# Patient Record
Sex: MALE | Race: WHITE | NOT HISPANIC OR LATINO | Employment: OTHER | URBAN - METROPOLITAN AREA
[De-identification: names, ages, dates, MRNs, and addresses within clinical notes are randomized per-mention and may not be internally consistent; named-entity substitution may affect disease eponyms.]

---

## 2017-02-01 ENCOUNTER — ALLIED HEALTH/NURSE VISIT (OUTPATIENT)
Dept: NURSING | Facility: CLINIC | Age: 72
End: 2017-02-01
Payer: COMMERCIAL

## 2017-02-01 VITALS
SYSTOLIC BLOOD PRESSURE: 130 MMHG | HEART RATE: 45 BPM | RESPIRATION RATE: 14 BRPM | OXYGEN SATURATION: 98 % | TEMPERATURE: 98.4 F | DIASTOLIC BLOOD PRESSURE: 62 MMHG

## 2017-02-01 DIAGNOSIS — E03.9 HYPOTHYROIDISM, UNSPECIFIED TYPE: Chronic | ICD-10-CM

## 2017-02-01 DIAGNOSIS — Z01.30 BP CHECK: ICD-10-CM

## 2017-02-01 DIAGNOSIS — I10 ESSENTIAL HYPERTENSION: Primary | Chronic | ICD-10-CM

## 2017-02-01 DIAGNOSIS — E78.5 HYPERLIPIDEMIA, UNSPECIFIED HYPERLIPIDEMIA TYPE: Chronic | ICD-10-CM

## 2017-02-01 DIAGNOSIS — D64.9 ANEMIA, UNSPECIFIED: ICD-10-CM

## 2017-02-01 LAB
ERYTHROCYTE [DISTWIDTH] IN BLOOD BY AUTOMATED COUNT: 13.6 % (ref 10–15)
HCT VFR BLD AUTO: 39 % (ref 40–53)
HGB BLD-MCNC: 13.4 G/DL (ref 13.3–17.7)
MCH RBC QN AUTO: 34.5 PG (ref 26.5–33)
MCHC RBC AUTO-ENTMCNC: 34.4 G/DL (ref 31.5–36.5)
MCV RBC AUTO: 101 FL (ref 78–100)
PLATELET # BLD AUTO: 205 10E9/L (ref 150–450)
RBC # BLD AUTO: 3.88 10E12/L (ref 4.4–5.9)
WBC # BLD AUTO: 5.9 10E9/L (ref 4–11)

## 2017-02-01 PROCEDURE — 85027 COMPLETE CBC AUTOMATED: CPT | Performed by: INTERNAL MEDICINE

## 2017-02-01 PROCEDURE — 99207 ZZC NO CHARGE NURSE ONLY: CPT

## 2017-02-01 PROCEDURE — 83540 ASSAY OF IRON: CPT | Performed by: INTERNAL MEDICINE

## 2017-02-01 PROCEDURE — 84443 ASSAY THYROID STIM HORMONE: CPT | Performed by: INTERNAL MEDICINE

## 2017-02-01 PROCEDURE — 84439 ASSAY OF FREE THYROXINE: CPT | Performed by: INTERNAL MEDICINE

## 2017-02-01 PROCEDURE — 80061 LIPID PANEL: CPT | Performed by: INTERNAL MEDICINE

## 2017-02-01 PROCEDURE — 36415 COLL VENOUS BLD VENIPUNCTURE: CPT | Performed by: INTERNAL MEDICINE

## 2017-02-01 PROCEDURE — 82728 ASSAY OF FERRITIN: CPT | Performed by: INTERNAL MEDICINE

## 2017-02-01 PROCEDURE — 83550 IRON BINDING TEST: CPT | Performed by: INTERNAL MEDICINE

## 2017-02-01 NOTE — NURSING NOTE
Mendez Greene is a 71 year old male who comes in today for a Blood Pressure check because of ongoing blood pressure monitoring.    *Document pulse and BP  *Use new set of vitals button for multiple readings.  *Use extended vitals for orthostatic    Vitals as recorded, a regular cuff was used.    Patient is taking medication as prescribed  Patient is tolerating medications well.  Patient is monitoring Blood Pressure at home.  Average readings if yes are 150s/70-80s    Current complaints: none    Disposition: follow-up as indicated by MD/AP, results routed to MD/AP, patient reminded to call as needed and patient to continue with the same medication unless notified by Dr. Day.    DONYA Frazier CMA February 1, 2017 10:06 AM

## 2017-02-02 LAB
CHOLEST SERPL-MCNC: 159 MG/DL
FERRITIN SERPL-MCNC: 80 NG/ML (ref 26–388)
HDLC SERPL-MCNC: 78 MG/DL
IRON SATN MFR SERPL: 33 % (ref 15–46)
IRON SERPL-MCNC: 103 UG/DL (ref 35–180)
LDLC SERPL CALC-MCNC: 69 MG/DL
NONHDLC SERPL-MCNC: 81 MG/DL
T4 FREE SERPL-MCNC: 0.88 NG/DL (ref 0.76–1.46)
TIBC SERPL-MCNC: 313 UG/DL (ref 240–430)
TRIGL SERPL-MCNC: 62 MG/DL
TSH SERPL DL<=0.005 MIU/L-ACNC: 8.09 MU/L (ref 0.4–4)

## 2017-02-03 ENCOUNTER — MYC MEDICAL ADVICE (OUTPATIENT)
Dept: FAMILY MEDICINE | Facility: CLINIC | Age: 72
End: 2017-02-03

## 2017-02-03 DIAGNOSIS — E03.9 HYPOTHYROIDISM, UNSPECIFIED TYPE: Primary | Chronic | ICD-10-CM

## 2017-02-06 RX ORDER — LEVOTHYROXINE SODIUM 150 UG/1
150 TABLET ORAL DAILY
Qty: 90 TABLET | Refills: 0 | Status: SHIPPED | OUTPATIENT
Start: 2017-02-06 | End: 2017-04-05

## 2017-02-06 NOTE — TELEPHONE ENCOUNTER
Please see My Chart message below and advise as appropriate.    Savanah Cornelius RN  [employer]

## 2017-02-07 NOTE — TELEPHONE ENCOUNTER
Trudy Goncalves, please see Steven's note below re: Tess not showing [facility]. Can you please help him? Per his note, this has been quite frustrating and he is a very on-top-of-it ladarius. ?Is it [place] or something else?  Thanks!

## 2017-02-07 NOTE — TELEPHONE ENCOUNTER
After some research into scheduling with Tess, [facility] is not set up for scheduling online d/t complexity of patients.  Patients need to send a message to the  or call the clinic directly to schedule so the appropriate appointment can be made.  Call to patient to inform, he verbalizes understanding.  He does not need to make an appointment at this time, he will call when he does.  Becky Rodriguez RN

## 2017-02-19 DIAGNOSIS — I10 ESSENTIAL HYPERTENSION: Chronic | ICD-10-CM

## 2017-02-20 RX ORDER — LISINOPRIL 5 MG/1
TABLET ORAL
Qty: 90 TABLET | Refills: 2 | Status: SHIPPED | OUTPATIENT
Start: 2017-02-20 | End: 2017-04-04

## 2017-02-20 NOTE — TELEPHONE ENCOUNTER
lisinopril (PRINIVIL/ZESTRIL) 5 MG tablet        Last Written Prescription Date: 5/19/2016  Last Fill Quantity: 90, # refills: 2  Last Office Visit with G, P or [facility] prescribing provider: 12/26/2016       Potassium   Date Value Ref Range Status   12/26/2016 4.1 3.4 - 5.3 mmol/L Final     Creatinine   Date Value Ref Range Status   12/26/2016 0.93 0.66 - 1.25 mg/dL Final     BP Readings from Last 3 Encounters:   02/01/17 130/62   12/26/16 106/72   08/01/16 136/68

## 2017-04-04 ENCOUNTER — OFFICE VISIT (OUTPATIENT)
Dept: FAMILY MEDICINE | Facility: CLINIC | Age: 72
End: 2017-04-04
Payer: COMMERCIAL

## 2017-04-04 VITALS
TEMPERATURE: 96.5 F | HEART RATE: 68 BPM | HEIGHT: 71 IN | OXYGEN SATURATION: 98 % | BODY MASS INDEX: 28 KG/M2 | DIASTOLIC BLOOD PRESSURE: 80 MMHG | SYSTOLIC BLOOD PRESSURE: 128 MMHG | WEIGHT: 200 LBS | RESPIRATION RATE: 18 BRPM

## 2017-04-04 DIAGNOSIS — E78.5 HYPERLIPIDEMIA, UNSPECIFIED HYPERLIPIDEMIA TYPE: Chronic | ICD-10-CM

## 2017-04-04 DIAGNOSIS — R06.7 SNEEZING: ICD-10-CM

## 2017-04-04 DIAGNOSIS — I10 ESSENTIAL HYPERTENSION: Chronic | ICD-10-CM

## 2017-04-04 DIAGNOSIS — M62.830 BACK MUSCLE SPASM: ICD-10-CM

## 2017-04-04 DIAGNOSIS — E03.9 HYPOTHYROIDISM, UNSPECIFIED TYPE: Primary | Chronic | ICD-10-CM

## 2017-04-04 PROCEDURE — 80061 LIPID PANEL: CPT | Performed by: INTERNAL MEDICINE

## 2017-04-04 PROCEDURE — 84443 ASSAY THYROID STIM HORMONE: CPT | Performed by: INTERNAL MEDICINE

## 2017-04-04 PROCEDURE — 99214 OFFICE O/P EST MOD 30 MIN: CPT | Performed by: INTERNAL MEDICINE

## 2017-04-04 PROCEDURE — 36415 COLL VENOUS BLD VENIPUNCTURE: CPT | Performed by: INTERNAL MEDICINE

## 2017-04-04 RX ORDER — SIMVASTATIN 10 MG
10 TABLET ORAL AT BEDTIME
Qty: 90 TABLET | Refills: 3 | Status: SHIPPED | OUTPATIENT
Start: 2017-04-04 | End: 2018-03-20

## 2017-04-04 RX ORDER — LISINOPRIL 2.5 MG/1
2.5 TABLET ORAL DAILY
Qty: 90 TABLET | Refills: 3 | Status: SHIPPED | OUTPATIENT
Start: 2017-04-04 | End: 2018-03-20

## 2017-04-04 NOTE — PATIENT INSTRUCTIONS
Labs today  Prescriptions refilled- they will be on file when you need them   Try Flonase or Nasacort daily in each nostril for sneezing/allergies  Try a heating pad?  Keep me updated on your symptoms

## 2017-04-04 NOTE — NURSING NOTE
"Chief Complaint   Patient presents with     Hypertension     Hyperlipidemia       Initial /80 (BP Location: Right arm, Patient Position: Right side, Cuff Size: Adult Regular)  Pulse 68  Temp 96.5  F (35.8  C) (Tympanic)  Resp 18  Ht 5' 10.5\" (1.791 m)  Wt 200 lb (90.7 kg)  SpO2 98%  BMI 28.29 kg/m2 Estimated body mass index is 28.29 kg/(m^2) as calculated from the following:    Height as of this encounter: 5' 10.5\" (1.791 m).    Weight as of this encounter: 200 lb (90.7 kg).  Medication Reconciliation: complete   Viola Garcia CMA (AAMA)      "

## 2017-04-04 NOTE — MR AVS SNAPSHOT
After Visit Summary   4/4/2017    Mendez Greene    MRN: [id number]           Patient Information     Date Of Birth          1945        Visit Information        Provider Department      4/4/2017 9:30 AM Michell Day, DO [facility]        Today's Diagnoses     Hypothyroidism, unspecified type    -  1    Essential hypertension        Hyperlipidemia, unspecified hyperlipidemia type        Back muscle spasm        Sneezing          Care Instructions    Labs today  Prescriptions refilled- they will be on file when you need them   Try Flonase or Nasacort daily in each nostril for sneezing/allergies  Try a heating pad?  Keep me updated on your symptoms          Follow-ups after your visit        Who to contact     If you have questions or need follow up information about today's clinic visit or your schedule please contact [facility] directly at [phone].  Normal or non-critical lab and imaging results will be communicated to you by combionichart, letter or phone within 4 business days after the clinic has received the results. If you do not hear from us within 7 days, please contact the clinic through combionichart or phone. If you have a critical or abnormal lab result, we will notify you by phone as soon as possible.  Submit refill requests through [employer] or call your pharmacy and they will forward the refill request to us. Please allow 3 business days for your refill to be completed.          Additional Information About Your Visit        MyChart Information     [employer] gives you secure access to your electronic health record. If you see a primary care provider, you can also send messages to your care team and make appointments. If you have questions, please call your primary care clinic.  If you do not have a primary care provider, please call [phone] and they will assist you.        Care EveryWhere ID     This is your Care EveryWhere ID. This could be used by other  "organizations to access your [place] medical records  [id number]        Your Vitals Were     Pulse Temperature Respirations Height Pulse Oximetry BMI (Body Mass Index)    68 96.5  F (35.8  C) (Tympanic) 18 5' 10.5\" (1.791 m) 98% 28.29 kg/m2       Blood Pressure from Last 3 Encounters:   04/04/17 128/80   02/01/17 130/62   12/26/16 106/72    Weight from Last 3 Encounters:   04/04/17 200 lb (90.7 kg)   12/26/16 205 lb (93 kg)   08/01/16 200 lb 4.8 oz (90.9 kg)              We Performed the Following     Lipid panel reflex to direct LDL     TSH with free T4 reflex          Today's Medication Changes          These changes are accurate as of: 4/4/17 10:14 AM.  If you have any questions, ask your nurse or doctor.               These medicines have changed or have updated prescriptions.        Dose/Directions    lisinopril 2.5 MG tablet   Commonly known as:  PRINIVIL/Zestril   This may have changed:  See the new instructions.   Used for:  Essential hypertension   Changed by:  Michell Day DO        Dose:  2.5 mg   Take 1 tablet (2.5 mg) by mouth daily   Quantity:  90 tablet   Refills:  3       simvastatin 10 MG tablet   Commonly known as:  ZOCOR   This may have changed:  medication strength   Used for:  Hyperlipidemia, unspecified hyperlipidemia type   Changed by:  Michell Day DO        Dose:  10 mg   Take 1 tablet (10 mg) by mouth At Bedtime   Quantity:  90 tablet   Refills:  3            Where to get your medicines      These medications were sent to [id number] - [facility] [address] AT [facility] [address]  [address], [facility] [address]    Hours:  24-hours Phone:  [phone]     lisinopril 2.5 MG tablet    simvastatin 10 MG tablet                Primary Care Provider Office Phone # Fax #    Michell Day [phone]       [facility] [place] [address]  [place] [address]        Thank you!     Thank you for choosing " [facility]  for your care. Our goal is always to provide you with excellent care. Hearing back from our patients is one way we can continue to improve our services. Please take a few minutes to complete the written survey that you may receive in the mail after your visit with us. Thank you!             Your Updated Medication List - Protect others around you: Learn how to safely use, store and throw away your medicines at www.disposemymeds.org.          This list is accurate as of: 4/4/17 10:14 AM.  Always use your most recent med list.                   Brand Name Dispense Instructions for use    aspirin 81 MG tablet      Take  by mouth daily.       B-12 1000 MCG Caps      Take 1,000 mcg by mouth daily       BENADRYL 25 MG tablet   Generic drug:  diphenhydrAMINE     30 tablet    Take 1 tablet by mouth every 6 hours as needed for itching.       COMPOUND - PHARMACY TO MIX COMPOUNDED MEDICATION    CMPD RX     Hydro 2.5% Cr / Ketocon Crm.  Apply to the effected area on face twice daily as needed.       levothyroxine 150 MCG tablet    SYNTHROID/LEVOTHROID    90 tablet    Take 1 tablet (150 mcg) by mouth daily       lisinopril 2.5 MG tablet    PRINIVIL/Zestril    90 tablet    Take 1 tablet (2.5 mg) by mouth daily       MULTIVITAMIN ADULT PO          PATADAY 0.2 % Soln   Generic drug:  olopatadine HCl      Apply  to eye. 1 drop each eye once daily as needed       simvastatin 10 MG tablet    ZOCOR    90 tablet    Take 1 tablet (10 mg) by mouth At Bedtime       tadalafil 20 MG tablet    CIALIS    6 tablet    Take 1 tablet (20 mg) by mouth daily as needed for erectile dysfunction TAKEN AT LEAST 30 MINUTES BEFORE INTERCOURSE

## 2017-04-04 NOTE — PROGRESS NOTES
"  SUBJECTIVE:                                                    Mendez Greene is a 71 year old male who presents to clinic today for the following health issues:      Hyperlipidemia Follow-Up      Rate your low fat/cholesterol diet?: not monitoring fat    Taking statin?  Yes, no muscle aches from statin    Other lipid medications/supplements?:  none     Hypertension Follow-up      Outpatient blood pressures are being checked at home.  Results are 130's/80's.    Low Salt Diet: not monitoring salt       Amount of exercise or physical activity: walking    Problems taking medications regularly: No    Medication side effects: none    Diet: regular (no restrictions)      Hypothyroidism Follow-up      Since last visit, patient describes the following symptoms: Weight stable, no hair loss, no skin changes, no constipation, no loose stools       Steven has been doing well. He is fasting today.  States he has been getting chilled and will start sneezing \"uncontrollably\" for a few minutes with congestion. He will blow his nose. Says it isn't related to cold weather, its just if he gets a chill which is very short-lived and random. He does have seasonal allergies. Pt states his eyes are itching too. He isn't that concerned about it - just wanted to bring it up.    Complaining of muscular pain across his left flank. He had questions about when/if he should be worried about kidney problems. Says the pain is the most prominent in the morning. He thinks it was d/t when he slept on a very hard bed and is musculoskeletal.   He had two kidney stones a few years ago and said he didn't have associated pain, except for when he passed it.  S/p cystoprostatectomy d/t history of bladder cancer. Denies hematuria. [place] did imaging last year, and he is not scheduled for any future f/u due to stability.  He has been doing well with his meds and increased Levothyroxine dose since our last OV and labs.    Problem list and histories reviewed & " "adjusted, as indicated.    ROS:  Detailed as above    This document serves as a record of the services and decisions personally performed and made by Michell Day DO. It was created on his/her behalf by Janene Mosley, a trained medical scribe. The creation of this document is based the provider's statements to the medical scribe.  Heaven Mosley 9:41 AM, April 4, 2017   OBJECTIVE:                                                    /80 (BP Location: Right arm, Patient Position: Right side, Cuff Size: Adult Regular)  Pulse 68  Temp 96.5  F (35.8  C) (Tympanic)  Resp 18  Ht 5' 10.5\" (1.791 m)  Wt 200 lb (90.7 kg)  SpO2 98%  BMI 28.29 kg/m2  Body mass index is 28.29 kg/(m^2).  Pale nasal passageway, clear drainage   No pain with palpation of the left paraspinal muscles nor rash  Abdomen ND, NT, no HSM   Alert, pleasant, cooperative, NAD  Normal affect     ASSESSMENT/PLAN:                                                    1. Hypothyroidism, unspecified type  Will dose adjust according to labs - had recent dose adjustment  - TSH with free T4 reflex    2. Essential hypertension  Tolerating decreased dose   - lisinopril (PRINIVIL/ZESTRIL) 2.5 MG tablet; Take 1 tablet (2.5 mg) by mouth daily  Dispense: 90 tablet; Refill: 3    3. Hyperlipidemia, unspecified hyperlipidemia type  Had cut down on dose d/t his improved physical activity and weight loss  - simvastatin (ZOCOR) 10 MG tablet; Take 1 tablet (10 mg) by mouth At Bedtime  Dispense: 90 tablet; Refill: 3  - Lipid panel reflex to direct LDL    4. Back muscle spasm  See pt instructions    5. Sneezing  See pt instructions - has h/o allergic rhinitis on problem list too      Patient Instructions   Labs today  Prescriptions refilled- they will be on file when you need them   Try Flonase or Nasacort daily in each nostril for sneezing/allergies  Try a heating pad?  Keep me updated on your symptoms    Follow up this winter for annual exam or " sooner PRN    The information in this document, created by the medical scribe for me, accurately reflects the services I personally performed and the decisions made by me. I have reviewed and approved this document for accuracy prior to leaving the patient care area.  Michell Day DO  9:42 AM, 04/04/17    Michell Day DO  [facility]

## 2017-04-05 LAB
CHOLEST SERPL-MCNC: 154 MG/DL
HDLC SERPL-MCNC: 76 MG/DL
LDLC SERPL CALC-MCNC: 66 MG/DL
NONHDLC SERPL-MCNC: 78 MG/DL
TRIGL SERPL-MCNC: 59 MG/DL
TSH SERPL DL<=0.005 MIU/L-ACNC: 3.88 MU/L (ref 0.4–4)

## 2017-04-05 RX ORDER — LEVOTHYROXINE SODIUM 150 UG/1
150 TABLET ORAL DAILY
Qty: 90 TABLET | Refills: 3 | Status: SHIPPED | OUTPATIENT
Start: 2017-04-05 | End: 2018-05-13

## 2017-06-08 ENCOUNTER — TRANSFERRED RECORDS (OUTPATIENT)
Dept: HEALTH INFORMATION MANAGEMENT | Facility: CLINIC | Age: 72
End: 2017-06-08

## 2017-06-20 ENCOUNTER — TRANSFERRED RECORDS (OUTPATIENT)
Dept: HEALTH INFORMATION MANAGEMENT | Facility: CLINIC | Age: 72
End: 2017-06-20

## 2017-08-23 ENCOUNTER — MYC MEDICAL ADVICE (OUTPATIENT)
Dept: FAMILY MEDICINE | Facility: CLINIC | Age: 72
End: 2017-08-23

## 2017-08-25 ENCOUNTER — OFFICE VISIT (OUTPATIENT)
Dept: FAMILY MEDICINE | Facility: CLINIC | Age: 72
End: 2017-08-25
Payer: COMMERCIAL

## 2017-08-25 VITALS
WEIGHT: 201 LBS | BODY MASS INDEX: 28.14 KG/M2 | HEART RATE: 51 BPM | HEIGHT: 71 IN | SYSTOLIC BLOOD PRESSURE: 110 MMHG | OXYGEN SATURATION: 96 % | TEMPERATURE: 98 F | DIASTOLIC BLOOD PRESSURE: 72 MMHG

## 2017-08-25 DIAGNOSIS — H81.10 BPPV (BENIGN PAROXYSMAL POSITIONAL VERTIGO), UNSPECIFIED LATERALITY: Primary | ICD-10-CM

## 2017-08-25 PROCEDURE — 99213 OFFICE O/P EST LOW 20 MIN: CPT | Performed by: INTERNAL MEDICINE

## 2017-08-25 NOTE — PATIENT INSTRUCTIONS
Meclizine (Antivert) as needed for dizziness  Try to back down a bit on warbler watching for now  Schedule physical therapy for vertigo if not improving  Lab work in spring

## 2017-08-25 NOTE — MR AVS SNAPSHOT
After Visit Summary   8/25/2017    Mendez Gerene    MRN: [id number]           Patient Information     Date Of Birth          1945        Visit Information        Provider Department      8/25/2017 10:00 AM Michell Day,  [facility]        Today's Diagnoses     BPPV (benign paroxysmal positional vertigo), unspecified laterality    -  1      Care Instructions    Meclizine (Antivert) as needed for dizziness  Try to back down a bit on warbler watching for now  Schedule physical therapy for vertigo if not improving  Lab work in spring          Follow-ups after your visit        Additional Services     [facility] PT, HAND, AND CHIROPRACTIC REFERRAL       **This order will print in the [facility] Scheduling Office**    Physical Therapy, Hand Therapy and Chiropractic Care are available through:    *[facility]  *[facility]  *[facility]    Call one number to schedule at any of the above locations: [phone].    Your provider has referred you to: Physical Therapy at [facility] or [facility]    Indication/Reason for Referral: BPPV  Onset of Illness: 2 weeks  Therapy Orders: Evaluate and Treat  Special Programs: None  Special Request: None    Kathy Chavez      Additional Comments for the Therapist or Chiropractor: No    Please be aware that coverage of these services is subject to the terms and limitations of your health insurance plan.  Call member services at your health plan with any benefit or coverage questions.      Please bring the following to your appointment:    *Your personal calendar for scheduling future appointments  *Comfortable clothing                  Who to contact     If you have questions or need follow up information about today's clinic visit or your schedule please contact [facility] [place] directly at [phone].  Normal or non-critical lab and imaging results will be communicated to you by MyChart, letter or phone within  "4 business days after the clinic has received the results. If you do not hear from us within 7 days, please contact the clinic through [employer] or phone. If you have a critical or abnormal lab result, we will notify you by phone as soon as possible.  Submit refill requests through [employer] or call your pharmacy and they will forward the refill request to us. Please allow 3 business days for your refill to be completed.          Additional Information About Your Visit        [employer] MessagingharYoungCurrent Information     [employer] gives you secure access to your electronic health record. If you see a primary care provider, you can also send messages to your care team and make appointments. If you have questions, please call your primary care clinic.  If you do not have a primary care provider, please call [phone] and they will assist you.        Care EveryWhere ID     This is your Care EveryWhere ID. This could be used by other organizations to access your [place] medical records  [id number]        Your Vitals Were     Pulse Temperature Height Pulse Oximetry BMI (Body Mass Index)       51 98  F (36.7  C) (Oral) 5' 10.5\" (1.791 m) 96% 28.43 kg/m2        Blood Pressure from Last 3 Encounters:   08/25/17 110/72   04/04/17 128/80   02/01/17 130/62    Weight from Last 3 Encounters:   08/25/17 201 lb (91.2 kg)   04/04/17 200 lb (90.7 kg)   12/26/16 205 lb (93 kg)              We Performed the Following     [facility] PT, HAND, AND CHIROPRACTIC REFERRAL        Primary Care Provider Office Phone # Fax #    Michell Meagan Day [phone] [address] [facility] [address]  [place] [address]        Equal Access to Services     [facility] HospitalJEANIE : Hadii laura Vivar, waaxda luqadaha, qaybta kaalmargaret hogan . So [facility] [phone].    ATENCIÓN: Si habla español, tiene a anne disposición servicios gratuitos de asistencia lingüística. Llame al [phone].    We comply with applicable federal civil " rights laws and [place] laws. We do not discriminate on the basis of race, color, national origin, age, disability sex, sexual orientation or gender identity.            Thank you!     Thank you for choosing [facility]  for your care. Our goal is always to provide you with excellent care. Hearing back from our patients is one way we can continue to improve our services. Please take a few minutes to complete the written survey that you may receive in the mail after your visit with us. Thank you!             Your Updated Medication List - Protect others around you: Learn how to safely use, store and throw away your medicines at www.disposemymeds.org.          This list is accurate as of: 8/25/17 10:52 AM.  Always use your most recent med list.                   Brand Name Dispense Instructions for use Diagnosis    aspirin 81 MG tablet      Take  by mouth daily.        B-12 1000 MCG Caps      Take 1,000 mcg by mouth daily        BENADRYL 25 MG tablet   Generic drug:  diphenhydrAMINE     30 tablet    Take 1 tablet by mouth every 6 hours as needed for itching.    Allergic rhinitis, cause unspecified       COMPOUNDED NON-CONTROLLED SUBSTANCE - PHARMACY TO MIX COMPOUNDED MEDICATION    CMPD RX     Hydro 2.5% Cr / Ketocon Crm.  Apply to the effected area on face twice daily as needed.        levothyroxine 150 MCG tablet    SYNTHROID/LEVOTHROID    90 tablet    Take 1 tablet (150 mcg) by mouth daily    Hypothyroidism, unspecified type       lisinopril 2.5 MG tablet    PRINIVIL/Zestril    90 tablet    Take 1 tablet (2.5 mg) by mouth daily    Essential hypertension       MULTIVITAMIN ADULT PO           PATADAY 0.2 % Soln   Generic drug:  olopatadine HCl      Apply  to eye. 1 drop each eye once daily as needed        simvastatin 10 MG tablet    ZOCOR    90 tablet    Take 1 tablet (10 mg) by mouth At Bedtime    Hyperlipidemia, unspecified hyperlipidemia type       tadalafil 20 MG tablet    CIALIS    6 tablet    Take  1 tablet (20 mg) by mouth daily as needed for erectile dysfunction TAKEN AT LEAST 30 MINUTES BEFORE INTERCOURSE    Impotence of organic origin

## 2017-08-25 NOTE — PROGRESS NOTES
"  SUBJECTIVE:   Mendez Greene is a 71 year old male who presents to clinic today for the following health issues:      Chief Complaint   Patient presents with     Dizziness     dizziness in the morning when getting out of bed and seems to last the whole day until he lays down back at night for bed; fatigue also present       Dizziness the second he lies down and then just karen \"there\". Sleeps well. Going on 10 days without change. Describes it as a \"tipping\" feeling but otherwise hard to describe.   Has been looking at warblers (looking up) more than usual past week but this isn't something new.  Also mild forehead pressure. Denies vision changes and just had eyes checked. No nausea.   Doesn't feel ill. No falls recently.   No head trauma.  Does have some intermittent right ear tinnitus.  No focal abnormalities noted.   His  recommended Meclizine.    Problem list and histories reviewed & adjusted, as indicated.    ROS:  Detailed as above     OBJECTIVE:     /72 (BP Location: Right arm, Patient Position: Sitting, Cuff Size: Adult Regular)  Pulse 51  Temp 98  F (36.7  C) (Oral)  Ht 5' 10.5\" (1.791 m)  Wt 201 lb (91.2 kg)  SpO2 96%  BMI 28.43 kg/m2  Body mass index is 28.43 kg/(m^2).  Alert, pleasant, robust, NAD, healthy appearing  CN II-XII within normal limits  Normal bilateral upper and lower ext strength and sensation  Normal speech and gait  No dysmetria  TMs normal and canals without cerumen  Heart regular fam and no carotid bruits    ASSESSMENT/PLAN:     BPPV (benign paroxysmal positional vertigo), unspecified laterality  Does sound like BPPV and is mild  Does have some intermittent though chronic right ear tinnitus which may relate to underlying disorder  - [facility] PT, HAND, AND CHIROPRACTIC REFERRAL    Patient Instructions   Meclizine (Antivert) as needed for dizziness  Try to back down a bit on warbler watching for now  Schedule physical therapy for vertigo if not improving  Lab work in " spring    MDM: >15 minutes spent with patient, over 50% time counseling, coordinating care and explaining about nature of the patient's conditions.      Michell Day, [facility]

## 2017-10-09 ENCOUNTER — MYC MEDICAL ADVICE (OUTPATIENT)
Dept: FAMILY MEDICINE | Facility: CLINIC | Age: 72
End: 2017-10-09

## 2017-10-09 DIAGNOSIS — G47.30 ORGANIC SLEEP APNEA: Primary | Chronic | ICD-10-CM

## 2017-10-11 NOTE — TELEPHONE ENCOUNTER
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 I don't have the forms anymore so we'd need a new blank form.  Thanks!

## 2017-10-12 NOTE — TELEPHONE ENCOUNTER
Arleth spoke with Steven and Lex Jernigan yesterday and I'll fill out the form when it comes back to me  When he officially goes on Medicare later this year it is suggested he see a sleep specialist.

## 2017-10-19 NOTE — TELEPHONE ENCOUNTER
I am working with [facility] to find out more specifics on what chart notes are sufficient in order to be compliant with his insurance. I will call Rachel back in the morning when she is available, she is out of the office for the rest of today.     FYI - we received a fax asking for chart notes for insurance compliance. Steven is not currently on Medicare, so we need to find out what is sufficient for his CPAP supply coverage, as Dr. Day did not specifically address his sleep apnea at any recent visits, but only approved his supplies for refill as she did last year.

## 2017-10-20 NOTE — TELEPHONE ENCOUNTER
I was able to reach [facility] regarding the insurance requirements for Steven's CPAP supplies - they need him to make an appointment with Dr. Day so that he can specifically address his sleep apnea with her in order for his supplies to be approved/covered by his insurance. Once this is done, we need to fax the visit notes along with the signed rx to [facility] at Fax # [phone].    I left Steven a message with this information through his wife, and she will have him make an appointment as soon as he is able. According to [facility], he just got new CPAP supplies in August, 2017 so he should be good for a while.

## 2017-12-05 ENCOUNTER — OFFICE VISIT (OUTPATIENT)
Dept: FAMILY MEDICINE | Facility: CLINIC | Age: 72
End: 2017-12-05
Payer: COMMERCIAL

## 2017-12-05 VITALS
WEIGHT: 201 LBS | DIASTOLIC BLOOD PRESSURE: 66 MMHG | HEART RATE: 54 BPM | SYSTOLIC BLOOD PRESSURE: 138 MMHG | TEMPERATURE: 97.7 F | BODY MASS INDEX: 28.14 KG/M2 | HEIGHT: 71 IN | OXYGEN SATURATION: 98 %

## 2017-12-05 DIAGNOSIS — M25.522 LEFT ELBOW PAIN: ICD-10-CM

## 2017-12-05 DIAGNOSIS — Z87.891 FORMER SMOKER: ICD-10-CM

## 2017-12-05 DIAGNOSIS — D49.4 NEOPLASM OF BLADDER: Chronic | ICD-10-CM

## 2017-12-05 DIAGNOSIS — G47.33 OSA (OBSTRUCTIVE SLEEP APNEA): Primary | ICD-10-CM

## 2017-12-05 PROCEDURE — 99213 OFFICE O/P EST LOW 20 MIN: CPT | Performed by: INTERNAL MEDICINE

## 2017-12-05 NOTE — PROGRESS NOTES
"  SUBJECTIVE:   Mendez Greene is a 72 year old male who presents to clinic today for the following health issues:      Follow-up for Obstructive sleep apnea  Per Tess note from Steven: \"I have used my CPAP for over 20 years, and doubt that my sleep apnea has been cured. I do know I can't sleep very long without it.\" Uses CPAP every night and even relies on portable CPAP when travels. His wife had noticed he had apnea and had snoring leading to the initial sleep study years ago. No longer snoring regularly and no witnessed apnea while using CPAP. He has lost some weight over the years by making good lifestyle habits. Uses heated humidity and CPAP set at 7 cmH20. Would be willing to establish with sleep clinic if necessary. Just did refill his supplies.    A few weeks of left elbow soreness and can't stretch out completely; no injury. Is R handed. Not that much of a nuisance; just noticed it. Overall very functional.    Also feels good overall. Weight stable. [facility].    Also recently received a good report with excellent basic labs per [place] in f/u of bladder cancer.    His vertigo we discussed per last OV resolved. As did recent 3 week course of what was likely a bronchitis.      Problem list and histories reviewed & adjusted, as indicated.    ROS:  Detailed as above     OBJECTIVE:     /66 (BP Location: Right arm, Cuff Size: Adult Large)  Pulse 54  Temp 97.7  F (36.5  C) (Oral)  Ht 5' 10.5\" (1.791 m)  Wt 201 lb (91.2 kg)  SpO2 98%  BMI 28.43 kg/m2  Body mass index is 28.43 kg/(m^2).  Wt Readings from Last 2 Encounters:   12/05/17 201 lb (91.2 kg)   08/25/17 201 lb (91.2 kg)   Alert, pleasant, healthy appearing, NAD  Mild fullness left medial elbow / proximal forearm but normal strength with elbow flexion, internal/external rotation, supination/pronation; slight difficulty with full extension      ASSESSMENT/PLAN:     1. CHA (obstructive sleep " "apnea)  Forms completed along with CPAP supplies to be faxed to [facility]  He can establish with the [facility] next year on medicare for refills as needed  - order for DME; Equipment being ordered: CPAP and supplies. LIFETIME need.  Dispense: 1 each; Refill: 0    2. Left elbow pain  Monitor - likely very low grade musculoskeletal tear  Physical therapy if worsens  Ok to hold off on further imaging at this time    3. 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  Labs in the summer due and he will hold off on further [facility] f/u unless symptoms develop or change in urine cytology  We'll scan in a copy of the labs [facility] requests and he'd be willing to go back to [facility] if a change in symptoms or cytology      Patient Instructions   Sleep clinic referral in about a year - [place] ([place])  Ph [phone]     [facility]    If left elbow is getting worse let me know and can start physical therapy     Labs and visit in spring/early summertime      Michell Day, DO  [facility] [place]    "

## 2017-12-05 NOTE — PATIENT INSTRUCTIONS
"Sleep clinic referral in about a year - [place] ([place])  Ph [phone]     [facility]\"    If left elbow is getting worse let me know and can start physical therapy     Labs and visit in spring/early summertime  "

## 2017-12-05 NOTE — NURSING NOTE
"Chief Complaint   Patient presents with     Follow Up For     Organic sleep apnea       Initial /66 (BP Location: Right arm, Cuff Size: Adult Large)  Pulse 54  Temp 97.7  F (36.5  C) (Oral)  Ht 5' 10.5\" (1.791 m)  Wt 201 lb (91.2 kg)  SpO2 98%  BMI 28.43 kg/m2 Estimated body mass index is 28.43 kg/(m^2) as calculated from the following:    Height as of this encounter: 5' 10.5\" (1.791 m).    Weight as of this encounter: 201 lb (91.2 kg).  Medication Reconciliation: complete       Mary Porter CMA      "

## 2017-12-05 NOTE — MR AVS SNAPSHOT
"              After Visit Summary   12/5/2017    Mendez Greene    MRN: [id number]           Patient Information     Date Of Birth          1945        Visit Information        Provider Department      12/5/2017 9:30 AM Michell Day,  [facility]        Today's Diagnoses     CHA (obstructive sleep apnea)    -  1    Essential hypertension        Hypothyroidism, unspecified type        Hyperlipidemia, unspecified hyperlipidemia type        Low vitamin B12 level          Care Instructions    Sleep clinic referral in about a year - [place] ([place])   [phone]     [facility]    If left elbow is getting worse let me know and can start physical therapy     Labs and visit in spring/early summertime          Follow-ups after your visit        Who to contact     If you have questions or need follow up information about today's clinic visit or your schedule please contact [facility] directly at [phone].  Normal or non-critical lab and imaging results will be communicated to you by [employer], letter or phone within 4 business days after the clinic has received the results. If you do not hear from us within 7 days, please contact the clinic through [employer] or phone. If you have a critical or abnormal lab result, we will notify you by phone as soon as possible.  Submit refill requests through [employer] or call your pharmacy and they will forward the refill request to us. Please allow 3 business days for your refill to be completed.          Additional Information About Your Visit        MyChart Information     [employer] gives you secure access to your electronic health record. If you see a primary care provider, you can also send messages to your care team and make appointments. If you have questions, please call your primary care clinic.  If you do not have a primary care provider, please call [phone] and they will assist you.        Care " "EveryWhere ID     This is your Care EveryWhere ID. This could be used by other organizations to access your [place] medical records  [id number]        Your Vitals Were     Pulse Temperature Height Pulse Oximetry BMI (Body Mass Index)       54 97.7  F (36.5  C) (Oral) 5' 10.5\" (1.791 m) 98% 28.43 kg/m2        Blood Pressure from Last 3 Encounters:   12/05/17 138/66   08/25/17 110/72   04/04/17 128/80    Weight from Last 3 Encounters:   12/05/17 201 lb (91.2 kg)   08/25/17 201 lb (91.2 kg)   04/04/17 200 lb (90.7 kg)              Today, you had the following     No orders found for display         Today's Medication Changes          These changes are accurate as of: 12/5/17 10:20 AM.  If you have any questions, ask your nurse or doctor.               Start taking these medicines.        Dose/Directions    order for DME   Used for:  CHA (obstructive sleep apnea)   Started by:  Michell Day DO        Equipment being ordered: CPAP and supplies. LIFETIME need.   Quantity:  1 each   Refills:  0            Where to get your medicines      Some of these will need a paper prescription and others can be bought over the counter.  Ask your nurse if you have questions.     Bring a paper prescription for each of these medications     order for DME                Primary Care Provider Office Phone # Fax #    Michell Day [phone] [address]  [facility] [address]        Equal Access to Services     [facility] JIGAR AH: Hadii alura paris hadasho Sobudali, waaxda luqadaha, qaybta kaalmada deborayada, margaret james . So [facility] [phone].    ATENCIÓN: Si habla español, tiene a anne disposición servicios gratuitos de asistencia lingüística. Llame al [phone].    We comply with applicable federal civil rights laws and [place] laws. We do not discriminate on the basis of race, color, national origin, age, disability, sex, sexual orientation, or gender identity.          "   Thank you!     Thank you for choosing [facility]  for your care. Our goal is always to provide you with excellent care. Hearing back from our patients is one way we can continue to improve our services. Please take a few minutes to complete the written survey that you may receive in the mail after your visit with us. Thank you!             Your Updated Medication List - Protect others around you: Learn how to safely use, store and throw away your medicines at www.disposemymeds.org.          This list is accurate as of: 12/5/17 10:20 AM.  Always use your most recent med list.                   Brand Name Dispense Instructions for use Diagnosis    aspirin 81 MG tablet      Take  by mouth daily.        B-12 1000 MCG Caps      Take 1,000 mcg by mouth daily        BENADRYL 25 MG tablet   Generic drug:  diphenhydrAMINE     30 tablet    Take 1 tablet by mouth every 6 hours as needed for itching.    Allergic rhinitis, cause unspecified       COMPOUNDED NON-CONTROLLED SUBSTANCE - PHARMACY TO MIX COMPOUNDED MEDICATION    CMPD RX     Hydro 2.5% Cr / Ketocon Crm.  Apply to the effected area on face twice daily as needed.        levothyroxine 150 MCG tablet    SYNTHROID/LEVOTHROID    90 tablet    Take 1 tablet (150 mcg) by mouth daily    Hypothyroidism, unspecified type       lisinopril 2.5 MG tablet    PRINIVIL/Zestril    90 tablet    Take 1 tablet (2.5 mg) by mouth daily    Essential hypertension       MULTIVITAMIN ADULT PO           order for DME     1 each    Equipment being ordered: CPAP and supplies. LIFETIME need.    CHA (obstructive sleep apnea)       PATADAY 0.2 % Soln   Generic drug:  olopatadine HCl      Apply  to eye. 1 drop each eye once daily as needed        simvastatin 10 MG tablet    ZOCOR    90 tablet    Take 1 tablet (10 mg) by mouth At Bedtime    Hyperlipidemia, unspecified hyperlipidemia type       tadalafil 20 MG tablet    CIALIS    6 tablet    Take 1 tablet (20 mg) by mouth daily as needed  for erectile dysfunction TAKEN AT LEAST 30 MINUTES BEFORE INTERCOURSE    Impotence of organic origin

## 2017-12-06 ENCOUNTER — DOCUMENTATION ONLY (OUTPATIENT)
Dept: VASCULAR SURGERY | Facility: CLINIC | Age: 72
End: 2017-12-06

## 2018-03-14 ENCOUNTER — DOCUMENTATION ONLY (OUTPATIENT)
Dept: FAMILY MEDICINE | Facility: CLINIC | Age: 73
End: 2018-03-14

## 2018-03-14 DIAGNOSIS — R79.89 LOW VITAMIN B12 LEVEL: ICD-10-CM

## 2018-03-14 DIAGNOSIS — E03.9 HYPOTHYROIDISM, UNSPECIFIED TYPE: Chronic | ICD-10-CM

## 2018-03-14 DIAGNOSIS — Z13.0 SCREENING FOR IRON DEFICIENCY ANEMIA: ICD-10-CM

## 2018-03-14 DIAGNOSIS — E78.5 HYPERLIPIDEMIA, UNSPECIFIED HYPERLIPIDEMIA TYPE: Chronic | ICD-10-CM

## 2018-03-14 DIAGNOSIS — D49.4 NEOPLASM OF BLADDER: Primary | Chronic | ICD-10-CM

## 2018-03-14 NOTE — PROGRESS NOTES
Labs requested per [facility] for follow up YEARLY (last done June 2017):  CBC, CMP, Urine Cytology, B12, Cystatin C

## 2018-03-20 DIAGNOSIS — E78.5 HYPERLIPIDEMIA, UNSPECIFIED HYPERLIPIDEMIA TYPE: Chronic | ICD-10-CM

## 2018-03-20 DIAGNOSIS — I10 ESSENTIAL HYPERTENSION: Chronic | ICD-10-CM

## 2018-03-21 NOTE — TELEPHONE ENCOUNTER
"Requested Prescriptions   Pending Prescriptions Disp Refills     lisinopril (PRINIVIL/ZESTRIL) 2.5 MG tablet [Pharmacy Med Name: LISINOPRIL 2.5MG TABLETS]  Last Written Prescription Date:  4/4/2017  Last Fill Quantity: 90 tablet,  # refills: 3   Last Office Visit: 12/5/2017   Future Office Visit:    90 tablet 0     Sig: TAKE 1 TABLET BY MOUTH DAILY    ACE Inhibitors (Including Combos) Protocol Failed    3/20/2018  4:30 PM       Failed - Normal serum creatinine on file in past 12 months    Recent Labs   Lab Test  12/26/16   1035   CR  0.93            Failed - Normal serum potassium on file in past 12 months    Recent Labs   Lab Test  12/26/16   1035   POTASSIUM  4.1            Passed - Blood pressure under 140/90 in past 12 months    BP Readings from Last 3 Encounters:   12/05/17 138/66   08/25/17 110/72   04/04/17 128/80                Passed - Recent (12 mo) or future (30 days) visit within the authorizing provider's specialty    Patient had office visit in the last 12 months or has a visit in the next 30 days with authorizing provider or within the authorizing provider's specialty.  See \"Patient Info\" tab in inbasket, or \"Choose Columns\" in Meds & Orders section of the refill encounter.           Passed - Patient is age 18 or older        simvastatin (ZOCOR) 10 MG tablet [Pharmacy Med Name: SIMVASTATIN 10MG TABLETS]  Last Written Prescription Date:  4/4/2017  Last Fill Quantity: 90 tablet,  # refills: 3   Last Office Visit: 12/5/2017   Future Office Visit:    90 tablet 0     Sig: TAKE 1 TABLET(10 MG) BY MOUTH AT BEDTIME    Statins Protocol Passed    3/20/2018  4:30 PM       Passed - LDL on file in past 12 months    Recent Labs   Lab Test  04/04/17   1020   LDL  66            Passed - No abnormal creatine kinase in past 12 months    No lab results found.            Passed - Recent (12 mo) or future (30 days) visit within the authorizing provider's specialty    Patient had office visit in the last 12 months or has a " "visit in the next 30 days with authorizing provider or within the authorizing provider's specialty.  See \"Patient Info\" tab in inbasket, or \"Choose Columns\" in Meds & Orders section of the refill encounter.           Passed - Patient is age 18 or older          "

## 2018-03-22 ENCOUNTER — MYC MEDICAL ADVICE (OUTPATIENT)
Dept: FAMILY MEDICINE | Facility: CLINIC | Age: 73
End: 2018-03-22

## 2018-03-22 RX ORDER — SIMVASTATIN 10 MG
TABLET ORAL
Qty: 90 TABLET | Refills: 0 | Status: SHIPPED | OUTPATIENT
Start: 2018-03-22 | End: 2018-05-18

## 2018-03-22 RX ORDER — LISINOPRIL 2.5 MG/1
TABLET ORAL
Qty: 90 TABLET | Refills: 0 | Status: SHIPPED | OUTPATIENT
Start: 2018-03-22 | End: 2018-05-16

## 2018-03-22 NOTE — TELEPHONE ENCOUNTER
Medication is being filled for 1 time refill only due to:  Patient needs to be seen because due for physical and fasting labs in April.

## 2018-04-06 ENCOUNTER — MYC MEDICAL ADVICE (OUTPATIENT)
Dept: FAMILY MEDICINE | Facility: CLINIC | Age: 73
End: 2018-04-06

## 2018-05-13 DIAGNOSIS — E03.9 HYPOTHYROIDISM, UNSPECIFIED TYPE: Chronic | ICD-10-CM

## 2018-05-14 NOTE — TELEPHONE ENCOUNTER
"levothyroxine (SYNTHROID/LEVOTHROID) 150 MCG tablet 90 tablet 3 4/5/2017         Last Written Prescription Date:  04/05/2017  Last Fill Quantity: 90,  # refills: 3   Last office visit: 12/5/2017 with prescribing provider:     Future Office Visit: 05/16/2018  Next 5 appointments (look out 90 days)     May 16, 2018  9:30 AM CDT   Tess Sahu with Michell Day, DO   [facility]    [address] [facility] [address]   [phone]                 Requested Prescriptions   Pending Prescriptions Disp Refills     levothyroxine (SYNTHROID/LEVOTHROID) 150 MCG tablet [Pharmacy Med Name: LEVOTHYROXINE 0.150MG (150MCG) TAB] 90 tablet 0     Sig: TAKE 1 TABLET BY MOUTH DAILY    Thyroid Protocol Failed    5/13/2018  3:12 AM       Failed - Normal TSH on file in past 12 months    Recent Labs   Lab Test  04/04/17   1020   TSH  3.88             Passed - Patient is 12 years or older       Passed - Recent (12 mo) or future (30 days) visit within the authorizing provider's specialty    Patient had office visit in the last 12 months or has a visit in the next 30 days with authorizing provider or within the authorizing provider's specialty.  See \"Patient Info\" tab in inbasket, or \"Choose Columns\" in Meds & Orders section of the refill encounter.              "

## 2018-05-15 RX ORDER — LEVOTHYROXINE SODIUM 150 UG/1
TABLET ORAL
Qty: 90 TABLET | Refills: 0 | Status: SHIPPED | OUTPATIENT
Start: 2018-05-15 | End: 2018-05-18

## 2018-05-15 NOTE — TELEPHONE ENCOUNTER
Prescription approved per [facility] Refill Protocol.  Karen Fink RN

## 2018-05-16 ENCOUNTER — OFFICE VISIT (OUTPATIENT)
Dept: FAMILY MEDICINE | Facility: CLINIC | Age: 73
End: 2018-05-16
Payer: COMMERCIAL

## 2018-05-16 VITALS
TEMPERATURE: 97.8 F | HEIGHT: 70 IN | HEART RATE: 51 BPM | SYSTOLIC BLOOD PRESSURE: 136 MMHG | BODY MASS INDEX: 28.63 KG/M2 | OXYGEN SATURATION: 99 % | WEIGHT: 200 LBS | DIASTOLIC BLOOD PRESSURE: 60 MMHG

## 2018-05-16 DIAGNOSIS — Z13.0 SCREENING FOR IRON DEFICIENCY ANEMIA: ICD-10-CM

## 2018-05-16 DIAGNOSIS — R79.89 LOW VITAMIN B12 LEVEL: ICD-10-CM

## 2018-05-16 DIAGNOSIS — I10 ESSENTIAL HYPERTENSION: Chronic | ICD-10-CM

## 2018-05-16 DIAGNOSIS — E03.9 HYPOTHYROIDISM, UNSPECIFIED TYPE: Chronic | ICD-10-CM

## 2018-05-16 DIAGNOSIS — D49.4 NEOPLASM OF BLADDER: Chronic | ICD-10-CM

## 2018-05-16 DIAGNOSIS — Z12.11 SPECIAL SCREENING FOR MALIGNANT NEOPLASMS, COLON: ICD-10-CM

## 2018-05-16 DIAGNOSIS — Z00.00 MEDICARE ANNUAL WELLNESS VISIT, SUBSEQUENT: Primary | ICD-10-CM

## 2018-05-16 DIAGNOSIS — E78.5 HYPERLIPIDEMIA, UNSPECIFIED HYPERLIPIDEMIA TYPE: Chronic | ICD-10-CM

## 2018-05-16 DIAGNOSIS — Z23 NEED FOR VACCINATION: ICD-10-CM

## 2018-05-16 LAB
ERYTHROCYTE [DISTWIDTH] IN BLOOD BY AUTOMATED COUNT: 14.2 % (ref 10–15)
HCT VFR BLD AUTO: 37.7 % (ref 40–53)
HGB BLD-MCNC: 13.1 G/DL (ref 13.3–17.7)
MCH RBC QN AUTO: 34.7 PG (ref 26.5–33)
MCHC RBC AUTO-ENTMCNC: 34.7 G/DL (ref 31.5–36.5)
MCV RBC AUTO: 100 FL (ref 78–100)
PLATELET # BLD AUTO: 217 10E9/L (ref 150–450)
RBC # BLD AUTO: 3.78 10E12/L (ref 4.4–5.9)
VIT B12 SERPL-MCNC: 407 PG/ML (ref 193–986)
WBC # BLD AUTO: 3.9 10E9/L (ref 4–11)

## 2018-05-16 PROCEDURE — 90750 HZV VACC RECOMBINANT IM: CPT | Performed by: INTERNAL MEDICINE

## 2018-05-16 PROCEDURE — 82728 ASSAY OF FERRITIN: CPT | Performed by: INTERNAL MEDICINE

## 2018-05-16 PROCEDURE — 82610 CYSTATIN C: CPT | Mod: 90 | Performed by: INTERNAL MEDICINE

## 2018-05-16 PROCEDURE — 99000 SPECIMEN HANDLING OFFICE-LAB: CPT | Performed by: INTERNAL MEDICINE

## 2018-05-16 PROCEDURE — 84443 ASSAY THYROID STIM HORMONE: CPT | Performed by: INTERNAL MEDICINE

## 2018-05-16 PROCEDURE — 85027 COMPLETE CBC AUTOMATED: CPT | Performed by: INTERNAL MEDICINE

## 2018-05-16 PROCEDURE — 80053 COMPREHEN METABOLIC PANEL: CPT | Performed by: INTERNAL MEDICINE

## 2018-05-16 PROCEDURE — 88112 CYTOPATH CELL ENHANCE TECH: CPT | Performed by: INTERNAL MEDICINE

## 2018-05-16 PROCEDURE — 36415 COLL VENOUS BLD VENIPUNCTURE: CPT | Performed by: INTERNAL MEDICINE

## 2018-05-16 PROCEDURE — 82306 VITAMIN D 25 HYDROXY: CPT | Performed by: INTERNAL MEDICINE

## 2018-05-16 PROCEDURE — 80061 LIPID PANEL: CPT | Performed by: INTERNAL MEDICINE

## 2018-05-16 PROCEDURE — 90471 IMMUNIZATION ADMIN: CPT | Performed by: INTERNAL MEDICINE

## 2018-05-16 PROCEDURE — 82607 VITAMIN B-12: CPT | Performed by: INTERNAL MEDICINE

## 2018-05-16 PROCEDURE — 99397 PER PM REEVAL EST PAT 65+ YR: CPT | Mod: 25 | Performed by: INTERNAL MEDICINE

## 2018-05-16 PROCEDURE — 00000103 ZZHCL STATISTIC CYTO-FISH QC 88120: Performed by: INTERNAL MEDICINE

## 2018-05-16 RX ORDER — LISINOPRIL 2.5 MG/1
2.5 TABLET ORAL DAILY
Qty: 90 TABLET | Refills: 3 | Status: SHIPPED | OUTPATIENT
Start: 2018-05-16 | End: 2019-05-06

## 2018-05-16 NOTE — MR AVS SNAPSHOT
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        Hyperlipidemia, unspecified hyperlipidemia type        Low vitamin B12 level        Screening for iron deficiency anemia        Hypothyroidism, unspecified type        Essential hypertension        Special screening for malignant neoplasms, colon          Care Instructions    Labs and Shingrix today    Shingrix  is:  1) Recommended for healthy adults aged 50 years and older to help prevent shingles and its related complications such as pain related to the shingles virus  2) Recommended for adults who previously received the previous shingles vaccine (Zostavax )  3) The preferred vaccine for helping to prevent shingles and its related complications  4) It is a series of TWO vaccines with the second dose administered between 2-6 months after the first dose in this series  5) PLEASE CHECK COST WITH YOUR INSURANCE COMPANY OR YOUR LOCAL PHARMACY AS EACH DOSE MAY BE AS MUCH AS APPROXIMATELY $180 IF NOT COVERED BY INSURANCE    Consider [facility]    You'll get a call to schedule colonoscopy    Follow up at least annually or sooner as needed    Preventive Health Recommendations:   Male Ages 65 and over    Yearly exam:             See your health care provider every year in order to  o   Review health changes.   o   Discuss preventive care.    o   Review your medicines if your doctor has prescribed any.    Talk with your health care provider about whether you should have a test to screen for prostate cancer  (PSA).    Every 3 years, have a diabetes test (fasting glucose). If you are at risk for diabetes, you should have this test more often.    Every 5 years, have a cholesterol test. Have this test more often if you are at risk for high cholesterol or heart disease.     Every 10 years, have a colonoscopy. Or, have a yearly FIT test (stool test). These exams will check for colon cancer.    Talk to with your health care provider about screening for Abdominal Aortic Aneurysm if you have a family history of AAA or have a history of smoking.  Shots:     Get a flu shot each year.     Get a tetanus shot every 10 years.     Talk to your doctor about your pneumonia vaccines. There are now two you should receive - Pneumovax (PPSV 23) and Prevnar (PCV 13).    Talk to your doctor about a shingles vaccine.     Talk to your doctor about the hepatitis B vaccine.  Nutrition:     Eat at least 5 servings of fruits and vegetables each day.     Eat whole-grain bread, whole-wheat pasta and brown rice instead of white grains and rice.     Talk to your doctor about Calcium and Vitamin D.   Lifestyle    Exercise for at least 150 minutes a week (30 minutes a day, 5 days a week). This will help you control your weight and prevent disease.     Limit alcohol to one drink per day.     No smoking.     Wear sunscreen to prevent skin cancer.     See your dentist every six months for an exam and cleaning.     See your eye doctor every 1 to 2 years to screen for conditions such as glaucoma, macular degeneration and cataracts.          Follow-ups after your visit        Additional Services     GASTROENTEROLOGY ADULT REF PROCEDURE ONLY Jose Diaz [phone]; No Provider Preference                 Who to contact     If you have questions or need follow up information about today's clinic visit or your schedule please contact [facility] directly at [phone].  Normal or non-critical lab and imaging results will be communicated to  "you by MyChart, letter or phone within 4 business days after the clinic has received the results. If you do not hear from us within 7 days, please contact the clinic through [employer] or phone. If you have a critical or abnormal lab result, we will notify you by phone as soon as possible.  Submit refill requests through [employer] or call your pharmacy and they will forward the refill request to us. Please allow 3 business days for your refill to be completed.          Additional Information About Your Visit        [employer] Information     [employer] gives you secure access to your electronic health record. If you see a primary care provider, you can also send messages to your care team and make appointments. If you have questions, please call your primary care clinic.  If you do not have a primary care provider, please call [phone] and they will assist you.        Care EveryWhere ID     This is your Care EveryWhere ID. This could be used by other organizations to access your [place] medical records  [id number]        Your Vitals Were     Pulse Temperature Height Pulse Oximetry BMI (Body Mass Index)       51 97.8  F (36.6  C) (Tympanic) 5' 9.75\" (1.772 m) 99% 28.9 kg/m2        Blood Pressure from Last 3 Encounters:   05/16/18 136/60   12/05/17 138/66   08/25/17 110/72    Weight from Last 3 Encounters:   05/16/18 200 lb (90.7 kg)   12/05/17 201 lb (91.2 kg)   08/25/17 201 lb (91.2 kg)              We Performed the Following     CBC with platelets     Comprehensive metabolic panel     Cystatin C with GFR     Cytology non gyn     Ferritin     GASTROENTEROLOGY ADULT REF PROCEDURE ONLY Jose Diaz [phone]; No Provider Preference     Lipid panel reflex to direct LDL Fasting     TSH with free T4 reflex     Vitamin B12     Vitamin D Deficiency          Today's Medication Changes          These changes are accurate as of 5/16/18 10:20 AM.  If you have any questions, ask your nurse or doctor.               These " medicines have changed or have updated prescriptions.        Dose/Directions    lisinopril 2.5 MG tablet   Commonly known as:  PRINIVIL/Zestril   This may have changed:  See the new instructions.   Used for:  Essential hypertension   Changed by:  Michell Day DO        Dose:  2.5 mg   Take 1 tablet (2.5 mg) by mouth daily   Quantity:  90 tablet   Refills:  3            Where to get your medicines      These medications were sent to [id number] - [facility] [address] AT [facility] [address]  [address], [facility] [address]     Phone:  [phone]     lisinopril 2.5 MG tablet                Primary Care Provider Office Phone # Fax #    Michell Day [phone] [address] [facility] [address]  [facility] [address]        Equal Access to Services     [facility] AH: Hadii aad ku hadasho Soomaali, waaxda luqadaha, qaybta kaalmada adeegyada, waxay idiin hayaan adeeg khmarek james ah. So [facility] [phone].    ATENCIÓN: Si habla español, tiene a anne disposición servicios gratuitos de asistencia lingüística. Toby al [phone].    We comply with applicable federal civil rights laws and [place] laws. We do not discriminate on the basis of race, color, national origin, age, disability, sex, sexual orientation, or gender identity.            Thank you!     Thank you for choosing [facility]  for your care. Our goal is always to provide you with excellent care. Hearing back from our patients is one way we can continue to improve our services. Please take a few minutes to complete the written survey that you may receive in the mail after your visit with us. Thank you!             Your Updated Medication List - Protect others around you: Learn how to safely use, store and throw away your medicines at www.disposemymeds.org.          This list is accurate as of 5/16/18 10:20 AM.  Always use your most recent med list.                   Brand Name Dispense  Instructions for use Diagnosis    aspirin 81 MG tablet      Take  by mouth daily.        B-12 1000 MCG Caps      Take 1,000 mcg by mouth daily        BENADRYL 25 MG tablet   Generic drug:  diphenhydrAMINE     30 tablet    Take 1 tablet by mouth every 6 hours as needed for itching.    Allergic rhinitis, cause unspecified       COMPOUNDED NON-CONTROLLED SUBSTANCE - PHARMACY TO MIX COMPOUNDED MEDICATION    CMPD RX     Hydro 2.5% Cr / Ketocon Crm.  Apply to the effected area on face twice daily as needed.        levothyroxine 150 MCG tablet    SYNTHROID/LEVOTHROID    90 tablet    TAKE 1 TABLET BY MOUTH DAILY    Hypothyroidism, unspecified type       lisinopril 2.5 MG tablet    PRINIVIL/Zestril    90 tablet    Take 1 tablet (2.5 mg) by mouth daily    Essential hypertension       MULTIVITAMIN ADULT PO           order for DME     1 each    Equipment being ordered: CPAP and supplies. LIFETIME need.    CHA (obstructive sleep apnea)       PATADAY 0.2 % Soln   Generic drug:  olopatadine HCl      Apply  to eye. 1 drop each eye once daily as needed        simvastatin 10 MG tablet    ZOCOR    90 tablet    TAKE 1 TABLET(10 MG) BY MOUTH AT BEDTIME    Hyperlipidemia, unspecified hyperlipidemia type       tadalafil 20 MG tablet    CIALIS    6 tablet    Take 1 tablet (20 mg) by mouth daily as needed for erectile dysfunction TAKEN AT LEAST 30 MINUTES BEFORE INTERCOURSE    Impotence of organic origin

## 2018-05-16 NOTE — PROGRESS NOTES
SUBJECTIVE:   Mendez Greene is a 72 year old male who presents for Preventive Visit.  Are you in the first 12 months of your Medicare Part B coverage?  No    Healthy Habits:    Do you get at least three servings of calcium containing foods daily (dairy, green leafy vegetables, etc.)? Yes     Amount of exercise or daily activities, outside of work: 7 day(s) per week - lots of walking    Problems taking medications regularly No    Medication side effects: No    Have you had an eye exam in the past two years? Yes     Do you see a dentist twice per year? Yes     Do you have sleep apnea, excessive snoring or daytime drowsiness? Yes-uses CPAP and works well for him      Ability to successfully perform activities of daily living: Yes, no assistance needed    Home safety:  none identified     Hearing impairment: Yes, tinnitus in the right ear    Fall risk:  Fallen 2 or more times in the past year?: No  Any fall with injury in the past year?: No      COGNITIVE SCREEN  1) Repeat 3 items (Banana, Sunrise, Chair)    2) Clock draw: NORMAL  3) 3 item recall: Recalls 1 object   Results: NORMAL clock, 1-2 items recalled: COGNITIVE IMPAIRMENT LESS LIKELY    Mini-CogTM Copyright S Zane. Licensed by the author for use in [facility]; reprinted with permission (gerald@.[facility]). All rights reserved.      Steven is here today for follow up and routine labs now that he was cleared by [place] to follow in primary care for his h/o bladder cancer. No changes in neobladder.  Was having some pain in different spots along right lateral leg from greater trochanter down and sounds like self-limited IT band problems - now better on its own. Still walks a lot.  Would like Shingrix and has checked insurance and it is covered.  Will travel to [place] some this summer with his wife.  Hasn't looked into [facility] with [facility] yet but thinks he may because he did have agent orange exposure.  Not using Cialis at all.  No family h/o colon  "cancer. Updated family history. He would be willing to do colonoscopy. He never did it before b/c of his bladder cancer and all the CTs he had there at [place] and he thought that was \"good enough\" for awhile but now he has decided it would be a good idea.    Fam Hx        Social History   Substance Use Topics     Smoking status: Former Smoker     Quit date: 9/1/1987     Smokeless tobacco: Never Used     Alcohol use 0.0 oz/week     0 Standard drinks or equivalent per week      Comment: Occas wine       If you drink alcohol do you typically have >3 drinks per day or >7 drinks per week? No                        Today's PHQ-2 Score:   PHQ-2 ( 1999 Pfizer) 5/16/2018 12/5/2017   Q1: Little interest or pleasure in doing things 0 0   Q2: Feeling down, depressed or hopeless 0 0   PHQ-2 Score 0 0       Do you feel safe in your environment - Yes    Do you have a Health Care Directive?: Yes: Patient states has Advance Directive and will bring in a copy to clinic.    Current providers sharing in care for this patient include:   Patient Care Team:  Michell Day DO as PCP - General (Internal Medicine)    The following health maintenance items are reviewed in Epic and correct as of today:  Health Maintenance   Topic Date Due     ADVANCE DIRECTIVE PLANNING Q5 YRS  06/16/2016     TSH W/ FREE T4 REFLEX Q1 YEAR  05/16/2019     FALL RISK ASSESSMENT  05/16/2019     COLON CANCER SCREEN (SYSTEM ASSIGNED)  02/17/2022     LIPID SCREEN Q5 YR MALE (SYSTEM ASSIGNED)  05/16/2023     TETANUS IMMUNIZATION (SYSTEM ASSIGNED)  12/26/2026     PNEUMOCOCCAL  Completed     INFLUENZA VACCINE  Completed     AORTIC ANEURYSM SCREENING (SYSTEM ASSIGNED)  Completed       ROS:  Comprehensive ROS negative unless as stated above in HPI.     OBJECTIVE:   /60  Pulse 51  Temp 97.8  F (36.6  C) (Tympanic)  Ht 5' 9.75\" (1.772 m)  Wt 200 lb (90.7 kg)  SpO2 99%  BMI 28.9 kg/m2 Estimated body mass index is 28.9 kg/(m^2) as calculated from the " "following:    Height as of this encounter: 5' 9.75\" (1.772 m).    Weight as of this encounter: 200 lb (90.7 kg).  EXAM:   GENERAL: healthy, alert and no distress, robust  EYES: Eyes grossly normal to inspection, PERRL and conjunctivae and sclerae normal  HENT: ear canals and TM's normal, nose and mouth without ulcers or lesions  NECK: no adenopathy, no asymmetry, masses, or scars and thyroid normal to palpation  RESP: lungs clear to auscultation - no rales, rhonchi or wheezes  CV: heart regular fam, no edema, no carotid bruits  ABDOMEN: soft, nontender, no hepatosplenomegaly, no masses and bowel sounds normal  MS: no gross musculoskeletal defects noted, no edema  SKIN: actinic prone skin but no obvious concerning lesions  NEURO: Normal strength and tone, mentation intact and speech normal  PSYCH: mentation appears normal, affect normal/bright    ASSESSMENT / PLAN:   1. Medicare annual wellness visit, subsequent  Needs 1st colonoscopy but otherwise will be up to date on preventative health care    2. 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 Hyperlipidemia, unspecified hyperlipidemia type  Labs returned stable so sent in same dose of Simvastatin 10 mg nightly  - Lipid panel reflex to direct LDL Fasting    4. Low vitamin B12 level  Taking supplementation  - Vitamin B12    5. Screening for iron deficiency anemia  - Ferritin    6. Hypothyroidism, unspecified type  Labs returned stable - Rx sent in as same dose Levothyroxine 150 mcg daily  - TSH with free T4 reflex    7. Essential hypertension  At goal  - lisinopril (PRINIVIL/ZESTRIL) 2.5 MG tablet; Take 1 tablet (2.5 mg) by mouth daily  Dispense: 90 tablet; Refill: 3    8. Special screening for " "malignant neoplasms, colon  Willing to have his first ever colonoscopy  - GASTROENTEROLOGY ADULT REF PROCEDURE ONLY Jose Diaz [phone]; No Provider Preference    End of Life Planning:  Patient currently has an advanced directive:  Yes: Patient states has Advance Directive and will bring in a copy to clinic.    COUNSELING:  Reviewed preventive health counseling, as reflected in patient instructions       Regular exercise       Healthy diet/nutrition  Estimated body mass index is 28.9 kg/(m^2) as calculated from the following:    Height as of this encounter: 5' 9.75\" (1.772 m).    Weight as of this encounter: 200 lb (90.7 kg).   reports that he quit smoking about 30 years ago. He has never used smokeless tobacco.      Appropriate preventive services were discussed with this patient, including applicable screening as appropriate for cardiovascular disease, diabetes, osteopenia/osteoporosis, and glaucoma.  As appropriate for age/gender, discussed screening for colorectal cancer, prostate cancer, breast cancer, and cervical cancer. Checklist reviewing preventive services available has been given to the patient.    Reviewed patients plan of care and provided an AVS. The Intermediate Care Plan ( asthma action plan, low back pain action plan, and migraine action plan) for Mendez meets the Care Plan requirement. This Care Plan has been established and reviewed with the Patient.    Patient Instructions   Labs and Shingrix today    Shingrix  is:  1) Recommended for healthy adults aged 50 years and older to help prevent shingles and its related complications such as pain related to the shingles virus  2) Recommended for adults who previously received the previous shingles vaccine (Zostavax )  3) The preferred vaccine for helping to prevent shingles and its related complications  4) It is a series of TWO vaccines with the second dose administered between 2-6 months after the first dose in this series  5) PLEASE " CHECK COST WITH YOUR INSURANCE COMPANY OR YOUR LOCAL PHARMACY AS EACH DOSE MAY BE AS MUCH AS APPROXIMATELY $180 IF NOT COVERED BY INSURANCE    [facility]    You'll get a call to schedule colonoscopy    Follow up at least annually or sooner as needed      Michell Day, DO  [facility]

## 2018-05-16 NOTE — NURSING NOTE
Screening Questionnaire for Adult Immunization    Are you sick today?   No   Do you have allergies to medications, food, a vaccine component or latex?   No   Have you ever had a serious reaction after receiving a vaccination?   No   Do you have a long-term health problem with heart disease, lung disease, asthma, kidney disease, metabolic disease (e.g. diabetes), anemia, or other blood disorder?   No   Do you have cancer, leukemia, HIV/AIDS, or any other immune system problem?   No   In the past 3 months, have you taken medications that affect  your immune system, such as prednisone, other steroids, or anticancer drugs; drugs for the treatment of rheumatoid arthritis, Crohn s disease, or psoriasis; or have you had radiation treatments?   No   Have you had a seizure, or a brain or other nervous system problem?   No   During the past year, have you received a transfusion of blood or blood     products, or been given immune (gamma) globulin or antiviral drug?   No   For women: Are you pregnant or is there a chance you could become        pregnant during the next month?   No   Have you received any vaccinations in the past 4 weeks?   No     Immunization questionnaire answers were all negative.        Per orders of Dr. Day, injection of Shingrix given by Juliette Dudley. Patient instructed to remain in clinic for 15 minutes afterwards, and to report any adverse reaction to me immediately.  Prior to injection verified patient identity using patient's name and date of birth.  Screening performed by Juliette Dudley on 5/16/2018 at 12:48 PM.  Juliette Dudley, [facility]

## 2018-05-16 NOTE — PATIENT INSTRUCTIONS
Labs and Shingrix today    Shingrix  is:  1) Recommended for healthy adults aged 50 years and older to help prevent shingles and its related complications such as pain related to the shingles virus  2) Recommended for adults who previously received the previous shingles vaccine (Zostavax )  3) The preferred vaccine for helping to prevent shingles and its related complications  4) It is a series of TWO vaccines with the second dose administered between 2-6 months after the first dose in this series  5) PLEASE CHECK COST WITH YOUR INSURANCE COMPANY OR YOUR LOCAL PHARMACY AS EACH DOSE MAY BE AS MUCH AS APPROXIMATELY $180 IF NOT COVERED BY INSURANCE    Consider [facility]    You'll get a call to schedule colonoscopy    Follow up at least annually or sooner as needed    Preventive Health Recommendations:   Male Ages 65 and over    Yearly exam:             See your health care provider every year in order to  o   Review health changes.   o   Discuss preventive care.    o   Review your medicines if your doctor has prescribed any.    Talk with your health care provider about whether you should have a test to screen for prostate cancer (PSA).    Every 3 years, have a diabetes test (fasting glucose). If you are at risk for diabetes, you should have this test more often.    Every 5 years, have a cholesterol test. Have this test more often if you are at risk for high cholesterol or heart disease.     Every 10 years, have a colonoscopy. Or, have a yearly FIT test (stool test). These exams will check for colon cancer.    Talk to with your health care provider about screening for Abdominal Aortic Aneurysm if you have a family history of AAA or have a history of smoking.  Shots:     Get a flu shot each year.     Get a tetanus shot every 10 years.     Talk to your doctor about your pneumonia vaccines. There are now two you should receive - Pneumovax (PPSV 23) and Prevnar (PCV 13).    Talk to your doctor about a shingles  vaccine.     Talk to your doctor about the hepatitis B vaccine.  Nutrition:     Eat at least 5 servings of fruits and vegetables each day.     Eat whole-grain bread, whole-wheat pasta and brown rice instead of white grains and rice.     Talk to your doctor about Calcium and Vitamin D.   Lifestyle    Exercise for at least 150 minutes a week (30 minutes a day, 5 days a week). This will help you control your weight and prevent disease.     Limit alcohol to one drink per day.     No smoking.     Wear sunscreen to prevent skin cancer.     See your dentist every six months for an exam and cleaning.     See your eye doctor every 1 to 2 years to screen for conditions such as glaucoma, macular degeneration and cataracts.

## 2018-05-17 LAB
ALBUMIN SERPL-MCNC: 3.6 G/DL (ref 3.4–5)
ALP SERPL-CCNC: 44 U/L (ref 40–150)
ALT SERPL W P-5'-P-CCNC: 23 U/L (ref 0–70)
ANION GAP SERPL CALCULATED.3IONS-SCNC: 9 MMOL/L (ref 3–14)
AST SERPL W P-5'-P-CCNC: 16 U/L (ref 0–45)
BILIRUB SERPL-MCNC: 0.7 MG/DL (ref 0.2–1.3)
BUN SERPL-MCNC: 16 MG/DL (ref 7–30)
CALCIUM SERPL-MCNC: 8.8 MG/DL (ref 8.5–10.1)
CHLORIDE SERPL-SCNC: 107 MMOL/L (ref 94–109)
CHOLEST SERPL-MCNC: 142 MG/DL
CO2 SERPL-SCNC: 22 MMOL/L (ref 20–32)
CREAT SERPL-MCNC: 0.78 MG/DL (ref 0.66–1.25)
DEPRECATED CALCIDIOL+CALCIFEROL SERPL-MC: 38 UG/L (ref 20–75)
FERRITIN SERPL-MCNC: 86 NG/ML (ref 26–388)
GFR SERPL CREATININE-BSD FRML MDRD: >90 ML/MIN/1.7M2
GLUCOSE SERPL-MCNC: 89 MG/DL (ref 70–99)
HDLC SERPL-MCNC: 66 MG/DL
LDLC SERPL CALC-MCNC: 67 MG/DL
NONHDLC SERPL-MCNC: 76 MG/DL
POTASSIUM SERPL-SCNC: 4.5 MMOL/L (ref 3.4–5.3)
PROT SERPL-MCNC: 6.5 G/DL (ref 6.8–8.8)
SODIUM SERPL-SCNC: 138 MMOL/L (ref 133–144)
TRIGL SERPL-MCNC: 43 MG/DL
TSH SERPL DL<=0.005 MIU/L-ACNC: 0.66 MU/L (ref 0.4–4)

## 2018-05-18 LAB — LAB SCANNED RESULT: NORMAL

## 2018-05-18 RX ORDER — LEVOTHYROXINE SODIUM 150 UG/1
150 TABLET ORAL DAILY
Qty: 90 TABLET | Refills: 3 | Status: SHIPPED | OUTPATIENT
Start: 2018-05-18 | End: 2019-05-06

## 2018-05-18 RX ORDER — SIMVASTATIN 10 MG
TABLET ORAL
Qty: 90 TABLET | Refills: 3 | Status: SHIPPED | OUTPATIENT
Start: 2018-05-18 | End: 2019-05-06

## 2018-05-23 LAB
COPATH REPORT: NORMAL
COPATH REPORT: NORMAL

## 2018-05-25 ENCOUNTER — MYC MEDICAL ADVICE (OUTPATIENT)
Dept: FAMILY MEDICINE | Facility: CLINIC | Age: 73
End: 2018-05-25

## 2018-06-18 DIAGNOSIS — I10 ESSENTIAL HYPERTENSION: Chronic | ICD-10-CM

## 2018-06-18 DIAGNOSIS — E78.5 HYPERLIPIDEMIA, UNSPECIFIED HYPERLIPIDEMIA TYPE: Chronic | ICD-10-CM

## 2018-06-19 NOTE — TELEPHONE ENCOUNTER
"Requested Prescriptions   Pending Prescriptions Disp Refills     simvastatin (ZOCOR) 10 MG tablet [Pharmacy Med Name: SIMVASTATIN 10MG TABLETS] 90 tablet 0    Last Written Prescription Date:  5/18/18  Last Fill Quantity: 90,  # refills: 3   Last office visit: 5/16/2018 with prescribing provider:     Future Office Visit:     Sig: TAKE 1 TABLET BY MOUTH AT BEDTIME    Statins Protocol Passed    6/18/2018  3:13 AM       Passed - LDL on file in past 12 months    Recent Labs   Lab Test  05/16/18   1141   LDL  67            Passed - No abnormal creatine kinase in past 12 months    No lab results found.            Passed - Recent (12 mo) or future (30 days) visit within the authorizing provider's specialty    Patient had office visit in the last 12 months or has a visit in the next 30 days with authorizing provider or within the authorizing provider's specialty.  See \"Patient Info\" tab in inbasket, or \"Choose Columns\" in Meds & Orders section of the refill encounter.           Passed - Patient is age 18 or older        lisinopril (PRINIVIL/ZESTRIL) 2.5 MG tablet [Pharmacy Med Name: LISINOPRIL 2.5MG TABLETS] 90 tablet 0    Last Written Prescription Date:  5/16/18  Last Fill Quantity: 90,  # refills: 3   Last office visit: 5/16/2018 with prescribing provider:     Future Office Visit:     Sig: TAKE 1 TABLET BY MOUTH DAILY    ACE Inhibitors (Including Combos) Protocol Passed    6/18/2018  3:13 AM       Passed - Blood pressure under 140/90 in past 12 months    BP Readings from Last 3 Encounters:   05/16/18 136/60   12/05/17 138/66   08/25/17 110/72                Passed - Recent (12 mo) or future (30 days) visit within the authorizing provider's specialty    Patient had office visit in the last 12 months or has a visit in the next 30 days with authorizing provider or within the authorizing provider's specialty.  See \"Patient Info\" tab in inbasket, or \"Choose Columns\" in Meds & Orders section of the refill encounter.           " Passed - Patient is age 18 or older       Passed - Normal serum creatinine on file in past 12 months    Recent Labs   Lab Test  05/16/18   1141   CR  0.78            Passed - Normal serum potassium on file in past 12 months    Recent Labs   Lab Test  05/16/18   1141   POTASSIUM  4.5

## 2018-06-20 RX ORDER — SIMVASTATIN 10 MG
TABLET ORAL
OUTPATIENT
Start: 2018-06-20

## 2018-06-20 RX ORDER — LISINOPRIL 2.5 MG/1
TABLET ORAL
OUTPATIENT
Start: 2018-06-20

## 2018-06-20 NOTE — TELEPHONE ENCOUNTER
Rx refused. Duplicate request. Pharmacy notified.  Rxs sent 1 year 5/2018     Makenzie SCHWARTZ RN

## 2018-08-20 DIAGNOSIS — E03.9 HYPOTHYROIDISM, UNSPECIFIED TYPE: Chronic | ICD-10-CM

## 2018-08-20 NOTE — TELEPHONE ENCOUNTER
"Levothyroxine 150 mcg  \"Refills for profile only please- do not fill at this time\" listed on note to pharmacy    Last Written Prescription Date:  05/18/18  Last Fill Quantity: 90 tablets,  # refills: 3   Last office visit: 5/16/2018 with prescribing provider:  Barb   Future Office Visit:   Next 5 appointments (look out 90 days)     Nov 07, 2018 10:00 AM CST   [facility]Chart Long with Michell Day, DO   [facility]    [address] [id number] [phone]                 Requested Prescriptions   Pending Prescriptions Disp Refills     levothyroxine (SYNTHROID/LEVOTHROID) 150 MCG tablet [Pharmacy Med Name: LEVOTHYROXINE 0.150MG (150MCG) TAB] 90 tablet 0     Sig: TAKE 1 TABLET BY MOUTH DAILY    Thyroid Protocol Passed    8/20/2018  3:12 AM       Passed - Patient is 12 years or older       Passed - Recent (12 mo) or future (30 days) visit within the authorizing provider's specialty    Patient had office visit in the last 12 months or has a visit in the next 30 days with authorizing provider or within the authorizing provider's specialty.  See \"Patient Info\" tab in inbasket, or \"Choose Columns\" in Meds & Orders section of the refill encounter.           Passed - Normal TSH on file in past 12 months    Recent Labs   Lab Test  05/16/18   1141   TSH  0.66                "

## 2018-08-22 RX ORDER — LEVOTHYROXINE SODIUM 150 UG/1
TABLET ORAL
Qty: 90 TABLET | Refills: 0 | OUTPATIENT
Start: 2018-08-22

## 2018-11-07 ENCOUNTER — OFFICE VISIT (OUTPATIENT)
Dept: FAMILY MEDICINE | Facility: CLINIC | Age: 73
End: 2018-11-07
Payer: COMMERCIAL

## 2018-11-07 VITALS
TEMPERATURE: 97.5 F | HEIGHT: 70 IN | HEART RATE: 45 BPM | WEIGHT: 208.6 LBS | OXYGEN SATURATION: 99 % | SYSTOLIC BLOOD PRESSURE: 132 MMHG | BODY MASS INDEX: 29.86 KG/M2 | DIASTOLIC BLOOD PRESSURE: 60 MMHG

## 2018-11-07 DIAGNOSIS — D49.4 NEOPLASM OF BLADDER: Chronic | ICD-10-CM

## 2018-11-07 DIAGNOSIS — I10 ESSENTIAL HYPERTENSION: Primary | Chronic | ICD-10-CM

## 2018-11-07 DIAGNOSIS — Z71.89 ADVANCED DIRECTIVES, COUNSELING/DISCUSSION: ICD-10-CM

## 2018-11-07 LAB
ANION GAP SERPL CALCULATED.3IONS-SCNC: 10 MMOL/L (ref 3–14)
BASOPHILS # BLD AUTO: 0 10E9/L (ref 0–0.2)
BASOPHILS NFR BLD AUTO: 0.6 %
BUN SERPL-MCNC: 16 MG/DL (ref 7–30)
CALCIUM SERPL-MCNC: 9.1 MG/DL (ref 8.5–10.1)
CHLORIDE SERPL-SCNC: 105 MMOL/L (ref 94–109)
CO2 SERPL-SCNC: 23 MMOL/L (ref 20–32)
CREAT SERPL-MCNC: 0.93 MG/DL (ref 0.66–1.25)
DIFFERENTIAL METHOD BLD: ABNORMAL
EOSINOPHIL # BLD AUTO: 0.4 10E9/L (ref 0–0.7)
EOSINOPHIL NFR BLD AUTO: 7.1 %
ERYTHROCYTE [DISTWIDTH] IN BLOOD BY AUTOMATED COUNT: 13.2 % (ref 10–15)
GFR SERPL CREATININE-BSD FRML MDRD: 80 ML/MIN/1.7M2
GLUCOSE SERPL-MCNC: 96 MG/DL (ref 70–99)
HCT VFR BLD AUTO: 39.6 % (ref 40–53)
HGB BLD-MCNC: 13.3 G/DL (ref 13.3–17.7)
LYMPHOCYTES # BLD AUTO: 1.3 10E9/L (ref 0.8–5.3)
LYMPHOCYTES NFR BLD AUTO: 25 %
MCH RBC QN AUTO: 33.6 PG (ref 26.5–33)
MCHC RBC AUTO-ENTMCNC: 33.6 G/DL (ref 31.5–36.5)
MCV RBC AUTO: 100 FL (ref 78–100)
MONOCYTES # BLD AUTO: 0.5 10E9/L (ref 0–1.3)
MONOCYTES NFR BLD AUTO: 8.8 %
NEUTROPHILS # BLD AUTO: 3.1 10E9/L (ref 1.6–8.3)
NEUTROPHILS NFR BLD AUTO: 58.5 %
PLATELET # BLD AUTO: 236 10E9/L (ref 150–450)
POTASSIUM SERPL-SCNC: 4.4 MMOL/L (ref 3.4–5.3)
RBC # BLD AUTO: 3.96 10E12/L (ref 4.4–5.9)
SODIUM SERPL-SCNC: 138 MMOL/L (ref 133–144)
WBC # BLD AUTO: 5.2 10E9/L (ref 4–11)

## 2018-11-07 PROCEDURE — 36415 COLL VENOUS BLD VENIPUNCTURE: CPT | Performed by: INTERNAL MEDICINE

## 2018-11-07 PROCEDURE — 85025 COMPLETE CBC W/AUTO DIFF WBC: CPT | Performed by: INTERNAL MEDICINE

## 2018-11-07 PROCEDURE — 99213 OFFICE O/P EST LOW 20 MIN: CPT | Performed by: INTERNAL MEDICINE

## 2018-11-07 PROCEDURE — 80048 BASIC METABOLIC PNL TOTAL CA: CPT | Performed by: INTERNAL MEDICINE

## 2018-11-07 NOTE — MR AVS SNAPSHOT
"              After Visit Summary   11/7/2018    Mendez Greene    MRN: [id number]           Patient Information     Date Of Birth          1945        Visit Information        Provider Department      11/7/2018 10:00 AM Michell Day, DO [facility]        Today's Diagnoses     Essential hypertension    -  1      Care Instructions    Call to schedule colonoscopy    Keep up with your walking this winter    Follow up with wellness visit after May 17th          Follow-ups after your visit        Who to contact     If you have questions or need follow up information about today's clinic visit or your schedule please contact [facility] directly at [phone].  Normal or non-critical lab and imaging results will be communicated to you by Soundstachehart, letter or phone within 4 business days after the clinic has received the results. If you do not hear from us within 7 days, please contact the clinic through Soundstachehart or phone. If you have a critical or abnormal lab result, we will notify you by phone as soon as possible.  Submit refill requests through [employer] or call your pharmacy and they will forward the refill request to us. Please allow 3 business days for your refill to be completed.          Additional Information About Your Visit        MyChart Information     [employer] gives you secure access to your electronic health record. If you see a primary care provider, you can also send messages to your care team and make appointments. If you have questions, please call your primary care clinic.  If you do not have a primary care provider, please call [phone] and they will assist you.        Care EveryWhere ID     This is your Care EveryWhere ID. This could be used by other organizations to access your [place] medical records  [id number]        Your Vitals Were     Pulse Temperature Height Pulse Oximetry BMI (Body Mass Index)       45 97.5  F (36.4  C) (Oral) 5' 9.75\" (1.772 m) 99% " 30.15 kg/m2        Blood Pressure from Last 3 Encounters:   11/07/18 132/60   05/16/18 136/60   12/05/17 138/66    Weight from Last 3 Encounters:   11/07/18 208 lb 9.6 oz (94.6 kg)   05/16/18 200 lb (90.7 kg)   12/05/17 201 lb (91.2 kg)              We Performed the Following     Basic metabolic panel     CBC with platelets differential          Today's Medication Changes          These changes are accurate as of 11/7/18 10:43 AM.  If you have any questions, ask your nurse or doctor.               Stop taking these medicines if you haven't already. Please contact your care team if you have questions.     tadalafil 20 MG tablet   Commonly known as:  CIALIS   Stopped by:  Michell Day,                     Primary Care Provider Office Phone # Fax #    Michell Day [phone] [address] [place] [employer]   [facility] [address]        Equal Access to Services     [facility] AH: Hadii aad ku hadasho Soomaali, waaxda luqadaha, qaybta kaalmada adeegyada, waxay idiin haysherinen debora james . So [facility] [phone].    ATENCIÓN: Si habla español, tiene a anne disposición servicios gratuitos de asistencia lingüística. Llame al [phone].    We comply with applicable federal civil rights laws and [place] laws. We do not discriminate on the basis of race, color, national origin, age, disability, sex, sexual orientation, or gender identity.            Thank you!     Thank you for choosing [facility]  for your care. Our goal is always to provide you with excellent care. Hearing back from our patients is one way we can continue to improve our services. Please take a few minutes to complete the written survey that you may receive in the mail after your visit with us. Thank you!             Your Updated Medication List - Protect others around you: Learn how to safely use, store and throw away your medicines at www.disposemymeds.org.          This list is accurate as of 11/7/18 10:43 AM.   Always use your most recent med list.                   Brand Name Dispense Instructions for use Diagnosis    aspirin 81 MG tablet      Take  by mouth daily.        B-12 1000 MCG Caps      Take 1,000 mcg by mouth daily        BENADRYL 25 MG tablet   Generic drug:  diphenhydrAMINE     30 tablet    Take 1 tablet by mouth every 6 hours as needed for itching.    Allergic rhinitis, cause unspecified       COMPOUNDED NON-CONTROLLED SUBSTANCE - PHARMACY TO MIX COMPOUNDED MEDICATION    CMPD RX     Hydro 2.5% Cr / Ketocon Crm.  Apply to the effected area on face twice daily as needed.        levothyroxine 150 MCG tablet    SYNTHROID/LEVOTHROID    90 tablet    Take 1 tablet (150 mcg) by mouth daily    Hypothyroidism, unspecified type       lisinopril 2.5 MG tablet    PRINIVIL/Zestril    90 tablet    Take 1 tablet (2.5 mg) by mouth daily    Essential hypertension       MULTIVITAMIN ADULT PO           order for DME     1 each    Equipment being ordered: CPAP and supplies. LIFETIME need.    CHA (obstructive sleep apnea)       PATADAY 0.2 % Soln   Generic drug:  olopatadine HCl      Apply  to eye. 1 drop each eye once daily as needed        simvastatin 10 MG tablet    ZOCOR    90 tablet    TAKE 1 TABLET(10 MG) BY MOUTH AT BEDTIME    Hyperlipidemia, unspecified hyperlipidemia type

## 2018-11-07 NOTE — PATIENT INSTRUCTIONS
Call to schedule colonoscopy    Keep up with your walking this winter    Follow up with wellness visit after May 17th

## 2018-11-07 NOTE — PROGRESS NOTES
"  SUBJECTIVE:   Menedz Greene is a 73 year old male who presents to clinic today for the following health issues:      Medication Followup    Steven feels really good overall.  He brought in his and his wife's HCD.  No urinary flow changes or blood in urine.  Stiff when wakes up but gets better as day goes on. Walks 2-3 miles per day but less in winter. Is about 5# over his goal weight. He will work on weight loss.  He says he wanted to put off his first colonoscopy til winter so will schedule.     Wt Readings from Last 4 Encounters:   11/07/18 208 lb 9.6 oz (94.6 kg)   05/16/18 200 lb (90.7 kg)   12/05/17 201 lb (91.2 kg)   08/25/17 201 lb (91.2 kg)     Problem list and histories reviewed & adjusted, as indicated.    ROS:  Detailed as above     OBJECTIVE:     /60  Pulse (!) 45  Temp 97.5  F (36.4  C) (Oral)  Ht 5' 9.75\" (1.772 m)  Wt 208 lb 9.6 oz (94.6 kg)  SpO2 99%  BMI 30.15 kg/m2  Body mass index is 30.15 kg/(m^2).  Alert, pleasant, NAD, robust  Heart regular fam  Lungs clear without wcr  Robust gait    ASSESSMENT/PLAN:     1. Essential hypertension  At goal and good BPs at home  Goal for him of 5-10# weight loss more though he has come a long way  - CBC with platelets differential  - Basic metabolic panel    2. 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 Advanced directives, counseling/discussion  He brought in a copy of his completed and notarized HCD today      Patient Instructions   Call to schedule colonoscopy    Keep up with your walking this winter    Follow up with wellness visit after May 17th      Michell Day, DO  [facility]    "

## 2018-11-12 ENCOUNTER — MYC MEDICAL ADVICE (OUTPATIENT)
Dept: FAMILY MEDICINE | Facility: CLINIC | Age: 73
End: 2018-11-12

## 2018-12-07 ENCOUNTER — MYC MEDICAL ADVICE (OUTPATIENT)
Dept: FAMILY MEDICINE | Facility: CLINIC | Age: 73
End: 2018-12-07

## 2019-05-06 ENCOUNTER — OFFICE VISIT (OUTPATIENT)
Dept: FAMILY MEDICINE | Facility: CLINIC | Age: 74
End: 2019-05-06
Payer: COMMERCIAL

## 2019-05-06 VITALS
BODY MASS INDEX: 28.92 KG/M2 | HEIGHT: 70 IN | HEART RATE: 53 BPM | SYSTOLIC BLOOD PRESSURE: 124 MMHG | TEMPERATURE: 98.3 F | OXYGEN SATURATION: 97 % | WEIGHT: 202 LBS | DIASTOLIC BLOOD PRESSURE: 60 MMHG

## 2019-05-06 DIAGNOSIS — E78.5 HYPERLIPIDEMIA, UNSPECIFIED HYPERLIPIDEMIA TYPE: Chronic | ICD-10-CM

## 2019-05-06 DIAGNOSIS — C67.9 MALIGNANT NEOPLASM OF URINARY BLADDER, UNSPECIFIED SITE (H): Primary | ICD-10-CM

## 2019-05-06 DIAGNOSIS — I10 ESSENTIAL HYPERTENSION: ICD-10-CM

## 2019-05-06 DIAGNOSIS — R79.89 LOW VITAMIN B12 LEVEL: ICD-10-CM

## 2019-05-06 DIAGNOSIS — E03.9 HYPOTHYROIDISM, UNSPECIFIED TYPE: ICD-10-CM

## 2019-05-06 LAB
ALBUMIN SERPL-MCNC: 3.6 G/DL (ref 3.4–5)
ALP SERPL-CCNC: 55 U/L (ref 40–150)
ALT SERPL W P-5'-P-CCNC: 19 U/L (ref 0–70)
ANION GAP SERPL CALCULATED.3IONS-SCNC: 6 MMOL/L (ref 3–14)
AST SERPL W P-5'-P-CCNC: 17 U/L (ref 0–45)
BILIRUB SERPL-MCNC: 0.9 MG/DL (ref 0.2–1.3)
BUN SERPL-MCNC: 19 MG/DL (ref 7–30)
CALCIUM SERPL-MCNC: 9.3 MG/DL (ref 8.5–10.1)
CHLORIDE SERPL-SCNC: 106 MMOL/L (ref 94–109)
CHOLEST SERPL-MCNC: 153 MG/DL
CO2 SERPL-SCNC: 25 MMOL/L (ref 20–32)
CREAT SERPL-MCNC: 0.88 MG/DL (ref 0.66–1.25)
ERYTHROCYTE [DISTWIDTH] IN BLOOD BY AUTOMATED COUNT: 13.3 % (ref 10–15)
GFR SERPL CREATININE-BSD FRML MDRD: 84 ML/MIN/{1.73_M2}
GLUCOSE SERPL-MCNC: 93 MG/DL (ref 70–99)
HCT VFR BLD AUTO: 37.7 % (ref 40–53)
HDLC SERPL-MCNC: 67 MG/DL
HGB BLD-MCNC: 13.6 G/DL (ref 13.3–17.7)
LDLC SERPL CALC-MCNC: 75 MG/DL
MCH RBC QN AUTO: 35.1 PG (ref 26.5–33)
MCHC RBC AUTO-ENTMCNC: 36.1 G/DL (ref 31.5–36.5)
MCV RBC AUTO: 97 FL (ref 78–100)
NONHDLC SERPL-MCNC: 86 MG/DL
PLATELET # BLD AUTO: 229 10E9/L (ref 150–450)
POTASSIUM SERPL-SCNC: 4.5 MMOL/L (ref 3.4–5.3)
PROT SERPL-MCNC: 7 G/DL (ref 6.8–8.8)
RBC # BLD AUTO: 3.87 10E12/L (ref 4.4–5.9)
SODIUM SERPL-SCNC: 137 MMOL/L (ref 133–144)
TRIGL SERPL-MCNC: 57 MG/DL
TSH SERPL DL<=0.005 MIU/L-ACNC: 0.76 MU/L (ref 0.4–4)
VIT B12 SERPL-MCNC: 531 PG/ML (ref 193–986)
WBC # BLD AUTO: 5.1 10E9/L (ref 4–11)

## 2019-05-06 PROCEDURE — 36415 COLL VENOUS BLD VENIPUNCTURE: CPT | Performed by: PHYSICIAN ASSISTANT

## 2019-05-06 PROCEDURE — 84443 ASSAY THYROID STIM HORMONE: CPT | Performed by: PHYSICIAN ASSISTANT

## 2019-05-06 PROCEDURE — 82607 VITAMIN B-12: CPT | Performed by: PHYSICIAN ASSISTANT

## 2019-05-06 PROCEDURE — 80061 LIPID PANEL: CPT | Performed by: PHYSICIAN ASSISTANT

## 2019-05-06 PROCEDURE — 80053 COMPREHEN METABOLIC PANEL: CPT | Performed by: PHYSICIAN ASSISTANT

## 2019-05-06 PROCEDURE — 85027 COMPLETE CBC AUTOMATED: CPT | Performed by: PHYSICIAN ASSISTANT

## 2019-05-06 PROCEDURE — 99214 OFFICE O/P EST MOD 30 MIN: CPT | Performed by: PHYSICIAN ASSISTANT

## 2019-05-06 RX ORDER — SIMVASTATIN 10 MG
TABLET ORAL
Qty: 90 TABLET | Refills: 3 | Status: SHIPPED | OUTPATIENT
Start: 2019-05-06 | End: 2020-10-22

## 2019-05-06 RX ORDER — LISINOPRIL 2.5 MG/1
2.5 TABLET ORAL DAILY
Qty: 90 TABLET | Refills: 3 | Status: SHIPPED | OUTPATIENT
Start: 2019-05-06 | End: 2020-02-20

## 2019-05-06 RX ORDER — LEVOTHYROXINE SODIUM 150 UG/1
150 TABLET ORAL DAILY
Qty: 90 TABLET | Refills: 3 | Status: SHIPPED | OUTPATIENT
Start: 2019-05-06 | End: 2020-06-26

## 2019-05-06 ASSESSMENT — MIFFLIN-ST. JEOR: SCORE: 1658.55

## 2019-05-06 NOTE — PROGRESS NOTES
HPI: Steven is a 72 yo male here for f/u bladder ca, HTN, hypothyroidism and hyperlipidemia  Tolerates medications well without SE  Requesting annual labs  HTN: pt is getting normal readings when checked at homes every few months  Tolerates lisinopril well  Bladder ca: no longer followed by [facility]  Pt quit smoking in   Pt declines colon ca screening  Does not want a colonoscopy or FIT test.  He walks 2-5 miles per day  Thyroid: denies any sxs of hypo/hyperthyroidism    Past Medical History:   Diagnosis Date     Allergic rhinitis, cause unspecified      Diverticulosis      Double vision 10/17/2011    probable migraine aura     Hypertrophy of prostate without urinary obstruction and other lower urinary tract symptoms (LUTS)      Neoplasm of unspecified nature of bladder 3-06    [facility] s/p cystoprostatectomy with ileal neobladder     Other and unspecified hyperlipidemia      Palpitations      Renal stone      Sleep apnea     C Pap     Unspecified essential hypertension      Unspecified hypothyroidism      Past Surgical History:   Procedure Laterality Date     BUNIONECTOMY      LT     BUNIONECTOMY JOSE  2011    RT-Procedure:BUNIONECTOMY JOSE; Right Foot Dwaine Jose Bunionectomy with Metatarsal and Phalanx Osteotomy with 1st Metatarsal Phalangeal Joint Repair Right Foot; Surgeon:BAKARI ZAIDI; Location:[facility]     CYSTOSCOPY  3-06    CA bladder     HERNIORRHAPHY INCISIONAL (LOCATION) N/A 2016    Procedure: HERNIORRHAPHY INCISIONAL (LOCATION);  Surgeon: Onofre Lua MD;  Location: [facility]     SURGICAL HISTORY OF -   -3-06    neobladder construction- CA     Social History     Tobacco Use     Smoking status: Former Smoker     Last attempt to quit: 1987     Years since quittin.6     Smokeless tobacco: Never Used   Substance Use Topics     Alcohol use: Yes     Alcohol/week: 0.0 oz     Comment: Occas wine     Current Outpatient Medications   Medication Sig Dispense Refill      "aspirin 81 MG tablet Take  by mouth daily.  3     COMPOUND (CMPD RX) - PHARMACY TO MIX COMPOUNDED MEDICATION Hydro 2.5% Cr / Ketocon Crm.  Apply to the effected area on face twice daily as needed.       Cyanocobalamin (B-12) 1000 MCG CAPS Take 1,000 mcg by mouth daily       diphenhydrAMINE (BENADRYL) 25 MG tablet Take 1 tablet by mouth every 6 hours as needed for itching. 30 tablet 0     levothyroxine (SYNTHROID/LEVOTHROID) 150 MCG tablet Take 1 tablet (150 mcg) by mouth daily 90 tablet 3     lisinopril (PRINIVIL/ZESTRIL) 2.5 MG tablet Take 1 tablet (2.5 mg) by mouth daily 90 tablet 3     Multiple Vitamins-Minerals (MULTIVITAMIN ADULT PO)        Olopatadine HCl (PATADAY) 0.2 % SOLN Apply  to eye. 1 drop each eye once daily as needed       order for DME Equipment being ordered: CPAP and supplies. LIFETIME need. 1 each 0     simvastatin (ZOCOR) 10 MG tablet TAKE 1 TABLET(10 MG) BY MOUTH AT BEDTIME 90 tablet 3     Allergies   Allergen Reactions     Food Other (See Comments)     Uncertain of which food , testing done ~1997     FAMILY HISTORY NOTED AND REVIEWED    REVIEW OF SYSTEMS: denies chest pain, sob or palpit    PHYSICAL EXAM:    /60 (BP Location: Right arm, Patient Position: Chair, Cuff Size: Adult Regular)   Pulse 53   Temp 98.3  F (36.8  C) (Oral)   Ht 1.772 m (5' 9.75\")   Wt 91.6 kg (202 lb)   SpO2 97%   BMI 29.19 kg/m      Patient appears non toxic  Lungs: CTA bilat  Heart: RRR  Extr: no edema  Neck: no carotid bruits.    Assessment and Plan:     (C67.9) Malignant neoplasm of urinary bladder, unspecified site (H)  (primary encounter diagnosis)  Comment: no longer followed by [facility] so doing labs here  Plan: CBC with platelets, Cytology non gyn            (E03.9) Hypothyroidism, unspecified type  Comment:   Plan: TSH with free T4 reflex, levothyroxine         (SYNTHROID/LEVOTHROID) 150 MCG tablet            (I10) Essential hypertension  Comment: well controlled, cont same.  Plan: Comprehensive " metabolic panel, lisinopril         (PRINIVIL/ZESTRIL) 2.5 MG tablet            (E53.8) Low vitamin B12 level  Comment:   Plan: Vitamin B12          (E78.5) Hyperlipidemia, unspecified hyperlipidemia type  Comment:   Plan: Lipid Profile, simvastatin (ZOCOR) 10 MG tablet              Lou King PA-C

## 2019-05-07 PROCEDURE — 88112 CYTOPATH CELL ENHANCE TECH: CPT | Performed by: PHYSICIAN ASSISTANT

## 2019-05-07 PROCEDURE — 00000103 ZZHCL STATISTIC CYTO-FISH QC 88120: Performed by: PHYSICIAN ASSISTANT

## 2019-05-13 LAB
COPATH REPORT: NORMAL
COPATH REPORT: NORMAL

## 2019-05-14 NOTE — RESULT ENCOUNTER NOTE
"It was a pleasure seeing you. I wanted to get back to you with your test results.       I am happy to report that your CBC or complete blood count is normal/stable with no signs of anemia, leukemia or platelet abnormalities. Your chemistry panel shows no signs of diabetes.  Your blood salts, kidney tests, liver tests, and proteins are all fine.    Your total cholesterol is 153 with the normal range being below 200.  Your HDL or good cholesterol is 67 with the normal range being above 40.  Your LDL or bad cholesterol is 75 with the normal range being below 130.  Please continue your current dose of simvastatin.    Your thyroid test is in normal range.  Please continue your current dose of levothyroxine.    Your vitamin B12 level was normal.    Your urine test came back as \"unsatisfactory for evaluation\" and needs to be repeated.  I don't understand why.  Please schedule a lab only appointment to have that repeated.  Sorry for the inconvenience.      Lou King PA-C    "

## 2019-05-15 DIAGNOSIS — C67.9 MALIGNANT NEOPLASM OF URINARY BLADDER, UNSPECIFIED SITE (H): Primary | Chronic | ICD-10-CM

## 2019-05-15 PROCEDURE — 88112 CYTOPATH CELL ENHANCE TECH: CPT | Performed by: PHYSICIAN ASSISTANT

## 2019-05-20 LAB
COPATH REPORT: NORMAL
COPATH REPORT: NORMAL

## 2019-10-01 ENCOUNTER — HEALTH MAINTENANCE LETTER (OUTPATIENT)
Age: 74
End: 2019-10-01

## 2019-11-14 ENCOUNTER — OFFICE VISIT (OUTPATIENT)
Dept: FAMILY MEDICINE | Facility: CLINIC | Age: 74
End: 2019-11-14
Payer: COMMERCIAL

## 2019-11-14 VITALS
TEMPERATURE: 98.5 F | BODY MASS INDEX: 29.49 KG/M2 | OXYGEN SATURATION: 95 % | HEIGHT: 70 IN | DIASTOLIC BLOOD PRESSURE: 75 MMHG | SYSTOLIC BLOOD PRESSURE: 131 MMHG | WEIGHT: 206 LBS | HEART RATE: 48 BPM

## 2019-11-14 DIAGNOSIS — G47.33 OSA (OBSTRUCTIVE SLEEP APNEA): ICD-10-CM

## 2019-11-14 DIAGNOSIS — J20.9 ACUTE BRONCHITIS, UNSPECIFIED ORGANISM: Primary | ICD-10-CM

## 2019-11-14 PROCEDURE — 99213 OFFICE O/P EST LOW 20 MIN: CPT | Performed by: PHYSICIAN ASSISTANT

## 2019-11-14 RX ORDER — OLOPATADINE HYDROCHLORIDE 1 MG/ML
1 SOLUTION/ DROPS OPHTHALMIC 2 TIMES DAILY
COMMUNITY
Start: 2019-11-11

## 2019-11-14 RX ORDER — AZITHROMYCIN 250 MG/1
TABLET, FILM COATED ORAL
Qty: 6 TABLET | Refills: 0 | Status: SHIPPED | OUTPATIENT
Start: 2019-11-14 | End: 2020-02-20

## 2019-11-14 ASSESSMENT — MIFFLIN-ST. JEOR: SCORE: 1671.69

## 2019-11-14 NOTE — PROGRESS NOTES
HPI: Steven is a 73 yo male here with compliant of cough x 2-3 weeks. Also here for f/u CHA  He goes on Medicare  and not sure what Medicare will require for his CPAP supplies.  He had a sleep study in the 80s and has been on a CPAP for several years  He can't sleep without his CPAP.   He does use his CPAP when he travels as well    Cough is occas prod of green sputum  Has some nasal congestion x 2-3 weeks as well  Has chronic rhinorrhea.  Has sneezing which is not new and he relates that to his cat allergy  He doesn't want to take claritin regularly  Denies sore throat, fever, sob or wheezing.    Past Medical History:   Diagnosis Date     Allergic rhinitis, cause unspecified      Diverticulosis      Double vision 10/17/2011    probable migraine aura     Hypertrophy of prostate without urinary obstruction and other lower urinary tract symptoms (LUTS)      Neoplasm of unspecified nature of bladder 3-06    [facility] s/p cystoprostatectomy with ileal neobladder     Other and unspecified hyperlipidemia      Palpitations      Renal stone      Sleep apnea -    C Pap     Unspecified essential hypertension      Unspecified hypothyroidism      Past Surgical History:   Procedure Laterality Date     BUNIONECTOMY      LT     BUNIONECTOMY DIANA  2011    RT-Procedure:BUNIONECTOMY DIANA; Right Foot [facility] Bunionectomy with Metatarsal and Phalanx Osteotomy with 1st Metatarsal Phalangeal Joint Repair Right Foot; Surgeon:BAKARI ZAIDI; Location:[facility]     CYSTOSCOPY  3-06    CA bladder     HERNIORRHAPHY INCISIONAL (LOCATION) N/A 2016    Procedure: HERNIORRHAPHY INCISIONAL (LOCATION);  Surgeon: Onofre Lua MD;  Location: [facility]     SURGICAL HISTORY OF -   5-3-06    neobladder construction- CA     Social History     Tobacco Use     Smoking status: Former Smoker     Last attempt to quit: 1987     Years since quittin.2     Smokeless tobacco: Never Used   Substance Use Topics      "Alcohol use: Yes     Alcohol/week: 0.0 standard drinks     Comment: Occas wine     Current Outpatient Medications   Medication Sig Dispense Refill     aspirin 81 MG tablet Take  by mouth daily.  3     azithromycin (ZITHROMAX) 250 MG tablet Take 2 tablets (500 mg) by mouth daily for 1 day, THEN 1 tablet (250 mg) daily for 4 days. 6 tablet 0     COMPOUND (CMPD RX) - PHARMACY TO MIX COMPOUNDED MEDICATION Hydro 2.5% Cr / Ketocon Crm.  Apply to the effected area on face twice daily as needed.       Cyanocobalamin (B-12) 1000 MCG CAPS Take 1,000 mcg by mouth daily       diphenhydrAMINE (BENADRYL) 25 MG tablet Take 1 tablet by mouth every 6 hours as needed for itching. 30 tablet 0     levothyroxine (SYNTHROID/LEVOTHROID) 150 MCG tablet Take 1 tablet (150 mcg) by mouth daily 90 tablet 3     lisinopril (PRINIVIL/ZESTRIL) 2.5 MG tablet Take 1 tablet (2.5 mg) by mouth daily 90 tablet 3     Multiple Vitamins-Minerals (MULTIVITAMIN ADULT PO)        olopatadine (PATANOL) 0.1 % ophthalmic solution        order for DME Equipment being ordered: CPAP and supplies. LIFETIME need. 1 each 0     simvastatin (ZOCOR) 10 MG tablet TAKE 1 TABLET(10 MG) BY MOUTH AT BEDTIME 90 tablet 3     Allergies   Allergen Reactions     Food Other (See Comments)     Uncertain of which food , testing done ~1997     FAMILY HISTORY NOTED AND REVIEWED    PHYSICAL EXAM:    /75 (BP Location: Right arm, Cuff Size: Adult Regular)   Pulse (!) 48   Temp 98.5  F (36.9  C) (Tympanic)   Ht 1.772 m (5' 9.75\")   Wt 93.4 kg (206 lb)   SpO2 95%   BMI 29.77 kg/m      Patient appears non toxic  Nose: there is thick crust noted in L nostril  Pt does sound congested  Lungs: bilat expiratory wheezing throughout  Heart: RRR    Assessment and Plan:     (J20.9) Acute bronchitis, unspecified organism  (primary encounter diagnosis)  Comment:   Plan: azithromycin (ZITHROMAX) 250 MG tablet        TAD. F/u if cough worsens or persists.    (G47.33) CHA (obstructive sleep " apnea)  Comment:   Plan: He will let me know if Medicare requires new order for his supplies in [place].      Lou King PA-C

## 2019-12-13 ENCOUNTER — MYC MEDICAL ADVICE (OUTPATIENT)
Dept: FAMILY MEDICINE | Facility: CLINIC | Age: 74
End: 2019-12-13

## 2019-12-13 NOTE — TELEPHONE ENCOUNTER
Lou,    See mychart and let us know how we can assist,    Nicole Sinclair RN- Triage [employer]

## 2019-12-15 ENCOUNTER — HEALTH MAINTENANCE LETTER (OUTPATIENT)
Age: 74
End: 2019-12-15

## 2019-12-16 NOTE — TELEPHONE ENCOUNTER
I told this pt I would take his wife after all.  She really prefers a female.  Please change me to her PCP and see if she needs to get in. (Alana Greene)

## 2020-02-12 ENCOUNTER — MYC MEDICAL ADVICE (OUTPATIENT)
Dept: FAMILY MEDICINE | Facility: CLINIC | Age: 75
End: 2020-02-12

## 2020-02-12 NOTE — TELEPHONE ENCOUNTER
PCP,    Pt needs a new order for his CPAP sent to [facility] now he's on medicare. Started the order, please review and complete.  Also, he states he needs a note sent to [facility] states that you have seen the patient since 1/1/20 and that he uses his equipment and require its continued use.     Are you willing to do these for this pt?  Please advise    Thank you,  Royer POSEY RN

## 2020-02-19 NOTE — TELEPHONE ENCOUNTER
I normally do not do this and defer orders to sleep clinic. However, he is a former pt of Dr. Day and it looks like she has done this for him in the past.  She used a form from [facility] so perhaps they could fill out what he needs and fax to me and I could sign and attach note?  Please check with them.  Delete your DME as I am not familiar with how to complete.

## 2020-02-19 NOTE — TELEPHONE ENCOUNTER
Received Fax from [facility], call out to patient and appointment made for face to face as required by Medicare for tomorrow. Form to be completed then.    Form given to Lou King.  Leny Cordova RN

## 2020-02-19 NOTE — TELEPHONE ENCOUNTER
Call to [facility] they are sending over new order form needed to be signed by Lou King.  Will watch for fax.  Leny Cordova RN

## 2020-02-20 ENCOUNTER — OFFICE VISIT (OUTPATIENT)
Dept: FAMILY MEDICINE | Facility: CLINIC | Age: 75
End: 2020-02-20
Payer: MEDICARE

## 2020-02-20 ENCOUNTER — MEDICAL CORRESPONDENCE (OUTPATIENT)
Dept: HEALTH INFORMATION MANAGEMENT | Facility: CLINIC | Age: 75
End: 2020-02-20

## 2020-02-20 VITALS
HEIGHT: 70 IN | BODY MASS INDEX: 30.35 KG/M2 | DIASTOLIC BLOOD PRESSURE: 70 MMHG | OXYGEN SATURATION: 98 % | HEART RATE: 43 BPM | WEIGHT: 212 LBS | SYSTOLIC BLOOD PRESSURE: 152 MMHG | TEMPERATURE: 98 F

## 2020-02-20 DIAGNOSIS — I10 ESSENTIAL HYPERTENSION: ICD-10-CM

## 2020-02-20 DIAGNOSIS — G47.33 OSA (OBSTRUCTIVE SLEEP APNEA): Primary | ICD-10-CM

## 2020-02-20 PROCEDURE — 99213 OFFICE O/P EST LOW 20 MIN: CPT | Performed by: PHYSICIAN ASSISTANT

## 2020-02-20 RX ORDER — LISINOPRIL 5 MG/1
5 TABLET ORAL DAILY
Qty: 90 TABLET | Refills: 1 | Status: SHIPPED | OUTPATIENT
Start: 2020-02-20 | End: 2021-02-23

## 2020-02-20 ASSESSMENT — MIFFLIN-ST. JEOR: SCORE: 1698.91

## 2020-02-20 NOTE — PROGRESS NOTES
HPI: Steven is a 73 yo male here for f/u CHA  He has been on CPAP since the 1980s and states he wouldn't be able to sleep without his CPAP  Uses this every night and tolerates this well  He uses his portable CPAP for travel  His wife had noticed apnea prior to the study    HTN: pt does monitor BP at home and when he monitors this at home can start out high, but comes down on 2nd or 3rd reading.    Pt here with L ant pain if he lifts knee at night or up stairs  He is able to go for long walks without any issues.  Denies swelling or redness.  He is using an OTC knee sleeve    Past Medical History:   Diagnosis Date     Allergic rhinitis, cause unspecified      Diverticulosis      Double vision 10/17/2011    probable migraine aura     Hypertrophy of prostate without urinary obstruction and other lower urinary tract symptoms (LUTS)      Neoplasm of unspecified nature of bladder 3-06    [facility] s/p cystoprostatectomy with ileal neobladder     Other and unspecified hyperlipidemia      Palpitations      Renal stone      Sleep apnea     C Pap     Unspecified essential hypertension      Unspecified hypothyroidism      Past Surgical History:   Procedure Laterality Date     BUNIONECTOMY      LT     BUNIONECTOMY DIANA  2011    RT-Procedure:BUNIONECTOMY DIANA; Right Foot Dwaine Chambers Bunionectomy with Metatarsal and Phalanx Osteotomy with 1st Metatarsal Phalangeal Joint Repair Right Foot; Surgeon:BAKARI ZAIDI; Location:[facility]     CYSTOSCOPY  3-06    CA bladder     HERNIORRHAPHY INCISIONAL (LOCATION) N/A 2016    Procedure: HERNIORRHAPHY INCISIONAL (LOCATION);  Surgeon: Onofre Lua MD;  Location: [facility]     SURGICAL HISTORY OF -   5-3-06    neobladder construction- [place]     Social History     Tobacco Use     Smoking status: Former Smoker     Last attempt to quit: 1987     Years since quittin.4     Smokeless tobacco: Never Used   Substance Use Topics     Alcohol use: Yes     Alcohol/week:  "0.0 standard drinks     Comment: [place] wine     Current Outpatient Medications   Medication Sig Dispense Refill     aspirin 81 MG tablet Take  by mouth daily.  3     COMPOUND (CMPD RX) - PHARMACY TO MIX COMPOUNDED MEDICATION Hydro 2.5% Cr / Ketocon Crm.  Apply to the effected area on face twice daily as needed.       Cyanocobalamin (B-12) 1000 MCG CAPS Take 1,000 mcg by mouth daily       diphenhydrAMINE (BENADRYL) 25 MG tablet Take 1 tablet by mouth every 6 hours as needed for itching. 30 tablet 0     levothyroxine (SYNTHROID/LEVOTHROID) 150 MCG tablet Take 1 tablet (150 mcg) by mouth daily 90 tablet 3     lisinopril 5 MG PO tablet Take 1 tablet (5 mg) by mouth daily 90 tablet 1     Multiple Vitamins-Minerals (MULTIVITAMIN ADULT PO)        olopatadine (PATANOL) 0.1 % ophthalmic solution        order for DME Equipment being ordered: CPAP and supplies. LIFETIME need. 1 each 0     simvastatin (ZOCOR) 10 MG tablet TAKE 1 TABLET(10 MG) BY MOUTH AT BEDTIME 90 tablet 3     Allergies   Allergen Reactions     Food Other (See Comments)     Uncertain of which food , testing done ~1997     FAMILY HISTORY NOTED AND REVIEWED    PHYSICAL EXAM:    BP (!) 152/65   Pulse (!) 43   Temp 98  F (36.7  C) (Oral)   Ht 1.772 m (5' 9.75\")   Wt 96.2 kg (212 lb)   SpO2 98%   BMI 30.64 kg/m      Patient appears non toxic  Heart: Bradycardic, no murmur or skips    Assessment and Plan:     (G47.33) CHA (obstructive sleep apnea)  (primary encounter diagnosis)  Comment:   Plan: authorized supplies for CPAP today which pt has used effectively since the 1980s without complications.    (I10) Essential hypertension  Comment: increase dose of lisinopril from 2.5mg to 5mg daily. Cont to monitor BP. F/u in May for px  Plan: lisinopril 5 MG PO tablet              Lou King PA-C            "

## 2020-03-15 ENCOUNTER — MYC MEDICAL ADVICE (OUTPATIENT)
Dept: FAMILY MEDICINE | Facility: CLINIC | Age: 75
End: 2020-03-15

## 2020-03-15 DIAGNOSIS — J32.9 SINUSITIS, UNSPECIFIED CHRONICITY, UNSPECIFIED LOCATION: Primary | ICD-10-CM

## 2020-03-16 RX ORDER — AMOXICILLIN 500 MG/1
500 CAPSULE ORAL 3 TIMES DAILY
Qty: 30 CAPSULE | Refills: 0 | Status: SHIPPED | OUTPATIENT
Start: 2020-03-16 | End: 2020-08-04

## 2020-03-16 NOTE — TELEPHONE ENCOUNTER
Please see My Chart message below and advise as appropriate.  JOHN MenesesN, RN  [employer] Triage

## 2020-03-17 ENCOUNTER — MYC MEDICAL ADVICE (OUTPATIENT)
Dept: FAMILY MEDICINE | Facility: CLINIC | Age: 75
End: 2020-03-17

## 2020-06-25 DIAGNOSIS — E03.9 HYPOTHYROIDISM, UNSPECIFIED TYPE: ICD-10-CM

## 2020-06-26 RX ORDER — LEVOTHYROXINE SODIUM 150 UG/1
TABLET ORAL
Qty: 90 TABLET | Refills: 0 | Status: SHIPPED | OUTPATIENT
Start: 2020-06-26 | End: 2020-09-29

## 2020-08-01 ASSESSMENT — ACTIVITIES OF DAILY LIVING (ADL): CURRENT_FUNCTION: NO ASSISTANCE NEEDED

## 2020-08-04 ENCOUNTER — OFFICE VISIT (OUTPATIENT)
Dept: FAMILY MEDICINE | Facility: CLINIC | Age: 75
End: 2020-08-04
Payer: MEDICARE

## 2020-08-04 VITALS
BODY MASS INDEX: 29.86 KG/M2 | HEART RATE: 42 BPM | TEMPERATURE: 97.1 F | DIASTOLIC BLOOD PRESSURE: 72 MMHG | WEIGHT: 201.6 LBS | HEIGHT: 69 IN | OXYGEN SATURATION: 100 % | SYSTOLIC BLOOD PRESSURE: 139 MMHG

## 2020-08-04 DIAGNOSIS — E78.5 HYPERLIPIDEMIA LDL GOAL <130: ICD-10-CM

## 2020-08-04 DIAGNOSIS — M62.81 MUSCLE WEAKNESS (GENERALIZED): ICD-10-CM

## 2020-08-04 DIAGNOSIS — Z12.11 COLON CANCER SCREENING: ICD-10-CM

## 2020-08-04 DIAGNOSIS — C67.9 MALIGNANT NEOPLASM OF URINARY BLADDER, UNSPECIFIED SITE (H): ICD-10-CM

## 2020-08-04 DIAGNOSIS — E83.51 HYPOCALCEMIA: ICD-10-CM

## 2020-08-04 DIAGNOSIS — Z00.00 MEDICARE ANNUAL WELLNESS VISIT, SUBSEQUENT: Primary | ICD-10-CM

## 2020-08-04 DIAGNOSIS — I10 ESSENTIAL HYPERTENSION: ICD-10-CM

## 2020-08-04 DIAGNOSIS — E03.9 HYPOTHYROIDISM, UNSPECIFIED TYPE: ICD-10-CM

## 2020-08-04 LAB
ALBUMIN SERPL-MCNC: 3.4 G/DL (ref 3.4–5)
ALP SERPL-CCNC: 52 U/L (ref 40–150)
ALT SERPL W P-5'-P-CCNC: 20 U/L (ref 0–70)
ANION GAP SERPL CALCULATED.3IONS-SCNC: 7 MMOL/L (ref 3–14)
AST SERPL W P-5'-P-CCNC: 18 U/L (ref 0–45)
BILIRUB SERPL-MCNC: 0.9 MG/DL (ref 0.2–1.3)
BUN SERPL-MCNC: 11 MG/DL (ref 7–30)
CALCIUM SERPL-MCNC: 8.3 MG/DL (ref 8.5–10.1)
CHLORIDE SERPL-SCNC: 105 MMOL/L (ref 94–109)
CHOLEST SERPL-MCNC: 150 MG/DL
CK SERPL-CCNC: 95 U/L (ref 30–300)
CO2 SERPL-SCNC: 22 MMOL/L (ref 20–32)
CREAT SERPL-MCNC: 0.82 MG/DL (ref 0.66–1.25)
ERYTHROCYTE [DISTWIDTH] IN BLOOD BY AUTOMATED COUNT: 13.4 % (ref 10–15)
GFR SERPL CREATININE-BSD FRML MDRD: 86 ML/MIN/{1.73_M2}
GLUCOSE SERPL-MCNC: 105 MG/DL (ref 70–99)
HCT VFR BLD AUTO: 38.8 % (ref 40–53)
HDLC SERPL-MCNC: 76 MG/DL
HGB BLD-MCNC: 13.5 G/DL (ref 13.3–17.7)
LDLC SERPL CALC-MCNC: 65 MG/DL
MCH RBC QN AUTO: 34.6 PG (ref 26.5–33)
MCHC RBC AUTO-ENTMCNC: 34.8 G/DL (ref 31.5–36.5)
MCV RBC AUTO: 100 FL (ref 78–100)
NONHDLC SERPL-MCNC: 74 MG/DL
PLATELET # BLD AUTO: 234 10E9/L (ref 150–450)
POTASSIUM SERPL-SCNC: 4.3 MMOL/L (ref 3.4–5.3)
PROT SERPL-MCNC: 6.8 G/DL (ref 6.8–8.8)
RBC # BLD AUTO: 3.9 10E12/L (ref 4.4–5.9)
SODIUM SERPL-SCNC: 134 MMOL/L (ref 133–144)
TRIGL SERPL-MCNC: 43 MG/DL
TSH SERPL DL<=0.005 MIU/L-ACNC: 0.74 MU/L (ref 0.4–4)
WBC # BLD AUTO: 5.1 10E9/L (ref 4–11)

## 2020-08-04 PROCEDURE — 84443 ASSAY THYROID STIM HORMONE: CPT | Performed by: PHYSICIAN ASSISTANT

## 2020-08-04 PROCEDURE — 88112 CYTOPATH CELL ENHANCE TECH: CPT | Performed by: PHYSICIAN ASSISTANT

## 2020-08-04 PROCEDURE — 80053 COMPREHEN METABOLIC PANEL: CPT | Performed by: PHYSICIAN ASSISTANT

## 2020-08-04 PROCEDURE — G0402 INITIAL PREVENTIVE EXAM: HCPCS | Performed by: PHYSICIAN ASSISTANT

## 2020-08-04 PROCEDURE — 80061 LIPID PANEL: CPT | Performed by: PHYSICIAN ASSISTANT

## 2020-08-04 PROCEDURE — 36415 COLL VENOUS BLD VENIPUNCTURE: CPT | Performed by: PHYSICIAN ASSISTANT

## 2020-08-04 PROCEDURE — 82550 ASSAY OF CK (CPK): CPT | Performed by: PHYSICIAN ASSISTANT

## 2020-08-04 PROCEDURE — 88112 CYTOPATH CELL ENHANCE TECH: CPT | Mod: 26 | Performed by: PHYSICIAN ASSISTANT

## 2020-08-04 PROCEDURE — 85027 COMPLETE CBC AUTOMATED: CPT | Performed by: PHYSICIAN ASSISTANT

## 2020-08-04 PROCEDURE — 99213 OFFICE O/P EST LOW 20 MIN: CPT | Mod: 25 | Performed by: PHYSICIAN ASSISTANT

## 2020-08-04 ASSESSMENT — MIFFLIN-ST. JEOR: SCORE: 1647.57

## 2020-08-04 ASSESSMENT — ACTIVITIES OF DAILY LIVING (ADL): CURRENT_FUNCTION: NO ASSISTANCE NEEDED

## 2020-08-04 NOTE — PROGRESS NOTES
"SUBJECTIVE:   Mendez Greene is a 74 year old male who presents for Preventive Visit.    He has back log of meds to doesn't need any refills.  Has hx of bladder ca and no longer followed by [facility].  Needs urine cytology  Walks for exercise. Starting to having bilat knee pain. Looses up with exercise. He takes a tylenol in the morning and not sure if it helps.He uses hiking poles in the winter.  He has some weakness in his thighs and when he goes from kneeling to standing has some difficulty.  Are you in the first 12 months of your Medicare coverage?  Yes,  Visual Acuity:  Right Eye: 20/25   Left Eye: 20/25  Both Eyes: 20/20      Healthy Habits:     In general, how would you rate your overall health?  Excellent    Frequency of exercise:  6-7 days/week    Duration of exercise:  Greater than 60 minutes    Do you usually eat at least 4 servings of fruit and vegetables a day, include whole grains    & fiber and avoid regularly eating high fat or \"junk\" foods?  Yes    Taking medications regularly:  Yes    Medication side effects:  None    Ability to successfully perform activities of daily living:  No assistance needed    Home Safety:  No safety concerns identified    Hearing Impairment:  Need to ask people to speak up or repeat themselves and difficulty understanding soft or whispered speech    In the past 6 months, have you been bothered by leaking of urine? Yes    In general, how would you rate your overall mental or emotional health?  Good      PHQ-2 Total Score: 1    Additional concerns today:  No    Do you feel safe in your environment? Yes    Have you ever done Advance Care Planning? (For example, a Health Directive, POLST, or a discussion with a medical provider or your loved ones about your wishes): Yes, advance care planning is on file.      Fall risk  Fallen 2 or more times in the past year?: No  Any fall with injury in the past year?: No    Cognitive Screening   1) Repeat 3 items (Leader, Season, Table)    2) " Clock draw: NORMAL  3) 3 item recall: Recalls 3 objects  Results: 3 items recalled: COGNITIVE IMPAIRMENT LESS LIKELY    Mini-CogTM Copyright FLORINDA Degroot. Licensed by the author for use in [facility]; reprinted with permission (gerald@[facility]). All rights reserved.      Do you have sleep apnea, excessive snoring or daytime drowsiness?: yes    Reviewed and updated as needed this visit by clinical staff  Tobacco  Allergies  Meds  Surg Hx         Reviewed and updated as needed this visit by Provider  Surg Hx        Social History     Tobacco Use     Smoking status: Former Smoker     Packs/day: 1.50     Years: 20.00     Pack years: 30.00     Types: Cigarettes     Start date: 1967     Last attempt to quit: 9/10/1987     Years since quittin.9     Smokeless tobacco: Never Used   Substance Use Topics     Alcohol use: Yes     Alcohol/week: 0.0 standard drinks     Comment: Occas wine     If you drink alcohol do you typically have >3 drinks per day or >7 drinks per week? No    Alcohol Use 2020   Prescreen: >3 drinks/day or >7 drinks/week? No         Current providers sharing in care for this patient include:   Patient Care Team:  Lou King PA-C as PCP - General (Internal Medicine)  Lou King PA-C as Assigned PCP    The following health maintenance items are reviewed in Epic and correct as of today:  Health Maintenance   Topic Date Due     MEDICARE ANNUAL WELLNESS VISIT  2019     FALL RISK ASSESSMENT  2019     TSH W/FREE T4 REFLEX  2020     CBC  2020     INFLUENZA VACCINE (1) 2020     COLORECTAL CANCER SCREENING  2022     ADVANCE CARE PLANNING  2023     LIPID  2024     DTAP/TDAP/TD IMMUNIZATION (3 - Td) 2026     PHQ-2  Completed     PNEUMOCOCCAL IMMUNIZATION 65+ LOW/MEDIUM RISK  Completed     ZOSTER IMMUNIZATION  Completed     AORTIC ANEURYSM SCREENING (SYSTEM ASSIGNED)  Completed     IPV IMMUNIZATION  Aged Out     MENINGITIS  IMMUNIZATION  Aged Out     Past Medical History:   Diagnosis Date     Allergic rhinitis, cause unspecified      Cancer (H) 4/2006     Diverticulosis      Double vision 10/17/2011    probable migraine aura     Hypertrophy of prostate without urinary obstruction and other lower urinary tract symptoms (LUTS)      Neoplasm of unspecified nature of bladder 3-06    [place] s/p cystoprostatectomy with ileal neobladder     Other and unspecified hyperlipidemia      Palpitations      Renal stone 5-12     Sinus bradycardia      Sinus bradycardia      Sleep apnea 7-05    C Pap     Unspecified essential hypertension 1988     Unspecified hypothyroidism      Past Surgical History:   Procedure Laterality Date     ABDOMEN SURGERY  4/2006    bladder     BIOPSY  3/2006     BUNIONECTOMY  12-12    LT     BUNIONECTOMY DIANA  11/28/2011    RT-Procedure:BUNIONECTOMY DIANA; Right Foot Dwaine Powell Bunionectomy with Metatarsal and Phalanx Osteotomy with 1st Metatarsal Phalangeal Joint Repair Right Foot; Surgeon:BAKARI ZAIDI; Location:[facility]     CYSTOSCOPY  3-06    CA bladder     HERNIORRHAPHY INCISIONAL (LOCATION) N/A 8/1/2016    Procedure: HERNIORRHAPHY INCISIONAL (LOCATION);  Surgeon: Onofre Lua MD;  Location: [facility]     SURGICAL HISTORY OF -   5-3-06    neobladder construction- CA     Current Outpatient Medications   Medication Sig Dispense Refill     aspirin 81 MG tablet Take  by mouth daily.  3     COMPOUND (CMPD RX) - PHARMACY TO MIX COMPOUNDED MEDICATION Hydro 2.5% Cr / Ketocon Crm.  Apply to the effected area on face twice daily as needed.       Cyanocobalamin (B-12) 1000 MCG CAPS Take 1,000 mcg by mouth daily       diphenhydrAMINE (BENADRYL) 25 MG tablet Take 1 tablet by mouth every 6 hours as needed for itching. 30 tablet 0     levothyroxine (SYNTHROID/LEVOTHROID) 150 MCG tablet TAKE ONE TABLET BY MOUTH ONE TIME DAILY  90 tablet 0     lisinopril 5 MG PO tablet Take 1 tablet (5 mg) by mouth daily 90 tablet 1      "Multiple Vitamins-Minerals (MULTIVITAMIN ADULT PO)        olopatadine (PATANOL) 0.1 % ophthalmic solution        order for DME Equipment being ordered: CPAP and supplies. LIFETIME need. 1 each 0     simvastatin (ZOCOR) 10 MG tablet TAKE 1 TABLET(10 MG) BY MOUTH AT BEDTIME 90 tablet 3         Review of Systems  Constitutional, HEENT, cardiovascular, pulmonary, GI, , musculoskeletal, neuro, skin, endocrine and psych systems are negative, except as otherwise noted.    OBJECTIVE:   /72   Pulse (!) 42   Temp 97.1  F (36.2  C) (Temporal)   Ht 1.765 m (5' 9.49\")   Wt 91.4 kg (201 lb 9.6 oz)   SpO2 100%   BMI 29.35 kg/m   Estimated body mass index is 29.35 kg/m  as calculated from the following:    Height as of this encounter: 1.765 m (5' 9.49\").    Weight as of this encounter: 91.4 kg (201 lb 9.6 oz).  Physical Exam  GENERAL: healthy, alert and no distress  EYES: Eyes grossly normal to inspection, PERRL and conjunctivae and sclerae normal  HENT: ear canals and TM's normal, nose and mouth without ulcers or lesions  NECK: no adenopathy, no asymmetry, masses, or scars and thyroid normal to palpation  RESP: lungs clear to auscultation - no rales, rhonchi or wheezes  CV: regular rate and rhythm, normal S1 S2, no S3 or S4, no murmur, click or rub, no peripheral edema and peripheral pulses strong  ABDOMEN: soft, nontender, no hepatosplenomegaly, no masses and bowel sounds normal  MS: no gross musculoskeletal defects noted, no edema  SKIN: no suspicious lesions or rashes  NEURO: Normal strength and tone, mentation intact and speech normal  PSYCH: mentation appears normal, affect normal/bright        ASSESSMENT / PLAN:   Assessment and Plan:     (Z00.00) Medicare annual wellness visit, subsequent  (primary encounter diagnosis)  Comment: pt's immunizations are up to date. Recd flu shot this fall  Plan: FIT test today    (C67.9) Malignant neoplasm of urinary bladder, unspecified site (H)  Comment:   Plan: CBC with " "platelets, Cytology non gyn        No longer followed by [facility]    (E03.9) Hypothyroidism, unspecified type  Comment:   Plan: TSH with free T4 reflex            (M62.81) Muscle weakness (generalized)  Comment: this going on for about 1.5 years and not getting worse. ? Due to aging?  Doubt statin related but will check CK and he could hold zocor for month to see if sxs improve.  Plan: CK total            (I10) Essential hypertension  Comment: well controlled according to home readings. Tends to run higher here  Plan: Comprehensive metabolic panel        His numbers did come down on recheck    (E78.5) Hyperlipidemia LDL goal <130  Comment:   Plan: Lipid panel reflex to direct LDL Fasting            (Z12.11) Colon cancer screening  Comment:   Plan: Fecal colorectal cancer screen (FIT)                  COUNSELING:  Reviewed preventive health counseling, as reflected in patient instructions    Estimated body mass index is 29.35 kg/m  as calculated from the following:    Height as of this encounter: 1.765 m (5' 9.49\").    Weight as of this encounter: 91.4 kg (201 lb 9.6 oz).         reports that he quit smoking about 32 years ago. His smoking use included cigarettes. He started smoking about 52 years ago. He has a 30.00 pack-year smoking history. He has never used smokeless tobacco.      Appropriate preventive services were discussed with this patient, including applicable screening as appropriate for cardiovascular disease, diabetes, osteopenia/osteoporosis, and glaucoma.  As appropriate for age/gender, discussed screening for colorectal cancer, prostate cancer, breast cancer, and cervical cancer. Checklist reviewing preventive services available has been given to the patient.    Reviewed patients plan of care and provided an AVS. The Basic Care Plan (routine screening as documented in Health Maintenance) for Mendez meets the Care Plan requirement. This Care Plan has been established and reviewed with the " Patient.    Counseling Resources:  ATP IV Guidelines  Pooled Cohorts Equation Calculator  Breast Cancer Risk Calculator  FRAX Risk Assessment  ICSI Preventive Guidelines  Dietary Guidelines for Americans, 2010  USDA's MyPlate  ASA Prophylaxis  Lung CA Screening    Lou King PA-C  [facility]    Identified Health Risks:

## 2020-08-04 NOTE — RESULT ENCOUNTER NOTE
It was a pleasure seeing you for your physical examination.  I wanted to get back to you with your test results.  I have enclosed a copy for your review.      I am happy to report that your CBC or complete blood count is normal with no signs of anemia, leukemia or platelet abnormalities. Your chemistry panel shows no signs of diabetes.  Your blood salts, kidney tests, liver tests, and proteins are all fine except for a slightly low calcium.  Let's recheck that in the lab in 2-3 weeks.    Your TSH (thyroid test) was in normal range.    Please continue your current dose of synthroid.  The CK muscle test was normal but you are welcome to try going off of the zocor for a month to see if that helps your muscle weakness.  My suspicion is that it is more due to your knees.      Your total cholesterol is 150 with the normal range being below 200.  Your HDL or good cholesterol is 76 with the normal range being above 40.  Your LDL or bad cholesterol is 65 with the normal range being below 130.      The urine test is still pending.  I will keep you posted.    Lou King PA-C

## 2020-08-05 LAB — COPATH REPORT: NORMAL

## 2020-08-05 NOTE — RESULT ENCOUNTER NOTE
"Steven,    Your urine test read \"negative for high grade urothelial carcinoma\".    Lou King PA-C  "

## 2020-08-17 ENCOUNTER — ALLIED HEALTH/NURSE VISIT (OUTPATIENT)
Dept: NURSING | Facility: CLINIC | Age: 75
End: 2020-08-17
Payer: MEDICARE

## 2020-08-17 DIAGNOSIS — Z00.00 ROUTINE HISTORY AND PHYSICAL EXAMINATION OF ADULT: Primary | ICD-10-CM

## 2020-08-17 PROCEDURE — 99207 ZZC NO CHARGE NURSE ONLY: CPT

## 2020-08-17 NOTE — PROGRESS NOTES
Chief Complaint   Patient presents with     FIRST EYE EXAM     for Medicare - note added to last px with PCP

## 2020-11-06 ENCOUNTER — HOSPITAL ENCOUNTER (INPATIENT)
Facility: CLINIC | Age: 75
LOS: 1 days | Discharge: HOME | DRG: 281 | End: 2020-11-07
Attending: EMERGENCY MEDICINE | Admitting: HOSPITALIST
Payer: MEDICARE

## 2020-11-06 ENCOUNTER — APPOINTMENT (OUTPATIENT)
Dept: GENERAL RADIOLOGY | Facility: CLINIC | Age: 75
DRG: 281 | End: 2020-11-06
Attending: EMERGENCY MEDICINE
Payer: MEDICARE

## 2020-11-06 ENCOUNTER — OFFICE VISIT (OUTPATIENT)
Dept: URGENT CARE | Facility: URGENT CARE | Age: 75
End: 2020-11-06
Payer: MEDICARE

## 2020-11-06 VITALS
TEMPERATURE: 98 F | OXYGEN SATURATION: 98 % | HEART RATE: 128 BPM | DIASTOLIC BLOOD PRESSURE: 74 MMHG | SYSTOLIC BLOOD PRESSURE: 126 MMHG | BODY MASS INDEX: 29.44 KG/M2 | RESPIRATION RATE: 18 BRPM | WEIGHT: 202.2 LBS

## 2020-11-06 DIAGNOSIS — R00.0 TACHYCARDIA: Primary | ICD-10-CM

## 2020-11-06 DIAGNOSIS — I21.4 NSTEMI (NON-ST ELEVATED MYOCARDIAL INFARCTION) (H): ICD-10-CM

## 2020-11-06 DIAGNOSIS — I48.0 PAROXYSMAL ATRIAL FIBRILLATION (H): Primary | ICD-10-CM

## 2020-11-06 DIAGNOSIS — R00.0 RAPID HEART RATE: ICD-10-CM

## 2020-11-06 LAB
ANION GAP SERPL CALCULATED.3IONS-SCNC: 3 MMOL/L (ref 3–14)
APTT PPP: 27 SEC (ref 22–37)
BASOPHILS # BLD AUTO: 0 10E9/L (ref 0–0.2)
BASOPHILS NFR BLD AUTO: 0.2 %
BUN SERPL-MCNC: 12 MG/DL (ref 7–30)
CALCIUM SERPL-MCNC: 8.8 MG/DL (ref 8.5–10.1)
CHLORIDE SERPL-SCNC: 102 MMOL/L (ref 94–109)
CO2 SERPL-SCNC: 27 MMOL/L (ref 20–32)
CREAT SERPL-MCNC: 0.77 MG/DL (ref 0.66–1.25)
DIFFERENTIAL METHOD BLD: ABNORMAL
EOSINOPHIL # BLD AUTO: 0.1 10E9/L (ref 0–0.7)
EOSINOPHIL NFR BLD AUTO: 0.8 %
ERYTHROCYTE [DISTWIDTH] IN BLOOD BY AUTOMATED COUNT: 13.5 % (ref 10–15)
ERYTHROCYTE [DISTWIDTH] IN BLOOD BY AUTOMATED COUNT: 13.5 % (ref 10–15)
GFR SERPL CREATININE-BSD FRML MDRD: 88 ML/MIN/{1.73_M2}
GLUCOSE SERPL-MCNC: 94 MG/DL (ref 70–99)
HBA1C MFR BLD: 5.1 % (ref 0–5.6)
HCT VFR BLD AUTO: 42.6 % (ref 40–53)
HCT VFR BLD AUTO: 43 % (ref 40–53)
HGB BLD-MCNC: 14.7 G/DL (ref 13.3–17.7)
HGB BLD-MCNC: 14.9 G/DL (ref 13.3–17.7)
IMM GRANULOCYTES # BLD: 0 10E9/L (ref 0–0.4)
IMM GRANULOCYTES NFR BLD: 0.4 %
INTERPRETATION ECG - MUSE: NORMAL
INTERPRETATION ECG - MUSE: NORMAL
LABORATORY COMMENT REPORT: NORMAL
LYMPHOCYTES # BLD AUTO: 1.3 10E9/L (ref 0.8–5.3)
LYMPHOCYTES NFR BLD AUTO: 15.8 %
MAGNESIUM SERPL-MCNC: 2.1 MG/DL (ref 1.6–2.3)
MCH RBC QN AUTO: 34.2 PG (ref 26.5–33)
MCH RBC QN AUTO: 34.7 PG (ref 26.5–33)
MCHC RBC AUTO-ENTMCNC: 34.2 G/DL (ref 31.5–36.5)
MCHC RBC AUTO-ENTMCNC: 35 G/DL (ref 31.5–36.5)
MCV RBC AUTO: 100 FL (ref 78–100)
MCV RBC AUTO: 99 FL (ref 78–100)
MONOCYTES # BLD AUTO: 0.7 10E9/L (ref 0–1.3)
MONOCYTES NFR BLD AUTO: 7.8 %
NEUTROPHILS # BLD AUTO: 6.4 10E9/L (ref 1.6–8.3)
NEUTROPHILS NFR BLD AUTO: 75 %
NRBC # BLD AUTO: 0 10*3/UL
NRBC BLD AUTO-RTO: 0 /100
PLATELET # BLD AUTO: 253 10E9/L (ref 150–450)
PLATELET # BLD AUTO: 261 10E9/L (ref 150–450)
POTASSIUM SERPL-SCNC: 4 MMOL/L (ref 3.4–5.3)
RBC # BLD AUTO: 4.29 10E12/L (ref 4.4–5.9)
RBC # BLD AUTO: 4.3 10E12/L (ref 4.4–5.9)
SARS-COV-2 RNA SPEC QL NAA+PROBE: NEGATIVE
SARS-COV-2 RNA SPEC QL NAA+PROBE: NORMAL
SODIUM SERPL-SCNC: 132 MMOL/L (ref 133–144)
SPECIMEN SOURCE: NORMAL
SPECIMEN SOURCE: NORMAL
TROPONIN I SERPL-MCNC: 0.7 UG/L (ref 0–0.04)
TROPONIN I SERPL-MCNC: 1.93 UG/L (ref 0–0.04)
WBC # BLD AUTO: 10.2 10E9/L (ref 4–11)
WBC # BLD AUTO: 8.5 10E9/L (ref 4–11)

## 2020-11-06 PROCEDURE — 93000 ELECTROCARDIOGRAM COMPLETE: CPT | Performed by: FAMILY MEDICINE

## 2020-11-06 PROCEDURE — 96361 HYDRATE IV INFUSION ADD-ON: CPT

## 2020-11-06 PROCEDURE — 96374 THER/PROPH/DIAG INJ IV PUSH: CPT

## 2020-11-06 PROCEDURE — 99223 1ST HOSP IP/OBS HIGH 75: CPT | Mod: AI | Performed by: HOSPITALIST

## 2020-11-06 PROCEDURE — 99285 EMERGENCY DEPT VISIT HI MDM: CPT | Mod: 25

## 2020-11-06 PROCEDURE — 99207 PR INPT ADMISSION FROM CLINIC: CPT | Performed by: FAMILY MEDICINE

## 2020-11-06 PROCEDURE — 83036 HEMOGLOBIN GLYCOSYLATED A1C: CPT | Performed by: EMERGENCY MEDICINE

## 2020-11-06 PROCEDURE — 85025 COMPLETE CBC W/AUTO DIFF WBC: CPT | Performed by: EMERGENCY MEDICINE

## 2020-11-06 PROCEDURE — 250N000013 HC RX MED GY IP 250 OP 250 PS 637: Performed by: HOSPITALIST

## 2020-11-06 PROCEDURE — 210N000002 HC R&B HEART CARE

## 2020-11-06 PROCEDURE — 250N000009 HC RX 250: Performed by: EMERGENCY MEDICINE

## 2020-11-06 PROCEDURE — 93005 ELECTROCARDIOGRAM TRACING: CPT | Mod: 76

## 2020-11-06 PROCEDURE — 36415 COLL VENOUS BLD VENIPUNCTURE: CPT | Performed by: HOSPITALIST

## 2020-11-06 PROCEDURE — 84484 ASSAY OF TROPONIN QUANT: CPT | Performed by: HOSPITALIST

## 2020-11-06 PROCEDURE — 80048 BASIC METABOLIC PNL TOTAL CA: CPT | Performed by: EMERGENCY MEDICINE

## 2020-11-06 PROCEDURE — 71046 X-RAY EXAM CHEST 2 VIEWS: CPT

## 2020-11-06 PROCEDURE — 250N000013 HC RX MED GY IP 250 OP 250 PS 637: Performed by: EMERGENCY MEDICINE

## 2020-11-06 PROCEDURE — C9803 HOPD COVID-19 SPEC COLLECT: HCPCS

## 2020-11-06 PROCEDURE — 250N000011 HC RX IP 250 OP 636: Performed by: HOSPITALIST

## 2020-11-06 PROCEDURE — U0003 INFECTIOUS AGENT DETECTION BY NUCLEIC ACID (DNA OR RNA); SEVERE ACUTE RESPIRATORY SYNDROME CORONAVIRUS 2 (SARS-COV-2) (CORONAVIRUS DISEASE [COVID-19]), AMPLIFIED PROBE TECHNIQUE, MAKING USE OF HIGH THROUGHPUT TECHNOLOGIES AS DESCRIBED BY CMS-2020-01-R: HCPCS | Performed by: EMERGENCY MEDICINE

## 2020-11-06 PROCEDURE — 85730 THROMBOPLASTIN TIME PARTIAL: CPT | Performed by: HOSPITALIST

## 2020-11-06 PROCEDURE — 258N000003 HC RX IP 258 OP 636: Performed by: EMERGENCY MEDICINE

## 2020-11-06 PROCEDURE — 85027 COMPLETE CBC AUTOMATED: CPT | Performed by: HOSPITALIST

## 2020-11-06 PROCEDURE — 93005 ELECTROCARDIOGRAM TRACING: CPT

## 2020-11-06 PROCEDURE — 84484 ASSAY OF TROPONIN QUANT: CPT | Performed by: EMERGENCY MEDICINE

## 2020-11-06 PROCEDURE — 83735 ASSAY OF MAGNESIUM: CPT | Performed by: EMERGENCY MEDICINE

## 2020-11-06 RX ORDER — LIDOCAINE 40 MG/G
CREAM TOPICAL
Status: DISCONTINUED | OUTPATIENT
Start: 2020-11-06 | End: 2020-11-07 | Stop reason: HOSPADM

## 2020-11-06 RX ORDER — SIMVASTATIN 10 MG
10 TABLET ORAL AT BEDTIME
Status: DISCONTINUED | OUTPATIENT
Start: 2020-11-06 | End: 2020-11-07 | Stop reason: HOSPADM

## 2020-11-06 RX ORDER — LISINOPRIL 5 MG/1
5 TABLET ORAL AT BEDTIME
Status: DISCONTINUED | OUTPATIENT
Start: 2020-11-06 | End: 2020-11-07 | Stop reason: HOSPADM

## 2020-11-06 RX ORDER — HEPARIN SODIUM 10000 [USP'U]/100ML
1000 INJECTION, SOLUTION INTRAVENOUS CONTINUOUS
Status: DISCONTINUED | OUTPATIENT
Start: 2020-11-06 | End: 2020-11-07

## 2020-11-06 RX ORDER — POLYETHYLENE GLYCOL 3350 17 G/17G
17 POWDER, FOR SOLUTION ORAL DAILY
Status: DISCONTINUED | OUTPATIENT
Start: 2020-11-06 | End: 2020-11-07 | Stop reason: HOSPADM

## 2020-11-06 RX ORDER — AMOXICILLIN 250 MG
2 CAPSULE ORAL 2 TIMES DAILY
Status: DISCONTINUED | OUTPATIENT
Start: 2020-11-06 | End: 2020-11-07 | Stop reason: HOSPADM

## 2020-11-06 RX ORDER — METOPROLOL TARTRATE 1 MG/ML
2.5-5 INJECTION, SOLUTION INTRAVENOUS EVERY 5 MIN PRN
Status: DISCONTINUED | OUTPATIENT
Start: 2020-11-06 | End: 2020-11-07 | Stop reason: HOSPADM

## 2020-11-06 RX ORDER — ONDANSETRON 2 MG/ML
4 INJECTION INTRAMUSCULAR; INTRAVENOUS EVERY 6 HOURS PRN
Status: DISCONTINUED | OUTPATIENT
Start: 2020-11-06 | End: 2020-11-07 | Stop reason: HOSPADM

## 2020-11-06 RX ORDER — AMOXICILLIN 250 MG
1 CAPSULE ORAL 2 TIMES DAILY
Status: DISCONTINUED | OUTPATIENT
Start: 2020-11-06 | End: 2020-11-07 | Stop reason: HOSPADM

## 2020-11-06 RX ORDER — ACETAMINOPHEN 650 MG/1
650 SUPPOSITORY RECTAL EVERY 4 HOURS PRN
Status: DISCONTINUED | OUTPATIENT
Start: 2020-11-06 | End: 2020-11-06

## 2020-11-06 RX ORDER — ACETAMINOPHEN 325 MG/1
650 TABLET ORAL EVERY 4 HOURS PRN
Status: DISCONTINUED | OUTPATIENT
Start: 2020-11-06 | End: 2020-11-07

## 2020-11-06 RX ORDER — METOPROLOL TARTRATE 1 MG/ML
5 INJECTION, SOLUTION INTRAVENOUS
Status: COMPLETED | OUTPATIENT
Start: 2020-11-06 | End: 2020-11-06

## 2020-11-06 RX ORDER — LEVOTHYROXINE SODIUM 150 UG/1
150 TABLET ORAL
Status: DISCONTINUED | OUTPATIENT
Start: 2020-11-07 | End: 2020-11-07 | Stop reason: HOSPADM

## 2020-11-06 RX ORDER — ONDANSETRON 4 MG/1
4 TABLET, ORALLY DISINTEGRATING ORAL EVERY 6 HOURS PRN
Status: DISCONTINUED | OUTPATIENT
Start: 2020-11-06 | End: 2020-11-07 | Stop reason: HOSPADM

## 2020-11-06 RX ORDER — ASPIRIN 81 MG/1
81 TABLET ORAL EVERY EVENING
Status: DISCONTINUED | OUTPATIENT
Start: 2020-11-07 | End: 2020-11-07

## 2020-11-06 RX ORDER — ASPIRIN 81 MG/1
324 TABLET, CHEWABLE ORAL ONCE
Status: COMPLETED | OUTPATIENT
Start: 2020-11-06 | End: 2020-11-06

## 2020-11-06 RX ADMIN — ASPIRIN 324 MG: 81 TABLET, CHEWABLE ORAL at 17:35

## 2020-11-06 RX ADMIN — SODIUM CHLORIDE 1000 ML: 9 INJECTION, SOLUTION INTRAVENOUS at 17:37

## 2020-11-06 RX ADMIN — METOPROLOL TARTRATE 5 MG: 5 INJECTION INTRAVENOUS at 17:42

## 2020-11-06 RX ADMIN — SIMVASTATIN 10 MG: 10 TABLET, FILM COATED ORAL at 21:02

## 2020-11-06 RX ADMIN — METOPROLOL TARTRATE 5 MG: 5 INJECTION INTRAVENOUS at 17:59

## 2020-11-06 RX ADMIN — METOPROLOL TARTRATE 5 MG: 5 INJECTION INTRAVENOUS at 17:48

## 2020-11-06 RX ADMIN — HEPARIN SODIUM 1200 UNITS/HR: 10000 INJECTION, SOLUTION INTRAVENOUS at 21:03

## 2020-11-06 ASSESSMENT — ENCOUNTER SYMPTOMS
LIGHT-HEADEDNESS: 0
SHORTNESS OF BREATH: 0
BACK PAIN: 1
PALPITATIONS: 0

## 2020-11-06 ASSESSMENT — MIFFLIN-ST. JEOR
SCORE: 1759.12
SCORE: 1671.57

## 2020-11-06 NOTE — PROGRESS NOTES
SUBJECTIVE:   Mendez Greene is a 75 year old male presenting with a chief complaint of   Chief Complaint   Patient presents with     Urgent Care     Went for a 2 mile walk and heart rate was up to 130 so patient is now worried     Comes in complaining of tachycardia.  Of significance he has had slow heart rate in the past.  Risk factors for his cardiovascular disease as well as for MI are diabetes mellitus.   Went on a long walk today had his heart rate up but never came down.  He does have some pressure in his chest but he says this is a normal pressure that he has had he is not complaining of shortness of breath    He is an established patient of [facility].        Review of Systems   Cardiovascular:        Rapid heart rate   All other systems reviewed and are negative.      Past Medical History:   Diagnosis Date     Allergic rhinitis, cause unspecified      Cancer (H) 2006     Diverticulosis      Double vision 10/17/2011    probable migraine aura     Hypertrophy of prostate without urinary obstruction and other lower urinary tract symptoms (LUTS)      Neoplasm of unspecified nature of bladder 3-    [place] s/p cystoprostatectomy with ileal neobladder     Other and unspecified hyperlipidemia      Palpitations      Renal stone 5-12     Sinus bradycardia      Sinus bradycardia      Sleep apnea 7-05    C Pap     Unspecified essential hypertension 1988     Unspecified hypothyroidism      Family History   Problem Relation Age of Onset     Osteoporosis Mother      Neurologic Disorder Mother         Parkinson's     Cerebrovascular Disease Mother      Cerebrovascular Disease Father      Diabetes Sister         Type 1     Diabetes Sister         type 1.        Other Cancer Maternal Grandmother         don't know type     Current Outpatient Medications   Medication Sig Dispense Refill     aspirin 81 MG tablet Take  by mouth daily.  3     COMPOUND (CMPD RX) - PHARMACY TO MIX COMPOUNDED MEDICATION Hydro 2.5% Cr /  Ketocon Crm.  Apply to the effected area on face twice daily as needed.       Cyanocobalamin (B-12) 1000 MCG CAPS Take 1,000 mcg by mouth daily       diphenhydrAMINE (BENADRYL) 25 MG tablet Take 1 tablet by mouth every 6 hours as needed for itching. 30 tablet 0     levothyroxine (SYNTHROID/LEVOTHROID) 150 MCG tablet TAKE ONE TABLET BY MOUTH ONE TIME DAILY  90 tablet 2     lisinopril 5 MG PO tablet Take 1 tablet (5 mg) by mouth daily 90 tablet 1     Multiple Vitamins-Minerals (MULTIVITAMIN ADULT PO)        olopatadine (PATANOL) 0.1 % ophthalmic solution        order for DME Equipment being ordered: CPAP and supplies. LIFETIME need. 1 each 0     simvastatin (ZOCOR) 10 MG tablet TAKE ONE TABLET BY MOUTH AT BEDTIME 90 tablet 2     Social History     Tobacco Use     Smoking status: Former Smoker     Packs/day: 1.50     Years: 20.00     Pack years: 30.00     Types: Cigarettes     Start date: 1967     Quit date: 9/10/1987     Years since quittin.1     Smokeless tobacco: Never Used   Substance Use Topics     Alcohol use: Yes     Alcohol/week: 0.0 standard drinks     Comment: Occas wine       OBJECTIVE  /74   Pulse 128   Temp 98  F (36.7  C) (Tympanic)   Resp 18   Wt 91.7 kg (202 lb 3.2 oz)   SpO2 98%   BMI 29.44 kg/m      Physical Exam  Vitals signs and nursing note reviewed.   HENT:      Head: Normocephalic.      Nose: Nose normal.   Eyes:      Extraocular Movements: Extraocular movements intact.   Neck:      Musculoskeletal: Normal range of motion.   Cardiovascular:      Rate and Rhythm: Tachycardia present.      Pulses: Normal pulses.   Pulmonary:      Effort: Pulmonary effort is normal.   Musculoskeletal: Normal range of motion.   Skin:     General: Skin is warm.      Capillary Refill: Capillary refill takes less than 2 seconds.   Neurological:      Mental Status: He is alert.   Psychiatric:         Mood and Affect: Mood normal.         Labs:  No results found for this or any previous visit (from  the past 24 hour(s)).    X-Ray was not done.    EKG; tachycardia.  Appears to have P waves SVT    ASSESSMENT:      ICD-10-CM    1. Tachycardia  R00.0 EKG 12-lead complete w/read - Clinics        Medical Decision Making:    Differential Diagnosis:  Tachycardia, SVT, atrial fibrillation, atrial flutter, MI, hyperthyroidism, electrolyte abnormality, dehydration,    Serious Comorbid Conditions:  Adult:  Diabetes    PLAN:    1.  He has a sustained SVT at approximately 130.  I do think because of this he needs to go to the emergency room and be ruled out for an MI electrolytes checked possibly medication to slow down his heart rate    Followup:    After evaluation at the emergency room I would like him to be seen by his primary care in the next 2 weeks    There are no Patient Instructions on file for this visit.

## 2020-11-06 NOTE — ED NOTES
DATE:  11/6/2020   TIME OF RECEIPT FROM LAB:  173  LAB TEST:  Troponin  LAB VALUE:  0.698  RESULTS GIVEN WITH READ-BACK TO (PROVIDER):  Patti Abdalla MD  TIME LAB VALUE REPORTED TO PROVIDER:   0529

## 2020-11-06 NOTE — ED PROVIDER NOTES
History     Chief Complaint:  Tachycardia      HPI   Mendez Greene is a 75 year old male who presents with tachycardia.  The patient reports noticing his heart rate was fast when checking it on his Fitbit after a walk this morning.  His normal heart rate is in the 40s.  He does not have any palpitations, shortness of breath, lightheadedness, or chest pain.  He has some soreness in both shoulders and upper back.  He did do some work yesterday that involved having his arms elevated above his head.  He does not think he would have noticed anything if he had not checked his Fitbit.    Cardiac/PE/DVT Risk Factors:  The patient has a history of hypertension and hyperlipidemia and is a former smoker.  He has no history of diabetes.  He reports no family history of heart disease.  He denies any personal or familial history of PE, DVT or clotting disorder.  He reports no recent travel, surgery or other immobilizations.  He has a history of bladder cancer.  He is not on hormone therapy.    Allergies:  No known drug allergies.      Medications:    Aspirin  Levothyroxine   Lisinopril   Simvastatin   Vitamin B12    Past Medical History:    Hypertension  Hyperlipidemia   Obstructive sleep apnea   Bladder cancer   Benign prostatic hypertrophy   Erectile dysfunction  Renal stone  Hypothyroidism   Diverticulosis     Past Surgical History:    Bladder resection, neobladder construction  Bunionectomy, left   Bunionectomy with metatarsal and phalanx osteotomy with 1st MTP joint repair, right   Incisional herniorrhaphy    Family History:    Diabetes - sister  Osteoporosis - mother  Parkinson's - mother  Cerebrovascular disease - mother, father     Social History:  Presents to the ED alone  Tobacco Use: Former smoker, 30 pack year history, quit 1987.   Alcohol Use: Occasional alcohol use.   PCP: Lou King      Review of Systems   Respiratory: Negative for shortness of breath.    Cardiovascular: Negative for chest pain,  "palpitations and leg swelling.   Musculoskeletal: Positive for back pain.        Positive for shoulder pain.   Neurological: Negative for light-headedness.   10 point review of systems performed and is negative except as above and in HPI.     Physical Exam   First Vitals:  BP: 126/87  Pulse: 133  Temp: 97  F (36.1  C)  Resp: 16  Height: 180.3 cm (5' 11\")  Weight: 100.7 kg (222 lb)  SpO2: 100 %      Physical Exam  General: Resting on the gurney, appears comfortable  Head:  The scalp, face, and head appear normal  Mouth/Throat: Mucus membranes are moist  CV:  Rapid but regular rhythm    Normal S1 and S2  No pathological murmur   Resp:  Breath sounds clear and equal bilaterally    Non-labored, no retractions or accessory muscle use    No coarseness    No wheezing   GI:  Abdomen is soft, no rigidity    No tenderness to palpation  MS:  Normal motor assessment of all extremities.    Good capillary refill noted.    No lower extremity edema, redness, swelling, or excess warmth.  Skin:  No rash or lesions noted.  Neuro: Speech is normal and fluent. No apparent deficit.  Psych:  Awake. Alert.  Normal affect.      Appropriate interactions.       Emergency Department Course   ECG:  @ 1639  Indication: tachycardia  Vent. Rate 134 bpm. NH interval 166 ms. QRS duration 122 ms. QT/QTc 280/418 ms. P-R-T axis * -17 -66.   Atrial fibrillation with rapid ventricular response. Abnormal ECG.    Read by Dr. Abdalla.     @ 4299  Indication: reassess rhythm  Vent. Rate 129 bpm. NH interval 132 ms. QRS duration 106 ms. QT/QTc 300/439 ms. P-R-T axis 77 -14 -48.   Atrial fibrillation with rapid ventricular response. Nonspecific ST and T-wave abnormality. Abnormal ECG.    Read by Dr. Abdalla.     @ 7196  Indication: reassess rhythm  Vent. Rate 80 bpm. NH interval * ms. QRS duration 98 ms. QT/QTc 370/426 ms. P-R-T axis * -6 -22.   Atrial fibrillation. Abnormal ECG.    Read by Dr. Abdalla.     Imaging:  Chest X-ray (PA and lateral):  Clear lungs. " No pleural effusion or pneumothorax. No cardiomegaly. Mildly tortuous thoracic aorta. Mild osteophytic spurring in the thoracic spine.  Report per radiology.     Radiographic findings were communicated with the patient who voiced understanding of the findings.    Laboratory:  CBC: RBC 4.29 (L), MCH 34.7 (H), otherwise WNL (WBC 8.5, HGB 14.9, )   BMP: Na 132 (L), otherwise WNL (Creatinine 0.77)   Troponin I: 0.698 (HH)     Asymptomatic COVID19 Virus PCR, nasopharyngeal: pending      Interventions:  (1735) Aspirin, 324 mg, PO    (1737) Normal Saline, 1 liter, IV bolus   (1759) Metoprolol, 5 mg, IV injection     Emergency Department Course:  Nursing notes and vitals reviewed.  I performed an exam of the patient as documented above.     EKG was done, interpretation as above.    A peripheral IV was established.  Blood was drawn and sent for laboratory testing, results as above. The patient was placed on continuous cardiac monitoring and pulse oximetry.     Repeat EKG was done, interpretation as above.    The patient was sent for a chest x-ray while in the emergency department, findings above.      Findings and plan explained to the patient who consents to admission.   (175) I discussed the patient with Dr. An of the hospitalist service, who will admit the patient to a cardiac telemetry bed for further monitoring, evaluation, and treatment.     Impression & Plan      Medical Decision Making:  Mendez Greene is a 75 year old male who presents for evaluation of rapid heart palpitations.  This is consistent with atrial fibrillation with rapid ventricular response.  Based on chronicity of symptoms and unclear nature of onset, elected to attempt to control rate instead of rhythm control with Metoprolol.  This was successful in controlling rate.  Patient with normal blood pressure and does not appear hemodynamically compromised.  Troponin returned elevated and aspirin given.  No heparin started initially as patient  denied chest pain, but then described pressure and thus heparin ordered by the hospitalist service.  Lab evaluation was otherwise unremarkable. No evidence of acute anemia or infectious process.         Diagnosis:    ICD-10-CM    1. NSTEMI (non-ST elevated myocardial infarction) (H)  I21.4 Asymptomatic COVID-19 Virus (Coronavirus) by PCR     Hemoglobin A1c     Hemoglobin A1c     Magnesium     Magnesium     CANCELED: Hemoglobin A1c     CANCELED: Magnesium   2. Rapid heart rate  R00.0        Disposition:  Admitted       I, Yara Yarbrough, am serving as a scribe on 11/6/2020 at 5:10 PM to personally document services performed by Patti Abdalla MD based on my observations and the provider's statements to me.     Yara Yarbrough  11/6/2020   [facility] EMERGENCY DEPT       Patti Abdalla MD  11/06/20 [id number]

## 2020-11-07 ENCOUNTER — APPOINTMENT (OUTPATIENT)
Dept: CARDIOLOGY | Facility: CLINIC | Age: 75
DRG: 281 | End: 2020-11-07
Attending: HOSPITALIST
Payer: MEDICARE

## 2020-11-07 VITALS
HEIGHT: 71 IN | DIASTOLIC BLOOD PRESSURE: 66 MMHG | OXYGEN SATURATION: 98 % | BODY MASS INDEX: 27.4 KG/M2 | SYSTOLIC BLOOD PRESSURE: 93 MMHG | WEIGHT: 195.7 LBS | TEMPERATURE: 97.5 F | HEART RATE: 65 BPM | RESPIRATION RATE: 18 BRPM

## 2020-11-07 LAB
ANION GAP SERPL CALCULATED.3IONS-SCNC: 2 MMOL/L (ref 3–14)
BUN SERPL-MCNC: 13 MG/DL (ref 7–30)
CALCIUM SERPL-MCNC: 8.4 MG/DL (ref 8.5–10.1)
CHLORIDE SERPL-SCNC: 109 MMOL/L (ref 94–109)
CHOLEST SERPL-MCNC: 145 MG/DL
CO2 SERPL-SCNC: 26 MMOL/L (ref 20–32)
CREAT SERPL-MCNC: 0.78 MG/DL (ref 0.66–1.25)
GFR SERPL CREATININE-BSD FRML MDRD: 88 ML/MIN/{1.73_M2}
GLUCOSE SERPL-MCNC: 94 MG/DL (ref 70–99)
HDLC SERPL-MCNC: 73 MG/DL
LDLC SERPL CALC-MCNC: 60 MG/DL
NONHDLC SERPL-MCNC: 72 MG/DL
POTASSIUM SERPL-SCNC: 4.2 MMOL/L (ref 3.4–5.3)
SODIUM SERPL-SCNC: 137 MMOL/L (ref 133–144)
TRIGL SERPL-MCNC: 59 MG/DL
TROPONIN I SERPL-MCNC: 2.52 UG/L (ref 0–0.04)
TROPONIN I SERPL-MCNC: 3.25 UG/L (ref 0–0.04)
TROPONIN I SERPL-MCNC: 3.59 UG/L (ref 0–0.04)
TROPONIN I SERPL-MCNC: 4.06 UG/L (ref 0–0.04)
UFH PPP CHRO-ACNC: 0.26 IU/ML
UFH PPP CHRO-ACNC: 0.55 IU/ML

## 2020-11-07 PROCEDURE — 93306 TTE W/DOPPLER COMPLETE: CPT | Mod: 26 | Performed by: INTERNAL MEDICINE

## 2020-11-07 PROCEDURE — 36415 COLL VENOUS BLD VENIPUNCTURE: CPT | Performed by: HOSPITALIST

## 2020-11-07 PROCEDURE — 4A023N7 MEASUREMENT OF CARDIAC SAMPLING AND PRESSURE, LEFT HEART, PERCUTANEOUS APPROACH: ICD-10-PCS | Performed by: INTERNAL MEDICINE

## 2020-11-07 PROCEDURE — 93458 L HRT ARTERY/VENTRICLE ANGIO: CPT | Performed by: INTERNAL MEDICINE

## 2020-11-07 PROCEDURE — 80061 LIPID PANEL: CPT | Performed by: HOSPITALIST

## 2020-11-07 PROCEDURE — 93010 ELECTROCARDIOGRAM REPORT: CPT | Performed by: INTERNAL MEDICINE

## 2020-11-07 PROCEDURE — 99152 MOD SED SAME PHYS/QHP 5/>YRS: CPT | Performed by: INTERNAL MEDICINE

## 2020-11-07 PROCEDURE — 258N000003 HC RX IP 258 OP 636: Performed by: INTERNAL MEDICINE

## 2020-11-07 PROCEDURE — 99239 HOSP IP/OBS DSCHRG MGMT >30: CPT | Performed by: HOSPITALIST

## 2020-11-07 PROCEDURE — 93005 ELECTROCARDIOGRAM TRACING: CPT

## 2020-11-07 PROCEDURE — C1894 INTRO/SHEATH, NON-LASER: HCPCS | Performed by: INTERNAL MEDICINE

## 2020-11-07 PROCEDURE — B2111ZZ FLUOROSCOPY OF MULTIPLE CORONARY ARTERIES USING LOW OSMOLAR CONTRAST: ICD-10-PCS | Performed by: INTERNAL MEDICINE

## 2020-11-07 PROCEDURE — 80048 BASIC METABOLIC PNL TOTAL CA: CPT | Performed by: HOSPITALIST

## 2020-11-07 PROCEDURE — 99221 1ST HOSP IP/OBS SF/LOW 40: CPT | Mod: 25 | Performed by: INTERNAL MEDICINE

## 2020-11-07 PROCEDURE — 250N000011 HC RX IP 250 OP 636: Performed by: INTERNAL MEDICINE

## 2020-11-07 PROCEDURE — 999N000208 ECHOCARDIOGRAM COMPLETE

## 2020-11-07 PROCEDURE — 85520 HEPARIN ASSAY: CPT | Performed by: HOSPITALIST

## 2020-11-07 PROCEDURE — C1769 GUIDE WIRE: HCPCS | Performed by: INTERNAL MEDICINE

## 2020-11-07 PROCEDURE — 250N000009 HC RX 250: Performed by: INTERNAL MEDICINE

## 2020-11-07 PROCEDURE — 84484 ASSAY OF TROPONIN QUANT: CPT | Performed by: INTERNAL MEDICINE

## 2020-11-07 PROCEDURE — 84484 ASSAY OF TROPONIN QUANT: CPT | Performed by: HOSPITALIST

## 2020-11-07 PROCEDURE — 999N000099 HC STATISTIC MODERATE SEDATION < 10 MIN: Performed by: INTERNAL MEDICINE

## 2020-11-07 PROCEDURE — 255N000002 HC RX 255 OP 636: Performed by: HOSPITALIST

## 2020-11-07 PROCEDURE — 272N000001 HC OR GENERAL SUPPLY STERILE: Performed by: INTERNAL MEDICINE

## 2020-11-07 PROCEDURE — 250N000013 HC RX MED GY IP 250 OP 250 PS 637: Performed by: HOSPITALIST

## 2020-11-07 PROCEDURE — C1760 CLOSURE DEV, VASC: HCPCS | Performed by: INTERNAL MEDICINE

## 2020-11-07 RX ORDER — VERAPAMIL HYDROCHLORIDE 2.5 MG/ML
INJECTION, SOLUTION INTRAVENOUS
Status: DISCONTINUED | OUTPATIENT
Start: 2020-11-07 | End: 2020-11-07 | Stop reason: HOSPADM

## 2020-11-07 RX ORDER — LIDOCAINE 40 MG/G
CREAM TOPICAL
Status: DISCONTINUED | OUTPATIENT
Start: 2020-11-07 | End: 2020-11-07

## 2020-11-07 RX ORDER — NITROGLYCERIN 5 MG/ML
VIAL (ML) INTRAVENOUS
Status: DISCONTINUED | OUTPATIENT
Start: 2020-11-07 | End: 2020-11-07 | Stop reason: HOSPADM

## 2020-11-07 RX ORDER — LORAZEPAM 2 MG/ML
0.5 INJECTION INTRAMUSCULAR
Status: DISCONTINUED | OUTPATIENT
Start: 2020-11-07 | End: 2020-11-07 | Stop reason: HOSPADM

## 2020-11-07 RX ORDER — ATROPINE SULFATE 0.1 MG/ML
0.5 INJECTION INTRAVENOUS
Status: DISCONTINUED | OUTPATIENT
Start: 2020-11-07 | End: 2020-11-07 | Stop reason: HOSPADM

## 2020-11-07 RX ORDER — FLUMAZENIL 0.1 MG/ML
0.2 INJECTION, SOLUTION INTRAVENOUS
Status: DISCONTINUED | OUTPATIENT
Start: 2020-11-07 | End: 2020-11-07 | Stop reason: HOSPADM

## 2020-11-07 RX ORDER — POTASSIUM CHLORIDE 1500 MG/1
20 TABLET, EXTENDED RELEASE ORAL
Status: DISCONTINUED | OUTPATIENT
Start: 2020-11-07 | End: 2020-11-07 | Stop reason: HOSPADM

## 2020-11-07 RX ORDER — HEPARIN SODIUM 1000 [USP'U]/ML
INJECTION, SOLUTION INTRAVENOUS; SUBCUTANEOUS
Status: DISCONTINUED | OUTPATIENT
Start: 2020-11-07 | End: 2020-11-07 | Stop reason: HOSPADM

## 2020-11-07 RX ORDER — LORAZEPAM 0.5 MG/1
0.5 TABLET ORAL
Status: DISCONTINUED | OUTPATIENT
Start: 2020-11-07 | End: 2020-11-07 | Stop reason: HOSPADM

## 2020-11-07 RX ORDER — METOPROLOL TARTRATE 25 MG/1
12.5 TABLET, FILM COATED ORAL 2 TIMES DAILY
Qty: 60 TABLET | Refills: 0 | Status: SHIPPED | OUTPATIENT
Start: 2020-11-07 | End: 2020-11-20

## 2020-11-07 RX ORDER — NALOXONE HYDROCHLORIDE 0.4 MG/ML
.2-.4 INJECTION, SOLUTION INTRAMUSCULAR; INTRAVENOUS; SUBCUTANEOUS
Status: DISCONTINUED | OUTPATIENT
Start: 2020-11-07 | End: 2020-11-07 | Stop reason: HOSPADM

## 2020-11-07 RX ORDER — IOPAMIDOL 755 MG/ML
INJECTION, SOLUTION INTRAVASCULAR
Status: DISCONTINUED | OUTPATIENT
Start: 2020-11-07 | End: 2020-11-07 | Stop reason: HOSPADM

## 2020-11-07 RX ORDER — SODIUM CHLORIDE 9 MG/ML
INJECTION, SOLUTION INTRAVENOUS CONTINUOUS
Status: DISCONTINUED | OUTPATIENT
Start: 2020-11-07 | End: 2020-11-07 | Stop reason: HOSPADM

## 2020-11-07 RX ORDER — FENTANYL CITRATE 50 UG/ML
INJECTION, SOLUTION INTRAMUSCULAR; INTRAVENOUS
Status: DISCONTINUED | OUTPATIENT
Start: 2020-11-07 | End: 2020-11-07 | Stop reason: HOSPADM

## 2020-11-07 RX ORDER — FENTANYL CITRATE 50 UG/ML
25-50 INJECTION, SOLUTION INTRAMUSCULAR; INTRAVENOUS
Status: ACTIVE | OUTPATIENT
Start: 2020-11-07 | End: 2020-11-07

## 2020-11-07 RX ORDER — ACETAMINOPHEN 325 MG/1
650 TABLET ORAL EVERY 4 HOURS PRN
Status: DISCONTINUED | OUTPATIENT
Start: 2020-11-07 | End: 2020-11-07 | Stop reason: HOSPADM

## 2020-11-07 RX ADMIN — SODIUM CHLORIDE: 9 INJECTION, SOLUTION INTRAVENOUS at 10:08

## 2020-11-07 RX ADMIN — LEVOTHYROXINE SODIUM 150 MCG: 150 TABLET ORAL at 08:58

## 2020-11-07 RX ADMIN — HUMAN ALBUMIN MICROSPHERES AND PERFLUTREN 9 ML: 10; .22 INJECTION, SOLUTION INTRAVENOUS at 08:43

## 2020-11-07 ASSESSMENT — ACTIVITIES OF DAILY LIVING (ADL)
ADLS_ACUITY_SCORE: 15

## 2020-11-07 ASSESSMENT — MIFFLIN-ST. JEOR: SCORE: 1639.82

## 2020-11-07 NOTE — CONSULTS
Attestation cardiology note    Consultation note of Dr. Gross reviewed.  Patient independently examined and interviewed.    New onset atrial fibrillation with rapid ventricular response, demand ischemia with cardiac catheterization showing no significant obstructive disease.    Aorta mild to moderately enlarged and will need surveillance in the future.    Okay to discharge today with anticoagulation and rate control of his atrial fibrillation, follow-up in 2 to 4 weeks with [facility] and if remains symptomatic consider cardioversion after 3 to 4 weeks of anticoagulation.

## 2020-11-07 NOTE — PROVIDER NOTIFICATION
MD Notification    Notified Person: MD    Notified Person Name: White    Notification Date/Time: 11/6/20 9770    Notification Interaction: amcon text page    Purpose of Notification: FYI after reviewing tele hx, pt had 23 beat run VT @ 2115, pt asymptomatic. K 4.0. Mag 2.1.     Orders Received: No call back expected.    Comments:

## 2020-11-07 NOTE — H&P
[facility]    History and Physical - Hospitalist Service       Date of Admission:  11/6/2020    Assessment & Plan   Mendez Greene is a 75 year old male with history of HTN, HL, CHA, BPH, hypothyroidism, and bladder cancer among others who presented to the ED with complaints of tachycardia. He first noted this on his fitbit this morning, particularly as he is usually in the 40s at rest and may be up to the 70s or 80s when he is walking, per his report. Initially he denies other symptoms associated, but subsequently does report some chest pressure and mild lightheadedness. Trop elevated at 0.698 - no overt ischemia on EKG which is irreg ireg and now he has normal range HR. Heparin gtt was started and he will be admitted for further management and cardiology evaluation.      Tachycardia, possibly atrial fibrillation with RVR  Baseline asymptomatic bradycardia  Noted on patient's fitbit while walking this morning. Reports baseline of bradycardia in 40s. No overt symptoms reported with this initially.  Although upon further discussion, he does endorse some chest pressure and very slight lightheadedness previously. He is not diabetic nor does he carry history of CAD. No family history of PE/DVT. No recent prolonged immobilization or surgery. He has a history of bladder cancer but is not on any kind of therapy at this time.   - EKG repeat pending  - Cardiology consulted   - telemetry monitoring  - given chest pressure and elevated trop, will start heparin per pharmacy dosing  - NPO at midnight in case of EP, angio etc    NSTEMI, likely type 2 - demand ischemia  No chest pain but does endorse chest pressure currently. Initial troponin 0.698.  - Given ASA in ED, will continue daily  - trend troponins, heparin gtt as above  - telemetry   - Cardiology consulted as above  - Echocardiogram    Hypertension  Hyperlipidemia  VSS in ED. PTA ACE, simva, and Aspirin to continue when verified.    Obstructive  sleep apnea  Continue home CPAP if he uses it.    Hypothyroidism  TSH Aug 2020 was 0.74.   - pta levothyroxine to continue    Remote history of bladder cancer status post resection and neobladder  Reports this being done at [place] years ago.  Sounds like he is no longer under monitoring.    BPH  Stable.  PTA flomax to continue if/when confirmed    Patient is a former walker who quit in 1980  Commended him for quitting smoking    Asymptomatic COVID 19   Felt to be low suspicion on admission. No special precautions indicated.  - Swab obtained in ED, pending      Diet:  heart healthy  DVT Prophylaxis: heparin gtt   Cortez Catheter: not present  Code Status:  Full code-discussed in detail and confirmed upon admission with spouse at bedside         Disposition Plan   Expected discharge: Likely 2 days possibly more pending clinical course and cardiology evaluation  Entered: Johnie An MD 11/06/2020, 6:03 PM     The patient's care was discussed with the Patient, Patient's Family and ED MD.    Johnie An MD  [facility]  Contact information available via [facility] Paging/Directory      ______________________________________________________________________    Chief Complaint   Tachycardia noted on fitbit, subsequently endorses chest pressure    History is obtained from the patient    History of Present Illness   Mendez Greene is a 75 year old male with history of HTN, HL, CHA, BPH, hypothyroidism, and bladder cancer among others who presented to the ED with complaints of tachycardia. He first noted this on his fitbit this morning, particularly as he is usually in the 40s at rest and may be up to the 70s or 80s when he is walking, per his report. Interestingly, he reports that he would not have noticed anything if it weren't for his fitbit.  Patient denies shortness of breath, chest pain, palpitations but does currently endorse some chest pressure.  He also thinks he might of felt some  lightheadedness earlier but nothing drastic.  Denies any history of similar symptoms.  He reports completing exercise stress test of some sort many many years ago for which he reports no further work-up was pursued.  Patient watches his blood pressure at home with 120s over 80s being the usual reading.  He takes his medications as directed.  He denies any fevers, chills, coughing, sore throat, headache, vision changes, loss of taste or smell, abdominal pain, nausea, vomiting, diarrhea or dysuria.  Additionally denies any history of GI bleeding or any other unusual bleeding.  He also denies any known sick contacts.  He lives with his wife and they have mainly remained at home and she is feeling well.    In the ED patient was seen by Dr. Abdalla with whom I discussed the case.  Patient is afebrile, hemodynamically stable, and saturating normally on room air initial heart rates were up to the 130s and this is decreased after metoprolol to the 70s and 80s range primarily.  Rhythm does look irregularly irregular.  I do not appreciate any acute ischemic changes.  EKGs are pending.  Blood work notable for troponin of 0.698.  CMP showed a mildly low sodium of 132.  CBC was unremarkable.  Given his chest pressure I am starting him on therapeutic heparin infusion.  Patient will be admitted to the heart center for further monitoring, management, and cardiology consultation.    Review of Systems    The 10 point Review of Systems is negative other than noted in the HPI or here.     Past Medical History    I have reviewed this patient's medical history and updated it with pertinent information if needed.   Past Medical History:   Diagnosis Date     Allergic rhinitis, cause unspecified      Cancer (H) 4/2006     Diverticulosis      Double vision 10/17/2011    probable migraine aura     Hypertrophy of prostate without urinary obstruction and other lower urinary tract symptoms (LUTS)      Neoplasm of unspecified nature of bladder  3-06    [place] s/p cystoprostatectomy with ileal neobladder     Other and unspecified hyperlipidemia      Palpitations      Renal stone -12     Sinus bradycardia      Sinus bradycardia      Sleep apnea 7-05    C Pap     Unspecified essential hypertension 1988     Unspecified hypothyroidism        Past Surgical History   I have reviewed this patient's surgical history and updated it with pertinent information if needed.  Past Surgical History:   Procedure Laterality Date     ABDOMEN SURGERY  2006    bladder     BIOPSY  3/2006     BUNIONECTOMY  12-    LT     BUNIONECTOMY DIANA  2011    RT-Procedure:BUNIONECTOMY DIANA; Right Foot Dwaine Scurry Bunionectomy with Metatarsal and Phalanx Osteotomy with 1st Metatarsal Phalangeal Joint Repair Right Foot; Surgeon:BAKARI ZAIDI; Location:[facility]     CYSTOSCOPY  3-06    [place] bladder     HERNIORRHAPHY INCISIONAL (LOCATION) N/A 2016    Procedure: HERNIORRHAPHY INCISIONAL (LOCATION);  Surgeon: Onofre Lua MD;  Location: [facility]     SURGICAL HISTORY OF -   5-3-06    neobladder construction- [place]       Social History   I have reviewed this patient's social history and updated it with pertinent information if needed.  Social History     Tobacco Use     Smoking status: Former Smoker     Packs/day: 1.50     Years: 20.00     Pack years: 30.00     Types: Cigarettes     Start date: 1967     Quit date: 9/10/1987     Years since quittin.1     Smokeless tobacco: Never Used   Substance Use Topics     Alcohol use: Yes     Alcohol/week: 0.0 standard drinks     Comment: Occas wine     Drug use: No       Family History   I have reviewed this patient's family history and updated it with pertinent information if needed.  Family History   Problem Relation Age of Onset     Osteoporosis Mother      Neurologic Disorder Mother         Parkinson's     Cerebrovascular Disease Mother      Cerebrovascular Disease Father      Diabetes Sister         Type 1     Diabetes Sister          type 1.   1972     Other Cancer Maternal Grandmother         don't know type       Prior to Admission Medications   Prior to Admission Medications   Prescriptions Last Dose Informant Patient Reported? Taking?   COMPOUND (CMPD RX) - PHARMACY TO MIX COMPOUNDED MEDICATION   Yes No   Sig: Hydro 2.5% Cr / Ketocon Crm.  Apply to the effected area on face twice daily as needed.   Cyanocobalamin (B-12) 1000 MCG CAPS   Yes No   Sig: Take 1,000 mcg by mouth daily   Multiple Vitamins-Minerals (MULTIVITAMIN ADULT PO)   Yes No   aspirin 81 MG tablet   Yes No   Sig: Take  by mouth daily.   diphenhydrAMINE (BENADRYL) 25 MG tablet   Yes No   Sig: Take 1 tablet by mouth every 6 hours as needed for itching.   levothyroxine (SYNTHROID/LEVOTHROID) 150 MCG tablet   No No   Sig: TAKE ONE TABLET BY MOUTH ONE TIME DAILY    lisinopril 5 MG PO tablet   No No   Sig: Take 1 tablet (5 mg) by mouth daily   olopatadine (PATANOL) 0.1 % ophthalmic solution   Yes No   order for DME   No No   Sig: Equipment being ordered: CPAP and supplies. LIFETIME need.   simvastatin (ZOCOR) 10 MG tablet   No No   Sig: TAKE ONE TABLET BY MOUTH AT BEDTIME      Facility-Administered Medications: None     Allergies   Allergies   Allergen Reactions     Food Other (See Comments)     Uncertain of which food , testing done ~       Physical Exam   Vital Signs: Temp: 97  F (36.1  C) Temp src: Oral BP: 116/87 Pulse: 129   Resp: 24 SpO2: 96 % O2 Device: None (Room air)    Weight: 222 lbs 0 oz    Gen: NAD, pleasant  HEENT: Normocephalic, EOMI, MMM  Resp: no crackles,  no wheezes, no increased work of resp  CV: S1S2 heard, irreg irreg rhythm, normal rate, no pitting pedal edema  Abdo: soft, nontender, nondistended, bowel sounds present  Ext: calves nontender, well perfused, Radial pulses palpated and symmetric  Neuro: AAOx3, CN grossly intact, no facial asymmetry      Data   Data reviewed today: I reviewed all medications, new labs and imaging results over the  last 24 hours. I personally reviewed no images or EKG's today.    Recent Labs   Lab 11/06/20  1644   WBC 8.5   HGB 14.9   MCV 99      *   POTASSIUM 4.0   CHLORIDE 102   CO2 27   BUN 12   CR 0.77   ANIONGAP 3   SIMONA 8.8   GLC 94   TROPI 0.698*     Recent Results (from the past 24 hour(s))   XR Chest 2 Views    Narrative    XR CHEST 2 VW 11/6/2020 6:19 PM    HISTORY: cp    COMPARISON: None.      Impression    IMPRESSION: Clear lungs. No pleural effusion or pneumothorax. No  cardiomegaly. Mildly tortuous thoracic aorta. Mild osteophytic  spurring in the thoracic spine.    DEEDEE OVIEDO MD

## 2020-11-07 NOTE — ED NOTES
[facility]  ED Nurse Handoff Report    ED Chief complaint: Palpitations      ED Diagnosis:   Final diagnoses:   NSTEMI (non-ST elevated myocardial infarction) (H)   Rapid heart rate       Code Status: Confirm with hospitalist    Allergies:   Allergies   Allergen Reactions     Food Other (See Comments)     Uncertain of which food , testing done ~1997       Patient Story: Pt from home where he lives with his wife. Pt reports palpitations that started this am. Pt went to  and was sent here for evaluation. Noted to have HR in 130s.    Focused Assessment:  Pt has received metoprolol 5mg IV x3 and current HR is 124. See EKGs. Pt denies chest pain or shortess of breath. Uses CPAP at HS, wife will bring in. Alert and oriented. Pleasant and cooperative.    Treatments and/or interventions provided: Labs, fluids, medications, monitoring. CXR pending.  Labs Ordered and Resulted from Time of ED Arrival Up to the Time of Departure from the ED   CBC WITH PLATELETS DIFFERENTIAL - Abnormal; Notable for the following components:       Result Value    RBC Count 4.29 (*)     MCH 34.7 (*)     All other components within normal limits   BASIC METABOLIC PANEL - Abnormal; Notable for the following components:    Sodium 132 (*)     All other components within normal limits   TROPONIN I - Abnormal; Notable for the following components:    Troponin I ES 0.698 (*)     All other components within normal limits   COVID-19 VIRUS (CORONAVIRUS) BY PCR       Patient's response to treatments and/or interventions: Tolerated well    To be done/followed up on inpatient unit:  Continue plan of care    Does this patient have any cognitive concerns?: none noted    Activity level - Baseline/Home:  Independent  Activity Level - Current:   Stand with Assist    Patient's Preferred language: English   Needed?: No    Isolation: None  Infection: Not Applicable  Patient tested for COVID 19 prior to admission: YES  Bariatric?:  No    Vital Signs:   Vitals:    11/06/20 1745 11/06/20 1750 11/06/20 1755 11/06/20 1800   BP: 103/81 111/76 116/87 (!) 126/96   Pulse: 149 126 129 122   Resp: 17 9 24 13   Temp:       TempSrc:       SpO2: 96% 98% 96% 98%   Weight:       Height:           Cardiac Rhythm:     Was the PSS-3 completed:   Yes  What interventions are required if any?               Family Comments: Wife avail by phone  OBS brochure/video discussed/provided to patient/family: No              Name of person given brochure if not patient: n/a              Relationship to patient: n/a    For the majority of the shift this patient's behavior was Green.   Behavioral interventions performed were Rounding.    ED NURSE PHONE NUMBER: *[phone]

## 2020-11-07 NOTE — DISCHARGE SUMMARY
[facility]  Hospitalist Discharge Summary      Date of Admission:  11/6/2020  Date of Discharge:  11/7/2020  Discharging Provider: Johnie An MD    Patient was discharged from inpatient admission prior to 2 midnights due to an unexpectedly rapid recovery and consultation was completed.    Discharge Diagnoses   1. New onset atrial fibrillation with rapid ventricular response  2. NSTEMI, likely type 2 due to demand ischemia  3. Mild ascending aorta dilation  4. Baseline asymptomatic bradycardia  5. Mild hyponatremia  6. Hypertension  7. Hyperlipidemia  8. CHA on CPAP  9. History of bladder cancer  10. BPH  11. Former smoker  12. Asymptomatic COVID 19 - negative 11/6    Follow-ups Needed After Discharge   Follow-up Appointments     Follow-up and recommended labs and tests       PCP this week for hospitalization follow up  Cardiology as scheduled             Unresulted Labs Ordered in the Past 30 Days of this Admission     No orders found for last 31 day(s).      These results will be followed up by NA    Discharge Disposition   Discharged to home  Condition at discharge: Stable      Hospital Course   Mendez Greene is a 75 year old male with history of HTN, HL, CHA, BPH, hypothyroidism, and bladder cancer among others who presented to the ED with complaints of tachycardia. He first noted this on his fitbit this morning, particularly as he is usually in the 40s at rest and may be up to the 70s or 80s when he is walking, per his report. Initially he denies other symptoms associated, but subsequently does report some chest pressure and mild lightheadedness. Trop elevated at 0.698 - no overt ischemia on EKG which is irreg ireg and now he has normal range HR. Heparin gtt was started and he will be admitted for further management and cardiology evaluation.      New onset atrial fibrillation with RVR  Baseline asymptomatic bradycardia  Mild ascending aortic dilation  Concentric LVH  Noted on  patient's fitbit while walking this morning. Reports baseline of bradycardia in 40s. No overt symptoms reported with this initially.  Although upon further discussion, he does endorse some chest pressure and very slight lightheadedness previously. He is not diabetic nor does he carry history of CAD. No family history of PE/DVT. No recent prolonged immobilization or surgery. He has a history of bladder cancer but is not on any kind of therapy at this time.   - Cardiology consulted   - telemetry monitoring  - trop trended up to 4, patient fairly asymptomatic however  -- Cardiology completed angiogram and reported no major disease - no intervention done.   -- Echo showed normal EF, mildly dilated aorta - as below   -- Cardiology started apixaban 5 mg bid and low dose metoprolol twice a day.  BP have been borderline low but most recently improved and he is asymptomatic with activity and ambulation around the floor. Cardiology ok with discharge and patient is wishing to discharge as well.  PCP follow up and Cardiology follow up arranged.  - holding parameters for metoprolol as below - patient to continue checking HR and BP at home    NSTEMI, likely type 2 - possibly demand ischemia  No chest pain but does endorse chest pressure currently. Initial troponin 0.698.  - Given ASA in ED, will continue daily  - trend troponins, heparin gtt as above  - telemetry   - Cardiology consulted as above  - Echocardiogram    Hypertension  Hyperlipidemia  VSS in ED. PTA ACE, simva, and Aspirin to continue when verified.    Obstructive sleep apnea  Continue home CPAP if he uses it.    Hypothyroidism  TSH Aug 2020 was 0.74.   - pta levothyroxine to continue    Remote history of bladder cancer status post resection and neobladder  Reports this being done at [place] years ago.  Sounds like he is no longer under monitoring.    BPH  Stable.  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 No special precautions indicated.  - Swab obtained in ED, negative 11/6    Consultations This Hospital Stay   PHARMACY TO DOSE HEPARIN  CARDIOLOGY IP CONSULT  PHARMACY IP CONSULT  PHARMACY IP CONSULT  PHARMACY IP CONSULT  PHARMACY IP CONSULT  SMOKING CESSATION PROGRAM IP CONSULT    Code Status   Full Code    Time Spent on this Encounter   I, Johnie An MD, personally saw the patient today and spent greater than 30 minutes discharging this patient.       Johnie An MD  [facility] [address], [address]  [facility] [address]  Phone: [phone]  ______________________________________________________________________    Physical Exam   Vital Signs: Temp: 97.5  F (36.4  C) Temp src: Oral BP: 93/66 Pulse: 65   Resp: 18 SpO2: 98 % O2 Device: None (Room air)    Weight: 195 lbs 11.2 oz    Gen: NAD, pleasant  HEENT: Normocephalic, EOMI, MMM  Resp: no crackles,  no wheezes, no increased work of resp  CV: S1S2 heard, irreg irreg rhythm, reg rate, no pitting pedal edema  Abdo: soft, nontender, nondistended, bowel sounds present  Ext: calves nontender, well perfused  Neuro: AAOx3, CN grossly intact, no facial asymmetry         Primary Care Physician   Lou King    Discharge Orders      Follow-Up with Cardiac Advanced Practice Provider      Reason for your hospital stay    Tachycardia and chest pressure     Follow-up and recommended labs and tests     PCP this week for hospitalization follow up  Cardiology as scheduled     Activity    Your activity upon discharge: activity as tolerated     Discharge Instructions    Start medications as below.  Monitor your HR and BP at home, as you have been. If you are symptomatic (lightheadedness, dizziness, shortness of breath, chest pain/pressure/palpitations) you should contact your doctor, your cardiology team as you may need to be re-evaluated and  possibly have medications adjusted. Allow your body some time to adjust to the medications and take a pause when getting out of bed or up from a chair before exerting.  If your HR is less than 55, do not take your metoprolol. If your systolic BP (top number) is < 95, do not take your metoprolol. Keep track of your BP and HR and bring this record to your PCP follow and your cardiology follow up.     Diet    Follow this diet upon discharge: Orders Placed This Encounter      Regular Diet Adult       Significant Results and Procedures   Most Recent 3 CBC's:  Recent Labs   Lab Test 20  1644 20  0950   WBC 10.2 8.5 5.1   HGB 14.7 14.9 13.5    99 100    253 234     Most Recent 3 BMP's:  Recent Labs   Lab Test 20  0629 20  1644 20  0950    132* 134   POTASSIUM 4.2 4.0 4.3   CHLORIDE 109 102 105   CO2 26 27 22   BUN 13 12 11   CR 0.78 0.77 0.82   ANIONGAP 2* 3 7   SIMONA 8.4* 8.8 8.3*   GLC 94 94 105*     Most Recent 3 Troponin's:  Recent Labs   Lab Test 20  1358 20  1107 20  0629   TROPI 3.590* 4.056* 3.247*     Most Recent TSH and T4:  Recent Labs   Lab Test 20  0950 17  0947 17  0947   TSH 0.74   < > 8.09*   T4  --   --  0.88    < > = values in this interval not displayed.   ,   Results for orders placed or performed during the hospital encounter of 20   XR Chest 2 Views    Narrative    XR CHEST 2 VW 2020 6:19 PM    HISTORY: cp    COMPARISON: None.      Impression    IMPRESSION: Clear lungs. No pleural effusion or pneumothorax. No  cardiomegaly. Mildly tortuous thoracic aorta. Mild osteophytic  spurring in the thoracic spine.    DEEDEE OVIEDO MD   Echocardiogram Complete    Narrative    [id number]  [id number]  [id number]  [id number]^DESHAUN^MARTIN^NIESHA           [facility]  Echocardiography Laboratory  [address] [place] [id number]        Name: JUAN MANUEL GRIMES  MRN: [id number]  :  1945  Study Date: 11/07/2020 08:07 AM  Age: 75 yrs  Gender: Male  Patient Location: [facility]  Reason For Study: Tachycardia, MI  Ordering Physician: MARTIN MEADE  Referring Physician: Lou King  Performed By: Leann Novak     BSA: 2.2 m2  Height: 71 in  Weight: 222 lb  HR: 102  BP: 108/69 mmHg  _____________________________________________________________________________  __        Procedure  Complete Portable Echo Adult. Optison (NDC #5400-1245) given intravenously.  _____________________________________________________________________________  __        Interpretation Summary     The ascending aorta is Moderately dilated.  Sinus Valsalva 43mm, Asc aorta 45mm  There is mild to moderate concentric left ventricular hypertrophy.  The visual ejection fraction is estimated at 55-60%.  Normal or borderline hypokinesis of distal anterior free wall/apex  The left atrium is mild to moderately dilated.  There is mild (1+) aortic regurgitation.  _____________________________________________________________________________  __        Left Ventricle  The left ventricle is normal in size. There is mild to moderate concentric  left ventricular hypertrophy. The visual ejection fraction is estimated at 55-  60%. Diastolic function not assessed due to atrial fibrillation. There is  borderline anterior wall hypokinesis. Normal or borderline hypokinesis of  distal anterior free wall/apex. There is no thrombus seen in the left  ventricle.     Right Ventricle  The right ventricle is normal in structure, function and size.     Atria  The left atrium is mild to moderately dilated. Right atrial size is normal.     Mitral Valve  There is trace mitral regurgitation.        Tricuspid Valve  There is trace to mild tricuspid regurgitation. The right ventricular systolic  pressure is approximated at 16.8 mmHg plus the right atrial pressure. Doppler  findings do not suggest pulmonary hypertension. IVC diameter <2.1 cm  collapsing >50%  with sniff suggests a normal RA pressure of 3 mmHg.     Aortic Valve  The aortic valve is trileaflet with aortic valve sclerosis. There is mild (1+)  aortic regurgitation. No hemodynamically significant valvular aortic stenosis.     Pulmonic Valve  The pulmonic valve is not well seen, but is grossly normal.     Vessels  The ascending aorta is Moderately dilated. Sinus Valsalva 43mm, Asc aorta  45mm.     Pericardium  The pericardium appears normal.        Rhythm  The rhythm was atrial fibrillation.  _____________________________________________________________________________  __  MMode/2D Measurements & Calculations  IVSd: 1.3 cm     LVIDd: 5.6 cm  LVIDs: 4.0 cm  LVPWd: 1.2 cm  FS: 29.0 %  LV mass(C)d: 292.5 grams  LV mass(C)dI: 132.7 grams/m2  Ao root diam: 4.0 cm  LA dimension: 3.9 cm  asc Aorta Diam: 4.5 cm  LA/Ao: 0.99  LA Volume (BP): 86.4 ml  LA Volume Index (BP): 39.3 ml/m2  RWT: 0.44           Doppler Measurements & Calculations  MV E max arnold: 88.6 cm/sec  MV dec time: 0.21 sec  Ao V2 max: 137.3 cm/sec  Ao max P.0 mmHg  Ao V2 mean: 91.7 cm/sec  Ao mean P.0 mmHg  Ao V2 VTI: 22.8 cm  LV V1 max P.8 mmHg  LV V1 max: 130.3 cm/sec  LV V1 VTI: 22.5 cm  TR max arnold: 205.1 cm/sec  TR max P.8 mmHg  AV Arnold Ratio (DI): 0.95  E/E' avg: 10.2  Lateral E/e': 8.1  Medial E/e': 12.3              _____________________________________________________________________________  __        Report approved by: Kun Bedoya 2020 10:37 AM      Cardiac Catheterization    Narrative    1) Minimal coronary artery disease  2) Normal LVEF and LVEDP       Discharge Medications   Current Discharge Medication List      START taking these medications    Details   apixaban ANTICOAGULANT (ELIQUIS) 5 MG tablet Take 1 tablet (5 mg) by mouth 2 times daily  Qty: 60 tablet, Refills: 0    Associated Diagnoses: Paroxysmal atrial fibrillation (H)      metoprolol tartrate (LOPRESSOR) 25 MG tablet Take 0.5 tablets (12.5 mg) by  mouth 2 times daily  Qty: 60 tablet, Refills: 0    Associated Diagnoses: Paroxysmal atrial fibrillation (H)         CONTINUE these medications which have NOT CHANGED    Details   COMPOUND (CMPD RX) - PHARMACY TO MIX COMPOUNDED MEDICATION Hydro 2.5% Cr / Ketocon Crm.  Apply to the effected area on face twice daily as needed.      Cyanocobalamin (B-12) 1000 MCG CAPS Take 1,000 mcg by mouth daily      diphenhydrAMINE-acetaminophen (TYLENOL PM)  MG tablet Take 0.5 tablets by mouth nightly as needed for sleep      levothyroxine (SYNTHROID/LEVOTHROID) 150 MCG tablet TAKE ONE TABLET BY MOUTH ONE TIME DAILY   Qty: 90 tablet, Refills: 2    Associated Diagnoses: Hypothyroidism, unspecified type      lisinopril 5 MG PO tablet Take 1 tablet (5 mg) by mouth daily  Qty: 90 tablet, Refills: 1    Associated Diagnoses: Essential hypertension      Multiple Vitamins-Minerals (MULTIVITAMIN ADULT PO)       olopatadine (PATANOL) 0.1 % ophthalmic solution       simvastatin (ZOCOR) 10 MG tablet TAKE ONE TABLET BY MOUTH AT BEDTIME  Qty: 90 tablet, Refills: 2    Associated Diagnoses: Hyperlipidemia, unspecified hyperlipidemia type      order for DME Equipment being ordered: CPAP and supplies. LIFETIME need.  Qty: 1 each, Refills: 0    Associated Diagnoses: CHA (obstructive sleep apnea)         STOP taking these medications       aspirin 81 MG tablet Comments:   Reason for Stopping:             Allergies   Allergies   Allergen Reactions     Food Other (See Comments)     Uncertain of which food , testing done ~1997

## 2020-11-07 NOTE — PROVIDER NOTIFICATION
MD Notification    Notified Person: MD    Notified Person Name: Corky    Notification Date/Time: 11/7/20 0122    Notification Interaction: amcon text page    Purpose of Notification: Troponins trending up, ok to trend until peak?    Orders Received: New order for continued troponins.    Comments:

## 2020-11-07 NOTE — PROGRESS NOTES
RECEIVING UNIT ED HANDOFF REVIEW    ED Nurse Handoff Report was reviewed by: Anum Chavarria RN on November 6, 2020 at 7:45 PM

## 2020-11-07 NOTE — PROGRESS NOTES
2015 -  Pt admitted from ED in stable condition. Afib CVR 70-80s. Denies CP, chest pressure or palpitations. NS bolus finishing infusion.

## 2020-11-07 NOTE — PROGRESS NOTES
[facility]    Medicine Progress Note - Hospitalist Service       Date of Admission:  11/6/2020  Assessment & Plan       Mendez Greene is a 75 year old male with history of HTN, HL, CHA, BPH, hypothyroidism, and bladder cancer among others who presented to the ED with complaints of tachycardia. He first noted this on his fitbit this morning, particularly as he is usually in the 40s at rest and may be up to the 70s or 80s when he is walking, per his report. Initially he denies other symptoms associated, but subsequently does report some chest pressure and mild lightheadedness. Trop elevated at 0.698 - no overt ischemia on EKG which is irreg ireg and now he has normal range HR. Heparin gtt was started and he will be admitted for further management and cardiology evaluation.       NSTEMI  No chest pain but does endorse chest pressure currently. Initial troponin 0.698.  Initially felt to be type 2 due to demand ischemia, but trended up over 3 upon admission. EKG update as above.  - Given ASA in ED, will continue daily  - trop 0.6--> 3.2, heparin gtt continued  - telemetry   - Cardiology consulted as above  - Echocardiogram  - given trend and history, anticipate angio today/tmrw, pending cardiology      Tachycardia, possibly atrial fibrillation with RVR  Baseline asymptomatic bradycardia  Noted on patient's fitbit while walking this morning. Reports baseline of bradycardia in 40s. No overt symptoms reported with this initially.  Although upon further discussion, he does endorse some chest pressure and very slight lightheadedness previously. He is not diabetic nor does he carry history of CAD. No family history of PE/DVT. No recent prolonged immobilization or surgery. He has a history of bladder cancer but is not on any kind of therapy at this time.   - EKG repeat showing A fib, possible ST depression  - Cardiology consulted   - telemetry monitoring  - given chest pressure and elevated trop,  will start heparin per pharmacy dosing  - NPO since midnight    Mild hyponatremia - normalized  Na 132 on admission. Corrected to 137 now.   - monitor periodically as indicated     Hypertension  Hyperlipidemia  VSS in ED.   - PTA ACE, simva, and Aspirin to continue when verified. Changing to high intensity statin likely recommended per cardiology.     Obstructive sleep apnea  Continue home CPAP.     Hypothyroidism  TSH Aug 2020 was 0.74.   - PTA levothyroxine to continue     Remote history of bladder cancer status post resection and neobladder  Reports this being done at [place] years ago.  Sounds like he is no longer under monitoring.  - continue prior outpatient plans     BPH  Stable.  PTA flomax to continue if/when confirmed     Patient is a former walker who quit in 1980  Commended him for quitting smoking     Asymptomatic COVID 19   Felt to be low suspicion on admission. No special precautions indicated.  - Swab obtained in ED, pending      Diet: NPO for Medical/Clinical Reasons Except for: Meds, Ice Chips    DVT Prophylaxis: Heparin gtt  Cortez Catheter: not present  Code Status: Full Code           Disposition Plan   Expected discharge: 2 days, pending course and cardiology evaluation  Entered: Johnie An MD 11/07/2020, 8:00 AM       The patient's care was discussed with the Bedside Nurse and Patient.    Johnie An MD  Hospitalist Service  [facility]  Contact information available via [facility] Paging/Directory    ______________________________________________________________________    Interval History   Seen and examined. No new complaints. Trop going up further 0.69 --> 3.2.  NPO in case of angiogram today.    Data reviewed today: I reviewed all medications, new labs and imaging results over the last 24 hours. I personally reviewed no images or EKG's today.    Physical Exam   Vital Signs: Temp: 97.5  F (36.4  C) Temp src: Oral BP: 108/69 Pulse: 79   Resp: 16 SpO2: 98 % O2  Device: None (Room air)    Weight: 195 lbs 11.2 oz    Gen: NAD, pleasant  HEENT: Normocephalic, EOMI, MMM  Resp: no crackles,  no wheezes, no increased work of resp  CV: S1S2 heard, irreg irreg rhythm, reg rate, no pedal edema  Abdo: soft, nontender, nondistended, bowel sounds present  Ext: calves nontender, well perfused  Neuro: AAOx3, CN grossly intact, no facial asymmetry      Data   Recent Labs   Lab 11/07/20  0629 11/07/20  0002 11/06/20 2020 11/06/20  1644   WBC  --   --  10.2 8.5   HGB  --   --  14.7 14.9   MCV  --   --  100 99   PLT  --   --  261 253     --   --  132*   POTASSIUM 4.2  --   --  4.0   CHLORIDE 109  --   --  102   CO2 26  --   --  27   BUN 13  --   --  12   CR 0.78  --   --  0.77   ANIONGAP 2*  --   --  3   SIMONA 8.4*  --   --  8.8   GLC 94  --   --  94   TROPI 3.247* 2.522* 1.927* 0.698*     Recent Results (from the past 24 hour(s))   XR Chest 2 Views    Narrative    XR CHEST 2 VW 11/6/2020 6:19 PM    HISTORY: cp    COMPARISON: None.      Impression    IMPRESSION: Clear lungs. No pleural effusion or pneumothorax. No  cardiomegaly. Mildly tortuous thoracic aorta. Mild osteophytic  spurring in the thoracic spine.    DEEDEE OVIEDO MD

## 2020-11-07 NOTE — PLAN OF CARE
2015-0700: A&Ox4. VSS on RA. Home CPAP overnight. COVID swab 11-6 negative. Denies CP, chest pressure or palpitations. LS clear. Denies shortness of breath. BLE +1 edema. Tele afib 70-100s. Trops trending up. Heparin infusing. Up indep/SBA. NPO @ 0000. Plan for echo and cards consult, may need angio.

## 2020-11-07 NOTE — CONSULTS
[facility]    Cardiology Consult Note-Cardiology      Date of Admission:  11/6/2020  Consult Requested by: Hospital Internal Medicine  Reason for Consult: Tachycardia, NSTEMI     Assessment & Plan: SL   Mendez Greene is a 75 year old male admitted on 11/6/2020 with newly diagnosed atrial fibrillation with rapid ventricular response and NSTEMI, type 1 vs type 2. History is significant for hypertension and dyslipidemia.     Mr. Greene remains in atrial fibrillation with improvement in rates today, but continues in the 80s-90s this morning. He has received 3 doses of IV metoprolol last night, but has not received any oral rate control agents at this time. The duration of his atrial fibrillation is unclear.  He is currently hemodynamically stable and asymptomatic and does not meet indications for urgent cardioversion. Given unclear duration and asymptomatic status, would prefer rate control strategy. His [id number] score is 3 (for HTN and age), and thus chronic anticoagulation would be indicated; will address this prior to discharge, and pending possible PCI as below.    He has evidence of NSTEMI with significant delta with troponin, trending up to 3.24 this AM. His chest pressure has now resolved. He has received  and heparin. This may all represent Type 2, demand injury from his rapid ventricular response, but in the setting of his symptoms and significant troponin increase, I think it would be prudent to proceed with angiogram to assess for significant CAD and proceed with PCI if indicated. It is possible he had a type 1 event that prompted his atrial fibrillation.     We will plan as follows:    #1 NSTEMI, Type 1 vs Type 2 secondary to demand from atrial fibrillation with RVR  - On heparin, received   - Will proceed with coronary angiogram today. He is NPO and I have discussed risk/benefits and he has consented to proceed. Will review results following this.  - ASA 81  mg daily; if undergoes PCI will discuss antiplatelet strategy post cath with likely therapeutic anticoagulation for his atrial fibrillation   - Echo pending     #2 Atrial fibrillation, newly documented, with rapid ventricular response  #3 [id number] = 3  - Will start metoprolol 12.5 mg BID today with up titration as needed for heart rate control  - Will require therapeutic anticoagulation - will discuss further following angiogram - currently receiving heparin for NSTEMI    #4 Hypertension  - Continue lisinopril     #5 Dyslipidemia   - Continue simvastatin  - Lipid panel shows total choleserol 145, HDL 73, LDL 60  - If significant coronary disease on angiogram today will increase to high intensity statin therapy with atorvastatin 40 mg       Ezequiel Gross MD  [facility]          ______________________________________________________________________    Chief Complaint   Tachycardia       History of Present Illness   Mendez Greene is a 75 year old male with a past medical history significant primarily for hypertension, dyslipidemia, CHA, BPH, and bladder cancer who is admitted following an episode of tachycardia with associated chest pressure.     Mr. Greene reports that he usually is active without any cardiovascular symptoms or issues. He takes no cardiac medications aside from aspirin and simvastatin. He walks outdoors regularly without difficulty. He tracks his heart rate on his FitBit device. Yesterday, after returning from a walk, he noted his heart rate was in the 130s, which is unusual for him, given that he is typically in the 40s at baseline. He also noted some mild chest pressure associated with this. He denies any other associated symptoms of dizziness/lightheadedness or palpitations. Given the persistent tachycardia, he presented to the ED. There, he was found to be in atrial fibrillation with rapid response with rates in the 130s. He received a dose of IV metoprolol with  "return of rates to the 70s-80s. His chest pressure improved in the ED. ECG did not show acute ischemic changes, but he had elevated troponin to 0.698 which trended up with repeat monitoring. He was thus initiated on heparin for NSTEMI and admitted for further management.     This morning, Mr. Greene denies any further symptoms of chest pain or pressure and denies any palpitations. He remains in atrial fibrillation with rates in the 80s-90s this morning. He is asymptomatic from this and tells me he \"feels great.\" His troponin has trended up this morning to 3.24.     Review of Systems   A complete review of systems was performed and was found to be negative except as otherwise noted per HPI.     Past Medical History    I have reviewed this patient's medical history and updated it with pertinent information if needed.   Past Medical History:   Diagnosis Date     Allergic rhinitis, cause unspecified      Cancer (H) 4/2006     Diverticulosis      Double vision 10/17/2011    probable migraine aura     Hypertrophy of prostate without urinary obstruction and other lower urinary tract symptoms (LUTS)      Neoplasm of unspecified nature of bladder 3-06    [place] s/p cystoprostatectomy with ileal neobladder     Other and unspecified hyperlipidemia      Palpitations      Renal stone 5-12     Sinus bradycardia      Sinus bradycardia      Sleep apnea 7-05    C Pap     Unspecified essential hypertension 1988     Unspecified hypothyroidism        Past Surgical History   I have reviewed this patient's surgical history and updated it with pertinent information if needed.  Past Surgical History:   Procedure Laterality Date     ABDOMEN SURGERY  4/2006    bladder     BIOPSY  3/2006     BUNIONECTOMY  12-12    LT     BUNIONECTOMY DIANA  11/28/2011    RT-Procedure:BUNIONECTOMY DIANA; Right Foot Dwaine Diana Bunionectomy with Metatarsal and Phalanx Osteotomy with 1st Metatarsal Phalangeal Joint Repair Right Foot; Surgeon:BAKARI ZAIDI; " Location:[facility]     CYSTOSCOPY  3-06    CA bladder     HERNIORRHAPHY INCISIONAL (LOCATION) N/A 2016    Procedure: HERNIORRHAPHY INCISIONAL (LOCATION);  Surgeon: Onofre Lua MD;  Location: [facility]     SURGICAL HISTORY OF -   5-3-06    neobladder construction- CA       Social History   I have reviewed this patient's social history and updated it with pertinent information if needed.  Social History     Tobacco Use     Smoking status: Former Smoker     Packs/day: 1.50     Years: 20.00     Pack years: 30.00     Types: Cigarettes     Start date: 1967     Quit date: 9/10/1987     Years since quittin.1     Smokeless tobacco: Never Used   Substance Use Topics     Alcohol use: Yes     Alcohol/week: 0.0 standard drinks     Comment: Occas wine     Drug use: No     Family History   I have reviewed this patient's family history and updated it with pertinent information if needed.   I have reviewed this patient's family history and updated it with pertinent information if needed.  Family History   Problem Relation Age of Onset     Osteoporosis Mother      Neurologic Disorder Mother         Parkinson's     Cerebrovascular Disease Mother      Cerebrovascular Disease Father      Diabetes Sister         Type 1     Diabetes Sister         type 1.   1972     Other Cancer Maternal Grandmother         don't know type       Medications   Medications Prior to Admission   Medication Sig Dispense Refill Last Dose     aspirin 81 MG tablet Take 81 mg by mouth At Bedtime   3 2020 at      COMPOUND (CMPD RX) - PHARMACY TO MIX COMPOUNDED MEDICATION Hydro 2.5% Cr / Ketocon Crm.  Apply to the effected area on face twice daily as needed.   2020 at am     Cyanocobalamin (B-12) 1000 MCG CAPS Take 1,000 mcg by mouth daily   2020 at am     diphenhydrAMINE-acetaminophen (TYLENOL PM)  MG tablet Take 0.5 tablets by mouth nightly as needed for sleep   Past Week at Unknown time     levothyroxine  (SYNTHROID/LEVOTHROID) 150 MCG tablet TAKE ONE TABLET BY MOUTH ONE TIME DAILY  90 tablet 2 11/6/2020 at am     lisinopril 5 MG PO tablet Take 1 tablet (5 mg) by mouth daily (Patient taking differently: Take 5 mg by mouth At Bedtime ) 90 tablet 1 11/5/2020 at hs     Multiple Vitamins-Minerals (MULTIVITAMIN ADULT PO)    11/6/2020 at am     olopatadine (PATANOL) 0.1 % ophthalmic solution    11/6/2020 at am     simvastatin (ZOCOR) 10 MG tablet TAKE ONE TABLET BY MOUTH AT BEDTIME 90 tablet 2 11/5/2020 at hs     order for DME Equipment being ordered: CPAP and supplies. LIFETIME need. 1 each 0        Allergies   Allergies   Allergen Reactions     Food Other (See Comments)     Uncertain of which food , testing done ~1997       Physical Exam   Vital Signs: Temp: 97.5  F (36.4  C) Temp src: Oral BP: 108/69 Pulse: 79   Resp: 16 SpO2: 98 % O2 Device: None (Room air)    Weight: 195 lbs 11.2 oz    General: The patient is a pleasant, well-appearing gentleman who appears his stated age and is in no acute distress.  Eyes: Anicteric sclera.  Vessels: Jugular venous pressure within normal limits.  Radial dorsalis pedis pulses intact bilaterally.  No carotid bruits noted.  Heart: Normal S1, S2.  Irregularly irregular.  No murmurs, gallops, or rubs appreciated.  Lungs: Clear to auscultation bilaterally with good inspiratory effort.  No wheezes, rales, or rhonchi.  Abdomen: Nontender, nondistended.  Extremities: Warm, well-perfused.  No cyanosis, clubbing, or significant lower extreme edema noted.  Mental: Patient is alert and oriented x3 with appropriate mood and affect.    Data   Results for orders placed or performed during the hospital encounter of 11/06/20 (from the past 24 hour(s))   EKG 12-lead, tracing only   Result Value Ref Range    Interpretation ECG Click View Image link to view waveform and result    CBC with platelets differential   Result Value Ref Range    WBC 8.5 4.0 - 11.0 10e9/L    RBC Count 4.29 (L) 4.4 - 5.9 10e12/L     Hemoglobin 14.9 13.3 - 17.7 g/dL    Hematocrit 42.6 40.0 - 53.0 %    MCV 99 78 - 100 fl    MCH 34.7 (H) 26.5 - 33.0 pg    MCHC 35.0 31.5 - 36.5 g/dL    RDW 13.5 10.0 - 15.0 %    Platelet Count 253 150 - 450 10e9/L    Diff Method Automated Method     % Neutrophils 75.0 %    % Lymphocytes 15.8 %    % Monocytes 7.8 %    % Eosinophils 0.8 %    % Basophils 0.2 %    % Immature Granulocytes 0.4 %    Nucleated RBCs 0 0 /100    Absolute Neutrophil 6.4 1.6 - 8.3 10e9/L    Absolute Lymphocytes 1.3 0.8 - 5.3 10e9/L    Absolute Monocytes 0.7 0.0 - 1.3 10e9/L    Absolute Eosinophils 0.1 0.0 - 0.7 10e9/L    Absolute Basophils 0.0 0.0 - 0.2 10e9/L    Abs Immature Granulocytes 0.0 0 - 0.4 10e9/L    Absolute Nucleated RBC 0.0    Basic metabolic panel   Result Value Ref Range    Sodium 132 (L) 133 - 144 mmol/L    Potassium 4.0 3.4 - 5.3 mmol/L    Chloride 102 94 - 109 mmol/L    Carbon Dioxide 27 20 - 32 mmol/L    Anion Gap 3 3 - 14 mmol/L    Glucose 94 70 - 99 mg/dL    Urea Nitrogen 12 7 - 30 mg/dL    Creatinine 0.77 0.66 - 1.25 mg/dL    GFR Estimate 88 >60 mL/min/[1.73_m2]    GFR Estimate If Black >90 >60 mL/min/[1.73_m2]    Calcium 8.8 8.5 - 10.1 mg/dL   Troponin I   Result Value Ref Range    Troponin I ES 0.698 (HH) 0.000 - 0.045 ug/L   Hemoglobin A1c   Result Value Ref Range    Hemoglobin A1C 5.1 0 - 5.6 %   Magnesium   Result Value Ref Range    Magnesium 2.1 1.6 - 2.3 mg/dL   Asymptomatic COVID-19 Virus (Coronavirus) by PCR    Specimen: Nasopharyngeal   Result Value Ref Range    COVID-19 Virus PCR to [facility] - Source Nasopharyngeal     COVID-19 Virus PCR to [facility] - Result       Test received-See reflex to IDDL test SARS CoV2 (COVID-19) Virus RT-PCR   SARS-CoV-2 COVID-19 Virus (Coronavirus) RT-PCR Nasopharyngeal    Specimen: Nasopharyngeal   Result Value Ref Range    SARS-CoV-2 Virus Specimen Source Nasopharyngeal     SARS-CoV-2 PCR Result NEGATIVE     SARS-CoV-2 PCR Comment       Testing was performed using the Xpert Xpress  SARS-CoV-2 Assay on the Cepheid Gene-Xpert   Instrument Systems. Additional information about this Emergency Use Authorization (EUA)   assay can be found via the Lab Guide.     XR Chest 2 Views    Narrative    XR CHEST 2 VW 11/6/2020 6:19 PM    HISTORY: cp    COMPARISON: None.      Impression    IMPRESSION: Clear lungs. No pleural effusion or pneumothorax. No  cardiomegaly. Mildly tortuous thoracic aorta. Mild osteophytic  spurring in the thoracic spine.    DEEDEE OVIEDO MD   EKG 12-lead, tracing only   Result Value Ref Range    Interpretation ECG Click View Image link to view waveform and result    Troponin I   Result Value Ref Range    Troponin I ES 1.927 (HH) 0.000 - 0.045 ug/L   Partial thromboplastin time   Result Value Ref Range    PTT 27 22 - 37 sec   CBC with platelets   Result Value Ref Range    WBC 10.2 4.0 - 11.0 10e9/L    RBC Count 4.30 (L) 4.4 - 5.9 10e12/L    Hemoglobin 14.7 13.3 - 17.7 g/dL    Hematocrit 43.0 40.0 - 53.0 %     78 - 100 fl    MCH 34.2 (H) 26.5 - 33.0 pg    MCHC 34.2 31.5 - 36.5 g/dL    RDW 13.5 10.0 - 15.0 %    Platelet Count 261 150 - 450 10e9/L   Troponin I   Result Value Ref Range    Troponin I ES 2.522 (HH) 0.000 - 0.045 ug/L   Heparin Unfractionated Anti Xa Level   Result Value Ref Range    Heparin Unfractionated Anti Xa Level 0.55 IU/mL   Basic metabolic panel   Result Value Ref Range    Sodium 137 133 - 144 mmol/L    Potassium 4.2 3.4 - 5.3 mmol/L    Chloride 109 94 - 109 mmol/L    Carbon Dioxide 26 20 - 32 mmol/L    Anion Gap 2 (L) 3 - 14 mmol/L    Glucose 94 70 - 99 mg/dL    Urea Nitrogen 13 7 - 30 mg/dL    Creatinine 0.78 0.66 - 1.25 mg/dL    GFR Estimate 88 >60 mL/min/[1.73_m2]    GFR Estimate If Black >90 >60 mL/min/[1.73_m2]    Calcium 8.4 (L) 8.5 - 10.1 mg/dL   Lipid panel reflex to direct LDL   Result Value Ref Range    Cholesterol 145 <200 mg/dL    Triglycerides 59 <150 mg/dL    HDL Cholesterol 73 >39 mg/dL    LDL Cholesterol Calculated 60 <100 mg/dL    Non HDL  Cholesterol 72 <130 mg/dL   Troponin I   Result Value Ref Range    Troponin I ES 3.247 (HH) 0.000 - 0.045 ug/L   EKG 12-lead, tracing only   Result Value Ref Range    Interpretation ECG Click View Image link to view waveform and result

## 2020-11-07 NOTE — PHARMACY-ADMISSION MEDICATION HISTORY
Pharmacy Medication History  Admission medication history interview status for the 11/6/2020  admission is complete. See EPIC admission navigator for prior to admission medications     Location of Interview: Phone  Medication history sources: Patient  Medication history source reliability: Good  Adherence assessment: Good      Additional medication history information:   Steven uses patanol due to cat allergies and doesn't think he will need those drops during this admission. He did not bring his hydrocortisone/ketoconazole cream with him, but he doesn't think he will need that during this admission either.     Medication reconciliation completed by provider prior to medication history? No    Time spent in this activity: 15 minutes      Prior to Admission medications    Medication Sig Last Dose Taking? Auth Provider   aspirin 81 MG tablet Take 81 mg by mouth At Bedtime  11/5/2020 at hs Yes Dali Dunn MD   COMPOUND (CMPD RX) - PHARMACY TO MIX COMPOUNDED MEDICATION Hydro 2.5% Cr / Ketocon Crm.  Apply to the effected area on face twice daily as needed. 11/6/2020 at am Yes Dali Dunn MD   Cyanocobalamin (B-12) 1000 MCG CAPS Take 1,000 mcg by mouth daily 11/6/2020 at am Yes Reported, Patient   diphenhydrAMINE-acetaminophen (TYLENOL PM)  MG tablet Take 0.5 tablets by mouth nightly as needed for sleep Past Week at Unknown time Yes Unknown, Entered By History   levothyroxine (SYNTHROID/LEVOTHROID) 150 MCG tablet TAKE ONE TABLET BY MOUTH ONE TIME DAILY  11/6/2020 at am Yes Lou King PA-C   lisinopril 5 MG PO tablet Take 1 tablet (5 mg) by mouth daily  Patient taking differently: Take 5 mg by mouth At Bedtime  11/5/2020 at hs Yes Lou King PA-C   Multiple Vitamins-Minerals (MULTIVITAMIN ADULT PO)  11/6/2020 at am Yes Reported, Patient   olopatadine (PATANOL) 0.1 % ophthalmic solution  11/6/2020 at am Yes Reported, Patient   simvastatin (ZOCOR) 10 MG tablet TAKE ONE TABLET BY MOUTH AT BEDTIME  11/5/2020 at hs Yes Lou King PA-C   order for DME Equipment being ordered: CPAP and supplies. LIFETIME need.   Michell Day, DO

## 2020-11-07 NOTE — PRE-PROCEDURE
GENERAL PRE-PROCEDURE:   Procedure:  Coronary angiogram  Date/Time:  11/7/2020 11:24 AM    Verbal consent obtained?: Yes    Written consent obtained?: Yes    Risks and benefits: Risks, benefits and alternatives were discussed    Consent given by:  Patient  Patient states understanding of procedure being performed: Yes    Patient's understanding of procedure matches consent: Yes    Procedure consent matches procedure scheduled: Yes    Appropriately NPO:  Yes  ASA Class:  Class 2- mild systemic disease, no acute problems, no functional limitations  Mallampati  :  Grade 2- soft palate, base of uvula, tonsillar pillars, and portion of posterior pharyngeal wall visible  Lungs:  Lungs clear with good breath sounds bilaterally  Heart:  Normal heart sounds and rate  History & Physical reviewed:  History and physical reviewed and no updates needed  Statement of review:  I have reviewed the lab findings, diagnostic data, medications, and the plan for sedation

## 2020-11-07 NOTE — PLAN OF CARE
BPs soft at times, MDs aware -Metoprolol held. S/p angiogram today, left radial with no intervention. TR band removed ~1400, negative reverse barbeau. Echo completed. Tele- AFib CVR. Per cards pt OK to discharge. Starting eliquis and metoprolol. Clarified metoprolol with cards. Went over detailed parameter instructions when taking metoprolol. Pt expressed understanding and all questions answered. Medications sent to outside pharmacy. Pt UAL. Denies dizziness/lightheadedness. All belongings sent home with patient. Adequate for discharge.

## 2020-11-09 ENCOUNTER — TELEPHONE (OUTPATIENT)
Dept: FAMILY MEDICINE | Facility: CLINIC | Age: 75
End: 2020-11-09

## 2020-11-09 NOTE — TELEPHONE ENCOUNTER
Nstemi (Non-St Elevated Myocardial Infarction) (H), Paroxysmal Atrial Fibrillation (H),SAT 07-NOV-2020 ,ed/ip 0 / 1    [phone] (home) [phone] (work)

## 2020-11-09 NOTE — TELEPHONE ENCOUNTER
"ED/Discharge Protocol    \"Hi, my name is Makenzie Kurtz RN, a registered nurse, and I am calling on behalf of Lou King's office at [place].  I am calling to follow up and see how things are going for you after your recent visit.\"    \"I see that you were in the (ER/UC/IP) on 11/6.    How are you doing now that you are home?\" good     Is patient experiencing symptoms that may require a hospital visit?  No     Discharge Instructions    \"Let's review your discharge instructions.  What is/are the follow-up recommendations?  Pt. Response: follow up with cardiology 1-2 weeks, then follow up with PCP after. 2 Rxs     \"Were you instructed to make a follow-up appointment?\"  Pt. Response: Yes.  Has appointment been made?   No.  \"Can I help you schedule that appointment?\" not yet, first cardiology       \"When you see the provider, I would recommend that you bring your discharge instructions with you.    Medications    \"How many new medications are you on since your hospitalization/ED visit?\"    0-1  \"How many of your current medicines changed (dose, timing, name, etc.) while you were in the hospital/ED visit?\"   0-1  \"Do you have questions about your medications?\"   No  \"Were you newly diagnosed with heart failure, COPD, diabetes or did you have a heart attack?\"   No  For patients on insulin: \"Did you start on insulin in the hospital or did you have your insulin dose changed?\"   No  Post Discharge Medication Reconciliation Status: discharge medications reconciled, continue medications without change.    Was MTM referral placed (*Make sure to put transitions as reason for referral)?   No    Call Summary    \"Do you have any questions or concerns about your condition or care plan at the moment?\"    No  Triage nurse advice given    Patient was in ER 1x in the past year (assess appropriateness of ER visits.)      \"If you have questions or things don't continue to improve, we encourage you contact us through the main clinic number, " " ([phone]).  Even if the clinic is not open, triage nurses are available 24/7 to help you.     We would like you to know that our clinic has extended hours (provide information).  We also have urgent care (provide details on closest location and hours/contact info)\"      \"Thank you for your time and take care!\"    Makenzie SCHWARTZ RN    "

## 2020-11-09 NOTE — TELEPHONE ENCOUNTER
ED / Discharge Outreach Protocol    Patient Contact    Attempt # 1    Was call answered?  No.  Left message on voicemail with information to call triage back.    Makenzie SCHWARTZ RN

## 2020-11-11 LAB — INTERPRETATION ECG - MUSE: NORMAL

## 2020-11-20 ENCOUNTER — VIRTUAL VISIT (OUTPATIENT)
Dept: CARDIOLOGY | Facility: CLINIC | Age: 75
End: 2020-11-20
Attending: INTERNAL MEDICINE
Payer: MEDICARE

## 2020-11-20 DIAGNOSIS — I48.0 PAROXYSMAL ATRIAL FIBRILLATION (H): ICD-10-CM

## 2020-11-20 PROCEDURE — 99214 OFFICE O/P EST MOD 30 MIN: CPT | Mod: 95 | Performed by: PHYSICIAN ASSISTANT

## 2020-11-20 RX ORDER — ASPIRIN 81 MG/1
81 TABLET ORAL DAILY
Status: ON HOLD | COMMUNITY
Start: 2020-11-20 | End: 2022-08-24

## 2020-11-20 RX ORDER — METOPROLOL TARTRATE 25 MG/1
TABLET, FILM COATED ORAL
Qty: 60 TABLET | Refills: 0 | COMMUNITY
Start: 2020-11-20 | End: 2021-01-18

## 2020-11-20 NOTE — PROGRESS NOTES
CARDIOLOGY CLINIC VIDEO VISIT  This visit is being conducted as a virtual visit due to the emphasis on mitigation of the COVID-19 virus pandemic. The clinician has decided that the risk of an in-office visit outweighs the benefit for this patient. The rest of the comprehensive physical examination is deferred due to public health emergency video visit restrictions.      Mendez Greene is a 75 year old male who is being evaluated via a billable video visit.      The patient has been notified of following:     This video visit will be conducted via a call between you and your physician/provider. We have found that certain health care needs can be provided without the need for an in-person physical exam.  This service lets us provide the care you need with a video conversation.  If a prescription is necessary we can send it directly to your pharmacy.  If lab work is needed we can place an order for that and you can then stop by our lab to have the test done at a later time.    Virtual visits are billed at different rates depending on your insurance coverage. During this emergency period, for some insurers they may be billed the same as an in-person visit.  Please reach out to your insurance provider with any questions.    If during the course of the call the physician/provider feels a video visit is not appropriate, you will not be charged for this service.      Physician has received verbal consent for a Video Visit from the patient? Yes    Patient would like the video invitation sent by: Other e-mail: Jannette RODRIGUEZ ROS:  Skin: negative  Eyes: negative  Ears/Nose/Throat: negative  Respiratory: No shortness of breath, dyspnea on exertion, cough, or hemoptysis  Cardiovascular: negative  Gastrointestinal: negative  Genitourinary:Alpesh Bladder  Musculoskeletal: negative  Neurologic: negative  Psychiatric: negative  Hematologic/Lymphatic/Immunologic: negative  Endocrine: thyroid disorder        Self reported  vitals:  Weight: 193.8lb  /64  HR 37-54    [facility]      Primary Cardiologist: Dr. Rocha    HPI:  Mendez Greene is a 75 year old male with past medical history including HTN, dyslipidemia, afib, mild to moderate ascending aortic dilatation, CHA, bladder cancer, BPH, hypothyroidism.      11/6-11/7/20 Hosp Admission due to episode of tachycardia with associated chest pressure.   He was found to be in afib with RVR with rates in the 120s. He was given IV metoprolol, started on metoprolol 12.5 mg BID (limited by baseline sinus fam rates 40s). Started on Eliquis 5 mg BID.     Trop was elevated to 0.698.  11/7/20  coronary angiogram - no significant obstructive disease.   11/7/20 Echo - LVEF 55-60%, aorta mild to moderately enlarged.     When he got home, he was in the 80s indicating he was still in afib.  That night he took metoprolol 12.5 mg and when he woke the next morning his HR was in the 40s.  It has remained at his normal rates since.  Thus, he has not taken any more metoprolol.   He has continued the Eliquis, but would like to stop it.  He is active, walks in the winter, and has slipped and fallen at times.  He is concerned about hitting his head.  He is aware that our guidelines would recommend continuing anticoagulation, and he is ok with restarting it should he have another episode.       I have reviewed and updated the patient's Past Medical History, Social History, Family History and Medication List.    PROBLEM LIST:  Patient Active Problem List   Diagnosis     Essential hypertension     Hypothyroidism     Allergic rhinitis     Hypertrophy of prostate with urinary obstruction     Erectile dysfunction     Malignant neoplasm of urinary bladder, unspecified site (H)     Hyperlipidemia LDL goal <130     Sinus bradycardia     Low vitamin B12 level     CHA (obstructive sleep apnea)     Rapid heart rate     NSTEMI (non-ST elevated myocardial infarction) (H)       MEDICATIONS:  Current Outpatient  Medications   Medication Sig Dispense Refill     apixaban ANTICOAGULANT (ELIQUIS) 5 MG tablet Take 1 tablet (5 mg) by mouth 2 times daily 60 tablet 0     COMPOUND (CMPD RX) - PHARMACY TO MIX COMPOUNDED MEDICATION Hydro 2.5% Cr / Ketocon Crm.  Apply to the effected area on face twice daily as needed.       Cyanocobalamin (B-12) 1000 MCG CAPS Take 1,000 mcg by mouth daily       diphenhydrAMINE-acetaminophen (TYLENOL PM)  MG tablet Take 0.5 tablets by mouth nightly as needed for sleep       levothyroxine (SYNTHROID/LEVOTHROID) 150 MCG tablet TAKE ONE TABLET BY MOUTH ONE TIME DAILY  90 tablet 2     lisinopril 5 MG PO tablet Take 1 tablet (5 mg) by mouth daily (Patient taking differently: Take 5 mg by mouth At Bedtime ) 90 tablet 1     metoprolol tartrate (LOPRESSOR) 25 MG tablet Take 0.5 tablets (12.5 mg) by mouth 2 times daily 60 tablet 0     Multiple Vitamins-Minerals (MULTIVITAMIN ADULT PO)        olopatadine (PATANOL) 0.1 % ophthalmic solution        order for DME Equipment being ordered: CPAP and supplies. LIFETIME need. 1 each 0     simvastatin (ZOCOR) 10 MG tablet TAKE ONE TABLET BY MOUTH AT BEDTIME 90 tablet 2         ALLERGIES:     Allergies   Allergen Reactions     Food Other (See Comments)     Uncertain of which food , testing done ~1997           EXAM:  A limited exam was conducted via video.  General: Patient is pleasant, alert, in no distress. Normal body habitus. Upright.    Eyes: No scleral icterus, no apparent redness or discharge. EOM's appear intact.   Chest/Lungs: No labored breathing, no cough during exam or audible wheezing.   Cardiovascular: No evidence of elevated JVP.   Abdomen: No evidence of abdominal distention. No observed jaundice.  Skin: No rashes or lesions appreciated on visualized skin, normal skin color.  Neuro: No obvious focal defects or tremors.   Psych: Alert and oriented. Does not appear anxious.     The rest of a comprehensive physical examination is deferred due to public  health emergency video visit restrictions.       DIAGNOSTICS:  Echo 11/7/20:  Interpretation Summary  The ascending aorta is Moderately dilated.  Sinus Valsalva 43mm, Asc aorta 45mm  There is mild to moderate concentric left ventricular hypertrophy.  The visual ejection fraction is estimated at 55-60%.  Normal or borderline hypokinesis of distal anterior free wall/apex  The left atrium is mild to moderately dilated.  There is mild (1+) aortic regurgitation.      Cardiac cath 11/7/20:  Minimal non-obstructive CAD        ASSESSMENT/PLAN:  New paroxysmal afib with RVR  - Baseline is sinus fam in the 40s as noted on multiple EKGs dating back to 2006 in our chart  - Discharged on rate control and anticoagulation with metoprolol 12.5 mg BID and Eliquis 5 mg BID.   - At home, he took one dose of metoprolol 12.5 mg.  The next morning his HR was back in the 30-50 range indicating he likely spontaneously converted to sinus fam.    - Should he have recurrence of faster rates, he will take metoprolol 12.5 mg PRN.  If he does not spontaneously convert within a day, he will let us know.   - [id number] score of 3 (Age-2, HTN). Eliquis 5 mg BID is indicated.  He requests that since this is his first and only episode of afib that he stop Eliquis for now.  If he has recurrence he is open to restarting Eliquis      Elevated troponin  Nonobstructive CAD  - Demand ischemia in setting of afib with RVR.  Cardiac acth 11/17/20 showed no significant obstructive disease  - Continue statin  - Since he is stopping Eliquis, he should resume ASA 81 mg      Mild to moderately enlarged ascending aorta  - Echo 11/7/20: ascending aorta 45 mm  - Will need surveillance, repeat echo in 6 months. He requests that this be done at his May 2021 annual visit with PCP.  I sent a message to PCP informing her of this.   - BP control  - Statin      Dyslipidemia  - FLP 11/7/20: , HDL 73, LDL 60, TG 59  - Continue simvastatin 10 mg      Follow up:   He  prefers to follow up with cardiology as needed, which is reasonable at this time      Video-Visit Details  Type of service:  Video Visit  Video Start Time: 1304  Video End Time (time video stopped): 1314  Originating Location (pt. Location): Home  Distant Location (provider location):  [facility] [facility]  Mode of Communication:  Video Conference via [employer]        [facility]

## 2020-11-20 NOTE — LETTER
11/20/2020    Lou King PA-C  [address] [facility] [address]  [place] [address]    RE: Mendez Maurermit       Dear Colleague,    I had the pleasure of seeing Mendez Greene in the [facility].    CARDIOLOGY CLINIC VIDEO VISIT  This visit is being conducted as a virtual visit due to the emphasis on mitigation of the COVID-19 virus pandemic. The clinician has decided that the risk of an in-office visit outweighs the benefit for this patient. The rest of the comprehensive physical examination is deferred due to public health emergency video visit restrictions.      Mendez Greene is a 75 year old male who is being evaluated via a billable video visit.      The patient has been notified of following:     This video visit will be conducted via a call between you and your physician/provider. We have found that certain health care needs can be provided without the need for an in-person physical exam.  This service lets us provide the care you need with a video conversation.  If a prescription is necessary we can send it directly to your pharmacy.  If lab work is needed we can place an order for that and you can then stop by our lab to have the test done at a later time.    Virtual visits are billed at different rates depending on your insurance coverage. During this emergency period, for some insurers they may be billed the same as an in-person visit.  Please reach out to your insurance provider with any questions.    If during the course of the call the physician/provider feels a video visit is not appropriate, you will not be charged for this service.      Physician has received verbal consent for a Video Visit from the patient? Yes    Patient would like the video invitation sent by: Other e-mail: Jannette RODRIGUEZ ROS:  Skin: negative  Eyes: negative  Ears/Nose/Throat: negative  Respiratory: No shortness of breath, dyspnea on exertion, cough, or hemoptysis  Cardiovascular:  negative  Gastrointestinal: negative  Genitourinary:Alpesh Bladder  Musculoskeletal: negative  Neurologic: negative  Psychiatric: negative  Hematologic/Lymphatic/Immunologic: negative  Endocrine: thyroid disorder        Self reported vitals:  Weight: 193.8lb  /64  HR 37-54    Diana Slaughter [facility]      Primary Cardiologist: Dr. Rocha    HPI:  Mendez Greene is a 75 year old male with past medical history including HTN, dyslipidemia, afib, mild to moderate ascending aortic dilatation, CHA, bladder cancer, BPH, hypothyroidism.      11/6-11/7/20 Hosp Admission due to episode of tachycardia with associated chest pressure.   He was found to be in afib with RVR with rates in the 120s. He was given IV metoprolol, started on metoprolol 12.5 mg BID (limited by baseline sinus fam rates 40s). Started on Eliquis 5 mg BID.     Trop was elevated to 0.698.  11/7/20  coronary angiogram - no significant obstructive disease.   11/7/20 Echo - LVEF 55-60%, aorta mild to moderately enlarged.     When he got home, he was in the 80s indicating he was still in afib.  That night he took metoprolol 12.5 mg and when he woke the next morning his HR was in the 40s.  It has remained at his normal rates since.  Thus, he has not taken any more metoprolol.   He has continued the Eliquis, but would like to stop it.  He is active, walks in the winter, and has slipped and fallen at times.  He is concerned about hitting his head.  He is aware that our guidelines would recommend continuing anticoagulation, and he is ok with restarting it should he have another episode.       I have reviewed and updated the patient's Past Medical History, Social History, Family History and Medication List.    PROBLEM LIST:  Patient Active Problem List   Diagnosis     Essential hypertension     Hypothyroidism     Allergic rhinitis     Hypertrophy of prostate with urinary obstruction     Erectile dysfunction     Malignant neoplasm of urinary bladder, unspecified site (H)      Hyperlipidemia LDL goal <130     Sinus bradycardia     Low vitamin B12 level     CHA (obstructive sleep apnea)     Rapid heart rate     NSTEMI (non-ST elevated myocardial infarction) (H)       MEDICATIONS:  Current Outpatient Medications   Medication Sig Dispense Refill     apixaban ANTICOAGULANT (ELIQUIS) 5 MG tablet Take 1 tablet (5 mg) by mouth 2 times daily 60 tablet 0     COMPOUND (CMPD RX) - PHARMACY TO MIX COMPOUNDED MEDICATION Hydro 2.5% Cr / Ketocon Crm.  Apply to the effected area on face twice daily as needed.       Cyanocobalamin (B-12) 1000 MCG CAPS Take 1,000 mcg by mouth daily       diphenhydrAMINE-acetaminophen (TYLENOL PM)  MG tablet Take 0.5 tablets by mouth nightly as needed for sleep       levothyroxine (SYNTHROID/LEVOTHROID) 150 MCG tablet TAKE ONE TABLET BY MOUTH ONE TIME DAILY  90 tablet 2     lisinopril 5 MG PO tablet Take 1 tablet (5 mg) by mouth daily (Patient taking differently: Take 5 mg by mouth At Bedtime ) 90 tablet 1     metoprolol tartrate (LOPRESSOR) 25 MG tablet Take 0.5 tablets (12.5 mg) by mouth 2 times daily 60 tablet 0     Multiple Vitamins-Minerals (MULTIVITAMIN ADULT PO)        olopatadine (PATANOL) 0.1 % ophthalmic solution        order for DME Equipment being ordered: CPAP and supplies. LIFETIME need. 1 each 0     simvastatin (ZOCOR) 10 MG tablet TAKE ONE TABLET BY MOUTH AT BEDTIME 90 tablet 2         ALLERGIES:     Allergies   Allergen Reactions     Food Other (See Comments)     Uncertain of which food , testing done ~1997           EXAM:  A limited exam was conducted via video.  General: Patient is pleasant, alert, in no distress. Normal body habitus. Upright.    Eyes: No scleral icterus, no apparent redness or discharge. EOM's appear intact.   Chest/Lungs: No labored breathing, no cough during exam or audible wheezing.   Cardiovascular: No evidence of elevated JVP.   Abdomen: No evidence of abdominal distention. No observed jaundice.  Skin: No rashes or lesions  appreciated on visualized skin, normal skin color.  Neuro: No obvious focal defects or tremors.   Psych: Alert and oriented. Does not appear anxious.     The rest of a comprehensive physical examination is deferred due to public health emergency video visit restrictions.       DIAGNOSTICS:  Echo 11/7/20:  Interpretation Summary  The ascending aorta is Moderately dilated.  Sinus Valsalva 43mm, Asc aorta 45mm  There is mild to moderate concentric left ventricular hypertrophy.  The visual ejection fraction is estimated at 55-60%.  Normal or borderline hypokinesis of distal anterior free wall/apex  The left atrium is mild to moderately dilated.  There is mild (1+) aortic regurgitation.      Cardiac cath 11/7/20:  Minimal non-obstructive CAD        ASSESSMENT/PLAN:  New paroxysmal afib with RVR  - Baseline is sinus fam in the 40s as noted on multiple EKGs dating back to 2006 in our chart  - Discharged on rate control and anticoagulation with metoprolol 12.5 mg BID and Eliquis 5 mg BID.   - At home, he took one dose of metoprolol 12.5 mg.  The next morning his HR was back in the 30-50 range indicating he likely spontaneously converted to sinus fam.    - Should he have recurrence of faster rates, he will take metoprolol 12.5 mg PRN.  If he does not spontaneously convert within a day, he will let us know.   - [id number] score of 3 (Age-2, HTN). Eliquis 5 mg BID is indicated.  He requests that since this is his first and only episode of afib that he stop Eliquis for now.  If he has recurrence he is open to restarting Eliquis      Elevated troponin  Nonobstructive CAD  - Demand ischemia in setting of afib with RVR.  Cardiac acth 11/17/20 showed no significant obstructive disease  - Continue statin  - Since he is stopping Eliquis, he should resume ASA 81 mg      Mild to moderately enlarged ascending aorta  - Echo 11/7/20: ascending aorta 45 mm  - Will need surveillance, repeat echo in 6 months. He requests that this be  done at his May 2021 annual visit with PCP.  I sent a message to PCP informing her of this.   - BP control  - Statin      Dyslipidemia  - FLP 11/7/20: , HDL 73, LDL 60, TG 59  - Continue simvastatin 10 mg      Follow up:   He prefers to follow up with cardiology as needed, which is reasonable at this time      Video-Visit Details  Type of service:  Video Visit  Video Start Time: 1304  Video End Time (time video stopped): 1314  Originating Location (pt. Location): Home  Distant Location (provider location):  [facility]-Home office  Mode of Communication:  Video Conference via [employer]        Thank you for allowing me to participate in the care of your patient.    Sincerely,     Jes Alegria PA-C     [facility]

## 2021-01-13 ENCOUNTER — MYC MEDICAL ADVICE (OUTPATIENT)
Dept: FAMILY MEDICINE | Facility: CLINIC | Age: 76
End: 2021-01-13

## 2021-01-13 DIAGNOSIS — G47.33 OSA (OBSTRUCTIVE SLEEP APNEA): Primary | ICD-10-CM

## 2021-01-15 NOTE — TELEPHONE ENCOUNTER
PCP, see below    Unable to find recent sleep study in pt's chart  One note states he had one in the 80s     Pended sleep study    Please advise    Makenzie SCHWARTZ RN

## 2021-01-15 NOTE — TELEPHONE ENCOUNTER
Pt will NOT want a referral to the sleep clinic. Schedule him for a virtual appt with me or team to put in the order for what he is requesting please.

## 2021-01-18 ENCOUNTER — VIRTUAL VISIT (OUTPATIENT)
Dept: FAMILY MEDICINE | Facility: CLINIC | Age: 76
End: 2021-01-18
Payer: MEDICARE

## 2021-01-18 VITALS
BODY MASS INDEX: 27.09 KG/M2 | DIASTOLIC BLOOD PRESSURE: 70 MMHG | SYSTOLIC BLOOD PRESSURE: 118 MMHG | TEMPERATURE: 97.6 F | WEIGHT: 194.2 LBS

## 2021-01-18 DIAGNOSIS — G47.33 OSA (OBSTRUCTIVE SLEEP APNEA): Primary | Chronic | ICD-10-CM

## 2021-01-18 PROCEDURE — 99213 OFFICE O/P EST LOW 20 MIN: CPT | Mod: 95 | Performed by: INTERNAL MEDICINE

## 2021-01-18 NOTE — Clinical Note
Can we fax order for CPAP to [facility] - Attn:  Patsy JAIMES at [phone].   Thanks.

## 2021-01-19 ENCOUNTER — MEDICAL CORRESPONDENCE (OUTPATIENT)
Dept: HEALTH INFORMATION MANAGEMENT | Facility: CLINIC | Age: 76
End: 2021-01-19

## 2021-01-29 ENCOUNTER — MYC MEDICAL ADVICE (OUTPATIENT)
Dept: FAMILY MEDICINE | Facility: CLINIC | Age: 76
End: 2021-01-29

## 2021-02-23 ENCOUNTER — OFFICE VISIT (OUTPATIENT)
Dept: FAMILY MEDICINE | Facility: CLINIC | Age: 76
End: 2021-02-23
Payer: MEDICARE

## 2021-02-23 VITALS
WEIGHT: 205 LBS | BODY MASS INDEX: 28.7 KG/M2 | HEART RATE: 49 BPM | HEIGHT: 71 IN | OXYGEN SATURATION: 99 % | SYSTOLIC BLOOD PRESSURE: 126 MMHG | DIASTOLIC BLOOD PRESSURE: 64 MMHG | TEMPERATURE: 98.2 F

## 2021-02-23 DIAGNOSIS — I10 ESSENTIAL HYPERTENSION: ICD-10-CM

## 2021-02-23 DIAGNOSIS — M79.651 PAIN OF RIGHT THIGH: Primary | ICD-10-CM

## 2021-02-23 PROCEDURE — 99213 OFFICE O/P EST LOW 20 MIN: CPT | Performed by: PHYSICIAN ASSISTANT

## 2021-02-23 RX ORDER — LISINOPRIL 5 MG/1
5 TABLET ORAL AT BEDTIME
Qty: 90 TABLET | Refills: 3 | Status: SHIPPED | OUTPATIENT
Start: 2021-02-23 | End: 2021-08-10

## 2021-02-23 ASSESSMENT — MIFFLIN-ST. JEOR: SCORE: 1682

## 2021-02-23 NOTE — PROGRESS NOTES
HPI: Steven is a 76 yo male here with aching in back of right thigh x one month  This is a dull ache that comes and goes  Sleeping makes this worse and better as the day on  Walking for exercise doesn't make this worse, in fact can make this feel better  Pressure from the car seat can make this worse  Denies any falls or injuries  Pain insidious without any known triggers.  Denies pain into buttock  Last night this was better.  Denies numbness, tingling or bruising      Past Medical History:   Diagnosis Date     Allergic rhinitis, cause unspecified      Cancer (H) 4/2006     Diverticulosis      Double vision 10/17/2011    probable migraine aura     Hypertrophy of prostate without urinary obstruction and other lower urinary tract symptoms (LUTS)      Neoplasm of unspecified nature of bladder 3-06    Wallace s/p cystoprostatectomy with ileal neobladder     Other and unspecified hyperlipidemia      Palpitations      Renal stone 5-12     Sinus bradycardia      Sinus bradycardia      Sleep apnea 7-05    C Pap     Unspecified essential hypertension 1988     Unspecified hypothyroidism      Past Surgical History:   Procedure Laterality Date     ABDOMEN SURGERY  4/2006    bladder     BIOPSY  3/2006     BUNIONECTOMY  12-12    LT     BUNIONECTOMY DIANA  11/28/2011    RT-Procedure:BUNIONECTOMY DIANA; Right Foot Dwaine [place] Bunionectomy with Metatarsal and Phalanx Osteotomy with 1st Metatarsal Phalangeal Joint Repair Right Foot; Surgeon:BAKARI ZAIDI; Location:[facility]     CV HEART CATHETERIZATION WITH POSSIBLE INTERVENTION N/A 11/7/2020    Procedure: Heart Catheterization with Possible Intervention;  Surgeon: Rishi Llanes MD;  Location:  HEART CARDIAC CATH LAB     CYSTOSCOPY  3-06    CA bladder     HERNIORRHAPHY INCISIONAL (LOCATION) N/A 8/1/2016    Procedure: HERNIORRHAPHY INCISIONAL (LOCATION);  Surgeon: Onofre Lua MD;  Location: [facility]     SURGICAL HISTORY OF -   5-3-06    neobladder construction- CA  "    Social History     Tobacco Use     Smoking status: Former Smoker     Packs/day: 1.50     Years: 20.00     Pack years: 30.00     Types: Cigarettes     Start date: 1967     Quit date: 9/10/1987     Years since quittin.4     Smokeless tobacco: Never Used   Substance Use Topics     Alcohol use: Yes     Alcohol/week: 0.0 standard drinks     Comment: Occas wine     Current Outpatient Medications   Medication Sig Dispense Refill     aspirin 81 MG EC tablet Take 1 tablet (81 mg) by mouth daily       COMPOUND (CMPD RX) - PHARMACY TO MIX COMPOUNDED MEDICATION Hydro 2.5% Cr / Ketocon Crm.  Apply to the effected area on face twice daily as needed.       Cyanocobalamin (B-12) 1000 MCG CAPS Take 1,000 mcg by mouth daily       diphenhydrAMINE-acetaminophen (TYLENOL PM)  MG tablet Take 0.5 tablets by mouth nightly as needed for sleep       levothyroxine (SYNTHROID/LEVOTHROID) 150 MCG tablet TAKE ONE TABLET BY MOUTH ONE TIME DAILY  90 tablet 2     lisinopril (ZESTRIL) 5 MG tablet Take 1 tablet (5 mg) by mouth At Bedtime 90 tablet 3     Multiple Vitamins-Minerals (MULTIVITAMIN ADULT PO)        olopatadine (PATANOL) 0.1 % ophthalmic solution        order for DME Equipment being ordered: CPAP and supplies. LIFETIME need. 1 each 0     simvastatin (ZOCOR) 10 MG tablet TAKE ONE TABLET BY MOUTH AT BEDTIME 90 tablet 2     Allergies   Allergen Reactions     Food Other (See Comments)     Uncertain of which food , testing done ~       PHYSICAL EXAM:    /64 (BP Location: Right arm, Patient Position: Chair, Cuff Size: Adult Large)   Pulse (!) 49   Temp 98.2  F (36.8  C) (Temporal)   Ht 1.803 m (5' 11\")   Wt 93 kg (205 lb)   SpO2 99%   BMI 28.59 kg/m      Patient appears non toxic  R leg: slightly tender over prox hamstring   No eccymosis, erythema or skin lesions  SLR pos on R and neg on L  Pain not exacerbated by int/ext hip rotation    Assessment and Plan:     (M79.127) Pain of right thigh  (primary encounter " diagnosis)  Comment: suspect radicular  Plan:   Patient Instructions   Tylenol 1000mg up to 3 times per day for the thigh pain.   Try some stretching, heat and give time.    If pain persists, consider doing physical therapy        (I10) Essential hypertension  Comment: well controlled  Plan: lisinopril (ZESTRIL) 5 MG tablet        Refilled.      Lou King PA-C

## 2021-02-23 NOTE — PATIENT INSTRUCTIONS
Tylenol 1000mg up to 3 times per day for the thigh pain.   Try some stretching, heat and give time.    If pain persists, consider doing physical therapy

## 2021-05-04 ENCOUNTER — OFFICE VISIT (OUTPATIENT)
Dept: FAMILY MEDICINE | Facility: CLINIC | Age: 76
End: 2021-05-04
Payer: MEDICARE

## 2021-05-04 VITALS
SYSTOLIC BLOOD PRESSURE: 154 MMHG | WEIGHT: 199 LBS | BODY MASS INDEX: 27.86 KG/M2 | OXYGEN SATURATION: 100 % | HEIGHT: 71 IN | DIASTOLIC BLOOD PRESSURE: 48 MMHG | HEART RATE: 41 BPM

## 2021-05-04 DIAGNOSIS — I48.0 PAROXYSMAL ATRIAL FIBRILLATION (H): ICD-10-CM

## 2021-05-04 DIAGNOSIS — I10 ESSENTIAL HYPERTENSION: ICD-10-CM

## 2021-05-04 DIAGNOSIS — C67.9 MALIGNANT NEOPLASM OF URINARY BLADDER, UNSPECIFIED SITE (H): ICD-10-CM

## 2021-05-04 DIAGNOSIS — M77.8 RIGHT SHOULDER TENDINITIS: Primary | ICD-10-CM

## 2021-05-04 PROCEDURE — 99213 OFFICE O/P EST LOW 20 MIN: CPT | Performed by: PHYSICIAN ASSISTANT

## 2021-05-04 ASSESSMENT — MIFFLIN-ST. JEOR: SCORE: 1654.79

## 2021-05-04 NOTE — PROGRESS NOTES
HPI:     Steven is a 74 yo male here for f/u bladder ca, afib, hyperlipidemia, HTN and hypothyroidism. He has some questions regarding his most recent visit with cardiologist. We discussed the note and findings.    Pt here to have cancer screening tests but these are not due until August    He was having some aching in the R upper arm for the past few months, but this is getting better  He was taking tylenol for this but has reduced dose to one tab BID  Denies any neck pain or extremity numbness or tingling  Pain was worse with reaching overhead but that has improved    I had seen him for thigh pain in Feb which has mostly resolved.      TSH   Date Value Ref Range Status   08/04/2020 0.74 0.40 - 4.00 mU/L Final       Recent Labs   Lab Test 11/07/20  0629 08/04/20  0950 12/10/14  1127 12/10/14  1127 04/23/14  1120   CHOL 145 150   < > 145 147   HDL 73 76   < > 53 46   LDL 60 65   < > 69 83   TRIG 59 43   < > 117 88   CHOLHDLRATIO  --   --   --  2.7 3.2    < > = values in this interval not displayed.     Past Medical History:   Diagnosis Date     Allergic rhinitis, cause unspecified      Cancer (H) 4/2006     Diverticulosis      Double vision 10/17/2011    probable migraine aura     Hypertrophy of prostate without urinary obstruction and other lower urinary tract symptoms (LUTS)      Neoplasm of unspecified nature of bladder 3-06    [place] s/p cystoprostatectomy with ileal neobladder     Other and unspecified hyperlipidemia      Palpitations      Renal stone 5-12     Sinus bradycardia      Sinus bradycardia      Sleep apnea 7-05    C Pap     Unspecified essential hypertension 1988     Unspecified hypothyroidism      Past Surgical History:   Procedure Laterality Date     ABDOMEN SURGERY  4/2006    bladder     BIOPSY  3/2006     BUNIONECTOMY  12-12    LT     BUNIONECTOMY DIANA  11/28/2011    RT-Procedure:BUNIONECTOMY DIANA; Right Foot Dwaine Garcia Bunionectomy with Metatarsal and Phalanx Osteotomy with 1st Metatarsal  Phalangeal Joint Repair Right Foot; Surgeon:BAKARI ZAIDI; Location:[facility]     CV HEART CATHETERIZATION WITH POSSIBLE INTERVENTION N/A 2020    Procedure: Heart Catheterization with Possible Intervention;  Surgeon: Rishi Llanes MD;  Location:  HEART CARDIAC CATH LAB     CYSTOSCOPY  3-06    CA bladder     HERNIORRHAPHY INCISIONAL (LOCATION) N/A 2016    Procedure: HERNIORRHAPHY INCISIONAL (LOCATION);  Surgeon: Onofre Lua MD;  Location: [facility]     SURGICAL HISTORY OF -   5-3-06    neobladder construction- CA     Social History     Tobacco Use     Smoking status: Former Smoker     Packs/day: 1.50     Years: 20.00     Pack years: 30.00     Types: Cigarettes     Start date: 1967     Quit date: 9/10/1987     Years since quittin.6     Smokeless tobacco: Never Used   Substance Use Topics     Alcohol use: Yes     Alcohol/week: 0.0 standard drinks     Comment: Occas wine     Current Outpatient Medications   Medication Sig Dispense Refill     aspirin 81 MG EC tablet Take 1 tablet (81 mg) by mouth daily       COMPOUND (CMPD RX) - PHARMACY TO MIX COMPOUNDED MEDICATION Hydro 2.5% Cr / Ketocon Crm.  Apply to the effected area on face twice daily as needed.       Cyanocobalamin (B-12) 1000 MCG CAPS Take 1,000 mcg by mouth daily       diphenhydrAMINE-acetaminophen (TYLENOL PM)  MG tablet Take 0.5 tablets by mouth nightly as needed for sleep       levothyroxine (SYNTHROID/LEVOTHROID) 150 MCG tablet TAKE ONE TABLET BY MOUTH ONE TIME DAILY  90 tablet 2     lisinopril (ZESTRIL) 5 MG tablet Take 1 tablet (5 mg) by mouth At Bedtime 90 tablet 3     Multiple Vitamins-Minerals (MULTIVITAMIN ADULT PO)        olopatadine (PATANOL) 0.1 % ophthalmic solution        order for DME Equipment being ordered: CPAP and supplies. LIFETIME need. 1 each 0     simvastatin (ZOCOR) 10 MG tablet TAKE ONE TABLET BY MOUTH AT BEDTIME 90 tablet 2     Allergies   Allergen Reactions     Food Other (See Comments)      "Uncertain of which food , testing done ~1997       PHYSICAL EXAM:    BP (!) 154/48 (BP Location: Left arm, Patient Position: Chair, Cuff Size: Adult Regular)   Pulse (!) 41   Ht 1.803 m (5' 11\")   Wt 90.3 kg (199 lb)   SpO2 100%   BMI 27.75 kg/m      Patient appears non toxic  R shoulder: pain on aduction, FROM AND NEG NEER IMPINGEMENT SIGN  No biceps tendon tenderness. Normal biceps strength.  Pain with resisted abduction    Assessment and Plan:     (M77.8) Right shoulder tendinitis  (primary encounter diagnosis)  Comment:   Plan: pt has already improved with time. If recurs would recd PT, tylenol and ice    (I10) Essential hypertension  Comment:   Plan: elevated reading today. Recd he recheck several times at home and bring results in when he has his px in a few months    (I48.0) Paroxysmal atrial fibrillation (H)  Comment:   Plan: pt taking asa for anticoagulation and denies recurrent sxs.      (C67.9) Malignant neoplasm of urinary bladder, unspecified site (H)  Comment:   Plan: will check urine cytology at px      Lou King PA-C          "

## 2021-05-20 ENCOUNTER — MEDICAL CORRESPONDENCE (OUTPATIENT)
Dept: HEALTH INFORMATION MANAGEMENT | Facility: CLINIC | Age: 76
End: 2021-05-20

## 2021-05-21 ENCOUNTER — MYC MEDICAL ADVICE (OUTPATIENT)
Dept: FAMILY MEDICINE | Facility: CLINIC | Age: 76
End: 2021-05-21

## 2021-05-24 NOTE — TELEPHONE ENCOUNTER
PCP,   Please see patient's MyChart and reply back to patient or advise if triage follow up needed.    Please check for the form that patient says was faxed, when in clinic this week.          Thank you,  Alissa DAILEY RN,BSN

## 2021-05-24 NOTE — TELEPHONE ENCOUNTER
Called patient.  Scheduled VV with PCP Thur afternoon.   Alissa ROLDAN, RN      May 24, 2021  4:07 PM

## 2021-05-27 ENCOUNTER — VIRTUAL VISIT (OUTPATIENT)
Dept: FAMILY MEDICINE | Facility: CLINIC | Age: 76
End: 2021-05-27
Payer: MEDICARE

## 2021-05-27 DIAGNOSIS — G47.33 OSA (OBSTRUCTIVE SLEEP APNEA): Primary | ICD-10-CM

## 2021-05-27 PROCEDURE — 99213 OFFICE O/P EST LOW 20 MIN: CPT | Mod: 95 | Performed by: PHYSICIAN ASSISTANT

## 2021-05-27 NOTE — PROGRESS NOTES
Steven is a 75 year old who is being evaluated via a billable video visit.      How would you like to obtain your AVS? MyChart  If the video visit is dropped, the invitation should be resent by: Text to cell phone: [phone]  Will anyone else be joining your video visit? No      Video Start Time: 3:42 PM        Subjective   Steven is a 75 year old who presents for the following health issues: renew CPAP supplies    HPI     Chief Complaint   Patient presents with     CPAP Follow Up     Pt has a new CPAP and needs documentation of his CHA for this to be approved.  He is using this every night for 20 years.  He is 100% compliant with using his CPAP  He is benefiting from the use of his CPAP and I would recommend he continue to this long term.  He has used this at least 6 hours per day for 30 consecutive days 100% of the time according to his machine documentation which is reviewed today.    Fax:  [phone]      Review of Systems   Constitutional, HEENT, cardiovascular, pulmonary, gi and gu systems are negative, except as otherwise noted.      Objective           Vitals:  No vitals were obtained today due to virtual visit.    Physical Exam   GENERAL: Healthy, alert and no distress  EYES: Eyes grossly normal to inspection.  No discharge or erythema, or obvious scleral/conjunctival abnormalities.  RESP: No audible wheeze, cough, or visible cyanosis.  No visible retractions or increased work of breathing.    SKIN: Visible skin clear. No significant rash, abnormal pigmentation or lesions.  NEURO: Cranial nerves grossly intact.  Mentation and speech appropriate for age.  PSYCH: Mentation appears normal, affect normal/bright, judgement and insight intact, normal speech and appearance well-groomed.      Assessment and Plan:     (G47.33) CAH (obstructive sleep apnea)  (primary encounter diagnosis)  Comment: Pt has a new CPAP. Has has CHA and used a CPAP with benefit for over 20 years.  He is compliant with using this  device.  Plan: Will fax documentation to [facility] as required by Medicare      Lou King PA-C              Video-Visit Details    Type of service:  Video Visit    Video End Time:3:50 PM    Originating Location (pt. Location): Home    Distant Location (provider location):  [facility]     Platform used for Video Visit: Pepe

## 2021-06-08 DIAGNOSIS — E03.9 HYPOTHYROIDISM, UNSPECIFIED TYPE: ICD-10-CM

## 2021-06-08 RX ORDER — LEVOTHYROXINE SODIUM 150 UG/1
TABLET ORAL
Qty: 90 TABLET | Refills: 0 | Status: SHIPPED | OUTPATIENT
Start: 2021-06-08 | End: 2021-08-10

## 2021-08-03 ASSESSMENT — ENCOUNTER SYMPTOMS
NAUSEA: 0
SHORTNESS OF BREATH: 0
DIZZINESS: 0
HEMATOCHEZIA: 0
CONSTIPATION: 0
HEMATURIA: 0
PALPITATIONS: 0
SORE THROAT: 0
PARESTHESIAS: 0
DIARRHEA: 0
WEAKNESS: 0
JOINT SWELLING: 0
DYSURIA: 0
EYE PAIN: 0
HEADACHES: 0
ABDOMINAL PAIN: 0
FEVER: 0
CHILLS: 0
FREQUENCY: 1
MYALGIAS: 1
NERVOUS/ANXIOUS: 0
COUGH: 0
HEARTBURN: 0
ARTHRALGIAS: 0

## 2021-08-03 ASSESSMENT — ACTIVITIES OF DAILY LIVING (ADL): CURRENT_FUNCTION: NO ASSISTANCE NEEDED

## 2021-08-04 DIAGNOSIS — E78.5 HYPERLIPIDEMIA, UNSPECIFIED HYPERLIPIDEMIA TYPE: Chronic | ICD-10-CM

## 2021-08-04 RX ORDER — SIMVASTATIN 10 MG
TABLET ORAL
Qty: 90 TABLET | Refills: 0 | Status: SHIPPED | OUTPATIENT
Start: 2021-08-04 | End: 2021-08-10

## 2021-08-10 ENCOUNTER — OFFICE VISIT (OUTPATIENT)
Dept: FAMILY MEDICINE | Facility: CLINIC | Age: 76
End: 2021-08-10
Payer: MEDICARE

## 2021-08-10 VITALS
RESPIRATION RATE: 16 BRPM | BODY MASS INDEX: 29.47 KG/M2 | DIASTOLIC BLOOD PRESSURE: 58 MMHG | SYSTOLIC BLOOD PRESSURE: 136 MMHG | HEART RATE: 41 BPM | HEIGHT: 69 IN | OXYGEN SATURATION: 99 % | WEIGHT: 199 LBS | TEMPERATURE: 97.1 F

## 2021-08-10 DIAGNOSIS — Z12.11 COLON CANCER SCREENING: ICD-10-CM

## 2021-08-10 DIAGNOSIS — C67.9 MALIGNANT NEOPLASM OF URINARY BLADDER, UNSPECIFIED SITE (H): ICD-10-CM

## 2021-08-10 DIAGNOSIS — Z00.00 MEDICARE ANNUAL WELLNESS VISIT, SUBSEQUENT: Primary | ICD-10-CM

## 2021-08-10 DIAGNOSIS — I48.0 PAROXYSMAL ATRIAL FIBRILLATION (H): ICD-10-CM

## 2021-08-10 DIAGNOSIS — E03.9 HYPOTHYROIDISM, UNSPECIFIED TYPE: ICD-10-CM

## 2021-08-10 DIAGNOSIS — E78.5 HYPERLIPIDEMIA, UNSPECIFIED HYPERLIPIDEMIA TYPE: Chronic | ICD-10-CM

## 2021-08-10 DIAGNOSIS — I10 ESSENTIAL HYPERTENSION: ICD-10-CM

## 2021-08-10 DIAGNOSIS — I71.21 ASCENDING AORTIC ANEURYSM (H): ICD-10-CM

## 2021-08-10 DIAGNOSIS — Z11.59 ENCOUNTER FOR HEPATITIS C SCREENING TEST FOR LOW RISK PATIENT: ICD-10-CM

## 2021-08-10 LAB
ALBUMIN SERPL-MCNC: 3.9 G/DL (ref 3.4–5)
ALP SERPL-CCNC: 51 U/L (ref 40–150)
ALT SERPL W P-5'-P-CCNC: 24 U/L (ref 0–70)
ANION GAP SERPL CALCULATED.3IONS-SCNC: 6 MMOL/L (ref 3–14)
AST SERPL W P-5'-P-CCNC: 19 U/L (ref 0–45)
BILIRUB SERPL-MCNC: 1 MG/DL (ref 0.2–1.3)
BUN SERPL-MCNC: 20 MG/DL (ref 7–30)
CALCIUM SERPL-MCNC: 9.6 MG/DL (ref 8.5–10.1)
CHLORIDE BLD-SCNC: 101 MMOL/L (ref 94–109)
CHOLEST SERPL-MCNC: 169 MG/DL
CO2 SERPL-SCNC: 25 MMOL/L (ref 20–32)
CREAT SERPL-MCNC: 0.97 MG/DL (ref 0.66–1.25)
ERYTHROCYTE [DISTWIDTH] IN BLOOD BY AUTOMATED COUNT: 13.2 % (ref 10–15)
FASTING STATUS PATIENT QL REPORTED: YES
GFR SERPL CREATININE-BSD FRML MDRD: 76 ML/MIN/1.73M2
GLUCOSE BLD-MCNC: 98 MG/DL (ref 70–99)
HCT VFR BLD AUTO: 40.3 % (ref 40–53)
HDLC SERPL-MCNC: 85 MG/DL
HGB BLD-MCNC: 14.3 G/DL (ref 13.3–17.7)
LDLC SERPL CALC-MCNC: 74 MG/DL
MCH RBC QN AUTO: 35.3 PG (ref 26.5–33)
MCHC RBC AUTO-ENTMCNC: 35.5 G/DL (ref 31.5–36.5)
MCV RBC AUTO: 100 FL (ref 78–100)
NONHDLC SERPL-MCNC: 84 MG/DL
PLATELET # BLD AUTO: 236 10E3/UL (ref 150–450)
POTASSIUM BLD-SCNC: 4.6 MMOL/L (ref 3.4–5.3)
PROT SERPL-MCNC: 7.1 G/DL (ref 6.8–8.8)
RBC # BLD AUTO: 4.05 10E6/UL (ref 4.4–5.9)
SODIUM SERPL-SCNC: 132 MMOL/L (ref 133–144)
TRIGL SERPL-MCNC: 48 MG/DL
TSH SERPL DL<=0.005 MIU/L-ACNC: 1.24 MU/L (ref 0.4–4)
WBC # BLD AUTO: 4.9 10E3/UL (ref 4–11)

## 2021-08-10 PROCEDURE — 80061 LIPID PANEL: CPT | Performed by: PHYSICIAN ASSISTANT

## 2021-08-10 PROCEDURE — 86803 HEPATITIS C AB TEST: CPT | Performed by: PHYSICIAN ASSISTANT

## 2021-08-10 PROCEDURE — 84443 ASSAY THYROID STIM HORMONE: CPT | Performed by: PHYSICIAN ASSISTANT

## 2021-08-10 PROCEDURE — 36415 COLL VENOUS BLD VENIPUNCTURE: CPT | Performed by: PHYSICIAN ASSISTANT

## 2021-08-10 PROCEDURE — 80053 COMPREHEN METABOLIC PANEL: CPT | Performed by: PHYSICIAN ASSISTANT

## 2021-08-10 PROCEDURE — 88112 CYTOPATH CELL ENHANCE TECH: CPT | Performed by: PATHOLOGY

## 2021-08-10 PROCEDURE — G0439 PPPS, SUBSEQ VISIT: HCPCS | Performed by: PHYSICIAN ASSISTANT

## 2021-08-10 PROCEDURE — 99214 OFFICE O/P EST MOD 30 MIN: CPT | Mod: 25 | Performed by: PHYSICIAN ASSISTANT

## 2021-08-10 PROCEDURE — 85027 COMPLETE CBC AUTOMATED: CPT | Performed by: PHYSICIAN ASSISTANT

## 2021-08-10 RX ORDER — SIMVASTATIN 10 MG
10 TABLET ORAL AT BEDTIME
Qty: 90 TABLET | Refills: 3 | Status: SHIPPED | OUTPATIENT
Start: 2021-08-10 | End: 2022-10-24

## 2021-08-10 RX ORDER — LEVOTHYROXINE SODIUM 150 UG/1
150 TABLET ORAL DAILY
Qty: 90 TABLET | Refills: 3 | Status: SHIPPED | OUTPATIENT
Start: 2021-08-10 | End: 2022-10-03

## 2021-08-10 RX ORDER — LISINOPRIL 5 MG/1
5 TABLET ORAL AT BEDTIME
Qty: 90 TABLET | Refills: 3 | Status: SHIPPED | OUTPATIENT
Start: 2021-08-10 | End: 2022-11-07

## 2021-08-10 ASSESSMENT — MIFFLIN-ST. JEOR: SCORE: 1626.21

## 2021-08-10 ASSESSMENT — ACTIVITIES OF DAILY LIVING (ADL): CURRENT_FUNCTION: NO ASSISTANCE NEEDED

## 2021-08-10 NOTE — PROGRESS NOTES
"SUBJECTIVE:   Mendez Greene is a 75 year old male who presents for Preventive Visit.    Pt has hx of one episode of Afib in Nov 2020 without recurrence. Cards agreed to let pt stop Eliquis and he is taking asa 81mg unless he has another episode of afib.  Pt had a normal coronary angiogram on 11/7/20  He has mid enlarged ascending aorta and due for echo.    HTN: home BP today 128/57  Tends to run a bit higher here  Other readings: 130/71, 140/66, 134/65, 134/66, 116/60, 126/61    Hx of bladder ca s/p cystoprostatectomy with ileal neobladder at [facility] in 2006    TSH   Date Value Ref Range Status   08/04/2020 0.74 0.40 - 4.00 mU/L Final         Patient has been advised of split billing requirements and indicates understanding: Yes   Are you in the first 12 months of your Medicare coverage?  No    Healthy Habits:     In general, how would you rate your overall health?  Good    Frequency of exercise:  6-7 days/week    Duration of exercise:  Greater than 60 minutes    Do you usually eat at least 4 servings of fruit and vegetables a day, include whole grains    & fiber and avoid regularly eating high fat or \"junk\" foods?  Yes    Taking medications regularly:  Yes    Barriers to taking medications:  None    Ability to successfully perform activities of daily living:  No assistance needed    Home Safety:  No safety concerns identified    Hearing Impairment:  Need to ask people to speak up or repeat themselves and difficulty understanding soft or whispered speech    In the past 6 months, have you been bothered by leaking of urine? Yes    In general, how would you rate your overall mental or emotional health?  Good      PHQ-2 Total Score: 2    Additional concerns today:  No    Do you feel safe in your environment? Yes    Have you ever done Advance Care Planning? (For example, a Health Directive, POLST, or a discussion with a medical provider or your loved ones about your wishes): Yes, patient states has an Advance Care " Planning document and will bring a copy to the clinic.       Fall risk  Fallen 2 or more times in the past year?: No  Any fall with injury in the past year?: No    Cognitive Screening   1) Repeat 3 items (Leader, Season, Table)    2) Clock draw: NORMAL  3) 3 item recall: Recalls 3 objects  Results: 3 items recalled: COGNITIVE IMPAIRMENT LESS LIKELY    Mini-CogTM Copyright FLORINDA Degroot. Licensed by the author for use in [facility]; reprinted with permission (gerald@[facility]). All rights reserved.      Do you have sleep apnea, excessive snoring or daytime drowsiness?: yes    Reviewed and updated as needed this visit by clinical staff  Tobacco  Allergies  Meds              Reviewed and updated as needed this visit by Provider  Tobacco               Social History     Tobacco Use     Smoking status: Former Smoker     Packs/day: 1.50     Years: 20.00     Pack years: 30.00     Types: Cigarettes     Start date: 1967     Quit date: 9/10/1987     Years since quittin.9     Smokeless tobacco: Never Used   Substance Use Topics     Alcohol use: Yes     Alcohol/week: 0.0 standard drinks     Comment: Occas wine     If you drink alcohol do you typically have >3 drinks per day or >7 drinks per week? No    Current providers sharing in care for this patient include:   Patient Care Team:  Lou King PA-C as PCP - General (Internal Medicine)  Lou King PA-C as Assigned PCP  Jes Alegria PA-C as Assigned Heart and Vascular Provider    The following health maintenance items are reviewed in Epic and correct as of today:  Health Maintenance Due   Topic Date Due     ANNUAL REVIEW OF HM ORDERS  Never done     HEPATITIS C SCREENING  Never done     TSH W/FREE T4 REFLEX  2021       Past Medical History:   Diagnosis Date     Allergic rhinitis, cause unspecified      Cancer (H) 2006     Diverticulosis      Double vision 10/17/2011    probable migraine aura     Hypertrophy of prostate without urinary  obstruction and other lower urinary tract symptoms (LUTS)      Neoplasm of unspecified nature of bladder 3-06    [place] s/p cystoprostatectomy with ileal neobladder     Other and unspecified hyperlipidemia      Palpitations      Renal stone 5-12     Sinus bradycardia      Sinus bradycardia      Sleep apnea 7-05    C Pap     Unspecified essential hypertension 1988     Unspecified hypothyroidism      Past Surgical History:   Procedure Laterality Date     ABDOMEN SURGERY  4/2006    bladder     BIOPSY  3/2006     BUNIONECTOMY  12-12    LT     BUNIONECTOMY DIANA  11/28/2011    RT-Procedure:BUNIONECTOMY DIANA; Right Foot Dwaine Sampson Bunionectomy with Metatarsal and Phalanx Osteotomy with 1st Metatarsal Phalangeal Joint Repair Right Foot; Surgeon:BAKARI ZAIDI; Location:[facility]     CV HEART CATHETERIZATION WITH POSSIBLE INTERVENTION N/A 11/7/2020    Procedure: Heart Catheterization with Possible Intervention;  Surgeon: Rishi Llanes MD;  Location:  HEART CARDIAC CATH LAB     CYSTOSCOPY  3-06    CA bladder     HERNIORRHAPHY INCISIONAL (LOCATION) N/A 8/1/2016    Procedure: HERNIORRHAPHY INCISIONAL (LOCATION);  Surgeon: Onofre Lua MD;  Location: [facility]     SURGICAL HISTORY OF -   5-3-06    neobladder construction- CA     Current Outpatient Medications   Medication Sig Dispense Refill     aspirin 81 MG EC tablet Take 1 tablet (81 mg) by mouth daily       COMPOUND (CMPD RX) - PHARMACY TO MIX COMPOUNDED MEDICATION Hydro 2.5% Cr / Ketocon Crm.  Apply to the effected area on face twice daily as needed.       Cyanocobalamin (B-12) 1000 MCG CAPS Take 1,000 mcg by mouth daily       diphenhydrAMINE-acetaminophen (TYLENOL PM)  MG tablet Take 0.5 tablets by mouth nightly as needed for sleep       levothyroxine (SYNTHROID/LEVOTHROID) 150 MCG tablet Take 1 tablet (150 mcg) by mouth daily 90 tablet 3     lisinopril (ZESTRIL) 5 MG tablet Take 1 tablet (5 mg) by mouth At Bedtime 90 tablet 3     Multiple  "Vitamins-Minerals (MULTIVITAMIN ADULT PO)        olopatadine (PATANOL) 0.1 % ophthalmic solution        order for DME Equipment being ordered: CPAP and supplies. LIFETIME need. 1 each 0     simvastatin (ZOCOR) 10 MG tablet Take 1 tablet (10 mg) by mouth At Bedtime 90 tablet 3             Review of Systems  Constitutional, HEENT, cardiovascular, pulmonary, GI, , musculoskeletal, neuro, skin, endocrine and psych systems are negative, except as otherwise noted.    OBJECTIVE:   /58   Pulse (!) 41   Temp 97.1  F (36.2  C) (Temporal)   Resp 16   Ht 1.758 m (5' 9.2\")   Wt 90.3 kg (199 lb)   SpO2 99%   BMI 29.22 kg/m   Estimated body mass index is 29.22 kg/m  as calculated from the following:    Height as of this encounter: 1.758 m (5' 9.2\").    Weight as of this encounter: 90.3 kg (199 lb).  Physical Exam  GENERAL: healthy, alert and no distress  EYES: Eyes grossly normal to inspection, PERRL and conjunctivae and sclerae normal  HENT: ear canals and TM's normal, nose and mouth without ulcers or lesions  NECK: no adenopathy, no asymmetry, masses, or scars and thyroid normal to palpation  RESP: lungs clear to auscultation - no rales, rhonchi or wheezes  CV: regular rate and rhythm, normal S1 S2, no S3 or S4, no murmur, click or rub, no peripheral edema and peripheral pulses strong  ABDOMEN: soft, nontender, no hepatosplenomegaly, no masses and bowel sounds normal  MS: no gross musculoskeletal defects noted, no edema  SKIN: no suspicious lesions or rashes  NEURO: Normal strength and tone, mentation intact and speech normal  PSYCH: mentation appears normal, affect normal/bright    Results for orders placed or performed in visit on 08/10/21   TSH WITH FREE T4 REFLEX     Status: Normal   Result Value Ref Range    TSH 1.24 0.40 - 4.00 mU/L   Comprehensive metabolic panel (BMP + Alb, Alk Phos, ALT, AST, Total. Bili, TP)     Status: Abnormal   Result Value Ref Range    Sodium 132 (L) 133 - 144 mmol/L    Potassium 4.6 " 3.4 - 5.3 mmol/L    Chloride 101 94 - 109 mmol/L    Carbon Dioxide (CO2) 25 20 - 32 mmol/L    Anion Gap 6 3 - 14 mmol/L    Urea Nitrogen 20 7 - 30 mg/dL    Creatinine 0.97 0.66 - 1.25 mg/dL    Calcium 9.6 8.5 - 10.1 mg/dL    Glucose 98 70 - 99 mg/dL    Alkaline Phosphatase 51 40 - 150 U/L    AST 19 0 - 45 U/L    ALT 24 0 - 70 U/L    Protein Total 7.1 6.8 - 8.8 g/dL    Albumin 3.9 3.4 - 5.0 g/dL    Bilirubin Total 1.0 0.2 - 1.3 mg/dL    GFR Estimate 76 >60 mL/min/1.73m2   CBC with platelets     Status: Abnormal   Result Value Ref Range    WBC Count 4.9 4.0 - 11.0 10e3/uL    RBC Count 4.05 (L) 4.40 - 5.90 10e6/uL    Hemoglobin 14.3 13.3 - 17.7 g/dL    Hematocrit 40.3 40.0 - 53.0 %     78 - 100 fL    MCH 35.3 (H) 26.5 - 33.0 pg    MCHC 35.5 31.5 - 36.5 g/dL    RDW 13.2 10.0 - 15.0 %    Platelet Count 236 150 - 450 10e3/uL   Lipid panel reflex to direct LDL Fasting     Status: None   Result Value Ref Range    Cholesterol 169 <200 mg/dL    Triglycerides 48 <150 mg/dL    Direct Measure HDL 85 >=40 mg/dL    LDL Cholesterol Calculated 74 <=100 mg/dL    Non HDL Cholesterol 84 <130 mg/dL    Patient Fasting > 8hrs? Yes            ASSESSMENT / PLAN:   Assessment and Plan:     (Z00.00) Medicare annual wellness visit, subsequent  (primary encounter diagnosis)  Comment: Immun up to date.  Plan: Comprehensive metabolic panel (BMP + Alb, Alk         Phos, ALT, AST, Total. Bili, TP), CBC with         platelets            (I71.2) Ascending aortic aneurysm (H)  Comment:   Plan: Echocardiogram Complete            (C67.9) Malignant neoplasm of urinary bladder, unspecified site (H)  Comment: remote hx as above  Plan: Cytology non gyn            (I10) Essential hypertension  Comment: well controlled, cont same  Plan: lisinopril (ZESTRIL) 5 MG tablet            (I48.0) Paroxysmal atrial fibrillation (H)  Comment: one episode last year. Didn't want to continue anticoagulation  Plan: cards okay for him to discontinue eliquis unless he  "has another episode of afib. He is babay asa    (E03.9) Hypothyroidism, unspecified type  Comment: normal, cont current dose of synthroid  Plan: TSH WITH FREE T4 REFLEX, levothyroxine         (SYNTHROID/LEVOTHROID) 150 MCG tablet            (E78.5) Hyperlipidemia, unspecified hyperlipidemia type  Comment: LDL at goal cont same.  Plan: Lipid panel reflex to direct LDL Fasting,         simvastatin (ZOCOR) 10 MG tablet            (Z12.11) Colon cancer screening  Comment:   Plan: Fecal colorectal cancer screen (FIT)            (Z11.59) Encounter for hepatitis C screening test for low risk patient  Comment:   Plan: Hepatitis C antibody              Patient has been advised of split billing requirements and indicates understanding: Yes  COUNSELING:  Reviewed preventive health counseling, as reflected in patient instructions    Estimated body mass index is 29.22 kg/m  as calculated from the following:    Height as of this encounter: 1.758 m (5' 9.2\").    Weight as of this encounter: 90.3 kg (199 lb).        He reports that he quit smoking about 33 years ago. His smoking use included cigarettes. He started smoking about 53 years ago. He has a 30.00 pack-year smoking history. He has never used smokeless tobacco.      Appropriate preventive services were discussed with this patient, including applicable screening as appropriate for cardiovascular disease, diabetes, osteopenia/osteoporosis, and glaucoma.  As appropriate for age/gender, discussed screening for colorectal cancer, prostate cancer, breast cancer, and cervical cancer. Checklist reviewing preventive services available has been given to the patient.    Reviewed patients plan of care and provided an AVS. The Basic Care Plan (routine screening as documented in Health Maintenance) for Mendez meets the Care Plan requirement. This Care Plan has been established and reviewed with the Patient.    Counseling Resources:  ATP IV Guidelines  Pooled Cohorts Equation " Calculator  Breast Cancer Risk Calculator  Breast Cancer: Medication to Reduce Risk  FRAX Risk Assessment  ICSI Preventive Guidelines  Dietary Guidelines for Americans, 2010  [employer]'s MyPlate  ASA Prophylaxis  Lung CA Screening    PRAVEEN [facility]    Identified Health Risks:

## 2021-08-11 LAB — HCV AB SERPL QL IA: NONREACTIVE

## 2021-08-12 LAB
PATH REPORT.COMMENTS IMP SPEC: NORMAL
PATH REPORT.FINAL DX SPEC: NORMAL
PATH REPORT.GROSS SPEC: NORMAL
PATH REPORT.RELEVANT HX SPEC: NORMAL

## 2021-08-12 NOTE — RESULT ENCOUNTER NOTE
"Steven,    Your urine cytology was \"negative\" so that is good.  Your screening test for hepatitis C was negative.  Your thyroid test was normal.  Your cholesterol profile looks great.  The CMP and CBC were stable for you.     Enjoy the rest of your summer.    Lou King PA-C    "

## 2021-08-23 ENCOUNTER — MYC MEDICAL ADVICE (OUTPATIENT)
Dept: FAMILY MEDICINE | Facility: CLINIC | Age: 76
End: 2021-08-23

## 2021-09-14 ENCOUNTER — HOSPITAL ENCOUNTER (OUTPATIENT)
Dept: CARDIOLOGY | Facility: CLINIC | Age: 76
Discharge: HOME | End: 2021-09-14
Attending: PHYSICIAN ASSISTANT | Admitting: PHYSICIAN ASSISTANT
Payer: MEDICARE

## 2021-09-14 DIAGNOSIS — I71.21 ASCENDING AORTIC ANEURYSM (H): ICD-10-CM

## 2021-09-14 LAB — LVEF ECHO: NORMAL

## 2021-09-14 PROCEDURE — 93306 TTE W/DOPPLER COMPLETE: CPT | Mod: 26 | Performed by: INTERNAL MEDICINE

## 2021-09-14 PROCEDURE — 93306 TTE W/DOPPLER COMPLETE: CPT

## 2021-09-15 NOTE — RESULT ENCOUNTER NOTE
Steven,    I would like you to follow up with cardiology to review this echo. They say in the report that the aortic insufficiency as progressed and that the ascending aorta was not well visualized.  I'd like cardiology's opinion on next steps. Please make a follow up appointment with Dr. Rocha or his PA (Jes Alegria).      Lou King PA-C

## 2021-10-08 ENCOUNTER — VIRTUAL VISIT (OUTPATIENT)
Dept: CARDIOLOGY | Facility: CLINIC | Age: 76
End: 2021-10-08
Payer: MEDICARE

## 2021-10-08 DIAGNOSIS — I35.1 AORTIC VALVE INSUFFICIENCY, ETIOLOGY OF CARDIAC VALVE DISEASE UNSPECIFIED: ICD-10-CM

## 2021-10-08 DIAGNOSIS — I71.21 ASCENDING AORTIC ANEURYSM (H): Primary | ICD-10-CM

## 2021-10-08 PROCEDURE — 99214 OFFICE O/P EST MOD 30 MIN: CPT | Mod: 95 | Performed by: PHYSICIAN ASSISTANT

## 2021-10-08 NOTE — LETTER
10/8/2021    Lou King PA-C  [address]  [facility] [address]    RE: Mendez Greene       Dear Colleague,    I had the pleasure of seeing Mendez Greene in the [facility].    CARDIOLOGY CLINIC VIDEO VISIT  This visit is being conducted as a virtual visit due to the emphasis on mitigation of the COVID-19 virus pandemic. The clinician has decided that the risk of an in-office visit outweighs the benefit for this patient. The rest of the comprehensive physical examination is deferred due to public health emergency video visit restrictions.      Mendez Greene is a 75 year old male who is being evaluated via a billable video visit.      The patient has been notified of following:     This video visit will be conducted via a call between you and your physician/provider. We have found that certain health care needs can be provided without the need for an in-person physical exam.  This service lets us provide the care you need with a video conversation.  If a prescription is necessary we can send it directly to your pharmacy.  If lab work is needed we can place an order for that and you can then stop by our lab to have the test done at a later time.    Virtual visits are billed at different rates depending on your insurance coverage. During this emergency period, for some insurers they may be billed the same as an in-person visit.  Please reach out to your insurance provider with any questions.    If during the course of the call the physician/provider feels a video visit is not appropriate, you will not be charged for this service.      Physician has received verbal consent for a Video Visit from the patient? Yes  How would you like to obtain your AVS? MyChart  If the video visit is dropped, the invitation should be resent by: Send to e-mail at: [email or web address]  Will anyone else be joining your video visit? No       Vitals - Patient  Reported  Systolic (Patient Reported): 120  Diastolic (Patient Reported): 58  Weight (Patient Reported): 86.2 kg (190 lb)    Reviewed by: DONNA Melgoza MA ROS:  Review Of Systems  Skin: negative  Eyes: itching eyes  Ears/Nose/Throat: negative  Respiratory: sleep apnea wears a cpap  Cardiovascular: negative  Gastrointestinal: negative  Genitourinary: negative  Musculoskeletal: negative  Neurologic: negative  Psychiatric: negative  Hematologic/Lymphatic/Immunologic: allergies, hay fever  Endocrine: thyroid disorder        Primary Cardiologist: Dr. Rocha     HPI:  Mendez Greene is a 75 year old male with past medical history including HTN, dyslipidemia, afib, mild to moderate ascending aortic dilatation, CHA, bladder cancer, BPH, hypothyroidism.       11/6-11/7/20 Hosp Admission due to episode of tachycardia with associated chest pressure.   He was found to be in afib with RVR with rates in the 120s. He was given IV metoprolol, started on metoprolol 12.5 mg BID (limited by baseline sinus fam rates 40s). Started on Eliquis 5 mg BID.     Trop was elevated to 0.698.  11/7/20  coronary angiogram - no significant obstructive disease.   11/7/20 Echo - LVEF 55-60%, aorta mild to moderately enlarged.      When he got home, he was in the 80s indicating he was still in afib.  That night he took metoprolol 12.5 mg and when he woke the next morning his HR was in the 40s.  It has remained at his normal rates since.     He has not taken any more metoprolol.   He is on ASA 81 mg per his request.  He is active, walks in the winter, and has slipped and fallen at times.  He is concerned about hitting his head.  He is aware that our guidelines would recommend continuing anticoagulation, and he is ok with restarting it should he have another episode.      Interval History:   PCP visit 8/10/21, in Epic reviewed, ordered Echo for routine follow up on mild enlarged ascending aorta.     9/14/21 Echo:  Interpretation Summary  Left  ventricular systolic function is normal. The visual ejection fraction is  60-65%.  Right ventricle is normal in structure, function and size.  Mild to moderate (1-2+) aortic regurgitation. There is an eccentric jet of  aortic insufficiency directed against the anterior mitral leaflet. The  severity of AI may be underestimated due to the eccentric nature of the  regurgitant jet.  Mild aortic root dilatation. Max diameter of the visualized portion 4.3 cm.  Ascending aorta is Mildly dilated. Max diameter of the visualized portion 4  cm.     This study was compared to a TTE from 11/7/2020. The severity of AI has  progressed. Ascending aorta was not as well visualized on this present study,  consider CT for evaluation if clinically appropriate.    PCP recommended follow up with Cardiology to review.          No metal except screws in foot.   Not claustrophobic.   No chest pain.  No SOB. No edema.   Walked 3 miles this morning.   Resting HR 40s.  Has been this for 30 years. No syncope, no near syncope.       I have reviewed and updated the patient's Past Medical History, Social History, Family History and Medication List.    PROBLEM LIST:  Patient Active Problem List   Diagnosis     Essential hypertension     Hypothyroidism     Allergic rhinitis     Hypertrophy of prostate with urinary obstruction     Erectile dysfunction     Malignant neoplasm of urinary bladder, unspecified site (H)     Hyperlipidemia LDL goal <130     Sinus bradycardia     Low vitamin B12 level     CHA (obstructive sleep apnea)     Rapid heart rate     NSTEMI (non-ST elevated myocardial infarction) (H)     Pain of right thigh     Paroxysmal atrial fibrillation (H)       MEDICATIONS:  Current Outpatient Medications   Medication Sig Dispense Refill     aspirin 81 MG EC tablet Take 1 tablet (81 mg) by mouth daily       COMPOUND (CMPD RX) - PHARMACY TO MIX COMPOUNDED MEDICATION Hydro 2.5% Cr / Ketocon Crm.  Apply to the effected area on face twice daily as  needed.       Cyanocobalamin (B-12) 1000 MCG CAPS Take 1,000 mcg by mouth daily       diphenhydrAMINE-acetaminophen (TYLENOL PM)  MG tablet Take 0.5 tablets by mouth nightly as needed for sleep       levothyroxine (SYNTHROID/LEVOTHROID) 150 MCG tablet Take 1 tablet (150 mcg) by mouth daily 90 tablet 3     lisinopril (ZESTRIL) 5 MG tablet Take 1 tablet (5 mg) by mouth At Bedtime 90 tablet 3     Multiple Vitamins-Minerals (MULTIVITAMIN ADULT PO)        olopatadine (PATANOL) 0.1 % ophthalmic solution        order for DME Equipment being ordered: CPAP and supplies. LIFETIME need. 1 each 0     simvastatin (ZOCOR) 10 MG tablet Take 1 tablet (10 mg) by mouth At Bedtime 90 tablet 3         ALLERGIES:     Allergies   Allergen Reactions     Food Other (See Comments)     Uncertain of which food , testing done ~1997           EXAM:  A limited exam was conducted via video.  General: Patient is pleasant, alert, in no distress. Normal body habitus. Upright.    Eyes: No scleral icterus, no apparent redness or discharge. EOM's appear intact.   Chest/Lungs: No labored breathing, no cough during exam or audible wheezing.   Cardiovascular: No evidence of elevated JVP.   Abdomen: No evidence of abdominal distention. No observed jaundice.  Skin: No rashes or lesions appreciated on visualized skin, normal skin color.  Neuro: No obvious focal defects or tremors.   Psych: Alert and oriented. Does not appear anxious.     The rest of a comprehensive physical examination is deferred due to public health emergency video visit restrictions.       DIAGNOSTICS:  Echo 11/7/20:  Interpretation Summary  The ascending aorta is Moderately dilated.  Sinus Valsalva 43mm, Asc aorta 45mm  There is mild to moderate concentric left ventricular hypertrophy.  The visual ejection fraction is estimated at 55-60%.  Normal or borderline hypokinesis of distal anterior free wall/apex  The left atrium is mild to moderately dilated.  There is mild (1+) aortic  regurgitation.        Cardiac cath 11/7/20:  Minimal non-obstructive CAD        Echo 9/14/21:  Interpretation Summary  Left ventricular systolic function is normal. The visual ejection fraction is  60-65%.  Right ventricle is normal in structure, function and size.  Mild to moderate (1-2+) aortic regurgitation. There is an eccentric jet of  aortic insufficiency directed against the anterior mitral leaflet. The  severity of AI may be underestimated due to the eccentric nature of the  regurgitant jet.  Mild aortic root dilatation. Max diameter of the visualized portion 4.3 cm.  Ascending aorta is Mildly dilated. Max diameter of the visualized portion 4  cm.     This study was compared to a TTE from 11/7/2020. The severity of AI has  progressed. Ascending aorta was not as well visualized on this present study,  consider CT for evaluation if clinically appropriate.         ASSESSMENT/PLAN:  New paroxysmal afib with RVR  - Baseline is sinus fam in the 40s as noted on multiple EKGs dating back to 2006 in our chart  - Discharged on rate control and anticoagulation with metoprolol 12.5 mg BID and Eliquis 5 mg BID.   - At home, he took one dose of metoprolol 12.5 mg.  The next morning his HR was back in the 30-50 range indicating he likely spontaneously converted to sinus fam.    - Should he have recurrence of faster rates, he will take metoprolol 12.5 mg PRN.  If he does not spontaneously convert within a day, he will let us know.   - [id number] score of 3 (Age-2, HTN). Eliquis 5 mg BID is indicated.  But he requests ASA 81 mg.  If he has recurrence he is open to restarting Eliquis        Elevated troponin  Nonobstructive CAD  - Demand ischemia in setting of afib with RVR.  Cardiac acth 11/17/20 showed no significant obstructive disease  - Continue statin  - ASA 81 mg        Mild to moderately enlarged ascending aorta  Mild to moderate AI  - Echo 11/7/20: ascending aorta 45 mm, mild AI  - Echo 9/14/21: asc aorta 43 mm,  but not well visualized; mild to mod AI, but echo report indicates the severity of AI may be underestimated  - Will proceed with MR to better evaluate the aorta but also to quantify the AI.    - BP control  - Statin        Dyslipidemia  - FLP 11/7/20: , HDL 73, LDL 60, TG 59  - Continue simvastatin 10 mg          Video-Visit Details  Type of service:  Video Visit  Video Start Time: 1616  Video End Time (time video stopped): 1633  Originating Location (pt. Location): Home  Distant Location (provider location):  [facility]  Mode of Communication:  Video Conference via [employer]         PRAVEEN [facility]                                                 Thank you for allowing me to participate in the care of your patient.      Sincerely,     PRAVEEN Bryant [facility]  cc:   No referring provider defined for this encounter.

## 2021-10-08 NOTE — PROGRESS NOTES
CARDIOLOGY CLINIC VIDEO VISIT  This visit is being conducted as a virtual visit due to the emphasis on mitigation of the COVID-19 virus pandemic. The clinician has decided that the risk of an in-office visit outweighs the benefit for this patient. The rest of the comprehensive physical examination is deferred due to public health emergency video visit restrictions.      Mendez Greene is a 75 year old male who is being evaluated via a billable video visit.      The patient has been notified of following:     This video visit will be conducted via a call between you and your physician/provider. We have found that certain health care needs can be provided without the need for an in-person physical exam.  This service lets us provide the care you need with a video conversation.  If a prescription is necessary we can send it directly to your pharmacy.  If lab work is needed we can place an order for that and you can then stop by our lab to have the test done at a later time.    Virtual visits are billed at different rates depending on your insurance coverage. During this emergency period, for some insurers they may be billed the same as an in-person visit.  Please reach out to your insurance provider with any questions.    If during the course of the call the physician/provider feels a video visit is not appropriate, you will not be charged for this service.      Physician has received verbal consent for a Video Visit from the patient? Yes  How would you like to obtain your AVS? MyChart  If the video visit is dropped, the invitation should be resent by: Send to e-mail at: [email or web address]  Will anyone else be joining your video visit? No       Vitals - Patient Reported  Systolic (Patient Reported): 120  Diastolic (Patient Reported): 58  Weight (Patient Reported): 86.2 kg (190 lb)    Reviewed by: DONNA Melgoza MA ROS:  Review Of Systems  Skin: negative  Eyes: itching eyes  Ears/Nose/Throat: negative  Respiratory:  sleep apnea wears a cpap  Cardiovascular: negative  Gastrointestinal: negative  Genitourinary: negative  Musculoskeletal: negative  Neurologic: negative  Psychiatric: negative  Hematologic/Lymphatic/Immunologic: allergies, hay fever  Endocrine: thyroid disorder        Primary Cardiologist: Dr. Rocha     HPI:  Mendez Greene is a 75 year old male with past medical history including HTN, dyslipidemia, afib, mild to moderate ascending aortic dilatation, CHA, bladder cancer, BPH, hypothyroidism.       11/6-11/7/20 Hosp Admission due to episode of tachycardia with associated chest pressure.   He was found to be in afib with RVR with rates in the 120s. He was given IV metoprolol, started on metoprolol 12.5 mg BID (limited by baseline sinus fam rates 40s). Started on Eliquis 5 mg BID.     Trop was elevated to 0.698.  11/7/20  coronary angiogram - no significant obstructive disease.   11/7/20 Echo - LVEF 55-60%, aorta mild to moderately enlarged.      When he got home, he was in the 80s indicating he was still in afib.  That night he took metoprolol 12.5 mg and when he woke the next morning his HR was in the 40s.  It has remained at his normal rates since.     He has not taken any more metoprolol.   He is on ASA 81 mg per his request.  He is active, walks in the winter, and has slipped and fallen at times.  He is concerned about hitting his head.  He is aware that our guidelines would recommend continuing anticoagulation, and he is ok with restarting it should he have another episode.      Interval History:   PCP visit 8/10/21, in Epic reviewed, ordered Echo for routine follow up on mild enlarged ascending aorta.     9/14/21 Echo:  Interpretation Summary  Left ventricular systolic function is normal. The visual ejection fraction is  60-65%.  Right ventricle is normal in structure, function and size.  Mild to moderate (1-2+) aortic regurgitation. There is an eccentric jet of  aortic insufficiency directed against the  anterior mitral leaflet. The  severity of AI may be underestimated due to the eccentric nature of the  regurgitant jet.  Mild aortic root dilatation. Max diameter of the visualized portion 4.3 cm.  Ascending aorta is Mildly dilated. Max diameter of the visualized portion 4  cm.     This study was compared to a TTE from 11/7/2020. The severity of AI has  progressed. Ascending aorta was not as well visualized on this present study,  consider CT for evaluation if clinically appropriate.    PCP recommended follow up with Cardiology to review.          No metal except screws in foot.   Not claustrophobic.   No chest pain.  No SOB. No edema.   Walked 3 miles this morning.   Resting HR 40s.  Has been this for 30 years. No syncope, no near syncope.       I have reviewed and updated the patient's Past Medical History, Social History, Family History and Medication List.    PROBLEM LIST:  Patient Active Problem List   Diagnosis     Essential hypertension     Hypothyroidism     Allergic rhinitis     Hypertrophy of prostate with urinary obstruction     Erectile dysfunction     Malignant neoplasm of urinary bladder, unspecified site (H)     Hyperlipidemia LDL goal <130     Sinus bradycardia     Low vitamin B12 level     CHA (obstructive sleep apnea)     Rapid heart rate     NSTEMI (non-ST elevated myocardial infarction) (H)     Pain of right thigh     Paroxysmal atrial fibrillation (H)       MEDICATIONS:  Current Outpatient Medications   Medication Sig Dispense Refill     aspirin 81 MG EC tablet Take 1 tablet (81 mg) by mouth daily       COMPOUND (CMPD RX) - PHARMACY TO MIX COMPOUNDED MEDICATION Hydro 2.5% Cr / Ketocon Crm.  Apply to the effected area on face twice daily as needed.       Cyanocobalamin (B-12) 1000 MCG CAPS Take 1,000 mcg by mouth daily       diphenhydrAMINE-acetaminophen (TYLENOL PM)  MG tablet Take 0.5 tablets by mouth nightly as needed for sleep       levothyroxine (SYNTHROID/LEVOTHROID) 150 MCG tablet  Take 1 tablet (150 mcg) by mouth daily 90 tablet 3     lisinopril (ZESTRIL) 5 MG tablet Take 1 tablet (5 mg) by mouth At Bedtime 90 tablet 3     Multiple Vitamins-Minerals (MULTIVITAMIN ADULT PO)        olopatadine (PATANOL) 0.1 % ophthalmic solution        order for DME Equipment being ordered: CPAP and supplies. LIFETIME need. 1 each 0     simvastatin (ZOCOR) 10 MG tablet Take 1 tablet (10 mg) by mouth At Bedtime 90 tablet 3         ALLERGIES:     Allergies   Allergen Reactions     Food Other (See Comments)     Uncertain of which food , testing done ~1997           EXAM:  A limited exam was conducted via video.  General: Patient is pleasant, alert, in no distress. Normal body habitus. Upright.    Eyes: No scleral icterus, no apparent redness or discharge. EOM's appear intact.   Chest/Lungs: No labored breathing, no cough during exam or audible wheezing.   Cardiovascular: No evidence of elevated JVP.   Abdomen: No evidence of abdominal distention. No observed jaundice.  Skin: No rashes or lesions appreciated on visualized skin, normal skin color.  Neuro: No obvious focal defects or tremors.   Psych: Alert and oriented. Does not appear anxious.     The rest of a comprehensive physical examination is deferred due to public health emergency video visit restrictions.       DIAGNOSTICS:  Echo 11/7/20:  Interpretation Summary  The ascending aorta is Moderately dilated.  Sinus Valsalva 43mm, Asc aorta 45mm  There is mild to moderate concentric left ventricular hypertrophy.  The visual ejection fraction is estimated at 55-60%.  Normal or borderline hypokinesis of distal anterior free wall/apex  The left atrium is mild to moderately dilated.  There is mild (1+) aortic regurgitation.        Cardiac cath 11/7/20:  Minimal non-obstructive CAD        Echo 9/14/21:  Interpretation Summary  Left ventricular systolic function is normal. The visual ejection fraction is  60-65%.  Right ventricle is normal in structure, function and  size.  Mild to moderate (1-2+) aortic regurgitation. There is an eccentric jet of  aortic insufficiency directed against the anterior mitral leaflet. The  severity of AI may be underestimated due to the eccentric nature of the  regurgitant jet.  Mild aortic root dilatation. Max diameter of the visualized portion 4.3 cm.  Ascending aorta is Mildly dilated. Max diameter of the visualized portion 4  cm.     This study was compared to a TTE from 11/7/2020. The severity of AI has  progressed. Ascending aorta was not as well visualized on this present study,  consider CT for evaluation if clinically appropriate.         ASSESSMENT/PLAN:  New paroxysmal afib with RVR  - Baseline is sinus fam in the 40s as noted on multiple EKGs dating back to 2006 in our chart  - Discharged on rate control and anticoagulation with metoprolol 12.5 mg BID and Eliquis 5 mg BID.   - At home, he took one dose of metoprolol 12.5 mg.  The next morning his HR was back in the 30-50 range indicating he likely spontaneously converted to sinus fam.    - Should he have recurrence of faster rates, he will take metoprolol 12.5 mg PRN.  If he does not spontaneously convert within a day, he will let us know.   - [id number] score of 3 (Age-2, HTN). Eliquis 5 mg BID is indicated.  But he requests ASA 81 mg.  If he has recurrence he is open to restarting Eliquis        Elevated troponin  Nonobstructive CAD  - Demand ischemia in setting of afib with RVR.  Cardiac acth 11/17/20 showed no significant obstructive disease  - Continue statin  - ASA 81 mg        Mild to moderately enlarged ascending aorta  Mild to moderate AI  - Echo 11/7/20: ascending aorta 45 mm, mild AI  - Echo 9/14/21: asc aorta 43 mm, but not well visualized; mild to mod AI, but echo report indicates the severity of AI may be underestimated  - Will proceed with MR to better evaluate the aorta but also to quantify the AI.    - BP control  - Statin        Dyslipidemia  - FLP 11/7/20: ,  HDL 73, LDL 60, TG 59  - Continue simvastatin 10 mg          Video-Visit Details  Type of service:  Video Visit  Video Start Time: 1616  Video End Time (time video stopped): 1633  Originating Location (pt. Location): Home  Distant Location (provider location):  [facility]  Mode of Communication:  Video Conference via [employer]         PRAVEEN Wallace [facility]

## 2021-11-10 ENCOUNTER — HOSPITAL ENCOUNTER (OUTPATIENT)
Dept: CARDIOLOGY | Facility: CLINIC | Age: 76
Discharge: HOME | End: 2021-11-10
Attending: PHYSICIAN ASSISTANT | Admitting: PHYSICIAN ASSISTANT
Payer: MEDICARE

## 2021-11-10 DIAGNOSIS — I35.1 AORTIC VALVE INSUFFICIENCY, ETIOLOGY OF CARDIAC VALVE DISEASE UNSPECIFIED: ICD-10-CM

## 2021-11-10 DIAGNOSIS — I71.21 ASCENDING AORTIC ANEURYSM (H): ICD-10-CM

## 2021-11-10 PROCEDURE — 255N000002 HC RX 255 OP 636: Performed by: PHYSICIAN ASSISTANT

## 2021-11-10 PROCEDURE — G1004 CDSM NDSC: HCPCS | Performed by: INTERNAL MEDICINE

## 2021-11-10 PROCEDURE — 75565 CARD MRI VELOC FLOW MAPPING: CPT | Mod: 26 | Performed by: INTERNAL MEDICINE

## 2021-11-10 PROCEDURE — 75561 CARDIAC MRI FOR MORPH W/DYE: CPT | Mod: 26 | Performed by: INTERNAL MEDICINE

## 2021-11-10 PROCEDURE — G1004 CDSM NDSC: HCPCS

## 2021-11-10 PROCEDURE — 75561 CARDIAC MRI FOR MORPH W/DYE: CPT | Mod: ME

## 2021-11-10 PROCEDURE — 75565 CARD MRI VELOC FLOW MAPPING: CPT | Mod: ME

## 2021-11-10 PROCEDURE — A9585 GADOBUTROL INJECTION: HCPCS | Performed by: PHYSICIAN ASSISTANT

## 2021-11-10 PROCEDURE — 250N000013 HC RX MED GY IP 250 OP 250 PS 637: Performed by: PHYSICIAN ASSISTANT

## 2021-11-10 PROCEDURE — 71555 MRI ANGIO CHEST W OR W/O DYE: CPT | Mod: 26 | Performed by: INTERNAL MEDICINE

## 2021-11-10 RX ORDER — AMINOPHYLLINE 25 MG/ML
100 INJECTION, SOLUTION INTRAVENOUS ONCE
Status: DISCONTINUED | OUTPATIENT
Start: 2021-11-10 | End: 2021-11-11 | Stop reason: HOSPADM

## 2021-11-10 RX ORDER — DIPHENHYDRAMINE HCL 25 MG
25 CAPSULE ORAL
Status: DISCONTINUED | OUTPATIENT
Start: 2021-11-10 | End: 2021-11-11 | Stop reason: HOSPADM

## 2021-11-10 RX ORDER — ALBUTEROL SULFATE 90 UG/1
2 AEROSOL, METERED RESPIRATORY (INHALATION) EVERY 5 MIN PRN
Status: DISCONTINUED | OUTPATIENT
Start: 2021-11-10 | End: 2021-11-11 | Stop reason: HOSPADM

## 2021-11-10 RX ORDER — DIAZEPAM 5 MG
5 TABLET ORAL EVERY 30 MIN PRN
Status: DISCONTINUED | OUTPATIENT
Start: 2021-11-10 | End: 2021-11-11 | Stop reason: HOSPADM

## 2021-11-10 RX ORDER — DIPHENHYDRAMINE HYDROCHLORIDE 50 MG/ML
25-50 INJECTION INTRAMUSCULAR; INTRAVENOUS
Status: DISCONTINUED | OUTPATIENT
Start: 2021-11-10 | End: 2021-11-11 | Stop reason: HOSPADM

## 2021-11-10 RX ORDER — METHYLPREDNISOLONE SODIUM SUCCINATE 125 MG/2ML
125 INJECTION, POWDER, LYOPHILIZED, FOR SOLUTION INTRAMUSCULAR; INTRAVENOUS
Status: DISCONTINUED | OUTPATIENT
Start: 2021-11-10 | End: 2021-11-11 | Stop reason: HOSPADM

## 2021-11-10 RX ORDER — ACYCLOVIR 200 MG/1
0-1 CAPSULE ORAL
Status: DISCONTINUED | OUTPATIENT
Start: 2021-11-10 | End: 2021-11-11 | Stop reason: HOSPADM

## 2021-11-10 RX ORDER — GADOBUTROL 604.72 MG/ML
18 INJECTION INTRAVENOUS ONCE
Status: COMPLETED | OUTPATIENT
Start: 2021-11-10 | End: 2021-11-10

## 2021-11-10 RX ORDER — CAFFEINE CITRATE 20 MG/ML
60 SOLUTION INTRAVENOUS
Status: DISCONTINUED | OUTPATIENT
Start: 2021-11-10 | End: 2021-11-11 | Stop reason: HOSPADM

## 2021-11-10 RX ORDER — ONDANSETRON 2 MG/ML
4 INJECTION INTRAMUSCULAR; INTRAVENOUS
Status: DISCONTINUED | OUTPATIENT
Start: 2021-11-10 | End: 2021-11-11 | Stop reason: HOSPADM

## 2021-11-10 RX ORDER — REGADENOSON 0.08 MG/ML
0.4 INJECTION, SOLUTION INTRAVENOUS ONCE
Status: DISCONTINUED | OUTPATIENT
Start: 2021-11-10 | End: 2021-11-11 | Stop reason: HOSPADM

## 2021-11-10 RX ADMIN — DIAZEPAM 5 MG: 5 TABLET ORAL at 08:44

## 2021-11-10 RX ADMIN — GADOBUTROL 18 ML: 604.72 INJECTION INTRAVENOUS at 11:58

## 2021-11-11 ENCOUNTER — CARE COORDINATION (OUTPATIENT)
Dept: CARDIOLOGY | Facility: CLINIC | Age: 76
End: 2021-11-11
Payer: MEDICARE

## 2021-11-11 DIAGNOSIS — I35.1 AORTIC VALVE INSUFFICIENCY, ETIOLOGY OF CARDIAC VALVE DISEASE UNSPECIFIED: ICD-10-CM

## 2021-11-11 DIAGNOSIS — I71.21 ASCENDING AORTIC ANEURYSM (H): Primary | ICD-10-CM

## 2021-11-11 NOTE — PROGRESS NOTES
Cardiac Core protocol  MRA Aorta protocol  Flows-LVOT, St J, Asc Ao, PA  Contrast administered per weight based protocol.  Per Dr Jamison

## 2021-11-11 NOTE — PROGRESS NOTES
11/10/21 CMRI and MRA chest:     Dawna Carrington RN   11/11/2021  4:43 PM CST Back to Top        Messaged results and recommendations to [facility].     Jes Alegria PA-C   11/10/2021  4:56 PM CST         Results reviewed.   - Echo 11/7/20: ascending aorta 45 mm, mild AI  - Echo 9/14/21: asc aorta 43 mm, but not well visualized; mild to mod AI, but echo report indicates the severity of AI may be underestimated  - Got CMR to better evaluate the aorta and to quantify the AI.      The CMRI shows only mildly dilated aortic root and ascending aorta. There is moderate eccentric AI.       He should have an echo next year to follow up on the AI, and can see Dr. Rocha.      Jes Alegria PA-C 11/10/2021 4:55 PM    Dawna Carrington RN   11/10/2021  3:09 PM CST         10/8/21 visit with JOEY Patterson. Ordered to evaluate aortic dilatation and AR. No current follow up orders. Routed to JOEY Patterson for review.

## 2021-11-16 ENCOUNTER — MYC MEDICAL ADVICE (OUTPATIENT)
Dept: FAMILY MEDICINE | Facility: CLINIC | Age: 76
End: 2021-11-16
Payer: MEDICARE

## 2021-12-14 ENCOUNTER — OFFICE VISIT (OUTPATIENT)
Dept: FAMILY MEDICINE | Facility: CLINIC | Age: 76
End: 2021-12-14
Payer: MEDICARE

## 2021-12-14 VITALS
TEMPERATURE: 97.3 F | WEIGHT: 199 LBS | DIASTOLIC BLOOD PRESSURE: 62 MMHG | HEIGHT: 69 IN | BODY MASS INDEX: 29.47 KG/M2 | OXYGEN SATURATION: 100 % | RESPIRATION RATE: 16 BRPM | SYSTOLIC BLOOD PRESSURE: 136 MMHG | HEART RATE: 50 BPM

## 2021-12-14 DIAGNOSIS — H26.9 CATARACT OF BOTH EYES, UNSPECIFIED CATARACT TYPE: Primary | ICD-10-CM

## 2021-12-14 DIAGNOSIS — Z01.818 PREOP GENERAL PHYSICAL EXAM: ICD-10-CM

## 2021-12-14 DIAGNOSIS — I48.0 PAROXYSMAL ATRIAL FIBRILLATION (H): ICD-10-CM

## 2021-12-14 DIAGNOSIS — E78.5 HYPERLIPIDEMIA LDL GOAL <130: ICD-10-CM

## 2021-12-14 PROCEDURE — 99214 OFFICE O/P EST MOD 30 MIN: CPT | Performed by: PHYSICIAN ASSISTANT

## 2021-12-14 ASSESSMENT — MIFFLIN-ST. JEOR: SCORE: 1621.21

## 2021-12-14 ASSESSMENT — PAIN SCALES - GENERAL: PAINLEVEL: NO PAIN (0)

## 2021-12-14 NOTE — PROGRESS NOTES
[address], [address]  [facility] [address]  Phone: [phone]  Primary Provider: Lou King        PREOPERATIVE EVALUATION:  Today's date: 12/14/2021    Mendez Greene is a 76 year old male who presents for a preoperative evaluation.    Surgical Information:  Surgery/Procedure: Cataract Removal    Surgery Location: [facility]  Surgeon:   Surgery Date: 01/12/22  Right Eye and 01/27/22 Left eye  Time of Surgery: 7:30 AM  Where patient plans to recover: At home with family  Fax number for surgical facility: [phone]    Type of Anesthesia Anticipated: to be determined        Subjective     HPI related to upcoming procedure: cataracts    Preop Questions 12/11/2021   1. Have you ever had a heart attack or stroke? YES - NSTEMI, likely due to demand ischemia   2. Have you ever had surgery on your heart or blood vessels, such as a stent placement, a coronary artery bypass, or surgery on an artery in your head, neck, heart, or legs? No   3. Do you have chest pain with activity? No   4. Do you have a history of  heart failure? No   5. Do you currently have a cold, bronchitis or symptoms of other infection? No   6. Do you have a cough, shortness of breath, or wheezing? No   7. Do you or anyone in your family have previous history of blood clots? No   8. Do you or does anyone in your family have a serious bleeding problem such as prolonged bleeding following surgeries or cuts? No   9. Have you ever had problems with anemia or been told to take iron pills? No   10. Have you had any abnormal blood loss such as black, tarry or bloody stools? No   11. Have you ever had a blood transfusion? No   12. Are you willing to have a blood transfusion if it is medically needed before, during, or after your surgery? Yes   13. Have you or any of your relatives ever had problems with anesthesia? UNKNOWN    14. Do you have sleep apnea, excessive snoring or daytime  drowsiness? YES - CHA   14a. Do you have a CPAP machine? Yes   15. Do you have any artifical heart valves or other implanted medical devices like a pacemaker, defibrillator, or continuous glucose monitor? No   16. Do you have artificial joints? No   17. Are you allergic to latex? No       Health Care Directive:  Patient has a Health Care Directive on file      Status of Chronic Conditions:    Afib: denies palpit or sob. He declines anticoagulation besides asa 81mg.    HYPERLIPIDEMIA - Patient has a long history of significant Hyperlipidemia requiring medication for treatment with recent good control. Patient reports no problems or side effects with the medication.     HYPERTENSION - Patient has longstanding history of HTN , currently denies any symptoms referable to elevated blood pressure. Specifically denies chest pain, palpitations, dyspnea, orthopnea, PND or peripheral edema. Blood pressure readings have been in normal range. Current medication regimen is as listed below. Patient denies any side effects of medication.     HYPOTHYROIDISM - Patient has a longstanding history of chronic Hypothyroidism. Patient has been doing well, noting no tremor, insomnia, hair loss or changes in skin texture. Continues to take medications as directed, without adverse reactions or side effects. Last TSH   Lab Results   Component Value Date    TSH 1.24 08/10/2021   .        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 Provider to review       Hypothyroidism 12/23/2003     Priority: High     Problem list name updated by automated process. Provider to review       Paroxysmal atrial fibrillation (H) 05/04/2021     Priority: Medium     Pain of right thigh 02/23/2021     Priority: Medium     Rapid heart rate 11/06/2020     Priority: Medium     NSTEMI (non-ST elevated myocardial infarction) (H) 11/06/2020     Priority: Medium     CHA (obstructive sleep apnea) 12/05/2017     Priority: Medium     Sinus bradycardia 07/19/2016     Priority: Medium     Low vitamin B12 level 07/19/2016     Priority: Medium     Erectile dysfunction 07/19/2005     Priority: Medium     Hypertrophy of prostate with urinary obstruction 12/06/2004     Priority: Medium     Problem list name updated by automated process. Provider to review       Allergic rhinitis 12/23/2003     Priority: Medium     Problem list name updated by automated process. Provider to review        Past Medical History:   Diagnosis Date     Allergic rhinitis, cause unspecified      Cancer (H) 4/2006     Diverticulosis      Double vision 10/17/2011    probable migraine aura     Hypertrophy of prostate without urinary obstruction and other lower urinary tract symptoms (LUTS)      Neoplasm of unspecified nature of bladder 3-06    [place] s/p cystoprostatectomy with ileal neobladder     Other and unspecified hyperlipidemia      Palpitations      Renal stone 5-12     Sinus bradycardia      Sinus bradycardia      Sleep apnea 7-05    C Pap     Unspecified essential hypertension 1988     Unspecified hypothyroidism      Past Surgical History:   Procedure Laterality Date     ABDOMEN SURGERY  4/2006    bladder     BIOPSY  3/2006     BUNIONECTOMY  12-12    LT     BUNIONECTOMY DIANA  11/28/2011    RT-Procedure:BUNIONECTOMY DIANA; Right Foot Dwaine  Jose Bunionectomy with Metatarsal and Phalanx Osteotomy with 1st Metatarsal Phalangeal Joint Repair Right Foot; Surgeon:BAKARI ZAIDI; Location:[facility]     CV HEART CATHETERIZATION WITH POSSIBLE INTERVENTION N/A 2020    Procedure: Heart Catheterization with Possible Intervention;  Surgeon: Rishi Llanes MD;  Location:  HEART CARDIAC CATH LAB     CYSTOSCOPY  3-06    CA bladder     HERNIORRHAPHY INCISIONAL (LOCATION) N/A 2016    Procedure: HERNIORRHAPHY INCISIONAL (LOCATION);  Surgeon: Onofre Lua MD;  Location: [facility]     SURGICAL HISTORY OF -   5-3-06    neobladder construction- CA     Current Outpatient Medications   Medication Sig Dispense Refill     aspirin 81 MG EC tablet Take 1 tablet (81 mg) by mouth daily       COMPOUND (CMPD RX) - PHARMACY TO MIX COMPOUNDED MEDICATION Hydro 2.5% Cr / Ketocon Crm.  Apply to the effected area on face twice daily as needed.       Cyanocobalamin (B-12) 1000 MCG CAPS Take 1,000 mcg by mouth daily       diphenhydrAMINE-acetaminophen (TYLENOL PM)  MG tablet Take 0.5 tablets by mouth nightly as needed for sleep       levothyroxine (SYNTHROID/LEVOTHROID) 150 MCG tablet Take 1 tablet (150 mcg) by mouth daily 90 tablet 3     lisinopril (ZESTRIL) 5 MG tablet Take 1 tablet (5 mg) by mouth At Bedtime 90 tablet 3     Multiple Vitamins-Minerals (MULTIVITAMIN ADULT PO)        olopatadine (PATANOL) 0.1 % ophthalmic solution        order for DME Equipment being ordered: CPAP and supplies. LIFETIME need. 1 each 0     simvastatin (ZOCOR) 10 MG tablet Take 1 tablet (10 mg) by mouth At Bedtime 90 tablet 3       Allergies   Allergen Reactions     Food Other (See Comments)     Uncertain of which food , testing done ~        Social History     Tobacco Use     Smoking status: Former Smoker     Packs/day: 1.50     Years: 20.00     Pack years: 30.00     Types: Cigarettes     Start date: 1967     Quit date: 9/10/1987     Years since quittin.2      "Smokeless tobacco: Never Used   Substance Use Topics     Alcohol use: Yes     Alcohol/week: 0.0 standard drinks     Comment: Occas wine     Family History   Problem Relation Age of Onset     Osteoporosis Mother      Neurologic Disorder Mother         Parkinson's     Cerebrovascular Disease Mother      Cerebrovascular Disease Father      Diabetes Sister         Type 1     Diabetes Sister         type 1.   1972     Other Cancer Maternal Grandmother         don't know type     History   Drug Use No         Objective     /62 (BP Location: Left arm, Patient Position: Sitting, Cuff Size: Adult Large)   Pulse 50   Temp 97.3  F (36.3  C) (Temporal)   Resp 16   Ht 1.758 m (5' 9.2\")   Wt 90.3 kg (199 lb)   SpO2 100%   BMI 29.22 kg/m      Physical Exam  GENERAL APPEARANCE: healthy, alert and no distress  HENT: ear canals and TM's normal and nose and mouth without ulcers or lesions  RESP: lungs clear to auscultation - no rales, rhonchi or wheezes  CV: regular rate and rhythm, normal S1 S2, no S3 or S4 and no murmur, click or rub   ABDOMEN: soft, nontender, no HSM or masses and bowel sounds normal  NEURO: Normal strength and tone, sensory exam grossly normal, mentation intact and speech normal    Recent Labs   Lab Test 08/10/21  1052 20  0629 20  2020 20  1644   HGB 14.3  --  14.7 14.9     --  261 253   * 137  --  132*   POTASSIUM 4.6 4.2  --  4.0   CR 0.97 0.78  --  0.77   A1C  --   --   --  5.1        Diagnostics:  No labs were ordered during this visit.   No EKG required for low risk surgery (cataract, skin procedure, breast biopsy, etc).    Assessment and Plan:     (H26.9) Cataract of both eyes, unspecified cataract type  (primary encounter diagnosis)  Comment:  Plan: pt medically cleared for surgery as planned. Pt has been vaccinated for covid.    (Z01.818) Preop general physical exam  Comment:   Plan: proceed with surgery as planned    (E78.5) Hyperlipidemia LDL goal " <130  Comment:   Plan: lipids well controlled on zocor 10mg.    (I48.0) Paroxysmal atrial fibrillation (H)  Comment:   Plan: pt denies recurrent sxs.  Takes asa 81mg and declines other anticoagulation due to risk of bleeding.        Signed Electronically by: Lou King PA-C  Copy of this evaluation report is provided to requesting physician.

## 2022-01-01 NOTE — TELEPHONE ENCOUNTER
Please reach out and schedule appointment as appropriate.    Thank you,    Leny Cordova RN  [facility]     PDA (patent ductus arteriosus)

## 2022-03-01 NOTE — PROGRESS NOTES
History of Present Illness     Reason for visit:  Knee injury,  cpap verification  Symptom onset:  1-2 weeks ago  Symptoms include:  Pain  Symptom intensity:  Severe  Symptom progression:  Improving  Had these symptoms before:  No  What makes it worse:  Sleeping on it  What makes it better:  Tylenol    He eats 2-3 servings of fruits and vegetables daily.He consumes 0 sweetened beverage(s) daily.He exercises with enough effort to increase his heart rate 60 or more minutes per day.  He exercises with enough effort to increase his heart rate 5 days per week.   He is taking medications regularly.

## 2022-03-02 ENCOUNTER — OFFICE VISIT (OUTPATIENT)
Dept: FAMILY MEDICINE | Facility: CLINIC | Age: 77
End: 2022-03-02
Payer: MEDICARE

## 2022-03-02 ENCOUNTER — ANCILLARY PROCEDURE (OUTPATIENT)
Dept: GENERAL RADIOLOGY | Facility: CLINIC | Age: 77
End: 2022-03-02
Attending: PHYSICIAN ASSISTANT
Payer: MEDICARE

## 2022-03-02 VITALS
WEIGHT: 201 LBS | OXYGEN SATURATION: 98 % | RESPIRATION RATE: 20 BRPM | DIASTOLIC BLOOD PRESSURE: 69 MMHG | HEIGHT: 69 IN | BODY MASS INDEX: 29.77 KG/M2 | TEMPERATURE: 97.3 F | SYSTOLIC BLOOD PRESSURE: 138 MMHG | HEART RATE: 49 BPM

## 2022-03-02 DIAGNOSIS — M25.562 ACUTE PAIN OF LEFT KNEE: ICD-10-CM

## 2022-03-02 DIAGNOSIS — G47.33 OSA (OBSTRUCTIVE SLEEP APNEA): ICD-10-CM

## 2022-03-02 DIAGNOSIS — M25.562 ACUTE PAIN OF LEFT KNEE: Primary | ICD-10-CM

## 2022-03-02 PROCEDURE — 99214 OFFICE O/P EST MOD 30 MIN: CPT | Performed by: PHYSICIAN ASSISTANT

## 2022-03-02 PROCEDURE — 73560 X-RAY EXAM OF KNEE 1 OR 2: CPT | Mod: LT | Performed by: INTERNAL MEDICINE

## 2022-03-02 ASSESSMENT — PAIN SCALES - GENERAL: PAINLEVEL: SEVERE PAIN (7)

## 2022-03-02 NOTE — RESULT ENCOUNTER NOTE
Called pt and recd ice 20min tid, neoprene brace, voltaren gel.  Consider PT or cortisone inj if pain worsens or persists.

## 2022-03-02 NOTE — PROGRESS NOTES
HPI: L knee pain x 3 weeks with just turning over in bed  Uses a weighted blanket so not sure if that would have caused some twisting.  He had some leftover narcotic that he took and that helped  Used a cane/crutches for a few days due to the pain  No acute injury  Taking tylenol for the pain  Can't sleep on either side due to pain  Has a limp now.  Not sure if there is any swelling  No hx of knee surgery    CHA: pt uses his CPAP every night and is compliant with this x 17 years  He finds is alleviates daytime sleepiness and prevents his apnea  He needs his new supplies and needs order send to [facility]  Fax # is [phone]      Past Medical History:   Diagnosis Date     Allergic rhinitis, cause unspecified      Cancer (H) 4/2006     Diverticulosis      Double vision 10/17/2011    probable migraine aura     Hypertrophy of prostate without urinary obstruction and other lower urinary tract symptoms (LUTS)      Neoplasm of unspecified nature of bladder 3-06    [place] s/p cystoprostatectomy with ileal neobladder     Other and unspecified hyperlipidemia      Palpitations      Renal stone 5-12     Sinus bradycardia      Sinus bradycardia      Sleep apnea 7-05    C Pap     Unspecified essential hypertension 1988     Unspecified hypothyroidism      Past Surgical History:   Procedure Laterality Date     ABDOMEN SURGERY  4/2006    bladder     BIOPSY  3/2006     BUNIONECTOMY  12-12    LT     BUNIONECTOMY JOSE  11/28/2011    RT-Procedure:BUNIONECTOMY JOSE; Right Foot Dwaine Jose Bunionectomy with Metatarsal and Phalanx Osteotomy with 1st Metatarsal Phalangeal Joint Repair Right Foot; Surgeon:BAKARI ZAIDI; Location:[facility]     CV HEART CATHETERIZATION WITH POSSIBLE INTERVENTION N/A 11/7/2020    Procedure: Heart Catheterization with Possible Intervention;  Surgeon: Rishi Llanes MD;  Location:  HEART CARDIAC CATH LAB     CYSTOSCOPY  3-06    CA bladder     HERNIORRHAPHY INCISIONAL (LOCATION) N/A  "2016    Procedure: HERNIORRHAPHY INCISIONAL (LOCATION);  Surgeon: Onofre Lua MD;  Location: [facility]     SURGICAL HISTORY OF -   5-3-06    neobladder construction- CA     Social History     Tobacco Use     Smoking status: Former Smoker     Packs/day: 1.50     Years: 20.00     Pack years: 30.00     Types: Cigarettes     Start date: 1967     Quit date: 9/10/1987     Years since quittin.4     Smokeless tobacco: Never Used   Substance Use Topics     Alcohol use: Yes     Alcohol/week: 0.0 standard drinks     Comment: Occas wine     Current Outpatient Medications   Medication Sig Dispense Refill     aspirin 81 MG EC tablet Take 1 tablet (81 mg) by mouth daily       COMPOUND (CMPD RX) - PHARMACY TO MIX COMPOUNDED MEDICATION Hydro 2.5% Cr / Ketocon Crm.  Apply to the effected area on face twice daily as needed.       Cyanocobalamin (B-12) 1000 MCG CAPS Take 1,000 mcg by mouth daily       diphenhydrAMINE-acetaminophen (TYLENOL PM)  MG tablet Take 0.5 tablets by mouth nightly as needed for sleep       levothyroxine (SYNTHROID/LEVOTHROID) 150 MCG tablet Take 1 tablet (150 mcg) by mouth daily 90 tablet 3     lisinopril (ZESTRIL) 5 MG tablet Take 1 tablet (5 mg) by mouth At Bedtime 90 tablet 3     Multiple Vitamins-Minerals (MULTIVITAMIN ADULT PO)        olopatadine (PATANOL) 0.1 % ophthalmic solution        order for DME Equipment being ordered: CPAP and supplies. LIFETIME need. 1 each 0     simvastatin (ZOCOR) 10 MG tablet Take 1 tablet (10 mg) by mouth At Bedtime 90 tablet 3     Allergies   Allergen Reactions     Food Other (See Comments)     Uncertain of which food , testing done ~       PHYSICAL EXAM:    /69 (BP Location: Left arm, Patient Position: Sitting, Cuff Size: Adult Regular)   Pulse (!) 49   Temp 97.3  F (36.3  C) (Temporal)   Resp 20   Ht 1.758 m (5' 9.2\")   Wt 91.2 kg (201 lb)   SpO2 98%   BMI 29.51 kg/m      Patient appears non toxic  L knee: Pt able to walk without " a limp  L knee appears a bit swollen; no erythema or warmth  FROM on extension and flexion  With flexion has some medial knee pain    Results for orders placed or performed in visit on 03/02/22   XR Knee Left 1/2 Views     Status: None    Narrative    EXAM: LEFT KNEE ONE TO TWO VIEWS  DATE/TIME: 3/2/2022 2:41 PM    INDICATION: Left knee pain and swelling x3 weeks. Acute pain of left  knee.  COMPARISON: None available.      Impression    IMPRESSION: Advanced patellofemoral compartment left knee  osteoarthritis with moderate medial compartment left knee  osteoarthritis. No acute knee fracture or dislocation. Mild lateral  compartment left knee osteoarthritis. No left knee joint effusion. No  significant left knee soft tissue swelling.      CHARITY BAIN MD         SYSTEM ID:  [id number]     Assessment and Plan:     (M25.562) Acute pain of left knee  (primary encounter diagnosis)  Comment: cont tylenol, knee compression, ice. PT if persists.  Plan: XR Knee Left 1/2 Views            (G47.33) CHA (obstructive sleep apnea)  Comment:   Plan: will fax re authorization for CPAP. He is compliant and this working well.       Lou King PA-C

## 2022-03-18 ENCOUNTER — MEDICAL CORRESPONDENCE (OUTPATIENT)
Dept: HEALTH INFORMATION MANAGEMENT | Facility: CLINIC | Age: 77
End: 2022-03-18
Payer: MEDICARE

## 2022-05-16 ENCOUNTER — TRANSFERRED RECORDS (OUTPATIENT)
Dept: HEALTH INFORMATION MANAGEMENT | Facility: CLINIC | Age: 77
End: 2022-05-16
Payer: MEDICARE

## 2022-06-14 ENCOUNTER — TRANSFERRED RECORDS (OUTPATIENT)
Dept: HEALTH INFORMATION MANAGEMENT | Facility: CLINIC | Age: 77
End: 2022-06-14

## 2022-07-05 ENCOUNTER — OFFICE VISIT (OUTPATIENT)
Dept: FAMILY MEDICINE | Facility: CLINIC | Age: 77
End: 2022-07-05
Payer: MEDICARE

## 2022-07-05 VITALS
DIASTOLIC BLOOD PRESSURE: 66 MMHG | WEIGHT: 199 LBS | BODY MASS INDEX: 29.47 KG/M2 | HEART RATE: 46 BPM | HEIGHT: 69 IN | OXYGEN SATURATION: 100 % | SYSTOLIC BLOOD PRESSURE: 144 MMHG | TEMPERATURE: 97.5 F | RESPIRATION RATE: 16 BRPM

## 2022-07-05 DIAGNOSIS — C67.9 MALIGNANT NEOPLASM OF URINARY BLADDER, UNSPECIFIED SITE (H): Primary | ICD-10-CM

## 2022-07-05 DIAGNOSIS — G47.33 OSA (OBSTRUCTIVE SLEEP APNEA): Chronic | ICD-10-CM

## 2022-07-05 DIAGNOSIS — I48.0 PAROXYSMAL ATRIAL FIBRILLATION (H): ICD-10-CM

## 2022-07-05 DIAGNOSIS — I71.21 ASCENDING AORTIC ANEURYSM (H): ICD-10-CM

## 2022-07-05 DIAGNOSIS — R00.1 SINUS BRADYCARDIA: Chronic | ICD-10-CM

## 2022-07-05 DIAGNOSIS — G89.29 CHRONIC PAIN OF LEFT KNEE: ICD-10-CM

## 2022-07-05 DIAGNOSIS — E78.5 HYPERLIPIDEMIA LDL GOAL <130: Chronic | ICD-10-CM

## 2022-07-05 DIAGNOSIS — I10 ESSENTIAL HYPERTENSION: Chronic | ICD-10-CM

## 2022-07-05 DIAGNOSIS — M25.562 CHRONIC PAIN OF LEFT KNEE: ICD-10-CM

## 2022-07-05 DIAGNOSIS — E03.9 HYPOTHYROIDISM, UNSPECIFIED TYPE: Chronic | ICD-10-CM

## 2022-07-05 PROBLEM — M79.651 PAIN OF RIGHT THIGH: Status: RESOLVED | Noted: 2021-02-23 | Resolved: 2022-07-05

## 2022-07-05 PROCEDURE — 99213 OFFICE O/P EST LOW 20 MIN: CPT | Performed by: INTERNAL MEDICINE

## 2022-07-05 NOTE — PROGRESS NOTES
"  Assessment & Plan     Malignant neoplasm of urinary bladder, unspecified site (H)  Follows with [facility] initially.   He gets urine cytology every year for screening.     Ascending aortic aneurysm (H)  Did not address this visit.     Paroxysmal atrial fibrillation (H)  One episode. Not on any medication for this.    CHA (obstructive sleep apnea)  Stable. Compliant with cpap.     Hypothyroidism, unspecified type  Stable. Cont meds.    Hyperlipidemia LDL goal <130  Stable. Cont meds    Essential hypertension  Stable. Elevated in clinic today. Continue meds. Discussed with the patient to check BP daily and send readings in 2 weeks.     Sinus bradycardia  Stable.     Chronic pain of left knee  Sees Ortho.     BMI:   Estimated body mass index is 29.22 kg/m  as calculated from the following:    Height as of this encounter: 1.758 m (5' 9.2\").    Weight as of this encounter: 90.3 kg (199 lb).   Weight management plan: Discussed healthy diet and exercise guidelines    See Patient Instructions    Return in about 4 weeks (around 8/2/2022) for Follow up, Routine preventive, with me.    KYLEIGH YOUNG MD  [facility] VIOLETA Harris is a 76 year old, presenting for the following health issues:  Establish Care    Bladder cancer diagnosed 2006 - under treatment with [facility].   Left knee pain - cortisone shot did not help, another shot didn't help. Has a brace on. Sees an orthopedic [facility].  Former smoker, quit in 1987.  Uses CPAP for sleep apnea every night, its working well for him.   No other concerns.   BP elevated in the clinic. Recheck is 144/66.     History of Present Illness       Reason for visit:  Establish care---lost old provider    He eats 0-1 servings of fruits and vegetables daily.He consumes 0 sweetened beverage(s) daily.He exercises with enough effort to increase his heart rate 10 to 19 minutes per day.  He exercises with enough effort to increase his heart rate 7 days per week.   He is taking " medications regularly.     Review of Systems       Objective    There were no vitals taken for this visit.  There is no height or weight on file to calculate BMI.  Physical Exam               .  ..

## 2022-07-05 NOTE — PATIENT INSTRUCTIONS
Purchase a home blood pressure cuff that  checks your blood pressure on your arm not  your wrist.     [employer] is a good brand. You can  check if the cuff you purchased is valid by going  to validateBP.org    Take your blood pressure after 5 minutes of rest  in the AM and PM for one week and record the  readings (14 total readings).    Send me a [employer] message with those  readings upon doing so.

## 2022-07-18 ENCOUNTER — TRANSFERRED RECORDS (OUTPATIENT)
Dept: FAMILY MEDICINE | Facility: CLINIC | Age: 77
End: 2022-07-18

## 2022-08-03 ENCOUNTER — APPOINTMENT (OUTPATIENT)
Dept: URBAN - METROPOLITAN AREA CLINIC 256 | Age: 77
Setting detail: DERMATOLOGY
End: 2022-08-03

## 2022-08-03 DIAGNOSIS — L82.1 OTHER SEBORRHEIC KERATOSIS: ICD-10-CM

## 2022-08-03 DIAGNOSIS — Z71.89 OTHER SPECIFIED COUNSELING: ICD-10-CM

## 2022-08-03 DIAGNOSIS — L72.8 OTHER FOLLICULAR CYSTS OF THE SKIN AND SUBCUTANEOUS TISSUE: ICD-10-CM

## 2022-08-03 DIAGNOSIS — D22 MELANOCYTIC NEVI: ICD-10-CM

## 2022-08-03 DIAGNOSIS — Z85.828 PERSONAL HISTORY OF OTHER MALIGNANT NEOPLASM OF SKIN: ICD-10-CM

## 2022-08-03 DIAGNOSIS — L57.8 OTHER SKIN CHANGES DUE TO CHRONIC EXPOSURE TO NONIONIZING RADIATION: ICD-10-CM

## 2022-08-03 DIAGNOSIS — D18.0 HEMANGIOMA: ICD-10-CM

## 2022-08-03 DIAGNOSIS — L57.0 ACTINIC KERATOSIS: ICD-10-CM

## 2022-08-03 PROBLEM — D22.5 MELANOCYTIC NEVI OF TRUNK: Status: ACTIVE | Noted: 2022-08-03

## 2022-08-03 PROBLEM — D18.01 HEMANGIOMA OF SKIN AND SUBCUTANEOUS TISSUE: Status: ACTIVE | Noted: 2022-08-03

## 2022-08-03 PROCEDURE — 17000 DESTRUCT PREMALG LESION: CPT

## 2022-08-03 PROCEDURE — OTHER COUNSELING: OTHER

## 2022-08-03 PROCEDURE — OTHER MIPS QUALITY: OTHER

## 2022-08-03 PROCEDURE — 99213 OFFICE O/P EST LOW 20 MIN: CPT | Mod: 25

## 2022-08-03 PROCEDURE — 17003 DESTRUCT PREMALG LES 2-14: CPT

## 2022-08-03 PROCEDURE — OTHER LIQUID NITROGEN: OTHER

## 2022-08-03 ASSESSMENT — LOCATION DETAILED DESCRIPTION DERM
LOCATION DETAILED: LEFT MEDIAL UPPER BACK
LOCATION DETAILED: LEFT SUPERIOR LATERAL LOWER BACK
LOCATION DETAILED: RIGHT SUPERIOR CENTRAL MALAR CHEEK
LOCATION DETAILED: LEFT INFERIOR UPPER BACK
LOCATION DETAILED: RIGHT MEDIAL FRONTAL SCALP
LOCATION DETAILED: LEFT CENTRAL MALAR CHEEK
LOCATION DETAILED: RIGHT SUPERIOR LATERAL MALAR CHEEK
LOCATION DETAILED: LEFT SUPERIOR UPPER BACK
LOCATION DETAILED: LEFT INFERIOR POSTERIOR NECK
LOCATION DETAILED: RIGHT SUPERIOR PARIETAL SCALP
LOCATION DETAILED: LEFT MEDIAL FOREHEAD
LOCATION DETAILED: LEFT CENTRAL FRONTAL SCALP
LOCATION DETAILED: LEFT SUPERIOR MEDIAL MIDBACK
LOCATION DETAILED: RIGHT INFERIOR LATERAL MALAR CHEEK
LOCATION DETAILED: LEFT INFERIOR CENTRAL MALAR CHEEK
LOCATION DETAILED: RIGHT SUPERIOR MEDIAL FOREHEAD

## 2022-08-03 ASSESSMENT — LOCATION ZONE DERM
LOCATION ZONE: TRUNK
LOCATION ZONE: FACE
LOCATION ZONE: NECK
LOCATION ZONE: SCALP

## 2022-08-03 ASSESSMENT — LOCATION SIMPLE DESCRIPTION DERM
LOCATION SIMPLE: LEFT SCALP
LOCATION SIMPLE: RIGHT FOREHEAD
LOCATION SIMPLE: LEFT CHEEK
LOCATION SIMPLE: POSTERIOR NECK
LOCATION SIMPLE: LEFT UPPER BACK
LOCATION SIMPLE: RIGHT SCALP
LOCATION SIMPLE: LEFT FOREHEAD
LOCATION SIMPLE: RIGHT CHEEK
LOCATION SIMPLE: LEFT LOWER BACK
LOCATION SIMPLE: SCALP

## 2022-08-03 NOTE — PROCEDURE: MIPS QUALITY
Detail Level: Detailed
Quality 431: Preventive Care And Screening: Unhealthy Alcohol Use - Screening: Patient not identified as an unhealthy alcohol user when screened for unhealthy alcohol use using a systematic screening method
Quality 111:Pneumonia Vaccination Status For Older Adults: Pneumococcal vaccine administered on or after patient’s 60th birthday and before the end of the measurement period
Quality 110: Preventive Care And Screening: Influenza Immunization: Influenza Immunization Administered during Influenza season
Quality 226: Preventive Care And Screening: Tobacco Use: Screening And Cessation Intervention: Patient screened for tobacco use and is an ex/non-smoker

## 2022-08-03 NOTE — PROCEDURE: LIQUID NITROGEN
Post-Care Instructions: I reviewed with the patient in detail post-care instructions. Patient is to wear sunprotection, and avoid picking at any of the treated lesions. Pt may apply Vaseline to crusted or scabbing areas.
Detail Level: Detailed
Show Aperture Variable?: Yes
Duration Of Freeze Thaw-Cycle (Seconds): 5
Render Post-Care Instructions In Note?: no
Number Of Freeze-Thaw Cycles: 1 freeze-thaw cycle
Consent: The patient's consent was obtained including but not limited to risks of crusting, scabbing, blistering, scarring, darker or lighter pigmentary change, recurrence, incomplete removal and infection.
Application Tool (Optional): Liquid Nitrogen Sprayer

## 2022-08-09 ENCOUNTER — TRANSFERRED RECORDS (OUTPATIENT)
Dept: HEALTH INFORMATION MANAGEMENT | Facility: CLINIC | Age: 77
End: 2022-08-09

## 2022-08-10 ENCOUNTER — OFFICE VISIT (OUTPATIENT)
Dept: FAMILY MEDICINE | Facility: CLINIC | Age: 77
End: 2022-08-10
Payer: MEDICARE

## 2022-08-10 VITALS
SYSTOLIC BLOOD PRESSURE: 138 MMHG | HEIGHT: 69 IN | OXYGEN SATURATION: 97 % | DIASTOLIC BLOOD PRESSURE: 68 MMHG | HEART RATE: 51 BPM | TEMPERATURE: 97.1 F | WEIGHT: 200.8 LBS | RESPIRATION RATE: 16 BRPM | BODY MASS INDEX: 29.74 KG/M2

## 2022-08-10 DIAGNOSIS — I10 ESSENTIAL HYPERTENSION: Chronic | ICD-10-CM

## 2022-08-10 DIAGNOSIS — D64.9 ANEMIA, UNSPECIFIED TYPE: ICD-10-CM

## 2022-08-10 DIAGNOSIS — I48.0 PAROXYSMAL ATRIAL FIBRILLATION (H): ICD-10-CM

## 2022-08-10 DIAGNOSIS — R73.9 ELEVATED BLOOD SUGAR: ICD-10-CM

## 2022-08-10 DIAGNOSIS — Z01.818 PREOPERATIVE EXAMINATION: Primary | ICD-10-CM

## 2022-08-10 DIAGNOSIS — E03.9 HYPOTHYROIDISM, UNSPECIFIED TYPE: Chronic | ICD-10-CM

## 2022-08-10 DIAGNOSIS — Z00.00 MEDICARE ANNUAL WELLNESS VISIT, SUBSEQUENT: ICD-10-CM

## 2022-08-10 DIAGNOSIS — G47.33 OSA (OBSTRUCTIVE SLEEP APNEA): Chronic | ICD-10-CM

## 2022-08-10 DIAGNOSIS — E78.5 HYPERLIPIDEMIA LDL GOAL <100: ICD-10-CM

## 2022-08-10 DIAGNOSIS — I71.21 ASCENDING AORTIC ANEURYSM (H): ICD-10-CM

## 2022-08-10 DIAGNOSIS — C67.9 MALIGNANT NEOPLASM OF URINARY BLADDER, UNSPECIFIED SITE (H): Chronic | ICD-10-CM

## 2022-08-10 LAB
ERYTHROCYTE [DISTWIDTH] IN BLOOD BY AUTOMATED COUNT: 13 % (ref 10–15)
HBA1C MFR BLD: 5 % (ref 0–5.6)
HCT VFR BLD AUTO: 39.1 % (ref 40–53)
HGB BLD-MCNC: 13.2 G/DL (ref 13.3–17.7)
MCH RBC QN AUTO: 34.5 PG (ref 26.5–33)
MCHC RBC AUTO-ENTMCNC: 33.8 G/DL (ref 31.5–36.5)
MCV RBC AUTO: 102 FL (ref 78–100)
PLATELET # BLD AUTO: 245 10E3/UL (ref 150–450)
RBC # BLD AUTO: 3.83 10E6/UL (ref 4.4–5.9)
WBC # BLD AUTO: 5.9 10E3/UL (ref 4–11)

## 2022-08-10 PROCEDURE — G0439 PPPS, SUBSEQ VISIT: HCPCS | Performed by: INTERNAL MEDICINE

## 2022-08-10 PROCEDURE — 99214 OFFICE O/P EST MOD 30 MIN: CPT | Mod: 25 | Performed by: INTERNAL MEDICINE

## 2022-08-10 PROCEDURE — 82728 ASSAY OF FERRITIN: CPT | Performed by: INTERNAL MEDICINE

## 2022-08-10 PROCEDURE — 83550 IRON BINDING TEST: CPT | Performed by: INTERNAL MEDICINE

## 2022-08-10 PROCEDURE — 83036 HEMOGLOBIN GLYCOSYLATED A1C: CPT | Performed by: INTERNAL MEDICINE

## 2022-08-10 PROCEDURE — 93000 ELECTROCARDIOGRAM COMPLETE: CPT | Performed by: INTERNAL MEDICINE

## 2022-08-10 PROCEDURE — 85027 COMPLETE CBC AUTOMATED: CPT | Performed by: INTERNAL MEDICINE

## 2022-08-10 PROCEDURE — 80053 COMPREHEN METABOLIC PANEL: CPT | Performed by: INTERNAL MEDICINE

## 2022-08-10 PROCEDURE — 84443 ASSAY THYROID STIM HORMONE: CPT | Performed by: INTERNAL MEDICINE

## 2022-08-10 PROCEDURE — 80061 LIPID PANEL: CPT | Performed by: INTERNAL MEDICINE

## 2022-08-10 PROCEDURE — 36415 COLL VENOUS BLD VENIPUNCTURE: CPT | Performed by: INTERNAL MEDICINE

## 2022-08-10 ASSESSMENT — ENCOUNTER SYMPTOMS
FATIGUE: 0
DIZZINESS: 0
CHILLS: 0
CHEST TIGHTNESS: 0
LIGHT-HEADEDNESS: 0
PALPITATIONS: 0
FEVER: 0
SHORTNESS OF BREATH: 0

## 2022-08-10 NOTE — H&P (VIEW-ONLY)
[address], [address]  [facility] [address]  Phone: [phone]  Primary Provider: Zoë Finney  Pre-op Performing Provider: ZOË FINNEY      PREOPERATIVE EVALUATION:  Today's date: 8/10/2022    Mendez Greene is a 76 year old male who presents for a preoperative evaluation.    Surgical Information:  Surgery/Procedure: Left total knee arthoplasty  Surgery Location:  OR  Surgeon: Dr. Dial  Surgery Date: 08/23/22  Time of Surgery: 1030 am  Where patient plans to recover: At home with family  Fax number for surgical facility: Note does not need to be faxed, will be available electronically in Epic.    Type of Anesthesia Anticipated: Spinal    Assessment & Plan     The proposed surgical procedure is considered INTERMEDIATE risk.    Preoperative examination  Medically optimized for the procedure.   Continue Aspirin.   - CBC with Platelets; Future  - COMPREHENSIVE METABOLIC PANEL; Future  - EKG 12-lead complete w/read - Clinics    Hypothyroidism, unspecified type  - TSH WITH FREE T4 REFLEX; Future    Essential hypertension  Stable. Cont medications    Paroxysmal atrial fibrillation (H)  Patient has declined AC in the past. He is on daily ASA 81 mg and I recommend to continue this.     Malignant neoplasm of urinary bladder, unspecified site (H)  Stable.     CHA (obstructive sleep apnea)  Uses CPAP.    Ascending aortic aneurysm (H)  Was seen by cardiology and they plan to follow up with another echo in one year (coming up in nov 2022)    Elevated blood sugar  - Hemoglobin A1c; Future    Hyperlipidemia LDL goal <100  - Lipid Profile; Future    Medicare annual wellness visit, subsequent  Up to date with screening tests and vaccinations.    Risks and Recommendations:  The patient has the following additional risks and recommendations for perioperative complications:   - No identified additional risk factors other than previously addressed    Medication Instructions:  Patient  is to take all scheduled medications on the day of surgery    RECOMMENDATION:  APPROVAL GIVEN to proceed with proposed procedure, without further diagnostic evaluation.      Subjective     HPI related to upcoming procedure:   Gurjit is a very pleasant 76 year old gentleman who presented to the clinic for preop exam and wellness exam.  He has Afib (paroxysmal) and has declined AC, he only daily a daily ASA 81 mg.   He has sinus bradycardia which is stable.  He has CHA and uses CPAP.  He has hx of Aortic insufficiency with previous echo showing progressing aortic root diameter, he was seen by cardiology and MRI was done which showed mildly dilated aortic root and ascending aorta. There is moderate eccentric AI. Cardiology recommended follow up in year.   EKG done in clinic today shows sinus fam which is stable. No other abnormality.  Patient denies chest pain, shortness of breath, palpitations, headache, lightheadedness, syncope, fever or chills. Patient is able to climb 1 flight of stairs without feeling short of breath or having chest pain.  Patient denies personal or family history of complications with anesthesia. Patient does not have a history of heart failure or TIA/stroke. Patient does not smoke or drink alcohol.    Preop Questions 8/3/2022   1. Have you ever had a heart attack or stroke? No   2. Have you ever had surgery on your heart or blood vessels, such as a stent placement, a coronary artery bypass, or surgery on an artery in your head, neck, heart, or legs? No   3. Do you have chest pain with activity? No   4. Do you have a history of  heart failure? No   5. Do you currently have a cold, bronchitis or symptoms of other infection? No   6. Do you have a cough, shortness of breath, or wheezing? No   7. Do you or anyone in your family have previous history of blood clots? No   8. Do you or does anyone in your family have a serious bleeding problem such as prolonged bleeding following surgeries or cuts? No    9. Have you ever had problems with anemia or been told to take iron pills? No   10. Have you had any abnormal blood loss such as black, tarry or bloody stools? No   11. Have you ever had a blood transfusion? No   12. Are you willing to have a blood transfusion if it is medically needed before, during, or after your surgery? Yes   13. Have you or any of your relatives ever had problems with anesthesia? No   14. Do you have sleep apnea, excessive snoring or daytime drowsiness? YES - CPAP   14a. Do you have a CPAP machine? Yes   15. Do you have any artifical heart valves or otherimplanted medical devices like a pacemaker, defibrillator, or continuous glucose monitor? No   16. Do you have artificial joints? No   17. Are you allergic to latex? No     Health Care Directive:  Patient has a Health Care Directive on file    Preoperative Review of :   reviewed - no record of controlled substances prescribed.     Review of Systems   Constitutional: Negative for chills, fatigue and fever.   Respiratory: Negative for chest tightness and shortness of breath.    Cardiovascular: Negative for chest pain and palpitations.   Neurological: Negative for dizziness, syncope and light-headedness.       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 Provider to review       Hypothyroidism 12/23/2003     Priority: High     Problem list name updated by automated process. Provider to review       Ascending aortic aneurysm (H) 07/05/2022     Priority: Medium     Chronic pain of left knee 07/05/2022     Priority:  Medium     Paroxysmal atrial fibrillation (H) 05/04/2021     Priority: Medium     Rapid heart rate 11/06/2020     Priority: Medium     NSTEMI (non-ST elevated myocardial infarction) (H) 11/06/2020     Priority: Medium     CHA (obstructive sleep apnea) 12/05/2017     Priority: Medium     Sinus bradycardia 07/19/2016     Priority: Medium     Low vitamin B12 level 07/19/2016     Priority: Medium     Erectile dysfunction 07/19/2005     Priority: Medium     Hypertrophy of prostate with urinary obstruction 12/06/2004     Priority: Medium     Problem list name updated by automated process. Provider to review       Allergic rhinitis 12/23/2003     Priority: Medium     Problem list name updated by automated process. Provider to review        Past Medical History:   Diagnosis Date     Allergic rhinitis, cause unspecified      Arthritis 5/2022     Cancer (H) 04/2006     Diverticulosis      Double vision 10/17/2011    probable migraine aura     Hypertrophy of prostate without urinary obstruction and other lower urinary tract symptoms (LUTS)      Neoplasm of unspecified nature of bladder 03/2006    [place] s/p cystoprostatectomy with ileal neobladder     Other and unspecified hyperlipidemia      Palpitations      Renal stone 05/2012     Sinus bradycardia      Sinus bradycardia      Sleep apnea 07/2005    C Pap     Unspecified essential hypertension 1988     Unspecified hypothyroidism      Past Surgical History:   Procedure Laterality Date     ABDOMEN SURGERY  4/2006    bladder     BIOPSY  3/2006     BUNIONECTOMY  12-12    LT     BUNIONECTOMY DIANA  11/28/2011    RT-Procedure:BUNIONECTOMY DIANA; Right Foot Dwaine [place] Bunionectomy with Metatarsal and Phalanx Osteotomy with 1st Metatarsal Phalangeal Joint Repair Right Foot; Surgeon:BAKARI ZAIDI; Location:[facility]     CV HEART CATHETERIZATION WITH POSSIBLE INTERVENTION N/A 11/7/2020    Procedure: Heart Catheterization with Possible Intervention;  Surgeon: Rishi Llanes  MD;  Location:  HEART CARDIAC CATH LAB     CYSTOSCOPY  3-06    CA bladder     HERNIORRHAPHY INCISIONAL (LOCATION) N/A 2016    Procedure: HERNIORRHAPHY INCISIONAL (LOCATION);  Surgeon: Onofre Lua MD;  Location: [facility]     SURGICAL HISTORY OF -   5-3-06    neobladder construction- CA     Current Outpatient Medications   Medication Sig Dispense Refill     aspirin 81 MG EC tablet Take 1 tablet (81 mg) by mouth daily       COMPOUND (CMPD RX) - PHARMACY TO MIX COMPOUNDED MEDICATION Hydro 2.5% Cr / Ketocon Crm.  Apply to the effected area on face twice daily as needed.       Cyanocobalamin (B-12) 1000 MCG CAPS Take 1,000 mcg by mouth daily       diphenhydrAMINE-acetaminophen (TYLENOL PM)  MG tablet Take 0.5 tablets by mouth nightly as needed for sleep       levothyroxine (SYNTHROID/LEVOTHROID) 150 MCG tablet Take 1 tablet (150 mcg) by mouth daily 90 tablet 3     lisinopril (ZESTRIL) 5 MG tablet Take 1 tablet (5 mg) by mouth At Bedtime 90 tablet 3     Multiple Vitamins-Minerals (MULTIVITAMIN ADULT PO)        olopatadine (PATANOL) 0.1 % ophthalmic solution        order for DME Equipment being ordered: CPAP and supplies. LIFETIME need. 1 each 0     simvastatin (ZOCOR) 10 MG tablet Take 1 tablet (10 mg) by mouth At Bedtime 90 tablet 3       Allergies   Allergen Reactions     Food Other (See Comments)     Uncertain of which food , testing done ~        Social History     Tobacco Use     Smoking status: Former Smoker     Packs/day: 1.50     Years: 20.00     Pack years: 30.00     Types: Cigarettes     Start date: 1967     Quit date: 9/10/1987     Years since quittin.9     Smokeless tobacco: Never Used   Substance Use Topics     Alcohol use: Yes     Comment: Occas wine     History   Drug Use No         Objective     There were no vitals taken for this visit.    Physical Exam  Vitals reviewed.   Constitutional:       Appearance: Normal appearance.   HENT:      Mouth/Throat:      Mouth: Mucous  membranes are moist.      Pharynx: Oropharynx is clear. No oropharyngeal exudate or posterior oropharyngeal erythema.   Cardiovascular:      Rate and Rhythm: Normal rate and regular rhythm.      Heart sounds: Normal heart sounds. No murmur heard.    No gallop.   Pulmonary:      Effort: Pulmonary effort is normal. No respiratory distress.      Breath sounds: Normal breath sounds. No stridor. No wheezing, rhonchi or rales.   Neurological:      Mental Status: He is alert.       Recent Labs   Lab Test 08/10/21  1052 11/07/20  0629 11/06/20  2020 11/06/20  1644   HGB 14.3  --  14.7 14.9     --  261 253   * 137  --  132*   POTASSIUM 4.6 4.2  --  4.0   CR 0.97 0.78  --  0.77   A1C  --   --   --  5.1     Diagnostics:  Labs pending at this time.  Results will be reviewed when available.   EKG: appears normal, NSR, sinus bradycardia, normal axis, normal intervals, no acute ST/T changes c/w ischemia, no LVH by voltage criteria, unchanged from previous tracings    Revised Cardiac Risk Index (RCRI):  The patient has the following serious cardiovascular risks for perioperative complications:   - No serious cardiac risks = 0 points     RCRI Interpretation: 0 points: Class I (very low risk - 0.4% complication rate)       Signed Electronically by: KYLEIGH YOUNG MD  Copy of this evaluation report is provided to requesting physician.

## 2022-08-10 NOTE — PATIENT INSTRUCTIONS
Avoid nonsteroidal anti-inflammatory pain medication like ibuprofen, Motrin, or Aleve 7 days before the surgery.  Tylenol can be used for pain.  Avoid any over the counter multivitamins or herbal supplement 7 days before surgery   You can resume these medications after surgery

## 2022-08-10 NOTE — PROGRESS NOTES
[address], [address]  [facility] [address]  Phone: [phone]  Primary Provider: Zoë Finney  Pre-op Performing Provider: ZOË FINNEY      PREOPERATIVE EVALUATION:  Today's date: 8/10/2022    Mendez Greene is a 76 year old male who presents for a preoperative evaluation.    Surgical Information:  Surgery/Procedure: Left total knee arthoplasty  Surgery Location:  OR  Surgeon: Dr. Dial  Surgery Date: 08/23/22  Time of Surgery: 1030 am  Where patient plans to recover: At home with family  Fax number for surgical facility: Note does not need to be faxed, will be available electronically in Epic.    Type of Anesthesia Anticipated: Spinal    Assessment & Plan     The proposed surgical procedure is considered INTERMEDIATE risk.    Preoperative examination  Medically optimized for the procedure.   Continue Aspirin.   - CBC with Platelets; Future  - COMPREHENSIVE METABOLIC PANEL; Future  - EKG 12-lead complete w/read - Clinics    Hypothyroidism, unspecified type  - TSH WITH FREE T4 REFLEX; Future    Essential hypertension  Stable. Cont medications    Paroxysmal atrial fibrillation (H)  Patient has declined AC in the past. He is on daily ASA 81 mg and I recommend to continue this.     Malignant neoplasm of urinary bladder, unspecified site (H)  Stable.     CHA (obstructive sleep apnea)  Uses CPAP.    Ascending aortic aneurysm (H)  Was seen by cardiology and they plan to follow up with another echo in one year (coming up in nov 2022)    Elevated blood sugar  - Hemoglobin A1c; Future    Hyperlipidemia LDL goal <100  - Lipid Profile; Future    Medicare annual wellness visit, subsequent  Up to date with screening tests and vaccinations.    Risks and Recommendations:  The patient has the following additional risks and recommendations for perioperative complications:   - No identified additional risk factors other than previously addressed    Medication Instructions:  Patient  is to take all scheduled medications on the day of surgery    RECOMMENDATION:  APPROVAL GIVEN to proceed with proposed procedure, without further diagnostic evaluation.      Subjective     HPI related to upcoming procedure:   Gurjit is a very pleasant 76 year old gentleman who presented to the clinic for preop exam and wellness exam.  He has Afib (paroxysmal) and has declined AC, he only daily a daily ASA 81 mg.   He has sinus bradycardia which is stable.  He has CHA and uses CPAP.  He has hx of Aortic insufficiency with previous echo showing progressing aortic root diameter, he was seen by cardiology and MRI was done which showed mildly dilated aortic root and ascending aorta. There is moderate eccentric AI. Cardiology recommended follow up in year.   EKG done in clinic today shows sinus fam which is stable. No other abnormality.  Patient denies chest pain, shortness of breath, palpitations, headache, lightheadedness, syncope, fever or chills. Patient is able to climb 1 flight of stairs without feeling short of breath or having chest pain.  Patient denies personal or family history of complications with anesthesia. Patient does not have a history of heart failure or TIA/stroke. Patient does not smoke or drink alcohol.    Preop Questions 8/3/2022   1. Have you ever had a heart attack or stroke? No   2. Have you ever had surgery on your heart or blood vessels, such as a stent placement, a coronary artery bypass, or surgery on an artery in your head, neck, heart, or legs? No   3. Do you have chest pain with activity? No   4. Do you have a history of  heart failure? No   5. Do you currently have a cold, bronchitis or symptoms of other infection? No   6. Do you have a cough, shortness of breath, or wheezing? No   7. Do you or anyone in your family have previous history of blood clots? No   8. Do you or does anyone in your family have a serious bleeding problem such as prolonged bleeding following surgeries or cuts? No    9. Have you ever had problems with anemia or been told to take iron pills? No   10. Have you had any abnormal blood loss such as black, tarry or bloody stools? No   11. Have you ever had a blood transfusion? No   12. Are you willing to have a blood transfusion if it is medically needed before, during, or after your surgery? Yes   13. Have you or any of your relatives ever had problems with anesthesia? No   14. Do you have sleep apnea, excessive snoring or daytime drowsiness? YES - CPAP   14a. Do you have a CPAP machine? Yes   15. Do you have any artifical heart valves or otherimplanted medical devices like a pacemaker, defibrillator, or continuous glucose monitor? No   16. Do you have artificial joints? No   17. Are you allergic to latex? No     Health Care Directive:  Patient has a Health Care Directive on file    Preoperative Review of :   reviewed - no record of controlled substances prescribed.     Review of Systems   Constitutional: Negative for chills, fatigue and fever.   Respiratory: Negative for chest tightness and shortness of breath.    Cardiovascular: Negative for chest pain and palpitations.   Neurological: Negative for dizziness, syncope and light-headedness.       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 Provider to review       Hypothyroidism 12/23/2003     Priority: High     Problem list name updated by automated process. Provider to review       Ascending aortic aneurysm (H) 07/05/2022     Priority: Medium     Chronic pain of left knee 07/05/2022     Priority:  Medium     Paroxysmal atrial fibrillation (H) 05/04/2021     Priority: Medium     Rapid heart rate 11/06/2020     Priority: Medium     NSTEMI (non-ST elevated myocardial infarction) (H) 11/06/2020     Priority: Medium     CHA (obstructive sleep apnea) 12/05/2017     Priority: Medium     Sinus bradycardia 07/19/2016     Priority: Medium     Low vitamin B12 level 07/19/2016     Priority: Medium     Erectile dysfunction 07/19/2005     Priority: Medium     Hypertrophy of prostate with urinary obstruction 12/06/2004     Priority: Medium     Problem list name updated by automated process. Provider to review       Allergic rhinitis 12/23/2003     Priority: Medium     Problem list name updated by automated process. Provider to review        Past Medical History:   Diagnosis Date     Allergic rhinitis, cause unspecified      Arthritis 5/2022     Cancer (H) 04/2006     Diverticulosis      Double vision 10/17/2011    probable migraine aura     Hypertrophy of prostate without urinary obstruction and other lower urinary tract symptoms (LUTS)      Neoplasm of unspecified nature of bladder 03/2006    [place] s/p cystoprostatectomy with ileal neobladder     Other and unspecified hyperlipidemia      Palpitations      Renal stone 05/2012     Sinus bradycardia      Sinus bradycardia      Sleep apnea 07/2005    C Pap     Unspecified essential hypertension 1988     Unspecified hypothyroidism      Past Surgical History:   Procedure Laterality Date     ABDOMEN SURGERY  4/2006    bladder     BIOPSY  3/2006     BUNIONECTOMY  12-12    LT     BUNIONECTOMY DIANA  11/28/2011    RT-Procedure:BUNIONECTOMY DIANA; Right Foot Dwaine [place] Bunionectomy with Metatarsal and Phalanx Osteotomy with 1st Metatarsal Phalangeal Joint Repair Right Foot; Surgeon:BAKARI ZAIDI; Location:[facility]     CV HEART CATHETERIZATION WITH POSSIBLE INTERVENTION N/A 11/7/2020    Procedure: Heart Catheterization with Possible Intervention;  Surgeon: Rishi Llanes  MD;  Location:  HEART CARDIAC CATH LAB     CYSTOSCOPY  3-06    CA bladder     HERNIORRHAPHY INCISIONAL (LOCATION) N/A 2016    Procedure: HERNIORRHAPHY INCISIONAL (LOCATION);  Surgeon: Onofre Lua MD;  Location: [facility]     SURGICAL HISTORY OF -   5-3-06    neobladder construction- CA     Current Outpatient Medications   Medication Sig Dispense Refill     aspirin 81 MG EC tablet Take 1 tablet (81 mg) by mouth daily       COMPOUND (CMPD RX) - PHARMACY TO MIX COMPOUNDED MEDICATION Hydro 2.5% Cr / Ketocon Crm.  Apply to the effected area on face twice daily as needed.       Cyanocobalamin (B-12) 1000 MCG CAPS Take 1,000 mcg by mouth daily       diphenhydrAMINE-acetaminophen (TYLENOL PM)  MG tablet Take 0.5 tablets by mouth nightly as needed for sleep       levothyroxine (SYNTHROID/LEVOTHROID) 150 MCG tablet Take 1 tablet (150 mcg) by mouth daily 90 tablet 3     lisinopril (ZESTRIL) 5 MG tablet Take 1 tablet (5 mg) by mouth At Bedtime 90 tablet 3     Multiple Vitamins-Minerals (MULTIVITAMIN ADULT PO)        olopatadine (PATANOL) 0.1 % ophthalmic solution        order for DME Equipment being ordered: CPAP and supplies. LIFETIME need. 1 each 0     simvastatin (ZOCOR) 10 MG tablet Take 1 tablet (10 mg) by mouth At Bedtime 90 tablet 3       Allergies   Allergen Reactions     Food Other (See Comments)     Uncertain of which food , testing done ~        Social History     Tobacco Use     Smoking status: Former Smoker     Packs/day: 1.50     Years: 20.00     Pack years: 30.00     Types: Cigarettes     Start date: 1967     Quit date: 9/10/1987     Years since quittin.9     Smokeless tobacco: Never Used   Substance Use Topics     Alcohol use: Yes     Comment: Occas wine     History   Drug Use No         Objective     There were no vitals taken for this visit.    Physical Exam  Vitals reviewed.   Constitutional:       Appearance: Normal appearance.   HENT:      Mouth/Throat:      Mouth: Mucous  membranes are moist.      Pharynx: Oropharynx is clear. No oropharyngeal exudate or posterior oropharyngeal erythema.   Cardiovascular:      Rate and Rhythm: Normal rate and regular rhythm.      Heart sounds: Normal heart sounds. No murmur heard.    No gallop.   Pulmonary:      Effort: Pulmonary effort is normal. No respiratory distress.      Breath sounds: Normal breath sounds. No stridor. No wheezing, rhonchi or rales.   Neurological:      Mental Status: He is alert.       Recent Labs   Lab Test 08/10/21  1052 11/07/20  0629 11/06/20  2020 11/06/20  1644   HGB 14.3  --  14.7 14.9     --  261 253   * 137  --  132*   POTASSIUM 4.6 4.2  --  4.0   CR 0.97 0.78  --  0.77   A1C  --   --   --  5.1     Diagnostics:  Labs pending at this time.  Results will be reviewed when available.   EKG: appears normal, NSR, sinus bradycardia, normal axis, normal intervals, no acute ST/T changes c/w ischemia, no LVH by voltage criteria, unchanged from previous tracings    Revised Cardiac Risk Index (RCRI):  The patient has the following serious cardiovascular risks for perioperative complications:   - No serious cardiac risks = 0 points     RCRI Interpretation: 0 points: Class I (very low risk - 0.4% complication rate)       Signed Electronically by: KYLEIGH YOUNG MD  Copy of this evaluation report is provided to requesting physician.

## 2022-08-11 ENCOUNTER — TELEPHONE (OUTPATIENT)
Dept: FAMILY MEDICINE | Facility: CLINIC | Age: 77
End: 2022-08-11

## 2022-08-11 DIAGNOSIS — D64.9 ANEMIA, UNSPECIFIED TYPE: Primary | ICD-10-CM

## 2022-08-11 LAB
ALBUMIN SERPL-MCNC: 3.9 G/DL (ref 3.4–5)
ALP SERPL-CCNC: 50 U/L (ref 40–150)
ALT SERPL W P-5'-P-CCNC: 22 U/L (ref 0–70)
ANION GAP SERPL CALCULATED.3IONS-SCNC: 8 MMOL/L (ref 3–14)
AST SERPL W P-5'-P-CCNC: 24 U/L (ref 0–45)
BILIRUB SERPL-MCNC: 0.7 MG/DL (ref 0.2–1.3)
BUN SERPL-MCNC: 13 MG/DL (ref 7–30)
CALCIUM SERPL-MCNC: 8.8 MG/DL (ref 8.5–10.1)
CHLORIDE BLD-SCNC: 100 MMOL/L (ref 94–109)
CHOLEST SERPL-MCNC: 148 MG/DL
CO2 SERPL-SCNC: 25 MMOL/L (ref 20–32)
CREAT SERPL-MCNC: 0.69 MG/DL (ref 0.66–1.25)
FASTING STATUS PATIENT QL REPORTED: YES
GFR SERPL CREATININE-BSD FRML MDRD: >90 ML/MIN/1.73M2
GLUCOSE BLD-MCNC: 99 MG/DL (ref 70–99)
HDLC SERPL-MCNC: 85 MG/DL
IRON SATN MFR SERPL: 28 % (ref 15–46)
IRON SERPL-MCNC: 106 UG/DL (ref 35–180)
LDLC SERPL CALC-MCNC: 51 MG/DL
NONHDLC SERPL-MCNC: 63 MG/DL
POTASSIUM BLD-SCNC: 4.4 MMOL/L (ref 3.4–5.3)
PROT SERPL-MCNC: 6.7 G/DL (ref 6.8–8.8)
SODIUM SERPL-SCNC: 133 MMOL/L (ref 133–144)
TIBC SERPL-MCNC: 382 UG/DL (ref 240–430)
TRIGL SERPL-MCNC: 59 MG/DL
TSH SERPL DL<=0.005 MIU/L-ACNC: 0.53 MU/L (ref 0.4–4)

## 2022-08-12 ENCOUNTER — DOCUMENTATION ONLY (OUTPATIENT)
Dept: LAB | Facility: CLINIC | Age: 77
End: 2022-08-12

## 2022-08-12 LAB — FERRITIN SERPL-MCNC: 105 NG/ML (ref 26–388)

## 2022-08-19 ENCOUNTER — LAB (OUTPATIENT)
Dept: URGENT CARE | Facility: URGENT CARE | Age: 77
End: 2022-08-19
Payer: MEDICARE

## 2022-08-19 DIAGNOSIS — Z20.822 ENCOUNTER FOR LABORATORY TESTING FOR COVID-19 VIRUS: ICD-10-CM

## 2022-08-19 PROCEDURE — 99207 PR NO CHARGE LOS: CPT

## 2022-08-19 PROCEDURE — U0005 INFEC AGEN DETEC AMPLI PROBE: HCPCS

## 2022-08-19 PROCEDURE — U0003 INFECTIOUS AGENT DETECTION BY NUCLEIC ACID (DNA OR RNA); SEVERE ACUTE RESPIRATORY SYNDROME CORONAVIRUS 2 (SARS-COV-2) (CORONAVIRUS DISEASE [COVID-19]), AMPLIFIED PROBE TECHNIQUE, MAKING USE OF HIGH THROUGHPUT TECHNOLOGIES AS DESCRIBED BY CMS-2020-01-R: HCPCS

## 2022-08-20 LAB — SARS-COV-2 RNA RESP QL NAA+PROBE: NEGATIVE

## 2022-08-22 ENCOUNTER — ANESTHESIA EVENT (OUTPATIENT)
Dept: SURGERY | Facility: CLINIC | Age: 77
End: 2022-08-22
Payer: MEDICARE

## 2022-08-22 RX ORDER — MULTIPLE VITAMINS W/ MINERALS TAB 9MG-400MCG
1 TAB ORAL DAILY
COMMUNITY

## 2022-08-22 RX ORDER — ACETAMINOPHEN 500 MG
500 TABLET ORAL 4 TIMES DAILY PRN
Status: ON HOLD | COMMUNITY
End: 2022-08-24

## 2022-08-22 ASSESSMENT — COPD QUESTIONNAIRES: COPD: 0

## 2022-08-22 ASSESSMENT — ENCOUNTER SYMPTOMS: DYSRHYTHMIAS: 1

## 2022-08-22 ASSESSMENT — LIFESTYLE VARIABLES: TOBACCO_USE: 0

## 2022-08-22 NOTE — ANESTHESIA PREPROCEDURE EVALUATION
Anesthesia Pre-Procedure Evaluation    Patient: Mendez Greene   MRN: [id number] : 1945        Procedure : Procedure(s):  LEFT TOTAL KNEE ARTHROPLASTY          Past Medical History:   Diagnosis Date     Allergic rhinitis, cause unspecified      Arthritis 2022     Cancer (H) 2006     Diverticulosis      Double vision 10/17/2011    probable migraine aura     Hypertrophy of prostate without urinary obstruction and other lower urinary tract symptoms (LUTS)      Neoplasm of unspecified nature of bladder 2006    [place] s/p cystoprostatectomy with ileal neobladder     Other and unspecified hyperlipidemia      Palpitations      Renal stone 2012     Sinus bradycardia      Sinus bradycardia      Sleep apnea 2005    C Pap     Unspecified essential hypertension      Unspecified hypothyroidism       Past Surgical History:   Procedure Laterality Date     ABDOMEN SURGERY  2006    bladder     BIOPSY  3/2006     BUNIONECTOMY  12-    LT     BUNIONECTOMY DIANA  2011    RT-Procedure:BUNIONECTOMY DIANA; Right Foot Dwaine Price Bunionectomy with Metatarsal and Phalanx Osteotomy with 1st Metatarsal Phalangeal Joint Repair Right Foot; Surgeon:BAKARI ZAIDI; Location:[facility] [place]     CV HEART CATHETERIZATION WITH POSSIBLE INTERVENTION N/A 2020    Procedure: Heart Catheterization with Possible Intervention;  Surgeon: Rishi Llanes MD;  Location:  HEART CARDIAC CATH LAB     CYSTOSCOPY  3-    CA bladder     HERNIORRHAPHY INCISIONAL (LOCATION) N/A 2016    Procedure: HERNIORRHAPHY INCISIONAL (LOCATION);  Surgeon: Onofre Lua MD;  Location: [facility] [place]     SURGICAL HISTORY OF -   5-3-06    neobladder construction- CA      Allergies   Allergen Reactions     Food Other (See Comments)     Uncertain of which food , testing done ~      Social History     Tobacco Use     Smoking status: Former Smoker     Packs/day: 1.50     Years: 20.00     Pack years: 30.00     Types: Cigarettes      Start date: 1967     Quit date: 9/10/1987     Years since quittin.9     Smokeless tobacco: Never Used   Substance Use Topics     Alcohol use: Yes     Comment: Occas wine      Wt Readings from Last 1 Encounters:   08/10/22 91.1 kg (200 lb 12.8 oz)        Anesthesia Evaluation   Pt has had prior anesthetic. Type: General.    No history of anesthetic complications       ROS/MED HX  ENT/Pulmonary:     (+) sleep apnea, uses CPAP, allergic rhinitis,  (-) tobacco use, asthma and COPD   Neurologic:       Cardiovascular: Comment: Ascending aortic aneurysm    Cardiac MRI       Name:           JUAN MANUEL GRIMES                                  :                  1945                                  Scan Date:   2021-11-10 09:49:02                                   Electronically signed by Jomar Jamison 2021-Nov-10 14:52:20     SUMMARY   ==========================================================================================================     Clinical history: Aorta dilatation, 1-2+ AR on echo, CMR +MRA to evaluate aorta dilatation and AR.  Comparison CMR: none     1. The LV is mild to moderately dilated with normal wall thickness. The global systolic function is normal.  The LVEF is 69 %. There are no regional wall motion abnormalities.     2. The RV is normal in cavity size. The global systolic function is normal. The RVEF is 70 %.      3. Mild bi-atrial enlargement.     4. Trileaflet aortic valve. Eccentric moderate aortic regurgitation- regurgitant volume 44 cc, regurgitant  fraction 26%.  Mild mitral regurgitation.     5. Late gadolinium enhancement imaging shows  a small size mid myocardial basal septal wall delayed  enhancement     6. There is no pericardial effusion.     7. There is no intracardiac thrombus.     8. Renal cysts noted.        MRA Aorta     1. Mildly dilated aortic root measuring 4.1 cm (maximal diameter sinus to sinus), 4 cm x 3.7 cm  x 3.4 cm  (sinus to commissure). Mildly  dilated ascending aorta measuring 4.3 cm x 4 cm. The transverse arch and  descending thoracic aorta are normal in size without an aneurysm or dissection.     2. The aortic arch is left sided. There is a bovine variant branching of the arch vessels with common  origin of the right brachiocephalic artery and left common carotid artery. There is no coarctation.     3. The main and proximal branch pulmonary arteries are normal in size.      4. The systemic venous connections are normal.         CONCLUSIONS:   1. Normal LV and RV systolic functions, LVEF 69%, Mild to moderately dilated LV.  2. Eccentric moderate aortic regurgitation.  3. Late gadolinium enhancement imaging shows  a small size mid myocardial basal septal wall delayed  enhancement consistent with a non CAD non specific scar.  4. Mildly dilated ascending aorta- 4.3 cm. Mildly dilated aortic root 4.1 cm.     CORE EXAM   ==========================================================================================================     MEASUREMENTS   ----------------------------------------------------------------------------------------    (+) Dyslipidemia hypertension-----dysrhythmias, a-fib, Previous cardiac testing   Echo: Date: 20 Results:  [id number]  [id number]^DESHAUN^MARTIN^NIESHA           [facility]  Echocardiography Laboratory  [address] [place] [address]        Name: JUAN MANUEL GRIMES  MRN: [id number]  : 1945  Study Date: 2020 08:07 AM  Age: 75 yrs  Gender: Male  Patient Location: [facility]  Reason For Study: Tachycardia, MI  Ordering Physician: MARTIN MEADE  Referring Physician: Lou King  Performed By: Leann Novak     BSA: 2.2 m2  Height: 71 in  Weight: 222 lb  HR: 102  BP: 108/69 mmHg  _____________________________________________________________________________  __        Procedure  Complete Portable Echo Adult. Optison (NDC #4608-4191) given  intravenously.  _____________________________________________________________________________  __        Interpretation Summary     The ascending aorta is Moderately dilated.  Sinus Valsalva 43mm, Asc aorta 45mm  There is mild to moderate concentric left ventricular hypertrophy.  The visual ejection fraction is estimated at 55-60%.  Normal or borderline hypokinesis of distal anterior free wall/apex  The left atrium is mild to moderately dilated.  There is mild (1+) aortic regurgitation.  _____________________________________________________________________________  __        Left Ventricle  The left ventricle is normal in size. There is mild to moderate concentric  left ventricular hypertrophy. The visual ejection fraction is estimated at 55-  60%. Diastolic function not assessed due to atrial fibrillation. There is  borderline anterior wall hypokinesis. Normal or borderline hypokinesis of  distal anterior free wall/apex. There is no thrombus seen in the left  ventricle.     Right Ventricle  The right ventricle is normal in structure, function and size.     Atria  The left atrium is mild to moderately dilated. Right atrial size is normal.     Mitral Valve  There is trace mitral regurgitation.        Tricuspid Valve  There is trace to mild tricuspid regurgitation. The right ventricular systolic  pressure is approximated at 16.8 mmHg plus the right atrial pressure. Doppler  findings do not suggest pulmonary hypertension. IVC diameter <2.1 cm  collapsing >50% with sniff suggests a normal RA pressure of 3 mmHg.     Aortic Valve  The aortic valve is trileaflet with aortic valve sclerosis. There is mild (1+)  aortic regurgitation. No hemodynamically significant valvular aortic stenosis.     Pulmonic Valve  The pulmonic valve is not well seen, but is grossly normal.     Vessels  The ascending aorta is Moderately dilated. Sinus Valsalva 43mm, Asc aorta  45mm.     Pericardium  The pericardium appears normal.         Rhythm  The rhythm was atrial fibrillation.  _____________________________________________________________________________  __  MMode/2D Measurements & Calculations  IVSd: 1.3 cm     LVIDd: 5.6 cm  LVIDs: 4.0 cm  LVPWd: 1.2 cm  FS: 29.0 %  LV mass(C)d: 292.5 grams  LV mass(C)dI: 132.7 grams/m2  Ao root diam: 4.0 cm  LA dimension: 3.9 cm  asc Aorta Diam: 4.5 cm  LA/Ao: 0.99  LA Volume (BP): 86.4 ml  LA Volume Index (BP): 39.3 ml/m2  RWT: 0.44           Doppler Measurements & Calculations  MV E max arnold: 88.6 cm/sec  MV dec time: 0.21 sec  Ao V2 max: 137.3 cm/sec  Ao max P.0 mmHg  Ao V2 mean: 91.7 cm/sec  Ao mean P.0 mmHg  Ao V2 VTI: 22.8 cm  LV V1 max P.8 mmHg  LV V1 max: 130.3 cm/sec  LV V1 VTI: 22.5 cm  TR max arnold: 205.1 cm/sec  TR max P.8 mmHg  AV Arnold Ratio (DI): 0.95  E/E' avg: 10.2  Stress Test: Date: Results:    ECG Reviewed: Date: Results:    Cath: Date: Results:      METS/Exercise Tolerance: 4 - Raking leaves, gardening    Hematologic:  - neg hematologic  ROS     Musculoskeletal:       GI/Hepatic:    (-) GERD and liver disease   Renal/Genitourinary:     (+) Nephrolithiasis , BPH,  (-) renal disease   Endo:     (+) thyroid problem, hypothyroidism,  (-) Type I DM and Type II DM   Psychiatric/Substance Use:       Infectious Disease:       Malignancy:   (+) Malignancy, History of Other.Other CA bladder cancer, neobladder present Remission status post.    Other:            Physical Exam    Airway        Mallampati: III   TM distance: > 3 FB   Neck ROM: full   Mouth opening: > 3 cm    Respiratory Devices and Support         Dental  no notable dental history         Cardiovascular          Rhythm and rate: regular and normal   (+) murmur       Pulmonary   pulmonary exam normal                OUTSIDE LABS:  CBC:   Lab Results   Component Value Date    WBC 5.9 08/10/2022    WBC 4.9 08/10/2021    HGB 13.2 (L) 08/10/2022    HGB 14.3 08/10/2021    HCT 39.1 (L) 08/10/2022    HCT 40.3 08/10/2021    PLT  245 08/10/2022     08/10/2021     BMP:   Lab Results   Component Value Date     08/10/2022     (L) 08/10/2021    POTASSIUM 4.4 08/10/2022    POTASSIUM 4.6 08/10/2021    CHLORIDE 100 08/10/2022    CHLORIDE 101 08/10/2021    CO2 25 08/10/2022    CO2 25 08/10/2021    BUN 13 08/10/2022    BUN 20 08/10/2021    CR 0.69 08/10/2022    CR 0.97 08/10/2021    GLC 99 08/10/2022    GLC 98 08/10/2021     COAGS:   Lab Results   Component Value Date    PTT 27 11/06/2020     POC: No results found for: BGM, HCG, HCGS  HEPATIC:   Lab Results   Component Value Date    ALBUMIN 3.9 08/10/2022    PROTTOTAL 6.7 (L) 08/10/2022    ALT 22 08/10/2022    AST 24 08/10/2022    ALKPHOS 50 08/10/2022    BILITOTAL 0.7 08/10/2022     OTHER:   Lab Results   Component Value Date    A1C 5.0 08/10/2022    SIMONA 8.8 08/10/2022    MAG 2.1 11/06/2020    TSH 0.53 08/10/2022    T4 0.88 02/01/2017       Anesthesia Plan    ASA Status:  3   NPO Status:  NPO Appropriate    Anesthesia Type: Spinal.              Consents    Anesthesia Plan(s) and associated risks, benefits, and realistic alternatives discussed. Questions answered and patient/representative(s) expressed understanding.    - Discussed:     - Discussed with:  Patient      - Extended Intubation/Ventilatory Support Discussed: No.      - Patient is DNR/DNI Status: No    Use of blood products discussed: No .     Postoperative Care    Pain management: Peripheral nerve block (Single Shot), Multi-modal analgesia, IV analgesics.     - Plan for long acting post-op opioid use   PONV prophylaxis: Ondansetron (or other 5HT-3), Dexamethasone or Solumedrol     Comments:                Mendez Ocasio MD

## 2022-08-22 NOTE — PROGRESS NOTES
PTA medications updated by Medication Scribe prior to surgery via phone call with patient (last doses completed by Nurse)     Medication history sources: Patient, Surescripts, H&P and Patient's home med list  In the past week, patient estimated taking medication this percent of the time: Greater than 90%  Adherence assessment: N/A Not Observed    Significant changes made to the medication list:  None      Additional medication history information:   None    Medication reconciliation completed by provider prior to medication history? No    Time spent in this activity: 35 MINUTES    The information provided in this note is only as accurate as the sources available at the time of update(s)      Prior to Admission medications    Medication Sig Last Dose Taking? Auth Provider Long Term End Date   acetaminophen (TYLENOL) 500 MG tablet Take 500 mg by mouth 4 times daily as needed (KNEE PAIN)  at PM Yes Reported, Patient     aspirin 81 MG EC tablet Take 1 tablet (81 mg) by mouth daily  at PM Yes Jes Alegria PA-C No    COMPOUND (CMPD RX) - PHARMACY TO MIX COMPOUNDED MEDICATION Hydro 2.5% Cr / Ketocon Crm.  Apply to the effected area on face twice daily as needed.  at AM Yes Dali Dunn MD     diclofenac (VOLTAREN) 1 % topical gel Apply topically 4 times daily  at PM Yes Reported, Patient     diphenhydrAMINE-acetaminophen (TYLENOL PM)  MG tablet Take 0.5 tablets by mouth nightly as needed for sleep  at PM Yes Unknown, Entered By History     levothyroxine (SYNTHROID/LEVOTHROID) 150 MCG tablet Take 1 tablet (150 mcg) by mouth daily  at PM Yes Lou King PA-C Yes    lisinopril (ZESTRIL) 5 MG tablet Take 1 tablet (5 mg) by mouth At Bedtime  at PM Yes Lou King PA-C Yes    multivitamin w/minerals (THERA-VIT-M) tablet Take 1 tablet by mouth daily  at PM Yes Reported, Patient     olopatadine (PATANOL) 0.1 % ophthalmic solution Place 1 drop into both eyes 2 times daily  at AM Yes Reported, Patient      simvastatin (ZOCOR) 10 MG tablet Take 1 tablet (10 mg) by mouth At Bedtime  at PM Yes Lou King PA-C Yes    vitamin B-12 (CYANOCOBALAMIN) 1000 MCG tablet Take 1,000 mcg by mouth daily  at AM Yes Reported, Patient     order for DME Equipment being ordered: CPAP and supplies. LIFETIME need.   Michell Day,

## 2022-08-23 ENCOUNTER — HOSPITAL ENCOUNTER (OUTPATIENT)
Facility: CLINIC | Age: 77
Discharge: HOME | End: 2022-08-24
Attending: ORTHOPAEDIC SURGERY | Admitting: ORTHOPAEDIC SURGERY
Payer: MEDICARE

## 2022-08-23 ENCOUNTER — APPOINTMENT (OUTPATIENT)
Dept: PHYSICAL THERAPY | Facility: CLINIC | Age: 77
End: 2022-08-23
Attending: ORTHOPAEDIC SURGERY
Payer: MEDICARE

## 2022-08-23 ENCOUNTER — ANESTHESIA (OUTPATIENT)
Dept: SURGERY | Facility: CLINIC | Age: 77
End: 2022-08-23
Payer: MEDICARE

## 2022-08-23 DIAGNOSIS — Z96.652 TOTAL KNEE REPLACEMENT STATUS, LEFT: Primary | ICD-10-CM

## 2022-08-23 PROCEDURE — 250N000011 HC RX IP 250 OP 636: Performed by: ORTHOPAEDIC SURGERY

## 2022-08-23 PROCEDURE — 370N000017 HC ANESTHESIA TECHNICAL FEE, PER MIN: Performed by: ORTHOPAEDIC SURGERY

## 2022-08-23 PROCEDURE — 97161 PT EVAL LOW COMPLEX 20 MIN: CPT | Mod: GP

## 2022-08-23 PROCEDURE — 258N000003 HC RX IP 258 OP 636: Performed by: PHYSICIAN ASSISTANT

## 2022-08-23 PROCEDURE — 250N000009 HC RX 250: Performed by: ANESTHESIOLOGY

## 2022-08-23 PROCEDURE — 250N000011 HC RX IP 250 OP 636: Performed by: PHYSICIAN ASSISTANT

## 2022-08-23 PROCEDURE — 97116 GAIT TRAINING THERAPY: CPT | Mod: GP

## 2022-08-23 PROCEDURE — 258N000003 HC RX IP 258 OP 636: Performed by: ANESTHESIOLOGY

## 2022-08-23 PROCEDURE — 272N000001 HC OR GENERAL SUPPLY STERILE: Performed by: ORTHOPAEDIC SURGERY

## 2022-08-23 PROCEDURE — C1776 JOINT DEVICE (IMPLANTABLE): HCPCS | Performed by: ORTHOPAEDIC SURGERY

## 2022-08-23 PROCEDURE — 250N000011 HC RX IP 250 OP 636: Performed by: ANESTHESIOLOGY

## 2022-08-23 PROCEDURE — 999N000141 HC STATISTIC PRE-PROCEDURE NURSING ASSESSMENT: Performed by: ORTHOPAEDIC SURGERY

## 2022-08-23 PROCEDURE — 250N000013 HC RX MED GY IP 250 OP 250 PS 637: Performed by: PHYSICIAN ASSISTANT

## 2022-08-23 PROCEDURE — 258N000003 HC RX IP 258 OP 636: Performed by: NURSE ANESTHETIST, CERTIFIED REGISTERED

## 2022-08-23 PROCEDURE — 710N000010 HC RECOVERY PHASE 1, LEVEL 2, PER MIN: Performed by: ORTHOPAEDIC SURGERY

## 2022-08-23 PROCEDURE — 250N000011 HC RX IP 250 OP 636: Performed by: NURSE ANESTHETIST, CERTIFIED REGISTERED

## 2022-08-23 PROCEDURE — 97530 THERAPEUTIC ACTIVITIES: CPT | Mod: GP

## 2022-08-23 PROCEDURE — 999N000157 HC STATISTIC RCP TIME EA 10 MIN

## 2022-08-23 PROCEDURE — 258N000003 HC RX IP 258 OP 636: Performed by: ORTHOPAEDIC SURGERY

## 2022-08-23 PROCEDURE — C1713 ANCHOR/SCREW BN/BN,TIS/BN: HCPCS | Performed by: ORTHOPAEDIC SURGERY

## 2022-08-23 PROCEDURE — 250N000009 HC RX 250: Performed by: NURSE ANESTHETIST, CERTIFIED REGISTERED

## 2022-08-23 PROCEDURE — 360N000077 HC SURGERY LEVEL 4, PER MIN: Performed by: ORTHOPAEDIC SURGERY

## 2022-08-23 DEVICE — ATTUNE PATELLA MEDIALIZED DOME 38MM CEMENTED AOX
Type: IMPLANTABLE DEVICE | Site: KNEE | Status: FUNCTIONAL
Brand: ATTUNE

## 2022-08-23 DEVICE — ATTUNE KNEE SYSTEM TIBIAL BASE FIXED BEARING SIZE 6 CEMENTED
Type: IMPLANTABLE DEVICE | Site: KNEE | Status: FUNCTIONAL
Brand: ATTUNE

## 2022-08-23 DEVICE — ATTUNE KNEE SYSTEM FEMORAL POSTERIOR STABILIZED SIZE 5 LEFT CEMENTED
Type: IMPLANTABLE DEVICE | Site: KNEE | Status: FUNCTIONAL
Brand: ATTUNE

## 2022-08-23 DEVICE — ATTUNE KNEE SYSTEM TIBIAL INSERT FIXED BEARING POSTERIOR STABILIZED 5 5MM AOX
Type: IMPLANTABLE DEVICE | Site: KNEE | Status: FUNCTIONAL
Brand: ATTUNE

## 2022-08-23 DEVICE — BONE CEMENT STRK SIMPLEX P SPEEDSET 6192-1-001: Type: IMPLANTABLE DEVICE | Site: KNEE | Status: FUNCTIONAL

## 2022-08-23 RX ORDER — ACETAMINOPHEN 325 MG/1
650 TABLET ORAL EVERY 4 HOURS PRN
Status: DISCONTINUED | OUTPATIENT
Start: 2022-08-26 | End: 2022-08-24 | Stop reason: HOSPADM

## 2022-08-23 RX ORDER — ONDANSETRON 2 MG/ML
4 INJECTION INTRAMUSCULAR; INTRAVENOUS EVERY 6 HOURS PRN
Status: DISCONTINUED | OUTPATIENT
Start: 2022-08-23 | End: 2022-08-24 | Stop reason: HOSPADM

## 2022-08-23 RX ORDER — FENTANYL CITRATE 50 UG/ML
INJECTION, SOLUTION INTRAMUSCULAR; INTRAVENOUS PRN
Status: DISCONTINUED | OUTPATIENT
Start: 2022-08-23 | End: 2022-08-23

## 2022-08-23 RX ORDER — ASPIRIN 81 MG/1
81 TABLET ORAL 2 TIMES DAILY
Status: DISCONTINUED | OUTPATIENT
Start: 2022-08-23 | End: 2022-08-24 | Stop reason: HOSPADM

## 2022-08-23 RX ORDER — SIMVASTATIN 10 MG
10 TABLET ORAL AT BEDTIME
Status: DISCONTINUED | OUTPATIENT
Start: 2022-08-23 | End: 2022-08-24 | Stop reason: HOSPADM

## 2022-08-23 RX ORDER — NALOXONE HYDROCHLORIDE 0.4 MG/ML
0.2 INJECTION, SOLUTION INTRAMUSCULAR; INTRAVENOUS; SUBCUTANEOUS
Status: DISCONTINUED | OUTPATIENT
Start: 2022-08-23 | End: 2022-08-24 | Stop reason: HOSPADM

## 2022-08-23 RX ORDER — BISACODYL 10 MG
10 SUPPOSITORY, RECTAL RECTAL DAILY PRN
Status: DISCONTINUED | OUTPATIENT
Start: 2022-08-23 | End: 2022-08-24 | Stop reason: HOSPADM

## 2022-08-23 RX ORDER — GLYCOPYRROLATE 0.2 MG/ML
INJECTION, SOLUTION INTRAMUSCULAR; INTRAVENOUS PRN
Status: DISCONTINUED | OUTPATIENT
Start: 2022-08-23 | End: 2022-08-23

## 2022-08-23 RX ORDER — HYDROXYZINE HYDROCHLORIDE 10 MG/1
10 TABLET, FILM COATED ORAL EVERY 6 HOURS PRN
Status: DISCONTINUED | OUTPATIENT
Start: 2022-08-23 | End: 2022-08-24 | Stop reason: HOSPADM

## 2022-08-23 RX ORDER — PROCHLORPERAZINE MALEATE 5 MG
5 TABLET ORAL EVERY 6 HOURS PRN
Status: DISCONTINUED | OUTPATIENT
Start: 2022-08-23 | End: 2022-08-24 | Stop reason: HOSPADM

## 2022-08-23 RX ORDER — PROPOFOL 10 MG/ML
INJECTION, EMULSION INTRAVENOUS PRN
Status: DISCONTINUED | OUTPATIENT
Start: 2022-08-23 | End: 2022-08-23

## 2022-08-23 RX ORDER — CEFAZOLIN SODIUM 2 G/100ML
2 INJECTION, SOLUTION INTRAVENOUS EVERY 8 HOURS
Status: COMPLETED | OUTPATIENT
Start: 2022-08-23 | End: 2022-08-24

## 2022-08-23 RX ORDER — CEFAZOLIN SODIUM/WATER 2 G/20 ML
2 SYRINGE (ML) INTRAVENOUS SEE ADMIN INSTRUCTIONS
Status: DISCONTINUED | OUTPATIENT
Start: 2022-08-23 | End: 2022-08-23 | Stop reason: HOSPADM

## 2022-08-23 RX ORDER — FENTANYL CITRATE 0.05 MG/ML
50 INJECTION, SOLUTION INTRAMUSCULAR; INTRAVENOUS
Status: DISCONTINUED | OUTPATIENT
Start: 2022-08-23 | End: 2022-08-23 | Stop reason: HOSPADM

## 2022-08-23 RX ORDER — ONDANSETRON 2 MG/ML
INJECTION INTRAMUSCULAR; INTRAVENOUS PRN
Status: DISCONTINUED | OUTPATIENT
Start: 2022-08-23 | End: 2022-08-23

## 2022-08-23 RX ORDER — ONDANSETRON 4 MG/1
4 TABLET, ORALLY DISINTEGRATING ORAL EVERY 30 MIN PRN
Status: DISCONTINUED | OUTPATIENT
Start: 2022-08-23 | End: 2022-08-23 | Stop reason: HOSPADM

## 2022-08-23 RX ORDER — HYDROMORPHONE HCL IN WATER/PF 6 MG/30 ML
0.2 PATIENT CONTROLLED ANALGESIA SYRINGE INTRAVENOUS
Status: DISCONTINUED | OUTPATIENT
Start: 2022-08-23 | End: 2022-08-24 | Stop reason: HOSPADM

## 2022-08-23 RX ORDER — OXYCODONE HYDROCHLORIDE 5 MG/1
10 TABLET ORAL EVERY 4 HOURS PRN
Status: DISCONTINUED | OUTPATIENT
Start: 2022-08-23 | End: 2022-08-24 | Stop reason: HOSPADM

## 2022-08-23 RX ORDER — LEVOTHYROXINE SODIUM 75 UG/1
150 TABLET ORAL
Status: DISCONTINUED | OUTPATIENT
Start: 2022-08-24 | End: 2022-08-24 | Stop reason: HOSPADM

## 2022-08-23 RX ORDER — OLOPATADINE HYDROCHLORIDE 1 MG/ML
1 SOLUTION/ DROPS OPHTHALMIC 2 TIMES DAILY
Status: DISCONTINUED | OUTPATIENT
Start: 2022-08-23 | End: 2022-08-24 | Stop reason: HOSPADM

## 2022-08-23 RX ORDER — SODIUM CHLORIDE, SODIUM LACTATE, POTASSIUM CHLORIDE, CALCIUM CHLORIDE 600; 310; 30; 20 MG/100ML; MG/100ML; MG/100ML; MG/100ML
INJECTION, SOLUTION INTRAVENOUS CONTINUOUS
Status: DISCONTINUED | OUTPATIENT
Start: 2022-08-23 | End: 2022-08-23 | Stop reason: HOSPADM

## 2022-08-23 RX ORDER — TRANEXAMIC ACID 650 MG/1
1950 TABLET ORAL ONCE
Status: COMPLETED | OUTPATIENT
Start: 2022-08-23 | End: 2022-08-23

## 2022-08-23 RX ORDER — AMOXICILLIN 250 MG
1 CAPSULE ORAL 2 TIMES DAILY
Status: DISCONTINUED | OUTPATIENT
Start: 2022-08-23 | End: 2022-08-24 | Stop reason: HOSPADM

## 2022-08-23 RX ORDER — POLYETHYLENE GLYCOL 3350 17 G/17G
17 POWDER, FOR SOLUTION ORAL DAILY
Status: DISCONTINUED | OUTPATIENT
Start: 2022-08-24 | End: 2022-08-24 | Stop reason: HOSPADM

## 2022-08-23 RX ORDER — CEFAZOLIN SODIUM/WATER 2 G/20 ML
2 SYRINGE (ML) INTRAVENOUS
Status: COMPLETED | OUTPATIENT
Start: 2022-08-23 | End: 2022-08-23

## 2022-08-23 RX ORDER — LIDOCAINE 40 MG/G
CREAM TOPICAL
Status: DISCONTINUED | OUTPATIENT
Start: 2022-08-23 | End: 2022-08-24 | Stop reason: HOSPADM

## 2022-08-23 RX ORDER — NALOXONE HYDROCHLORIDE 0.4 MG/ML
0.4 INJECTION, SOLUTION INTRAMUSCULAR; INTRAVENOUS; SUBCUTANEOUS
Status: DISCONTINUED | OUTPATIENT
Start: 2022-08-23 | End: 2022-08-24 | Stop reason: HOSPADM

## 2022-08-23 RX ORDER — HYDROMORPHONE HCL IN WATER/PF 6 MG/30 ML
0.2 PATIENT CONTROLLED ANALGESIA SYRINGE INTRAVENOUS EVERY 5 MIN PRN
Status: DISCONTINUED | OUTPATIENT
Start: 2022-08-23 | End: 2022-08-23 | Stop reason: HOSPADM

## 2022-08-23 RX ORDER — OXYCODONE HYDROCHLORIDE 5 MG/1
5 TABLET ORAL EVERY 4 HOURS PRN
Status: DISCONTINUED | OUTPATIENT
Start: 2022-08-23 | End: 2022-08-24 | Stop reason: HOSPADM

## 2022-08-23 RX ORDER — LISINOPRIL 5 MG/1
5 TABLET ORAL AT BEDTIME
Status: DISCONTINUED | OUTPATIENT
Start: 2022-08-23 | End: 2022-08-24 | Stop reason: HOSPADM

## 2022-08-23 RX ORDER — HYDROMORPHONE HCL IN WATER/PF 6 MG/30 ML
0.4 PATIENT CONTROLLED ANALGESIA SYRINGE INTRAVENOUS
Status: DISCONTINUED | OUTPATIENT
Start: 2022-08-23 | End: 2022-08-24 | Stop reason: HOSPADM

## 2022-08-23 RX ORDER — EPHEDRINE SULFATE 50 MG/ML
INJECTION, SOLUTION INTRAMUSCULAR; INTRAVENOUS; SUBCUTANEOUS PRN
Status: DISCONTINUED | OUTPATIENT
Start: 2022-08-23 | End: 2022-08-23

## 2022-08-23 RX ORDER — BUPIVACAINE HYDROCHLORIDE 7.5 MG/ML
INJECTION, SOLUTION INTRASPINAL
Status: COMPLETED | OUTPATIENT
Start: 2022-08-23 | End: 2022-08-23

## 2022-08-23 RX ORDER — FENTANYL CITRATE 50 UG/ML
25 INJECTION, SOLUTION INTRAMUSCULAR; INTRAVENOUS EVERY 5 MIN PRN
Status: DISCONTINUED | OUTPATIENT
Start: 2022-08-23 | End: 2022-08-23 | Stop reason: HOSPADM

## 2022-08-23 RX ORDER — ONDANSETRON 2 MG/ML
4 INJECTION INTRAMUSCULAR; INTRAVENOUS EVERY 30 MIN PRN
Status: DISCONTINUED | OUTPATIENT
Start: 2022-08-23 | End: 2022-08-23 | Stop reason: HOSPADM

## 2022-08-23 RX ORDER — ONDANSETRON 4 MG/1
4 TABLET, ORALLY DISINTEGRATING ORAL EVERY 6 HOURS PRN
Status: DISCONTINUED | OUTPATIENT
Start: 2022-08-23 | End: 2022-08-24 | Stop reason: HOSPADM

## 2022-08-23 RX ORDER — ACETAMINOPHEN 325 MG/1
975 TABLET ORAL EVERY 8 HOURS
Status: DISCONTINUED | OUTPATIENT
Start: 2022-08-23 | End: 2022-08-24 | Stop reason: HOSPADM

## 2022-08-23 RX ORDER — PROPOFOL 10 MG/ML
INJECTION, EMULSION INTRAVENOUS CONTINUOUS PRN
Status: DISCONTINUED | OUTPATIENT
Start: 2022-08-23 | End: 2022-08-23

## 2022-08-23 RX ORDER — LIDOCAINE HYDROCHLORIDE 20 MG/ML
INJECTION, SOLUTION INFILTRATION; PERINEURAL PRN
Status: DISCONTINUED | OUTPATIENT
Start: 2022-08-23 | End: 2022-08-23

## 2022-08-23 RX ORDER — SODIUM CHLORIDE, SODIUM LACTATE, POTASSIUM CHLORIDE, CALCIUM CHLORIDE 600; 310; 30; 20 MG/100ML; MG/100ML; MG/100ML; MG/100ML
INJECTION, SOLUTION INTRAVENOUS CONTINUOUS
Status: DISCONTINUED | OUTPATIENT
Start: 2022-08-23 | End: 2022-08-24 | Stop reason: HOSPADM

## 2022-08-23 RX ADMIN — LIDOCAINE HYDROCHLORIDE 40 MG: 20 INJECTION, SOLUTION INFILTRATION; PERINEURAL at 10:17

## 2022-08-23 RX ADMIN — PHENYLEPHRINE HYDROCHLORIDE 0.4 MCG/KG/MIN: 10 INJECTION INTRAVENOUS at 10:23

## 2022-08-23 RX ADMIN — GLYCOPYRROLATE 0.2 MG: 0.2 INJECTION, SOLUTION INTRAMUSCULAR; INTRAVENOUS at 10:30

## 2022-08-23 RX ADMIN — PROPOFOL 40 MG: 10 INJECTION, EMULSION INTRAVENOUS at 10:12

## 2022-08-23 RX ADMIN — OXYCODONE HYDROCHLORIDE 5 MG: 5 TABLET ORAL at 15:08

## 2022-08-23 RX ADMIN — BUPIVACAINE HYDROCHLORIDE 15 ML: 5 INJECTION, SOLUTION PERINEURAL at 10:01

## 2022-08-23 RX ADMIN — SIMVASTATIN 10 MG: 10 TABLET, FILM COATED ORAL at 21:37

## 2022-08-23 RX ADMIN — ONDANSETRON 4 MG: 2 INJECTION INTRAMUSCULAR; INTRAVENOUS at 11:10

## 2022-08-23 RX ADMIN — SODIUM CHLORIDE, POTASSIUM CHLORIDE, SODIUM LACTATE AND CALCIUM CHLORIDE: 600; 310; 30; 20 INJECTION, SOLUTION INTRAVENOUS at 14:56

## 2022-08-23 RX ADMIN — LIDOCAINE HYDROCHLORIDE 60 MG: 20 INJECTION, SOLUTION INFILTRATION; PERINEURAL at 10:24

## 2022-08-23 RX ADMIN — HYDROXYZINE HYDROCHLORIDE 10 MG: 10 TABLET ORAL at 18:01

## 2022-08-23 RX ADMIN — TRANEXAMIC ACID 1950 MG: 650 TABLET ORAL at 09:02

## 2022-08-23 RX ADMIN — ACETAMINOPHEN 975 MG: 325 TABLET ORAL at 14:54

## 2022-08-23 RX ADMIN — ACETAMINOPHEN 975 MG: 325 TABLET ORAL at 21:37

## 2022-08-23 RX ADMIN — OXYCODONE HYDROCHLORIDE 10 MG: 5 TABLET ORAL at 23:08

## 2022-08-23 RX ADMIN — ASPIRIN 81 MG: 81 TABLET, COATED ORAL at 20:28

## 2022-08-23 RX ADMIN — SENNOSIDES AND DOCUSATE SODIUM 1 TABLET: 50; 8.6 TABLET ORAL at 20:28

## 2022-08-23 RX ADMIN — FENTANYL CITRATE 50 MCG: 50 INJECTION, SOLUTION INTRAMUSCULAR; INTRAVENOUS at 10:10

## 2022-08-23 RX ADMIN — PROPOFOL 150 MCG/KG/MIN: 10 INJECTION, EMULSION INTRAVENOUS at 10:12

## 2022-08-23 RX ADMIN — Medication 5 MG: at 10:24

## 2022-08-23 RX ADMIN — Medication 2 G: at 10:06

## 2022-08-23 RX ADMIN — PHENYLEPHRINE HYDROCHLORIDE 200 MCG: 10 INJECTION INTRAVENOUS at 11:20

## 2022-08-23 RX ADMIN — CEFAZOLIN SODIUM 2 G: 2 INJECTION, SOLUTION INTRAVENOUS at 17:51

## 2022-08-23 RX ADMIN — MIDAZOLAM HYDROCHLORIDE 2 MG: 1 INJECTION, SOLUTION INTRAMUSCULAR; INTRAVENOUS at 09:53

## 2022-08-23 RX ADMIN — SODIUM CHLORIDE, POTASSIUM CHLORIDE, SODIUM LACTATE AND CALCIUM CHLORIDE: 600; 310; 30; 20 INJECTION, SOLUTION INTRAVENOUS at 09:02

## 2022-08-23 RX ADMIN — LISINOPRIL 5 MG: 5 TABLET ORAL at 21:37

## 2022-08-23 RX ADMIN — BUPIVACAINE HYDROCHLORIDE IN DEXTROSE 1.6 ML: 7.5 INJECTION, SOLUTION SUBARACHNOID at 10:15

## 2022-08-23 RX ADMIN — OXYCODONE HYDROCHLORIDE 10 MG: 5 TABLET ORAL at 19:09

## 2022-08-23 ASSESSMENT — ACTIVITIES OF DAILY LIVING (ADL)
TOILETING_ISSUES: NO
ADLS_ACUITY_SCORE: 20
ADLS_ACUITY_SCORE: 20
DIFFICULTY_EATING/SWALLOWING: NO
ADLS_ACUITY_SCORE: 20
ADLS_ACUITY_SCORE: 35
ADLS_ACUITY_SCORE: 20

## 2022-08-23 NOTE — INTERVAL H&P NOTE
"I have reviewed the surgical (or preoperative) H&P that is linked to this encounter, and examined the patient. There are no significant changes    Clinical Conditions Present on Arrival:  Clinically Significant Risk Factors Present on Admission                   # Overweight: Estimated body mass index is 28.72 kg/m  as calculated from the following:    Height as of this encounter: 1.753 m (5' 9\").    Weight as of this encounter: 88.2 kg (194 lb 8 oz).       "

## 2022-08-23 NOTE — ANESTHESIA PROCEDURE NOTES
Adductor canal Procedure Note    Pre-Procedure   Staff -        Anesthesiologist:  Mendez Ocasio MD       Performed By: anesthesiologist       Location: pre-op       Pre-Anesthestic Checklist: patient identified, IV checked, site marked, risks and benefits discussed, informed consent, monitors and equipment checked, pre-op evaluation, at physician/surgeon's request and post-op pain management  Timeout:       Correct Patient: Yes        Correct Procedure: Yes        Correct Site: Yes        Correct Position: Yes        Correct Laterality: Yes        Site Marked: Yes  Procedure Documentation  Procedure: Adductor canal       Laterality: left       Patient Position: supine       Patient Prep/Sterile Barriers: sterile gloves, mask       Skin prep: Chloraprep       Local skin infiltrated with mL of 1% lidocaine.        Needle Type: short bevel       Needle Gauge: 21.        Needle Length (millimeters): 100        Ultrasound guided       1. Ultrasound was used to identify targeted nerve, plexus, vascular marker, or fascial plane and place a needle adjacent to it in real-time.       2. Ultrasound was used to visualize the spread of anesthetic in close proximity to the above referenced structure.       3. A permanent image is entered into the patient's record.       4. The visualized anatomic structures appeared normal.       5. There were no apparent abnormal pathologic findings.    Assessment/Narrative         The placement was negative for: blood aspirated and painful injection       Paresthesias: No.       Bolus given via needle..        Secured via.        Insertion/Infusion Method: Single Shot       Complications: none       Injection made incrementally with aspirations every 5 mL.    Medication(s) Administered   Bupivacaine 0.5% w/ 1:400K Epi (Injection) - Injection   15 mL - 8/23/2022 10:01:00 AM

## 2022-08-23 NOTE — PLAN OF CARE
.Patient vital signs are at baseline: Yes  Patient able to ambulate as they were prior to admission or with assist devices provided by therapies during their stay:  No,  Reason:  Not OOB yet.  Patient MUST void prior to discharge:  No,  Reason:  Patient DTV since arriving to unit  Patient able to tolerate oral intake:  Yes, tolerating regular diet.  Pain has adequate pain control using Oral analgesics:  Yes  Does patient have an identified :  Yes  Has goal D/C date and time been discussed with patient:  Yes      A&Ox4. Up to unit around 1400. CMS intact to LLE. Dressing intact. LR running at 100cc/hr. VSS on RA. Oxycodone 5mg given x1 for pain 3 out of 10. Discharge goals reviewed with patient and patients wife. Discharge tomorrow planned.

## 2022-08-23 NOTE — PROGRESS NOTES
08/23/22 1700   Quick Adds   Type of Visit Initial PT Evaluation   Living Environment   People in Home spouse   Current Living Arrangements house   Living Environment Comments 2-3 NEELAM no railing   Self-Care   Usual Activity Tolerance moderate   Current Activity Tolerance good   Equipment Currently Used at Home none   Fall history within last six months no   Activity/Exercise/Self-Care Comment Pt IND at baseline, has FWW, cane, Crutches at home   General Information   Onset of Illness/Injury or Date of Surgery 08/23/22   Referring Physician Vianney Gonzalez PA-C   Pertinent History of Current Problem (include personal factors and/or comorbidities that impact the POC) POD # 0 L TKA   Existing Precautions/Restrictions fall   Weight-Bearing Status - LLE weight-bearing as tolerated   Cognition   Cognitive Status Comments WNL   Pain Assessment   Patient Currently in Pain No   Posture    Posture Forward head position   Range of Motion (ROM)   ROM Comment L knee 5-50*   Strength (Manual Muscle Testing)   Strength Comments LLE > 3/5 strength   Bed Mobility   Comment, (Bed Mobility) Supine > sitting EOB CGA   Transfers   Comment, (Transfers) STS with FWW and CGA no buckling, heavy reliance on UE support   Gait/Stairs (Locomotion)   Distance in Feet (Required for LE Total Joints) 5' eval, 85' treatment   Comment, (Gait/Stairs) step to, FWW, close CGA, dec LLE knee extension in stance phase   Balance   Balance Comments Unsteady at times with use of FWW   Clinical Impression   Criteria for Skilled Therapeutic Intervention Yes, treatment indicated   PT Diagnosis (PT) impaired IND with mobility from baseline   Influenced by the following impairments functional weakness, impaired balance   Functional limitations due to impairments Impaired IND with transfers, bed mobility, gait, stairs   Clinical Presentation (PT Evaluation Complexity) Stable/Uncomplicated   Clinical Presentation Rationale clinical judgement   Clinical  Decision Making (Complexity) low complexity   Planned Therapy Interventions (PT) balance training;bed mobility training;cryotherapy;gait training;home exercise program;ROM (range of motion);stair training;strengthening;patient/family education;transfer training   Risk & Benefits of therapy have been explained evaluation/treatment results reviewed;care plan/treatment goals reviewed;risks/benefits reviewed;patient   PT Discharge Planning   PT Rationale for DC Rec Pt currently CGA-min A x 1 with FWW for transfers and gait,  pt tolerated minimal TKA exercises well, session limited by meal tray arriving. Tolerated 85' ambulation with FWW   Plan of Care Review   Plan of Care Reviewed With patient   Total Evaluation Time   Total Evaluation Time (Minutes) 10   Physical Therapy Goals   PT Frequency 2x/day   PT Predicted Duration/Target Date for Goal Attainment 08/26/22   PT Goals Bed Mobility;Transfers;Gait;Stairs   PT: Bed Mobility Supervision/stand-by assist;Supine to/from sit   PT: Transfers Sit to/from stand;Bed to/from chair;Assistive device;Supervision/stand-by assist   PT: Gait Rolling walker;Supervision/stand-by assist;100 feet   PT: Stairs Minimal assist;3 stairs;Assistive device

## 2022-08-23 NOTE — ANESTHESIA CARE TRANSFER NOTE
Patient: Mendez Greene    Procedure: Procedure(s):  LEFT TOTAL KNEE ARTHROPLASTY       Diagnosis: Osteoarthritis of left knee, unspecified osteoarthritis type [M17.12]  Diagnosis Additional Information: No value filed.    Anesthesia Type:   Spinal     Note:    Oropharynx: oropharynx clear of all foreign objects and spontaneously breathing  Level of Consciousness: awake  Oxygen Supplementation: face mask  Level of Supplemental Oxygen (L/min / FiO2): 8  Independent Airway: airway patency satisfactory and stable  Dentition: dentition unchanged  Vital Signs Stable: post-procedure vital signs reviewed and stable  Report to RN Given: handoff report given  Patient transferred to: PACU    Handoff Report: Identifed the Patient, Identified the Reponsible Provider, Reviewed the pertinent medical history, Discussed the surgical course, Reviewed Intra-OP anesthesia mangement and issues during anesthesia, Set expectations for post-procedure period and Allowed opportunity for questions and acknowledgement of understanding      Vitals:  Vitals Value Taken Time   BP 93/52 08/23/22 1130   Temp     Pulse 86 08/23/22 1131   Resp 21 08/23/22 1131   SpO2 100 % 08/23/22 1131   Vitals shown include unvalidated device data.    Electronically Signed By: AFSHIN Villafuerte CRNA  August 23, 2022  11:33 AM

## 2022-08-23 NOTE — OR NURSING
Bladder scanned for 700. Straight cath attempted x2. Pt has hx of neurogenic bladder. Pt placed in upright position with bed pointing down, was able to void. Pt states that when he is standing he is able to void without trouble. Will continue to monitor and put in urology consult if pt unable to void when he arrives on floor.

## 2022-08-23 NOTE — BRIEF OP NOTE
[facility]    Brief Operative Note    Pre-operative diagnosis: Osteoarthritis of left knee, unspecified osteoarthritis type [M17.12]  Post-operative diagnosis Same as pre-operative diagnosis    Procedure: Procedure(s):  LEFT TOTAL KNEE ARTHROPLASTY  Surgeon: Surgeon(s) and Role:     * Melba Dial MD - Primary     * Vianney Gonzalez PA-C - Assisting  Anesthesia: Spinal   Estimated Blood Loss: 20 mL from 8/23/2022 10:06 AM to 8/23/2022 11:27 AM      Drains: None  Specimens: * No specimens in log *  Findings:   None.  Complications: None.  Implants:   Implant Name Type Inv. Item Serial No.  Lot No. LRB No. Used Action   BONE CEMENT STRK SIMPLEX P SPEEDSET 6192-1-001 - [id number] Cement, Bone BONE CEMENT STRK SIMPLEX P SPEEDSET 6192-1-001  [employer] [id number] Left 2 Implanted   IMP INSERT JJ ATTUNE POST STAB FX BR SYS SZ5 5MM [id number] - [id number] Total Joint Component/Insert IMP INSERT JJ ATTUNE POST STAB FX BR SYS SZ5 5MM [id number]  [employer]- [id number] Left 1 Implanted   IMP PATELLA JJ ATTUNE DOME 38MM [id number] - [id number] Total Joint Component/Insert IMP PATELLA JJ ATTUNE DOME 38MM [id number]  [employer] [id number] Left 1 Implanted   IMP INSERT JJ ATTUNE POST STAB FX BR SYS SZ5 5MM [id number] - [id number] Total Joint Component/Insert IMP INSERT JJ ATTUNE POST STAB FX BR SYS SZ5 5MM [id number]  [employer] [id number] Left 1 Implanted   IMP TIB BASE JJ ATTUNE FX BR SYS VANESSA SZ6 [id number] - [id number] Total Joint Component/Insert IMP TIB BASE JJ ATTUNE FX BR SYS VANESSA SZ6 [id number]  [employer] [id number] Left 1 Implanted   IMP COMP FEM JJ ATTUNE POST STAB LT VANESSA SZ5 [id number] - [id number] Total Joint Component/Insert IMP COMP FEM JJ ATTUNE POST STAB LT VANESSA SZ5 [id number]  [employer]- [id number] Left 1 Implanted

## 2022-08-23 NOTE — OP NOTE
Procedure Date: 08/23/2022    SURGEON:  Melba Dial M.D.    ASSISTANT:  Vianney Cruz PA-C, skilled assistant was required for retraction of soft tissues, protection of neurovascular structures, layered wound closure of skin, patient transfer    SECOND ASSISTANT:  Tristian Garcia M.D., resident physician.     IMPLANTS PLACED:  DePuy Attune size 5 femur, 6 tibia, 5 poly, 38 patella.    PREOPERATIVE DIAGNOSIS:  End-stage osteoarthritis, left knee.    POSTOPERATIVE DIAGNOSIS:  End-stage osteoarthritis, left knee.    PROCEDURE:  Total knee arthroplasty, left knee.    INDICATIONS:  Steven is a 76-year-old gentleman with advanced end-stage osteoarthritis of his knee.  He exhausted nonoperative measures.  Risks, benefits, alternatives, and recovery were discussed, questions answered, he elected to proceed with TKA.    DESCRIPTION OF PROCEDURE:  After informed consent was obtained and operative site marked, adductor canal block was administered by anesthesia in the preoperative holding suite.  He was then brought to the operating room where spinal anesthetic and sedation were completed.  The left lower extremity was positioned, prepped and draped in the usual sterile fashion.  Surgical timeout taken.  Leg was exsanguinated and tourniquet insufflated to 250 mmHg.  Anterior incision was made through skin and subcutaneous fat.  An arthrotomy created with the knee in hyperflexion.  Femoral drill was used to find the canal.  A canal-based resecting guide was positioned.  Distal femoral cut was performed and on the surface, sizing and rotational alignment were selected.  A cutting jig was secured and anterior, posterior and chamfer cut, followed by PCL sacrificing box cut were completed.    The tibia was then addressed, correcting for coronal and sagittal alignment.  The articular surface was resected, sized and punched.  Trial femur was placed followed by polyethylene and soft tissue and polyethylene selection were completed.   With the knee in extension, the articular surface of the patella was resected, sized and punched.  At this time, all trials were in place, and upon conclusion, the knee was found to be balanced with full extension and flexion, stability throughout the range of motion and normal patellar tracking.  All trials were removed.  Bone ends were irrigated, dried, and prepared for final cementation.  Final components were cemented into place.  Periarticular local anesthetic was administered.  Betadine wash was completed and evacuated.  The arthrotomy was closed followed by secondary check of the soft tissue balancing.  Layered wound closure of skin was completed, followed by sterile dressing application.  The patient was taken to PACU in stable condition.    ESTIMATED BLOOD LOSS:  Minimal.    COMPLICATIONS:  None apparent.    POSTOPERATIVE PLAN:  For him to bear weight.  He has unrestricted range of motion.  PT to start postop day zero.  Anticipate discharge home postoperative day #1.  Outpatient followup in 10-14 days, sooner if needed.    Melba Dial MD        D: 2022   T: 2022   MT: JAYY    Name:     JUAN MANUEL GRIMES  MRN:      [id number]        Account:        [id number]   :      1945           Procedure Date: 2022     Document: [id number]

## 2022-08-23 NOTE — ANESTHESIA POSTPROCEDURE EVALUATION
Patient: Mendez Greene    Procedure: Procedure(s):  LEFT TOTAL KNEE ARTHROPLASTY       Anesthesia Type:  Spinal    Note:  Disposition: Inpatient   Postop Pain Control: Uneventful            Sign Out: Well controlled pain   PONV: No   Neuro/Psych: Uneventful            Sign Out: Acceptable/Baseline neuro status   Airway/Respiratory: Uneventful            Sign Out: Acceptable/Baseline resp. status   CV/Hemodynamics: Uneventful            Sign Out: Acceptable CV status   Other NRE: NONE   DID A NON-ROUTINE EVENT OCCUR? No           Last vitals:  Vitals Value Taken Time   /57 08/23/22 1210   Temp     Pulse 63 08/23/22 1214   Resp 38 08/23/22 1214   SpO2 99 % 08/23/22 1156   Vitals shown include unvalidated device data.    Electronically Signed By: Mendez Ocasio MD  August 23, 2022  12:15 PM

## 2022-08-23 NOTE — ANESTHESIA PROCEDURE NOTES
Intrathecal Procedure Note    Pre-Procedure   Staff -        Anesthesiologist:  Mendez Ocasio MD       Performed By: anesthesiologist       Location: OR       Pre-Anesthestic Checklist: patient identified, IV checked, risks and benefits discussed, informed consent, monitors and equipment checked and pre-op evaluation  Timeout:       Correct Patient: Yes        Correct Procedure: Yes        Correct Site: Yes        Correct Position: Yes   Procedure Documentation  Procedure: intrathecal       Patient Position: sitting       Patient Prep/Sterile Barriers: sterile gloves, mask, patient draped       Skin prep: Betadine       Insertion Site: L3-4. (midline approach).       Needle Gauge: 24.        Needle Length (Inches): 4        Spinal Needle Type: Pencan       Introducer used       # of attempts: 1 and  # of redirects:     Assessment/Narrative         Paresthesias: No.       CSF fluid: clear.    Medication(s) Administered   0.75% Hyperbaric Bupivacaine (Intrathecal) - Intrathecal   1.6 mL - 8/23/2022 10:15:00 AM   Comments:  No pain with injection, questions answered about procedure

## 2022-08-23 NOTE — PLAN OF CARE
Goal Outcome Evaluation:    Patient vital signs are at baseline: Yes  Patient able to ambulate as they were prior to admission or with assist devices provided by therapies during their stay:  Yes  Patient MUST void prior to discharge:  Yes  Patient able to tolerate oral intake:  Yes  Pain has adequate pain control using Oral analgesics:  Yes  Does patient have an identified :  Yes  Has goal D/C date and time been discussed with patient:  Yes        Patient is alert and oriented X4. VSS on RA with good sat  95%. Up with assist of 1 with gait belt and walker. On regular diet with good appetite. Takes pills whole with thin liquids. Patient ambulating to bathroom.IV LR infusing 100ml/hr.pain managed well with prn oxycodone,  Atarax and scheduled acetaminophen.patient participating in PT. CMS intact. Dressing dry and intact.Uses home CPAP at bedtime. Voiding adequately in bathroom with minimum output PVR check with output 90ml. Discharge tomorrow home.

## 2022-08-24 ENCOUNTER — APPOINTMENT (OUTPATIENT)
Dept: PHYSICAL THERAPY | Facility: CLINIC | Age: 77
End: 2022-08-24
Attending: ORTHOPAEDIC SURGERY
Payer: MEDICARE

## 2022-08-24 ENCOUNTER — APPOINTMENT (OUTPATIENT)
Dept: OCCUPATIONAL THERAPY | Facility: CLINIC | Age: 77
End: 2022-08-24
Attending: ORTHOPAEDIC SURGERY
Payer: MEDICARE

## 2022-08-24 VITALS
RESPIRATION RATE: 16 BRPM | HEART RATE: 40 BPM | SYSTOLIC BLOOD PRESSURE: 109 MMHG | BODY MASS INDEX: 28.81 KG/M2 | HEIGHT: 69 IN | OXYGEN SATURATION: 97 % | DIASTOLIC BLOOD PRESSURE: 57 MMHG | WEIGHT: 194.5 LBS | TEMPERATURE: 97.3 F

## 2022-08-24 LAB
GLUCOSE BLDC GLUCOMTR-MCNC: 103 MG/DL (ref 70–99)
HGB BLD-MCNC: 10.2 G/DL (ref 13.3–17.7)

## 2022-08-24 PROCEDURE — 82962 GLUCOSE BLOOD TEST: CPT

## 2022-08-24 PROCEDURE — 97110 THERAPEUTIC EXERCISES: CPT | Mod: GP

## 2022-08-24 PROCEDURE — 97166 OT EVAL MOD COMPLEX 45 MIN: CPT | Mod: GO | Performed by: OCCUPATIONAL THERAPIST

## 2022-08-24 PROCEDURE — 97530 THERAPEUTIC ACTIVITIES: CPT | Mod: GO | Performed by: OCCUPATIONAL THERAPIST

## 2022-08-24 PROCEDURE — 250N000011 HC RX IP 250 OP 636: Performed by: PHYSICIAN ASSISTANT

## 2022-08-24 PROCEDURE — 36415 COLL VENOUS BLD VENIPUNCTURE: CPT | Performed by: PHYSICIAN ASSISTANT

## 2022-08-24 PROCEDURE — 97535 SELF CARE MNGMENT TRAINING: CPT | Mod: GO | Performed by: OCCUPATIONAL THERAPIST

## 2022-08-24 PROCEDURE — 250N000013 HC RX MED GY IP 250 OP 250 PS 637: Performed by: PHYSICIAN ASSISTANT

## 2022-08-24 PROCEDURE — 97116 GAIT TRAINING THERAPY: CPT | Mod: GP

## 2022-08-24 PROCEDURE — 85018 HEMOGLOBIN: CPT | Performed by: PHYSICIAN ASSISTANT

## 2022-08-24 RX ORDER — AMOXICILLIN 250 MG
1 CAPSULE ORAL 2 TIMES DAILY
Qty: 30 TABLET | Refills: 0 | Status: SHIPPED | OUTPATIENT
Start: 2022-08-24 | End: 2022-11-16

## 2022-08-24 RX ORDER — HYDROXYZINE HYDROCHLORIDE 10 MG/1
10 TABLET, FILM COATED ORAL EVERY 6 HOURS PRN
Qty: 30 TABLET | Refills: 0 | Status: SHIPPED | OUTPATIENT
Start: 2022-08-24 | End: 2022-11-16

## 2022-08-24 RX ORDER — CELECOXIB 100 MG/1
100 CAPSULE ORAL 2 TIMES DAILY
Qty: 60 CAPSULE | Refills: 0 | Status: SHIPPED | OUTPATIENT
Start: 2022-08-24 | End: 2022-11-16

## 2022-08-24 RX ORDER — ACETAMINOPHEN 325 MG/1
975 TABLET ORAL EVERY 8 HOURS
Qty: 100 TABLET | Refills: 0 | Status: SHIPPED | OUTPATIENT
Start: 2022-08-24 | End: 2022-11-16

## 2022-08-24 RX ORDER — OXYCODONE HYDROCHLORIDE 5 MG/1
5 TABLET ORAL EVERY 4 HOURS PRN
Qty: 25 TABLET | Refills: 0 | Status: SHIPPED | OUTPATIENT
Start: 2022-08-24 | End: 2022-11-16

## 2022-08-24 RX ADMIN — ASPIRIN 81 MG: 81 TABLET, COATED ORAL at 08:53

## 2022-08-24 RX ADMIN — OXYCODONE HYDROCHLORIDE 10 MG: 5 TABLET ORAL at 03:09

## 2022-08-24 RX ADMIN — LEVOTHYROXINE SODIUM 150 MCG: 75 TABLET ORAL at 06:43

## 2022-08-24 RX ADMIN — SENNOSIDES AND DOCUSATE SODIUM 1 TABLET: 50; 8.6 TABLET ORAL at 08:54

## 2022-08-24 RX ADMIN — CEFAZOLIN SODIUM 2 G: 2 INJECTION, SOLUTION INTRAVENOUS at 01:48

## 2022-08-24 RX ADMIN — ACETAMINOPHEN 975 MG: 325 TABLET ORAL at 06:43

## 2022-08-24 RX ADMIN — HYDROXYZINE HYDROCHLORIDE 10 MG: 10 TABLET ORAL at 01:54

## 2022-08-24 RX ADMIN — OXYCODONE HYDROCHLORIDE 10 MG: 5 TABLET ORAL at 08:54

## 2022-08-24 ASSESSMENT — ACTIVITIES OF DAILY LIVING (ADL)
ADLS_ACUITY_SCORE: 20
PREVIOUS_RESPONSIBILITIES: MEAL PREP;HOUSEKEEPING;LAUNDRY;SHOPPING;YARDWORK;MEDICATION MANAGEMENT;FINANCES;DRIVING
ADLS_ACUITY_SCORE: 20

## 2022-08-24 NOTE — PROGRESS NOTES
08/24/22 0853   Quick Adds   Type of Visit Initial Occupational Therapy Evaluation   Living Environment   People in Home spouse   Current Living Arrangements house   Transportation Anticipated family or friend will provide   Living Environment Comments Pt's wife arrived at end of session and can A pt as needed with ADL/IADL's, etc.   Self-Care   Equipment Currently Used at Home   (has a reacher)   Fall history within last six months no   Activity/Exercise/Self-Care Comment walk in shower, pt has toilet safety frame around toilet at home.   Instrumental Activities of Daily Living (IADL)   Previous Responsibilities meal prep;housekeeping;laundry;shopping;yardwork;medication management;finances;driving   General Information   Onset of Illness/Injury or Date of Surgery 08/23/22   Referring Physician Vianney Gonzalez PA-C   Patient/Family Therapy Goal Statement (OT) home with wife A   Additional Occupational Profile Info/Pertinent History of Current Problem POD # 1 L TKA   Existing Precautions/Restrictions fall   Left Lower Extremity (Weight-bearing Status) weight-bearing as tolerated (WBAT)   Cognitive Status Examination   Orientation Status orientation to person, place and time   Affect/Mental Status (Cognitive) WFL   Follows Commands WFL   Cognitive Status Comments cues for ww safety at times.   Sensory   Sensory Comments did not report any numbness   Pain Assessment   Patient Currently in Pain Yes, see Vital Sign flowsheet  (4.5/5 L knee pain, rec'd pain meds during session.)   Range of Motion Comprehensive   Comment, General Range of Motion B UE AROM WFL   Transfer Skill: Bed to Chair/Chair to Bed   Bed-Chair [place] (Transfers) contact guard   Assistive Device (Bed-Chair Transfers) rolling walker   Sit-Stand Transfer   Sit-Stand [place] (Transfers)   (SBA)   Assistive Device (Sit-Stand Transfers) walker, front-wheeled   Toilet Transfer   [place] Level (Toilet Transfer)   (stood for toileting  with SBA)   Lower Body Dressing Assessment/Training   [place] Level (Lower Body Dressing) minimum assist (75% patient effort)   Clinical Impression   Criteria for Skilled Therapeutic Interventions Met (OT) Yes, treatment indicated   OT Diagnosis impaired I with ADL's and functional mobility   OT Problem List-Impairments impacting ADL problems related to;pain;strength;balance;range of motion (ROM)   Assessment of Occupational Performance 3-5 Performance Deficits   Identified Performance Deficits impaired I with LE dress, toilet and walk in shower transfer, yard work, shopping, etc   Planned Therapy Interventions (OT) ADL retraining;transfer training   Clinical Decision Making Complexity (OT) moderate complexity   Anticipated Equipment Needs Upon Discharge (OT)   (reports has reacher, LHSH, toilet safety frame, denies need for RTS)   Risk & Benefits of therapy have been explained evaluation/treatment results reviewed;care plan/treatment goals reviewed;risks/benefits reviewed;current/potential barriers reviewed;participants voiced agreement with care plan;participants included;patient;spouse/significant other   OT Discharge Planning   OT Rationale for DC Rec Pt reports his wife will be able to A with ADL/IADL's as needed when return home. pt will likely require initial A with socks, walk in shower transfer, shopping, yard work, transportation, etc   OT Brief overview of current status LE dress with Reacher use for socks after ed S/mod I, pt declined seated toilet transfer but stood for toileting Supervision/mod I, pt reports no concerns with toilet transfe rat home with toilet safety frame.   Total Evaluation Time (Minutes)   Total Evaluation Time (Minutes) 10   OT Goals   Therapy Frequency (OT) One time eval and treatment   OT Predicted Duration/Target Date for Goal Attainment 08/24/22   OT Goals Lower Body Dressing;Toilet Transfer/Toileting   OT: Lower Body Dressing Modified independent;using adaptive  equipment;Goal Met   OT: Toilet Transfer/Toileting Modified independent;toilet transfer;cleaning and garment management;using adaptive equipment

## 2022-08-24 NOTE — PLAN OF CARE
Physical Therapy Discharge Summary    Reason for therapy discharge:    All goals and outcomes met, no further needs identified.    Progress towards therapy goal(s). See goals on Care Plan in [facility] electronic health record for goal details.  Goals met    Therapy recommendation(s):    Continued therapy is recommended.  Rationale/Recommendations:  Defer to ortho team.

## 2022-08-24 NOTE — PROGRESS NOTES
The patient will be discharging to home, via personal transportation, accompanied by family. He attests to having all of his belongings and will sign all pertinent paperwork.

## 2022-08-24 NOTE — PLAN OF CARE
Occupational Therapy Discharge Summary    Reason for therapy discharge:    Discharged to home.    Progress towards therapy goal(s). See goals on Care Plan in [facility] electronic health record for goal details.  Goals partially met.  Barriers to achieving goals:   discharge from facility.    Therapy recommendation(s):    Pt reports his wife will be able to A with ADL/IADL's as needed when return home. pt will likely require initial A with socks, walk in shower transfer, shopping, yard work, transportation, etc

## 2022-08-24 NOTE — PROGRESS NOTES
"Mendez Greene  2022   POD # 1, left TKA    Doing well, working with PT, nurse at bedside, pain well controlled  Blood pressure 109/57, pulse (!) 40, temperature 97.3  F (36.3  C), temperature source Oral, resp. rate 16, height 1.753 m (5' 9\"), weight 88.2 kg (194 lb 8 oz), SpO2 97 %.  Temperatures:  Current - Temp: 97.3  F (36.3  C); Max - Temp  Av.3  F (36.8  C)  Min: 97.3  F (36.3  C)  Max: 98.7  F (37.1  C)  Pulse range: Pulse  Av.8  Min: 40  Max: 90  Blood pressure range: Systolic (24hrs), Av , Min:93 , Max:208   ; Diastolic (24hrs), Av, Min:38, Max:99    CMS: dressing CDI, able to wiggle toes bilat, bilat calves soft and non tender  Labs:  Hemoglobin   Date Value Ref Range Status   2022 10.2 (L) 13.3 - 17.7 g/dL Final   2020 14.7 13.3 - 17.7 g/dL Final   ]  No results found for: INR  Lab Results   Component Value Date     08/10/2022       PLAN:    Discharge home with ASA 81mg BID x 30 days, APAP, celebrex, oxycodone, hydroxyzine  aquacel dressing, home with ACE wrap on or with patient   "

## 2022-08-24 NOTE — CARE PLAN
Shift Summary [id number]    Admitting Diagnosis: Osteoarthritis of left knee, unspecified osteoarthritis type [M17.12]  Total knee replacement status, left [Z96.652]     Patient vital signs are at baseline: Yes  Patient able to ambulate as they were prior to admission or with assist devices provided by therapies during their stay:  Yes  Patient MUST void prior to discharge:  Yes  Patient able to tolerate oral intake:  Yes  Pain has adequate pain control using Oral analgesics:  Yes  Does patient have an identified :  Yes  Has goal D/C date and time been discussed with patient:  Yes, Possible today.    A&Ox4. VSS on RA.Lung sounds clear bilateral. CMS intact.Dressing CDI. Tolerated regular diet. Saline locked.one assist with GB/walker. HR 38-40's baseline. CPAP on NOC.Pain managed PRN atarax,oxycodone and schedule tylenol. Possible discharging today.

## 2022-08-26 ENCOUNTER — THERAPY VISIT (OUTPATIENT)
Dept: PHYSICAL THERAPY | Facility: CLINIC | Age: 77
End: 2022-08-26
Payer: MEDICARE

## 2022-08-26 DIAGNOSIS — Z96.652 TOTAL KNEE REPLACEMENT STATUS, LEFT: Primary | ICD-10-CM

## 2022-08-26 DIAGNOSIS — Z47.89 ORTHOPEDIC AFTERCARE: ICD-10-CM

## 2022-08-26 PROCEDURE — 97161 PT EVAL LOW COMPLEX 20 MIN: CPT | Mod: GP | Performed by: PHYSICAL THERAPIST

## 2022-08-26 PROCEDURE — 97110 THERAPEUTIC EXERCISES: CPT | Mod: GP | Performed by: PHYSICAL THERAPIST

## 2022-08-26 ASSESSMENT — ACTIVITIES OF DAILY LIVING (ADL)
GO DOWN STAIRS: ACTIVITY IS MINIMALLY DIFFICULT
WEAKNESS: I HAVE THE SYMPTOM BUT IT DOES NOT AFFECT MY ACTIVITY
STIFFNESS: I HAVE THE SYMPTOM BUT IT DOES NOT AFFECT MY ACTIVITY
GIVING WAY, BUCKLING OR SHIFTING OF KNEE: I DO NOT HAVE THE SYMPTOM
STAND: ACTIVITY IS MINIMALLY DIFFICULT
KNEEL ON THE FRONT OF YOUR KNEE: NOT ANSWERED
GO UP STAIRS: ACTIVITY IS MINIMALLY DIFFICULT
RAW_SCORE: 56
LIMPING: I HAVE THE SYMPTOM BUT IT DOES NOT AFFECT MY ACTIVITY
HOW_WOULD_YOU_RATE_THE_CURRENT_FUNCTION_OF_YOUR_KNEE_DURING_YOUR_USUAL_DAILY_ACTIVITIES_ON_A_SCALE_FROM_0_TO_100_WITH_100_BEING_YOUR_LEVEL_OF_KNEE_FUNCTION_PRIOR_TO_YOUR_INJURY_AND_0_BEING_THE_INABILITY_TO_PERFORM_ANY_OF_YOUR_USUAL_DAILY_ACTIVITIES?: 70
PAIN: I HAVE THE SYMPTOM BUT IT DOES NOT AFFECT MY ACTIVITY
SIT WITH YOUR KNEE BENT: ACTIVITY IS MINIMALLY DIFFICULT
HOW_WOULD_YOU_RATE_THE_OVERALL_FUNCTION_OF_YOUR_KNEE_DURING_YOUR_USUAL_DAILY_ACTIVITIES?: NEARLY NORMAL
AS_A_RESULT_OF_YOUR_KNEE_INJURY,_HOW_WOULD_YOU_RATE_YOUR_CURRENT_LEVEL_OF_DAILY_ACTIVITY?: ABNORMAL
SWELLING: I HAVE THE SYMPTOM BUT IT DOES NOT AFFECT MY ACTIVITY
RISE FROM A CHAIR: ACTIVITY IS MINIMALLY DIFFICULT
KNEE_ACTIVITY_OF_DAILY_LIVING_SUM: 52
KNEE_ACTIVITY_OF_DAILY_LIVING_SCORE: 80
SQUAT: ACTIVITY IS FAIRLY DIFFICULT
WALK: ACTIVITY IS NOT DIFFICULT

## 2022-08-26 NOTE — PROGRESS NOTES
[facility]    OUTPATIENT Physical Therapy ORTHOPEDIC EVALUATION  PLAN OF TREATMENT FOR OUTPATIENT REHABILITATION  (COMPLETE FOR INITIAL CLAIMS ONLY)  Patient's Last Name, First Name, M.I.  YOB: 1945  Mendez Greene    Provider s Name:  [facility]   Medical Record No.  [id number]   Start of Care Date:  08/26/22   Onset Date:  08/23/22    Type:     _X__PT   ___OT Medical Diagnosis:  No diagnosis found.     Treatment Diagnosis:  s/p L TKA        Goals:     08/26/22 0500   Body Part   Goals listed below are for L knee   Goal #1   Goal #1 ambulation   Previous Functional Level No restrictions   Current Functional Level with walker   Performance Level 10+ minutes   STG Target Performance with cane   Performance Level 10-15 minutes   Rationale for safe household ambulation;for safe outdoor household ambulation;for safe community ambulation   Due Date 09/23/22    LTG Target Performance without assistive device   Performance Level 15+ minutes   Rationale for safe household ambulation;for safe outdoor household ambulation;for safe community ambulation   Due Date 10/07/22   Goal #2   Goal #2 stairs   Current Functional Level   (not using)   STG Target Performance Ascend/descend stairs;in a normal reciprocal pattern;with a railing   Rationale to reach upper level of home safely;to reach lower level of home safely   Due Date 09/23/22       Therapy Frequency:  2x/week for 3 weeks, 1x/week for 2 weeks  Predicted Duration of Therapy Intervention:  6 weeks    Varsha Brown, PT                 I CERTIFY THE NEED FOR THESE SERVICES FURNISHED UNDER        THIS PLAN OF TREATMENT AND WHILE UNDER MY CARE .             Physician Signature               Date    X_____________________________________________________                         Certification Date From:  08/26/22    Certification Date To:  10/07/22    Referring Provider:  Melba Dial    Initial Assessment        See Epic Evaluation SOC Date: 08/26/22

## 2022-08-26 NOTE — PROGRESS NOTES
Physical Therapy Initial Evaluation  Subjective:  The history is provided by the patient. No  was used.   Patient Health History  Mendez Greene being seen for knee replacment PT.     Problem began: 8/23/2022.   Problem occurred: aging   Pain is reported as 8/10 on pain scale.  General health as reported by patient is good.  Pertinent medical history includes: cancer, heart problems, high blood pressure, osteoarthritis, sleep disorder/apnea, smoking and thyroid problems.   Red flags:  None as reported by patient.  Medical allergies: none.   Surgeries include:  Orthopedic surgery, cancer surgery and other. Other surgery history details: Hernia surgery.    Current medications:  Anti-inflammatory, high blood pressure medication, pain medication and thyroid medication.    Current occupation is retired.   Primary job tasks include:  Computer work, driving, lifting/carrying and pushing/pulling.                  Therapist Generated HPI Evaluation  Problem details: Pt presents s/p L TKA 8-23-22; discharge on 8-24-22.  Pt reported 2 steps into the home; bedroom on the main level. Pt reported he is currently taking OTC meds only for pain management. Utilizing cold packs hourly for pain management.         Type of problem:  Left knee.    This is a chronic condition.  Condition occurred with:  Degenerative joint disease.    Patient reports pain:  Anterior and posterior.  Pain is described as aching and is constant.  Pain is worse during the day.  Since onset symptoms are gradually improving.  Associated symptoms:  Loss of motion/stiffness and edema. Symptoms are exacerbated by standing, weight bearing and walking  and relieved by ice and other (OTC meds-tylenol).      Work activity restrictions: Retired.  Barriers include:  None as reported by patient.                        Objective:    Gait:    Weight Bearing Status:  WBAT   Assistive Devices:  Walker                                                         Knee Evaluation:  ROM:    AROM      Extension: Left: 1-2    Right:   Flexion: Left: 110   Right:        Strength:         Quad Set Left: Good    Pain:     Ligament Testing:  Not Assessed                Special Tests: Not Assessed        Edema:  Edema of the knee: generalized swelling L LE.      Functional Testing:  : Demonstrated ability to lift L LE independently from sitting to lying, lying to sitting.                  General     ROS    Assessment/Plan:    Patient is a 76 year old male with left side knee complaints.    Patient has the following significant findings with corresponding treatment plan.                Diagnosis 1:  S/p L TKA  Pain -  hot/cold therapy, self management, education and home program  Decreased ROM/flexibility - therapeutic exercise  Decreased strength - therapeutic exercise and therapeutic activities  Edema - cold therapy  Impaired gait - gait training  Impaired muscle performance - neuro re-education  Decreased function - therapeutic activities    Therapy Evaluation Codes:   1) History comprised of:   Personal factors that impact the plan of care:      None.    Comorbidity factors that impact the plan of care are:      None.     Medications impacting care: None.  2) Examination of Body Systems comprised of:   Body structures and functions that impact the plan of care:      Knee.   Activity limitations that impact the plan of care are:      Stairs and Walking.  3) Clinical presentation characteristics are:   Stable/Uncomplicated.  4) Decision-Making    Low complexity using standardized patient assessment instrument and/or measureable assessment of functional outcome.  Cumulative Therapy Evaluation is: Low complexity.    Previous and current functional limitations:  (See Goal Flow Sheet for this information)    Short term and Long term goals: (See Goal Flow Sheet for this information)     Communication ability:  Patient appears to be able to clearly communicate and understand verbal and  written communication and follow directions correctly.  Treatment Explanation - The following has been discussed with the patient:   RX ordered/plan of care  Anticipated outcomes  Possible risks and side effects  This patient would benefit from PT intervention to resume normal activities.   Rehab potential is excellent.    Frequency:  2 X week, once daily  Duration:  for 3 weeks tapering to 1 X a week over 2 weeks  Discharge Plan:  Achieve all LTG.  Independent in home treatment program.  Reach maximal therapeutic benefit.    Please refer to the daily flowsheet for treatment today, total treatment time and time spent performing 1:1 timed codes.

## 2022-08-29 ENCOUNTER — THERAPY VISIT (OUTPATIENT)
Dept: PHYSICAL THERAPY | Facility: CLINIC | Age: 77
End: 2022-08-29
Payer: MEDICARE

## 2022-08-29 DIAGNOSIS — Z47.89 ORTHOPEDIC AFTERCARE: ICD-10-CM

## 2022-08-29 DIAGNOSIS — Z96.652 TOTAL KNEE REPLACEMENT STATUS, LEFT: Primary | ICD-10-CM

## 2022-08-29 PROCEDURE — 97110 THERAPEUTIC EXERCISES: CPT | Mod: GP | Performed by: PHYSICAL THERAPIST

## 2022-08-29 PROCEDURE — 97530 THERAPEUTIC ACTIVITIES: CPT | Mod: GP | Performed by: PHYSICAL THERAPIST

## 2022-09-01 ENCOUNTER — THERAPY VISIT (OUTPATIENT)
Dept: PHYSICAL THERAPY | Facility: CLINIC | Age: 77
End: 2022-09-01
Payer: MEDICARE

## 2022-09-01 DIAGNOSIS — Z96.652 TOTAL KNEE REPLACEMENT STATUS, LEFT: Primary | ICD-10-CM

## 2022-09-01 DIAGNOSIS — Z47.89 ORTHOPEDIC AFTERCARE: ICD-10-CM

## 2022-09-01 PROCEDURE — 97110 THERAPEUTIC EXERCISES: CPT | Mod: GP | Performed by: PHYSICAL THERAPIST

## 2022-09-01 PROCEDURE — 97530 THERAPEUTIC ACTIVITIES: CPT | Mod: GP | Performed by: PHYSICAL THERAPIST

## 2022-09-06 ENCOUNTER — THERAPY VISIT (OUTPATIENT)
Dept: PHYSICAL THERAPY | Facility: CLINIC | Age: 77
End: 2022-09-06
Payer: MEDICARE

## 2022-09-06 ENCOUNTER — TRANSFERRED RECORDS (OUTPATIENT)
Dept: HEALTH INFORMATION MANAGEMENT | Facility: CLINIC | Age: 77
End: 2022-09-06

## 2022-09-06 DIAGNOSIS — Z96.652 TOTAL KNEE REPLACEMENT STATUS, LEFT: Primary | ICD-10-CM

## 2022-09-06 DIAGNOSIS — Z47.89 ORTHOPEDIC AFTERCARE: ICD-10-CM

## 2022-09-06 PROCEDURE — 97110 THERAPEUTIC EXERCISES: CPT | Mod: GP | Performed by: PHYSICAL THERAPIST

## 2022-09-06 PROCEDURE — 97530 THERAPEUTIC ACTIVITIES: CPT | Mod: GP | Performed by: PHYSICAL THERAPIST

## 2022-09-08 ENCOUNTER — THERAPY VISIT (OUTPATIENT)
Dept: PHYSICAL THERAPY | Facility: CLINIC | Age: 77
End: 2022-09-08
Payer: MEDICARE

## 2022-09-08 DIAGNOSIS — Z47.89 ORTHOPEDIC AFTERCARE: ICD-10-CM

## 2022-09-08 DIAGNOSIS — Z96.652 TOTAL KNEE REPLACEMENT STATUS, LEFT: Primary | ICD-10-CM

## 2022-09-08 PROCEDURE — 97110 THERAPEUTIC EXERCISES: CPT | Mod: GP | Performed by: PHYSICAL THERAPIST

## 2022-09-08 PROCEDURE — 97112 NEUROMUSCULAR REEDUCATION: CPT | Mod: GP | Performed by: PHYSICAL THERAPIST

## 2022-09-12 ENCOUNTER — THERAPY VISIT (OUTPATIENT)
Dept: PHYSICAL THERAPY | Facility: CLINIC | Age: 77
End: 2022-09-12
Payer: MEDICARE

## 2022-09-12 DIAGNOSIS — Z96.652 TOTAL KNEE REPLACEMENT STATUS, LEFT: Primary | ICD-10-CM

## 2022-09-12 DIAGNOSIS — Z47.89 ORTHOPEDIC AFTERCARE: ICD-10-CM

## 2022-09-12 PROCEDURE — 97530 THERAPEUTIC ACTIVITIES: CPT | Mod: GP | Performed by: PHYSICAL THERAPIST

## 2022-09-12 PROCEDURE — 97110 THERAPEUTIC EXERCISES: CPT | Mod: GP | Performed by: PHYSICAL THERAPIST

## 2022-09-19 ENCOUNTER — THERAPY VISIT (OUTPATIENT)
Dept: PHYSICAL THERAPY | Facility: CLINIC | Age: 77
End: 2022-09-19
Payer: MEDICARE

## 2022-09-19 DIAGNOSIS — Z96.652 TOTAL KNEE REPLACEMENT STATUS, LEFT: Primary | ICD-10-CM

## 2022-09-19 DIAGNOSIS — Z47.89 ORTHOPEDIC AFTERCARE: ICD-10-CM

## 2022-09-19 PROCEDURE — 97110 THERAPEUTIC EXERCISES: CPT | Mod: GP | Performed by: PHYSICAL THERAPIST

## 2022-09-19 PROCEDURE — 97112 NEUROMUSCULAR REEDUCATION: CPT | Mod: GP | Performed by: PHYSICAL THERAPIST

## 2022-09-26 ENCOUNTER — THERAPY VISIT (OUTPATIENT)
Dept: PHYSICAL THERAPY | Facility: CLINIC | Age: 77
End: 2022-09-26
Payer: MEDICARE

## 2022-09-26 DIAGNOSIS — Z96.652 TOTAL KNEE REPLACEMENT STATUS, LEFT: Primary | ICD-10-CM

## 2022-09-26 DIAGNOSIS — Z47.89 ORTHOPEDIC AFTERCARE: ICD-10-CM

## 2022-09-26 PROCEDURE — 97110 THERAPEUTIC EXERCISES: CPT | Mod: GP | Performed by: PHYSICAL THERAPIST

## 2022-09-26 PROCEDURE — 97140 MANUAL THERAPY 1/> REGIONS: CPT | Mod: GP | Performed by: PHYSICAL THERAPIST

## 2022-09-29 DIAGNOSIS — E03.9 HYPOTHYROIDISM, UNSPECIFIED TYPE: ICD-10-CM

## 2022-10-03 ENCOUNTER — THERAPY VISIT (OUTPATIENT)
Dept: PHYSICAL THERAPY | Facility: CLINIC | Age: 77
End: 2022-10-03
Payer: MEDICARE

## 2022-10-03 DIAGNOSIS — Z47.89 ORTHOPEDIC AFTERCARE: ICD-10-CM

## 2022-10-03 DIAGNOSIS — Z96.652 TOTAL KNEE REPLACEMENT STATUS, LEFT: Primary | ICD-10-CM

## 2022-10-03 PROCEDURE — 97110 THERAPEUTIC EXERCISES: CPT | Mod: GP | Performed by: PHYSICAL THERAPIST

## 2022-10-03 RX ORDER — LEVOTHYROXINE SODIUM 150 UG/1
TABLET ORAL
Qty: 90 TABLET | Refills: 2 | Status: SHIPPED | OUTPATIENT
Start: 2022-10-03 | End: 2023-04-19

## 2022-10-03 ASSESSMENT — ACTIVITIES OF DAILY LIVING (ADL)
AS_A_RESULT_OF_YOUR_KNEE_INJURY,_HOW_WOULD_YOU_RATE_YOUR_CURRENT_LEVEL_OF_DAILY_ACTIVITY?: NEARLY NORMAL
RISE FROM A CHAIR: ACTIVITY IS NOT DIFFICULT
SWELLING: I DO NOT HAVE THE SYMPTOM
LIMPING: I DO NOT HAVE THE SYMPTOM
KNEE_ACTIVITY_OF_DAILY_LIVING_SCORE: 94.29
WALK: ACTIVITY IS NOT DIFFICULT
KNEE_ACTIVITY_OF_DAILY_LIVING_SUM: 66
HOW_WOULD_YOU_RATE_THE_OVERALL_FUNCTION_OF_YOUR_KNEE_DURING_YOUR_USUAL_DAILY_ACTIVITIES?: NORMAL
STAND: ACTIVITY IS NOT DIFFICULT
GO UP STAIRS: ACTIVITY IS NOT DIFFICULT
GO DOWN STAIRS: ACTIVITY IS NOT DIFFICULT
RAW_SCORE: 66
WEAKNESS: I HAVE THE SYMPTOM BUT IT DOES NOT AFFECT MY ACTIVITY
KNEEL ON THE FRONT OF YOUR KNEE: ACTIVITY IS SOMEWHAT DIFFICULT
SIT WITH YOUR KNEE BENT: ACTIVITY IS NOT DIFFICULT
STIFFNESS: I DO NOT HAVE THE SYMPTOM
PAIN: I HAVE THE SYMPTOM BUT IT DOES NOT AFFECT MY ACTIVITY
GIVING WAY, BUCKLING OR SHIFTING OF KNEE: I DO NOT HAVE THE SYMPTOM
SQUAT: ACTIVITY IS NOT DIFFICULT
HOW_WOULD_YOU_RATE_THE_CURRENT_FUNCTION_OF_YOUR_KNEE_DURING_YOUR_USUAL_DAILY_ACTIVITIES_ON_A_SCALE_FROM_0_TO_100_WITH_100_BEING_YOUR_LEVEL_OF_KNEE_FUNCTION_PRIOR_TO_YOUR_INJURY_AND_0_BEING_THE_INABILITY_TO_PERFORM_ANY_OF_YOUR_USUAL_DAILY_ACTIVITIES?: 98

## 2022-10-03 NOTE — PROGRESS NOTES
"DISCHARGE REPORT    Progress reporting period is from 8-26-22 to 10-3-22. Pt completed 9 visits.       SUBJECTIVE  Subjective changes noted by patient:  Continues to walk a minimum of 1 mile; no overall issues with the knee but awareness of \"fatigue.\" Reported  he has a \"leaky heart valve\"; follows with a cardiologist.    Current pain level is 0-2/10.     Previous pain level was up to 8/10.   Changes in function:  Yes (See Goal flowsheet attached for changes in current functional level)  Adverse reaction to treatment or activity: None    OBJECTIVE  Objective: Ambulates without assistive device with deviation. Knee ROM: flexion: 133 degrees (heel slide); knee extension: 0 degrees. Able to ascend/descend stairs with a reciprocal gait/railing.     ASSESSMENT/PLAN  Updated problem list and treatment plan: Diagnosis 1:  S/p L TKA   STG/LTGs have been met or progress has been made towards goals:  Yes (See Goal flow sheet completed today.)  Assessment of Progress: Patient is meeting short term goals and is progressing towards long term goals.  Self Management Plans:  Patient is independent in a home treatment program.  Mendez continues to require the following intervention to meet STG and LTG's:  PT intervention is no longer required to meet STG/LTG.    Recommendations:  MD office visit on 10-4-22; no further PT planned due to progress with all goals.    Please refer to the daily flowsheet for treatment today, total treatment time and time spent performing 1:1 timed codes.        "

## 2022-10-03 NOTE — TELEPHONE ENCOUNTER
Prescription approved per [facility] Refill Protocol.    Cassie Aremnta RN  [facility] Triage Team

## 2022-10-03 NOTE — LETTER
"[facility]  [address]  [address]  [facility] [id number] [phone]    2022    Re: Mendez Greene   :   1945  MRN:  [id number]   REFERRING PHYSICIAN:   Melba POSEY [facility]    Date of Initial Evaluation:  2022  Visits:  Rxs Used: 9  Reason for Referral:     Total knee replacement status, left  Orthopedic aftercare    DISCHARGE REPORT  Progress reporting period is from 22 to 10-3-22. Pt completed 9 visits.       SUBJECTIVE  Subjective changes noted by patient:  Continues to walk a minimum of 1 mile; no overall issues with the knee but awareness of \"fatigue.\" Reported  he has a \"leaky heart valve\"; follows with a cardiologist.    Current pain level is 0-2/10.     Previous pain level was up to 8/10.   Changes in function:  Yes (See Goal flowsheet attached for changes in current functional level)  Adverse reaction to treatment or activity: None    OBJECTIVE  Objective: Ambulates without assistive device with deviation. Knee ROM: flexion: 133 degrees (heel slide); knee extension: 0 degrees. Able to ascend/descend stairs with a reciprocal gait/railing.     ASSESSMENT/PLAN  Updated problem list and treatment plan: Diagnosis 1:  S/p L TKA   STG/LTGs have been met or progress has been made towards goals:  Yes (See Goal flow sheet completed today.)  Assessment of Progress: Patient is meeting short term goals and is progressing towards long term goals.  Self Management Plans:  Patient is independent in a home treatment program.  Mendez continues to require the following intervention to meet STG and LTG's:  PT intervention is no longer required to meet STG/LTG.    Recommendations:  MD office visit on 10-4-22; no further PT planned due to progress with all goals.          Thank you for your referral.    INQUIRIES  Therapist: Varsha Brown, PT   [facility]" SERVICES [address]  [address]  [facility] [address]  Phone: [phone]  Fax: [phone]

## 2022-10-13 ENCOUNTER — TRANSFERRED RECORDS (OUTPATIENT)
Dept: HEALTH INFORMATION MANAGEMENT | Facility: CLINIC | Age: 77
End: 2022-10-13

## 2022-10-19 DIAGNOSIS — E78.5 HYPERLIPIDEMIA, UNSPECIFIED HYPERLIPIDEMIA TYPE: Chronic | ICD-10-CM

## 2022-10-24 RX ORDER — SIMVASTATIN 10 MG
TABLET ORAL
Qty: 90 TABLET | Refills: 1 | Status: SHIPPED | OUTPATIENT
Start: 2022-10-24 | End: 2023-04-21

## 2022-10-24 NOTE — TELEPHONE ENCOUNTER
Routing refill request to provider for review/approval because:    Medication last ordered by PCP - patient last seen by Dr. Finney (PCP)      LOV: 8/10/22 - no future OV scheduled     Carolina Fuentes RN  [facility]

## 2022-11-07 ENCOUNTER — MYC MEDICAL ADVICE (OUTPATIENT)
Dept: FAMILY MEDICINE | Facility: CLINIC | Age: 77
End: 2022-11-07

## 2022-11-07 DIAGNOSIS — Z12.11 COLON CANCER SCREENING: Primary | ICD-10-CM

## 2022-11-07 DIAGNOSIS — I10 ESSENTIAL HYPERTENSION: ICD-10-CM

## 2022-11-07 RX ORDER — LISINOPRIL 5 MG/1
5 TABLET ORAL AT BEDTIME
Qty: 90 TABLET | Refills: 3 | Status: SHIPPED | OUTPATIENT
Start: 2022-11-07 | End: 2023-10-31

## 2022-11-07 NOTE — TELEPHONE ENCOUNTER
LOV 8- Sharmin    Appointments in Next Year    Nov 11, 2022 10:45 AM  ECHO COMPLETE with [email or web address]  [facility] ([facility] ) [phone]   Dec 22, 2022 10:30 AM  (Arrive by 10:25 AM)  Return Cardiology with Jes Alegria PA-C  [facility] [place] ([facility] ) [phone]          Previously written by Lou Maravilla, RT (R)

## 2022-11-07 NOTE — TELEPHONE ENCOUNTER
Dr Finney  See patient's request - asking for Cologuard test orders.    If appropriate, place orders.      Patient is MyChart active, please send reply to patient.    LOV 8- Sharmin    Appointments in Next Year    Nov 11, 2022 10:45 AM  ECHO COMPLETE with [email or web address]  [facility] ([facility] ) [phone]   Dec 22, 2022 10:30 AM  (Arrive by 10:25 AM)  Return Cardiology with Jes Alegria PA-C  [facility] [place] ([facility] ) [phone]            Thank you  RT Daya (R)

## 2022-11-11 ENCOUNTER — HOSPITAL ENCOUNTER (OUTPATIENT)
Dept: CARDIOLOGY | Facility: CLINIC | Age: 77
Discharge: HOME | End: 2022-11-11
Attending: PHYSICIAN ASSISTANT | Admitting: PHYSICIAN ASSISTANT
Payer: MEDICARE

## 2022-11-11 DIAGNOSIS — I35.1 AORTIC VALVE INSUFFICIENCY, ETIOLOGY OF CARDIAC VALVE DISEASE UNSPECIFIED: ICD-10-CM

## 2022-11-11 DIAGNOSIS — I71.21 ASCENDING AORTIC ANEURYSM (H): ICD-10-CM

## 2022-11-11 LAB — LVEF ECHO: NORMAL

## 2022-11-11 PROCEDURE — 93306 TTE W/DOPPLER COMPLETE: CPT | Mod: 26 | Performed by: INTERNAL MEDICINE

## 2022-11-11 PROCEDURE — 93306 TTE W/DOPPLER COMPLETE: CPT

## 2022-11-15 ENCOUNTER — NURSE TRIAGE (OUTPATIENT)
Dept: FAMILY MEDICINE | Facility: CLINIC | Age: 77
End: 2022-11-15

## 2022-11-15 NOTE — TELEPHONE ENCOUNTER
Call to patient. Patient informed of Dr. Finney's below response. Appointment scheduled.     Appointments in Next Year    Nov 16, 2022  1:00 PM  (Arrive by 12:40 PM)  Provider Visit with Zoë Finney MD  [facility] ([facility] ) [phone]   Dec 22, 2022 10:30 AM  (Arrive by 10:25 AM)  Return Cardiology with Jes Alegria PA-C  [facility] ([facility] ) [phone]        Anum Garcia RN BSN MSN  [facility]

## 2022-11-15 NOTE — TELEPHONE ENCOUNTER
"Nurse Triage SBAR    Is this a 2nd Level Triage? YES, LICENSED PRACTITIONER REVIEW IS REQUIRED    Situation:     Writer called patient to follow up on recent Zondlehart message:    \"I believe I should see you soon or at least come in for some lab work.  I have been exhausted after minimal movement for the last month or so.  I am constantly tired and short of breath fairly often.  I do not feel sick at all and have normal temp.  I had my echocardiogram Friday and as far as I can determine from the comments, it hasn't changed much.  (But, I can't talk to the cardiologist until 12/22)\"    Background:    Patient had L TKA in August, recent Echo on 11/11/22    Patient scheduled for follow up appointment with cardiology:    12/22/2022 10:30 AM (Arrive by 10:25 AM) Jes Alegria PA-C [facility]     Assessment:     RESPIRATORY STATUS: \"I get winded when I walk\" \"its just like feeling winded after walking, but I still can walk a mile\"  ONSET: ongoing since end of September   PATTERN: comes and goes, with exercise  SEVERITY: mild, feels SOB with walking, states he occasionally feels SOB at rest although while on phone with writer speaking in full sentences and writer unable to hear any breathing difficulty   RECURRENT SYMPTOM: denies   CARDIAC HISTORY: A fib, HTN, NSTEMI, ascending aortic aneurysm   LUNG HISTORY: allergic rhinitis, CHA   CAUSE: recent echocardiogram   O2 SATURATION MONITOR: 96%, checks frequently   TRAVEL: denies  OTHER SYMPTOMS: DENIES dizziness, cough, chest pain, fever    Reports some runny nose - states this is chronic due to allergies     Patient has not taken recent covid test - patient has at home tests available - writer advised patient take at home covid test and call back to clinic if positive - patient agreeable     Protocol Recommended Disposition:   Go To Office Now    Recommendation:     Per protocol - patient to go to office now. No appointments available with PCP " or within clinic today. No appointments at [facility] or [facility].     PCP please advise if patient should be seen urgently in ED/UCC today or if patient can schedule in office as symptoms have been ongoing since September? Please advise.    Routed to provider    Does the patient meet one of the following criteria for ADS visit consideration? 16+ years old, with an [facility] PCP     TIP  Providers, please consider if this condition is appropriate for management at one of our Acute and Diagnostic Services sites.     If patient is a good candidate, please use dotphrase <dot>triageresponse and select Refer to ADS to document.    Writer advised with new/worsening symptoms in the interim for patient to go to UCC/ED. Reviewed red flag symptoms with patient that warrant ED evaluation. Patient expressed verbal understanding and is agreeable.     Callback [phone] - ok to leave detailed VM     Carolina Fuentes RN  [facility]      Reason for Disposition    MILD difficulty breathing (e.g., minimal/no SOB at rest, SOB with walking, pulse < 100) of new-onset or worse than normal    Additional Information    Negative: SEVERE difficulty breathing (e.g., struggling for each breath, speaks in single words, pulse > 120)    Negative: Breathing stopped and hasn't returned    Negative: Choking on something    Negative: Bluish (or gray) lips or face    Negative: Difficult to awaken or acting confused (e.g., disoriented, slurred speech)    Negative: Passed out (i.e., fainted, collapsed and was not responding)    Negative: Wheezing started suddenly after medicine, an allergic food, or bee sting    Negative: Stridor    Negative: Slow, shallow and weak breathing    Negative: Sounds like a life-threatening emergency to the triager    Negative: Chest pain    Negative: Wheezing (high pitched whistling sound) and previous asthma attacks or use of asthma medicines    Negative: Difficulty breathing and within 14 days of  "COVID-19 Exposure    Negative: Difficulty breathing and only present when coughing    Negative: Difficulty breathing and only from stuffy nose    Negative: Difficulty breathing and only from stuffy nose or runny nose from common cold    Negative: MODERATE difficulty breathing (e.g., speaks in phrases, SOB even at rest, pulse 100-120) of new-onset or worse than normal    Negative: Oxygen level (e.g., pulse oximetry) 90 percent or lower    Negative: Wheezing can be heard across the room    Negative: Drooling or spitting out saliva (because can't swallow)    Negative: Any history of prior \"blood clot\" in leg or lungs    Negative: Illness requiring prolonged bedrest in past month (e.g., immobilization, long hospital stay)    Negative: Hip or leg fracture (broken bone) in past month (or had cast on leg or ankle in past month)    Negative: Major surgery in the past month    Negative: Long-distance travel in past month (e.g., car, bus, train, plane; with trip lasting 6 or more hours)    Negative: Cancer treatment in past six months (or has cancer now)    Negative: Extra heart beats OR irregular heart beating (i.e., \"palpitations\")    Negative: Fever > 103 F (39.4 C)    Negative: Fever > 101 F (38.3 C) and over 60 years of age    Negative: Fever > 100.0 F (37.8 C) and bedridden (e.g., nursing home patient, stroke, chronic illness, recovering from surgery)    Negative: Fever > 100.0 F (37.8 C) and diabetes mellitus or weak immune system (e.g., HIV positive, cancer chemo, splenectomy, organ transplant, chronic steroids)    Negative: Periods where breathing stops and then resumes normally and bedridden (e.g., nursing home patient, CVA)    Negative: Pregnant or postpartum (from 0 to 6 weeks after delivery)    Negative: Patient sounds very sick or weak to the triager    Protocols used: BREATHING DIFFICULTY-A-OH      "

## 2022-11-16 ENCOUNTER — APPOINTMENT (OUTPATIENT)
Dept: URBAN - METROPOLITAN AREA CLINIC 256 | Age: 77
Setting detail: DERMATOLOGY
End: 2022-11-16

## 2022-11-16 ENCOUNTER — ANCILLARY PROCEDURE (OUTPATIENT)
Dept: GENERAL RADIOLOGY | Facility: CLINIC | Age: 77
End: 2022-11-16
Attending: INTERNAL MEDICINE
Payer: MEDICARE

## 2022-11-16 ENCOUNTER — OFFICE VISIT (OUTPATIENT)
Dept: FAMILY MEDICINE | Facility: CLINIC | Age: 77
End: 2022-11-16
Payer: MEDICARE

## 2022-11-16 VITALS — HEIGHT: 70 IN | WEIGHT: 183 LBS

## 2022-11-16 VITALS
WEIGHT: 192.5 LBS | HEART RATE: 64 BPM | TEMPERATURE: 97.7 F | DIASTOLIC BLOOD PRESSURE: 66 MMHG | HEIGHT: 69 IN | OXYGEN SATURATION: 99 % | SYSTOLIC BLOOD PRESSURE: 133 MMHG | RESPIRATION RATE: 16 BRPM | BODY MASS INDEX: 28.51 KG/M2

## 2022-11-16 DIAGNOSIS — L82.1 OTHER SEBORRHEIC KERATOSIS: ICD-10-CM

## 2022-11-16 DIAGNOSIS — Z96.652 TOTAL KNEE REPLACEMENT STATUS, LEFT: ICD-10-CM

## 2022-11-16 DIAGNOSIS — Z85.828 PERSONAL HISTORY OF OTHER MALIGNANT NEOPLASM OF SKIN: ICD-10-CM

## 2022-11-16 DIAGNOSIS — C67.9 MALIGNANT NEOPLASM OF URINARY BLADDER, UNSPECIFIED SITE (H): ICD-10-CM

## 2022-11-16 DIAGNOSIS — L81.4 OTHER MELANIN HYPERPIGMENTATION: ICD-10-CM

## 2022-11-16 DIAGNOSIS — R06.02 SOB (SHORTNESS OF BREATH): Primary | ICD-10-CM

## 2022-11-16 DIAGNOSIS — R06.02 SOB (SHORTNESS OF BREATH): ICD-10-CM

## 2022-11-16 DIAGNOSIS — D64.9 ANEMIA, UNSPECIFIED TYPE: ICD-10-CM

## 2022-11-16 DIAGNOSIS — Z71.89 OTHER SPECIFIED COUNSELING: ICD-10-CM

## 2022-11-16 DIAGNOSIS — I35.1 AORTIC VALVE INSUFFICIENCY, ETIOLOGY OF CARDIAC VALVE DISEASE UNSPECIFIED: ICD-10-CM

## 2022-11-16 DIAGNOSIS — L57.0 ACTINIC KERATOSIS: ICD-10-CM

## 2022-11-16 LAB
ERYTHROCYTE [DISTWIDTH] IN BLOOD BY AUTOMATED COUNT: 12.2 % (ref 10–15)
FERRITIN SERPL-MCNC: 150 NG/ML (ref 31–409)
FOLATE SERPL-MCNC: 27 NG/ML (ref 4.6–34.8)
HCT VFR BLD AUTO: 40.6 % (ref 40–53)
HGB BLD-MCNC: 14 G/DL (ref 13.3–17.7)
MCH RBC QN AUTO: 34.1 PG (ref 26.5–33)
MCHC RBC AUTO-ENTMCNC: 34.5 G/DL (ref 31.5–36.5)
MCV RBC AUTO: 99 FL (ref 78–100)
NT-PROBNP SERPL-MCNC: 127 PG/ML (ref 0–1800)
PLATELET # BLD AUTO: 233 10E3/UL (ref 150–450)
RBC # BLD AUTO: 4.11 10E6/UL (ref 4.4–5.9)
VIT B12 SERPL-MCNC: 774 PG/ML (ref 232–1245)
WBC # BLD AUTO: 6.3 10E3/UL (ref 4–11)

## 2022-11-16 PROCEDURE — OTHER COUNSELING: OTHER

## 2022-11-16 PROCEDURE — 85027 COMPLETE CBC AUTOMATED: CPT | Performed by: INTERNAL MEDICINE

## 2022-11-16 PROCEDURE — 82728 ASSAY OF FERRITIN: CPT | Performed by: INTERNAL MEDICINE

## 2022-11-16 PROCEDURE — 82746 ASSAY OF FOLIC ACID SERUM: CPT | Performed by: INTERNAL MEDICINE

## 2022-11-16 PROCEDURE — 99213 OFFICE O/P EST LOW 20 MIN: CPT | Mod: 25

## 2022-11-16 PROCEDURE — 71046 X-RAY EXAM CHEST 2 VIEWS: CPT | Mod: TC | Performed by: RADIOLOGY

## 2022-11-16 PROCEDURE — 99214 OFFICE O/P EST MOD 30 MIN: CPT | Performed by: INTERNAL MEDICINE

## 2022-11-16 PROCEDURE — OTHER LIQUID NITROGEN: OTHER

## 2022-11-16 PROCEDURE — 82607 VITAMIN B-12: CPT | Performed by: INTERNAL MEDICINE

## 2022-11-16 PROCEDURE — 17000 DESTRUCT PREMALG LESION: CPT

## 2022-11-16 PROCEDURE — 36415 COLL VENOUS BLD VENIPUNCTURE: CPT | Performed by: INTERNAL MEDICINE

## 2022-11-16 PROCEDURE — 17003 DESTRUCT PREMALG LES 2-14: CPT

## 2022-11-16 PROCEDURE — OTHER MIPS QUALITY: OTHER

## 2022-11-16 PROCEDURE — 83880 ASSAY OF NATRIURETIC PEPTIDE: CPT | Performed by: INTERNAL MEDICINE

## 2022-11-16 RX ORDER — ALBUTEROL SULFATE 90 UG/1
2 AEROSOL, METERED RESPIRATORY (INHALATION) EVERY 6 HOURS PRN
Qty: 18 G | Refills: 0 | Status: SHIPPED | OUTPATIENT
Start: 2022-11-16 | End: 2024-03-04

## 2022-11-16 ASSESSMENT — LOCATION DETAILED DESCRIPTION DERM
LOCATION DETAILED: RIGHT SUPERIOR FOREHEAD
LOCATION DETAILED: RIGHT SUPERIOR LATERAL MALAR CHEEK
LOCATION DETAILED: LEFT MEDIAL UPPER BACK
LOCATION DETAILED: LEFT SUPERIOR UPPER BACK
LOCATION DETAILED: RIGHT POSTERIOR SHOULDER
LOCATION DETAILED: RIGHT SUPERIOR MEDIAL FOREHEAD
LOCATION DETAILED: LEFT CENTRAL MALAR CHEEK

## 2022-11-16 ASSESSMENT — LOCATION ZONE DERM
LOCATION ZONE: FACE
LOCATION ZONE: ARM
LOCATION ZONE: TRUNK

## 2022-11-16 ASSESSMENT — LOCATION SIMPLE DESCRIPTION DERM
LOCATION SIMPLE: LEFT UPPER BACK
LOCATION SIMPLE: RIGHT CHEEK
LOCATION SIMPLE: LEFT CHEEK
LOCATION SIMPLE: RIGHT FOREHEAD
LOCATION SIMPLE: RIGHT SHOULDER

## 2022-11-16 ASSESSMENT — PAIN SCALES - GENERAL: PAINLEVEL: NO PAIN (0)

## 2022-11-16 NOTE — PROGRESS NOTES
Assessment & Plan     SOB (shortness of breath)  This could be deconditioning as he started walking long distance after almost a year.   Check for any pleural effusion/edema, check ferritin/anemia.   - albuterol (PROAIR HFA/PROVENTIL HFA/VENTOLIN HFA) 108 (90 Base) MCG/ACT inhaler; Inhale 2 puffs into the lungs every 6 hours as needed for shortness of breath / dyspnea or wheezing  - BNP-N terminal pro; Future  - XR Chest 2 Views; Future  - CBC with platelets; Future    Total knee replacement status, left  Takes ASA for CAD   - aspirin (ASA) 81 MG EC tablet; Take 1 tablet (81 mg) by mouth daily    Malignant neoplasm of urinary bladder, unspecified site (H)  Stable. Follows with urology.     Aortic valve insufficiency, etiology of cardiac valve disease unspecified  Will be following up with cardiology next month.     Anemia, unspecified type  - Ferritin; Future  - Vitamin B12  - Folate     See Patient Instructions    Return in about 4 weeks (around 12/14/2022) for Follow up, with me.    KYLEIGH YOUNG MD  [facility] VIOLETA Harris is a 77 year old, presenting for the following health issues:  Respiratory Distress    He was walking 2-3 miles per day up until last spring 2021, when he started to have worsening of knee pain, he underwent knee replacement in august 2022. Since the procedure, he noticed that when he walks more than 1 mile, he becomes short of breath. Sometimes he gets dyspneic after walking half a mile. This is a casual walk, not a brisk walk.   He reports getting short of breath at home as well, with light housework.   He has hx of aortic insufficiency which, per most recent echo, has slightly worsened, he has an appt with cardiology next month.   He also has anemia per most recent labs in august 2022    History of Present Illness       Reason for visit:  Tire easily  Symptom onset:  3-4 weeks ago  Symptoms include:  I tire easily  Symptom intensity:  Moderate  Had these symptoms  "before:  No  What makes it worse:  No  What makes it better:  No    He eats 2-3 servings of fruits and vegetables daily.He consumes 0 sweetened beverage(s) daily.He exercises with enough effort to increase his heart rate 30 to 60 minutes per day.  He exercises with enough effort to increase his heart rate 7 days per week.   He is taking medications regularly.     Review of Systems       Objective    /66 (BP Location: Left arm, Patient Position: Sitting, Cuff Size: Adult Large)   Pulse 64   Temp 97.7  F (36.5  C) (Oral)   Resp 16   Ht 1.753 m (5' 9.02\")   Wt 87.3 kg (192 lb 8 oz)   SpO2 99%   BMI 28.41 kg/m    Body mass index is 28.41 kg/m .  Physical Exam  Vitals reviewed.   Constitutional:       Appearance: Normal appearance.   Cardiovascular:      Rate and Rhythm: Normal rate and regular rhythm.      Heart sounds: Normal heart sounds. No murmur heard.    No gallop.   Pulmonary:      Effort: Pulmonary effort is normal. No respiratory distress.      Breath sounds: Normal breath sounds. No stridor. No wheezing, rhonchi or rales.   Neurological:      Mental Status: He is alert.                  "

## 2022-11-23 LAB — NONINV COLON CA DNA+OCC BLD SCRN STL QL: NEGATIVE

## 2022-12-01 ENCOUNTER — OFFICE VISIT (OUTPATIENT)
Dept: FAMILY MEDICINE | Facility: CLINIC | Age: 77
End: 2022-12-01
Payer: MEDICARE

## 2022-12-01 VITALS
RESPIRATION RATE: 16 BRPM | HEIGHT: 69 IN | BODY MASS INDEX: 28.33 KG/M2 | WEIGHT: 191.3 LBS | SYSTOLIC BLOOD PRESSURE: 138 MMHG | OXYGEN SATURATION: 98 % | HEART RATE: 52 BPM | DIASTOLIC BLOOD PRESSURE: 72 MMHG

## 2022-12-01 DIAGNOSIS — M25.511 ACUTE PAIN OF BOTH SHOULDERS: ICD-10-CM

## 2022-12-01 DIAGNOSIS — R06.02 SOB (SHORTNESS OF BREATH): Primary | ICD-10-CM

## 2022-12-01 DIAGNOSIS — M25.512 ACUTE PAIN OF BOTH SHOULDERS: ICD-10-CM

## 2022-12-01 DIAGNOSIS — R06.09 DYSPNEA ON EXERTION: ICD-10-CM

## 2022-12-01 PROCEDURE — 99214 OFFICE O/P EST MOD 30 MIN: CPT | Performed by: INTERNAL MEDICINE

## 2022-12-01 ASSESSMENT — PAIN SCALES - GENERAL: PAINLEVEL: NO PAIN (0)

## 2022-12-01 NOTE — PROGRESS NOTES
"  Assessment & Plan     SOB (shortness of breath)  - CT Chest w Contrast; Future  - General PFT Lab (Please always keep checked); Future  - Pulmonary Function Test; Future    Dyspnea on exertion  Tests ordered.   He will be seeing his cardiologist soon.     Acute pain of both shoulders  - Physical Therapy Referral; Future       See Patient Instructions    Return in about 6 months (around 6/1/2023) for Follow up, Routine preventive, with me.    KYLEIGH YOUNG MD  [facility] 77 [facility], presenting for the following health issues:  RECHECK      History of Present Illness       Reason for visit:  Fatigue, weight loss--check for leukemia or lymphomaHe consumes 0 sweetened beverage(s) daily. He exercises with enough effort to increase his heart rate 3 or less days per week.   He is taking medications regularly.     Steven is here for follow up   He continues to have shortness of breath on exertion. When he walks around the house he has to stop to take a deep breath.   He used to walk 2-5 miles but not able to do it without getting SOB.   He smoked for 20 years, quit in 1987. He used to smoke 1.5 packs per day.     He has hx of Afib, he sees cardiology and not on any AC at this time. He had one episode of Afib    He has pain in both shoulders and would like to see a physical therapist for it.    Review of Systems       Objective    /72 (BP Location: Left arm, Patient Position: Sitting, Cuff Size: Adult Regular)   Pulse 52   Resp 16   Ht 1.753 m (5' 9.02\")   Wt 86.8 kg (191 lb 4.8 oz)   SpO2 98%   BMI 28.23 kg/m    Body mass index is 28.23 kg/m .  Physical Exam  Vitals reviewed.   Constitutional:       Appearance: Normal appearance.   Pulmonary:      Effort: Pulmonary effort is normal. No respiratory distress.      Breath sounds: Normal breath sounds. No stridor. No wheezing, rhonchi or rales.   Neurological:      Mental Status: He is alert.                    "

## 2022-12-06 ENCOUNTER — ANCILLARY PROCEDURE (OUTPATIENT)
Dept: CT IMAGING | Facility: CLINIC | Age: 77
End: 2022-12-06
Attending: INTERNAL MEDICINE
Payer: MEDICARE

## 2022-12-06 DIAGNOSIS — R06.02 SOB (SHORTNESS OF BREATH): ICD-10-CM

## 2022-12-06 LAB
CREAT BLD-MCNC: 0.8 MG/DL (ref 0.7–1.3)
GFR SERPL CREATININE-BSD FRML MDRD: >60 ML/MIN/1.73M2

## 2022-12-06 PROCEDURE — G1010 CDSM STANSON: HCPCS

## 2022-12-06 PROCEDURE — 82565 ASSAY OF CREATININE: CPT

## 2022-12-06 PROCEDURE — 255N000002 HC RX 255 OP 636: Performed by: INTERNAL MEDICINE

## 2022-12-06 RX ADMIN — IOHEXOL 80 ML: 350 INJECTION, SOLUTION INTRAVENOUS at 11:47

## 2022-12-22 ENCOUNTER — VIRTUAL VISIT (OUTPATIENT)
Dept: CARDIOLOGY | Facility: CLINIC | Age: 77
End: 2022-12-22
Payer: MEDICARE

## 2022-12-22 DIAGNOSIS — I35.1 AORTIC VALVE INSUFFICIENCY, ETIOLOGY OF CARDIAC VALVE DISEASE UNSPECIFIED: ICD-10-CM

## 2022-12-22 DIAGNOSIS — I71.21 ANEURYSM OF ASCENDING AORTA WITHOUT RUPTURE (H): ICD-10-CM

## 2022-12-22 DIAGNOSIS — I48.0 PAROXYSMAL ATRIAL FIBRILLATION (H): ICD-10-CM

## 2022-12-22 DIAGNOSIS — I25.118 CORONARY ARTERY DISEASE OF NATIVE ARTERY OF NATIVE HEART WITH STABLE ANGINA PECTORIS (H): Primary | ICD-10-CM

## 2022-12-22 PROCEDURE — 99214 OFFICE O/P EST MOD 30 MIN: CPT | Mod: 95 | Performed by: PHYSICIAN ASSISTANT

## 2022-12-22 RX ORDER — METOPROLOL TARTRATE 25 MG/1
12.5-25 TABLET, FILM COATED ORAL PRN
COMMUNITY
Start: 2022-12-22 | End: 2023-10-09

## 2022-12-22 NOTE — PATIENT INSTRUCTIONS
Your echo looks stable.     Due to your exertional shoulder pain, we will proceed with a stress test called a Lexiscan.     See Dr. Rocha with labs and an echo in 1 year.

## 2022-12-22 NOTE — LETTER
12/22/2022    KYLEIGH YOUNG MD  [address] [place] [address]    RE: Mendez Greene       Dear Colleague,     I had the pleasure of seeing Mendez Greene in the [facility].  CARDIOLOGY CLINIC VIDEO VISIT  This visit is being conducted as a virtual visit due to the emphasis on mitigation of the COVID-19 virus pandemic. The clinician has decided that the risk of an in-office visit outweighs the benefit for this patient. The rest of the comprehensive physical examination is deferred due to public health emergency video visit restrictions.      Mendez Greene is a 77 year old male who is being evaluated via a billable video visit.      The patient has been notified of following:     This video visit will be conducted via a call between you and your physician/provider. We have found that certain health care needs can be provided without the need for an in-person physical exam.  This service lets us provide the care you need with a video conversation.  If a prescription is necessary we can send it directly to your pharmacy.  If lab work is needed we can place an order for that and you can then stop by our lab to have the test done at a later time.    Virtual visits are billed at different rates depending on your insurance coverage. During this emergency period, for some insurers they may be billed the same as an in-person visit.  Please reach out to your insurance provider with any questions.    If during the course of the call the physician/provider feels a video visit is not appropriate, you will not be charged for this service.      Physician has received verbal consent for a Video Visit from the patient? Yes    Patient would like the video invitation sent by: Send to e-mail at: [email or web address]      MA ROS:  Skin:   Eyes:   Ears/Nose/Throat: Jaw fatigue when eating, gets to the point where patient has difficulty chewing  Respiratory: Sleep Apnea, CPAP  Cardiovascular: Fatigue with  exertion since having knee surgery  Gastrointestinal:   Genitourinary:   Musculoskeletal: Bilateral shoulder pain with exertion, starting PT next week. Arm/shoulder weakness, hard to lift things over head  Neurologic:   Psychiatric:   Hematologic/Lymphatic/Immunologic:   Endocrine:         Self reported vitals:  Weight: 183 lbs  BP N/A  HR 54           Primary Cardiologist: Dr. Rocha     HPI:  Mendez Greene is a 77 year old male with past medical history including HTN, dyslipidemia, afib, mild to moderate ascending aortic dilatation, CHA, bladder cancer, BPH, hypothyroidism.       11/6-11/7/20 Hosp Admission due to episode of tachycardia with associated chest pressure.   He was found to be in afib with RVR with rates in the 120s. He was given IV metoprolol, started on metoprolol 12.5 mg BID (limited by baseline sinus fam rates 40s). Started on Eliquis 5 mg BID.     Trop was elevated to 0.698.  11/7/20  coronary angiogram - no significant obstructive disease.   11/7/20 Echo - LVEF 55-60%, aorta mild to moderately enlarged.        He had left knee replacement 8/23/22.  No afib recurrence that he is aware of.  He is aware that our guidelines would recommend anticoagulation, but requests ASA.  No chest pain.  When he walks, he starts to get tired and then he gets some discomfort in his shoulders. He is going to see ortho/PT as well.   No SOB.   No edema.   No syncope, no near syncope.           I have reviewed and updated the patient's Past Medical History, Social History, Family History and Medication List.    PROBLEM LIST:  Patient Active Problem List   Diagnosis     Essential hypertension     Hypothyroidism     Allergic rhinitis     Hypertrophy of prostate with urinary obstruction     Erectile dysfunction     Malignant neoplasm of urinary bladder, unspecified site (H)     Hyperlipidemia LDL goal <130     Sinus bradycardia     Low vitamin B12 level     CHA (obstructive sleep apnea)     Rapid heart rate     NSTEMI  (non-ST elevated myocardial infarction) (H)     Paroxysmal atrial fibrillation (H)     Ascending aortic aneurysm     Chronic pain of left knee     Preoperative examination     Elevated blood sugar     Hyperlipidemia LDL goal <100     Medicare annual wellness visit, subsequent     Total knee replacement status, left     SOB (shortness of breath)     Aortic valve insufficiency, etiology of cardiac valve disease unspecified     Anemia, unspecified type     Dyspnea on exertion     Acute pain of both shoulders       MEDICATIONS:  Current Outpatient Medications   Medication Sig Dispense Refill     albuterol (PROAIR HFA/PROVENTIL HFA/VENTOLIN HFA) 108 (90 Base) MCG/ACT inhaler Inhale 2 puffs into the lungs every 6 hours as needed for shortness of breath / dyspnea or wheezing 18 g 0     aspirin (ASA) 81 MG EC tablet Take 1 tablet (81 mg) by mouth daily 60 tablet 0     COMPOUND (CMPD RX) - PHARMACY TO MIX COMPOUNDED MEDICATION Hydro 2.5% Cr / Ketocon Crm.  Apply to the effected area on face twice daily as needed.       diphenhydrAMINE-acetaminophen (TYLENOL PM)  MG tablet Take 0.5 tablets by mouth nightly as needed for sleep       levothyroxine (SYNTHROID/LEVOTHROID) 150 MCG tablet TAKE ONE TABLET BY MOUTH ONE TIME DAILY 90 tablet 2     lisinopril (ZESTRIL) 5 MG tablet Take 1 tablet (5 mg) by mouth At Bedtime 90 tablet 3     multivitamin w/minerals (THERA-VIT-M) tablet Take 1 tablet by mouth daily       olopatadine (PATANOL) 0.1 % ophthalmic solution Place 1 drop into both eyes 2 times daily       order for DME Equipment being ordered: CPAP and supplies. LIFETIME need. 1 each 0     simvastatin (ZOCOR) 10 MG tablet TAKE ONE TABLET BY MOUTH AT BEDTIME 90 tablet 1     vitamin B-12 (CYANOCOBALAMIN) 1000 MCG tablet Take 1,000 mcg by mouth daily           ALLERGIES:     Allergies   Allergen Reactions     Food Other (See Comments)     Uncertain of which food , testing done ~1997           EXAM:  A limited exam was conducted  via video.  General: Patient is pleasant, alert, in no distress. Normal body habitus. Upright.    Eyes: No scleral icterus, no apparent redness or discharge. EOM's appear intact.   Chest/Lungs: No labored breathing, no cough during exam or audible wheezing.   Cardiovascular: No evidence of elevated JVP.   Abdomen: No gross concerns.   Skin: No rashes or lesions appreciated on visualized skin, normal skin color.  Neuro: No obvious focal defects or tremors.   Psych: Alert and oriented. Does not appear anxious.     The rest of a comprehensive physical examination is deferred due to public [facility] emergency video visit restrictions.       DIAGNOSTICS:  11/11/22 echo:  Interpretation Summary  Left ventricular systolic function is normal. The visual ejection fraction is 55-60%.  The right ventricle is normal in size and function.  Eccentric aortic insufficiency jet, probably moderate aortic insufficiency.  Aortic valve is trileaflet and sclerotic.  There is mild (1+) mitral regurgitation.  There is mild (1+) tricuspid regurgitation.  Mildly dilated aortic root 4.1cm.  Moderately dilated ascending aorta 4.1cm.     In comparison to echo from 9/14/21, the ascending aorta was re-measured and  maximum diameter was 4.5cm (unchanged). Root at the sinuses was 3.9cm. Aortic  insuffiencey may be somewhat worse, but the jet is very eccentric and not as  well seen on Color Flow Doppler on the previous study.      CMR and MRA 11/10/21:  SUMMARY   =======================================================================================     Clinical history: Aorta dilatation, 1-2+ AR on echo, CMR +MRA to evaluate aorta dilatation and AR.  Comparison CMR: none     1. The LV is mild to moderately dilated with normal wall thickness. The global systolic function is normal.  The LVEF is 69 %. There are no regional wall motion abnormalities.     2. The RV is normal in cavity size. The global systolic function is normal. The RVEF is 70 %.      3.  Mild bi-atrial enlargement.     4. Trileaflet aortic valve. Eccentric moderate aortic regurgitation- regurgitant volume 44 cc, regurgitant  fraction 26%.  Mild mitral regurgitation.     5. Late gadolinium enhancement imaging shows  a small size mid myocardial basal septal wall delayed  enhancement     6. There is no pericardial effusion.     7. There is no intracardiac thrombus.     8. Renal cysts noted.        MRA Aorta     1. Mildly dilated aortic root measuring 4.1 cm (maximal diameter sinus to sinus), 4 cm x 3.7 cm  x 3.4 cm  (sinus to commissure). Mildly dilated ascending aorta measuring 4.3 cm x 4 cm. The transverse arch and  descending thoracic aorta are normal in size without an aneurysm or dissection.     2. The aortic arch is left sided. There is a bovine variant branching of the arch vessels with common  origin of the right brachiocephalic artery and left common carotid artery. There is no coarctation.     3. The main and proximal branch pulmonary arteries are normal in size.      4. The systemic venous connections are normal.         CONCLUSIONS:   1. Normal LV and RV systolic functions, LVEF 69%, Mild to moderately dilated LV.  2. Eccentric moderate aortic regurgitation.  3. Late gadolinium enhancement imaging shows  a small size mid myocardial basal septal wall delayed  enhancement consistent with a non CAD non specific scar.  4. Mildly dilated ascending aorta- 4.3 cm. Mildly dilated aortic root 4.1 cm.        9/14/21 Echo:  Interpretation Summary  Left ventricular systolic function is normal. The visual ejection fraction is 60-65%.  Right ventricle is normal in structure, function and size.  Mild to moderate (1-2+) aortic regurgitation. There is an eccentric jet of aortic insufficiency directed against the anterior mitral leaflet. The severity of AI may be underestimated due to the eccentric nature of the regurgitant jet.  Mild aortic root dilatation. Max diameter of the visualized portion 4.3  cm.  Ascending aorta is Mildly dilated. Max diameter of the visualized portion 4 cm.     This study was compared to a TTE from 11/7/2020. The severity of AI has  progressed. Ascending aorta was not as well visualized on this present study,  consider CT for evaluation if clinically appropriate.        Cardiac cath 11/7/20:  Minimal non-obstructive CAD           ASSESSMENT/PLAN:  Paroxysmal afib with RVR  - Baseline is sinus fam in the 40s as noted on multiple EKGs dating back to 2006 in our chart  - Rate control with metoprolol 12.5 mg PRN as he is fam at rest  - [id number] score of 3 (Age-2, HTN). Eliquis 5 mg BID is indicated.  But he requests ASA 81 mg.  If he has recurrence he is open to restarting Eliquis        Elevated troponin  Nonobstructive CAD  - Demand ischemia in setting of afib with RVR.  Cardiac cath 11/17/20 showed no significant obstructive disease  - He has been having some exertional shoulder discomfort.  We will proceed with a Sangeeta stress test.  He is also starting PT for his shoulders in case it is orthopedic  - Continue statin, ASA 81 mg        Mildly  enlarged ascending aorta  Moderate AI  - Echo 11/7/20: ascending aorta 45 mm, mild AI  - Echo 9/14/21: asc aorta 43 mm, but not well visualized; mild to mod AI, but echo report indicates the severity of AI may be underestimated  - CMR and MRA 11/10/21: Eccentric moderate aortic regurgitation. Mildly dilated ascending aorta- 4.3 cm. Mildly dilated aortic root 4.1 cm.  - Echo 11/11/22: asc aorta 4.5 cm in report (in summary it is listed as 4.1 cm), probably moderate AI  - BP control. On lisinopril 5 mg  - Statin  - repeat echo in 1 year        Dyslipidemia  - FLP 8/10/22: , HDL 85, LDL 51, TG 59  - Continue simvastatin 10 mg      Follow up:   Sangeeta  1 year with Dr. Rocha and echo, BMP, FLP/ALT        Video-Visit Details  Type of service:  Video Visit  Video Start Time: 1043  Video End Time (time video stopped): 1104  Originating Location (pt.  Location): Home  Distant Location (provider location):  [facility]  Mode of Communication:  Video Conference via [employer]         PRAVEEN [facility]                                           Thank you for allowing me to participate in the care of your patient.      Sincerely,     PRAVEEN [facility]  cc:   Jes Alegria PA-C  [address] [place] [address]

## 2022-12-22 NOTE — PROGRESS NOTES
CARDIOLOGY CLINIC VIDEO VISIT  This visit is being conducted as a virtual visit due to the emphasis on mitigation of the COVID-19 virus pandemic. The clinician has decided that the risk of an in-office visit outweighs the benefit for this patient. The rest of the comprehensive physical examination is deferred due to public health emergency video visit restrictions.      Mendez Greene is a 77 year old male who is being evaluated via a billable video visit.      The patient has been notified of following:     This video visit will be conducted via a call between you and your physician/provider. We have found that certain health care needs can be provided without the need for an in-person physical exam.  This service lets us provide the care you need with a video conversation.  If a prescription is necessary we can send it directly to your pharmacy.  If lab work is needed we can place an order for that and you can then stop by our lab to have the test done at a later time.    Virtual visits are billed at different rates depending on your insurance coverage. During this emergency period, for some insurers they may be billed the same as an in-person visit.  Please reach out to your insurance provider with any questions.    If during the course of the call the physician/provider feels a video visit is not appropriate, you will not be charged for this service.      Physician has received verbal consent for a Video Visit from the patient? Yes    Patient would like the video invitation sent by: Send to e-mail at: [email or web address]      MA ROS:  Skin:   Eyes:   Ears/Nose/Throat: Jaw fatigue when eating, gets to the point where patient has difficulty chewing  Respiratory: Sleep Apnea, CPAP  Cardiovascular: Fatigue with exertion since having knee surgery  Gastrointestinal:   Genitourinary:   Musculoskeletal: Bilateral shoulder pain with exertion, starting PT next week. Arm/shoulder weakness, hard to lift things over  head  Neurologic:   Psychiatric:   Hematologic/Lymphatic/Immunologic:   Endocrine:         Self reported vitals:  Weight: 183 lbs  BP N/A  HR 54           Primary Cardiologist: Dr. Rocha     HPI:  Mendez Greene is a 77 year old male with past medical history including HTN, dyslipidemia, afib, mild to moderate ascending aortic dilatation, CHA, bladder cancer, BPH, hypothyroidism.       11/6-11/7/20 Hosp Admission due to episode of tachycardia with associated chest pressure.   He was found to be in afib with RVR with rates in the 120s. He was given IV metoprolol, started on metoprolol 12.5 mg BID (limited by baseline sinus fam rates 40s). Started on Eliquis 5 mg BID.     Trop was elevated to 0.698.  11/7/20  coronary angiogram - no significant obstructive disease.   11/7/20 Echo - LVEF 55-60%, aorta mild to moderately enlarged.        He had left knee replacement 8/23/22.  No afib recurrence that he is aware of.  He is aware that our guidelines would recommend anticoagulation, but requests ASA.  No chest pain.  When he walks, he starts to get tired and then he gets some discomfort in his shoulders. He is going to see ortho/PT as well.   No SOB.   No edema.   No syncope, no near syncope.           I have reviewed and updated the patient's Past Medical History, Social History, Family History and Medication List.    PROBLEM LIST:  Patient Active Problem List   Diagnosis     Essential hypertension     Hypothyroidism     Allergic rhinitis     Hypertrophy of prostate with urinary obstruction     Erectile dysfunction     Malignant neoplasm of urinary bladder, unspecified site (H)     Hyperlipidemia LDL goal <130     Sinus bradycardia     Low vitamin B12 level     CHA (obstructive sleep apnea)     Rapid heart rate     NSTEMI (non-ST elevated myocardial infarction) (H)     Paroxysmal atrial fibrillation (H)     Ascending aortic aneurysm     Chronic pain of left knee     Preoperative examination     Elevated blood sugar      Hyperlipidemia LDL goal <100     Medicare annual wellness visit, subsequent     Total knee replacement status, left     SOB (shortness of breath)     Aortic valve insufficiency, etiology of cardiac valve disease unspecified     Anemia, unspecified type     Dyspnea on exertion     Acute pain of both shoulders       MEDICATIONS:  Current Outpatient Medications   Medication Sig Dispense Refill     albuterol (PROAIR HFA/PROVENTIL HFA/VENTOLIN HFA) 108 (90 Base) MCG/ACT inhaler Inhale 2 puffs into the lungs every 6 hours as needed for shortness of breath / dyspnea or wheezing 18 g 0     aspirin (ASA) 81 MG EC tablet Take 1 tablet (81 mg) by mouth daily 60 tablet 0     COMPOUND (CMPD RX) - PHARMACY TO MIX COMPOUNDED MEDICATION Hydro 2.5% Cr / Ketocon Crm.  Apply to the effected area on face twice daily as needed.       diphenhydrAMINE-acetaminophen (TYLENOL PM)  MG tablet Take 0.5 tablets by mouth nightly as needed for sleep       levothyroxine (SYNTHROID/LEVOTHROID) 150 MCG tablet TAKE ONE TABLET BY MOUTH ONE TIME DAILY 90 tablet 2     lisinopril (ZESTRIL) 5 MG tablet Take 1 tablet (5 mg) by mouth At Bedtime 90 tablet 3     multivitamin w/minerals (THERA-VIT-M) tablet Take 1 tablet by mouth daily       olopatadine (PATANOL) 0.1 % ophthalmic solution Place 1 drop into both eyes 2 times daily       order for DME Equipment being ordered: CPAP and supplies. LIFETIME need. 1 each 0     simvastatin (ZOCOR) 10 MG tablet TAKE ONE TABLET BY MOUTH AT BEDTIME 90 tablet 1     vitamin B-12 (CYANOCOBALAMIN) 1000 MCG tablet Take 1,000 mcg by mouth daily           ALLERGIES:     Allergies   Allergen Reactions     Food Other (See Comments)     Uncertain of which food , testing done ~1997           EXAM:  A limited exam was conducted via video.  General: Patient is pleasant, alert, in no distress. Normal body habitus. Upright.    Eyes: No scleral icterus, no apparent redness or discharge. EOM's appear intact.   Chest/Lungs: No  labored breathing, no cough during exam or audible wheezing.   Cardiovascular: No evidence of elevated JVP.   Abdomen: No gross concerns.   Skin: No rashes or lesions appreciated on visualized skin, normal skin color.  Neuro: No obvious focal defects or tremors.   Psych: Alert and oriented. Does not appear anxious.     The rest of a comprehensive physical examination is deferred due to [facility] emergency video visit restrictions.       DIAGNOSTICS:  11/11/22 echo:  Interpretation Summary  Left ventricular systolic function is normal. The visual ejection fraction is 55-60%.  The right ventricle is normal in size and function.  Eccentric aortic insufficiency jet, probably moderate aortic insufficiency.  Aortic valve is trileaflet and sclerotic.  There is mild (1+) mitral regurgitation.  There is mild (1+) tricuspid regurgitation.  Mildly dilated aortic root 4.1cm.  Moderately dilated ascending aorta 4.1cm.     In comparison to echo from 9/14/21, the ascending aorta was re-measured and  maximum diameter was 4.5cm (unchanged). Root at the sinuses was 3.9cm. Aortic  insuffiencey may be somewhat worse, but the jet is very eccentric and not as  well seen on Color Flow Doppler on the previous study.      CMR and MRA 11/10/21:  SUMMARY   =======================================================================================     Clinical history: Aorta dilatation, 1-2+ AR on echo, CMR +MRA to evaluate aorta dilatation and AR.  Comparison CMR: none     1. The LV is mild to moderately dilated with normal wall thickness. The global systolic function is normal.  The LVEF is 69 %. There are no regional wall motion abnormalities.     2. The RV is normal in cavity size. The global systolic function is normal. The RVEF is 70 %.      3. Mild bi-atrial enlargement.     4. Trileaflet aortic valve. Eccentric moderate aortic regurgitation- regurgitant volume 44 cc, regurgitant  fraction 26%.  Mild mitral regurgitation.     5. Late  gadolinium enhancement imaging shows  a small size mid myocardial basal septal wall delayed  enhancement     6. There is no pericardial effusion.     7. There is no intracardiac thrombus.     8. Renal cysts noted.        MRA Aorta     1. Mildly dilated aortic root measuring 4.1 cm (maximal diameter sinus to sinus), 4 cm x 3.7 cm  x 3.4 cm  (sinus to commissure). Mildly dilated ascending aorta measuring 4.3 cm x 4 cm. The transverse arch and  descending thoracic aorta are normal in size without an aneurysm or dissection.     2. The aortic arch is left sided. There is a bovine variant branching of the arch vessels with common  origin of the right brachiocephalic artery and left common carotid artery. There is no coarctation.     3. The main and proximal branch pulmonary arteries are normal in size.      4. The systemic venous connections are normal.         CONCLUSIONS:   1. Normal LV and RV systolic functions, LVEF 69%, Mild to moderately dilated LV.  2. Eccentric moderate aortic regurgitation.  3. Late gadolinium enhancement imaging shows  a small size mid myocardial basal septal wall delayed  enhancement consistent with a non CAD non specific scar.  4. Mildly dilated ascending aorta- 4.3 cm. Mildly dilated aortic root 4.1 cm.        9/14/21 Echo:  Interpretation Summary  Left ventricular systolic function is normal. The visual ejection fraction is 60-65%.  Right ventricle is normal in structure, function and size.  Mild to moderate (1-2+) aortic regurgitation. There is an eccentric jet of aortic insufficiency directed against the anterior mitral leaflet. The severity of AI may be underestimated due to the eccentric nature of the regurgitant jet.  Mild aortic root dilatation. Max diameter of the visualized portion 4.3 cm.  Ascending aorta is Mildly dilated. Max diameter of the visualized portion 4 cm.     This study was compared to a TTE from 11/7/2020. The severity of AI has  progressed. Ascending aorta was not as  well visualized on this present study,  consider CT for evaluation if clinically appropriate.        Cardiac cath 11/7/20:  Minimal non-obstructive CAD           ASSESSMENT/PLAN:  Paroxysmal afib with RVR  - Baseline is sinus fam in the 40s as noted on multiple EKGs dating back to 2006 in our chart  - Rate control with metoprolol 12.5 mg PRN as he is fam at rest  - COATB2Wvum score of 3 (Age-2, HTN). Eliquis 5 mg BID is indicated.  But he requests ASA 81 mg.  If he has recurrence he is open to restarting Eliquis        Elevated troponin  Nonobstructive CAD  - Demand ischemia in setting of afib with RVR.  Cardiac cath 11/17/20 showed no significant obstructive disease  - He has been having some exertional shoulder discomfort.  We will proceed with a Sangeeta stress test.  He is also starting PT for his shoulders in case it is orthopedic  - Continue statin, ASA 81 mg        Mildly  enlarged ascending aorta  Moderate AI  - Echo 11/7/20: ascending aorta 45 mm, mild AI  - Echo 9/14/21: asc aorta 43 mm, but not well visualized; mild to mod AI, but echo report indicates the severity of AI may be underestimated  - CMR and MRA 11/10/21: Eccentric moderate aortic regurgitation. Mildly dilated ascending aorta- 4.3 cm. Mildly dilated aortic root 4.1 cm.  - Echo 11/11/22: asc aorta 4.5 cm in report (in summary it is listed as 4.1 cm), probably moderate AI  - BP control. On lisinopril 5 mg  - Statin  - repeat echo in 1 year        Dyslipidemia  - FLP 8/10/22: , HDL 85, LDL 51, TG 59  - Continue simvastatin 10 mg      Follow up:   Sangeeta  1 year with Dr. Rocha and echo, BMP, FLP/ALT        Video-Visit Details  Type of service:  Video Visit  Video Start Time: 1043  Video End Time (time video stopped): 1104  Originating Location (pt. Location): Home  Distant Location (provider location):  [facility]  Mode of Communication:  Video Conference via PRAVEEN Paulino  [facility]

## 2022-12-29 ENCOUNTER — THERAPY VISIT (OUTPATIENT)
Dept: PHYSICAL THERAPY | Facility: CLINIC | Age: 77
End: 2022-12-29
Payer: MEDICARE

## 2022-12-29 DIAGNOSIS — M25.511 ACUTE PAIN OF BOTH SHOULDERS: Primary | ICD-10-CM

## 2022-12-29 DIAGNOSIS — M25.512 ACUTE PAIN OF BOTH SHOULDERS: Primary | ICD-10-CM

## 2022-12-29 PROCEDURE — 97110 THERAPEUTIC EXERCISES: CPT | Mod: GP

## 2022-12-29 PROCEDURE — 97161 PT EVAL LOW COMPLEX 20 MIN: CPT | Mod: GP

## 2022-12-29 NOTE — PROGRESS NOTES
[facility]    OUTPATIENT Physical Therapy ORTHOPEDIC EVALUATION  PLAN OF TREATMENT FOR OUTPATIENT REHABILITATION  (COMPLETE FOR INITIAL CLAIMS ONLY)  Patient's Last Name, First Name, M.I.  YOB: 1945  Mendez Greene    Provider s Name:  [facility]   Medical Record No.  [id number]   Start of Care Date:  12/29/22   Onset Date:   12/01/22 (MD order)   Treatment Diagnosis:  B shoulder pain/weakness Medical Diagnosis:  Acute bilateral shoulder pain       Goals:     12/29/22 0500   Body Part   Goals listed below are for B shoulders   Goal #1   Goal #1 lifting/carrying   Previous Functional Level No restrictions   Current Functional Level an item overhead weighing   Performance level 2# w/ pain/fatigue   STG [employer] Performance Lift an item overhead weighing   Performance level 5# w/o increased pain   Rationale for grocery shopping;for meal preparation;for housework such as laundry, emptying garbage, use of ;for yard work such as shoveling snow   Due date 02/02/23   LTG [employer] Performance Lift an item overhead weighing   Performance Level 10# w/o pain/fatigue   Rationale for grocery shopping;for meal preparation;for housework such as laundry, emptying garbage, use of ;for yard work such as shoveling snow   Due date 03/23/23         Therapy Frequency:  1x/week  Predicted Duration of Therapy Intervention:  4 weeks tapering to 2x/mo for 8 weeks    Tamica Centeno, PT                 I CERTIFY THE NEED FOR THESE SERVICES FURNISHED UNDER        THIS PLAN OF TREATMENT AND WHILE UNDER MY CARE     (Physician attestation of this document indicates review and certification of the therapy plan).                     Certification Date From:  12/29/22   Certification Date To:  03/23/23    Referring Provider:  Zoë Finney    Initial Assessment        See Epic  Evaluation SOC Date: 12/29/22

## 2022-12-29 NOTE — PROGRESS NOTES
Physical Therapy Initial Evaluation  Therapist Impression: Mendez is a 77 year old year old male referred to physical therapy by Dr. Zoë Finney for treatment of B shoulder pain. Subjective history and objective findings are consistent with B shoulder weakness, no signs of significant RC tearing, OA or other specific pathology at assessment today. Due to these impairments, patient has difficulty with extended activity and reaching/lifting overhead. Patient will benefit from skilled PT to address impairments/limitations in order to reach patient's goals, facilitate return to prior level of function, and maximize participation.    KEY FINDINGS:  1. Limited AROM shoulder elevation, near normal PROM  2. L>R shoulder weakness  3. Negative RC, ACJ and impingement special testing  4. Limited active CROM, repeated retraction NE on UE symptoms    Subjective:  The history is provided by the patient. No  was used.   Patient Health History  Mendez Greene being seen for muscle weakness and pain, shortness of breath.     Problem began: 10/1/2022.   Problem occurred: I wish I knew   Pain is reported as 3/10 on pain scale.  General health as reported by patient is good.  Pertinent medical history includes: cancer, heart problems, high blood pressure, overweight, sleep disorder/apnea, smoking, thyroid problems, weakness and unexplained weight loss.     Medical allergies: none.   Surgeries include:  Orthopedic surgery, cancer surgery and other. Other surgery history details: hernia.    Current medications:  High blood pressure medication, thyroid medication and other. Other medications details: cholesterol.    Current occupation is retired.   Primary job tasks include:  Computer work, driving, lifting/carrying, prolonged sitting and repetitive tasks.                  Therapist Generated HPI Evaluation  Problem details: Pt presents with B shoulder pain. He notices it when he exercises. For example, he went for a  mile walk the other day and by the end of this he had soreness in B shoulders. He also had difficulty > pain with reaching overhead, such as trying to put birdfeeder up. He has brace that he will occasionally use to help with upright posture. This started in October. Has been able to shovel without issues. Denies actual SOB, despite getting tested for this. As the achyness increases, feels his posture gets worse. RHD..         Type of problem:  Bilateral shoulders.    This is a new condition.  Condition occurred with:  Unknown cause.  Where condition occurred: for unknown reasons.  Site of Pain: B upper trap area, base of the neck.  Pain quality: dull ache. and is constant (increases with activity).  Pain timing: not time dependent.  Since onset symptoms are unchanged.  Associated symptoms:  Loss of strength (occassional stiffness in his neck. denies N/T). Exacerbated by: lifting overhead, walking for extended times.  and relieved by heat.      Barriers include:  None as reported by patient.                        Objective:  Standing Alignment:    Cervical/Thoracic:  Forward head and thoracic kyphosis increased  Shoulder/UE:  Rounded shoulders                                  Cervical/Thoracic Evaluation  Arom wnl cervical: cervical retraction WNL, repeated NE.     AROM:  AROM Cervical:    Flexion:            Min loss  Extension:       Mod loss  Rotation:         Left: mod loss     Right: min loss  Side Bend:      Left: max loss * slight discomfort L side     Right:  Max loss                               Shoulder Evaluation:  ROM:  AROM:    Flexion:  Left:  100    Right:  110    Abduction:  Left: 110   Right:  145    Internal Rotation:  Left:  T10    Right:  T12  External Rotation:  Left:  60    Right:  70                PROM:    Flexion:  Left:  160    Right: 170                            Pain: no pain at end ranges    Strength:    Flexion: Left:4+/5   Pain:    Right: 5-/5     Pain:     Abduction:  Left: 4/5   Pain:    Right: 4+/5     Pain:    Internal Rotation:  Left:5/5     Pain:    Right: 5/5     Pain:  External Rotation:   Left:4/5     Pain:   Right:4+/5     Pain:        Elbow Flexion:  Left:5/5     Pain:    Right:5/5     Pain:  Elbow Extension:  Left:5/5     Pain:    Right:5/5     Pain:    Special Tests:      Left shoulder negative for the following special tests:  Impingement; Rotator cuff tear and Acromioclavicular    Right shoulder negative for the following special tests:Impingement; Rotator cuff tear and Acrimioclavicular  Palpation:  normal                                         General     ROS    Assessment/Plan:    Patient is a 77 year old male with both sides shoulder complaints.    Patient has the following significant findings with corresponding treatment plan.                Diagnosis 1:  B shoulder pain/weakness  Pain -  manual therapy, splint/taping/bracing/orthotics, self management, education, directional preference exercise and home program  Decreased ROM/flexibility - manual therapy, therapeutic exercise, therapeutic activity and home program  Decreased joint mobility - manual therapy, therapeutic exercise, therapeutic activity and home program  Decreased strength - therapeutic exercise, therapeutic activities and home program  Decreased function - therapeutic activities and home program  Impaired posture - neuro re-education, therapeutic activities and home program    Therapy Evaluation Codes:   Cumulative Therapy Evaluation is: Low complexity.    Previous and current functional limitations:  (See Goal Flow Sheet for this information)    Short term and Long term goals: (See Goal Flow Sheet for this information)     Communication ability:  Patient appears to be able to clearly communicate and understand verbal and written communication and follow directions correctly.  Treatment Explanation - The following has been discussed with the patient:   RX ordered/plan of care  Anticipated  outcomes  Possible risks and side effects  This patient would benefit from PT intervention to resume normal activities.   Rehab potential is excellent.    Frequency:  1 X week, once daily  Duration:  for 4 weeks tapering to 2 X a month over 8 weeks  Discharge Plan:  Achieve all LTG.  Independent in home treatment program.  Reach maximal therapeutic benefit.    Please refer to the daily flowsheet for treatment today, total treatment time and time spent performing 1:1 timed codes.

## 2023-01-05 ENCOUNTER — HOSPITAL ENCOUNTER (OUTPATIENT)
Dept: NUCLEAR MEDICINE | Facility: CLINIC | Age: 78
Setting detail: NUCLEAR MEDICINE
Discharge: HOME | End: 2023-01-05
Attending: PHYSICIAN ASSISTANT
Payer: MEDICARE

## 2023-01-05 ENCOUNTER — HOSPITAL ENCOUNTER (OUTPATIENT)
Dept: CARDIOLOGY | Facility: CLINIC | Age: 78
Discharge: HOME | End: 2023-01-05
Attending: PHYSICIAN ASSISTANT
Payer: MEDICARE

## 2023-01-05 DIAGNOSIS — I25.118 CORONARY ARTERY DISEASE OF NATIVE ARTERY OF NATIVE HEART WITH STABLE ANGINA PECTORIS (H): ICD-10-CM

## 2023-01-05 LAB
CV BLOOD PRESSURE: 66 MMHG
CV STRESS MAX HR HE: 75
RATE PRESSURE PRODUCT: 9900
STRESS ECHO BASELINE DIASTOLIC HE: 81
STRESS ECHO BASELINE HR: 55 BPM
STRESS ECHO BASELINE SYSTOLIC BP: 151
STRESS ECHO CALCULATED PERCENT HR: 53 %
STRESS ECHO LAST STRESS DIASTOLIC BP: 71
STRESS ECHO LAST STRESS SYSTOLIC BP: 132
STRESS ECHO TARGET HR: 142

## 2023-01-05 PROCEDURE — 250N000011 HC RX IP 250 OP 636

## 2023-01-05 PROCEDURE — 93017 CV STRESS TEST TRACING ONLY: CPT

## 2023-01-05 PROCEDURE — 78452 HT MUSCLE IMAGE SPECT MULT: CPT | Mod: 26 | Performed by: INTERNAL MEDICINE

## 2023-01-05 PROCEDURE — 93016 CV STRESS TEST SUPVJ ONLY: CPT | Performed by: INTERNAL MEDICINE

## 2023-01-05 PROCEDURE — 343N000001 HC RX 343: Performed by: PHYSICIAN ASSISTANT

## 2023-01-05 PROCEDURE — A9502 TC99M TETROFOSMIN: HCPCS | Performed by: PHYSICIAN ASSISTANT

## 2023-01-05 PROCEDURE — G1010 CDSM STANSON: HCPCS | Performed by: INTERNAL MEDICINE

## 2023-01-05 PROCEDURE — 93018 CV STRESS TEST I&R ONLY: CPT | Performed by: INTERNAL MEDICINE

## 2023-01-05 PROCEDURE — G1010 CDSM STANSON: HCPCS

## 2023-01-05 RX ORDER — ACYCLOVIR 200 MG/1
0-1 CAPSULE ORAL
Status: DISCONTINUED | OUTPATIENT
Start: 2023-01-05 | End: 2023-01-06 | Stop reason: HOSPADM

## 2023-01-05 RX ORDER — REGADENOSON 0.08 MG/ML
0.4 INJECTION, SOLUTION INTRAVENOUS ONCE
Status: COMPLETED | OUTPATIENT
Start: 2023-01-05 | End: 2023-01-05

## 2023-01-05 RX ORDER — AMINOPHYLLINE 25 MG/ML
50-100 INJECTION, SOLUTION INTRAVENOUS
Status: DISCONTINUED | OUTPATIENT
Start: 2023-01-05 | End: 2023-01-06 | Stop reason: HOSPADM

## 2023-01-05 RX ORDER — REGADENOSON 0.08 MG/ML
INJECTION, SOLUTION INTRAVENOUS
Status: COMPLETED
Start: 2023-01-05 | End: 2023-01-05

## 2023-01-05 RX ORDER — CAFFEINE CITRATE 20 MG/ML
60 SOLUTION INTRAVENOUS
Status: DISCONTINUED | OUTPATIENT
Start: 2023-01-05 | End: 2023-01-06 | Stop reason: HOSPADM

## 2023-01-05 RX ORDER — ALBUTEROL SULFATE 90 UG/1
2 AEROSOL, METERED RESPIRATORY (INHALATION) EVERY 5 MIN PRN
Status: DISCONTINUED | OUTPATIENT
Start: 2023-01-05 | End: 2023-01-06 | Stop reason: HOSPADM

## 2023-01-05 RX ADMIN — TETROFOSMIN 31 MCI.: 1.38 INJECTION, POWDER, LYOPHILIZED, FOR SOLUTION INTRAVENOUS at 10:30

## 2023-01-05 RX ADMIN — REGADENOSON 0.4 MG: 0.08 INJECTION, SOLUTION INTRAVENOUS at 10:27

## 2023-01-05 RX ADMIN — TETROFOSMIN 10.1 MCI.: 1.38 INJECTION, POWDER, LYOPHILIZED, FOR SOLUTION INTRAVENOUS at 09:14

## 2023-01-09 ENCOUNTER — THERAPY VISIT (OUTPATIENT)
Dept: PHYSICAL THERAPY | Facility: CLINIC | Age: 78
End: 2023-01-09
Payer: MEDICARE

## 2023-01-09 DIAGNOSIS — M25.511 ACUTE PAIN OF BOTH SHOULDERS: Primary | ICD-10-CM

## 2023-01-09 DIAGNOSIS — M25.512 ACUTE PAIN OF BOTH SHOULDERS: Primary | ICD-10-CM

## 2023-01-09 PROCEDURE — 97110 THERAPEUTIC EXERCISES: CPT | Mod: GP | Performed by: PHYSICAL THERAPIST

## 2023-01-09 PROCEDURE — 97530 THERAPEUTIC ACTIVITIES: CPT | Mod: GP | Performed by: PHYSICAL THERAPIST

## 2023-01-16 ENCOUNTER — THERAPY VISIT (OUTPATIENT)
Dept: PHYSICAL THERAPY | Facility: CLINIC | Age: 78
End: 2023-01-16
Payer: MEDICARE

## 2023-01-16 DIAGNOSIS — M25.511 ACUTE PAIN OF BOTH SHOULDERS: Primary | ICD-10-CM

## 2023-01-16 DIAGNOSIS — M25.512 ACUTE PAIN OF BOTH SHOULDERS: Primary | ICD-10-CM

## 2023-01-16 PROCEDURE — 97110 THERAPEUTIC EXERCISES: CPT | Mod: GP | Performed by: PHYSICAL THERAPIST

## 2023-01-16 PROCEDURE — 97530 THERAPEUTIC ACTIVITIES: CPT | Mod: GP | Performed by: PHYSICAL THERAPIST

## 2023-01-18 ENCOUNTER — HOSPITAL ENCOUNTER (OUTPATIENT)
Dept: CARDIAC REHAB | Facility: CLINIC | Age: 78
Discharge: HOME | End: 2023-01-18
Attending: INTERNAL MEDICINE
Payer: MEDICARE

## 2023-01-18 DIAGNOSIS — R06.02 SOB (SHORTNESS OF BREATH): ICD-10-CM

## 2023-01-18 LAB
DLCOUNC-%PRED-PRE: 97 %
DLCOUNC-PRE: 23.11 ML/MIN/MMHG
DLCOUNC-PRED: 23.71 ML/MIN/MMHG
ERV-%PRED-PRE: 86 %
ERV-PRE: 0.7 L
ERV-PRED: 0.81 L
EXPTIME-PRE: 7.41 SEC
FEF2575-%PRED-POST: 174 %
FEF2575-%PRED-PRE: 82 %
FEF2575-POST: 3.57 L/SEC
FEF2575-PRE: 1.69 L/SEC
FEF2575-PRED: 2.04 L/SEC
FEFMAX-%PRED-PRE: 92 %
FEFMAX-PRE: 6.63 L/SEC
FEFMAX-PRED: 7.19 L/SEC
FEV1-%PRED-PRE: 79 %
FEV1-PRE: 2.24 L
FEV1FEV6-PRE: 75 %
FEV1FEV6-PRED: 77 %
FEV1FVC-PRE: 75 %
FEV1FVC-PRED: 75 %
FEV1SVC-PRE: 72 %
FEV1SVC-PRED: 65 %
FIFMAX-PRE: 4.34 L/SEC
FRCPLETH-%PRED-PRE: 98 %
FRCPLETH-PRE: 3.64 L
FRCPLETH-PRED: 3.7 L
FVC-%PRED-PRE: 79 %
FVC-PRE: 3 L
FVC-PRED: 3.78 L
IC-%PRED-PRE: 67 %
IC-PRE: 2.37 L
IC-PRED: 3.54 L
RVPLETH-%PRED-PRE: 105 %
RVPLETH-PRE: 2.91 L
RVPLETH-PRED: 2.77 L
TLCPLETH-%PRED-PRE: 87 %
TLCPLETH-PRE: 6.02 L
TLCPLETH-PRED: 6.86 L
VA-%PRED-PRE: 83 %
VA-PRE: 5.05 L
VC-%PRED-PRE: 71 %
VC-PRE: 3.1 L
VC-PRED: 4.35 L

## 2023-01-18 PROCEDURE — 94060 EVALUATION OF WHEEZING: CPT

## 2023-01-18 PROCEDURE — 94729 DIFFUSING CAPACITY: CPT

## 2023-01-18 PROCEDURE — 94726 PLETHYSMOGRAPHY LUNG VOLUMES: CPT

## 2023-01-20 NOTE — RESULT ENCOUNTER NOTE
I am covering for your doctor who is out of the office today.  Overall your lung function studies look very good.  They do mention that you did respond well to the bronchodilator so this is something that your doctor may want to consider.  I would asked that you contact her in the near future to go over this.    Murtaza Grewal M.D.

## 2023-01-23 ENCOUNTER — THERAPY VISIT (OUTPATIENT)
Dept: PHYSICAL THERAPY | Facility: CLINIC | Age: 78
End: 2023-01-23
Payer: MEDICARE

## 2023-01-23 DIAGNOSIS — M25.512 ACUTE PAIN OF BOTH SHOULDERS: Primary | ICD-10-CM

## 2023-01-23 DIAGNOSIS — M25.511 ACUTE PAIN OF BOTH SHOULDERS: Primary | ICD-10-CM

## 2023-01-23 PROCEDURE — 97530 THERAPEUTIC ACTIVITIES: CPT | Mod: GP | Performed by: PHYSICAL THERAPIST

## 2023-01-23 PROCEDURE — 97110 THERAPEUTIC EXERCISES: CPT | Mod: 59 | Performed by: PHYSICAL THERAPIST

## 2023-02-06 ENCOUNTER — THERAPY VISIT (OUTPATIENT)
Dept: PHYSICAL THERAPY | Facility: CLINIC | Age: 78
End: 2023-02-06
Payer: MEDICARE

## 2023-02-06 DIAGNOSIS — M25.512 ACUTE PAIN OF BOTH SHOULDERS: Primary | ICD-10-CM

## 2023-02-06 DIAGNOSIS — M25.511 ACUTE PAIN OF BOTH SHOULDERS: Primary | ICD-10-CM

## 2023-02-06 PROCEDURE — 97530 THERAPEUTIC ACTIVITIES: CPT | Mod: GP | Performed by: PHYSICAL THERAPIST

## 2023-02-06 PROCEDURE — 97110 THERAPEUTIC EXERCISES: CPT | Mod: GP | Performed by: PHYSICAL THERAPIST

## 2023-02-20 ENCOUNTER — TRANSFERRED RECORDS (OUTPATIENT)
Dept: HEALTH INFORMATION MANAGEMENT | Facility: CLINIC | Age: 78
End: 2023-02-20

## 2023-03-06 ENCOUNTER — THERAPY VISIT (OUTPATIENT)
Dept: PHYSICAL THERAPY | Facility: CLINIC | Age: 78
End: 2023-03-06
Payer: MEDICARE

## 2023-03-06 DIAGNOSIS — M25.511 ACUTE PAIN OF BOTH SHOULDERS: Primary | ICD-10-CM

## 2023-03-06 DIAGNOSIS — M25.512 ACUTE PAIN OF BOTH SHOULDERS: Primary | ICD-10-CM

## 2023-03-06 PROCEDURE — 97530 THERAPEUTIC ACTIVITIES: CPT | Mod: GP | Performed by: PHYSICAL THERAPIST

## 2023-03-06 PROCEDURE — 97110 THERAPEUTIC EXERCISES: CPT | Mod: GP | Performed by: PHYSICAL THERAPIST

## 2023-03-06 NOTE — PROGRESS NOTES
DISCHARGE REPORT    Progress reporting period is from 12-29-22 to 3-6-23. Pt completed 6 sessions of PT.     SUBJECTIVE  Subjective changes noted by patient:  Reaching overhead continues to cause issues due to fatigue but is able to reach to top shelf in medicine cabinet and reach overhead to place bird feeders on their hooks. LE's still feel weak-specifically in the AM.    Current pain level is 0-1/10.     Previous pain level was  3/10.   Changes in function:  Yes (See Goal flowsheet attached for changes in current functional level)  Adverse reaction to treatment or activity: None    OBJECTIVE  Objective: AROM: R flexion: 130; L flexion: 120. MMT: R ABD: 4/5; ER: 4/5; L: abd: 3+/5: ER: 4/5.     ASSESSMENT/PLAN  Updated problem list and treatment plan: Diagnosis 1:  B shoulder pain/weakness   STG/LTGs have been met or progress has been made towards goals:  Yes (See Goal flow sheet completed today.)  Assessment of Progress: The patient has met all of their long term goals.  Self Management Plans:  Patient is independent in a home treatment program.  I have re-evaluated this patient and find that the nature, scope, duration and intensity of the therapy is appropriate for the medical condition of the patient.  Mendez continues to require the following intervention to meet STG and LTG's:  PT intervention is no longer required to meet STG/LTG.    Recommendations:  This patient is ready to be discharged from therapy and continue their home treatment program.    Please refer to the daily flowsheet for treatment today, total treatment time and time spent performing 1:1 timed codes.

## 2023-03-14 ENCOUNTER — APPOINTMENT (OUTPATIENT)
Dept: URBAN - METROPOLITAN AREA CLINIC 256 | Age: 78
Setting detail: DERMATOLOGY
End: 2023-03-14

## 2023-03-14 VITALS — HEIGHT: 69 IN | WEIGHT: 188 LBS

## 2023-03-14 DIAGNOSIS — Z71.89 OTHER SPECIFIED COUNSELING: ICD-10-CM

## 2023-03-14 DIAGNOSIS — L82.1 OTHER SEBORRHEIC KERATOSIS: ICD-10-CM

## 2023-03-14 DIAGNOSIS — D22 MELANOCYTIC NEVI: ICD-10-CM

## 2023-03-14 DIAGNOSIS — L57.8 OTHER SKIN CHANGES DUE TO CHRONIC EXPOSURE TO NONIONIZING RADIATION: ICD-10-CM

## 2023-03-14 DIAGNOSIS — L57.0 ACTINIC KERATOSIS: ICD-10-CM

## 2023-03-14 PROBLEM — D22.5 MELANOCYTIC NEVI OF TRUNK: Status: ACTIVE | Noted: 2023-03-14

## 2023-03-14 PROCEDURE — 17003 DESTRUCT PREMALG LES 2-14: CPT

## 2023-03-14 PROCEDURE — 99213 OFFICE O/P EST LOW 20 MIN: CPT | Mod: 25

## 2023-03-14 PROCEDURE — OTHER LIQUID NITROGEN: OTHER

## 2023-03-14 PROCEDURE — OTHER MIPS QUALITY: OTHER

## 2023-03-14 PROCEDURE — 17000 DESTRUCT PREMALG LESION: CPT

## 2023-03-14 PROCEDURE — OTHER COUNSELING: OTHER

## 2023-03-14 ASSESSMENT — LOCATION DETAILED DESCRIPTION DERM
LOCATION DETAILED: LEFT SUPERIOR FRONTAL SCALP
LOCATION DETAILED: LEFT SUPERIOR UPPER BACK
LOCATION DETAILED: MID-OCCIPITAL SCALP
LOCATION DETAILED: POSTERIOR MID-PARIETAL SCALP
LOCATION DETAILED: RIGHT SUPERIOR OCCIPITAL SCALP
LOCATION DETAILED: RIGHT MEDIAL TEMPLE
LOCATION DETAILED: LEFT MEDIAL UPPER BACK
LOCATION DETAILED: LEFT CENTRAL MALAR CHEEK

## 2023-03-14 ASSESSMENT — LOCATION ZONE DERM
LOCATION ZONE: TRUNK
LOCATION ZONE: FACE
LOCATION ZONE: SCALP

## 2023-03-14 ASSESSMENT — LOCATION SIMPLE DESCRIPTION DERM
LOCATION SIMPLE: LEFT UPPER BACK
LOCATION SIMPLE: SCALP
LOCATION SIMPLE: POSTERIOR SCALP
LOCATION SIMPLE: RIGHT TEMPLE
LOCATION SIMPLE: LEFT CHEEK

## 2023-03-14 NOTE — PROCEDURE: LIQUID NITROGEN
Detail Level: Detailed
Show Aperture Variable?: Yes
Post-Care Instructions: I reviewed with the patient in detail post-care instructions. Patient is to wear sunprotection, and avoid picking at any of the treated lesions. Pt may apply Vaseline to crusted or scabbing areas.
Consent: The patient's consent was obtained including but not limited to risks of crusting, scabbing, blistering, scarring, darker or lighter pigmentary change, recurrence, incomplete removal and infection.
Render Post-Care Instructions In Note?: no
Application Tool (Optional): Liquid Nitrogen Sprayer
Number Of Freeze-Thaw Cycles: 1 freeze-thaw cycle
Duration Of Freeze Thaw-Cycle (Seconds): 5

## 2023-03-14 NOTE — HPI: PREVENTATIVE SKIN CHECK
What Is The Reason For Today's Visit?: Full Body Skin Examination
Additional History: He is here today for a semi annual upper body skin check. There is a spot on his right cheek he would like evaluated today.

## 2023-04-09 ENCOUNTER — MYC MEDICAL ADVICE (OUTPATIENT)
Dept: FAMILY MEDICINE | Facility: CLINIC | Age: 78
End: 2023-04-09
Payer: MEDICARE

## 2023-04-10 RX ORDER — AMOXICILLIN 500 MG/1
500 CAPSULE ORAL 2 TIMES DAILY
Status: CANCELLED | OUTPATIENT
Start: 2023-04-10

## 2023-04-10 NOTE — TELEPHONE ENCOUNTER
To PCP    See my chart messages.     Please advise.     Pharmacy and rx pended for your Review.     Darlene Adamson RN on 4/10/2023 at 11:08 AM

## 2023-04-11 NOTE — TELEPHONE ENCOUNTER
Called patient takes     500 mg Amoxicillin capsules, 4 capsules 1 hour prior to dental appointments, last authorizing provider: Vianney Gonzalez     Takes due to: Knee replacement surgery in the past     Per pt, Already got the script sent in by the orthopedic office, does not need anything at this time     Makenzie SCHWARTZ, Triage RN  [facility]

## 2023-04-19 ENCOUNTER — APPOINTMENT (OUTPATIENT)
Dept: URBAN - METROPOLITAN AREA CLINIC 256 | Age: 78
Setting detail: DERMATOLOGY
End: 2023-04-19

## 2023-04-19 DIAGNOSIS — E03.9 HYPOTHYROIDISM, UNSPECIFIED TYPE: ICD-10-CM

## 2023-04-19 RX ORDER — LEVOTHYROXINE SODIUM 150 UG/1
TABLET ORAL
Qty: 90 TABLET | Refills: 0 | Status: SHIPPED | OUTPATIENT
Start: 2023-04-19 | End: 2023-07-03

## 2023-06-13 ASSESSMENT — ENCOUNTER SYMPTOMS
ARTHRALGIAS: 0
HEADACHES: 0
JOINT SWELLING: 0
FEVER: 0
SHORTNESS OF BREATH: 0
SORE THROAT: 0
EYE PAIN: 0
CHILLS: 0
PALPITATIONS: 0
ABDOMINAL PAIN: 0
CONSTIPATION: 0
WEAKNESS: 0
HEMATOCHEZIA: 0
MYALGIAS: 0
DIARRHEA: 0
HEARTBURN: 0
COUGH: 0
PARESTHESIAS: 0
NAUSEA: 0
HEMATURIA: 0
DYSURIA: 0
FREQUENCY: 1
NERVOUS/ANXIOUS: 0
DIZZINESS: 0

## 2023-06-13 ASSESSMENT — ACTIVITIES OF DAILY LIVING (ADL): CURRENT_FUNCTION: NO ASSISTANCE NEEDED

## 2023-06-14 ENCOUNTER — OFFICE VISIT (OUTPATIENT)
Dept: FAMILY MEDICINE | Facility: CLINIC | Age: 78
End: 2023-06-14
Payer: MEDICARE

## 2023-06-14 VITALS
DIASTOLIC BLOOD PRESSURE: 58 MMHG | HEIGHT: 69 IN | OXYGEN SATURATION: 100 % | RESPIRATION RATE: 18 BRPM | TEMPERATURE: 98.1 F | BODY MASS INDEX: 28.71 KG/M2 | WEIGHT: 193.8 LBS | HEART RATE: 46 BPM | SYSTOLIC BLOOD PRESSURE: 128 MMHG

## 2023-06-14 DIAGNOSIS — I48.0 PAROXYSMAL ATRIAL FIBRILLATION (H): ICD-10-CM

## 2023-06-14 DIAGNOSIS — I25.118 CORONARY ARTERY DISEASE OF NATIVE ARTERY OF NATIVE HEART WITH STABLE ANGINA PECTORIS (H): ICD-10-CM

## 2023-06-14 DIAGNOSIS — I71.21 ANEURYSM OF ASCENDING AORTA WITHOUT RUPTURE (H): ICD-10-CM

## 2023-06-14 DIAGNOSIS — Z00.00 ANNUAL PHYSICAL EXAM: Primary | ICD-10-CM

## 2023-06-14 DIAGNOSIS — Z23 HIGH PRIORITY FOR 2019-NCOV VACCINE: ICD-10-CM

## 2023-06-14 DIAGNOSIS — G47.33 OSA (OBSTRUCTIVE SLEEP APNEA): Chronic | ICD-10-CM

## 2023-06-14 DIAGNOSIS — C67.9 MALIGNANT NEOPLASM OF URINARY BLADDER, UNSPECIFIED SITE (H): ICD-10-CM

## 2023-06-14 DIAGNOSIS — E03.9 HYPOTHYROIDISM, UNSPECIFIED TYPE: Chronic | ICD-10-CM

## 2023-06-14 LAB — TSH SERPL DL<=0.005 MIU/L-ACNC: 0.41 UIU/ML (ref 0.3–4.2)

## 2023-06-14 PROCEDURE — 0124A COVID-19 BIVALENT 12+ (PFIZER): CPT | Performed by: INTERNAL MEDICINE

## 2023-06-14 PROCEDURE — 88112 CYTOPATH CELL ENHANCE TECH: CPT | Performed by: PATHOLOGY

## 2023-06-14 PROCEDURE — 99213 OFFICE O/P EST LOW 20 MIN: CPT | Mod: 25 | Performed by: INTERNAL MEDICINE

## 2023-06-14 PROCEDURE — 36415 COLL VENOUS BLD VENIPUNCTURE: CPT | Performed by: INTERNAL MEDICINE

## 2023-06-14 PROCEDURE — 91312 COVID-19 BIVALENT 12+ (PFIZER): CPT | Performed by: INTERNAL MEDICINE

## 2023-06-14 PROCEDURE — G0439 PPPS, SUBSEQ VISIT: HCPCS | Performed by: INTERNAL MEDICINE

## 2023-06-14 PROCEDURE — 84443 ASSAY THYROID STIM HORMONE: CPT | Performed by: INTERNAL MEDICINE

## 2023-06-14 ASSESSMENT — ENCOUNTER SYMPTOMS
WEAKNESS: 0
CHILLS: 0
FREQUENCY: 1
NERVOUS/ANXIOUS: 0
JOINT SWELLING: 0
COUGH: 0
EYE PAIN: 0
HEMATURIA: 0
NAUSEA: 0
DYSURIA: 0
CONSTIPATION: 0
PARESTHESIAS: 0
HEMATOCHEZIA: 0
ABDOMINAL PAIN: 0
FEVER: 0
SORE THROAT: 0
HEARTBURN: 0
PALPITATIONS: 0
ARTHRALGIAS: 0
DIARRHEA: 0
MYALGIAS: 0
DIZZINESS: 0
HEADACHES: 0
SHORTNESS OF BREATH: 0

## 2023-06-14 ASSESSMENT — ACTIVITIES OF DAILY LIVING (ADL): CURRENT_FUNCTION: NO ASSISTANCE NEEDED

## 2023-06-14 ASSESSMENT — PAIN SCALES - GENERAL: PAINLEVEL: NO PAIN (0)

## 2023-06-14 NOTE — PROGRESS NOTES
"SUBJECTIVE:   Steven is a 77 year old who presents for Preventive Visit.    Are you in the first 12 months of your Medicare coverage?  No    Healthy Habits:     In general, how would you rate your overall health?  Good    Frequency of exercise:  6-7 days/week    Duration of exercise:  45-60 minutes    Do you usually eat at least 4 servings of fruit and vegetables a day, include whole grains    & fiber and avoid regularly eating high fat or \"junk\" foods?  No    Taking medications regularly:  Yes    Medication side effects:  None    Ability to successfully perform activities of daily living:  No assistance needed    Home Safety:  Throw rugs in the hallway and lack of grab bars in the bathroom    Hearing Impairment:  Difficulty following a conversation in a noisy restaurant or crowded room and difficulty understanding soft or whispered speech    In the past 6 months, have you been bothered by leaking of urine? Yes    In general, how would you rate your overall mental or emotional health?  Good      PHQ-2 Total Score: 0    Additional concerns today:  No      Steven is a very pleasant 77 year old gentleman who presented to the clinic for APE      He has hx of bladder cancer, s/p prostatectomy and cystectomy. He has an artifical bladder. Continues to have urinary incontinence.   He is due for covid booster   He is requesting urine cytology.     Have you ever done Advance Care Planning? (For example, a Health Directive, POLST, or a discussion with a medical provider or your loved ones about your wishes): Yes, advance care planning is on file.       Fall risk  Fallen 2 or more times in the past year?: Yes  Any fall with injury in the past year?: No    Cognitive Screening   1) Repeat 3 items (Leader, Season, Table)    2) Clock draw: NORMAL  3) 3 item recall: Recalls 3 objects  Results: 3 items recalled: COGNITIVE IMPAIRMENT LESS LIKELY    Mini-CogTM Copyright S Zane. Licensed by the author for use in [facility]; " reprinted with permission (soomid@.[facility]). All rights reserved.      Do you have sleep apnea, excessive snoring or daytime drowsiness?: yes    Social History     Tobacco Use     Smoking status: Former     Packs/day: 1.50     Years: 20.00     Pack years: 30.00     Types: Cigarettes     Start date: 1967     Quit date: 9/10/1987     Years since quittin.7     Smokeless tobacco: Never   Vaping Use     Vaping status: Never Used   Substance Use Topics     Alcohol use: Yes     Comment: Occas wine           2023     4:45 AM   Alcohol Use   Prescreen: >3 drinks/day or >7 drinks/week? No     Do you have a current opioid prescription? No  Do you use any other controlled substances or medications that are not prescribed by a provider? None      Current providers sharing in care for this patient include:   Patient Care Team:  Zoë Finney MD as PCP - General (Internal Medicine)  Jes Alegria PA-C as Assigned Heart and Vascular Provider  Zoë Finney MD as Assigned PCP    The following health maintenance items are reviewed in Epic and correct as of today:  Health Maintenance   Topic Date Due     URINE DRUG SCREEN  Never done     DTAP/TDAP/TD IMMUNIZATION (1 - Tdap) 2016     COVID-19 Vaccine (6 - Pfizer series) 2023     ANNUAL REVIEW OF HM ORDERS  2023     MEDICARE ANNUAL WELLNESS VISIT  08/10/2023     TSH W/FREE T4 REFLEX  08/10/2023     CBC  2023     FALL RISK ASSESSMENT  2024     LIPID  08/10/2027     ADVANCE CARE PLANNING  2028     HEPATITIS C SCREENING  Completed     PHQ-2 (once per calendar year)  Completed     INFLUENZA VACCINE  Completed     Pneumococcal Vaccine: 65+ Years  Completed     ZOSTER IMMUNIZATION  Completed     IPV IMMUNIZATION  Aged Out     MENINGITIS IMMUNIZATION  Aged Out     COLORECTAL CANCER SCREENING  Discontinued       Review of Systems   Constitutional: Negative for chills and fever.   HENT: Negative for congestion, ear pain, hearing loss and sore  "throat.    Eyes: Negative for pain and visual disturbance.   Respiratory: Negative for cough and shortness of breath.    Cardiovascular: Negative for chest pain, palpitations and peripheral edema.   Gastrointestinal: Negative for abdominal pain, constipation, diarrhea, heartburn, hematochezia and nausea.   Genitourinary: Positive for frequency, impotence and urgency. Negative for dysuria, genital sores, hematuria and penile discharge.   Musculoskeletal: Negative for arthralgias, joint swelling and myalgias.   Skin: Negative for rash.   Neurological: Negative for dizziness, weakness, headaches and paresthesias.   Psychiatric/Behavioral: Negative for mood changes. The patient is not nervous/anxious.          OBJECTIVE:   /58 (BP Location: Left arm, Patient Position: Sitting, Cuff Size: Adult Regular)   Pulse (!) 46   Temp 98.1  F (36.7  C) (Oral)   Resp 18   Ht 1.746 m (5' 8.75\")   Wt 87.9 kg (193 lb 12.8 oz)   SpO2 100%   BMI 28.83 kg/m   Estimated body mass index is 28.83 kg/m  as calculated from the following:    Height as of this encounter: 1.746 m (5' 8.75\").    Weight as of this encounter: 87.9 kg (193 lb 12.8 oz).  Physical Exam  Vitals reviewed.   Constitutional:       Appearance: Normal appearance.   HENT:      Right Ear: Tympanic membrane normal. There is no impacted cerumen.      Left Ear: Tympanic membrane normal. There is no impacted cerumen.      Mouth/Throat:      Mouth: Mucous membranes are moist.      Pharynx: Oropharynx is clear. No oropharyngeal exudate or posterior oropharyngeal erythema.   Cardiovascular:      Rate and Rhythm: Normal rate and regular rhythm.      Heart sounds: Normal heart sounds. No murmur heard.     No gallop.   Pulmonary:      Effort: Pulmonary effort is normal. No respiratory distress.      Breath sounds: Normal breath sounds. No stridor. No wheezing, rhonchi or rales.   Abdominal:      General: Abdomen is flat. There is no distension.      Palpations: Abdomen is " "soft. There is no mass.      Tenderness: There is no abdominal tenderness. There is no guarding.      Hernia: No hernia is present.   Musculoskeletal:         General: Normal range of motion.      Cervical back: Normal range of motion and neck supple. No rigidity or tenderness.      Right lower leg: No edema.      Left lower leg: No edema.   Lymphadenopathy:      Cervical: No cervical adenopathy.   Skin:     General: Skin is warm and dry.   Neurological:      General: No focal deficit present.      Mental Status: He is alert.   Psychiatric:         Mood and Affect: Mood normal.       ASSESSMENT / PLAN:   Mendez was seen today for physical and imm/inj.    Diagnoses and all orders for this visit:    Annual physical exam  -     PRIMARY CARE FOLLOW-UP SCHEDULING; Future    Coronary artery disease of native artery of native heart with stable angina pectoris (H)  Stable. Continue ASA 81, statin, metoprolol.     Malignant neoplasm of urinary bladder, unspecified site (H)  -     Cytology non gyn; Future  -     Cytology non gyn    Aneurysm of ascending aorta without rupture (H)    Paroxysmal atrial fibrillation (H)  Stable. Currently on ASA 81 only. Per patient because of his low HR, he was advised to not take eliquis. Cardiology note says \" Eliquis 5 mg BID is indicated.  But he requests ASA 81 mg.  If he has recurrence he is open to restarting Eliquis\"    Hypothyroidism, unspecified type  -     TSH; Future  -     TSH    High priority for 2019-nCoV vaccine  -     COVID-19 BIVALENT 12+ (PFIZER)    CHA (obstructive sleep apnea)  Continues to use CPAP daily and is doing well.       Patient has been advised of split billing requirements and indicates understanding: Yes      COUNSELING:  Reviewed preventive health counseling, as reflected in patient instructions       Bladder control      BMI:   Estimated body mass index is 28.83 kg/m  as calculated from the following:    Height as of this encounter: 1.746 m (5' 8.75\").    " Weight as of this encounter: 87.9 kg (193 lb 12.8 oz).         He reports that he quit smoking about 35 years ago. His smoking use included cigarettes. He started smoking about 55 years ago. He has a 30.00 pack-year smoking history. He has never used smokeless tobacco.      Appropriate preventive services were discussed with this patient, including applicable screening as appropriate for cardiovascular disease, diabetes, osteopenia/osteoporosis, and glaucoma.  As appropriate for age/gender, discussed screening for colorectal cancer, prostate cancer, breast cancer, and cervical cancer. Checklist reviewing preventive services available has been given to the patient.    Reviewed patients plan of care and provided an AVS. The Basic Care Plan (routine screening as documented in Health Maintenance) for Mendez meets the Care Plan requirement. This Care Plan has been established and reviewed with the Patient.      KYLEIGH YOUNG MD  [facility]    Identified Health Risks:    I have reviewed Opioid Use Disorder and Substance Use Disorder risk factors and made any needed referrals.

## 2023-06-15 LAB
PATH REPORT.COMMENTS IMP SPEC: NORMAL
PATH REPORT.FINAL DX SPEC: NORMAL
PATH REPORT.GROSS SPEC: NORMAL
PATH REPORT.MICROSCOPIC SPEC OTHER STN: NORMAL
PATH REPORT.RELEVANT HX SPEC: NORMAL

## 2023-06-27 ENCOUNTER — TRANSFERRED RECORDS (OUTPATIENT)
Dept: HEALTH INFORMATION MANAGEMENT | Facility: CLINIC | Age: 78
End: 2023-06-27
Payer: MEDICARE

## 2023-07-01 DIAGNOSIS — E03.9 HYPOTHYROIDISM, UNSPECIFIED TYPE: ICD-10-CM

## 2023-07-03 RX ORDER — LEVOTHYROXINE SODIUM 150 UG/1
TABLET ORAL
Qty: 90 TABLET | Refills: 2 | Status: SHIPPED | OUTPATIENT
Start: 2023-07-03 | End: 2024-04-22

## 2023-07-07 ENCOUNTER — RX ONLY (RX ONLY)
Age: 78
End: 2023-07-07

## 2023-07-07 RX ORDER — KETOCONAZOLE 20 MG/G
CREAM TOPICAL
Qty: 60 | Refills: 0 | Status: ERX | COMMUNITY
Start: 2023-07-07

## 2023-07-07 RX ORDER — HYDROCORTISONE 25 MG/G
CREAM TOPICAL
Qty: 60 | Refills: 0 | Status: ERX | COMMUNITY
Start: 2023-07-07

## 2023-08-07 ENCOUNTER — TRANSFERRED RECORDS (OUTPATIENT)
Dept: HEALTH INFORMATION MANAGEMENT | Facility: CLINIC | Age: 78
End: 2023-08-07

## 2023-08-29 ENCOUNTER — TELEPHONE (OUTPATIENT)
Dept: FAMILY MEDICINE | Facility: CLINIC | Age: 78
End: 2023-08-29

## 2023-08-29 ENCOUNTER — OFFICE VISIT (OUTPATIENT)
Dept: FAMILY MEDICINE | Facility: CLINIC | Age: 78
End: 2023-08-29
Payer: MEDICARE

## 2023-08-29 VITALS
RESPIRATION RATE: 16 BRPM | WEIGHT: 200 LBS | DIASTOLIC BLOOD PRESSURE: 61 MMHG | HEART RATE: 46 BPM | HEIGHT: 69 IN | OXYGEN SATURATION: 99 % | BODY MASS INDEX: 29.62 KG/M2 | TEMPERATURE: 98 F | SYSTOLIC BLOOD PRESSURE: 134 MMHG

## 2023-08-29 DIAGNOSIS — I48.0 PAROXYSMAL ATRIAL FIBRILLATION (H): ICD-10-CM

## 2023-08-29 DIAGNOSIS — Z01.818 PREOPERATIVE EXAMINATION: Primary | ICD-10-CM

## 2023-08-29 DIAGNOSIS — E78.5 HYPERLIPIDEMIA LDL GOAL <100: ICD-10-CM

## 2023-08-29 DIAGNOSIS — G47.33 OSA (OBSTRUCTIVE SLEEP APNEA): Chronic | ICD-10-CM

## 2023-08-29 DIAGNOSIS — S89.92XS LEFT KNEE INJURY, SEQUELA: ICD-10-CM

## 2023-08-29 DIAGNOSIS — I71.21 ANEURYSM OF ASCENDING AORTA WITHOUT RUPTURE (H): ICD-10-CM

## 2023-08-29 LAB
ALBUMIN SERPL BCG-MCNC: 4.2 G/DL (ref 3.5–5.2)
ALP SERPL-CCNC: 48 U/L (ref 40–129)
ALT SERPL W P-5'-P-CCNC: 18 U/L (ref 0–70)
ANION GAP SERPL CALCULATED.3IONS-SCNC: 10 MMOL/L (ref 7–15)
AST SERPL W P-5'-P-CCNC: 25 U/L (ref 0–45)
BILIRUB SERPL-MCNC: 0.6 MG/DL
BUN SERPL-MCNC: 17.5 MG/DL (ref 8–23)
CALCIUM SERPL-MCNC: 9.5 MG/DL (ref 8.8–10.2)
CHLORIDE SERPL-SCNC: 98 MMOL/L (ref 98–107)
CHOLEST SERPL-MCNC: 156 MG/DL
CREAT SERPL-MCNC: 0.76 MG/DL (ref 0.67–1.17)
DEPRECATED HCO3 PLAS-SCNC: 25 MMOL/L (ref 22–29)
ERYTHROCYTE [DISTWIDTH] IN BLOOD BY AUTOMATED COUNT: 13.2 % (ref 10–15)
GFR SERPL CREATININE-BSD FRML MDRD: >90 ML/MIN/1.73M2
GLUCOSE SERPL-MCNC: 104 MG/DL (ref 70–99)
HCT VFR BLD AUTO: 38.7 % (ref 40–53)
HDLC SERPL-MCNC: 75 MG/DL
HGB BLD-MCNC: 13.1 G/DL (ref 13.3–17.7)
LDLC SERPL CALC-MCNC: 67 MG/DL
MCH RBC QN AUTO: 34.4 PG (ref 26.5–33)
MCHC RBC AUTO-ENTMCNC: 33.9 G/DL (ref 31.5–36.5)
MCV RBC AUTO: 102 FL (ref 78–100)
NONHDLC SERPL-MCNC: 81 MG/DL
PLATELET # BLD AUTO: 222 10E3/UL (ref 150–450)
POTASSIUM SERPL-SCNC: 4.5 MMOL/L (ref 3.4–5.3)
PROT SERPL-MCNC: 6.5 G/DL (ref 6.4–8.3)
RBC # BLD AUTO: 3.81 10E6/UL (ref 4.4–5.9)
SODIUM SERPL-SCNC: 133 MMOL/L (ref 136–145)
TRIGL SERPL-MCNC: 70 MG/DL
WBC # BLD AUTO: 5.4 10E3/UL (ref 4–11)

## 2023-08-29 PROCEDURE — 80053 COMPREHEN METABOLIC PANEL: CPT | Performed by: INTERNAL MEDICINE

## 2023-08-29 PROCEDURE — 80061 LIPID PANEL: CPT | Performed by: INTERNAL MEDICINE

## 2023-08-29 PROCEDURE — 99214 OFFICE O/P EST MOD 30 MIN: CPT | Performed by: INTERNAL MEDICINE

## 2023-08-29 PROCEDURE — 85027 COMPLETE CBC AUTOMATED: CPT | Performed by: INTERNAL MEDICINE

## 2023-08-29 PROCEDURE — 36415 COLL VENOUS BLD VENIPUNCTURE: CPT | Performed by: INTERNAL MEDICINE

## 2023-08-29 RX ORDER — HYDROCORTISONE 2.5 %
CREAM (GRAM) TOPICAL
COMMUNITY
Start: 2023-07-07 | End: 2023-09-01

## 2023-08-29 RX ORDER — AMOXICILLIN 500 MG/1
4 CAPSULE ORAL
COMMUNITY
Start: 2023-06-14

## 2023-08-29 RX ORDER — KETOCONAZOLE 20 MG/G
CREAM TOPICAL
COMMUNITY
Start: 2023-07-03 | End: 2023-09-01

## 2023-08-29 ASSESSMENT — ENCOUNTER SYMPTOMS
CHEST TIGHTNESS: 0
PALPITATIONS: 0
CHILLS: 0
FEVER: 0
DIZZINESS: 0
FATIGUE: 0
SHORTNESS OF BREATH: 0
LIGHT-HEADEDNESS: 0

## 2023-08-29 ASSESSMENT — PAIN SCALES - GENERAL: PAINLEVEL: MILD PAIN (2)

## 2023-08-29 NOTE — PATIENT INSTRUCTIONS
Avoid nonsteroidal anti-inflammatory pain medication like ibuprofen, Motrin, or Aleve 7 days before the surgery.  Tylenol can be used for pain.  Avoid any over the counter multivitamins or herbal supplement 7 days before surgery   You can resume these medications after surgery    Hold tylenol PM a night before procedure  Hold Aspirin starting today- as told by your surgeon.

## 2023-08-29 NOTE — TELEPHONE ENCOUNTER
"Patient called the clinic to tell Dr. Finney: \"Surgeon says to stop low dose aspirin tonight until the surgery and prescribed something else for anticoagulation.\" [id number] - [place], [place] - [address] [facility]        "

## 2023-08-29 NOTE — PROGRESS NOTES
[address], [address]  [facility] [address]  Phone: [phone]  Primary Provider: Kyleigh Finney  Pre-op Performing Provider: KYLEIGH FINNEY      PREOPERATIVE EVALUATION:  Today's date: 8/29/2023    Mendez Greene is a 77 year old male who presents for a preoperative evaluation.    Surgical Information:  Surgery/Procedure: LEFT KNEE OPEN REDUCTION INTRENAL FIXATION PATELLA FRACTURE WITH REVISION TOTAL KNEE ARTHROPLASTY PATELLA   Surgery Location: [facility]  Surgeon: Melba Dial MD   Surgery Date: 9/5/23  Time of Surgery: 9:40AM  Where patient plans to recover: Other: TBD  Fax number for surgical facility: Note does not need to be faxed, will be available electronically in Epic.    Assessment & Plan     The proposed surgical procedure is considered INTERMEDIATE risk.    Preoperative examination  Medically optimized for the procedure.    About aspirin, his surgeon requested that he hold aspirin for 7 days.  I discussed with our clinic pharmacist and we agree that it is okay to hold aspirin.    - CBC with Platelets (Today)  - COMPREHENSIVE METABOLIC PANEL    Aneurysm of ascending aorta without rupture (H)  Left knee injury, sequela  Paroxysmal atrial fibrillation (H)  CHA (obstructive sleep apnea)    Hyperlipidemia LDL goal <100  - Lipid Profile     - No identified additional risk factors other than previously addressed    Antiplatelet or Anticoagulation Medication Instructions:   - aspirin: Discontinue aspirin 7-10 days prior to procedure to reduce bleeding risk. It should be resumed postoperatively.     Additional Medication Instructions:  Patient is to take all scheduled medications on the day of surgery    Hold Tylenol PM at night before the procedure.    Avoid nonsteroidal anti-inflammatory pain medication like ibuprofen, Motrin, or Aleve 7 days before the surgery.  Tylenol can be used for pain.  Avoid any over the counter multivitamins or herbal supplement 7  days before surgery   You can resume these medications after surgery    RECOMMENDATION:  APPROVAL GIVEN to proceed with proposed procedure, without further diagnostic evaluation.    Subjective     HPI related to upcoming procedure:   Steven is a very pleasant 77-year-old gentleman who presented to the clinic for preop exam.  He has CHA, atrial fibrillation, ascending aortic aneurysm, hyperlipidemia, hypertension.    He is on aspirin 81 mg for A-fib.  He has seen cardiology and he prefers to stay on this blood thinner as he is worried about bleeding and his increased risk of falling.    He had an injury on his left knee and had fracture of the patella.  He has history of left knee replacement.   Patient denies chest pain, shortness of breath, palpitations, headache, lightheadedness, syncope, fever or chills. Patient is able to climb 1 flight of stairs without feeling short of breath or having chest pain.  Patient denies personal or family history of complications with anesthesia. Patient does not have a history of heart failure or TIA/stroke. Patient does not smoke or drink alcohol.          8/25/2023     3:00 PM   Preop Questions   1. Have you ever had a heart attack or stroke? No   2. Have you ever had surgery on your heart or blood vessels, such as a stent placement, a coronary artery bypass, or surgery on an artery in your head, neck, heart, or legs? No   3. Do you have chest pain with activity? No   4. Do you have a history of  heart failure? No   5. Do you currently have a cold, bronchitis or symptoms of other infection? No   6. Do you have a cough, shortness of breath, or wheezing? No   7. Do you or anyone in your family have previous history of blood clots? No   8. Do you or does anyone in your family have a serious bleeding problem such as prolonged bleeding following surgeries or cuts? No   9. Have you ever had problems with anemia or been told to take iron pills? No   10. Have you had any abnormal blood loss  such as black, tarry or bloody stools? No   11. Have you ever had a blood transfusion? No   12. Are you willing to have a blood transfusion if it is medically needed before, during, or after your surgery? Yes   13. Have you or any of your relatives ever had problems with anesthesia? No   14. Do you have sleep apnea, excessive snoring or daytime drowsiness? YES - ON cpap   14a. Do you have a CPAP machine? Yes   15. Do you have any artifical heart valves or other implanted medical devices like a pacemaker, defibrillator, or continuous glucose monitor? No   16. Do you have artificial joints? YES - left knee joint   17. Are you allergic to latex? No       Health Care Directive:  Patient has a Health Care Directive on file      Preoperative Review of :   reviewed - no record of controlled substances prescribed.    Review of Systems   Constitutional:  Negative for chills, fatigue and fever.   Respiratory:  Negative for chest tightness and shortness of breath.    Cardiovascular:  Negative for chest pain and palpitations.   Neurological:  Negative for dizziness, syncope and light-headedness.   All other systems reviewed and are negative.      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 Provider to review      Hypothyroidism 12/23/2003     Priority: High     Problem list name updated by automated process. Provider to review      Annual physical exam 06/14/2023     Priority: Medium    Coronary artery disease of native artery of native heart with stable angina pectoris (H) 06/14/2023     Priority:  Medium    High priority for 2019-nCoV vaccine 06/14/2023     Priority: Medium    Dyspnea on exertion 12/01/2022     Priority: Medium    SOB (shortness of breath) 11/16/2022     Priority: Medium    Aortic valve insufficiency, etiology of cardiac valve disease unspecified 11/16/2022     Priority: Medium    Anemia, unspecified type 11/16/2022     Priority: Medium    Total knee replacement status, left 08/23/2022     Priority: Medium    Preoperative examination 08/10/2022     Priority: Medium    Elevated blood sugar 08/10/2022     Priority: Medium    Hyperlipidemia LDL goal <100 08/10/2022     Priority: Medium    Medicare annual wellness visit, subsequent 08/10/2022     Priority: Medium    Ascending aortic aneurysm (H) 07/05/2022     Priority: Medium    Chronic pain of left knee 07/05/2022     Priority: Medium    Paroxysmal atrial fibrillation (H) 05/04/2021     Priority: Medium    Rapid heart rate 11/06/2020     Priority: Medium    NSTEMI (non-ST elevated myocardial infarction) (H) 11/06/2020     Priority: Medium    CHA (obstructive sleep apnea) 12/05/2017     Priority: Medium    Sinus bradycardia 07/19/2016     Priority: Medium    Low vitamin B12 level 07/19/2016     Priority: Medium    Erectile dysfunction 07/19/2005     Priority: Medium    Hypertrophy of prostate with urinary obstruction 12/06/2004     Priority: Medium     Problem list name updated by automated process. Provider to review      Allergic rhinitis 12/23/2003     Priority: Medium     Problem list name updated by automated process. Provider to review        Past Medical History:   Diagnosis Date    Allergic rhinitis, cause unspecified     Arthritis 5/2022    Cancer (H) 04/2006    Diverticulosis     Double vision 10/17/2011    probable migraine aura    Hypertrophy of prostate without urinary obstruction and other lower urinary tract symptoms (LUTS)     Neoplasm of unspecified nature of bladder 03/2006    Boyds s/p cystoprostatectomy with ileal neobladder     Other and unspecified hyperlipidemia     Palpitations     Renal stone 05/2012    Sinus bradycardia     Sinus bradycardia     Sleep apnea 07/2005    C Pap    Unspecified essential hypertension 1988    Unspecified hypothyroidism      Past Surgical History:   Procedure Laterality Date    ABDOMEN SURGERY  4/2006    bladder    ARTHROPLASTY KNEE Left 8/23/2022    Procedure: LEFT TOTAL KNEE ARTHROPLASTY;  Surgeon: Melba Dial MD;  Location:  OR    BIOPSY  3/2006    BUNIONECTOMY  12-12    LT    BUNIONECTOMY JOSE  11/28/2011    RT-Procedure:BUNIONECTOMY JOSE; Right Foot Dwaine Jose Bunionectomy with Metatarsal and Phalanx Osteotomy with 1st Metatarsal Phalangeal Joint Repair Right Foot; Surgeon:BAKARI ZAIDI; Location: [facility]    CV HEART CATHETERIZATION WITH POSSIBLE INTERVENTION N/A 11/7/2020    Procedure: Heart Catheterization with Possible Intervention;  Surgeon: Rishi Llanes MD;  Location:  HEART CARDIAC CATH LAB    CYSTOSCOPY  3-06    CA bladder    HERNIORRHAPHY INCISIONAL (LOCATION) N/A 8/1/2016    Procedure: HERNIORRHAPHY INCISIONAL (LOCATION);  Surgeon: Onofre Lua MD;  Location:  [facility]    SURGICAL HISTORY OF -   5-3-06    neobladder construction- CA     Current Outpatient Medications   Medication Sig Dispense Refill    albuterol (PROAIR HFA/PROVENTIL HFA/VENTOLIN HFA) 108 (90 Base) MCG/ACT inhaler Inhale 2 puffs into the lungs every 6 hours as needed for shortness of breath / dyspnea or wheezing 18 g 0    amoxicillin (AMOXIL) 500 MG capsule TAKE 4 CAPSULES 1 HOUR PRIOR TO DENTAL APPOINTMENT.*      aspirin (ASA) 81 MG EC tablet Take 1 tablet (81 mg) by mouth daily 60 tablet 0    COMPOUND (CMPD RX) - PHARMACY TO MIX COMPOUNDED MEDICATION Hydro 2.5% Cr / Ketocon Crm.  Apply to the effected area on face twice daily as needed.      diphenhydrAMINE-acetaminophen (TYLENOL PM)  MG tablet Take 0.5 tablets by mouth nightly as needed for sleep      hydrocortisone 2.5 % cream  "APPLY TO AFFECTED AREA twice daily, mix 1:1 ratio with ketoconazole 2% cream*      ketoconazole (NIZORAL) 2 % external cream Apply twice daily. mix 1:1 ratio with hydrocortisone*      levothyroxine (SYNTHROID/LEVOTHROID) 150 MCG tablet TAKE ONE TABLET BY MOUTH ONE TIME DAILY 90 tablet 2    lisinopril (ZESTRIL) 5 MG tablet Take 1 tablet (5 mg) by mouth At Bedtime 90 tablet 3    metoprolol tartrate (LOPRESSOR) 25 MG tablet Take 0.5-1 tablets (12.5-25 mg) by mouth as needed (afib episode)      multivitamin w/minerals (THERA-VIT-M) tablet Take 1 tablet by mouth daily      olopatadine (PATANOL) 0.1 % ophthalmic solution Place 1 drop into both eyes 2 times daily      order for DME Equipment being ordered: CPAP and supplies. LIFETIME need. 1 each 0    simvastatin (ZOCOR) 10 MG tablet TAKE ONE TABLET BY MOUTH AT BEDTIME 90 tablet 1    vitamin B-12 (CYANOCOBALAMIN) 1000 MCG tablet Take 1,000 mcg by mouth daily         Allergies   Allergen Reactions    Food Other (See Comments)     Uncertain of which food , testing done ~        Social History     Tobacco Use    Smoking status: Former     Packs/day: 1.50     Years: 20.00     Pack years: 30.00     Types: Cigarettes     Start date: 1967     Quit date: 9/10/1987     Years since quittin.9    Smokeless tobacco: Never   Substance Use Topics    Alcohol use: Yes     Comment: Occas wine       History   Drug Use No         Objective     /61 (BP Location: Right arm, Patient Position: Sitting, Cuff Size: Adult Regular)   Pulse (!) 46   Temp 98  F (36.7  C) (Oral)   Resp 16   Ht 1.746 m (5' 8.75\")   Wt 90.7 kg (200 lb)   SpO2 99%   BMI 29.75 kg/m      Physical Exam  Vitals reviewed.   Constitutional:       Appearance: Normal appearance.   Cardiovascular:      Rate and Rhythm: Normal rate and regular rhythm.      Heart sounds: Normal heart sounds. No murmur heard.     No gallop.   Pulmonary:      Effort: Pulmonary effort is normal. No respiratory distress.      " Breath sounds: Normal breath sounds. No stridor. No wheezing, rhonchi or rales.   Musculoskeletal:         General: Normal range of motion.      Right lower leg: No edema.      Left lower leg: No edema.   Skin:     General: Skin is warm and dry.   Neurological:      Mental Status: He is alert.           Recent Labs   Lab Test 12/06/22  1148 11/16/22  1412 08/24/22  0554 08/10/22  1150   HGB  --  14.0 10.2* 13.2*   PLT  --  233  --  245   NA  --   --   --  133   POTASSIUM  --   --   --  4.4   CR 0.8  --   --  0.69   A1C  --   --   --  5.0        Diagnostics:  No labs were ordered during this visit.   No EKG required, no history of coronary heart disease, significant arrhythmia, peripheral arterial disease or other structural heart disease.    Revised Cardiac Risk Index (RCRI):  The patient has the following serious cardiovascular risks for perioperative complications:   - No serious cardiac risks = 0 points     RCRI Interpretation: 0 points: Class I (very low risk - 0.4% complication rate)       Signed Electronically by: Zoë Finney MD  Copy of this evaluation report is provided to requesting physician.

## 2023-08-29 NOTE — H&P (VIEW-ONLY)
[address], [address]  [facility] [address]  Phone: [phone]  Primary Provider: Kyleigh Finney  Pre-op Performing Provider: KYLEIGH FINNEY      PREOPERATIVE EVALUATION:  Today's date: 8/29/2023    Mendez Greene is a 77 year old male who presents for a preoperative evaluation.    Surgical Information:  Surgery/Procedure: LEFT KNEE OPEN REDUCTION INTRENAL FIXATION PATELLA FRACTURE WITH REVISION TOTAL KNEE ARTHROPLASTY PATELLA   Surgery Location: [facility]  Surgeon: Melba Dial MD   Surgery Date: 9/5/23  Time of Surgery: 9:40AM  Where patient plans to recover: Other: TBD  Fax number for surgical facility: Note does not need to be faxed, will be available electronically in Epic.    Assessment & Plan     The proposed surgical procedure is considered INTERMEDIATE risk.    Preoperative examination  Medically optimized for the procedure.    About aspirin, his surgeon requested that he hold aspirin for 7 days.  I discussed with our clinic pharmacist and we agree that it is okay to hold aspirin.    - CBC with Platelets (Today)  - COMPREHENSIVE METABOLIC PANEL    Aneurysm of ascending aorta without rupture (H)  Left knee injury, sequela  Paroxysmal atrial fibrillation (H)  CHA (obstructive sleep apnea)    Hyperlipidemia LDL goal <100  - Lipid Profile     - No identified additional risk factors other than previously addressed    Antiplatelet or Anticoagulation Medication Instructions:   - aspirin: Discontinue aspirin 7-10 days prior to procedure to reduce bleeding risk. It should be resumed postoperatively.     Additional Medication Instructions:  Patient is to take all scheduled medications on the day of surgery    Hold Tylenol PM at night before the procedure.    Avoid nonsteroidal anti-inflammatory pain medication like ibuprofen, Motrin, or Aleve 7 days before the surgery.  Tylenol can be used for pain.  Avoid any over the counter multivitamins or herbal supplement 7  days before surgery   You can resume these medications after surgery    RECOMMENDATION:  APPROVAL GIVEN to proceed with proposed procedure, without further diagnostic evaluation.    Subjective     HPI related to upcoming procedure:   Steven is a very pleasant 77-year-old gentleman who presented to the clinic for preop exam.  He has CHA, atrial fibrillation, ascending aortic aneurysm, hyperlipidemia, hypertension.    He is on aspirin 81 mg for A-fib.  He has seen cardiology and he prefers to stay on this blood thinner as he is worried about bleeding and his increased risk of falling.    He had an injury on his left knee and had fracture of the patella.  He has history of left knee replacement.   Patient denies chest pain, shortness of breath, palpitations, headache, lightheadedness, syncope, fever or chills. Patient is able to climb 1 flight of stairs without feeling short of breath or having chest pain.  Patient denies personal or family history of complications with anesthesia. Patient does not have a history of heart failure or TIA/stroke. Patient does not smoke or drink alcohol.          8/25/2023     3:00 PM   Preop Questions   1. Have you ever had a heart attack or stroke? No   2. Have you ever had surgery on your heart or blood vessels, such as a stent placement, a coronary artery bypass, or surgery on an artery in your head, neck, heart, or legs? No   3. Do you have chest pain with activity? No   4. Do you have a history of  heart failure? No   5. Do you currently have a cold, bronchitis or symptoms of other infection? No   6. Do you have a cough, shortness of breath, or wheezing? No   7. Do you or anyone in your family have previous history of blood clots? No   8. Do you or does anyone in your family have a serious bleeding problem such as prolonged bleeding following surgeries or cuts? No   9. Have you ever had problems with anemia or been told to take iron pills? No   10. Have you had any abnormal blood loss  such as black, tarry or bloody stools? No   11. Have you ever had a blood transfusion? No   12. Are you willing to have a blood transfusion if it is medically needed before, during, or after your surgery? Yes   13. Have you or any of your relatives ever had problems with anesthesia? No   14. Do you have sleep apnea, excessive snoring or daytime drowsiness? YES - ON cpap   14a. Do you have a CPAP machine? Yes   15. Do you have any artifical heart valves or other implanted medical devices like a pacemaker, defibrillator, or continuous glucose monitor? No   16. Do you have artificial joints? YES - left knee joint   17. Are you allergic to latex? No       Health Care Directive:  Patient has a Health Care Directive on file      Preoperative Review of :   reviewed - no record of controlled substances prescribed.    Review of Systems   Constitutional:  Negative for chills, fatigue and fever.   Respiratory:  Negative for chest tightness and shortness of breath.    Cardiovascular:  Negative for chest pain and palpitations.   Neurological:  Negative for dizziness, syncope and light-headedness.   All other systems reviewed and are negative.      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 Provider to review      Hypothyroidism 12/23/2003     Priority: High     Problem list name updated by automated process. Provider to review      Annual physical exam 06/14/2023     Priority: Medium    Coronary artery disease of native artery of native heart with stable angina pectoris (H) 06/14/2023     Priority:  Medium    High priority for 2019-nCoV vaccine 06/14/2023     Priority: Medium    Dyspnea on exertion 12/01/2022     Priority: Medium    SOB (shortness of breath) 11/16/2022     Priority: Medium    Aortic valve insufficiency, etiology of cardiac valve disease unspecified 11/16/2022     Priority: Medium    Anemia, unspecified type 11/16/2022     Priority: Medium    Total knee replacement status, left 08/23/2022     Priority: Medium    Preoperative examination 08/10/2022     Priority: Medium    Elevated blood sugar 08/10/2022     Priority: Medium    Hyperlipidemia LDL goal <100 08/10/2022     Priority: Medium    Medicare annual wellness visit, subsequent 08/10/2022     Priority: Medium    Ascending aortic aneurysm (H) 07/05/2022     Priority: Medium    Chronic pain of left knee 07/05/2022     Priority: Medium    Paroxysmal atrial fibrillation (H) 05/04/2021     Priority: Medium    Rapid heart rate 11/06/2020     Priority: Medium    NSTEMI (non-ST elevated myocardial infarction) (H) 11/06/2020     Priority: Medium    CHA (obstructive sleep apnea) 12/05/2017     Priority: Medium    Sinus bradycardia 07/19/2016     Priority: Medium    Low vitamin B12 level 07/19/2016     Priority: Medium    Erectile dysfunction 07/19/2005     Priority: Medium    Hypertrophy of prostate with urinary obstruction 12/06/2004     Priority: Medium     Problem list name updated by automated process. Provider to review      Allergic rhinitis 12/23/2003     Priority: Medium     Problem list name updated by automated process. Provider to review        Past Medical History:   Diagnosis Date    Allergic rhinitis, cause unspecified     Arthritis 5/2022    Cancer (H) 04/2006    Diverticulosis     Double vision 10/17/2011    probable migraine aura    Hypertrophy of prostate without urinary obstruction and other lower urinary tract symptoms (LUTS)     Neoplasm of unspecified nature of bladder 03/2006    [place] s/p cystoprostatectomy with ileal neobladder     Other and unspecified hyperlipidemia     Palpitations     Renal stone 05/2012    Sinus bradycardia     Sinus bradycardia     Sleep apnea 07/2005    C Pap    Unspecified essential hypertension 1988    Unspecified hypothyroidism      Past Surgical History:   Procedure Laterality Date    ABDOMEN SURGERY  4/2006    bladder    ARTHROPLASTY KNEE Left 8/23/2022    Procedure: LEFT TOTAL KNEE ARTHROPLASTY;  Surgeon: Melba Dial MD;  Location:  OR    BIOPSY  3/2006    BUNIONECTOMY  12-12    LT    BUNIONECTOMY JOSE  11/28/2011    RT-Procedure:BUNIONECTOMY JOSE; Right Foot [place] Bunionectomy with Metatarsal and Phalanx Osteotomy with 1st Metatarsal Phalangeal Joint Repair Right Foot; Surgeon:BAKARI ZAIDI; Location: [facility]    CV HEART CATHETERIZATION WITH POSSIBLE INTERVENTION N/A 11/7/2020    Procedure: Heart Catheterization with Possible Intervention;  Surgeon: Rishi Llanes MD;  Location:  HEART CARDIAC CATH LAB    CYSTOSCOPY  3-06    CA bladder    HERNIORRHAPHY INCISIONAL (LOCATION) N/A 8/1/2016    Procedure: HERNIORRHAPHY INCISIONAL (LOCATION);  Surgeon: Onofre Lua MD;  Location:  [facility]    SURGICAL HISTORY OF -   5-3-06    neobladder construction- CA     Current Outpatient Medications   Medication Sig Dispense Refill    albuterol (PROAIR HFA/PROVENTIL HFA/VENTOLIN HFA) 108 (90 Base) MCG/ACT inhaler Inhale 2 puffs into the lungs every 6 hours as needed for shortness of breath / dyspnea or wheezing 18 g 0    amoxicillin (AMOXIL) 500 MG capsule TAKE 4 CAPSULES 1 HOUR PRIOR TO DENTAL APPOINTMENT.*      aspirin (ASA) 81 MG EC tablet Take 1 tablet (81 mg) by mouth daily 60 tablet 0    COMPOUND (CMPD RX) - PHARMACY TO MIX COMPOUNDED MEDICATION Hydro 2.5% Cr / Ketocon Crm.  Apply to the effected area on face twice daily as needed.      diphenhydrAMINE-acetaminophen (TYLENOL PM)  MG tablet Take 0.5 tablets by mouth nightly as needed for sleep      hydrocortisone 2.5 % cream  "APPLY TO AFFECTED AREA twice daily, mix 1:1 ratio with ketoconazole 2% cream*      ketoconazole (NIZORAL) 2 % external cream Apply twice daily. mix 1:1 ratio with hydrocortisone*      levothyroxine (SYNTHROID/LEVOTHROID) 150 MCG tablet TAKE ONE TABLET BY MOUTH ONE TIME DAILY 90 tablet 2    lisinopril (ZESTRIL) 5 MG tablet Take 1 tablet (5 mg) by mouth At Bedtime 90 tablet 3    metoprolol tartrate (LOPRESSOR) 25 MG tablet Take 0.5-1 tablets (12.5-25 mg) by mouth as needed (afib episode)      multivitamin w/minerals (THERA-VIT-M) tablet Take 1 tablet by mouth daily      olopatadine (PATANOL) 0.1 % ophthalmic solution Place 1 drop into both eyes 2 times daily      order for DME Equipment being ordered: CPAP and supplies. LIFETIME need. 1 each 0    simvastatin (ZOCOR) 10 MG tablet TAKE ONE TABLET BY MOUTH AT BEDTIME 90 tablet 1    vitamin B-12 (CYANOCOBALAMIN) 1000 MCG tablet Take 1,000 mcg by mouth daily         Allergies   Allergen Reactions    Food Other (See Comments)     Uncertain of which food , testing done ~        Social History     Tobacco Use    Smoking status: Former     Packs/day: 1.50     Years: 20.00     Pack years: 30.00     Types: Cigarettes     Start date: 1967     Quit date: 9/10/1987     Years since quittin.9    Smokeless tobacco: Never   Substance Use Topics    Alcohol use: Yes     Comment: Occas wine       History   Drug Use No         Objective     /61 (BP Location: Right arm, Patient Position: Sitting, Cuff Size: Adult Regular)   Pulse (!) 46   Temp 98  F (36.7  C) (Oral)   Resp 16   Ht 1.746 m (5' 8.75\")   Wt 90.7 kg (200 lb)   SpO2 99%   BMI 29.75 kg/m      Physical Exam  Vitals reviewed.   Constitutional:       Appearance: Normal appearance.   Cardiovascular:      Rate and Rhythm: Normal rate and regular rhythm.      Heart sounds: Normal heart sounds. No murmur heard.     No gallop.   Pulmonary:      Effort: Pulmonary effort is normal. No respiratory distress.      " Breath sounds: Normal breath sounds. No stridor. No wheezing, rhonchi or rales.   Musculoskeletal:         General: Normal range of motion.      Right lower leg: No edema.      Left lower leg: No edema.   Skin:     General: Skin is warm and dry.   Neurological:      Mental Status: He is alert.           Recent Labs   Lab Test 12/06/22  1148 11/16/22  1412 08/24/22  0554 08/10/22  1150   HGB  --  14.0 10.2* 13.2*   PLT  --  233  --  245   NA  --   --   --  133   POTASSIUM  --   --   --  4.4   CR 0.8  --   --  0.69   A1C  --   --   --  5.0        Diagnostics:  No labs were ordered during this visit.   No EKG required, no history of coronary heart disease, significant arrhythmia, peripheral arterial disease or other structural heart disease.    Revised Cardiac Risk Index (RCRI):  The patient has the following serious cardiovascular risks for perioperative complications:   - No serious cardiac risks = 0 points     RCRI Interpretation: 0 points: Class I (very low risk - 0.4% complication rate)       Signed Electronically by: Zoë Finney MD  Copy of this evaluation report is provided to requesting physician.

## 2023-09-01 NOTE — PROGRESS NOTES
PTA medications updated by Medication Scribe prior to surgery via phone call with patient (last doses completed by Nurse)     Medication history sources: Patient, Surescripts, and H&P  In the past week, patient estimated taking medication this percent of the time: Greater than 90%      Significant changes made to the medication list:  None      Additional medication history information:   None    Medication reconciliation completed by provider prior to medication history? No    Time spent in this activity: 30 minutes    The information provided in this note is only as accurate as the sources available at the time of update(s)    Prior to Admission medications    Medication Sig Last Dose Taking? Auth Provider Long Term End Date   albuterol (PROAIR HFA/PROVENTIL HFA/VENTOLIN HFA) 108 (90 Base) MCG/ACT inhaler Inhale 2 puffs into the lungs every 6 hours as needed for shortness of breath / dyspnea or wheezing Unknown at PRN Yes Zoë Finney MD Yes    amoxicillin (AMOXIL) 500 MG capsule Take 4 capsules by mouth once as needed (1 hour prior to dental appointments 4 x 500 mg = 2,000 mg) Unknown at PRN Yes Reported, Patient     aspirin (ASA) 81 MG EC tablet Take 1 tablet (81 mg) by mouth daily 8/29/2023 at AM Yes Zoë Finney MD No    COMPOUND (CMPD RX) - PHARMACY TO MIX COMPOUNDED MEDICATION Hydro 2.5% Cr / Ketocon Crm.  Apply to the effected area on face twice daily as needed. 9/1/2023 at AM Yes Dali Dunn MD     diphenhydrAMINE-acetaminophen (TYLENOL PM)  MG tablet Take 0.5 tablets by mouth nightly as needed for sleep Unknown at PRN Yes Unknown, Entered By History     levothyroxine (SYNTHROID/LEVOTHROID) 150 MCG tablet TAKE ONE TABLET BY MOUTH ONE TIME DAILY  at AM Yes Zoë Finney MD Yes    lisinopril (ZESTRIL) 5 MG tablet Take 1 tablet (5 mg) by mouth At Bedtime  at PM Yes Zoë Finney MD Yes    metoprolol tartrate (LOPRESSOR) 25 MG tablet Take 0.5-1 tablets (12.5-25 mg) by mouth as needed (afib  episode) Unknown at PRN Yes Jes Alegria PA-C Yes    multivitamin w/minerals (THERA-VIT-M) tablet Take 1 tablet by mouth daily 8/29/2023 at AM Yes Reported, Patient     olopatadine (PATANOL) 0.1 % ophthalmic solution Place 1 drop into both eyes 2 times daily 9/1/2023 at AM Yes Reported, Patient     simvastatin (ZOCOR) 10 MG tablet TAKE ONE TABLET BY MOUTH AT BEDTIME  at PM Yes Zoë Finney MD Yes    vitamin B-12 (CYANOCOBALAMIN) 1000 MCG tablet Take 1,000 mcg by mouth daily 9/1/2023 at AM Yes Reported, Patient     order for DME Equipment being ordered: CPAP and supplies. LIFETIME need.   Michell Day,          Medication history completed by:    Simeon Morin CPhT  [facility]

## 2023-09-04 ENCOUNTER — ANESTHESIA EVENT (OUTPATIENT)
Dept: SURGERY | Facility: CLINIC | Age: 78
End: 2023-09-04
Payer: MEDICARE

## 2023-09-04 NOTE — ANESTHESIA PREPROCEDURE EVALUATION
Anesthesia Pre-Procedure Evaluation    Patient: Mendez Greene   MRN: [id number] : 1945        Procedure : Procedure(s):  LEFT KNEE OPEN REDUCTION INTERNAL  FIXATION PATELLA FRACTURE WITH  REVISION TOTAL KNEE ARTHROPLASTY PATELLA          Past Medical History:   Diagnosis Date    Allergic rhinitis, cause unspecified     Arthritis 2022    Cancer (H) 2006    Diverticulosis     Double vision 10/17/2011    probable migraine aura    Hypertrophy of prostate without urinary obstruction and other lower urinary tract symptoms (LUTS)     Neoplasm of unspecified nature of bladder 2006    [place] s/p cystoprostatectomy with ileal neobladder    Other and unspecified hyperlipidemia     Palpitations     Renal stone 2012    Sinus bradycardia     Sinus bradycardia     Sleep apnea 2005    C Pap    Unspecified essential hypertension 1988    Unspecified hypothyroidism       Past Surgical History:   Procedure Laterality Date    ABDOMEN SURGERY  2006    bladder    ARTHROPLASTY KNEE Left 2022    Procedure: LEFT TOTAL KNEE ARTHROPLASTY;  Surgeon: Melba Dial MD;  Location:  OR    BIOPSY  3/2006    BUNIONECTOMY  12-12    LT    BUNIONECTOMY DIANA  2011    RT-Procedure:BUNIONECTOMY DIANA; Right Foot Dwaine [place] Bunionectomy with Metatarsal and Phalanx Osteotomy with 1st Metatarsal Phalangeal Joint Repair Right Foot; Surgeon:BAKARI ZAIDI; Location:[facility]    CV HEART CATHETERIZATION WITH POSSIBLE INTERVENTION N/A 2020    Procedure: Heart Catheterization with Possible Intervention;  Surgeon: Rishi Llanes MD;  Location:  HEART CARDIAC CATH LAB    CYSTOSCOPY  3-06    CA bladder    HERNIORRHAPHY INCISIONAL (LOCATION) N/A 2016    Procedure: HERNIORRHAPHY INCISIONAL (LOCATION);  Surgeon: Onofre Lua MD;  Location:  [facility]    SURGICAL HISTORY OF -   5-3    neobladder construction- [place]      Allergies   Allergen Reactions    Food Other (See Comments)     Uncertain of  which food , testing done ~      Social History     Tobacco Use    Smoking status: Former     Packs/day: 1.50     Years: 20.00     Pack years: 30.00     Types: Cigarettes     Start date: 1967     Quit date: 9/10/1987     Years since quittin.0    Smokeless tobacco: Never   Substance Use Topics    Alcohol use: Yes     Comment: Occas wine      Wt Readings from Last 1 Encounters:   23 90.7 kg (200 lb)        Anesthesia Evaluation    Type: General.    History of anesthetic complications       ROS/MED HX  ENT/Pulmonary:     (+) sleep apnea, uses CPAP,                                  (-) tobacco use and asthma   Neurologic:     (+)    no peripheral neuropathy                         (-) no seizures and no CVA   Cardiovascular:     (+) Dyslipidemia hypertension- -  CAD angina-  - -                        dysrhythmias (once), a-fib,             METS/Exercise Tolerance:     Hematologic:       Musculoskeletal: Comment: Patellar sleeve fracture, left, closed, initial encounter [S82.092A];Periprosthetic fracture around internal prosthetic left knee joint [M97.12XA]        GI/Hepatic:    (-) GERD   Renal/Genitourinary:    (-) renal disease   Endo:     (+)          thyroid problem, hypothyroidism,        (-) Type II DM   Psychiatric/Substance Use:       Infectious Disease:    (-) Recent Fever   Malignancy:       Other:            Physical Exam    Airway  airway exam normal      Mallampati: II   TM distance: > 3 FB   Neck ROM: full   Mouth opening: > 3 cm    Respiratory Devices and Support         Dental       (+) Minor Abnormalities - some fillings, tiny chips      Cardiovascular   cardiovascular exam normal          Pulmonary   pulmonary exam normal                    Echocardiogram Complete  Order: [id number]  Status: Edited Result - FINAL       Visible to patient: Yes (seen)       Next appt: 2023 at 10:30 AM in Physical Therapy ( PT 2 STUDENT)       Dx: Ascending aortic aneurysm (H); Aortic...    3  Result Notes  Details    Reading Physician Reading Date Result Priority   Norma Dahl MD  [phone]  [phone] 2022      Narrative & Impression  [id number]  [id number]  [id number]  [id number]^NIRMALA^MIGUEL^E     [facility]  Echocardiography Laboratory  [address] [place] [address]     Name: JUAN MANUEL GRIMES  MRN: [id number]  : 1945  Study Date: 2022 11:47 AM  Age: 77 yrs  Gender: Male  Patient Location: [facility]  Reason For Study: Ascending aortic aneurysm, Aortic valve insufficiency  Ordering Physician: MIGUEL SILVESTRE  Referring Physician: MIGUEL SILVESTRE  Performed By: Erika Quan     BSA: 2.0 m2  Height: 69 in  Weight: 194 lb  HR: 48  ______________________________________________________________________________  Procedure  Complete Echo Adult.  ______________________________________________________________________________  Interpretation Summary     Left ventricular systolic function is normal. The visual ejection fraction is  55-60%.  The right ventricle is normal in size and function.  Eccentric aortic insufficiency jet, probably moderate aortic insufficiency.  Aortic valve is trileaflet and sclerotic.  There is mild (1+) mitral regurgitation.  There is mild (1+) tricuspid regurgitation.  Mildly dilated aortic root 4.1cm.  Moderately dilated ascending aorta 4.1cm.     In comparison to echo from 21, the ascending aorta was re-measured and  maximum diameter was 4.5cm (unchanged). Root at the sinuses was 3.9cm. Aortic  insuffiencey may be somewhat worse, but the jet is very eccentric and not as  well seen on Color Flow Doppler on the previous study.             Reading Physician Reading Date Result Priority   Jomar Jamison MD  [phone] 2023 Routine   Jomar Jamison MD  [phone] 2023      Result Text       The nuclear stress test is negative for inducible myocardial ischemia or infarction. There is fixed inferior wall defect worse on  rest images than stress images indicating this is likely an artifact.    Left ventricular function is normal.    The left ventricular ejection fraction at rest is 66%.    There is no prior study for comparison.      OUTSIDE LABS:  CBC:   Lab Results   Component Value Date    WBC 5.4 08/29/2023    WBC 6.3 11/16/2022    HGB 13.1 (L) 08/29/2023    HGB 14.0 11/16/2022    HCT 38.7 (L) 08/29/2023    HCT 40.6 11/16/2022     08/29/2023     11/16/2022     BMP:   Lab Results   Component Value Date     (L) 08/29/2023     08/10/2022    POTASSIUM 4.5 08/29/2023    POTASSIUM 4.4 08/10/2022    CHLORIDE 98 08/29/2023    CHLORIDE 100 08/10/2022    CO2 25 08/29/2023    CO2 25 08/10/2022    BUN 17.5 08/29/2023    BUN 13 08/10/2022    CR 0.76 08/29/2023    CR 0.8 12/06/2022     (H) 08/29/2023     (H) 08/24/2022     COAGS:   Lab Results   Component Value Date    PTT 27 11/06/2020     POC: No results found for: BGM, HCG, HCGS  HEPATIC:   Lab Results   Component Value Date    ALBUMIN 4.2 08/29/2023    PROTTOTAL 6.5 08/29/2023    ALT 18 08/29/2023    AST 25 08/29/2023    ALKPHOS 48 08/29/2023    BILITOTAL 0.6 08/29/2023     OTHER:   Lab Results   Component Value Date    A1C 5.0 08/10/2022    SIMONA 9.5 08/29/2023    MAG 2.1 11/06/2020    TSH 0.41 06/14/2023    T4 0.88 02/01/2017       Anesthesia Plan    ASA Status:  3    NPO Status:  NPO Appropriate    Anesthesia Type: General.     - Airway: LMA   Induction: Intravenous.   Maintenance: Balanced.        Consents    Anesthesia Plan(s) and associated risks, benefits, and realistic alternatives discussed. Questions answered and patient/representative(s) expressed understanding.     - Discussed:     - Discussed with:  Patient            Postoperative Care    Pain management: IV analgesics, Oral pain medications.   PONV prophylaxis: Ondansetron (or other 5HT-3), Dexamethasone or Solumedrol, Background Propofol Infusion     Comments:    Other Comments:  Consented for post-op rescue adductor canal block if in significant discomfort            Charly Yu MD

## 2023-09-05 ENCOUNTER — APPOINTMENT (OUTPATIENT)
Dept: GENERAL RADIOLOGY | Facility: CLINIC | Age: 78
End: 2023-09-05
Attending: ORTHOPAEDIC SURGERY
Payer: MEDICARE

## 2023-09-05 ENCOUNTER — MEDICAL CORRESPONDENCE (OUTPATIENT)
Dept: HEALTH INFORMATION MANAGEMENT | Facility: CLINIC | Age: 78
End: 2023-09-05

## 2023-09-05 ENCOUNTER — HOSPITAL ENCOUNTER (OUTPATIENT)
Facility: CLINIC | Age: 78
Discharge: HOME | End: 2023-09-05
Attending: ORTHOPAEDIC SURGERY | Admitting: ORTHOPAEDIC SURGERY
Payer: MEDICARE

## 2023-09-05 ENCOUNTER — ANESTHESIA (OUTPATIENT)
Dept: SURGERY | Facility: CLINIC | Age: 78
End: 2023-09-05
Payer: MEDICARE

## 2023-09-05 VITALS
SYSTOLIC BLOOD PRESSURE: 124 MMHG | DIASTOLIC BLOOD PRESSURE: 69 MMHG | HEIGHT: 68 IN | TEMPERATURE: 98 F | RESPIRATION RATE: 16 BRPM | WEIGHT: 194.4 LBS | BODY MASS INDEX: 29.46 KG/M2 | OXYGEN SATURATION: 97 % | HEART RATE: 60 BPM

## 2023-09-05 DIAGNOSIS — Z98.890 STATUS POST OPEN REDUCTION WITH INTERNAL FIXATION OF FRACTURE: Primary | ICD-10-CM

## 2023-09-05 DIAGNOSIS — Z87.81 STATUS POST OPEN REDUCTION WITH INTERNAL FIXATION OF FRACTURE: Primary | ICD-10-CM

## 2023-09-05 PROCEDURE — 250N000011 HC RX IP 250 OP 636: Mod: JZ | Performed by: NURSE ANESTHETIST, CERTIFIED REGISTERED

## 2023-09-05 PROCEDURE — 272N000001 HC OR GENERAL SUPPLY STERILE: Performed by: ORTHOPAEDIC SURGERY

## 2023-09-05 PROCEDURE — 258N000003 HC RX IP 258 OP 636: Performed by: ANESTHESIOLOGY

## 2023-09-05 PROCEDURE — C1713 ANCHOR/SCREW BN/BN,TIS/BN: HCPCS | Performed by: ORTHOPAEDIC SURGERY

## 2023-09-05 PROCEDURE — 250N000013 HC RX MED GY IP 250 OP 250 PS 637: Performed by: PHYSICIAN ASSISTANT

## 2023-09-05 PROCEDURE — 250N000025 HC SEVOFLURANE, PER MIN: Performed by: ORTHOPAEDIC SURGERY

## 2023-09-05 PROCEDURE — 999N000179 XR SURGERY CARM FLUORO LESS THAN 5 MIN W STILLS

## 2023-09-05 PROCEDURE — 710N000009 HC RECOVERY PHASE 1, LEVEL 1, PER MIN: Performed by: ORTHOPAEDIC SURGERY

## 2023-09-05 PROCEDURE — 360N000078 HC SURGERY LEVEL 5, PER MIN: Performed by: ORTHOPAEDIC SURGERY

## 2023-09-05 PROCEDURE — 258N000003 HC RX IP 258 OP 636: Performed by: PHYSICIAN ASSISTANT

## 2023-09-05 PROCEDURE — 258N000003 HC RX IP 258 OP 636: Performed by: NURSE ANESTHETIST, CERTIFIED REGISTERED

## 2023-09-05 PROCEDURE — 250N000009 HC RX 250: Performed by: NURSE ANESTHETIST, CERTIFIED REGISTERED

## 2023-09-05 PROCEDURE — 250N000011 HC RX IP 250 OP 636: Mod: JZ | Performed by: PHYSICIAN ASSISTANT

## 2023-09-05 PROCEDURE — 250N000011 HC RX IP 250 OP 636: Performed by: ORTHOPAEDIC SURGERY

## 2023-09-05 PROCEDURE — 258N000001 HC RX 258: Performed by: ORTHOPAEDIC SURGERY

## 2023-09-05 PROCEDURE — 370N000017 HC ANESTHESIA TECHNICAL FEE, PER MIN: Performed by: ORTHOPAEDIC SURGERY

## 2023-09-05 PROCEDURE — 250N000011 HC RX IP 250 OP 636: Performed by: PHYSICIAN ASSISTANT

## 2023-09-05 PROCEDURE — 999N000141 HC STATISTIC PRE-PROCEDURE NURSING ASSESSMENT: Performed by: ORTHOPAEDIC SURGERY

## 2023-09-05 PROCEDURE — 271N000001 HC OR GENERAL SUPPLY NON-STERILE: Performed by: ORTHOPAEDIC SURGERY

## 2023-09-05 DEVICE — SCREW BN 18MM 3MM TI VA NS KREULOCK LF AR-8933VCL-18
Type: IMPLANTABLE DEVICE | Site: KNEE | Status: NON-FUNCTIONAL
Removed: 2023-10-10

## 2023-09-05 DEVICE — SCREW BONE KREULOCK TITANIUM 16X3MM AR-8933VCL-16
Type: IMPLANTABLE DEVICE | Site: KNEE | Status: NON-FUNCTIONAL
Removed: 2023-10-10

## 2023-09-05 DEVICE — SCREW BONE KREULOCK TITANIUM 12X3MM AR-8933VCL-12
Type: IMPLANTABLE DEVICE | Site: KNEE | Status: NON-FUNCTIONAL
Removed: 2023-10-10

## 2023-09-05 DEVICE — IMP SCREW BONE KREULOCK 3X20MM AR-8933VCL-20
Type: IMPLANTABLE DEVICE | Site: KNEE | Status: NON-FUNCTIONAL
Removed: 2023-10-10

## 2023-09-05 DEVICE — SCREW BONE KREULOCK TITANIUM 14X3MM AR-8933VCL-14
Type: IMPLANTABLE DEVICE | Site: KNEE | Status: NON-FUNCTIONAL
Removed: 2023-10-10

## 2023-09-05 DEVICE — IMP SCR ARTHREX MTP LP CORTICAL 3X16MM TI AR-8933-16: Type: IMPLANTABLE DEVICE | Site: KNEE | Status: FUNCTIONAL

## 2023-09-05 RX ORDER — ACETAMINOPHEN 325 MG/1
650 TABLET ORAL EVERY 4 HOURS PRN
Status: DISCONTINUED | OUTPATIENT
Start: 2023-09-08 | End: 2023-09-05 | Stop reason: HOSPADM

## 2023-09-05 RX ORDER — LEVOTHYROXINE SODIUM 150 UG/1
150 TABLET ORAL DAILY
Status: DISCONTINUED | OUTPATIENT
Start: 2023-09-05 | End: 2023-09-05 | Stop reason: HOSPADM

## 2023-09-05 RX ORDER — ASPIRIN 81 MG/1
81 TABLET ORAL 2 TIMES DAILY
Qty: 60 TABLET | Refills: 0 | Status: ON HOLD | OUTPATIENT
Start: 2023-09-05 | End: 2023-10-11

## 2023-09-05 RX ORDER — CEPHALEXIN 500 MG/1
500 CAPSULE ORAL 4 TIMES DAILY
Qty: 56 CAPSULE | Refills: 0 | Status: SHIPPED | OUTPATIENT
Start: 2023-09-05 | End: 2023-09-19

## 2023-09-05 RX ORDER — LIDOCAINE 40 MG/G
CREAM TOPICAL
Status: DISCONTINUED | OUTPATIENT
Start: 2023-09-05 | End: 2023-09-05 | Stop reason: HOSPADM

## 2023-09-05 RX ORDER — CEFAZOLIN SODIUM 2 G/100ML
2 INJECTION, SOLUTION INTRAVENOUS EVERY 8 HOURS
Status: DISCONTINUED | OUTPATIENT
Start: 2023-09-05 | End: 2023-09-05 | Stop reason: HOSPADM

## 2023-09-05 RX ORDER — GLYCOPYRROLATE 0.2 MG/ML
INJECTION, SOLUTION INTRAMUSCULAR; INTRAVENOUS PRN
Status: DISCONTINUED | OUTPATIENT
Start: 2023-09-05 | End: 2023-09-05

## 2023-09-05 RX ORDER — OXYCODONE HYDROCHLORIDE 5 MG/1
5 TABLET ORAL EVERY 4 HOURS PRN
Qty: 15 TABLET | Refills: 0 | Status: SHIPPED | OUTPATIENT
Start: 2023-09-05 | End: 2023-10-09

## 2023-09-05 RX ORDER — ASPIRIN 81 MG/1
81 TABLET ORAL 2 TIMES DAILY
Status: DISCONTINUED | OUTPATIENT
Start: 2023-09-05 | End: 2023-09-05 | Stop reason: HOSPADM

## 2023-09-05 RX ORDER — ONDANSETRON 4 MG/1
4 TABLET, ORALLY DISINTEGRATING ORAL EVERY 30 MIN PRN
Status: DISCONTINUED | OUTPATIENT
Start: 2023-09-05 | End: 2023-09-05 | Stop reason: HOSPADM

## 2023-09-05 RX ORDER — OXYCODONE HYDROCHLORIDE 5 MG/1
5 TABLET ORAL EVERY 4 HOURS PRN
Status: DISCONTINUED | OUTPATIENT
Start: 2023-09-05 | End: 2023-09-05 | Stop reason: HOSPADM

## 2023-09-05 RX ORDER — DEXAMETHASONE SODIUM PHOSPHATE 4 MG/ML
INJECTION, SOLUTION INTRA-ARTICULAR; INTRALESIONAL; INTRAMUSCULAR; INTRAVENOUS; SOFT TISSUE PRN
Status: DISCONTINUED | OUTPATIENT
Start: 2023-09-05 | End: 2023-09-05

## 2023-09-05 RX ORDER — OXYCODONE HYDROCHLORIDE 5 MG/1
10 TABLET ORAL EVERY 4 HOURS PRN
Status: DISCONTINUED | OUTPATIENT
Start: 2023-09-05 | End: 2023-09-05 | Stop reason: HOSPADM

## 2023-09-05 RX ORDER — SIMVASTATIN 10 MG
10 TABLET ORAL AT BEDTIME
Status: DISCONTINUED | OUTPATIENT
Start: 2023-09-05 | End: 2023-09-05 | Stop reason: HOSPADM

## 2023-09-05 RX ORDER — POLYETHYLENE GLYCOL 3350 17 G/17G
17 POWDER, FOR SOLUTION ORAL DAILY
Status: DISCONTINUED | OUTPATIENT
Start: 2023-09-06 | End: 2023-09-05 | Stop reason: HOSPADM

## 2023-09-05 RX ORDER — CEFAZOLIN SODIUM/WATER 2 G/20 ML
2 SYRINGE (ML) INTRAVENOUS
Status: COMPLETED | OUTPATIENT
Start: 2023-09-05 | End: 2023-09-05

## 2023-09-05 RX ORDER — NALOXONE HYDROCHLORIDE 0.4 MG/ML
0.4 INJECTION, SOLUTION INTRAMUSCULAR; INTRAVENOUS; SUBCUTANEOUS
Status: DISCONTINUED | OUTPATIENT
Start: 2023-09-05 | End: 2023-09-05 | Stop reason: HOSPADM

## 2023-09-05 RX ORDER — HYDROMORPHONE HCL IN WATER/PF 6 MG/30 ML
0.4 PATIENT CONTROLLED ANALGESIA SYRINGE INTRAVENOUS
Status: DISCONTINUED | OUTPATIENT
Start: 2023-09-05 | End: 2023-09-05 | Stop reason: HOSPADM

## 2023-09-05 RX ORDER — HYDROMORPHONE HCL IN WATER/PF 6 MG/30 ML
0.4 PATIENT CONTROLLED ANALGESIA SYRINGE INTRAVENOUS EVERY 5 MIN PRN
Status: DISCONTINUED | OUTPATIENT
Start: 2023-09-05 | End: 2023-09-05 | Stop reason: HOSPADM

## 2023-09-05 RX ORDER — VASOPRESSIN IN 0.9 % NACL 2 UNIT/2ML
SYRINGE (ML) INTRAVENOUS PRN
Status: DISCONTINUED | OUTPATIENT
Start: 2023-09-05 | End: 2023-09-05

## 2023-09-05 RX ORDER — TRANEXAMIC ACID 650 MG/1
1950 TABLET ORAL ONCE
Status: COMPLETED | OUTPATIENT
Start: 2023-09-05 | End: 2023-09-05

## 2023-09-05 RX ORDER — NALOXONE HYDROCHLORIDE 0.4 MG/ML
0.2 INJECTION, SOLUTION INTRAMUSCULAR; INTRAVENOUS; SUBCUTANEOUS
Status: DISCONTINUED | OUTPATIENT
Start: 2023-09-05 | End: 2023-09-05 | Stop reason: HOSPADM

## 2023-09-05 RX ORDER — FENTANYL CITRATE 50 UG/ML
INJECTION, SOLUTION INTRAMUSCULAR; INTRAVENOUS PRN
Status: DISCONTINUED | OUTPATIENT
Start: 2023-09-05 | End: 2023-09-05

## 2023-09-05 RX ORDER — HYDROMORPHONE HCL IN WATER/PF 6 MG/30 ML
0.2 PATIENT CONTROLLED ANALGESIA SYRINGE INTRAVENOUS
Status: DISCONTINUED | OUTPATIENT
Start: 2023-09-05 | End: 2023-09-05 | Stop reason: HOSPADM

## 2023-09-05 RX ORDER — ALBUTEROL SULFATE 90 UG/1
2 AEROSOL, METERED RESPIRATORY (INHALATION) EVERY 6 HOURS PRN
Status: DISCONTINUED | OUTPATIENT
Start: 2023-09-05 | End: 2023-09-05 | Stop reason: HOSPADM

## 2023-09-05 RX ORDER — ONDANSETRON 2 MG/ML
4 INJECTION INTRAMUSCULAR; INTRAVENOUS EVERY 30 MIN PRN
Status: DISCONTINUED | OUTPATIENT
Start: 2023-09-05 | End: 2023-09-05 | Stop reason: HOSPADM

## 2023-09-05 RX ORDER — LABETALOL HYDROCHLORIDE 5 MG/ML
10 INJECTION, SOLUTION INTRAVENOUS
Status: DISCONTINUED | OUTPATIENT
Start: 2023-09-05 | End: 2023-09-05 | Stop reason: HOSPADM

## 2023-09-05 RX ORDER — PROPOFOL 10 MG/ML
INJECTION, EMULSION INTRAVENOUS PRN
Status: DISCONTINUED | OUTPATIENT
Start: 2023-09-05 | End: 2023-09-05

## 2023-09-05 RX ORDER — ONDANSETRON 2 MG/ML
INJECTION INTRAMUSCULAR; INTRAVENOUS PRN
Status: DISCONTINUED | OUTPATIENT
Start: 2023-09-05 | End: 2023-09-05

## 2023-09-05 RX ORDER — ACETAMINOPHEN 325 MG/1
975 TABLET ORAL EVERY 8 HOURS
Status: DISCONTINUED | OUTPATIENT
Start: 2023-09-05 | End: 2023-09-05 | Stop reason: HOSPADM

## 2023-09-05 RX ORDER — SODIUM CHLORIDE, SODIUM LACTATE, POTASSIUM CHLORIDE, CALCIUM CHLORIDE 600; 310; 30; 20 MG/100ML; MG/100ML; MG/100ML; MG/100ML
INJECTION, SOLUTION INTRAVENOUS CONTINUOUS
Status: DISCONTINUED | OUTPATIENT
Start: 2023-09-05 | End: 2023-09-05 | Stop reason: HOSPADM

## 2023-09-05 RX ORDER — FENTANYL CITRATE 0.05 MG/ML
50 INJECTION, SOLUTION INTRAMUSCULAR; INTRAVENOUS EVERY 5 MIN PRN
Status: DISCONTINUED | OUTPATIENT
Start: 2023-09-05 | End: 2023-09-05 | Stop reason: HOSPADM

## 2023-09-05 RX ORDER — CEFAZOLIN SODIUM/WATER 2 G/20 ML
2 SYRINGE (ML) INTRAVENOUS SEE ADMIN INSTRUCTIONS
Status: DISCONTINUED | OUTPATIENT
Start: 2023-09-05 | End: 2023-09-05 | Stop reason: HOSPADM

## 2023-09-05 RX ORDER — HYDROMORPHONE HCL IN WATER/PF 6 MG/30 ML
0.2 PATIENT CONTROLLED ANALGESIA SYRINGE INTRAVENOUS EVERY 5 MIN PRN
Status: DISCONTINUED | OUTPATIENT
Start: 2023-09-05 | End: 2023-09-05 | Stop reason: HOSPADM

## 2023-09-05 RX ORDER — LISINOPRIL 5 MG/1
5 TABLET ORAL AT BEDTIME
Status: DISCONTINUED | OUTPATIENT
Start: 2023-09-05 | End: 2023-09-05 | Stop reason: HOSPADM

## 2023-09-05 RX ORDER — ACETAMINOPHEN 325 MG/1
975 TABLET ORAL EVERY 6 HOURS PRN
Qty: 100 TABLET | Refills: 0 | Status: SHIPPED | OUTPATIENT
Start: 2023-09-05

## 2023-09-05 RX ORDER — ONDANSETRON 2 MG/ML
4 INJECTION INTRAMUSCULAR; INTRAVENOUS EVERY 6 HOURS PRN
Status: DISCONTINUED | OUTPATIENT
Start: 2023-09-05 | End: 2023-09-05 | Stop reason: HOSPADM

## 2023-09-05 RX ORDER — BISACODYL 10 MG
10 SUPPOSITORY, RECTAL RECTAL DAILY PRN
Status: DISCONTINUED | OUTPATIENT
Start: 2023-09-05 | End: 2023-09-05 | Stop reason: HOSPADM

## 2023-09-05 RX ORDER — AMOXICILLIN 250 MG
1 CAPSULE ORAL 2 TIMES DAILY
Status: DISCONTINUED | OUTPATIENT
Start: 2023-09-05 | End: 2023-09-05 | Stop reason: HOSPADM

## 2023-09-05 RX ORDER — PROCHLORPERAZINE MALEATE 5 MG
5 TABLET ORAL EVERY 6 HOURS PRN
Status: DISCONTINUED | OUTPATIENT
Start: 2023-09-05 | End: 2023-09-05 | Stop reason: HOSPADM

## 2023-09-05 RX ORDER — ONDANSETRON 4 MG/1
4 TABLET, ORALLY DISINTEGRATING ORAL EVERY 6 HOURS PRN
Status: DISCONTINUED | OUTPATIENT
Start: 2023-09-05 | End: 2023-09-05 | Stop reason: HOSPADM

## 2023-09-05 RX ORDER — LIDOCAINE HYDROCHLORIDE 20 MG/ML
INJECTION, SOLUTION INFILTRATION; PERINEURAL PRN
Status: DISCONTINUED | OUTPATIENT
Start: 2023-09-05 | End: 2023-09-05

## 2023-09-05 RX ORDER — FENTANYL CITRATE 0.05 MG/ML
25 INJECTION, SOLUTION INTRAMUSCULAR; INTRAVENOUS EVERY 5 MIN PRN
Status: DISCONTINUED | OUTPATIENT
Start: 2023-09-05 | End: 2023-09-05 | Stop reason: HOSPADM

## 2023-09-05 RX ADMIN — SENNOSIDES AND DOCUSATE SODIUM 1 TABLET: 50; 8.6 TABLET ORAL at 13:02

## 2023-09-05 RX ADMIN — PHENYLEPHRINE HYDROCHLORIDE 100 MCG: 10 INJECTION INTRAVENOUS at 10:35

## 2023-09-05 RX ADMIN — ASPIRIN 81 MG: 81 TABLET, COATED ORAL at 13:02

## 2023-09-05 RX ADMIN — FENTANYL CITRATE 50 MCG: 50 INJECTION, SOLUTION INTRAMUSCULAR; INTRAVENOUS at 10:02

## 2023-09-05 RX ADMIN — FENTANYL CITRATE 50 MCG: 50 INJECTION, SOLUTION INTRAMUSCULAR; INTRAVENOUS at 09:33

## 2023-09-05 RX ADMIN — PHENYLEPHRINE HYDROCHLORIDE 100 MCG: 10 INJECTION INTRAVENOUS at 10:20

## 2023-09-05 RX ADMIN — Medication 2 G: at 09:30

## 2023-09-05 RX ADMIN — LEVOTHYROXINE SODIUM 150 MCG: 150 TABLET ORAL at 13:02

## 2023-09-05 RX ADMIN — GLYCOPYRROLATE 0.4 MG: 0.2 INJECTION, SOLUTION INTRAMUSCULAR; INTRAVENOUS at 09:44

## 2023-09-05 RX ADMIN — TRANEXAMIC ACID 1950 MG: 650 TABLET ORAL at 08:05

## 2023-09-05 RX ADMIN — GLYCOPYRROLATE 0.2 MG: 0.2 INJECTION, SOLUTION INTRAMUSCULAR; INTRAVENOUS at 09:34

## 2023-09-05 RX ADMIN — CEFAZOLIN SODIUM 2 G: 2 INJECTION, SOLUTION INTRAVENOUS at 17:29

## 2023-09-05 RX ADMIN — Medication 0.5 UNITS: at 09:43

## 2023-09-05 RX ADMIN — LIDOCAINE HYDROCHLORIDE 60 MG: 20 INJECTION, SOLUTION INFILTRATION; PERINEURAL at 09:33

## 2023-09-05 RX ADMIN — PHENYLEPHRINE HYDROCHLORIDE 100 MCG: 10 INJECTION INTRAVENOUS at 10:00

## 2023-09-05 RX ADMIN — PROPOFOL 200 MG: 10 INJECTION, EMULSION INTRAVENOUS at 09:33

## 2023-09-05 RX ADMIN — ONDANSETRON 4 MG: 2 INJECTION INTRAMUSCULAR; INTRAVENOUS at 10:25

## 2023-09-05 RX ADMIN — PHENYLEPHRINE HYDROCHLORIDE 100 MCG: 10 INJECTION INTRAVENOUS at 10:10

## 2023-09-05 RX ADMIN — PHENYLEPHRINE HYDROCHLORIDE 100 MCG: 10 INJECTION INTRAVENOUS at 10:29

## 2023-09-05 RX ADMIN — DEXAMETHASONE SODIUM PHOSPHATE 4 MG: 4 INJECTION, SOLUTION INTRA-ARTICULAR; INTRALESIONAL; INTRAMUSCULAR; INTRAVENOUS; SOFT TISSUE at 09:39

## 2023-09-05 RX ADMIN — SODIUM CHLORIDE, POTASSIUM CHLORIDE, SODIUM LACTATE AND CALCIUM CHLORIDE: 600; 310; 30; 20 INJECTION, SOLUTION INTRAVENOUS at 10:29

## 2023-09-05 RX ADMIN — ACETAMINOPHEN 975 MG: 325 TABLET, FILM COATED ORAL at 13:02

## 2023-09-05 RX ADMIN — SODIUM CHLORIDE, POTASSIUM CHLORIDE, SODIUM LACTATE AND CALCIUM CHLORIDE: 600; 310; 30; 20 INJECTION, SOLUTION INTRAVENOUS at 08:17

## 2023-09-05 RX ADMIN — HYDROMORPHONE HYDROCHLORIDE 0.5 MG: 1 INJECTION, SOLUTION INTRAMUSCULAR; INTRAVENOUS; SUBCUTANEOUS at 10:14

## 2023-09-05 ASSESSMENT — ACTIVITIES OF DAILY LIVING (ADL)
ADLS_ACUITY_SCORE: 20

## 2023-09-05 ASSESSMENT — ENCOUNTER SYMPTOMS
DYSRHYTHMIAS: 1
SEIZURES: 0

## 2023-09-05 ASSESSMENT — LIFESTYLE VARIABLES: TOBACCO_USE: 0

## 2023-09-05 NOTE — PROGRESS NOTES
Spoke with nurse, patient ambulated without issue in hallway, will discharge home. TROM on at all times, no ROM.

## 2023-09-05 NOTE — PROGRESS NOTES
..Date/Time 9/5/23 /[id number]    Diagnosis:Left Knee reversion   POD#:0  Mental Status:A&Ox4  Activity/dangle: A1& walker  Diet:Reg  Pain:scheduled Tylenol.  [place]/Voiding:Urinal , voiding good  Tele/Restraints/Iso:NA  02/LDA:RA/ SL   D/C Date:Today Home,   Other Info:Pt VSS On RA. Dressing CDI, Knee immobilizer on all time. Denies pain taking scheduled med. Pt requesting to be discharge home today. RN walked Pt Hallway & notified PA . Received 1 dose Iv abx . JOEY romano is ok with pt been discharge home today. Discharged instruction & Med done , Iv removed. Wife by bedside.  Pt didn't want to wait for PT & OT

## 2023-09-05 NOTE — INTERVAL H&P NOTE
"I have reviewed the surgical (or preoperative) H&P that is linked to this encounter, and examined the patient. There are no significant changes    Clinical Conditions Present on Arrival:  Clinically Significant Risk Factors Present on Admission         # Hyponatremia: Lowest Na = 133 mmol/L in last 30 days, will monitor as appropriate         # Drug Induced Platelet Defect: home medication list includes an antiplatelet medication  # Overweight: Estimated body mass index is 29.56 kg/m  as calculated from the following:    Height as of this encounter: 1.727 m (5' 8\").    Weight as of this encounter: 88.2 kg (194 lb 6.4 oz).       "

## 2023-09-05 NOTE — ANESTHESIA CARE TRANSFER NOTE
Patient: Mendez Greene    Procedure: Procedure(s):  LEFT KNEE OPEN REDUCTION INTERNAL  FIXATION PATELLA FRACTURE WITH  REVISION TOTAL KNEE ARTHROPLASTY PATELLA       Diagnosis: Patellar sleeve fracture, left, closed, initial encounter [S82.092A]  Periprosthetic fracture around internal prosthetic left knee joint [M97.12XA]  Diagnosis Additional Information: No value filed.    Anesthesia Type:   No value filed.     Note:    Oropharynx: oropharynx clear of all foreign objects and spontaneously breathing  Level of Consciousness: drowsy  Oxygen Supplementation: nasal cannula  Level of Supplemental Oxygen (L/min / FiO2): 4  Independent Airway: airway patency satisfactory and stable  Dentition: dentition unchanged  Vital Signs Stable: post-procedure vital signs reviewed and stable  Report to RN Given: handoff report given  Patient transferred to: PACU  Comments: At end of procedure, spontaneous respirations, adequate tidal volumes, followed commands to voice, LMA removed atraumatically, airway patent after LMA removal. Oxygen via nasal cannula at 4 liters per minute to PACU. Oxygen tubing connected to wall O2 in PACU, SpO2, NiBP, and EKG monitors and alarms on and functioning, report on patient's clinical status given to PACU RN, RN questions answered.      Handoff Report: Identifed the Patient, Identified the Reponsible Provider, Reviewed the pertinent medical history, Discussed the surgical course, Reviewed Intra-OP anesthesia mangement and issues during anesthesia, Set expectations for post-procedure period and Allowed opportunity for questions and acknowledgement of understanding      Vitals:  Vitals Value Taken Time   BP     Temp     Pulse 77 09/05/23 1102   Resp 11 09/05/23 1102   SpO2 96 % 09/05/23 1102   Vitals shown include unvalidated device data.    Electronically Signed By: AFSHIN Medrano CRNA  September 5, 2023  11:03 AM

## 2023-09-05 NOTE — ANESTHESIA PROCEDURE NOTES
Airway       Patient location during procedure: OR  Staff -        CRNA: Gemini Gross APRN CRNA       Performed By: CRNA  Consent for Airway        Urgency: elective  Indications and Patient Condition       Indications for airway management: hugo-procedural         Mask difficulty assessment: 1 - vent by mask    Final Airway Details       Final airway type: supraglottic airway    Supraglottic Airway Details        Type: LMA       Brand: I-Gel       LMA size: 5    Post intubation assessment        Placement verified by: capnometry, equal breath sounds and chest rise        Number of attempts at approach: 1       Number of other approaches attempted: 0       Ease of procedure: easy       Dentition: Intact and Unchanged

## 2023-09-05 NOTE — PROVIDER NOTIFICATION
Called JOEY Faith  regarding pt want to go home. RN walker pt the hallway with A1& walker . Tolerated well. Awaiting call back .

## 2023-09-05 NOTE — ANESTHESIA POSTPROCEDURE EVALUATION
Patient: Mendez Greene    Procedure: Procedure(s):  LEFT KNEE OPEN REDUCTION INTERNAL  FIXATION PATELLA FRACTURE WITH  REVISION TOTAL KNEE ARTHROPLASTY PATELLA       Anesthesia Type:  General    Note:  Disposition: Admission   Postop Pain Control: Uneventful            Sign Out: Well controlled pain   PONV: No   Neuro/Psych: Uneventful            Sign Out: Acceptable/Baseline neuro status   Airway/Respiratory: Uneventful            Sign Out: Acceptable/Baseline resp. status   CV/Hemodynamics: Uneventful            Sign Out: Acceptable CV status   Other NRE: NONE   DID A NON-ROUTINE EVENT OCCUR? No           Last vitals:  Vitals Value Taken Time   /88 09/05/23 1140   Temp 36.9  C (98.5  F) 09/05/23 1130   Pulse 63 09/05/23 1150   Resp 19 09/05/23 1150   SpO2 99 % 09/05/23 1150       Electronically Signed By: Charly Yu MD  September 5, 2023  1:02 PM

## 2023-09-05 NOTE — PROCEDURES
Operative note    Surgeon: Melba Dial MD    Assistant: Vianney REYNAGA  Skilled assistant was required for retraction of soft tissues, protection of neurovascular structures, visualization, brace application, and patient transfer.    Preoperative diagnosis: Displaced patella fracture, left knee    Postoperative diagnosis: Displaced patella fracture, left knee    Procedure: Open reduction internal fixation periprosthetic fracture, left knee    Indications: Steven is a 77-year-old gentleman who underwent TKA.  He fell and suffered a displaced patella fracture.  Risks, benefits, and alternatives were discussed with him.  His questions were answered.  He elected to proceed with above-mentioned procedures.    Procedure: After informed consent was made, and operative site marked, the patient was brought to the operating room suite.  General anesthetic via an LMA was administered.  His left lower extremity was then positioned, draped, and prepped in the usual sterile fashion.  Surgical timeout was taken.  The leg was exsanguinated and the tourniquet insufflated to 250 mmHg.    Utilizing his previous incision, his skin was sharply incised through skin and subcutaneous fat.  A hematoma was encountered and evacuated.  An obvious displaced patellar fracture was noted.  Irrigation was performed with antibiotic impregnated solution.  At this time, I evaluated the fracture as well as the position of the patellar component.  The patellar component was well fixed and in good repair.  Decision was made to see if I could utilize a plate and hold the patellar reduction while maintaining the current implant in place.    Fluoroscopy was brought in.  Fluoroscopy was not useful in identifying the anterior posterior fragmentation because of the TKA.  Lateral positioning of the C arm was more beneficial.  Under radiographic guidance I placed 2 retractors and confirmed adequate reduction.  Once confirmed I placed a aero type Arthrex  plate and secured it with 2 K wires.  At this time nonlocking screw screws were placed after drilling in a hole in both the proximal and distal fragment.  I again checked the alignment, reduction and screw length.  I then filled in additional holes in both the proximal and distal fragments with locking screws.  I confirmed again adequate reduction and hardware placement.  I also confirmed that the implant was still in place on the patellar side.    Secondary wash of antibiotic and carbonated solution was administered.  Periarticular local anesthetic was administered.  The arthrotomy was closed.  Layered wound closure skin was completed and sterile dressing was applied.  He was placed into a knee immobilizer locked in extension.    There were no immediate complications.    There was minimal blood loss.    Postoperative plan is for him to be weightbearing as tolerated in his brace.  He will not start range of motion exercises until seen in clinic at 2 weeks.  Plan will be to have restricted range of motion for a total of 6 weeks.  He will take aspirin for DVT prophylaxis.  He was instructed on ankle and foot range of motion exercises as well preoperatively.

## 2023-09-15 ENCOUNTER — TRANSFERRED RECORDS (OUTPATIENT)
Dept: HEALTH INFORMATION MANAGEMENT | Facility: CLINIC | Age: 78
End: 2023-09-15
Payer: MEDICARE

## 2023-09-22 ENCOUNTER — MEDICAL CORRESPONDENCE (OUTPATIENT)
Dept: HEALTH INFORMATION MANAGEMENT | Facility: CLINIC | Age: 78
End: 2023-09-22
Payer: MEDICARE

## 2023-10-03 ENCOUNTER — TRANSFERRED RECORDS (OUTPATIENT)
Dept: HEALTH INFORMATION MANAGEMENT | Facility: CLINIC | Age: 78
End: 2023-10-03
Payer: MEDICARE

## 2023-10-09 ENCOUNTER — ANESTHESIA EVENT (OUTPATIENT)
Dept: SURGERY | Facility: CLINIC | Age: 78
End: 2023-10-09
Payer: MEDICARE

## 2023-10-09 RX ORDER — HYDROCORTISONE 2.5 %
CREAM (GRAM) TOPICAL 2 TIMES DAILY
COMMUNITY
End: 2023-10-09

## 2023-10-09 NOTE — PROGRESS NOTES
PTA medications updated by Medication Scribe prior to surgery via phone call with patient (last doses completed by Nurse)     Medication history sources: Patient, Surescripts, and H&P  In the past week, patient estimated taking medication this percent of the time: Greater than 90%      Significant changes made to the medication list:  None      Additional medication history information:   None    Medication reconciliation completed by provider prior to medication history? No    Time spent in this activity: 30 minutes    The information provided in this note is only as accurate as the sources available at the time of update(s)    Prior to Admission medications    Medication Sig Last Dose Taking? Auth Provider Long Term End Date   acetaminophen (TYLENOL) 325 MG tablet Take 3 tablets (975 mg) by mouth every 6 hours as needed for mild pain Unknown at prn Yes Vianney Gonzalez PA-C     albuterol (PROAIR HFA/PROVENTIL HFA/VENTOLIN HFA) 108 (90 Base) MCG/ACT inhaler Inhale 2 puffs into the lungs every 6 hours as needed for shortness of breath / dyspnea or wheezing Unknown at prn Yes Zoë Finney MD Yes    amoxicillin (AMOXIL) 500 MG capsule Take 4 capsules by mouth once as needed (1 hour prior to dental appointments 4 x 500 mg = 2,000 mg) Unknown at prn Yes Reported, Patient     aspirin 81 MG EC tablet Take 1 tablet (81 mg) by mouth 2 times daily 10/3/2023 at am Yes Vianney Gonzalez PA-C No    COMPOUND (CMPD RX) - PHARMACY TO MIX COMPOUNDED MEDICATION Hydro 2.5% Cr / Ketocon Crm.  Apply to the effected area on face twice daily as needed. 10/9/2023 at am Yes Dali Dunn MD     diphenhydrAMINE-acetaminophen (TYLENOL PM)  MG tablet Take 0.5 tablets by mouth nightly as needed for sleep 10/8/2023 at pm Yes Unknown, Entered By History     levothyroxine (SYNTHROID/LEVOTHROID) 150 MCG tablet TAKE ONE TABLET BY MOUTH ONE TIME DAILY 10/9/2023 at am Yes Zoë Finney MD Yes    lisinopril (ZESTRIL) 5 MG tablet  Take 1 tablet (5 mg) by mouth At Bedtime 10/9/2023 at pm Yes Zoë Finney MD Yes    multivitamin w/minerals (THERA-VIT-M) tablet Take 1 tablet by mouth daily 10/9/2023 at am Yes Reported, Patient     olopatadine (PATANOL) 0.1 % ophthalmic solution Place 1 drop into both eyes 2 times daily 10/9/2023 at am Yes Reported, Patient     simvastatin (ZOCOR) 10 MG tablet TAKE ONE TABLET BY MOUTH AT BEDTIME 10/9/2023 at pm Yes Zoë Finney MD Yes    vitamin B-12 (CYANOCOBALAMIN) 1000 MCG tablet Take 1,000 mcg by mouth daily 10/9/2023 at am Yes Reported, Patient     order for DME Equipment being ordered: CPAP and supplies. LIFETIME need.   Michell Day,          Medication history completed by:    Simeon Morin CPhT  Medication [facility]

## 2023-10-10 ENCOUNTER — HOSPITAL ENCOUNTER (OUTPATIENT)
Facility: CLINIC | Age: 78
Discharge: HOME | End: 2023-10-11
Attending: ORTHOPAEDIC SURGERY | Admitting: ORTHOPAEDIC SURGERY
Payer: MEDICARE

## 2023-10-10 ENCOUNTER — ANESTHESIA (OUTPATIENT)
Dept: SURGERY | Facility: CLINIC | Age: 78
End: 2023-10-10
Payer: MEDICARE

## 2023-10-10 ENCOUNTER — APPOINTMENT (OUTPATIENT)
Dept: GENERAL RADIOLOGY | Facility: CLINIC | Age: 78
End: 2023-10-10
Attending: ORTHOPAEDIC SURGERY
Payer: MEDICARE

## 2023-10-10 DIAGNOSIS — Z98.890 STATUS POST OPEN REDUCTION WITH INTERNAL FIXATION OF FRACTURE: Primary | ICD-10-CM

## 2023-10-10 DIAGNOSIS — Z87.81 STATUS POST OPEN REDUCTION WITH INTERNAL FIXATION OF FRACTURE: Primary | ICD-10-CM

## 2023-10-10 PROBLEM — S82.009A PATELLA FRACTURE: Status: ACTIVE | Noted: 2023-10-10

## 2023-10-10 PROCEDURE — 250N000011 HC RX IP 250 OP 636: Performed by: PHYSICIAN ASSISTANT

## 2023-10-10 PROCEDURE — 250N000009 HC RX 250: Performed by: ORTHOPAEDIC SURGERY

## 2023-10-10 PROCEDURE — C1713 ANCHOR/SCREW BN/BN,TIS/BN: HCPCS | Performed by: ORTHOPAEDIC SURGERY

## 2023-10-10 PROCEDURE — 370N000017 HC ANESTHESIA TECHNICAL FEE, PER MIN: Performed by: ORTHOPAEDIC SURGERY

## 2023-10-10 PROCEDURE — 250N000011 HC RX IP 250 OP 636: Performed by: ANESTHESIOLOGY

## 2023-10-10 PROCEDURE — 272N000001 HC OR GENERAL SUPPLY STERILE: Performed by: ORTHOPAEDIC SURGERY

## 2023-10-10 PROCEDURE — 250N000011 HC RX IP 250 OP 636: Performed by: ORTHOPAEDIC SURGERY

## 2023-10-10 PROCEDURE — 250N000025 HC SEVOFLURANE, PER MIN: Performed by: ORTHOPAEDIC SURGERY

## 2023-10-10 PROCEDURE — 250N000009 HC RX 250: Performed by: ANESTHESIOLOGY

## 2023-10-10 PROCEDURE — 250N000011 HC RX IP 250 OP 636

## 2023-10-10 PROCEDURE — 999N000141 HC STATISTIC PRE-PROCEDURE NURSING ASSESSMENT: Performed by: ORTHOPAEDIC SURGERY

## 2023-10-10 PROCEDURE — 250N000011 HC RX IP 250 OP 636: Mod: JZ | Performed by: PHYSICIAN ASSISTANT

## 2023-10-10 PROCEDURE — 250N000011 HC RX IP 250 OP 636: Mod: JZ | Performed by: ANESTHESIOLOGY

## 2023-10-10 PROCEDURE — 250N000013 HC RX MED GY IP 250 OP 250 PS 637: Performed by: PHYSICIAN ASSISTANT

## 2023-10-10 PROCEDURE — 710N000009 HC RECOVERY PHASE 1, LEVEL 1, PER MIN: Performed by: ORTHOPAEDIC SURGERY

## 2023-10-10 PROCEDURE — 360N000084 HC SURGERY LEVEL 4 W/ FLUORO, PER MIN: Performed by: ORTHOPAEDIC SURGERY

## 2023-10-10 PROCEDURE — 250N000009 HC RX 250

## 2023-10-10 PROCEDURE — 258N000003 HC RX IP 258 OP 636: Performed by: PHYSICIAN ASSISTANT

## 2023-10-10 PROCEDURE — 999N000179 XR SURGERY CARM FLUORO LESS THAN 5 MIN W STILLS

## 2023-10-10 PROCEDURE — 258N000003 HC RX IP 258 OP 636: Performed by: ANESTHESIOLOGY

## 2023-10-10 DEVICE — PATELLA SUTPLT II STAR M STRL
Type: IMPLANTABLE DEVICE | Site: KNEE | Status: FUNCTIONAL
Brand: ARTHREX®

## 2023-10-10 DEVICE — VAL KREULOCK SCREW, TI, 3.0X16
Type: IMPLANTABLE DEVICE | Site: KNEE | Status: FUNCTIONAL
Brand: ARTHREX®

## 2023-10-10 DEVICE — VAL KREULOCK SCREW, TI, 3.0X12
Type: IMPLANTABLE DEVICE | Site: KNEE | Status: FUNCTIONAL
Brand: ARTHREX®

## 2023-10-10 DEVICE — VAL KREULOCK SCREW, TI, 3.0X18
Type: IMPLANTABLE DEVICE | Site: KNEE | Status: FUNCTIONAL
Brand: ARTHREX®

## 2023-10-10 DEVICE — VAL KREULOCK SCREW, TI, 3.0X14
Type: IMPLANTABLE DEVICE | Site: KNEE | Status: FUNCTIONAL
Brand: ARTHREX®

## 2023-10-10 RX ORDER — EPHEDRINE SULFATE 50 MG/ML
INJECTION, SOLUTION INTRAMUSCULAR; INTRAVENOUS; SUBCUTANEOUS PRN
Status: DISCONTINUED | OUTPATIENT
Start: 2023-10-10 | End: 2023-10-10

## 2023-10-10 RX ORDER — MAGNESIUM HYDROXIDE 1200 MG/15ML
LIQUID ORAL PRN
Status: DISCONTINUED | OUTPATIENT
Start: 2023-10-10 | End: 2023-10-10 | Stop reason: HOSPADM

## 2023-10-10 RX ORDER — FENTANYL CITRATE 50 UG/ML
INJECTION, SOLUTION INTRAMUSCULAR; INTRAVENOUS PRN
Status: DISCONTINUED | OUTPATIENT
Start: 2023-10-10 | End: 2023-10-10

## 2023-10-10 RX ORDER — HYDROMORPHONE HCL IN WATER/PF 6 MG/30 ML
0.2 PATIENT CONTROLLED ANALGESIA SYRINGE INTRAVENOUS
Status: DISCONTINUED | OUTPATIENT
Start: 2023-10-10 | End: 2023-10-11 | Stop reason: HOSPADM

## 2023-10-10 RX ORDER — SODIUM CHLORIDE, SODIUM LACTATE, POTASSIUM CHLORIDE, CALCIUM CHLORIDE 600; 310; 30; 20 MG/100ML; MG/100ML; MG/100ML; MG/100ML
INJECTION, SOLUTION INTRAVENOUS CONTINUOUS
Status: DISCONTINUED | OUTPATIENT
Start: 2023-10-10 | End: 2023-10-10 | Stop reason: HOSPADM

## 2023-10-10 RX ORDER — TRANEXAMIC ACID 650 MG/1
1950 TABLET ORAL ONCE
Status: COMPLETED | OUTPATIENT
Start: 2023-10-10 | End: 2023-10-10

## 2023-10-10 RX ORDER — PROCHLORPERAZINE MALEATE 5 MG
5 TABLET ORAL EVERY 6 HOURS PRN
Status: DISCONTINUED | OUTPATIENT
Start: 2023-10-10 | End: 2023-10-11 | Stop reason: HOSPADM

## 2023-10-10 RX ORDER — PROPOFOL 10 MG/ML
INJECTION, EMULSION INTRAVENOUS PRN
Status: DISCONTINUED | OUTPATIENT
Start: 2023-10-10 | End: 2023-10-10

## 2023-10-10 RX ORDER — FENTANYL CITRATE 0.05 MG/ML
50 INJECTION, SOLUTION INTRAMUSCULAR; INTRAVENOUS EVERY 5 MIN PRN
Status: DISCONTINUED | OUTPATIENT
Start: 2023-10-10 | End: 2023-10-10 | Stop reason: HOSPADM

## 2023-10-10 RX ORDER — OXYCODONE HYDROCHLORIDE 5 MG/1
10 TABLET ORAL EVERY 4 HOURS PRN
Status: DISCONTINUED | OUTPATIENT
Start: 2023-10-10 | End: 2023-10-11 | Stop reason: HOSPADM

## 2023-10-10 RX ORDER — FENTANYL CITRATE 0.05 MG/ML
50 INJECTION, SOLUTION INTRAMUSCULAR; INTRAVENOUS
Status: DISCONTINUED | OUTPATIENT
Start: 2023-10-10 | End: 2023-10-10 | Stop reason: HOSPADM

## 2023-10-10 RX ORDER — DEXAMETHASONE SODIUM PHOSPHATE 4 MG/ML
INJECTION, SOLUTION INTRA-ARTICULAR; INTRALESIONAL; INTRAMUSCULAR; INTRAVENOUS; SOFT TISSUE PRN
Status: DISCONTINUED | OUTPATIENT
Start: 2023-10-10 | End: 2023-10-10

## 2023-10-10 RX ORDER — HYDROMORPHONE HCL IN WATER/PF 6 MG/30 ML
0.2 PATIENT CONTROLLED ANALGESIA SYRINGE INTRAVENOUS EVERY 5 MIN PRN
Status: DISCONTINUED | OUTPATIENT
Start: 2023-10-10 | End: 2023-10-10 | Stop reason: HOSPADM

## 2023-10-10 RX ORDER — FENTANYL CITRATE 0.05 MG/ML
25 INJECTION, SOLUTION INTRAMUSCULAR; INTRAVENOUS EVERY 5 MIN PRN
Status: DISCONTINUED | OUTPATIENT
Start: 2023-10-10 | End: 2023-10-10 | Stop reason: HOSPADM

## 2023-10-10 RX ORDER — NALOXONE HYDROCHLORIDE 0.4 MG/ML
0.4 INJECTION, SOLUTION INTRAMUSCULAR; INTRAVENOUS; SUBCUTANEOUS
Status: DISCONTINUED | OUTPATIENT
Start: 2023-10-10 | End: 2023-10-11 | Stop reason: HOSPADM

## 2023-10-10 RX ORDER — ONDANSETRON 4 MG/1
4 TABLET, ORALLY DISINTEGRATING ORAL EVERY 30 MIN PRN
Status: DISCONTINUED | OUTPATIENT
Start: 2023-10-10 | End: 2023-10-10 | Stop reason: HOSPADM

## 2023-10-10 RX ORDER — BISACODYL 10 MG
10 SUPPOSITORY, RECTAL RECTAL DAILY PRN
Status: DISCONTINUED | OUTPATIENT
Start: 2023-10-10 | End: 2023-10-11 | Stop reason: HOSPADM

## 2023-10-10 RX ORDER — HYDROMORPHONE HCL IN WATER/PF 6 MG/30 ML
0.4 PATIENT CONTROLLED ANALGESIA SYRINGE INTRAVENOUS EVERY 5 MIN PRN
Status: DISCONTINUED | OUTPATIENT
Start: 2023-10-10 | End: 2023-10-10 | Stop reason: HOSPADM

## 2023-10-10 RX ORDER — ONDANSETRON 2 MG/ML
4 INJECTION INTRAMUSCULAR; INTRAVENOUS EVERY 30 MIN PRN
Status: DISCONTINUED | OUTPATIENT
Start: 2023-10-10 | End: 2023-10-10 | Stop reason: HOSPADM

## 2023-10-10 RX ORDER — ONDANSETRON 4 MG/1
4 TABLET, ORALLY DISINTEGRATING ORAL EVERY 6 HOURS PRN
Status: DISCONTINUED | OUTPATIENT
Start: 2023-10-10 | End: 2023-10-11 | Stop reason: HOSPADM

## 2023-10-10 RX ORDER — LIDOCAINE 40 MG/G
CREAM TOPICAL
Status: DISCONTINUED | OUTPATIENT
Start: 2023-10-10 | End: 2023-10-11 | Stop reason: HOSPADM

## 2023-10-10 RX ORDER — POLYETHYLENE GLYCOL 3350 17 G/17G
17 POWDER, FOR SOLUTION ORAL DAILY
Status: DISCONTINUED | OUTPATIENT
Start: 2023-10-11 | End: 2023-10-11 | Stop reason: HOSPADM

## 2023-10-10 RX ORDER — SODIUM CHLORIDE, SODIUM LACTATE, POTASSIUM CHLORIDE, CALCIUM CHLORIDE 600; 310; 30; 20 MG/100ML; MG/100ML; MG/100ML; MG/100ML
INJECTION, SOLUTION INTRAVENOUS CONTINUOUS
Status: DISCONTINUED | OUTPATIENT
Start: 2023-10-10 | End: 2023-10-11 | Stop reason: HOSPADM

## 2023-10-10 RX ORDER — CEFAZOLIN SODIUM/WATER 2 G/20 ML
2 SYRINGE (ML) INTRAVENOUS SEE ADMIN INSTRUCTIONS
Status: DISCONTINUED | OUTPATIENT
Start: 2023-10-10 | End: 2023-10-10 | Stop reason: HOSPADM

## 2023-10-10 RX ORDER — AMOXICILLIN 250 MG
1 CAPSULE ORAL 2 TIMES DAILY
Status: DISCONTINUED | OUTPATIENT
Start: 2023-10-10 | End: 2023-10-11 | Stop reason: HOSPADM

## 2023-10-10 RX ORDER — ACETAMINOPHEN 325 MG/1
650 TABLET ORAL EVERY 4 HOURS PRN
Status: DISCONTINUED | OUTPATIENT
Start: 2023-10-13 | End: 2023-10-11 | Stop reason: HOSPADM

## 2023-10-10 RX ORDER — CEFAZOLIN SODIUM/WATER 2 G/20 ML
2 SYRINGE (ML) INTRAVENOUS
Status: COMPLETED | OUTPATIENT
Start: 2023-10-10 | End: 2023-10-10

## 2023-10-10 RX ORDER — LIDOCAINE HYDROCHLORIDE 20 MG/ML
INJECTION, SOLUTION INFILTRATION; PERINEURAL PRN
Status: DISCONTINUED | OUTPATIENT
Start: 2023-10-10 | End: 2023-10-10

## 2023-10-10 RX ORDER — ASPIRIN 81 MG/1
81 TABLET ORAL 2 TIMES DAILY
Status: DISCONTINUED | OUTPATIENT
Start: 2023-10-10 | End: 2023-10-11 | Stop reason: HOSPADM

## 2023-10-10 RX ORDER — CEFAZOLIN SODIUM 2 G/100ML
2 INJECTION, SOLUTION INTRAVENOUS EVERY 8 HOURS
Status: COMPLETED | OUTPATIENT
Start: 2023-10-10 | End: 2023-10-11

## 2023-10-10 RX ORDER — ACETAMINOPHEN 325 MG/1
975 TABLET ORAL EVERY 8 HOURS
Status: DISCONTINUED | OUTPATIENT
Start: 2023-10-10 | End: 2023-10-11 | Stop reason: HOSPADM

## 2023-10-10 RX ORDER — HYDROMORPHONE HCL IN WATER/PF 6 MG/30 ML
0.4 PATIENT CONTROLLED ANALGESIA SYRINGE INTRAVENOUS
Status: DISCONTINUED | OUTPATIENT
Start: 2023-10-10 | End: 2023-10-11 | Stop reason: HOSPADM

## 2023-10-10 RX ORDER — OXYCODONE HYDROCHLORIDE 5 MG/1
5 TABLET ORAL EVERY 4 HOURS PRN
Status: DISCONTINUED | OUTPATIENT
Start: 2023-10-10 | End: 2023-10-11 | Stop reason: HOSPADM

## 2023-10-10 RX ORDER — NALOXONE HYDROCHLORIDE 0.4 MG/ML
0.2 INJECTION, SOLUTION INTRAMUSCULAR; INTRAVENOUS; SUBCUTANEOUS
Status: DISCONTINUED | OUTPATIENT
Start: 2023-10-10 | End: 2023-10-11 | Stop reason: HOSPADM

## 2023-10-10 RX ORDER — ONDANSETRON 2 MG/ML
4 INJECTION INTRAMUSCULAR; INTRAVENOUS EVERY 6 HOURS PRN
Status: DISCONTINUED | OUTPATIENT
Start: 2023-10-10 | End: 2023-10-11 | Stop reason: HOSPADM

## 2023-10-10 RX ADMIN — PROPOFOL 200 MG: 10 INJECTION, EMULSION INTRAVENOUS at 10:43

## 2023-10-10 RX ADMIN — ACETAMINOPHEN 975 MG: 325 TABLET, FILM COATED ORAL at 21:05

## 2023-10-10 RX ADMIN — DEXAMETHASONE SODIUM PHOSPHATE 4 MG: 4 INJECTION, SOLUTION INTRA-ARTICULAR; INTRALESIONAL; INTRAMUSCULAR; INTRAVENOUS; SOFT TISSUE at 10:48

## 2023-10-10 RX ADMIN — Medication 5 MG: at 11:28

## 2023-10-10 RX ADMIN — FENTANYL CITRATE 25 MCG: 50 INJECTION, SOLUTION INTRAMUSCULAR; INTRAVENOUS at 12:30

## 2023-10-10 RX ADMIN — Medication 5 MG: at 11:41

## 2023-10-10 RX ADMIN — SODIUM CHLORIDE, POTASSIUM CHLORIDE, SODIUM LACTATE AND CALCIUM CHLORIDE: 600; 310; 30; 20 INJECTION, SOLUTION INTRAVENOUS at 10:34

## 2023-10-10 RX ADMIN — TRANEXAMIC ACID 1950 MG: 650 TABLET ORAL at 08:45

## 2023-10-10 RX ADMIN — Medication 2 G: at 10:34

## 2023-10-10 RX ADMIN — FENTANYL CITRATE 50 MCG: 50 INJECTION INTRAMUSCULAR; INTRAVENOUS at 10:59

## 2023-10-10 RX ADMIN — FENTANYL CITRATE 50 MCG: 50 INJECTION INTRAMUSCULAR; INTRAVENOUS at 10:38

## 2023-10-10 RX ADMIN — Medication 5 MG: at 10:53

## 2023-10-10 RX ADMIN — ACETAMINOPHEN 975 MG: 325 TABLET, FILM COATED ORAL at 14:57

## 2023-10-10 RX ADMIN — Medication 5 MG: at 10:56

## 2023-10-10 RX ADMIN — SODIUM CHLORIDE, POTASSIUM CHLORIDE, SODIUM LACTATE AND CALCIUM CHLORIDE: 600; 310; 30; 20 INJECTION, SOLUTION INTRAVENOUS at 13:46

## 2023-10-10 RX ADMIN — MIDAZOLAM 1 MG: 1 INJECTION INTRAMUSCULAR; INTRAVENOUS at 09:16

## 2023-10-10 RX ADMIN — ONDANSETRON 4 MG: 2 INJECTION INTRAMUSCULAR; INTRAVENOUS at 10:48

## 2023-10-10 RX ADMIN — CEFAZOLIN SODIUM 2 G: 2 INJECTION, SOLUTION INTRAVENOUS at 17:44

## 2023-10-10 RX ADMIN — ASPIRIN 81 MG: 81 TABLET, COATED ORAL at 21:05

## 2023-10-10 RX ADMIN — Medication 5 MG: at 10:51

## 2023-10-10 RX ADMIN — LIDOCAINE HYDROCHLORIDE 50 MG: 20 INJECTION, SOLUTION INFILTRATION; PERINEURAL at 10:40

## 2023-10-10 RX ADMIN — SENNOSIDES AND DOCUSATE SODIUM 1 TABLET: 50; 8.6 TABLET ORAL at 21:05

## 2023-10-10 RX ADMIN — PROPOFOL 30 MCG/KG/MIN: 10 INJECTION, EMULSION INTRAVENOUS at 10:47

## 2023-10-10 RX ADMIN — ROPIVACAINE HYDROCHLORIDE 15 ML: 5 INJECTION, SOLUTION EPIDURAL; INFILTRATION; PERINEURAL at 09:30

## 2023-10-10 ASSESSMENT — ENCOUNTER SYMPTOMS
DYSRHYTHMIAS: 1
SEIZURES: 0

## 2023-10-10 ASSESSMENT — ACTIVITIES OF DAILY LIVING (ADL)
ADLS_ACUITY_SCORE: 20

## 2023-10-10 ASSESSMENT — LIFESTYLE VARIABLES: TOBACCO_USE: 0

## 2023-10-10 NOTE — PLAN OF CARE
Goal Outcome Evaluation:    Procedure:- LEFT KNEE REVISION OPEN REDUCTION INTERNAL FIXATION PATELLA WITH POLY REMOVAL, PATELLAR    Pt A&OX4, VSS on RA. CMS intact. Not out of bed yet. Regular diet. NWB on LLE. Hinge brace must be on at all time when get out of bed. LR infusing 100 mL/hr. Voided via urinal. Will continue to monitor.

## 2023-10-10 NOTE — ANESTHESIA CARE TRANSFER NOTE
Patient: Mendez Greene    Procedure: Procedure(s):  LEFT KNEE REVISION OPEN REDUCTION INTERNAL FIXATION PATELLA WITH POLY REMOVAL, PATELLAR  WITH POLY REMOVAL, PATELLAR       Diagnosis: Patellar sleeve fracture, left, closed, initial encounter [S82.306V]  Diagnosis Additional Information: No value filed.    Anesthesia Type:   General     Note:    Oropharynx: oropharynx clear of all foreign objects and spontaneously breathing  Level of Consciousness: awake  Oxygen Supplementation: face mask  Level of Supplemental Oxygen (L/min / FiO2): 6  Independent Airway: airway patency satisfactory and stable  Dentition: dentition unchanged  Vital Signs Stable: post-procedure vital signs reviewed and stable  Report to RN Given: handoff report given  Patient transferred to: PACU  Comments: SEE PACU RN documentation of VS  Handoff Report: Identifed the Patient, Identified the Reponsible Provider, Reviewed the pertinent medical history, Discussed the surgical course, Reviewed Intra-OP anesthesia mangement and issues during anesthesia, Set expectations for post-procedure period and Allowed opportunity for questions and acknowledgement of understanding      Vitals:  Vitals Value Taken Time   BP     Temp     Pulse 70 10/10/23 1215   Resp 21 10/10/23 1215   SpO2 96 % 10/10/23 1215   Vitals shown include unvalidated device data.    Electronically Signed By: AFSHIN Espinoza CRNA  October 10, 2023  12:15 PM

## 2023-10-10 NOTE — ANESTHESIA PREPROCEDURE EVALUATION
Anesthesia Pre-Procedure Evaluation    Patient: Mendez Greene   MRN: [id number] : 1945        Procedure : Procedure(s):  LEFT KNEE OPEN REDUCTION INTERNAL  FIXATION PATELLA FRACTURE WITH  REVISION TOTAL KNEE ARTHROPLASTY PATELLA          Past Medical History:   Diagnosis Date    Allergic rhinitis, cause unspecified     Aneurysm of ascending aorta without rupture (H24)     aortic valve insufficiency     Arrhythmia     Paroxysmal atrial fib    Arthritis 2022    Cancer (H) 2006    bladder    Coronary artery disease     Diverticulosis     HARRIS (dyspnea on exertion)     Double vision 10/17/2011    probable migraine aura    ED (erectile dysfunction)     Heart attack (H)     Hyperlipidemia     Hypertrophy of prostate with urinary obstruction     Hypertrophy of prostate without urinary obstruction and other lower urinary tract symptoms (LUTS)     Neoplasm of unspecified nature of bladder 2006    [place] s/p cystoprostatectomy with ileal neobladder    Other and unspecified hyperlipidemia     Palpitations     Renal stone 2012    Sinus bradycardia     Sinus bradycardia     Sleep apnea 2005    C Pap    Unspecified essential hypertension 1988    Unspecified hypothyroidism     Vitamin B12 deficiency (non anemic)       Past Surgical History:   Procedure Laterality Date    ABDOMEN SURGERY  2006    bladder    ARTHROPLASTY KNEE Left 2022    Procedure: LEFT TOTAL KNEE ARTHROPLASTY;  Surgeon: Melba Dial MD;  Location:  OR    ARTHROPLASTY REVISION KNEE Left 2023    Procedure: REVISION TOTAL KNEE ARTHROPLASTY PATELLA;  Surgeon: Melba Dial MD;  Location:  OR    BIOPSY  3/2006    BUNIONECTOMY  12-12    LT    BUNIONECTOMY JOSE  2011    RT-Procedure:BUNIONECTOMY JOSE; Right Foot Dwaine Jose Bunionectomy with Metatarsal and Phalanx Osteotomy with 1st Metatarsal Phalangeal Joint Repair Right Foot; Surgeon:BAKARI ZAIDI; Location:[facility]    CV HEART CATHETERIZATION WITH POSSIBLE  INTERVENTION N/A 2020    Procedure: Heart Catheterization with Possible Intervention;  Surgeon: Rishi Llanes MD;  Location:  HEART CARDIAC CATH LAB    CYSTOSCOPY  3-06    CA bladder    HERNIORRHAPHY INCISIONAL (LOCATION) N/A 2016    Procedure: HERNIORRHAPHY INCISIONAL (LOCATION);  Surgeon: Onofre Lua MD;  Location:  SD    OPEN REDUCTION INTERNAL FIXATION PATELLA Left 2023    Procedure: LEFT KNEE OPEN REDUCTION INTERNAL  FIXATION PATELLA FRACTURE WITH;  Surgeon: Melba Dial MD;  Location:  OR    SURGICAL HISTORY OF -   5-3-06    neobladder construction- CA      Allergies   Allergen Reactions    Food Other (See Comments)     Uncertain of which food , testing done ~    Other Drug Allergy (See Comments)      NKDA      Social History     Tobacco Use    Smoking status: Former     Packs/day: 1.50     Years: 20.00     Pack years: 30.00     Types: Cigarettes     Start date: 1967     Quit date: 9/10/1987     Years since quittin.1    Smokeless tobacco: Never   Substance Use Topics    Alcohol use: Yes     Comment: Occas wine      Wt Readings from Last 1 Encounters:   10/10/23 88 kg (194 lb)        Anesthesia Evaluation    Type: General.    History of anesthetic complications       ROS/MED HX  ENT/Pulmonary:     (+) sleep apnea, uses CPAP,                                  (-) tobacco use and asthma   Neurologic:     (+)    no peripheral neuropathy                         (-) no seizures and no CVA   Cardiovascular:     (+) Dyslipidemia hypertension- -  CAD angina-  - -                        dysrhythmias (once), a-fib,             METS/Exercise Tolerance:     Hematologic:       Musculoskeletal: Comment: Patellar sleeve fracture, left, closed, initial encounter [S82.092A];Periprosthetic fracture around internal prosthetic left knee joint [M97.12XA]        GI/Hepatic:    (-) GERD   Renal/Genitourinary:    (-) renal disease   Endo:     (+)          thyroid problem,  hypothyroidism,        (-) Type II DM   Psychiatric/Substance Use:       Infectious Disease:    (-) Recent Fever   Malignancy:       Other:            Physical Exam    Airway  airway exam normal      Mallampati: II   TM distance: > 3 FB   Neck ROM: full   Mouth opening: > 3 cm    Respiratory Devices and Support         Dental       (+) Minor Abnormalities - some fillings, tiny chips      Cardiovascular   cardiovascular exam normal          Pulmonary   pulmonary exam normal                    Echocardiogram Complete  Order: [id number]  Status: Edited Result - FINAL       Visible to patient: Yes (seen)       Next appt: 2023 at 10:30 AM in Physical Therapy ( PT 2 STUDENT)       Dx: Ascending aortic aneurysm (H); Aortic...    3 Result Notes  Details    Reading Physician Reading Date Result Priority   Norma Dahl MD  [phone] 2022      Narrative & Impression  [id number]  [id number]  [id number]  [id number]^NIRMALA^MIGUEL^ODILON     [facility]  Echocardiography Laboratory  [address] [place] [address]     Name: JUAN MANUEL GRIMES  MRN: [id number]  : 1945  Study Date: 2022 11:47 AM  Age: 77 yrs  Gender: Male  Patient Location: [facility]  Reason For Study: Ascending aortic aneurysm, Aortic valve insufficiency  Ordering Physician: MIGUEL SILVESTRE  Referring Physician: MIGUEL SILVESTRE  Performed By: Erika Quan     BSA: 2.0 m2  Height: 69 in  Weight: 194 lb  HR: 48  ______________________________________________________________________________  Procedure  Complete Echo Adult.  ______________________________________________________________________________  Interpretation Summary     Left ventricular systolic function is normal. The visual ejection fraction is  55-60%.  The right ventricle is normal in size and function.  Eccentric aortic insufficiency jet, probably moderate aortic insufficiency.  Aortic valve is trileaflet and sclerotic.  There is mild (1+) mitral  regurgitation.  There is mild (1+) tricuspid regurgitation.  Mildly dilated aortic root 4.1cm.  Moderately dilated ascending aorta 4.1cm.     In comparison to echo from 9/14/21, the ascending aorta was re-measured and  maximum diameter was 4.5cm (unchanged). Root at the sinuses was 3.9cm. Aortic  insuffiencey may be somewhat worse, but the jet is very eccentric and not as  well seen on Color Flow Doppler on the previous study.             Reading Physician Reading Date Result Priority   Jomar Jamison MD  [phone] 1/5/2023 Routine   Jomar Jamison MD  [phone] 1/5/2023      Result Text       The nuclear stress test is negative for inducible myocardial ischemia or infarction. There is fixed inferior wall defect worse on rest images than stress images indicating this is likely an artifact.    Left ventricular function is normal.    The left ventricular ejection fraction at rest is 66%.    There is no prior study for comparison.      OUTSIDE LABS:  CBC:   Lab Results   Component Value Date    WBC 5.4 08/29/2023    WBC 6.3 11/16/2022    HGB 13.1 (L) 08/29/2023    HGB 14.0 11/16/2022    HCT 38.7 (L) 08/29/2023    HCT 40.6 11/16/2022     08/29/2023     11/16/2022     BMP:   Lab Results   Component Value Date     (L) 08/29/2023     08/10/2022    POTASSIUM 4.5 08/29/2023    POTASSIUM 4.4 08/10/2022    CHLORIDE 98 08/29/2023    CHLORIDE 100 08/10/2022    CO2 25 08/29/2023    CO2 25 08/10/2022    BUN 17.5 08/29/2023    BUN 13 08/10/2022    CR 0.76 08/29/2023    CR 0.8 12/06/2022     (H) 08/29/2023     (H) 08/24/2022     COAGS:   Lab Results   Component Value Date    PTT 27 11/06/2020     POC: No results found for: BGM, HCG, HCGS  HEPATIC:   Lab Results   Component Value Date    ALBUMIN 4.2 08/29/2023    PROTTOTAL 6.5 08/29/2023    ALT 18 08/29/2023    AST 25 08/29/2023    ALKPHOS 48 08/29/2023    BILITOTAL 0.6 08/29/2023     OTHER:   Lab Results   Component Value Date    A1C  5.0 08/10/2022    SIMONA 9.5 08/29/2023    MAG 2.1 11/06/2020    TSH 0.41 06/14/2023    T4 0.88 02/01/2017       Anesthesia Plan    ASA Status:  3    NPO Status:  NPO Appropriate    Anesthesia Type: General.     - Airway: LMA   Induction: Intravenous.   Maintenance: Balanced.        Consents    Anesthesia Plan(s) and associated risks, benefits, and realistic alternatives discussed. Questions answered and patient/representative(s) expressed understanding.     - Discussed:     - Discussed with:  Patient            Postoperative Care    Pain management: Peripheral nerve block (Single Shot).   PONV prophylaxis: Ondansetron (or other 5HT-3), Dexamethasone or Solumedrol, Background Propofol Infusion     Comments:                Radha Crews

## 2023-10-10 NOTE — ANESTHESIA POSTPROCEDURE EVALUATION
Patient: Mendez Greene    Procedure: Procedure(s):  LEFT KNEE REVISION OPEN REDUCTION INTERNAL FIXATION PATELLA WITH POLY REMOVAL, PATELLAR  WITH POLY REMOVAL, PATELLAR       Anesthesia Type:  General    Note:  Disposition: Outpatient   Postop Pain Control: Uneventful            Sign Out: Well controlled pain   PONV: No   Neuro/Psych: Uneventful            Sign Out: Acceptable/Baseline neuro status   Airway/Respiratory: Uneventful            Sign Out: Acceptable/Baseline resp. status   CV/Hemodynamics: Uneventful            Sign Out: Acceptable CV status   Other NRE:    DID A NON-ROUTINE EVENT OCCUR? No           Last vitals:  Vitals Value Taken Time   /66 10/10/23 1300   Temp 36.2  C (97.2  F) 10/10/23 1238   Pulse 61 10/10/23 1312   Resp 14 10/10/23 1312   SpO2 94 % 10/10/23 1312   Vitals shown include unvalidated device data.    Electronically Signed By: Radha Crews  October 10, 2023  2:13 PM

## 2023-10-10 NOTE — BRIEF OP NOTE
[facility]    Brief Operative Note    Pre-operative diagnosis: Patellar sleeve fracture, left, closed, initial encounter [I09.200N]  Post-operative diagnosis Same as pre-operative diagnosis    Procedure: LEFT KNEE REVISION OPEN REDUCTION INTERNAL FIXATION PATELLA WITH POLY REMOVAL, PATELLAR, Left - Knee  WITH POLY REMOVAL, PATELLAR, Left - Leg    Surgeon: Surgeon(s) and Role:     * Melba Dial MD - Primary     * Hunter Mejía MD - Fellow - Assisting     * Vianney Gonzalez PA-C  Anesthesia: General   Estimated Blood Loss: 20 mL from 10/10/2023 10:34 AM to 10/10/2023 12:09 PM      Drains: None  Specimens: * No specimens in log *  Findings:   None.  Complications: None.  Implants:   Implant Name Type Inv. Item Serial No.  Lot No. [id number] No. Used Action   Patella SuturePlate II, Arrow, 3mm     [id number] Left 1 Explanted   Patella SuturePlate II Star 3mm Medium   [id number] ARTHREX [id number] Left 1 Implanted   IMP SCREW BONE KREULOCK 3X20MM [id number] - [id number] Metallic Hardware/[place] IMP SCREW BONE KREULOCK 3X20MM [id number]  ARTHREX 41 5 01AUG 2023 Left 1 Explanted   SCREW BONE KREULOCK TITANIUM 12X3MM [id number] - [id number] Metallic Hardware/[place] SCREW BONE KREULOCK TITANIUM 12X3MM [id number]  ARTHREX 41 5 01AUG 2023 Left 1 Explanted   SCREW BONE KREULOCK TITANIUM 14X3MM [id number] - [id number] Metallic Hardware/[place] SCREW BONE KREULOCK TITANIUM 14X3MM [id number]  ARTHREX 41 5 01AUG 2023 Left 1 Explanted   SCREW BONE KREULOCK TITANIUM 16X3MM [id number] - [id number] Metallic Hardware/[place] SCREW BONE KREULOCK TITANIUM 16X3MM [id number]  ARTHREX 41 5 01AUG 2023 Left 2 Explanted   SCREW BN 18MM 3MM TI VA NS KREULOCK LF [id number] - [id number] Metallic Hardware/[place] SCREW BN 18MM 3MM TI VA NS KREULOCK LF [id number]  ARTHREX 41 5 01AUG 2023 Left 1 Explanted   SCREW BONE KREULOCK TITANIUM 12X3MM [id number] - [id number] Metallic  Hardware/[place] SCREW BONE KREULOCK TITANIUM 12X3MM [id number]  ARTHREX 72-02 LOAD [id number] Left 2 Implanted   SCREW BONE KREULOCK TITANIUM 14X3MM [id number] - [id number] Metallic Hardware/[place] SCREW BONE KREULOCK TITANIUM 14X3MM [id number]  ARTHREX 72-02 LOAD [id number] Left 3 Implanted   SCREW BONE KREULOCK TITANIUM 16X3MM [id number] - [id number] Metallic Hardware/[place] SCREW BONE KREULOCK TITANIUM 16X3MM [id number]  ARTHREX 72-02 LOAD [id number] Left 3 Implanted   SCREW BONE KREULOCK TITANIUM 16X3MM [id number] - [id number] Metallic Hardware/[place] SCREW BONE KREULOCK TITANIUM 16X3MM [id number]  ARTHREX  Left 2 Wasted   SCREW BN 18MM 3MM TI VA NS KREULOCK LF [id number] - [id number] Metallic Hardware/[place] SCREW BN 18MM 3MM TI VA NS KREULOCK LF [id number]  ARTHREX 72-02 LOAD [id number] Left 1 Implanted

## 2023-10-10 NOTE — ANESTHESIA PROCEDURE NOTES
Airway       Patient location during procedure: OR  Staff -        Anesthesiologist:  Radha Crews       CRNA: Franklin Sanders APRN CRNA       Performed By: CRNA  Consent for Airway        Urgency: elective  Indications and Patient Condition       Indications for airway management: hugo-procedural       Induction type:intravenous       Mask difficulty assessment: 0 - not attempted    Final Airway Details       Final airway type: supraglottic airway    Supraglottic Airway Details        Type: LMA       LMA size: 4    Post intubation assessment        Placement verified by: capnometry, equal breath sounds and chest rise        Number of attempts at approach: 1       Number of other approaches attempted: 0       Ease of procedure: easy       Dentition: Intact and Unchanged

## 2023-10-10 NOTE — ANESTHESIA PROCEDURE NOTES
Adductor canal Procedure Note    Pre-Procedure   Staff -        Anesthesiologist:  Radha Crews       Performed By: anesthesiologist       Location: pre-op       Pre-Anesthestic Checklist: patient identified, IV checked, site marked, risks and benefits discussed, informed consent, monitors and equipment checked, pre-op evaluation, at physician/surgeon's request and post-op pain management  Timeout:       Correct Patient: Yes        Correct Procedure: Yes        Correct Site: Yes        Correct Position: Yes        Correct Laterality: Yes        Site Marked: Yes  Procedure Documentation  Procedure: Adductor canal       Laterality: left       Patient Position: supine       Skin prep: Chloraprep       Local skin infiltrated with 3 mL of 1% lidocaine.        Needle Type: insulated and short bevel       Needle Gauge: 21.        Needle Length (millimeters): 100        Ultrasound guided       1. Ultrasound was used to identify targeted nerve, plexus, vascular marker, or fascial plane and place a needle adjacent to it in real-time.       2. Ultrasound was used to visualize the spread of anesthetic in close proximity to the above referenced structure.       3. A permanent image is entered into the patient's record.       4. The visualized anatomic structures appeared normal.       5. There were no apparent abnormal pathologic findings.    Assessment/Narrative         The placement was negative for: blood aspirated, painful injection and site bleeding       Paresthesias: No.       Insertion/Infusion Method: Single Shot       Complications: none       Injection made incrementally with aspirations every 3 mL.    Medication(s) Administered   Ropivacaine 0.5% w/ 1:400K Epi (Injection) - Injection   15 mL - 10/10/2023 9:30:00 AM   Comments:  Needle tip visualized throughout.  No nerve swelling.     Patient tolerated procedure well.  No known complications.     The surgeon has given a verbal order transferring care of  "this patient to me for the performance of a regional analgesia block for post-op pain control. It is requested of me because I am uniquely trained and qualified to perform this block and the surgeon is neither trained nor qualified to perform this procedure.          FOR [facility] ([facility]) ONLY:   Pain Team Contact information: please page the Pain Team Via [employer]. Search \"Pain\". During daytime hours, please page the attending first. At night please page the resident first.      "

## 2023-10-11 ENCOUNTER — APPOINTMENT (OUTPATIENT)
Dept: PHYSICAL THERAPY | Facility: CLINIC | Age: 78
End: 2023-10-11
Attending: PHYSICIAN ASSISTANT
Payer: MEDICARE

## 2023-10-11 ENCOUNTER — APPOINTMENT (OUTPATIENT)
Dept: OCCUPATIONAL THERAPY | Facility: CLINIC | Age: 78
End: 2023-10-11
Attending: PHYSICIAN ASSISTANT
Payer: MEDICARE

## 2023-10-11 VITALS
BODY MASS INDEX: 27.77 KG/M2 | HEIGHT: 70 IN | DIASTOLIC BLOOD PRESSURE: 53 MMHG | HEART RATE: 52 BPM | WEIGHT: 194 LBS | TEMPERATURE: 98.1 F | RESPIRATION RATE: 16 BRPM | SYSTOLIC BLOOD PRESSURE: 118 MMHG | OXYGEN SATURATION: 99 %

## 2023-10-11 LAB
FASTING STATUS PATIENT QL REPORTED: NO
GLUCOSE SERPL-MCNC: 129 MG/DL (ref 70–99)
HGB BLD-MCNC: 10.2 G/DL (ref 13.3–17.7)

## 2023-10-11 PROCEDURE — 97161 PT EVAL LOW COMPLEX 20 MIN: CPT | Mod: GP

## 2023-10-11 PROCEDURE — 97165 OT EVAL LOW COMPLEX 30 MIN: CPT | Mod: GO

## 2023-10-11 PROCEDURE — 36415 COLL VENOUS BLD VENIPUNCTURE: CPT | Performed by: PHYSICIAN ASSISTANT

## 2023-10-11 PROCEDURE — 250N000011 HC RX IP 250 OP 636: Mod: JZ | Performed by: PHYSICIAN ASSISTANT

## 2023-10-11 PROCEDURE — 250N000013 HC RX MED GY IP 250 OP 250 PS 637: Performed by: PHYSICIAN ASSISTANT

## 2023-10-11 PROCEDURE — 82947 ASSAY GLUCOSE BLOOD QUANT: CPT | Performed by: ORTHOPAEDIC SURGERY

## 2023-10-11 PROCEDURE — 97535 SELF CARE MNGMENT TRAINING: CPT | Mod: GO

## 2023-10-11 PROCEDURE — 97530 THERAPEUTIC ACTIVITIES: CPT | Mod: GP

## 2023-10-11 PROCEDURE — 97116 GAIT TRAINING THERAPY: CPT | Mod: GP

## 2023-10-11 PROCEDURE — 85018 HEMOGLOBIN: CPT | Performed by: PHYSICIAN ASSISTANT

## 2023-10-11 RX ORDER — LISINOPRIL 5 MG/1
5 TABLET ORAL AT BEDTIME
Status: DISCONTINUED | OUTPATIENT
Start: 2023-10-11 | End: 2023-10-11 | Stop reason: HOSPADM

## 2023-10-11 RX ORDER — OXYCODONE HYDROCHLORIDE 5 MG/1
5 TABLET ORAL EVERY 4 HOURS PRN
Qty: 15 TABLET | Refills: 0 | Status: SHIPPED | OUTPATIENT
Start: 2023-10-11 | End: 2024-03-04

## 2023-10-11 RX ORDER — ALBUTEROL SULFATE 90 UG/1
2 AEROSOL, METERED RESPIRATORY (INHALATION) EVERY 6 HOURS PRN
Status: DISCONTINUED | OUTPATIENT
Start: 2023-10-11 | End: 2023-10-11 | Stop reason: HOSPADM

## 2023-10-11 RX ORDER — LEVOTHYROXINE SODIUM 75 UG/1
150 TABLET ORAL
Status: DISCONTINUED | OUTPATIENT
Start: 2023-10-11 | End: 2023-10-11 | Stop reason: HOSPADM

## 2023-10-11 RX ORDER — ASPIRIN 81 MG/1
81 TABLET ORAL 2 TIMES DAILY
Qty: 60 TABLET | Refills: 0 | Status: SHIPPED | OUTPATIENT
Start: 2023-10-11

## 2023-10-11 RX ORDER — SIMVASTATIN 10 MG
10 TABLET ORAL AT BEDTIME
Status: DISCONTINUED | OUTPATIENT
Start: 2023-10-11 | End: 2023-10-11 | Stop reason: HOSPADM

## 2023-10-11 RX ADMIN — CEFAZOLIN SODIUM 2 G: 2 INJECTION, SOLUTION INTRAVENOUS at 02:26

## 2023-10-11 RX ADMIN — ASPIRIN 81 MG: 81 TABLET, COATED ORAL at 09:09

## 2023-10-11 RX ADMIN — LEVOTHYROXINE SODIUM 150 MCG: 75 TABLET ORAL at 09:09

## 2023-10-11 RX ADMIN — ACETAMINOPHEN 975 MG: 325 TABLET, FILM COATED ORAL at 09:11

## 2023-10-11 ASSESSMENT — ACTIVITIES OF DAILY LIVING (ADL)
ADLS_ACUITY_SCORE: 20
PREVIOUS_RESPONSIBILITIES: MEAL PREP;HOUSEKEEPING;LAUNDRY;SHOPPING;MEDICATION MANAGEMENT;FINANCES;DRIVING
ADLS_ACUITY_SCORE: 20

## 2023-10-11 NOTE — PLAN OF CARE
Occupational Therapy Discharge Summary    Reason for therapy discharge:    All goals and outcomes met, no further needs identified.    Progress towards therapy goal(s). See [facility] for goal details.  Goals met    Therapy recommendation(s):    Patient is functioning near baseline level of function, with noted decreased mobility and strength, post op precautions, impacting overall I/ADL independence. Patient, however, completes functional mobility in room with SBA and FWW within precautions and SBA and FWW for most self cares - pt with Min A for LB dressing. Patient resides with spouse who is able to assist with all I/ADLs including LB dressing. Patient with all AE needs met to complete self-cares. No further acute OT needs at this time. Acute OT to sign off.

## 2023-10-11 NOTE — PROGRESS NOTES
"   10/11/23 1100   Appointment Info   Signing Clinician's Name / Credentials (OT) Gerri Zendejas, OTR/L   Rehab Comments (OT) NWB LLE, Immobilizer on at all times   Living Environment   People in Home spouse   Current Living Arrangements house   Home Accessibility stairs to enter home   Number of Stairs, Main Entrance 2   Stair Railings, Main Entrance none   Transportation Anticipated family or friend will provide   Living Environment Comments Pt lives in a single story home with spouse, 2 NEEL[address] with no railings. All needs met on main level of home. Walk in shower, plans to sponge bathe. Standard toilet, grab bars. Pt has shower chair.   Self-Care   Usual Activity Tolerance good   Current Activity Tolerance moderate   Regular Exercise No   Equipment Currently Used at Home walker, standard   Fall history within last six months no   Activity/Exercise/Self-Care Comment Pt is typically IND with mobility without use of AD with ADLs or mobility. Since surgery in early september, has been using FWW, but has been mod I with spouse assisting with some ADLs and IADLs. Owns wheelchair, FWW, cane, raised toilet seat   Instrumental Activities of Daily Living (IADL)   Previous Responsibilities meal prep;housekeeping;laundry;shopping;medication management;finances;driving   General Information   Onset of Illness/Injury or Date of Surgery 10/11/23   Referring Physician Vianney Gonzalez PA-C   Patient/Family Therapy Goal Statement (OT) To go home   Additional Occupational Profile Info/Pertinent History of Current Problem Pt is a 76 y/o male s/p LEFT KNEE OPEN REDUCTION INTERNAL  FIXATION PATELLA FRACTURE WITH  REVISION TOTAL KNEE ARTHROPLASTY PATELLA on 10/10/23, pt is POD#1.   Existing Precautions/Restrictions fall;brace worn when out of bed;weight bearing;other (see comments)  (\"NON WEIGHT BEARING AND NO RANGE OF MOTION, Brace locked in extension at all times\")   Left Lower Extremity (Weight-bearing Status) non weight-bearing " (NWB)   Cognitive Status Examination   Orientation Status orientation to person, place and time   Visual Perception   Visual Impairment/Limitations WFL;corrective lenses full-time   Sensory   Sensory Comments Pt denies numbness/tingling   Pain Assessment   Patient Currently in Pain   (2/10 in L knee at rest)   Range of Motion Comprehensive   Comment, General Range of Motion BUEs WFL   Strength Comprehensive (MMT)   Comment, General Manual Muscle Testing (MMT) Assessment BUEs WFL   Coordination   Upper Extremity Coordination No deficits were identified   Bed Mobility   Comment (Bed Mobility) NT - pt up in chair at time of OT session   Transfers   Transfers sit-stand transfer;toilet transfer;shower transfer   Sit-Stand Transfer   Sit/Stand Transfer Comments SBA   Shower Transfer   Shower Transfer Comments CGA per clinical judgement   Toilet Transfer   Toilet Transfer Comments SBA   Balance   Balance Comments No overt LOB noted   Activities of Daily Living   BADL Assessment/Intervention upper body dressing;lower body dressing;toileting   Upper Body Dressing Assessment/Training   Comment, (Upper Body Dressing) Set up A   Lower Body Dressing Assessment/Training   Comment, (Lower Body Dressing) SBA   Toileting   Comment, (Toileting) SBA in standing   Clinical Impression   Criteria for Skilled Therapeutic Interventions Met (OT) Yes, treatment indicated   OT Diagnosis Decreased ind with I/ADLs   OT Problem List-Impairments impacting ADL problems related to;strength;mobility;other (see comments)  (post op precautions)   Assessment of Occupational Performance 3-5 Performance Deficits   Identified Performance Deficits dressing, bathing, heavy IADLs   Planned Therapy Interventions (OT) ADL retraining;IADL retraining;transfer training   Clinical Decision Making Complexity (OT) problem focused assessment/low complexity   Therapy Frequency (OT) One time eval and treatment   Risk & Benefits of therapy have been explained patient    OT Total Evaluation Time   OT Eval, Low Complexity Minutes (65960) 10   OT Goals   OT Predicted Duration/Target Date for Goal Attainment 10/11/23   OT Goals Upper Body Dressing;Lower Body Dressing;Toilet Transfer/Toileting   OT: Upper Body Dressing Modified independent   OT: Lower Body Dressing Supervision/stand-by assist;using adaptive equipment;within precautions  (FWW)   OT: Toilet Transfer/Toileting Supervision/stand-by assist;toilet transfer;cleaning and garment management;using adaptive equipment;within precautions  (FWW)   Self-Care/Home Management   Self-Care/Home Mgmt/ADL, Compensatory, Meal Prep Minutes (64582) 15   Symptoms Noted During/After Treatment (Meal Preparation/Planning Training) none   Treatment Detail/Skilled Intervention Pt greeted in supine, spouse present for session, pt agreeable to OT. Pt educated on NWB LLE, and brace on at all times, including OOB, pt edu on undoing straps of brace to apply lotion and re-strapping brace prior to moving again - pt verbalized understanding. Brace already donned. Patient completes bed mobility supine to sitting EOB with Sup A, HOB raised. Patient educated on LB dressing strategies for ease of task, including dressing the surgical side first and undressing non-surgical side first. Patient dons pants with Min A with LLE on bed, assist to lift LLE to don pants over brace, pt able to pull up over B hips in standing with FWW and within precautions - pt's spouse reports able to assist with this at home. Pt doffs gown and dons shirt with set up A sitting EOB.   Patient demonstrates sit > stand from EOB with SBA, FWW. Patient completes room level functional mobility to/from BR with SBA, one-leg hopping technique, FWW. Patient completes toilet transfer with SBA and use of grab bar to simulate home, FWW. Pt completes toileting In standing with urinal with SBA and FWW. Patient reports plans to sponge bathe initially at discharge.   Patient mobilizes to chair with  SBA, one-leg hopping, FWW. Patient sits in chair with Min A to stablize LLE while elevating BLEs, FWW. Patient educated on fall risk reduction strategies such as removal of throw rugs, loose cords, having good lighting, attach a walker bag to walker for carrying items - pt verbalized understanding. Pt educated on icing for pain management, keeping a layer between ice pack and skin for protection, icing no longer than 20 minutes at a time. Patient up in chair with needs met, alarm set, items in reach, spouse present, RN updated.   OT Discharge Planning   OT Plan d/c   OT Rationale for DC Rec Patient is functioning near baseline level of function, with noted decreased mobility and strength, post op precautions, impacting overall I/ADL independence. Patient, however, completes functional mobility in room with SBA and FWW within precautions and SBA and FWW for most self cares - pt with Min A for LB dressing. Patient resides with spouse who is able to assist with all I/ADLs including LB dressing. Patient with all AE needs met to complete self-cares. No further acute OT needs at this time. Acute OT to sign off.   Total Session Time   Timed Code Treatment Minutes 15   Total Session Time (sum of timed and untimed services) 25

## 2023-10-11 NOTE — OP NOTE
Surgeon:Melba Dial MD  Assistant:Vianney Gonzalez PA-C    Preoperative diagnosis: Left knee, patella fracture.  Widely displaced.  Postoperative diagnosis: Left knee, patella fracture.  Widely displaced.    Procedure: Revision open reduction internal fixation, left patella          Removal patellar total arthroplasty, component          Hardware removal, Arthrex plate and screws, patella    Implants removed: DePuy attune patellar polyethylene component and Arthrex patellar plate and screws    Implants placed: Arthrex patellar plate and screws    Indications: Steven is a 77-year-old gentleman who underwent an uncomplicated total knee arthroplasty and recovered well.  He suffered a fall where he was noted to have a displaced patella fracture.  He was taken to the operating room and the fracture was fixed.  In the early postoperative.  He reinjured himself and was found to have pulled apart his care.  He then got ill with COVID and revision surgery was delayed.  He presents today for revision open reduction internal fixation.  Risks, benefits, alternatives, and recovery were reviewed and questions answered.    Procedure:    After informed consent was obtained, and operative site marked, adductor canal was administered by anesthesia in the preoperative holding suite.  He was then brought to the operating room where general anesthetic via an LMA was administered.  His left lower extremity was positioned, prepped, and draped in usual sterile fashion.  Timeout was taken.  This time his leg was exsanguinated and the tourniquet insufflated to 250 mmHg.    Utilizing his previously used incision the skin was incised sharply to the periarticular joint capsule.  Previous repair was noted and this was again incised sharply the arthrotomy was created.  He had a hematoma that was encountered and evacuated.  At this time scar tissue was released and mobilization of the soft tissues was carried out his patella was translated and  slightly everted.  At this time I removed the patellar polyethylene and surrounding cement.  Then I removed the anterior,/superficial patellar plate and screws.  I performed a reduction using 2 point-to-point forceps.  Fluoroscopy was brought in to confirm adequate reduction.  Once this was adequately reduced irrigation was performed.  Irrigation included antibiotic impregnated saline.    At this time plate was fashioned along with locking screws.  Fluoroscopy was used to confirm persistence of adequate reduction, and screw length.  Once the fracture had been adequately reduced and fixed final images were obtained.  At this time retinacular closure was carried out medially and laterally and the arthrotomy was closed in partial flexion.  Further irrigation was carried out.  Periarticular local anesthetic was administered.    Layered wound closure skin was completed at this time and sterile dressing was applied.  He was placed into a hinged knee brace locked in extension.  His he was extubated and taken to PACU in stable condition.    Estimated blood loss, minimal    Complications: None apparent.    Postoperative plan: His plan is to be toe-touch weightbearing and have no range of motion of his knee for the initial 2 weeks if he shows maintenance of the reduction and has no further episodes then the plan will be to gradually increase his flexion in a nonweighted position.  He may then also do some weightbearing in full extension.  This will be a gradual and slow process.  He will be admitted and start physical therapy in-house.  Plan will be to see him back in postoperative follow-up in 10 to 14 days.

## 2023-10-11 NOTE — PROGRESS NOTES
"Mendez Greene  10/11/2023  POD # 1, revision ORIF left patellar fracture     Resting comfortably in bed, denies any CP or SOB  Tolerating PO   Pain well controlled   Blood pressure (!) 143/76, pulse 67, temperature 98.2  F (36.8  C), temperature source Oral, resp. rate 16, height 1.778 m (5' 10\"), weight 88 kg (194 lb), SpO2 95%.  Temperatures:  Current - Temp: 98.2  F (36.8  C); Max - Temp  Av.8  F (36.6  C)  Min: 97.2  F (36.2  C)  Max: 98.2  F (36.8  C)  Pulse range: Pulse  Av.7  Min: 53  Max: 74  Blood pressure range: Systolic (24hrs), Av , Min:111 , Max:143   ; Diastolic (24hrs), Av, Min:50, Max:76    CMS: dressing CDI, brace intact, bilat calves soft and non tender, able to wiggle toes bilat   Labs:  Hemoglobin   Date Value Ref Range Status   2023 13.1 (L) 13.3 - 17.7 g/dL Final   2020 14.7 13.3 - 17.7 g/dL Final   ]  No results found for: \"INR\"  Lab Results   Component Value Date     2023       PLAN:   Discharge home today pending PT clearance  NON WEIGHT BEARING AND NO RANGE OF MOTION  Brace locked in extension at all times  aquacel dressing and edema sleeve control then reapply dress  Follow up in 10-14 days as outpatient   "

## 2023-10-11 NOTE — PROGRESS NOTES
"   10/11/23 0956   Appointment Info   Signing Clinician's Name / Credentials (PT) Nelson Yu, PT, DPT   Rehab Comments (PT) NWBing LLE with hinged knee brace on at all times locked in extension   Quick Adds   Quick Adds Certification       Present no   Living Environment   People in Home spouse   Current Living Arrangements house   Home Accessibility stairs to enter home   Number of Stairs, Main Entrance 2   Stair Railings, Main Entrance none   Transportation Anticipated family or friend will provide   Living Environment Comments Pt lives in a single story home with spouse, 2 NEELAM with no railings. All needs met on main level of home.   Self-Care   Usual Activity Tolerance good   Current Activity Tolerance moderate   Regular Exercise No   Equipment Currently Used at Home walker, standard   Fall history within last six months no   Activity/Exercise/Self-Care Comment Pt is typically IND with mobility without use of AD with ADLs or mobility. Since surgery in early september, has been using FWW, but has been mod I with spouse assisting with some ADLs and IADLs. Owns wheelchair, FWW, cane, raised toilet seat.   General Information   Onset of Illness/Injury or Date of Surgery 10/10/23   Referring Physician Vianney Gonzalez PA-C   Patient/Family Therapy Goals Statement (PT) return home safely   Pertinent History of Current Problem (include personal factors and/or comorbidities that impact the POC) Per chart review, pt is POD # 1, revision ORIF left patellar fracture.   Existing Precautions/Restrictions fall;weight bearing   Weight-Bearing Status - LLE nonweight-bearing   General Observations Per ortho,\"NON WEIGHT BEARING AND NO RANGE OF MOTION, Brace locked in extension at all times\"   Cognition   Affect/Mental Status (Cognition) WNL   Orientation Status (Cognition) oriented x 4   Follows Commands (Cognition) WNL   Integumentary/Edema   Integumentary/Edema no deficits were identifed   Posture  "   Posture Forward head position   Range of Motion (ROM)   Range of Motion ROM deficits secondary to surgical procedure   ROM Comment LLE AROM limited 2/2 weakness and surgical procedure   Strength (Manual Muscle Testing)   Strength (Manual Muscle Testing) Deficits observed during functional mobility   Strength Comments mild functional strength deficits   Bed Mobility   Comment, (Bed Mobility) supine>sit w/ SBA, sit>supine min A   Transfers   Comment, (Transfers) sit>stand w/ FWW and CGA   Gait/Stairs (Locomotion)   Distance in Feet 5' eval   Distance in Feet (Gait) 50' treat   Comment, (Gait/Stairs) pt ambulated 5' w/ FWW and CGA for eval   Balance   Balance Comments impaired dynamic balance 2/2 post-op weight bearing restrictions   Sensory Examination   Sensory Perception patient reports no sensory changes   Clinical Impression   Criteria for Skilled Therapeutic Intervention Yes, treatment indicated   PT Diagnosis (PT) impaired functional mobility   Influenced by the following impairments impaired strength, balance, activity tolerance, post-op pain   Functional limitations due to impairments limited IND with mobility   Clinical Presentation (PT Evaluation Complexity) stable   Clinical Presentation Rationale clinical judgement   Clinical Decision Making (Complexity) low complexity   Planned Therapy Interventions (PT) balance training;cryotherapy;bed mobility training;gait training;home exercise program;neuromuscular re-education;orthotic fitting/training;stair training;strengthening;transfer training;progressive activity/exercise;home program guidelines   Risk & Benefits of therapy have been explained evaluation/treatment results reviewed;care plan/treatment goals reviewed;risks/benefits reviewed;current/potential barriers reviewed;participants voiced agreement with care plan;participants included;patient;spouse/significant other   PT Total Evaluation Time   PT Eval, Low Complexity Minutes (73019) 10   Plan of Care  Review   Plan of Care Reviewed With patient;spouse   Therapy Certification   Start of care date 10/11/23   Certification date from 10/11/23   Certification date to 10/11/23   Medical Diagnosis L patellar fracture revision ORIF   Physical Therapy Goals   PT Frequency One time eval and treatment only   PT Predicted Duration/Target Date for Goal Attainment 10/11/23   PT Goals Bed Mobility;Transfers;Gait;PT Goal 1;Stairs   PT: Bed Mobility Minimal assist;Supine to/from sit;Within precautions;Goal Met;Completed   PT: Transfers Supervision/stand-by assist;Sit to/from stand;Bed to/from chair;Assistive device;Within precautions   PT: Gait Supervision/stand-by assist;Rolling walker;Within precautions;50 feet;Goal Met;Completed   PT: Goal 1 Patient and spouse will verbalize understanding of use of wheelchair for safe stair navigation of 2 platform steps to enter home with son providing max assist at handles of wheelchair while bumping up backwards. goal met   Interventions   Interventions Quick Adds Gait Training;Therapeutic Activity   Therapeutic Activity   Therapeutic Activities: dynamic activities to improve functional performance Minutes (68715) 15   Symptoms Noted During/After Treatment Fatigue   Treatment Detail/Skilled Intervention Greeted pt upon arrival to room. Pt agreeable to working with PT. Spouse present during session. Education provided regarding NWBing status of LLE with hinged knee brace locked in extension with all mobility. Knee brace doffed and adjusted for proper fit with pt in long sitting position. Supine>sit w/ SBA. Sit>stand w/ CGA. Cueing for safe and efficient sit<>stand procedure including scooting to the front edge of the chair, to lean forward with nose over toes, and hand and foot placement. Following ambulation, stand>sit to raised toilet seat w/ mod A, pt tending to abandon FWW early, cueing to keep FWW close to body until feeling the toilet with both legs followed by sitting slowly to the  toilet. Brief doffed with mod A. New brief donned dependently. Sit>stand from toilet w/ SBA and good use of arm rests. Pt transferred toilet>bed w/ FWW and SBA. Sit>supine w/ min A for assist in lifting LLE. Pt left with all needs met and call light within reach. RN in room at end of session.   Gait Training   Gait Training Minutes (22377) 20   Symptoms Noted During/After Treatment (Gait Training) fatigue;increased pain   Treatment Detail/Skilled Intervention Pt ambulated 50' w/ FWW and CGA, progressing to SBA throughout. Visual demonstration provided for safe ambulation with use of FWW and NWBing on LLE. Cueing throughout for NWBIng to LLE. Pt demonstrating good ability to adhere to weight bearing restrictions throughout without loss of balance with ambulation. Pt attempted to navigate 1 step with use of FWW and no railings, but patient and spouse deferring attempt at this time, noting safety concerns. Discussed use of home wheelchair and adult son to assist in dependent stair navigation with use of wheelchair. Visual demonstration provided for use of wheelchair going backwards with tipping back method to bump up to each step. Pt and spouse feeling comfortable in instructing their son through this process to enter the home.   PT Discharge Planning   PT Plan goals met, d/c   PT Rationale for DC Rec Pt is below baseline mobility levels, but mobilizing safely at this time. SBA with use of FWW for transfer and short distance ambulation. Limited to NWBing and no ROM to L knee for first 2 weeks of recovery. Would benefit from follow up with skilled PT intervention with home care PT once cleared by surgical team to progress to higher level PT intervention. Pt and spouse verbalized understanding of safe stair navigation with use of wheelchair, plan to have adult son assist in stair navigation at home. 2 platform steps at home. At this time, all inpatient PT goals met.   PT Brief overview of current status SBA w/ FWW  transfers and short distance ambulation   PT Equipment Needed at Discharge walker, rolling;wheelchair   Total Session Time   Timed Code Treatment Minutes 35   Total Session Time (sum of timed and untimed services) 45       [facility]  OUTPATIENT PHYSICAL THERAPY EVALUATION  PLAN OF TREATMENT FOR OUTPATIENT REHABILITATION  (COMPLETE FOR INITIAL CLAIMS ONLY)  Patient's Last Name, First Name, M.I.  YOB: 1945  Mendez Greene                        Provider's Name  [facility] Medical Record No.  [id number]                             Onset Date:  10/10/23   Start of Care Date:  10/11/23   Type:     _X_PT   ___OT   ___SLP Medical Diagnosis:  L patellar fracture revision ORIF              PT Diagnosis:  impaired functional mobility Visits from SOC:  1     See note for plan of treatment, functional goals and certification details    I CERTIFY THE NEED FOR THESE SERVICES FURNISHED UNDER        THIS PLAN OF TREATMENT AND WHILE UNDER MY CARE     (Physician co-signature of this document indicates review and certification of the therapy plan).

## 2023-10-11 NOTE — PLAN OF CARE
Goal Outcome Evaluation:      Plan of Care Reviewed With: patient, spouse    Overall Patient Progress: improvingOverall Patient Progress: improving    Outcome Evaluation: Discharge to Home w/ Spouse 10/11/2023.    Patient vital signs are at baseline: Yes  Patient able to ambulate as they were prior to admission or with assist devices provided by therapies during their stay:  Yes  Patient MUST void prior to discharge:  Yes  Patient able to tolerate oral intake:  Yes  Pain has adequate pain control using Oral analgesics:  Yes  Does patient have an identified :  Yes  Has goal D/C date and time been discussed with patient:  Yes    LLE NWB    A&Ox4.   CMS Intact.   VSS on RA.   Up with Ax1 GB and walker. Immobilizer brace in place.  Voiding in Urinal.   Pain controlled with Tylenol.   Continue to monitor.    Reviewed discharge instructions and medications with patient and spouse:YES  Questions answered:YES  Patient discharged to:Home w/ Spouse  All belongs discharged with patient:YES

## 2023-10-11 NOTE — PLAN OF CARE
Physical Therapy Discharge Summary    Reason for therapy discharge:    All goals and outcomes met, no further needs identified.    Progress towards therapy goal(s). See goals on Care Plan in Epic electronic health record for goal details.  Goals met    Therapy recommendation(s):    Continued therapy is recommended.  Rationale/Recommendations:  Pt is below baseline mobility levels, but mobilizing safely at this time. SBA with use of FWW for transfer and short distance ambulation. Limited to NWBing and no ROM to L knee for first 2 weeks of recovery. Would benefit from follow up with skilled PT intervention with home care PT once cleared by surgical team to progress to higher level PT intervention. Pt and spouse verbalized understanding of safe stair navigation with use of wheelchair, plan to have adult son assist in stair navigation at home. 2 platform steps at home. At this time, all inpatient PT goals met.

## 2023-10-12 DIAGNOSIS — E78.5 HYPERLIPIDEMIA, UNSPECIFIED HYPERLIPIDEMIA TYPE: Chronic | ICD-10-CM

## 2023-10-12 RX ORDER — SIMVASTATIN 10 MG
TABLET ORAL
Qty: 90 TABLET | Refills: 1 | Status: SHIPPED | OUTPATIENT
Start: 2023-10-12 | End: 2024-01-22

## 2023-10-24 ENCOUNTER — TRANSFERRED RECORDS (OUTPATIENT)
Dept: HEALTH INFORMATION MANAGEMENT | Facility: CLINIC | Age: 78
End: 2023-10-24
Payer: MEDICARE

## 2023-10-31 DIAGNOSIS — I10 ESSENTIAL HYPERTENSION: ICD-10-CM

## 2023-10-31 RX ORDER — LISINOPRIL 5 MG/1
5 TABLET ORAL AT BEDTIME
Qty: 90 TABLET | Refills: 2 | Status: SHIPPED | OUTPATIENT
Start: 2023-10-31 | End: 2024-07-10

## 2023-11-07 ENCOUNTER — TRANSFERRED RECORDS (OUTPATIENT)
Dept: HEALTH INFORMATION MANAGEMENT | Facility: CLINIC | Age: 78
End: 2023-11-07
Payer: MEDICARE

## 2023-11-21 ENCOUNTER — TRANSFERRED RECORDS (OUTPATIENT)
Dept: HEALTH INFORMATION MANAGEMENT | Facility: CLINIC | Age: 78
End: 2023-11-21
Payer: MEDICARE

## 2023-11-22 ENCOUNTER — THERAPY VISIT (OUTPATIENT)
Dept: PHYSICAL THERAPY | Facility: CLINIC | Age: 78
End: 2023-11-22
Payer: MEDICARE

## 2023-11-22 DIAGNOSIS — M25.562 ACUTE PAIN OF LEFT KNEE: Primary | ICD-10-CM

## 2023-11-22 DIAGNOSIS — Z47.89 UNSPECIFIED ORTHOPEDIC AFTERCARE: ICD-10-CM

## 2023-11-22 DIAGNOSIS — R26.9 ABNORMAL GAIT: ICD-10-CM

## 2023-11-22 PROCEDURE — 97110 THERAPEUTIC EXERCISES: CPT | Mod: GP | Performed by: PHYSICAL THERAPIST

## 2023-11-22 PROCEDURE — 97161 PT EVAL LOW COMPLEX 20 MIN: CPT | Mod: GP | Performed by: PHYSICAL THERAPIST

## 2023-11-22 ASSESSMENT — ACTIVITIES OF DAILY LIVING (ADL)
AS_A_RESULT_OF_YOUR_KNEE_INJURY,_HOW_WOULD_YOU_RATE_YOUR_CURRENT_LEVEL_OF_DAILY_ACTIVITY?: ABNORMAL
RISE FROM A CHAIR: ACTIVITY IS MINIMALLY DIFFICULT
GO DOWN STAIRS: ACTIVITY IS MINIMALLY DIFFICULT
HOW_WOULD_YOU_RATE_THE_CURRENT_FUNCTION_OF_YOUR_KNEE_DURING_YOUR_USUAL_DAILY_ACTIVITIES_ON_A_SCALE_FROM_0_TO_100_WITH_100_BEING_YOUR_LEVEL_OF_KNEE_FUNCTION_PRIOR_TO_YOUR_INJURY_AND_0_BEING_THE_INABILITY_TO_PERFORM_ANY_OF_YOUR_USUAL_DAILY_ACTIVITIES?: 40
SWELLING: I HAVE THE SYMPTOM BUT IT DOES NOT AFFECT MY ACTIVITY
WALK: ACTIVITY IS SOMEWHAT DIFFICULT
KNEE_ACTIVITY_OF_DAILY_LIVING_SCORE: 52.86
LIMPING: THE SYMPTOM AFFECTS MY ACTIVITY SLIGHTLY
HOW_WOULD_YOU_RATE_THE_OVERALL_FUNCTION_OF_YOUR_KNEE_DURING_YOUR_USUAL_DAILY_ACTIVITIES?: ABNORMAL
STAND: ACTIVITY IS MINIMALLY DIFFICULT
KNEE_ACTIVITY_OF_DAILY_LIVING_SUM: 37
GIVING WAY, BUCKLING OR SHIFTING OF KNEE: I DO NOT HAVE THE SYMPTOM
PAIN: I DO NOT HAVE THE SYMPTOM
KNEEL ON THE FRONT OF YOUR KNEE: I AM UNABLE TO DO THE ACTIVITY
RAW_SCORE: 37
SQUAT: I AM UNABLE TO DO THE ACTIVITY
GO UP STAIRS: ACTIVITY IS VERY DIFFICULT
STIFFNESS: THE SYMPTOM AFFECTS MY ACTIVITY MODERATELY
SIT WITH YOUR KNEE BENT: I AM UNABLE TO DO THE ACTIVITY
WEAKNESS: THE SYMPTOM AFFECTS MY ACTIVITY MODERATELY

## 2023-11-22 NOTE — PROGRESS NOTES
PHYSICAL THERAPY EVALUATION  Type of Visit: Evaluation    See electronic medical record for Abuse and Falls Screening details.    Subjective   Patient is s/p revision ORIF, left patella from 10/10/23. Removal patellar TKA component. He had fallen on knee and fractured patella, 1st revision in September but in the early postoperative stage he re-injured knee and pulled apart the cure, per MD notes. He is ambulating WBAT with walker and brace locked in extension. He sleeps in the brace. MD orders include: WBAT in brace with walker, active assisted leg lifts and quad activation.   AROM 0-60 degrees.        Presenting condition or subjective complaint: Broken kneecap surgery  Date of onset: 10/10/23 (2nd revision)    Relevant medical history: Bladder or bowel problems; Cancer; Hearing problems; High blood pressure; Incontinence; Overweight; Smoking; Thyroid problems; Unexplained weight loss   Dates & types of surgery: 2/06 bladder; bunion, 8/22 TKA, kneecap repair 8/5/23 and 10/5/23    Prior diagnostic imaging/testing results: X-ray     Prior therapy history for the same diagnosis, illness or injury: No      Prior Level of Function  Transfers: Independent  Ambulation: Independent  ADL: Independent    Living Environment  Social support: With a significant other or spouse   Type of home: Multi-level   Stairs to enter the home: Yes 2 Is there a railing: No   Ramp: No   Stairs inside the home: Yes 8 Is there a railing: Yes   Help at home: Self Cares (home health aide/personal care attendant, family, etc); Home and Yard maintenance tasks; Assist for driving and community activities  Equipment owned: Straight Cane; Walker; Walker with wheels; Crutches; Standard wheelchair     Employment: No    Hobbies/Interests: birding, gardening, hiking, photography    Patient goals for therapy: walk without walker       Objective   KNEE EVALUATION  PAIN: Pain Level at Rest: 0/10  Pain Level with Use: 3/10  Pain Location: knee  Pain  "Quality: Aching  Pain Frequency: intermittent  Pain is Worst: daytime  Pain is Exacerbated By: using knee  Pain is Relieved By: rest  Pain Progression: Improved  INTEGUMENTARY (edema, incisions):  incision scabbing present, mild redness, mild edema; girth of L quad 8\" above joint line=45.1 cm; joint line=40.0cm.    GAIT:  Weightbearing Status: WBAT  Assistive Device(s): Walker (standard)  Gait Deviations:  ambulating with knee brace and decreased knee ext and flexion, decreased push off, decreased dylan.    ROM:   (Degrees) Left AROM Left PROM  Right AROM Right PROM   Knee Flexion  46     Knee Extension 18 5     Pain:   End feel:     STRENGTH:  quad set=fair    FUNCTIONAL TESTS:  rises sit to stand with significant use of UE on arm rests, knee in full extension in brace.      Assessment & Plan   CLINICAL IMPRESSIONS  Medical Diagnosis: s/p revision ORIF, left patella    Treatment Diagnosis: s/p ORIF left patella   Impression/Assessment: Patient is a 78 year old male with left knee complaints.  The following significant findings have been identified: Pain, Decreased ROM/flexibility, Decreased strength, Edema, Impaired gait, and Decreased activity tolerance. These impairments interfere with their ability to perform self care tasks, recreational activities, household chores, driving , household mobility, and community mobility as compared to previous level of function.     Clinical Decision Making (Complexity):  Clinical Presentation: Stable/Uncomplicated  Clinical Presentation Rationale: based on medical and personal factors listed in PT evaluation  Clinical Decision Making (Complexity): Low complexity    PLAN OF CARE  Treatment Interventions:  Interventions: Gait Training, Manual Therapy, Neuromuscular Re-education, Therapeutic Activity, Therapeutic Exercise, Self-Care/Home Management    Long Term Goals     PT Goal 1  Goal Identifier: Ambulation  Goal Description: Ambulate without gait deviation without AD and 0/10 " pain.  Rationale: to maximize safety and independence with performance of ADLs and functional tasks;to maximize safety and independence within the home;to maximize safety and independence within the community;to maximize safety and independence with transportation;to maximize safety and independence with self cares  Target Date: 02/14/24  PT Goal 2  Goal Identifier: Stairs  Goal Description: Navigate stairs with reciprocal gait up/down stairs with railing.  Rationale: to maximize safety and independence within the home;to maximize safety and independence within the community  Target Date: 02/14/24      Frequency of Treatment: 1x/week  Duration of Treatment: 12 weeks    Recommended Referrals to Other Professionals:  none  Education Assessment:   Learner/Method: Patient;No Barriers to Learning    Risks and benefits of evaluation/treatment have been explained.   Patient/Family/caregiver agrees with Plan of Care.     Evaluation Time:     PT Eval, Low Complexity Minutes (67491): 15       Signing Clinician: Mary Mandel PT      [facility]                                                                                   OUTPATIENT PHYSICAL THERAPY      PLAN OF TREATMENT FOR OUTPATIENT REHABILITATION   Patient's Last Name, First Name, Mendez Agosto YOB: 1945   Provider's Name   [facility]   Medical Record No.  [id number]     Onset Date: 10/10/23 (2nd revision)  Start of Care Date: 11/22/23     Medical Diagnosis:  s/p revision ORIF, left patella      PT Treatment Diagnosis:  s/p ORIF left patella Plan of Treatment  Frequency/Duration: 1x/week/ 12 weeks    Certification date from 11/22/23 to 02/14/24         See note for plan of treatment details and functional goals     Mary Mandel, ZHEN                         I CERTIFY THE NEED FOR THESE SERVICES FURNISHED UNDER        THIS PLAN OF TREATMENT AND WHILE UNDER MY CARE .              Physician Signature               Date    X_____________________________________________________                  Referring Provider:  Melba Dial    Initial Assessment  See Epic Evaluation- Start of Care Date: 11/22/23

## 2023-11-27 ENCOUNTER — TRANSCRIBE ORDERS (OUTPATIENT)
Dept: OTHER | Age: 78
End: 2023-11-27

## 2023-11-27 DIAGNOSIS — Z98.890 S/P ORIF (OPEN REDUCTION INTERNAL FIXATION) FRACTURE: Primary | ICD-10-CM

## 2023-11-27 DIAGNOSIS — Z98.890 S/P KNEE SURGERY: ICD-10-CM

## 2023-11-27 DIAGNOSIS — Z87.81 S/P ORIF (OPEN REDUCTION INTERNAL FIXATION) FRACTURE: Primary | ICD-10-CM

## 2023-12-05 ENCOUNTER — THERAPY VISIT (OUTPATIENT)
Dept: PHYSICAL THERAPY | Facility: CLINIC | Age: 78
End: 2023-12-05
Payer: MEDICARE

## 2023-12-05 DIAGNOSIS — M25.562 ACUTE PAIN OF LEFT KNEE: Primary | ICD-10-CM

## 2023-12-05 DIAGNOSIS — R26.9 ABNORMAL GAIT: ICD-10-CM

## 2023-12-05 DIAGNOSIS — Z47.89 UNSPECIFIED ORTHOPEDIC AFTERCARE: ICD-10-CM

## 2023-12-05 PROCEDURE — 97110 THERAPEUTIC EXERCISES: CPT | Mod: GP | Performed by: PHYSICAL THERAPIST

## 2023-12-08 ENCOUNTER — THERAPY VISIT (OUTPATIENT)
Dept: PHYSICAL THERAPY | Facility: CLINIC | Age: 78
End: 2023-12-08
Payer: MEDICARE

## 2023-12-08 DIAGNOSIS — R26.9 ABNORMAL GAIT: ICD-10-CM

## 2023-12-08 DIAGNOSIS — M25.562 ACUTE PAIN OF LEFT KNEE: Primary | ICD-10-CM

## 2023-12-08 DIAGNOSIS — Z47.89 UNSPECIFIED ORTHOPEDIC AFTERCARE: ICD-10-CM

## 2023-12-08 PROCEDURE — 97110 THERAPEUTIC EXERCISES: CPT | Mod: GP | Performed by: PHYSICAL THERAPIST

## 2023-12-12 ENCOUNTER — TRANSCRIBE ORDERS (OUTPATIENT)
Dept: OTHER | Age: 78
End: 2023-12-12

## 2023-12-12 ENCOUNTER — THERAPY VISIT (OUTPATIENT)
Dept: PHYSICAL THERAPY | Facility: CLINIC | Age: 78
End: 2023-12-12
Payer: MEDICARE

## 2023-12-12 DIAGNOSIS — Z47.89 UNSPECIFIED ORTHOPEDIC AFTERCARE: ICD-10-CM

## 2023-12-12 DIAGNOSIS — M25.562 ACUTE PAIN OF LEFT KNEE: Primary | ICD-10-CM

## 2023-12-12 DIAGNOSIS — R26.9 ABNORMAL GAIT: ICD-10-CM

## 2023-12-12 PROCEDURE — 97110 THERAPEUTIC EXERCISES: CPT | Mod: GP | Performed by: PHYSICAL THERAPIST

## 2023-12-14 ENCOUNTER — TRANSFERRED RECORDS (OUTPATIENT)
Dept: HEALTH INFORMATION MANAGEMENT | Facility: CLINIC | Age: 78
End: 2023-12-14
Payer: MEDICARE

## 2023-12-15 ENCOUNTER — THERAPY VISIT (OUTPATIENT)
Dept: PHYSICAL THERAPY | Facility: CLINIC | Age: 78
End: 2023-12-15
Payer: MEDICARE

## 2023-12-15 DIAGNOSIS — R26.9 ABNORMAL GAIT: ICD-10-CM

## 2023-12-15 DIAGNOSIS — Z47.89 UNSPECIFIED ORTHOPEDIC AFTERCARE: ICD-10-CM

## 2023-12-15 DIAGNOSIS — M25.562 ACUTE PAIN OF LEFT KNEE: Primary | ICD-10-CM

## 2023-12-15 PROCEDURE — 97110 THERAPEUTIC EXERCISES: CPT | Mod: GP | Performed by: PHYSICAL THERAPIST

## 2023-12-19 ENCOUNTER — THERAPY VISIT (OUTPATIENT)
Dept: PHYSICAL THERAPY | Facility: CLINIC | Age: 78
End: 2023-12-19
Payer: MEDICARE

## 2023-12-19 DIAGNOSIS — R26.9 ABNORMAL GAIT: ICD-10-CM

## 2023-12-19 DIAGNOSIS — M25.562 ACUTE PAIN OF LEFT KNEE: Primary | ICD-10-CM

## 2023-12-19 DIAGNOSIS — Z47.89 UNSPECIFIED ORTHOPEDIC AFTERCARE: ICD-10-CM

## 2023-12-19 PROCEDURE — 97110 THERAPEUTIC EXERCISES: CPT | Mod: GP | Performed by: PHYSICAL THERAPIST

## 2023-12-22 ENCOUNTER — TRANSCRIBE ORDERS (OUTPATIENT)
Dept: OTHER | Age: 78
End: 2023-12-22

## 2023-12-22 ENCOUNTER — THERAPY VISIT (OUTPATIENT)
Dept: PHYSICAL THERAPY | Facility: CLINIC | Age: 78
End: 2023-12-22
Payer: MEDICARE

## 2023-12-22 DIAGNOSIS — R26.9 ABNORMAL GAIT: ICD-10-CM

## 2023-12-22 DIAGNOSIS — M25.562 ACUTE PAIN OF LEFT KNEE: Primary | ICD-10-CM

## 2023-12-22 DIAGNOSIS — Z47.89 UNSPECIFIED ORTHOPEDIC AFTERCARE: ICD-10-CM

## 2023-12-22 PROCEDURE — 97110 THERAPEUTIC EXERCISES: CPT | Mod: GP | Performed by: PHYSICAL THERAPIST

## 2023-12-29 ENCOUNTER — THERAPY VISIT (OUTPATIENT)
Dept: PHYSICAL THERAPY | Facility: CLINIC | Age: 78
End: 2023-12-29
Payer: MEDICARE

## 2023-12-29 DIAGNOSIS — R26.9 ABNORMAL GAIT: ICD-10-CM

## 2023-12-29 DIAGNOSIS — Z47.89 UNSPECIFIED ORTHOPEDIC AFTERCARE: ICD-10-CM

## 2023-12-29 DIAGNOSIS — M25.562 ACUTE PAIN OF LEFT KNEE: Primary | ICD-10-CM

## 2023-12-29 PROCEDURE — 97110 THERAPEUTIC EXERCISES: CPT | Mod: GP | Performed by: PHYSICAL THERAPIST

## 2024-01-05 ENCOUNTER — THERAPY VISIT (OUTPATIENT)
Dept: PHYSICAL THERAPY | Facility: CLINIC | Age: 79
End: 2024-01-05
Payer: MEDICARE

## 2024-01-05 DIAGNOSIS — Z47.89 UNSPECIFIED ORTHOPEDIC AFTERCARE: ICD-10-CM

## 2024-01-05 DIAGNOSIS — M25.562 ACUTE PAIN OF LEFT KNEE: Primary | ICD-10-CM

## 2024-01-05 DIAGNOSIS — R26.9 ABNORMAL GAIT: ICD-10-CM

## 2024-01-05 PROCEDURE — 97112 NEUROMUSCULAR REEDUCATION: CPT | Mod: GP | Performed by: PHYSICAL THERAPIST

## 2024-01-05 PROCEDURE — 97110 THERAPEUTIC EXERCISES: CPT | Mod: GP | Performed by: PHYSICAL THERAPIST

## 2024-01-09 ENCOUNTER — THERAPY VISIT (OUTPATIENT)
Dept: PHYSICAL THERAPY | Facility: CLINIC | Age: 79
End: 2024-01-09
Payer: MEDICARE

## 2024-01-09 DIAGNOSIS — R26.9 ABNORMAL GAIT: ICD-10-CM

## 2024-01-09 DIAGNOSIS — M25.562 ACUTE PAIN OF LEFT KNEE: Primary | ICD-10-CM

## 2024-01-09 DIAGNOSIS — Z47.89 UNSPECIFIED ORTHOPEDIC AFTERCARE: ICD-10-CM

## 2024-01-09 PROCEDURE — 97110 THERAPEUTIC EXERCISES: CPT | Mod: GP | Performed by: PHYSICAL THERAPIST

## 2024-01-09 PROCEDURE — 97112 NEUROMUSCULAR REEDUCATION: CPT | Mod: GP | Performed by: PHYSICAL THERAPIST

## 2024-01-12 ENCOUNTER — THERAPY VISIT (OUTPATIENT)
Dept: PHYSICAL THERAPY | Facility: CLINIC | Age: 79
End: 2024-01-12
Payer: MEDICARE

## 2024-01-12 DIAGNOSIS — R26.9 ABNORMAL GAIT: ICD-10-CM

## 2024-01-12 DIAGNOSIS — M25.562 ACUTE PAIN OF LEFT KNEE: Primary | ICD-10-CM

## 2024-01-12 DIAGNOSIS — Z47.89 UNSPECIFIED ORTHOPEDIC AFTERCARE: ICD-10-CM

## 2024-01-12 PROCEDURE — 97110 THERAPEUTIC EXERCISES: CPT | Mod: GP | Performed by: PHYSICAL THERAPIST

## 2024-01-15 ENCOUNTER — APPOINTMENT (OUTPATIENT)
Dept: URBAN - METROPOLITAN AREA CLINIC 256 | Age: 79
Setting detail: DERMATOLOGY
End: 2024-01-16

## 2024-01-15 VITALS — WEIGHT: 185 LBS | HEIGHT: 70 IN

## 2024-01-15 DIAGNOSIS — L57.8 OTHER SKIN CHANGES DUE TO CHRONIC EXPOSURE TO NONIONIZING RADIATION: ICD-10-CM

## 2024-01-15 DIAGNOSIS — L82.0 INFLAMED SEBORRHEIC KERATOSIS: ICD-10-CM

## 2024-01-15 DIAGNOSIS — L57.0 ACTINIC KERATOSIS: ICD-10-CM

## 2024-01-15 DIAGNOSIS — L82.1 OTHER SEBORRHEIC KERATOSIS: ICD-10-CM

## 2024-01-15 DIAGNOSIS — D22 MELANOCYTIC NEVI: ICD-10-CM

## 2024-01-15 DIAGNOSIS — Z71.89 OTHER SPECIFIED COUNSELING: ICD-10-CM

## 2024-01-15 DIAGNOSIS — D18.0 HEMANGIOMA: ICD-10-CM

## 2024-01-15 PROBLEM — D22.62 MELANOCYTIC NEVI OF LEFT UPPER LIMB, INCLUDING SHOULDER: Status: ACTIVE | Noted: 2024-01-15

## 2024-01-15 PROBLEM — D22.5 MELANOCYTIC NEVI OF TRUNK: Status: ACTIVE | Noted: 2024-01-15

## 2024-01-15 PROBLEM — D22.72 MELANOCYTIC NEVI OF LEFT LOWER LIMB, INCLUDING HIP: Status: ACTIVE | Noted: 2024-01-15

## 2024-01-15 PROBLEM — D22.71 MELANOCYTIC NEVI OF RIGHT LOWER LIMB, INCLUDING HIP: Status: ACTIVE | Noted: 2024-01-15

## 2024-01-15 PROBLEM — D22.61 MELANOCYTIC NEVI OF RIGHT UPPER LIMB, INCLUDING SHOULDER: Status: ACTIVE | Noted: 2024-01-15

## 2024-01-15 PROBLEM — D18.01 HEMANGIOMA OF SKIN AND SUBCUTANEOUS TISSUE: Status: ACTIVE | Noted: 2024-01-15

## 2024-01-15 PROCEDURE — OTHER EDUCATIONAL RESOURCES PROVIDED: OTHER

## 2024-01-15 PROCEDURE — OTHER COUNSELING: OTHER

## 2024-01-15 PROCEDURE — 99213 OFFICE O/P EST LOW 20 MIN: CPT | Mod: 25

## 2024-01-15 PROCEDURE — 17003 DESTRUCT PREMALG LES 2-14: CPT | Mod: 59

## 2024-01-15 PROCEDURE — 17000 DESTRUCT PREMALG LESION: CPT | Mod: 59

## 2024-01-15 PROCEDURE — OTHER LIQUID NITROGEN: OTHER

## 2024-01-15 PROCEDURE — OTHER MIPS QUALITY: OTHER

## 2024-01-15 PROCEDURE — 17110 DESTRUCT B9 LESION 1-14: CPT

## 2024-01-15 ASSESSMENT — LOCATION DETAILED DESCRIPTION DERM
LOCATION DETAILED: INFERIOR THORACIC SPINE
LOCATION DETAILED: LEFT SUPERIOR PARIETAL SCALP
LOCATION DETAILED: RIGHT SUPERIOR MEDIAL FOREHEAD
LOCATION DETAILED: RIGHT SUPERIOR CENTRAL MALAR CHEEK
LOCATION DETAILED: LEFT VENTRAL DISTAL FOREARM
LOCATION DETAILED: RIGHT CENTRAL PARIETAL SCALP
LOCATION DETAILED: RIGHT VENTRAL PROXIMAL FOREARM
LOCATION DETAILED: RIGHT VENTRAL DISTAL FOREARM
LOCATION DETAILED: RIGHT SUPERIOR OCCIPITAL SCALP
LOCATION DETAILED: LEFT ANTECUBITAL SKIN
LOCATION DETAILED: SUPERIOR THORACIC SPINE
LOCATION DETAILED: LEFT VENTRAL PROXIMAL FOREARM
LOCATION DETAILED: LEFT INFERIOR LATERAL MIDBACK
LOCATION DETAILED: LEFT MEDIAL UPPER BACK
LOCATION DETAILED: LEFT INFERIOR CENTRAL MALAR CHEEK
LOCATION DETAILED: RIGHT SUPERIOR PARIETAL SCALP
LOCATION DETAILED: LEFT ANTERIOR DISTAL THIGH
LOCATION DETAILED: RIGHT ANTERIOR DISTAL THIGH

## 2024-01-15 ASSESSMENT — LOCATION ZONE DERM
LOCATION ZONE: ARM
LOCATION ZONE: SCALP
LOCATION ZONE: LEG
LOCATION ZONE: TRUNK
LOCATION ZONE: FACE

## 2024-01-15 ASSESSMENT — LOCATION SIMPLE DESCRIPTION DERM
LOCATION SIMPLE: RIGHT THIGH
LOCATION SIMPLE: LEFT THIGH
LOCATION SIMPLE: RIGHT FOREHEAD
LOCATION SIMPLE: LEFT LOWER BACK
LOCATION SIMPLE: LEFT CHEEK
LOCATION SIMPLE: LEFT UPPER BACK
LOCATION SIMPLE: RIGHT FOREARM
LOCATION SIMPLE: LEFT FOREARM
LOCATION SIMPLE: RIGHT OCCIPITAL SCALP
LOCATION SIMPLE: UPPER BACK
LOCATION SIMPLE: LEFT UPPER ARM
LOCATION SIMPLE: SCALP
LOCATION SIMPLE: RIGHT CHEEK

## 2024-01-15 NOTE — PROCEDURE: LIQUID NITROGEN
Show Aperture Variable?: Yes
Duration Of Freeze Thaw-Cycle (Seconds): 3
Application Tool (Optional): Liquid Nitrogen Sprayer
Render Note In Bullet Format When Appropriate: No
Consent: The patient's consent was obtained including but not limited to risks of crusting, scabbing, blistering, scarring, darker or lighter pigmentary change, recurrence, incomplete removal and infection.
Number Of Freeze-Thaw Cycles: 1 freeze-thaw cycle
Post-Care Instructions: I reviewed with the patient in detail post-care instructions. Patient is to wear sunprotection, and avoid picking at any of the treated lesions. Pt may apply Vaseline to crusted or scabbing areas.
Detail Level: Simple
Detail Level: Detailed
Medical Necessity Information: It is in your best interest to select a reason for this procedure from the list below. All of these items fulfill various CMS LCD requirements except the new and changing color options.
Spray Paint Text: The liquid nitrogen was applied to the skin utilizing a spray paint frosting technique.
Medical Necessity Clause: This procedure was medically necessary because the lesions that were treated were:

## 2024-01-16 ENCOUNTER — THERAPY VISIT (OUTPATIENT)
Dept: PHYSICAL THERAPY | Facility: CLINIC | Age: 79
End: 2024-01-16
Payer: MEDICARE

## 2024-01-16 DIAGNOSIS — R26.9 ABNORMAL GAIT: ICD-10-CM

## 2024-01-16 DIAGNOSIS — Z47.89 UNSPECIFIED ORTHOPEDIC AFTERCARE: ICD-10-CM

## 2024-01-16 DIAGNOSIS — M25.562 ACUTE PAIN OF LEFT KNEE: Primary | ICD-10-CM

## 2024-01-16 PROCEDURE — 97110 THERAPEUTIC EXERCISES: CPT | Mod: GP | Performed by: PHYSICAL THERAPIST

## 2024-01-17 NOTE — PROGRESS NOTES
"    PLAN  Pt has completed 12 sessions of PT. Pt is now ambulating without assistive device and without brace. Gait is non-antalgic, however, ambulates with slow dylan. Slow progress with quad function; demonstrates loss of TKE during SAQ, SLR's, and step-up ex's.  Plan to continue 2x/week for 2 weeks and taper to 1x/week for 4 weeks.   01/16/24 0500   Appointment Info   Signing clinician's name / credentials Varsha Brown PT   Total/Authorized Visits Plan 18   Visits Used 12   Medical Diagnosis s/p revision ORIF, left patella   PT Tx Diagnosis s/p ORIF left patella   Precautions/Limitations progress to 90 degrees over 2 weeks; ROM as tolerated thereafter.   Quick Adds Certification   Progress Note/Certification   Start of Care Date 11/22/23   Onset of illness/injury or Date of Surgery 10/10/23  (2nd revision)   Therapy Frequency 2x/week for 2 weeks, tapering to 1x/week for 4 weeks   Predicted Duration 12 weeks   Certification date from 11/22/23   Certification date to 02/14/24   Progress Note Due Date 01/20/24   Progress Note Completed Date 01/16/24   GOALS   PT Goals 2   PT Goal 1   Goal Identifier Ambulation   Goal Description Ambulate without gait deviation without AD and 0/10 pain.   Rationale to maximize safety and independence with performance of ADLs and functional tasks;to maximize safety and independence within the home;to maximize safety and independence within the community;to maximize safety and independence with transportation;to maximize safety and independence with self cares   Goal Progress Goal almost met; slight unsteadiness with gait; no pain.   Target Date 02/14/24   PT Goal 2   Goal Identifier Stairs   Goal Description Navigate stairs with reciprocal gait up/down stairs with railing.   Rationale to maximize safety and independence within the home;to maximize safety and independence within the community   Goal Progress Able to ascend 4\" step height with the L.   Target Date 02/14/24 " "  Subjective Report   Subjective Report Reporting significant fatigue. Noted similar fatigue after his knee surgery last year. Fatigue lasted for a couple of months. Indicated he went through numerous tests to pinpoint cause, however, all tests results were normal.  Aware of discomfort in the lower back in the AM which makes moving in the AM slowl  upon awakening.   Objective Measures   Objective Measures Objective Measure 1;Objective Measure 2;Objective Measure 3;Objective Measure 4   Objective Measure 1   Objective Measure AROM L knee-flexion/active knee extension   Details 129: wallslide.   Objective Measure 2   Objective Measure Visual inspection   Objective Measure 3   Objective Measure Quad set/Strength   Details Able to perform step-up to 6\" height with  mild UE support and with mild loss of quad strength at TKE. Improved tolerance to 7\" height Trial step-down forwards. Able to perform with 3\" height and moderate UE support.   Treatment Interventions (PT)   Interventions Therapeutic Procedure/Exercise;Neuromuscular Re-education;Therapeutic Activity   Therapeutic Procedure/Exercise   Therapeutic Procedures: strength, endurance, ROM, flexibillity minutes (26227) 40   Therapeutic Procedures Ther Proc 2;Ther Proc 3;Ther Proc 4;Ther Proc 5;Ther Proc 6   Ther Proc 1 Education   Ther Proc 1 - Details Established POC, discussed lui/phys of pain, HEP frequency and activity modifications.   Ther Proc 2 quad set 5\"x10; 2x/day-reviewed with towel roll behind knee for feedback; MC to avoid use of glutes during contraction  (HEP)   Ther Proc 2 - Details wallslide: 129 degrees   Ther Proc 3 SAQ: 25 degrees extensor lag; reported performing at home but does not feel any progress is occuring  (HEP)   Ther Proc 3 - Details SLR: flexion: ongoing extensor lag with lift/lowering  (HEP)   Ther Proc 4 TKE: functional knee extension with tubing x10; VC and MC for form.  (not today)   Ther Proc 4 - Details Nu-step: seat 7; changed " "to bike after 3 minutes. Seat 6; x3'   Ther Proc 5 Step-up forwards/step-down forwards   Ther Proc 5 - Details Initiated with 5\" height. Changed to 6\" height. Able to perform to 7\" height with moderate UE support.   Ther Proc 6 Leg press   Ther Proc 6 - Details seat E; 85# x10; cueing to perform with controlled speed. Attempted to progress to 90# but too challenging. L LE: 50# x10, x2   Skilled Intervention Instructed in strengtheing ex's to assist with return to normal function during gait/transfers. Cues for form and control throughout   Therapeutic Activity   Ther Act 1 Partial squat   Ther Act 1 - Details Initiated to 25\" height. Instructed in front of wall at home as bed height too high. VC for form-utilizing hip hinge to initiate. Goal 3 sets of 10 reps.  (not today)   Skilled Intervention Instructed in functional LE strengthening ex's to assist in stairs/transfers.   Neuromuscular Re-education   Neuro Re-ed 1 Tandem stance/tandem gait   Neuro Re-ed 1 - Details L leg behind. Able to perform 45+ seconds. Added slow head turns for added challenge. Tandem gait around large counter-top in gym; slight fingertaps as needed for balance.   Skilled Intervention Instructed in balance drills to assist with stability during gait.   Education   Learner/Method Patient;No Barriers to Learning   Plan   Home program PTRX HEP   Plan for next session re-assess; progress as able         Beginning/End Dates of Progress Note Reporting Period:  12-19-23 to 01/16/2024    Referring Provider:  Melba Dial"

## 2024-01-19 ENCOUNTER — THERAPY VISIT (OUTPATIENT)
Dept: PHYSICAL THERAPY | Facility: CLINIC | Age: 79
End: 2024-01-19
Payer: MEDICARE

## 2024-01-19 DIAGNOSIS — R26.9 ABNORMAL GAIT: ICD-10-CM

## 2024-01-19 DIAGNOSIS — M25.562 ACUTE PAIN OF LEFT KNEE: Primary | ICD-10-CM

## 2024-01-19 DIAGNOSIS — Z47.89 UNSPECIFIED ORTHOPEDIC AFTERCARE: ICD-10-CM

## 2024-01-19 PROCEDURE — 97110 THERAPEUTIC EXERCISES: CPT | Mod: GP | Performed by: PHYSICAL THERAPIST

## 2024-01-21 DIAGNOSIS — E78.5 HYPERLIPIDEMIA, UNSPECIFIED HYPERLIPIDEMIA TYPE: Chronic | ICD-10-CM

## 2024-01-22 RX ORDER — SIMVASTATIN 10 MG
TABLET ORAL
Qty: 90 TABLET | Refills: 0 | Status: SHIPPED | OUTPATIENT
Start: 2024-01-22 | End: 2024-07-10

## 2024-01-22 NOTE — TELEPHONE ENCOUNTER
Prescription approved per [facility] [facility].  Gia Mcfarlane RN  [facility]

## 2024-01-23 ENCOUNTER — THERAPY VISIT (OUTPATIENT)
Dept: PHYSICAL THERAPY | Facility: CLINIC | Age: 79
End: 2024-01-23
Payer: MEDICARE

## 2024-01-23 DIAGNOSIS — M25.562 ACUTE PAIN OF LEFT KNEE: Primary | ICD-10-CM

## 2024-01-23 DIAGNOSIS — Z47.89 UNSPECIFIED ORTHOPEDIC AFTERCARE: ICD-10-CM

## 2024-01-23 DIAGNOSIS — R26.9 ABNORMAL GAIT: ICD-10-CM

## 2024-01-23 PROCEDURE — 97110 THERAPEUTIC EXERCISES: CPT | Mod: GP | Performed by: PHYSICAL THERAPIST

## 2024-01-26 ENCOUNTER — TELEPHONE (OUTPATIENT)
Dept: PHYSICAL THERAPY | Facility: CLINIC | Age: 79
End: 2024-01-26

## 2024-01-26 ENCOUNTER — THERAPY VISIT (OUTPATIENT)
Dept: PHYSICAL THERAPY | Facility: CLINIC | Age: 79
End: 2024-01-26
Payer: MEDICARE

## 2024-01-26 DIAGNOSIS — Z47.89 UNSPECIFIED ORTHOPEDIC AFTERCARE: ICD-10-CM

## 2024-01-26 DIAGNOSIS — M25.562 ACUTE PAIN OF LEFT KNEE: Primary | ICD-10-CM

## 2024-01-26 DIAGNOSIS — R26.9 ABNORMAL GAIT: ICD-10-CM

## 2024-01-26 PROCEDURE — 97110 THERAPEUTIC EXERCISES: CPT | Mod: GP | Performed by: PHYSICAL THERAPIST

## 2024-01-26 NOTE — TELEPHONE ENCOUNTER
Savage Faith,    I've been working with Mendez Greene. Just wanted to touch base with you regarding his slow return of quad strength. I don't get to see a lot of these pt's and given the time he was immobilized in the brace, I'm assuming all is  normal.  He has loss of TKE (mild) during SLR but more pronounced during SAQ movement; loss of 20+ degrees from 0 during active extension. ROM is WNL's. Able to perform step-up to 7  height, with UE support. Reported he is now able to ascend stairs with a reciprocal gait but uses step-to pattern descending.     We also noticed at his previous appt that there was a   squeak (noise)  while using the leg press machine. Noise is coming from the knee. There is no pain. He has been utilizing the leg press for weeks without issue. He mentioned that he contacted you and you were not overly concerned, specifically if there was no pain.     Varsha Brown, PT

## 2024-02-02 ENCOUNTER — THERAPY VISIT (OUTPATIENT)
Dept: PHYSICAL THERAPY | Facility: CLINIC | Age: 79
End: 2024-02-02
Payer: MEDICARE

## 2024-02-02 DIAGNOSIS — M25.562 ACUTE PAIN OF LEFT KNEE: Primary | ICD-10-CM

## 2024-02-02 DIAGNOSIS — Z47.89 UNSPECIFIED ORTHOPEDIC AFTERCARE: ICD-10-CM

## 2024-02-02 DIAGNOSIS — R26.9 ABNORMAL GAIT: ICD-10-CM

## 2024-02-02 PROCEDURE — 97110 THERAPEUTIC EXERCISES: CPT | Mod: GP | Performed by: PHYSICAL THERAPIST

## 2024-02-06 ENCOUNTER — THERAPY VISIT (OUTPATIENT)
Dept: PHYSICAL THERAPY | Facility: CLINIC | Age: 79
End: 2024-02-06
Payer: MEDICARE

## 2024-02-06 DIAGNOSIS — Z47.89 UNSPECIFIED ORTHOPEDIC AFTERCARE: ICD-10-CM

## 2024-02-06 DIAGNOSIS — R26.9 ABNORMAL GAIT: ICD-10-CM

## 2024-02-06 DIAGNOSIS — M25.562 ACUTE PAIN OF LEFT KNEE: Primary | ICD-10-CM

## 2024-02-06 PROCEDURE — 97110 THERAPEUTIC EXERCISES: CPT | Mod: GP | Performed by: PHYSICAL THERAPIST

## 2024-02-09 ENCOUNTER — THERAPY VISIT (OUTPATIENT)
Dept: PHYSICAL THERAPY | Facility: CLINIC | Age: 79
End: 2024-02-09
Payer: MEDICARE

## 2024-02-09 DIAGNOSIS — M25.562 ACUTE PAIN OF LEFT KNEE: Primary | ICD-10-CM

## 2024-02-09 DIAGNOSIS — R26.9 ABNORMAL GAIT: ICD-10-CM

## 2024-02-09 DIAGNOSIS — Z47.89 UNSPECIFIED ORTHOPEDIC AFTERCARE: ICD-10-CM

## 2024-02-09 PROCEDURE — 97110 THERAPEUTIC EXERCISES: CPT | Mod: GP | Performed by: PHYSICAL THERAPIST

## 2024-02-11 ENCOUNTER — NURSE TRIAGE (OUTPATIENT)
Dept: NURSING | Facility: CLINIC | Age: 79
End: 2024-02-11
Payer: MEDICARE

## 2024-02-11 NOTE — TELEPHONE ENCOUNTER
"Patient calling, in October patient had surgery on a broken left knee cap. It does not look like it should. It bulges when he bends it and it squeaks. Patient had flu symptoms last week. Patient has an appointment tomorrow with the surgeon to address the knee.    Patient is calling today as his knee is warm to the touch. Patient had a total knee replacement and a \"bracket that put the knee cap back together\" on this left knee.    The knee is bumpy but not swollen. The squeaking was first noticed a couple weeks ago. The warmth was first noticed last weekend. Nothing worse today than it has been for the last week.   Denies fever, swelling, redness, increased pain  Protocol recommends see PCP within 3 days  Care advice given. If symptoms worsen patient will call back, otherwise will see surgeon tomorrow to evaluate the knee.   Varsha Larry RN   02/11/24 7:41 AM  [facility] Nurse Advisor      Reason for Disposition   [1] MODERATE pain (e.g., interferes with normal activities, limping) AND [2] present > 3 days    Additional Information   Negative: Sounds like a life-threatening emergency to the triager   Negative: Followed a knee injury   Negative: Leg pain is main symptom   Negative: Knee swelling is main symptom   Negative: [1] Swollen joint AND [2] fever   Negative: [1] Red area or streak AND [2] fever   Negative: Patient sounds very sick or weak to the triager   Negative: [1] SEVERE pain (e.g., excruciating, unable to walk) AND [2] not improved after 2 hours of pain medicine   Negative: [1] Can't move swollen joint at all AND [2] no fever   Negative: [1] Thigh or calf pain AND [2] only 1 side AND [3] present > 1 hour   Negative: [1] Thigh, calf, or ankle swelling AND [2] only 1 side   Negative: [1] Looks infected (spreading redness, pus) AND [2] large red area (> 2 in. or 5 cm)   Negative: [1] Very swollen joint AND [2] no fever   Negative: Blistering rash in area of pain (i.e., dermatomal distribution or " "\"band\" or \"stripe\")   Negative: Looks like a boil, infected sore, or deep ulcer   Negative: [1] Redness of the skin AND [2] no fever    Protocols used: Knee Pain-A-AH    "

## 2024-02-12 ENCOUNTER — TRANSFERRED RECORDS (OUTPATIENT)
Dept: HEALTH INFORMATION MANAGEMENT | Facility: CLINIC | Age: 79
End: 2024-02-12
Payer: MEDICARE

## 2024-02-15 ENCOUNTER — THERAPY VISIT (OUTPATIENT)
Dept: PHYSICAL THERAPY | Facility: CLINIC | Age: 79
End: 2024-02-15
Payer: MEDICARE

## 2024-02-15 DIAGNOSIS — R26.9 ABNORMAL GAIT: ICD-10-CM

## 2024-02-15 DIAGNOSIS — Z47.89 UNSPECIFIED ORTHOPEDIC AFTERCARE: ICD-10-CM

## 2024-02-15 DIAGNOSIS — M25.562 ACUTE PAIN OF LEFT KNEE: Primary | ICD-10-CM

## 2024-02-15 PROCEDURE — 97110 THERAPEUTIC EXERCISES: CPT | Mod: GP | Performed by: PHYSICAL THERAPIST

## 2024-02-15 NOTE — PROGRESS NOTES
"    [facility]                                                                                   OUTPATIENT PHYSICAL THERAPY    PLAN OF TREATMENT FOR OUTPATIENT REHABILITATION   Patient's Last Name, First Name, Mendez Agosto YOB: 1945   Provider's Name   [facility]   Medical Record No.  [id number]     Onset Date: 10/10/23 (2nd revision)  Start of Care Date: 11/22/23     Medical Diagnosis:  s/p revision ORIF, left patella      PT Treatment Diagnosis:  s/p ORIF left patella Plan of Treatment  Frequency/Duration: 1x/week for 4 weeks, tapering to every other week for 4 weeks/ 12 weeks    Certification date from 02/15/24 to 03/28/24         See note for plan of treatment details and functional goals     Varsha Brown PT                         I CERTIFY THE NEED FOR THESE SERVICES FURNISHED UNDER        THIS PLAN OF TREATMENT AND WHILE UNDER MY CARE .             Physician Signature               Date    X_____________________________________________________                  Referring Provider:  Melba Dial    Initial Assessment  See Epic Evaluation- Start of Care Date: 11/22/23      PLAN  Continue therapy per current plan of care.  Pt has completed 19 sessions of PT. Recent MD follow-up 2-12-24; related to concerns regarding \"noises\" (squeaks) related to movement of the knee. Pt reported recent imaging unremarkable. Advised to return in 3 months to see surgeon. Slow progress continues with quad function; difficulty with TKE during weight-bearing and non-weight-bearing activities.  Current plan: continue with PT 1x/week for 4 weeks and every other week for 4 weeks.     02/15/24 0500   Appointment Info   Signing clinician's name / credentials Varsha Brown PT   Total/Authorized Visits Plan changed to 24 visits   Visits Used 19   Medical Diagnosis s/p revision ORIF, left patella   PT Tx Diagnosis s/p ORIF left patella " "  Precautions/Limitations progress to 90 degrees over 2 weeks; ROM as tolerated thereafter.   Quick Adds Certification   Progress Note/Certification   Start of Care Date 11/22/23   Onset of illness/injury or Date of Surgery 10/10/23  (2nd revision)   Therapy Frequency 1x/week for 4 weeks, tapering to every other week for 4 weeks   Predicted Duration 12 weeks   Certification date from 02/15/24   Certification date to 03/28/24   Progress Note Due Date 01/20/24   Progress Note Completed Date 02/15/24   GOALS   PT Goals 2   PT Goal 1   Goal Identifier Ambulation   Goal Description Ambulate without gait deviation without AD and 0/10 pain.   Rationale to maximize safety and independence with performance of ADLs and functional tasks;to maximize safety and independence within the home;to maximize safety and independence within the community;to maximize safety and independence with transportation;to maximize safety and independence with self cares   Goal Progress Continuing to progress towards goal. Reports unsteadiness a factor of balance vs LE weakness.   Target Date 03/28/24  (goal date revised)   PT Goal 2   Goal Identifier Stairs   Goal Description Navigate stairs with reciprocal gait up/down stairs with railing.   Rationale to maximize safety and independence within the home;to maximize safety and independence within the community   Goal Progress Able to ascend 7\" step height with reciprocal gait; unable to descend with reciprocal gait.   Target Date 03/28/24  (goal date revised)   Subjective Report   Subjective Report Reported MD (surgeon) office visit on 2-12-24. Reported imaging (x-rays) unremarkable. Advised not to worry about the squeak-likely noise related to plastic component and the bone. Reported he was advised he could utilize weight machine (leg press).   Objective Measures   Objective Measures Objective Measure 1;Objective Measure 2;Objective Measure 3;Objective Measure 4   Objective Measure 1   Objective " "Measure AROM L knee-flexion/active knee extension   Objective Measure 2   Objective Measure Visual inspection   Objective Measure 3   Objective Measure Quad set/Strength   Details Ongoing loss TKE during SAQ/SLR.   Treatment Interventions (PT)   Interventions Therapeutic Procedure/Exercise;Neuromuscular Re-education;Therapeutic Activity   Therapeutic Procedure/Exercise   Therapeutic Procedures Ther Proc 2;Ther Proc 3;Ther Proc 4;Ther Proc 5;Ther Proc 6   Ther Proc 1 Education   Ther Proc 1 - Details Established POC, discussed lui/phys of pain, HEP frequency and activity modifications.   Ther Proc 2 quad set 5\"x10; 2x/day-reviewed with towel roll behind knee for feedback; MC to avoid use of glutes during contraction  (brief review with MC; decreased VMO contraction noted during quad set.)   Ther Proc 2 - Details wallslide: 127 degrees; decrease of ROM over last week; encouraged pt to return to daily knee flexion ROM  (not today)   Ther Proc 3 SAQ: reviewed; ongoing extensor lag; 15 degree extensor lag  (brief review; ongoing TKE loss)   Ther Proc 3 - Details SLR: flexion: reviewed; ongoing extensor lag with lift/lowering  (HEP)   Ther Proc 4 TKE: 1) functional knee extension with tubing x10; 2) wall ball with MC  (reviewed-changed ball position to slightly above joint line-improved quad contraction noted with changed ball position)   Ther Proc 4 - Details Bike, seat 6; 5 minutes   Ther Proc 5 Step-up forwards/step-down forwards   Ther Proc 5 - Details Performed to 7\" height; mild to moderate UE support (per pt report); step-down forwards-4\" height with mild/mpderate UE support.   Ther Proc 6 Leg press   Ther Proc 6 - Details seat E: 75# x15 bilaterally; 50# L x10   Skilled Intervention Instructed in strengtheing ex's to assist with return to normal function during gait/transfers. Cues for form and control throughout   Therapeutic Activity   Ther Act 1 Partial squat   Ther Act 1 - Details Initiated to 25\" height. " Instructed in front of wall at home as bed height too high. VC for form-utilizing hip hinge to initiate. Goal 3 sets of 10 reps.  (not today)   Skilled Intervention Instructed in functional LE strengthening ex's to assist in stairs/transfers.   Neuromuscular Re-education   Neuro Re-ed 1 Tandem stance/tandem gait   Neuro Re-ed 1 - Details L leg behind. Able to perform 45+ seconds. Added slow head turns for added challenge. Tandem gait around large counter-top in gym; slight fingertaps as needed for balance.   Skilled Intervention Instructed in balance drills to assist with stability during gait.   Education   Learner/Method Patient;No Barriers to Learning   Plan   Home program PTRX HEP   Plan for next session re-assess; progress as able       Beginning/End Dates of Progress Note Reporting Period:  02/15/24 to 02/15/2024    Referring Provider:  Melba Dial

## 2024-02-22 ENCOUNTER — THERAPY VISIT (OUTPATIENT)
Dept: PHYSICAL THERAPY | Facility: CLINIC | Age: 79
End: 2024-02-22
Payer: MEDICARE

## 2024-02-22 DIAGNOSIS — Z47.89 UNSPECIFIED ORTHOPEDIC AFTERCARE: ICD-10-CM

## 2024-02-22 DIAGNOSIS — M25.562 ACUTE PAIN OF LEFT KNEE: Primary | ICD-10-CM

## 2024-02-22 DIAGNOSIS — R26.9 ABNORMAL GAIT: ICD-10-CM

## 2024-02-22 PROCEDURE — 97530 THERAPEUTIC ACTIVITIES: CPT | Mod: GP | Performed by: PHYSICAL THERAPIST

## 2024-02-22 PROCEDURE — 97110 THERAPEUTIC EXERCISES: CPT | Mod: 59 | Performed by: PHYSICAL THERAPIST

## 2024-02-26 ENCOUNTER — MYC MEDICAL ADVICE (OUTPATIENT)
Dept: FAMILY MEDICINE | Facility: CLINIC | Age: 79
End: 2024-02-26

## 2024-02-26 ENCOUNTER — VIRTUAL VISIT (OUTPATIENT)
Dept: FAMILY MEDICINE | Facility: CLINIC | Age: 79
End: 2024-02-26
Payer: MEDICARE

## 2024-02-26 DIAGNOSIS — R50.9 FEVER, UNSPECIFIED FEVER CAUSE: ICD-10-CM

## 2024-02-26 DIAGNOSIS — R50.9 FEVER, UNSPECIFIED FEVER CAUSE: Primary | ICD-10-CM

## 2024-02-26 PROCEDURE — 99213 OFFICE O/P EST LOW 20 MIN: CPT | Mod: 95 | Performed by: NURSE PRACTITIONER

## 2024-02-26 RX ORDER — DOXYCYCLINE 100 MG/1
100 CAPSULE ORAL 2 TIMES DAILY
Qty: 14 CAPSULE | Refills: 0 | Status: SHIPPED | OUTPATIENT
Start: 2024-02-26 | End: 2024-03-04

## 2024-02-26 NOTE — NURSING NOTE
called to get him a follow-up appointment with Dr. Finney in 1 to 2 weeks per Zach.  Manually faxed Doxy Rx (Technical glitches in [employer]  system) to [facility]

## 2024-02-26 NOTE — PROGRESS NOTES
Steven is a 78 year old who is being evaluated via a billable video visit.      How would you like to obtain your AVS? MyChart  If the video visit is dropped, the invitation should be resent by: Text to cell phone: [phone]  Will anyone else be joining your video visit? No          Assessment & Plan     (R50.9) Fever, unspecified fever cause  (primary encounter diagnosis)  Comment: fever of unknown origin. Most concerned about penumonia. HE has a little chest congestion but no other symptoms at all. We will try an antibiotic. If he does not improve he would need a workup (potential ideas labs, CXR, UA). Will get him an appt in a couple weeks for follow-up    Plan: doxycycline hyclate (VIBRAMYCIN) 100 MG capsule              Subjective   Steven is a 78 year old, presenting for the following health issues:  Fever    HPI         1 month of chills and fever. He would have a bad night sleep and would feel good in the morning.   These symptoms happen 2-3 times a week   He currently is really chilled and is shivering   Neg covid test   Very rare cough. Does feel a little congested in upper chest. Chronic nasal congestion   No sore throat, ear pain  No abd pain  Had diarrhea a couple times which is not his norm  Sometimes he doesn't have much of an appetite   Went out for dinner last night. This wore him out.   Is up to date on flu, rsv vaccines       Last summer broke patella. That was a long recovery.             Review of Systems  Detailed as above         Objective           Vitals:  No vitals were obtained today due to virtual visit.    Physical Exam   GENERAL: alert and no distress  EYES: Eyes grossly normal to inspection.  No discharge or erythema, or obvious scleral/conjunctival abnormalities.  RESP: No audible wheeze, cough, or visible cyanosis.    SKIN: Visible skin clear. No significant rash, abnormal pigmentation or lesions.  NEURO: Cranial nerves grossly intact.  Mentation and speech appropriate for age.  PSYCH:  Appropriate affect, tone, and pace of words          Video-Visit Details    Type of service:  Video Visit   Video Start Time: 1:15 PM  Video End Time:1:26 PM    Originating Location (pt. Location): Home    Distant Location (provider location):  On-site  Platform used for Video Visit: Lamont  Signed Electronically by: AFSHIN Aguiar CNP

## 2024-02-27 ENCOUNTER — MYC MEDICAL ADVICE (OUTPATIENT)
Dept: FAMILY MEDICINE | Facility: CLINIC | Age: 79
End: 2024-02-27

## 2024-02-27 RX ORDER — DOXYCYCLINE 100 MG/1
100 CAPSULE ORAL 2 TIMES DAILY
Qty: 14 CAPSULE | Refills: 0 | Status: CANCELLED | OUTPATIENT
Start: 2024-02-27

## 2024-02-27 NOTE — TELEPHONE ENCOUNTER
Due to issue with [facility], rx was manually faxed per below notes:        Placed call to pharmacy to clarify if fax was received, pharmacy staff states that prescription was received by pharmacy and patient had already picked up rx.    Signing encounter.    Carolina Fuentes RN  [facility]

## 2024-02-27 NOTE — TELEPHONE ENCOUNTER
Per chart review, abx prescription was not received by pharmacy (transmission to pharmacy failed)        Routing back to prescribing provider to please re-send to pharmacy - thank you!     Please route back to triage to follow up with patient     Carolina Fuentes RN  [facility]

## 2024-02-29 ENCOUNTER — NURSE TRIAGE (OUTPATIENT)
Dept: FAMILY MEDICINE | Facility: CLINIC | Age: 79
End: 2024-02-29

## 2024-02-29 ENCOUNTER — THERAPY VISIT (OUTPATIENT)
Dept: PHYSICAL THERAPY | Facility: CLINIC | Age: 79
End: 2024-02-29
Payer: MEDICARE

## 2024-02-29 DIAGNOSIS — R26.9 ABNORMAL GAIT: ICD-10-CM

## 2024-02-29 DIAGNOSIS — M25.562 ACUTE PAIN OF LEFT KNEE: Primary | ICD-10-CM

## 2024-02-29 DIAGNOSIS — Z47.89 UNSPECIFIED ORTHOPEDIC AFTERCARE: ICD-10-CM

## 2024-02-29 PROCEDURE — 97110 THERAPEUTIC EXERCISES: CPT | Mod: GP | Performed by: PHYSICAL THERAPIST

## 2024-02-29 NOTE — TELEPHONE ENCOUNTER
"Pt called c/o intermittent fever with shaking chills    Pt had VV 2/26/24 for current symptoms     TEMPERATURE:  97.8 F, normal today.  Tuesday was last day he had a fever. Yesterday was decent day no chills temp 98 F. Normal for him is in 97s. No chills or fever today, but he's exhausted. About to go to PT     ONSET: for about a month, 2-3 days a week. Only over the weekend generally. Usually starts over the weekend then lasts until Mon-Tue    OTHER SYMPTOMS: denies - (e.g., abdomen pain, cough, diarrhea, earache, headache, sore throat, urination pain). Still just a bit congested but otherwise no symptoms. Occasionally coughs to clear throat    CAUSE: \"no one knows\"    TREATMENT: Zach gave pt abx just in case he had pneumonia \"unlikely but possible\" - if doxycycline does not work will go back for more workup, on this until Monday AM. He does feel better but that's normal with this cycle. The test will be this weekend whether he gets another cycle of it     IMMUNOCOMPROMISED: no denies     RECENT TRAVEL: none     Pt asking what he should do for intermittent fever that comes and goes - he still does have doxycyline tabs left and will continue taking those. Asking if he should monitor for now and be seen next week if symptoms persist, or if there is anything else/additional he needs to do now?     Please advise     Makenzie SCHWARTZ, Triage RN  [facility]            Reason for Disposition   Fever comes and goes (intermittent) and lasts > 3 weeks    Additional Information   Negative: Difficult to awaken or acting confused (e.g., disoriented, slurred speech)   Negative: Pale cold skin and very weak (can't stand)   Negative: Difficulty breathing and bluish (or gray) lips or face   Negative: New-onset rash with purple (or blood-colored) spots or dots   Negative: Sounds like a life-threatening emergency to the triager   Negative: Fever onset within 24 hours of receiving vaccine   Negative: " Fever within 14 days of COVID-19 Exposure   Negative: Pregnant   Negative: Postpartum (from 0 to 6 weeks after delivery)   Negative: Headache and stiff neck (can't touch chin to chest)   Negative: Difficulty breathing   Negative: IV Drug Use (IVDU)   Negative: Fever > 103 F (39.4 C)   Negative: Fever > 100.0 F (37.8 C) and indwelling urinary catheter (e.g., Cortez, coude)   Negative: Fever > 100.4 F (38.0 C) and has port (portacath), central line, or PICC line   Negative: Drinking very little and dehydration suspected (e.g., no urine > 12 hours, very dry mouth, very lightheaded)   Negative: Patient sounds very sick or weak to the triager   Negative: Fever > 101 F (38.3 C) and over 60 years of age   Negative: Fever > 100.0 F (37.8 C) and diabetes mellitus or a weak immune system (e.g., HIV positive, chemotherapy, splenectomy)   Negative: Fever > 100.0 F (37.8 C) and bedridden (e.g., CVA, chronic illness, recovering from surgery)   Negative: Fever > 100.4 F (38.0 C) and surgery in the past month   Negative: Transplant patient (e.g., liver, heart, lung, kidney)   Negative: Widespread rash and cause unknown   Negative: Severe chills (i.e., feeling extremely cold WITH shaking chills)   Negative: Patient wants to be seen   Negative: Fever present > 3 days (72 hours)    Protocols used: Fever-A-OH

## 2024-02-29 NOTE — TELEPHONE ENCOUNTER
Called patient and scheduled       Mar 04, 2024  1:30 PM  (Arrive by 1:10 PM)  Provider Visit with AFSHIN Aguiar CNP  [facility] ([facility] ) [phone]     Makenzie SCHWARTZ Triage RN  [facility]

## 2024-02-29 NOTE — TELEPHONE ENCOUNTER
Patient Contact    Attempt # 1    Was call answered?  No.  Left message on voicemail with information to call triage back.  On call back, please triage.

## 2024-03-04 ENCOUNTER — OFFICE VISIT (OUTPATIENT)
Dept: FAMILY MEDICINE | Facility: CLINIC | Age: 79
End: 2024-03-04
Payer: MEDICARE

## 2024-03-04 VITALS
WEIGHT: 188.8 LBS | OXYGEN SATURATION: 99 % | BODY MASS INDEX: 27.03 KG/M2 | TEMPERATURE: 97.1 F | DIASTOLIC BLOOD PRESSURE: 66 MMHG | HEIGHT: 70 IN | SYSTOLIC BLOOD PRESSURE: 133 MMHG | RESPIRATION RATE: 12 BRPM | HEART RATE: 50 BPM

## 2024-03-04 DIAGNOSIS — R50.9 FEVER, UNSPECIFIED FEVER CAUSE: Primary | ICD-10-CM

## 2024-03-04 LAB
BASOPHILS # BLD AUTO: ABNORMAL 10*3/UL
BASOPHILS # BLD MANUAL: 0 10E3/UL (ref 0–0.2)
BASOPHILS NFR BLD AUTO: ABNORMAL %
BASOPHILS NFR BLD MANUAL: 0 %
EOSINOPHIL # BLD AUTO: ABNORMAL 10*3/UL
EOSINOPHIL # BLD MANUAL: 0.1 10E3/UL (ref 0–0.7)
EOSINOPHIL NFR BLD AUTO: ABNORMAL %
EOSINOPHIL NFR BLD MANUAL: 1 %
ERYTHROCYTE [DISTWIDTH] IN BLOOD BY AUTOMATED COUNT: 14.5 % (ref 10–15)
HCT VFR BLD AUTO: 34.9 % (ref 40–53)
HGB BLD-MCNC: 11.3 G/DL (ref 13.3–17.7)
IMM GRANULOCYTES # BLD: ABNORMAL 10*3/UL
IMM GRANULOCYTES NFR BLD: ABNORMAL %
LYMPHOCYTES # BLD AUTO: ABNORMAL 10*3/UL
LYMPHOCYTES # BLD MANUAL: 2.2 10E3/UL (ref 0.8–5.3)
LYMPHOCYTES NFR BLD AUTO: ABNORMAL %
LYMPHOCYTES NFR BLD MANUAL: 30 %
MCH RBC QN AUTO: 32.5 PG (ref 26.5–33)
MCHC RBC AUTO-ENTMCNC: 32.4 G/DL (ref 31.5–36.5)
MCV RBC AUTO: 100 FL (ref 78–100)
MONOCYTES # BLD AUTO: ABNORMAL 10*3/UL
MONOCYTES # BLD MANUAL: 0.5 10E3/UL (ref 0–1.3)
MONOCYTES NFR BLD AUTO: ABNORMAL %
MONOCYTES NFR BLD MANUAL: 7 %
MYELOCYTES # BLD MANUAL: 0.2 10E3/UL
MYELOCYTES NFR BLD MANUAL: 3 %
NEUTROPHILS # BLD AUTO: ABNORMAL 10*3/UL
NEUTROPHILS # BLD MANUAL: 4.3 10E3/UL (ref 1.6–8.3)
NEUTROPHILS NFR BLD AUTO: ABNORMAL %
NEUTROPHILS NFR BLD MANUAL: 59 %
NRBC # BLD AUTO: 0 10E3/UL
NRBC BLD AUTO-RTO: 0 /100
PLAT MORPH BLD: ABNORMAL
PLATELET # BLD AUTO: 407 10E3/UL (ref 150–450)
RBC # BLD AUTO: 3.48 10E6/UL (ref 4.4–5.9)
RBC MORPH BLD: ABNORMAL
WBC # BLD AUTO: 7.3 10E3/UL (ref 4–11)

## 2024-03-04 PROCEDURE — 85007 BL SMEAR W/DIFF WBC COUNT: CPT | Performed by: NURSE PRACTITIONER

## 2024-03-04 PROCEDURE — 99213 OFFICE O/P EST LOW 20 MIN: CPT | Performed by: NURSE PRACTITIONER

## 2024-03-04 PROCEDURE — 36415 COLL VENOUS BLD VENIPUNCTURE: CPT | Performed by: NURSE PRACTITIONER

## 2024-03-04 PROCEDURE — 80053 COMPREHEN METABOLIC PANEL: CPT | Performed by: NURSE PRACTITIONER

## 2024-03-04 PROCEDURE — 85027 COMPLETE CBC AUTOMATED: CPT | Performed by: NURSE PRACTITIONER

## 2024-03-04 ASSESSMENT — PAIN SCALES - GENERAL: PAINLEVEL: NO PAIN (0)

## 2024-03-04 NOTE — PROGRESS NOTES
"  Assessment & Plan     (R50.9) Fever, unspecified fever cause (primary encounter diagnosis)  Comment: Seen 2/26 for one month of intermittent fevers/chills, finished vibramycin course today. Symptoms completely resolved last Thursday, today he feels well. No SOB or CP.  Plan:   - CBC with platelets and differential,   - Comprehensive metabolic panel (BMP + Alb, Alk Phos, ALT, AST, Total. Bili, TP)  - Follow up if symptoms return    Subjective   Steven is a 78 year old, presenting for the following health issues:  Follow Up    History of Present Illness       Reason for visit:  Follow up  Symptom onset:  3-4 weeks ago  Symptoms include:  Chills and fever  Symptom intensity:  Moderate  Symptom progression:  Improving  Had these symptoms before:  No  What makes it better:  Possibly doxycyclineHe consumes 0 sweetened beverage(s) daily.He exercises with enough effort to increase his heart rate 30 to 60 minutes per day.  He exercises with enough effort to increase his heart rate 7 days per week.   He is taking medications regularly.     - Was seen 2/26 for fevers/chills, treated with vibramycin - finished course today  - Fevers have improved, since Thursday feels well   - Month of intermittent fevers/chills a few days each week   - Still has a little congestion & occasional cough which is not unusual for him     Review of Systems  Detailed as above.      Objective    /66 (BP Location: Left arm, Patient Position: Sitting, Cuff Size: Adult Regular)   Pulse 50   Temp 97.1  F (36.2  C) (Tympanic)   Resp 12   Ht 1.778 m (5' 10\")   Wt 85.6 kg (188 lb 12.8 oz)   SpO2 99%   BMI 27.09 kg/m    There is no height or weight on file to calculate BMI.  Physical Exam  Constitutional:       Appearance: Normal appearance.   HENT:      Head: Normocephalic.   Pulmonary:      Effort: Pulmonary effort is normal.   Skin:     General: Skin is warm and dry.   Neurological:      General: No focal deficit present.      Mental Status: He " is alert.   Psychiatric:         Mood and Affect: Mood normal.         Behavior: Behavior normal.              Signed Electronically by: AFSHIN Aguiar CNP      I saw this patient in collaboration with ELINOR Moncada, RN, DNP Student      I was present with the APRN/PA student who participated in the service and in the documentation of the services provided. I have verified the history and personally performed the physical exam and medical decision making, as documented by the student and edited by me.     AFSHIN Hanson, CNP

## 2024-03-05 LAB
ALBUMIN SERPL BCG-MCNC: 3.9 G/DL (ref 3.5–5.2)
ALP SERPL-CCNC: 65 U/L (ref 40–150)
ALT SERPL W P-5'-P-CCNC: 19 U/L (ref 0–70)
ANION GAP SERPL CALCULATED.3IONS-SCNC: 10 MMOL/L (ref 7–15)
AST SERPL W P-5'-P-CCNC: 21 U/L (ref 0–45)
BILIRUB SERPL-MCNC: 0.4 MG/DL
BUN SERPL-MCNC: 20.8 MG/DL (ref 8–23)
CALCIUM SERPL-MCNC: 9.3 MG/DL (ref 8.8–10.2)
CHLORIDE SERPL-SCNC: 103 MMOL/L (ref 98–107)
CREAT SERPL-MCNC: 0.81 MG/DL (ref 0.67–1.17)
DEPRECATED HCO3 PLAS-SCNC: 22 MMOL/L (ref 22–29)
EGFRCR SERPLBLD CKD-EPI 2021: 90 ML/MIN/1.73M2
GLUCOSE SERPL-MCNC: 90 MG/DL (ref 70–99)
POTASSIUM SERPL-SCNC: 5 MMOL/L (ref 3.4–5.3)
PROT SERPL-MCNC: 6.5 G/DL (ref 6.4–8.3)
SODIUM SERPL-SCNC: 135 MMOL/L (ref 135–145)

## 2024-03-07 ENCOUNTER — MYC MEDICAL ADVICE (OUTPATIENT)
Dept: FAMILY MEDICINE | Facility: CLINIC | Age: 79
End: 2024-03-07

## 2024-03-07 NOTE — RESULT ENCOUNTER NOTE
Overall labs aren't too bad! Glad we checked! You are a little anemic but better than last time which is great.   Zach

## 2024-03-08 ENCOUNTER — THERAPY VISIT (OUTPATIENT)
Dept: PHYSICAL THERAPY | Facility: CLINIC | Age: 79
End: 2024-03-08
Payer: MEDICARE

## 2024-03-08 DIAGNOSIS — Z47.89 UNSPECIFIED ORTHOPEDIC AFTERCARE: ICD-10-CM

## 2024-03-08 DIAGNOSIS — R26.9 ABNORMAL GAIT: ICD-10-CM

## 2024-03-08 DIAGNOSIS — M25.562 ACUTE PAIN OF LEFT KNEE: Primary | ICD-10-CM

## 2024-03-08 PROCEDURE — 97110 THERAPEUTIC EXERCISES: CPT | Mod: GP | Performed by: PHYSICAL THERAPIST

## 2024-03-14 ENCOUNTER — THERAPY VISIT (OUTPATIENT)
Dept: PHYSICAL THERAPY | Facility: CLINIC | Age: 79
End: 2024-03-14
Payer: MEDICARE

## 2024-03-14 DIAGNOSIS — M25.562 ACUTE PAIN OF LEFT KNEE: Primary | ICD-10-CM

## 2024-03-14 DIAGNOSIS — R26.9 ABNORMAL GAIT: ICD-10-CM

## 2024-03-14 DIAGNOSIS — Z47.89 UNSPECIFIED ORTHOPEDIC AFTERCARE: ICD-10-CM

## 2024-03-14 PROCEDURE — 97110 THERAPEUTIC EXERCISES: CPT | Mod: GP | Performed by: PHYSICAL THERAPIST

## 2024-03-19 ENCOUNTER — VIRTUAL VISIT (OUTPATIENT)
Dept: FAMILY MEDICINE | Facility: CLINIC | Age: 79
End: 2024-03-19
Payer: MEDICARE

## 2024-03-19 DIAGNOSIS — D72.9 ABNORMAL WHITE BLOOD CELL: Primary | ICD-10-CM

## 2024-03-19 DIAGNOSIS — D64.9 ANEMIA, UNSPECIFIED TYPE: ICD-10-CM

## 2024-03-19 PROCEDURE — 99213 OFFICE O/P EST LOW 20 MIN: CPT | Mod: 95 | Performed by: INTERNAL MEDICINE

## 2024-03-19 NOTE — PROGRESS NOTES
"Steven is a 78 year old who is being evaluated via a billable video visit.    How would you like to obtain your AVS? MyChart  If the video visit is dropped, the invitation should be resent by: Text to cell phone: [phone]  Will anyone else be joining your video visit? No    Steven is a 78 year old who is being evaluated via a billable video visit.    How would you like to obtain your AVS? MyChart  If the video visit is dropped, the invitation should be resent by: Text to cell phone: [phone]  Will anyone else be joining your video visit? No    Assessment & Plan     Abnormal white blood cell  Urgent referral to hematology.   - Adult Oncology/Hematology  Referral; Future    Anemia, unspecified type  - Adult Oncology/Hematology  Referral; Future  - Ferritin; Future  - Reticulocyte count; Future  - Lactate Dehydrogenase; Future  - Iron and iron binding capacity; Future      BMI  Estimated body mass index is 27.09 kg/m  as calculated from the following:    Height as of 3/4/24: 1.778 m (5' 10\").    Weight as of 3/4/24: 85.6 kg (188 lb 12.8 oz).         See Patient Instructions    Subjective   Steven is a 78 year old, presenting for the following health issues:  Follow Up (anemia)    Video Start Time: 3:14 PM    History of Present Illness       Reason for visit:  Anemia?  Or something else?    He eats 2-3 servings of fruits and vegetables daily.He consumes 0 sweetened beverage(s) daily.He exercises with enough effort to increase his heart rate 20 to 29 minutes per day.  He exercises with enough effort to increase his heart rate 6 days per week.   He is taking medications regularly.     Steven is here virtually for follow up.   He was seen by team for fever chills and was given doxycycline and fevers stopped.  Cbc with differential shows elevated absolute myelocytes   Hemoglobin dropping.   Hx of bladder cancer in 2006         Objective       Vitals:  No vitals were obtained today due to virtual " visit.    Physical Exam   GEN: No acute distress  RESP: No audible increased work of breathing. Patient speaking in full sentences without distress.  PSYCH: pleasant  Exam otherwise limited due to virtual platform        Video-Visit Details    Type of service:  Video Visit   Video End Time: 3:30 pm  Originating Location (pt. Location): Home  Distant Location (provider location):  On-site  Platform used for Video Visit: Lamont  Signed Electronically by: Zoë Finney MD

## 2024-03-20 ENCOUNTER — PATIENT OUTREACH (OUTPATIENT)
Dept: ONCOLOGY | Facility: CLINIC | Age: 79
End: 2024-03-20
Payer: MEDICARE

## 2024-03-20 ENCOUNTER — TELEPHONE (OUTPATIENT)
Dept: FAMILY MEDICINE | Facility: CLINIC | Age: 79
End: 2024-03-20
Payer: MEDICARE

## 2024-03-20 DIAGNOSIS — D64.9 ANEMIA, UNSPECIFIED TYPE: Primary | ICD-10-CM

## 2024-03-20 DIAGNOSIS — D72.9 ABNORMAL WHITE BLOOD CELL: ICD-10-CM

## 2024-03-20 NOTE — PROGRESS NOTES
[facility]: Hematology                                                                Hem/Onc  Referral reviewed     Adult Oncology/Hematology  Referral [[id number]]  Ordering user: Zoë Finney MD 03/19/24 1517     Referral Details  Referred By Priority: Urgent: 3-5 Days  Referred To   Zoë Finney MD [facility]   [address] [address]   Phone: [phone]   Fax: [phone]    Diagnoses: Abnormal white blood cell   Anemia, unspecified type   Order: Adult Oncology/Hematology  Referral         Order Questions  Question Answer   My Clinical Question Is: absolute m   Reason for Referral: Hematology     ASSESSMENT      Clinical History (per Nurse review of records provided):    78 year old male patient referred as above     Residence:  [facility]   PCP: Zoë Finney  Records Location: [facility]   Pertinent labs -- BOOKMARKED  Referring provider note(s)-- BOOKMARKED    INTERVENTION(S)                                                      March 20, 2024  OUTGOING CALL to pt, no answer. Left voicemail introducing my role as nurse navigator with [facility] and that we have recd the referral to hematology from Dr Finney. Requested callback to number below (Mon - Fri 8am - 4:30pm) to speak to a  to set the consult date/time/location. Explained to pt that he will also receive a call from our scheduling intake team in the next 1-2 business days and I will be following with Dr Finney for the additional labs ordered which he is scheduled to have drawn tomorrow, we will adjust consult appt sooner if needed.     Future Appointments   Date Time Provider Department Center   3/21/2024 11:15 AM [facility]    4/11/2024 11:20 AM Varsha Brown, PT [facility]   6/17/2024 11:00 AM Zoë Finney MD [facility]       -Referral Triage Scheduling Instructions added to Hem/Onc   referral for Patient Access rep ( number below, hours are Monday - Friday 8am - 4:30 pm) who will contact pt in the next  business day to schedule the consultation.    PLAN                                                      Hematology consult    See records team's Pre-Visit encounter documentation for additional records retrieval information.    Lina Heath, RN, BSN, OCN  Hematology/Oncology New Patient Nurse Navigator   [facility]  [phone]  [phone]

## 2024-03-21 ENCOUNTER — LAB (OUTPATIENT)
Dept: LAB | Facility: CLINIC | Age: 79
End: 2024-03-21
Payer: MEDICARE

## 2024-03-21 DIAGNOSIS — D72.9 ABNORMAL WHITE BLOOD CELL: ICD-10-CM

## 2024-03-21 DIAGNOSIS — D64.9 ANEMIA, UNSPECIFIED TYPE: ICD-10-CM

## 2024-03-21 LAB
BASOPHILS # BLD AUTO: 0 10E3/UL (ref 0–0.2)
BASOPHILS NFR BLD AUTO: 0 %
EOSINOPHIL # BLD AUTO: 0 10E3/UL (ref 0–0.7)
EOSINOPHIL NFR BLD AUTO: 0 %
ERYTHROCYTE [DISTWIDTH] IN BLOOD BY AUTOMATED COUNT: 15.7 % (ref 10–15)
HCT VFR BLD AUTO: 34.8 % (ref 40–53)
HGB BLD-MCNC: 11.5 G/DL (ref 13.3–17.7)
IMM GRANULOCYTES # BLD: 0 10E3/UL
IMM GRANULOCYTES NFR BLD: 0 %
LYMPHOCYTES # BLD AUTO: 1.9 10E3/UL (ref 0.8–5.3)
LYMPHOCYTES NFR BLD AUTO: 29 %
MCH RBC QN AUTO: 33.3 PG (ref 26.5–33)
MCHC RBC AUTO-ENTMCNC: 33 G/DL (ref 31.5–36.5)
MCV RBC AUTO: 101 FL (ref 78–100)
MONOCYTES # BLD AUTO: 1.1 10E3/UL (ref 0–1.3)
MONOCYTES NFR BLD AUTO: 16 %
NEUTROPHILS # BLD AUTO: 3.6 10E3/UL (ref 1.6–8.3)
NEUTROPHILS NFR BLD AUTO: 55 %
PLATELET # BLD AUTO: 247 10E3/UL (ref 150–450)
RBC # BLD AUTO: 3.45 10E6/UL (ref 4.4–5.9)
RETICS # AUTO: 0.04 10E6/UL (ref 0.03–0.1)
RETICS/RBC NFR AUTO: 1 % (ref 0.5–2)
WBC # BLD AUTO: 6.5 10E3/UL (ref 4–11)

## 2024-03-21 PROCEDURE — 36415 COLL VENOUS BLD VENIPUNCTURE: CPT

## 2024-03-21 PROCEDURE — 83615 LACTATE (LD) (LDH) ENZYME: CPT

## 2024-03-21 PROCEDURE — 85025 COMPLETE CBC W/AUTO DIFF WBC: CPT

## 2024-03-21 PROCEDURE — 85045 AUTOMATED RETICULOCYTE COUNT: CPT

## 2024-03-21 PROCEDURE — 82728 ASSAY OF FERRITIN: CPT

## 2024-03-21 PROCEDURE — 83540 ASSAY OF IRON: CPT

## 2024-03-21 PROCEDURE — 83550 IRON BINDING TEST: CPT

## 2024-03-22 LAB
FERRITIN SERPL-MCNC: 276 NG/ML (ref 31–409)
IRON BINDING CAPACITY (ROCHE): 264 UG/DL (ref 240–430)
IRON SATN MFR SERPL: 8 % (ref 15–46)
IRON SERPL-MCNC: 22 UG/DL (ref 61–157)
LDH SERPL L TO P-CCNC: 145 U/L (ref 0–250)
PATH REPORT.COMMENTS IMP SPEC: NORMAL
PATH REPORT.FINAL DX SPEC: NORMAL
PATH REPORT.MICROSCOPIC SPEC OTHER STN: NORMAL
PATH REPORT.RELEVANT HX SPEC: NORMAL

## 2024-04-02 NOTE — PROGRESS NOTES
Writer placed call to patient to review upcoming appointment with Dr Munoz at our [place] office. Per Dr Munoz, this needs to be moved to a different slot. Hold placed for [facility] provider and call back information left with patient to see if he is willing to reschedule and to see a provider at an alternate site.

## 2024-04-11 ENCOUNTER — THERAPY VISIT (OUTPATIENT)
Dept: PHYSICAL THERAPY | Facility: CLINIC | Age: 79
End: 2024-04-11
Payer: MEDICARE

## 2024-04-11 DIAGNOSIS — R26.9 ABNORMAL GAIT: ICD-10-CM

## 2024-04-11 DIAGNOSIS — Z47.89 UNSPECIFIED ORTHOPEDIC AFTERCARE: ICD-10-CM

## 2024-04-11 DIAGNOSIS — M25.562 ACUTE PAIN OF LEFT KNEE: Primary | ICD-10-CM

## 2024-04-11 PROCEDURE — 97110 THERAPEUTIC EXERCISES: CPT | Mod: GP | Performed by: PHYSICAL THERAPIST

## 2024-04-11 ASSESSMENT — ACTIVITIES OF DAILY LIVING (ADL)
SWELLING: I DO NOT HAVE THE SYMPTOM
LIMPING: I HAVE THE SYMPTOM BUT IT DOES NOT AFFECT MY ACTIVITY
AS_A_RESULT_OF_YOUR_KNEE_INJURY,_HOW_WOULD_YOU_RATE_YOUR_CURRENT_LEVEL_OF_DAILY_ACTIVITY?: NEARLY NORMAL
STAND: ACTIVITY IS NOT DIFFICULT
PAIN: I HAVE THE SYMPTOM BUT IT DOES NOT AFFECT MY ACTIVITY
WALK: ACTIVITY IS MINIMALLY DIFFICULT
KNEEL ON THE FRONT OF YOUR KNEE: I AM UNABLE TO DO THE ACTIVITY
HOW_WOULD_YOU_RATE_THE_OVERALL_FUNCTION_OF_YOUR_KNEE_DURING_YOUR_USUAL_DAILY_ACTIVITIES?: NEARLY NORMAL
KNEE_ACTIVITY_OF_DAILY_LIVING_SUM: 48
GO DOWN STAIRS: ACTIVITY IS VERY DIFFICULT
KNEE_ACTIVITY_OF_DAILY_LIVING_SCORE: 68.57
GO UP STAIRS: ACTIVITY IS MINIMALLY DIFFICULT
SQUAT: ACTIVITY IS SOMEWHAT DIFFICULT
SIT WITH YOUR KNEE BENT: ACTIVITY IS NOT DIFFICULT
HOW_WOULD_YOU_RATE_THE_CURRENT_FUNCTION_OF_YOUR_KNEE_DURING_YOUR_USUAL_DAILY_ACTIVITIES_ON_A_SCALE_FROM_0_TO_100_WITH_100_BEING_YOUR_LEVEL_OF_KNEE_FUNCTION_PRIOR_TO_YOUR_INJURY_AND_0_BEING_THE_INABILITY_TO_PERFORM_ANY_OF_YOUR_USUAL_DAILY_ACTIVITIES?: 80
STIFFNESS: I HAVE THE SYMPTOM BUT IT DOES NOT AFFECT MY ACTIVITY
RAW_SCORE: 48
WEAKNESS: THE SYMPTOM AFFECTS MY ACTIVITY SLIGHTLY
RISE FROM A CHAIR: ACTIVITY IS MINIMALLY DIFFICULT
GIVING WAY, BUCKLING OR SHIFTING OF KNEE: THE SYMPTOM AFFECTS MY ACTIVITY MODERATELY

## 2024-04-11 NOTE — PROGRESS NOTES
[facility]                                                                                   OUTPATIENT PHYSICAL THERAPY    PLAN OF TREATMENT FOR OUTPATIENT REHABILITATION   Patient's Last Name, First Name, Mendez Agosto YOB: 1945   Provider's Name   [facility]   Medical Record No.  [id number]     Onset Date: 10/10/23 (2nd revision)  Start of Care Date: 11/22/23     Medical Diagnosis:  s/p revision ORIF, left patella      PT Treatment Diagnosis:  s/p ORIF left patella Plan of Treatment  Frequency/Duration: 1x/week for 4 weeks, tapering to every other week for 4 weeks/ 12 weeks    Certification date from 03/29/24 to 04/11/24         See note for plan of treatment details and functional goals     Varsha Brown PT                         I CERTIFY THE NEED FOR THESE SERVICES FURNISHED UNDER        THIS PLAN OF TREATMENT AND WHILE UNDER MY CARE .             Physician Signature               Date    X_____________________________________________________                  Referring Provider:  Melba Dial    Initial Assessment  See Epic Evaluation- Start of Care Date: 11/22/23            DISCHARGE  Reason for Discharge: Pt has completed 6 sessions of PT since last PN completed 2-15-24. Pt reports he is currently walking upwards of 2 miles; notes fatigue by the end of the 2 miles. Pt reported currently dealing with issues related to his anemia (related to fatigue); scheduled for upcoming visit with practitioner the end of April.  Knee ROM: WNL's. Functional strength: difficulty with descending stairs. Improving TKE strength; extensor lag of 10 degrees noted vs 15 degrees (one month prior).  Pt will continue with current HEP until follow-up with surgeon in July.   04/11/24 0500   Appointment Info   Signing clinician's name / credentials Varsha Brown PT   Total/Authorized Visits Plan changed to 24 visits   Visits Used 24  "  Medical Diagnosis s/p revision ORIF, left patella   PT Tx Diagnosis s/p ORIF left patella   Precautions/Limitations progress to 90 degrees over 2 weeks; ROM as tolerated thereafter.   Quick Adds Certification   Progress Note/Certification   Start of Care Date 11/22/23   Onset of illness/injury or Date of Surgery 10/10/23  (2nd revision)   Therapy Frequency 1x/week for 4 weeks, tapering to every other week for 4 weeks   Predicted Duration 12 weeks   Certification date from 03/29/24   Certification date to 04/11/24   Progress Note Due Date 01/20/24   Progress Note Completed Date 04/11/24   GOALS   PT Goals 2   PT Goal 1   Goal Identifier Ambulation   Goal Description Ambulate without gait deviation without AD and 0/10 pain.   Rationale to maximize safety and independence with performance of ADLs and functional tasks;to maximize safety and independence within the home;to maximize safety and independence within the community;to maximize safety and independence with transportation;to maximize safety and independence with self cares   Goal Progress ambulating up to 2 miles; fatigue vs pain   Target Date 03/28/24  (goal date revised)   Date Met 04/11/24   PT Goal 2   Goal Identifier Stairs   Goal Description Navigate stairs with reciprocal gait up/down stairs with railing.   Rationale to maximize safety and independence within the home;to maximize safety and independence within the community   Goal Progress Able to ascend 7\" step height with reciprocal gait; able to descend with reciprocal gait with railing with minor deviations   Target Date 03/28/24  (goal date revised)   Subjective Report   Subjective Report Reported fatigue related to anemia. Reports he has been anemic for the past 10 years; upcoming visit scheduled the end of April regarding issues with anemia. Reports he has been walking upwards of 2 miles. Aware of fatigue by the end of the 2 miles vs pain. Ascending stairs without issue. Descending stairs remains " "challenging.   Objective Measures   Objective Measures Objective Measure 1;Objective Measure 2;Objective Measure 3;Objective Measure 4;Objective Measure 5   Objective Measure 1   Objective Measure AROM L knee   Details Knee flexion: 120 degrees (heelslide); active knee extension: 10 degrees; previously 15 degrees.   Objective Measure 2   Objective Measure Visual inspection   Objective Measure 3   Objective Measure Quad set/Strength   Objective Measure 4   Objective Measure Funtional strength   Objective Measure 5   Objective Measure Stairs   Details Ascended stairs with a reciprocal gait; descended stairs with evident weakness with reciprocal gait due to loss of quad control.   Treatment Interventions (PT)   Interventions Therapeutic Procedure/Exercise;Neuromuscular Re-education;Therapeutic Activity   Therapeutic Procedure/Exercise   Therapeutic Procedures: strength, endurance, ROM, flexibility minutes (46897) 40   Therapeutic Procedures Ther Proc 2;Ther Proc 3;Ther Proc 4;Ther Proc 5;Ther Proc 6;Ther Proc 7   Ther Proc 1 Education   Ther Proc 1 - Details Established POC, discussed lui/phys of pain, HEP frequency and activity modifications.   Ther Proc 2 quad set 5\"x10; 2x/day-reviewed with towel roll behind knee for feedback; MC to avoid use of glutes during contraction  (brief review with MC; decreased VMO contraction noted during quad set.)   Ther Proc 2 - Details knee flexion: 120 degrees (heelslide)  (re-assess at next visit)   Ther Proc 3 SAQ: reviewed; ongoing extensor lag; 10 degree extensor lag  (ongoing TKE loss)   Ther Proc 3 - Details SLR: flexion: reviewed; mild extensor lag with lowering >lifting. Performing 3 sets of 10.  (HEP)   Ther Proc 4 TKE: 1) functional knee extension with tubing x10; 2) wall ball with MC  (HEP; with ball or tubing)   Ther Proc 4 - Details Bike, seat 6; 5 minutes   Ther Proc 5 Step-up forwards/step-down forwards/lateral step-down   Ther Proc 5 - Details Able to perform step-up " "forwards to 7\" height with mild UE support and fair to good quad contraction; able to perform step-down forwards from 5-6\" heights with light UE support. Able to perform step-up to 5\" height without UE support. Encouraged for balance. Reviewed lateral step-down from 3\" height.   Ther Proc 6 Leg press   Ther Proc 6 - Details seat E: 100# x15 bilaterally; audible \"squeak\" noted   Ther Proc 7 Hip abduction/ER/Ext   Ther Proc 7 - Details Reviewed hip abd/ER in sidelying. Reviewed hip extension in standing-leaning forwards onto forearms.  (Verbal review; encouraged as part of HEP)   Skilled Intervention Instructed in strengtheing ex's to assist with return to normal function during gait/transfers. Cues for form and control throughout   Therapeutic Activity   Ther Act 1 Partial squat   Ther Act 1 - Details Reviewed to 25\" height; plinth in clinic and wall at home.  Goal 2-3 sets of 10 reps.  (not today)   Skilled Intervention Instructed in functional LE strengthening ex's to assist in stairs/transfers.   Neuromuscular Re-education   Neuro Re-ed 1 Tandem stance/tandem gait   Neuro Re-ed 1 - Details L leg behind. Able to perform 45+ seconds. Added slow head turns for added challenge. Tandem gait around large counter-top in gym; slight fingertaps as needed for balance.   Skilled Intervention Instructed in balance drills to assist with stability during gait.   Education   Learner/Method Patient;No Barriers to Learning   Plan   Home program PTRX HEP   Plan for next session re-assess; progress as able       Discharge Plan: Patient to continue home program.    Referring Provider:  Melba Dial"

## 2024-04-15 NOTE — TELEPHONE ENCOUNTER
RECORDS STATUS - ALL OTHER DIAGNOSIS      RECORDS RECEIVED FROM: Epic   DATE RECEIVED:    NOTES STATUS DETAILS   OFFICE NOTE from referring provider Epic 3/19/24: Dr. Zoë Finney   MEDICATION LIST [facility]    LABS     PATHOLOGY REPORTS Report in Epic Morphology:  3/21/24: [id number]   ANYTHING RELATED TO DIAGNOSIS Epic Most recent 3/21/24

## 2024-04-20 DIAGNOSIS — E03.9 HYPOTHYROIDISM, UNSPECIFIED TYPE: ICD-10-CM

## 2024-04-22 RX ORDER — LEVOTHYROXINE SODIUM 150 UG/1
TABLET ORAL
Qty: 90 TABLET | Refills: 0 | Status: SHIPPED | OUTPATIENT
Start: 2024-04-22 | End: 2024-07-10

## 2024-04-22 NOTE — TELEPHONE ENCOUNTER
Prescription approved per [facility] Refill Protocol.  Gia Mcfarlane RN  [facility]

## 2024-04-23 ENCOUNTER — VIRTUAL VISIT (OUTPATIENT)
Dept: ONCOLOGY | Facility: CLINIC | Age: 79
End: 2024-04-23
Attending: INTERNAL MEDICINE
Payer: MEDICARE

## 2024-04-23 ENCOUNTER — PRE VISIT (OUTPATIENT)
Dept: ONCOLOGY | Facility: CLINIC | Age: 79
End: 2024-04-23
Payer: MEDICARE

## 2024-04-23 VITALS
HEART RATE: 68 BPM | WEIGHT: 184 LBS | HEIGHT: 70 IN | BODY MASS INDEX: 26.34 KG/M2 | DIASTOLIC BLOOD PRESSURE: 65 MMHG | SYSTOLIC BLOOD PRESSURE: 132 MMHG

## 2024-04-23 DIAGNOSIS — D72.9 ABNORMAL WHITE BLOOD CELL: ICD-10-CM

## 2024-04-23 DIAGNOSIS — D64.9 ANEMIA, UNSPECIFIED TYPE: ICD-10-CM

## 2024-04-23 PROCEDURE — 99204 OFFICE O/P NEW MOD 45 MIN: CPT | Mod: 95 | Performed by: INTERNAL MEDICINE

## 2024-04-23 ASSESSMENT — PAIN SCALES - GENERAL: PAINLEVEL: NO PAIN (0)

## 2024-04-23 NOTE — PROGRESS NOTES
[facility] Hematology Consultation  [address] [place] [address]  Phone: [phone]    Outpatient Visit Note:    Patient: Mendez Greene  MRN: [id number]  : 1945  SOL: 2024     Reason for Consultation:  Mendez Greene is referred for evaluation and treatment of abnormal CBC.    Assessment: Mendez Greene is a 78 year old man who overall is quite healthy but recent was treated with ABX for FUO (UTI/prostatitis?) which resolved after ABX, mild anemia and likely reactive transient mild myelocytes on diff.  He still has mild and asymptomatic anemia.    Overall I have very low concern for underlying bone marrow disorder causing his changes on CBC.  Given the duration of his symptoms, it's possible to attribute his anemia to his inflammatory response (anemia of acute inflammation). I'm reassured that his mild myelocytosis resolved a couple weeks later as well.    Recommendations:  I counseled the patient about my assessment of the causes for his CBC changes as above  Check CBC, diff, B12, foate, MMA, homocysteine, retics, EPO in a couple weeks  If still anemic, monitor every 3-4 months with PCP.  This mild anemia could be his new baseline  RTC as needed      50 minutes spent by me on the date of the encounter doing chart review, review of test results, interpretation of tests, patient visit, and documentation     Jerrod Bah MD   of Medicine, Division of Hematology, Oncology and Transplantation  [employer]     -------------------------------  History: Mendez Greene is a 78 year old health man with a history of A fib on ASA, CHA, HTN and dyslipidemia who presents for evaluation of abnormal CBC.  He reports that he has been in his usual well state of health until about 1-2 months ago when he started noticing rigors and fevers.  He reports no cough, dysuria, rash or other localizing symptoms. He saw his PCP on 3/4/24 and at  visit he had new mild anemia, hb 11.3, mcv 100 with a normal WBC with 0.2 metas on diff, normal PLT count.  He was treated with ABX for FUO which he tolerated well. He reports that since then he has had no recurrence of the symptoms and has been feeling well.  He reports excellent energy level and appetite.  He has no chest pain or dyspnea on exertion. He had his knee replaced last fall and still feels the knee limits his activity rather than cardiopulmonary reserve.    Medical history is notable for AF, CHA, hypertension, dyslipidemia and recent FUO which resolved with ABX    Surgical history is reviewed in the EMR and is notable for knee replacement in Oct 2023    No etoh or smoking.  He is retired and an avid birder    Physical Exam:  Vitals: B/P: 132/65[taken this morning per pt[, T: Data Unavailable, P: 68, R: Data Unavailable, Wt: 184 lbs 0 oz Body mass index is 26.4 kg/m .   Exam:   Exam:  Body mass index is 26.4 kg/m .   Constitutional: Appears well, no distress  Eyes: no discharge, injection or icterus  Respiratory: no cough or labored breathing  Skin: no rashes or petechiae  Neurological: no deficits appreciated, speech is fluent  Psych: affect is normal      Labs:   Latest Reference Range & Units 11/16/22 14:12 08/29/23 09:49 10/11/23 08:27 03/04/24 13:43 03/21/24 11:12   WBC 4.0 - 11.0 10e3/uL 6.3 5.4  7.3 6.5   Hemoglobin 13.3 - 17.7 g/dL 14.0 13.1 (L) 10.2 (L) 11.3 (L) 11.5 (L)   Hematocrit 40.0 - 53.0 % 40.6 38.7 (L)  34.9 (L) 34.8 (L)   Platelet Count 150 - 450 10e3/uL 233 222  407 247   RBC Count 4.40 - 5.90 10e6/uL 4.11 (L) 3.81 (L)  3.48 (L) 3.45 (L)   MCV 78 - 100 fL 99 102 (H)  100 101 (H)   (L): Data is abnormally low  (H): Data is abnormally high     Latest Reference Range & Units 03/04/24 13:43   Absolute Myelocytes <=0.0 10e3/uL 0.2 (H)   (H): Data is abnormally high    Imaging:  none    Virtual Visit Details    Type of service:  Video Visit   Joined the call at 4/23/2024, 10:43:36 am.  Left  the call at 4/23/2024, 11:01:27 am.  You were on the call for 17 minutes 51 seconds     Originating Location (pt. Location): Home    Distant Location (provider location):  On-site  Platform used for Video Visit: Lamont

## 2024-04-23 NOTE — LETTER
2024         RE: Mendez Greene  [address] [facility] [address]        Dear Colleague,    Thank you for referring your patient, Mendez Greene, to the [facility]. Please see a copy of my visit note below.        [facility] Hematology Consultation  [address] [place] [address]  Phone: [phone]    Outpatient Visit Note:    Patient: Mendez Greene  MRN: [id number]  : 1945  SOL: 2024     Reason for Consultation:  Mendez Greene is referred for evaluation and treatment of abnormal CBC.    Assessment: Mendez Greene is a 78 year old man who overall is quite healthy but recent was treated with ABX for FUO (UTI/prostatitis?) which resolved after ABX, mild anemia and likely reactive transient mild myelocytes on diff.  He still has mild and asymptomatic anemia.    Overall I have very low concern for underlying bone marrow disorder causing his changes on CBC.  Given the duration of his symptoms, it's possible to attribute his anemia to his inflammatory response (anemia of acute inflammation). I'm reassured that his mild myelocytosis resolved a couple weeks later as well.    Recommendations:  I counseled the patient about my assessment of the causes for his CBC changes as above  Check CBC, diff, B12, foate, MMA, homocysteine, retics, EPO in a couple weeks  If still anemic, monitor every 3-4 months with PCP.  This mild anemia could be his new baseline  RTC as needed      50 minutes spent by me on the date of the encounter doing chart review, review of test results, interpretation of tests, patient visit, and documentation     Jerrod Bah MD   of Medicine, Division of Hematology, Oncology and Transplantation  [employer]     -------------------------------  History: Mendez Greene is a 78 year old health man with a history of A fib on ASA, CHA, HTN and dyslipidemia who presents  for evaluation of abnormal CBC.  He reports that he has been in his usual well state of health until about 1-2 months ago when he started noticing rigors and fevers.  He reports no cough, dysuria, rash or other localizing symptoms. He saw his PCP on 3/4/24 and at visit he had new mild anemia, hb 11.3, mcv 100 with a normal WBC with 0.2 metas on diff, normal PLT count.  He was treated with ABX for FUO which he tolerated well. He reports that since then he has had no recurrence of the symptoms and has been feeling well.  He reports excellent energy level and appetite.  He has no chest pain or dyspnea on exertion. He had his knee replaced last fall and still feels the knee limits his activity rather than cardiopulmonary reserve.    Medical history is notable for AF, CHA, hypertension, dyslipidemia and recent FUO which resolved with ABX    Surgical history is reviewed in the EMR and is notable for knee replacement in Oct 2023    No etoh or smoking.  He is retired and an avid birder    Physical Exam:  Vitals: B/P: 132/65[taken this morning per pt[, T: Data Unavailable, P: 68, R: Data Unavailable, Wt: 184 lbs 0 oz Body mass index is 26.4 kg/m .   Exam:   Exam:  Body mass index is 26.4 kg/m .   Constitutional: Appears well, no distress  Eyes: no discharge, injection or icterus  Respiratory: no cough or labored breathing  Skin: no rashes or petechiae  Neurological: no deficits appreciated, speech is fluent  Psych: affect is normal      Labs:   Latest Reference Range & Units 11/16/22 14:12 08/29/23 09:49 10/11/23 08:27 03/04/24 13:43 03/21/24 11:12   WBC 4.0 - 11.0 10e3/uL 6.3 5.4  7.3 6.5   Hemoglobin 13.3 - 17.7 g/dL 14.0 13.1 (L) 10.2 (L) 11.3 (L) 11.5 (L)   Hematocrit 40.0 - 53.0 % 40.6 38.7 (L)  34.9 (L) 34.8 (L)   Platelet Count 150 - 450 10e3/uL 233 222  407 247   RBC Count 4.40 - 5.90 10e6/uL 4.11 (L) 3.81 (L)  3.48 (L) 3.45 (L)   MCV 78 - 100 fL 99 102 (H)  100 101 (H)   (L): Data is abnormally low  (H): Data is  abnormally high     Latest Reference Range & Units 03/04/24 13:43   Absolute Myelocytes <=0.0 10e3/uL 0.2 (H)   (H): Data is abnormally high    Imaging:  none    Virtual Visit Details    Type of service:  Video Visit   Joined the call at 4/23/2024, 10:43:36 am.  Left the call at 4/23/2024, 11:01:27 am.  You were on the call for 17 minutes 51 seconds     Originating Location (pt. Location): Home    Distant Location (provider location):  On-site  Platform used for Video Visit: Lamont Bah MD

## 2024-04-23 NOTE — NURSING NOTE
Is the patient currently in the state of [place]? YES    Visit mode:VIDEO    If the visit is dropped, the patient can be reconnected by: VIDEO VISIT: Text to cell phone:   Telephone Information:   Mobile [phone]       Will anyone else be joining the visit? NO  (If patient encounters technical issues they should call [phone])    How would you like to obtain your AVS? MyChart    Are changes needed to the allergy or medication list? Pt stated no med changes    Are refills needed on medications prescribed by this physician? NO    Reason for visit: Consult    No other vitals to report per pt    Joanna SOFIAF

## 2024-05-06 ENCOUNTER — RX ONLY (RX ONLY)
Age: 79
End: 2024-05-06

## 2024-05-06 RX ORDER — KETOCONAZOLE 20 MG/G
CREAM TOPICAL
Qty: 30 | Refills: 2 | Status: ERX

## 2024-05-06 RX ORDER — HYDROCORTISONE 25 MG/G
CREAM TOPICAL
Qty: 30 | Refills: 2 | Status: ERX

## 2024-05-07 ENCOUNTER — LAB (OUTPATIENT)
Dept: LAB | Facility: CLINIC | Age: 79
End: 2024-05-07
Payer: MEDICARE

## 2024-05-07 DIAGNOSIS — D72.9 ABNORMAL WHITE BLOOD CELL: ICD-10-CM

## 2024-05-07 DIAGNOSIS — D64.9 ANEMIA, UNSPECIFIED TYPE: ICD-10-CM

## 2024-05-07 LAB
BASOPHILS # BLD AUTO: 0 10E3/UL (ref 0–0.2)
BASOPHILS NFR BLD AUTO: 0 %
EOSINOPHIL # BLD AUTO: 0 10E3/UL (ref 0–0.7)
EOSINOPHIL NFR BLD AUTO: 0 %
ERYTHROCYTE [DISTWIDTH] IN BLOOD BY AUTOMATED COUNT: 15.1 % (ref 10–15)
FOLATE SERPL-MCNC: 29.3 NG/ML (ref 4.6–34.8)
HCT VFR BLD AUTO: 37.3 % (ref 40–53)
HCYS SERPL-SCNC: 11.4 UMOL/L (ref 0–15)
HGB BLD-MCNC: 12 G/DL (ref 13.3–17.7)
IMM GRANULOCYTES # BLD: 0 10E3/UL
IMM GRANULOCYTES NFR BLD: 0 %
LYMPHOCYTES # BLD AUTO: 2.1 10E3/UL (ref 0.8–5.3)
LYMPHOCYTES NFR BLD AUTO: 37 %
MCH RBC QN AUTO: 34.4 PG (ref 26.5–33)
MCHC RBC AUTO-ENTMCNC: 32.2 G/DL (ref 31.5–36.5)
MCV RBC AUTO: 107 FL (ref 78–100)
MONOCYTES # BLD AUTO: 0.6 10E3/UL (ref 0–1.3)
MONOCYTES NFR BLD AUTO: 10 %
NEUTROPHILS # BLD AUTO: 3.1 10E3/UL (ref 1.6–8.3)
NEUTROPHILS NFR BLD AUTO: 53 %
NRBC # BLD AUTO: 0 10E3/UL
NRBC BLD AUTO-RTO: 0 /100
PLATELET # BLD AUTO: 269 10E3/UL (ref 150–450)
RBC # BLD AUTO: 3.49 10E6/UL (ref 4.4–5.9)
RETICS # AUTO: 0.04 10E6/UL (ref 0.03–0.1)
RETICS/RBC NFR AUTO: 1.2 % (ref 0.5–2)
VIT B12 SERPL-MCNC: 464 PG/ML (ref 232–1245)
WBC # BLD AUTO: 5.8 10E3/UL (ref 4–11)

## 2024-05-07 PROCEDURE — 83090 ASSAY OF HOMOCYSTEINE: CPT

## 2024-05-07 PROCEDURE — 85025 COMPLETE CBC W/AUTO DIFF WBC: CPT

## 2024-05-07 PROCEDURE — 82668 ASSAY OF ERYTHROPOIETIN: CPT | Mod: 90

## 2024-05-07 PROCEDURE — 82607 VITAMIN B-12: CPT

## 2024-05-07 PROCEDURE — 85045 AUTOMATED RETICULOCYTE COUNT: CPT

## 2024-05-07 PROCEDURE — 99000 SPECIMEN HANDLING OFFICE-LAB: CPT

## 2024-05-07 PROCEDURE — 83921 ORGANIC ACID SINGLE QUANT: CPT

## 2024-05-07 PROCEDURE — 82746 ASSAY OF FOLIC ACID SERUM: CPT

## 2024-05-07 PROCEDURE — 36415 COLL VENOUS BLD VENIPUNCTURE: CPT

## 2024-05-08 LAB
EPO SERPL-ACNC: 25 MU/ML
METHYLMALONATE SERPL-SCNC: 0.24 UMOL/L (ref 0–0.4)

## 2024-06-05 ENCOUNTER — DOCUMENTATION ONLY (OUTPATIENT)
Dept: ONCOLOGY | Facility: CLINIC | Age: 79
End: 2024-06-05

## 2024-06-05 NOTE — PROGRESS NOTES
Mendez Greene has an upcoming lab appointment:    Future Appointments   Date Time Provider Department Center   6/6/2024  2:30 PM [facility]   9/9/2024 10:30 AM Zoë Finney MD [facility] CS     Patient is scheduled for the following lab(s): Labs per Niurka    There is no order available. Please review and place either future orders or HMPO (Review of Health Maintenance Protocol Orders), as appropriate.    Health Maintenance Due   Topic    ANNUAL REVIEW OF HM ORDERS     TSH W/FREE T4 REFLEX      Jenn Isidro

## 2024-06-06 ENCOUNTER — APPOINTMENT (OUTPATIENT)
Dept: LAB | Facility: CLINIC | Age: 79
End: 2024-06-06
Payer: MEDICARE

## 2024-06-14 PROBLEM — R26.9 ABNORMAL GAIT: Status: RESOLVED | Noted: 2023-11-22 | Resolved: 2024-06-14

## 2024-06-14 PROBLEM — M25.562 ACUTE PAIN OF LEFT KNEE: Status: RESOLVED | Noted: 2023-11-22 | Resolved: 2024-06-14

## 2024-06-14 PROBLEM — Z47.89 UNSPECIFIED ORTHOPEDIC AFTERCARE: Status: RESOLVED | Noted: 2023-11-22 | Resolved: 2024-06-14

## 2024-06-28 ENCOUNTER — TELEPHONE (OUTPATIENT)
Dept: INTERNAL MEDICINE | Facility: CLINIC | Age: 79
End: 2024-06-28
Payer: MEDICARE

## 2024-06-28 NOTE — TELEPHONE ENCOUNTER
Left message for pt to call clinic back so we can assist him in scheduling a annual with Dr. Vazquez. When pt calls back please assist scheduling him in the ok spots per Dr. Vazquez below.

## 2024-06-28 NOTE — TELEPHONE ENCOUNTER
Happy to have him.    May use any virtual or virtual release spot for an in-person visit.     Patient may also be seen at noon (arrival time 11:40am) Mondays, Wednesdays, Thursdays, and Fridays OR at 1:00pm (arrival time 12:40pm) on Thursdays (during the huddle).    Please do not schedule in a same day, next day, or hospital follow-up slot.    Okay to double book lunch slot (but patient should still arrive by 11:40am).

## 2024-06-28 NOTE — TELEPHONE ENCOUNTER
Reason for Call:  Appointment Request    Patient requesting this type of appt:  Preventive     Requested provider:  Gladys Vazquez    Reason patient unable to be scheduled: Needs to be scheduled by clinic    When does patient want to be seen/preferred time:  Whenever available    Comments: Pt calling and would like to establish care with a new PCP. He would like to see Gladys Vazquez if she is willing to take him on as a new pt. He would also like to reschedule his AWV with her if possible.     Could we send this information to you in [place] or would you prefer to receive a phone call?:   Patient would prefer a phone call   Okay to leave a detailed message?: Yes at Cell number on file:    Telephone Information:   Mobile [phone]       Call taken on 6/28/2024 at 1:12 PM by Aubree Altamirano

## 2024-07-05 SDOH — HEALTH STABILITY: PHYSICAL HEALTH: ON AVERAGE, HOW MANY DAYS PER WEEK DO YOU ENGAGE IN MODERATE TO STRENUOUS EXERCISE (LIKE A BRISK WALK)?: 3 DAYS

## 2024-07-05 SDOH — HEALTH STABILITY: PHYSICAL HEALTH: ON AVERAGE, HOW MANY MINUTES DO YOU ENGAGE IN EXERCISE AT THIS LEVEL?: 60 MIN

## 2024-07-05 ASSESSMENT — SOCIAL DETERMINANTS OF HEALTH (SDOH): HOW OFTEN DO YOU GET TOGETHER WITH FRIENDS OR RELATIVES?: TWICE A WEEK

## 2024-07-08 ENCOUNTER — TRANSFERRED RECORDS (OUTPATIENT)
Dept: HEALTH INFORMATION MANAGEMENT | Facility: CLINIC | Age: 79
End: 2024-07-08
Payer: MEDICARE

## 2024-07-08 PROBLEM — I71.21 ASCENDING AORTIC ANEURYSM (H): Status: RESOLVED | Noted: 2022-07-05 | Resolved: 2024-07-08

## 2024-07-08 PROBLEM — G47.33 OSA (OBSTRUCTIVE SLEEP APNEA): Chronic | Status: RESOLVED | Noted: 2017-12-05 | Resolved: 2024-07-08

## 2024-07-08 PROBLEM — R73.9 ELEVATED BLOOD SUGAR: Status: RESOLVED | Noted: 2022-08-10 | Resolved: 2024-07-08

## 2024-07-08 PROBLEM — Z87.81 STATUS POST OPEN REDUCTION WITH INTERNAL FIXATION OF FRACTURE: Status: RESOLVED | Noted: 2023-09-05 | Resolved: 2024-07-08

## 2024-07-08 PROBLEM — S89.92XS LEFT KNEE INJURY, SEQUELA: Status: RESOLVED | Noted: 2023-08-29 | Resolved: 2024-07-08

## 2024-07-08 PROBLEM — Z98.890 STATUS POST OPEN REDUCTION WITH INTERNAL FIXATION OF FRACTURE: Status: RESOLVED | Noted: 2023-09-05 | Resolved: 2024-07-08

## 2024-07-08 PROBLEM — Z01.818 PREOPERATIVE EXAMINATION: Status: RESOLVED | Noted: 2022-08-10 | Resolved: 2024-07-08

## 2024-07-08 PROBLEM — Z00.00 ANNUAL PHYSICAL EXAM: Status: RESOLVED | Noted: 2023-06-14 | Resolved: 2024-07-08

## 2024-07-08 PROBLEM — D64.9 ANEMIA, UNSPECIFIED TYPE: Status: RESOLVED | Noted: 2022-11-16 | Resolved: 2024-07-08

## 2024-07-08 PROBLEM — I21.4 NSTEMI (NON-ST ELEVATED MYOCARDIAL INFARCTION) (H): Status: RESOLVED | Noted: 2020-11-06 | Resolved: 2024-07-08

## 2024-07-08 PROBLEM — E78.5 HYPERLIPIDEMIA LDL GOAL <100: Status: RESOLVED | Noted: 2022-08-10 | Resolved: 2024-07-08

## 2024-07-08 PROBLEM — I25.118 CORONARY ARTERY DISEASE OF NATIVE ARTERY OF NATIVE HEART WITH STABLE ANGINA PECTORIS (H): Status: RESOLVED | Noted: 2023-06-14 | Resolved: 2024-07-08

## 2024-07-08 PROBLEM — S82.009A PATELLA FRACTURE: Status: RESOLVED | Noted: 2023-10-10 | Resolved: 2024-07-08

## 2024-07-08 PROBLEM — Z23 HIGH PRIORITY FOR 2019-NCOV VACCINE: Status: RESOLVED | Noted: 2023-06-14 | Resolved: 2024-07-08

## 2024-07-08 PROBLEM — R06.09 DYSPNEA ON EXERTION: Status: RESOLVED | Noted: 2022-12-01 | Resolved: 2024-07-08

## 2024-07-08 PROBLEM — Z96.652 TOTAL KNEE REPLACEMENT STATUS, LEFT: Status: RESOLVED | Noted: 2022-08-23 | Resolved: 2024-07-08

## 2024-07-08 PROBLEM — R06.02 SOB (SHORTNESS OF BREATH): Status: RESOLVED | Noted: 2022-11-16 | Resolved: 2024-07-08

## 2024-07-08 PROBLEM — I48.0 PAROXYSMAL ATRIAL FIBRILLATION (H): Status: RESOLVED | Noted: 2021-05-04 | Resolved: 2024-07-08

## 2024-07-08 PROBLEM — I35.1 AORTIC VALVE INSUFFICIENCY, ETIOLOGY OF CARDIAC VALVE DISEASE UNSPECIFIED: Status: RESOLVED | Noted: 2022-11-16 | Resolved: 2024-07-08

## 2024-07-08 PROBLEM — G89.29 CHRONIC PAIN OF LEFT KNEE: Status: RESOLVED | Noted: 2022-07-05 | Resolved: 2024-07-08

## 2024-07-08 PROBLEM — Z00.00 MEDICARE ANNUAL WELLNESS VISIT, SUBSEQUENT: Status: RESOLVED | Noted: 2022-08-10 | Resolved: 2024-07-08

## 2024-07-08 PROBLEM — R00.0 RAPID HEART RATE: Status: RESOLVED | Noted: 2020-11-06 | Resolved: 2024-07-08

## 2024-07-08 PROBLEM — M25.562 CHRONIC PAIN OF LEFT KNEE: Status: RESOLVED | Noted: 2022-07-05 | Resolved: 2024-07-08

## 2024-07-08 PROBLEM — D72.9 ABNORMAL WHITE BLOOD CELL: Status: RESOLVED | Noted: 2024-03-19 | Resolved: 2024-07-08

## 2024-07-08 SDOH — HEALTH STABILITY: PHYSICAL HEALTH: ON AVERAGE, HOW MANY MINUTES DO YOU ENGAGE IN EXERCISE AT THIS LEVEL?: 60 MIN

## 2024-07-08 SDOH — HEALTH STABILITY: PHYSICAL HEALTH: ON AVERAGE, HOW MANY DAYS PER WEEK DO YOU ENGAGE IN MODERATE TO STRENUOUS EXERCISE (LIKE A BRISK WALK)?: 3 DAYS

## 2024-07-08 ASSESSMENT — SOCIAL DETERMINANTS OF HEALTH (SDOH): HOW OFTEN DO YOU GET TOGETHER WITH FRIENDS OR RELATIVES?: TWICE A WEEK

## 2024-07-10 ENCOUNTER — OFFICE VISIT (OUTPATIENT)
Dept: INTERNAL MEDICINE | Facility: CLINIC | Age: 79
End: 2024-07-10
Payer: MEDICARE

## 2024-07-10 VITALS
HEART RATE: 47 BPM | BODY MASS INDEX: 27.99 KG/M2 | DIASTOLIC BLOOD PRESSURE: 64 MMHG | SYSTOLIC BLOOD PRESSURE: 152 MMHG | HEIGHT: 70 IN | OXYGEN SATURATION: 98 % | TEMPERATURE: 97.6 F | WEIGHT: 195.5 LBS

## 2024-07-10 DIAGNOSIS — Z00.00 MEDICARE ANNUAL WELLNESS VISIT, SUBSEQUENT: Primary | ICD-10-CM

## 2024-07-10 DIAGNOSIS — D50.9 IRON DEFICIENCY ANEMIA, UNSPECIFIED IRON DEFICIENCY ANEMIA TYPE: ICD-10-CM

## 2024-07-10 DIAGNOSIS — I35.1 NONRHEUMATIC AORTIC VALVE INSUFFICIENCY: ICD-10-CM

## 2024-07-10 DIAGNOSIS — Z13.1 SCREENING FOR DIABETES MELLITUS: ICD-10-CM

## 2024-07-10 DIAGNOSIS — I71.21 ANEURYSM OF ASCENDING AORTA WITHOUT RUPTURE (H): ICD-10-CM

## 2024-07-10 DIAGNOSIS — E03.4 HYPOTHYROIDISM DUE TO ACQUIRED ATROPHY OF THYROID: ICD-10-CM

## 2024-07-10 DIAGNOSIS — G47.33 OSA ON CPAP: ICD-10-CM

## 2024-07-10 DIAGNOSIS — I48.0 PAROXYSMAL ATRIAL FIBRILLATION (H): ICD-10-CM

## 2024-07-10 DIAGNOSIS — E78.00 PURE HYPERCHOLESTEROLEMIA: ICD-10-CM

## 2024-07-10 PROCEDURE — G0439 PPPS, SUBSEQ VISIT: HCPCS | Performed by: INTERNAL MEDICINE

## 2024-07-10 PROCEDURE — 36415 COLL VENOUS BLD VENIPUNCTURE: CPT | Performed by: INTERNAL MEDICINE

## 2024-07-10 PROCEDURE — 80053 COMPREHEN METABOLIC PANEL: CPT | Performed by: INTERNAL MEDICINE

## 2024-07-10 PROCEDURE — 80061 LIPID PANEL: CPT | Performed by: INTERNAL MEDICINE

## 2024-07-10 PROCEDURE — 84443 ASSAY THYROID STIM HORMONE: CPT | Performed by: INTERNAL MEDICINE

## 2024-07-10 PROCEDURE — 99214 OFFICE O/P EST MOD 30 MIN: CPT | Mod: 25 | Performed by: INTERNAL MEDICINE

## 2024-07-10 NOTE — PROGRESS NOTES
ASSESSMENT/PLAN                                                       (Z00.00) Medicare annual wellness visit, subsequent  (primary encounter diagnosis)  Comment: PMH, PSH, FH, SH, medications, allergies, immunizations, and preventative health measures reviewed and updated as appropriate.  Plan: see below for plans.      (E78.00) Pure hypercholesterolemia  (E03.4) Hypothyroidism due to acquired atrophy of thyroid  (Z13.1) Screening for diabetes mellitus  Plan: fasting labs today.    (I71.21) Aneurysm of ascending aorta without rupture (H24)  (I35.1) Nonrheumatic aortic valve insufficiency  Plan: Echocardiogram Complete ordered - patient to schedule.     (G47.33) CHA on CPAP  Comment: patient is compliant with and benefits from CPAP use.    (I48.0) Paroxysmal atrial fibrillation (H)  Comment: remote, single episode of PAF; declines chronic AC; on daily baby aspirin.    (D50.9) Iron deficiency anemia, unspecified iron deficiency anemia type  Comment: etiology unclear; discussed upper and lower endoscopy for further evaluation with patient, who declines these at this time.  Plan: recommend continued monitoring with repeat CBC and iron studies in 6 months.    Appropriate preventive services were discussed with this patient, including applicable screening as appropriate for cardiovascular disease, diabetes, osteopenia/osteoporosis, and glaucoma.  As appropriate for age/gender, discussed screening for colorectal cancer, prostate cancer, breast cancer, and cervical cancer. Checklist reviewing preventive services available has been given to the patient.    Reviewed patients plan of care. The Basic Care Plan (routine screening as documented in Health Maintenance) for Mendez Greene meets the Care Plan requirement. This Care Plan has been established and reviewed with the Patient.    Gladys Vazquez MD   [address] [address]  T: [phone], F: [phone]    SUBJECTIVE                                                       Mendez Greene is a very pleasant 78 year old male who presents for his subsequent AWV:    Current providers (other than myself): n/a     PMH, PSH, FH, SH, medications, allergies, immunizations, preventative health, and health risk assessment reviewed.     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, stage pT1, carcinoma in situ, stage Tis, N0, M0 s/p cystoprostatectomy with ileal neobladder formation    Pure hypercholesterolemia      Past Surgical History:   Procedure Laterality Date    ARTHROPLASTY KNEE Left 08/23/2022    Procedure: LEFT TOTAL KNEE ARTHROPLASTY;  Surgeon: Melba Dial MD;  Location:  OR    ARTHROPLASTY REVISION KNEE Left 09/05/2023    Procedure: REVISION TOTAL KNEE ARTHROPLASTY PATELLA;  Surgeon: Melba Dial MD;  Location:  OR    BUNIONECTOMY Left     BUNIONECTOMY DIANA  11/28/2011    RT-Procedure:BUNIONECTOMY DIANA; Right Foot [place] Bunionectomy with Metatarsal and Phalanx Osteotomy with 1st Metatarsal Phalangeal Joint Repair Right Foot; Surgeon:BAKARI ZAIDI; Location: [facility]    CATARACT EXTRACTION, BILATERAL      CV HEART CATHETERIZATION WITH POSSIBLE INTERVENTION N/A 11/07/2020    Procedure: Heart Catheterization with Possible Intervention;  Surgeon: Rishi Llanes MD;  Location:  HEART CARDIAC CATH LAB    CYSTECTOMY BLADDER, ILEAL DIVERSION NEOBLADDER, COMBINED  2006    HERNIORRHAPHY INCISIONAL (LOCATION) N/A 08/01/2016    Procedure: HERNIORRHAPHY INCISIONAL (LOCATION);  Surgeon: Onofre Lua MD;  Location:  [facility]    OPEN REDUCTION INTERNAL FIXATION PATELLA Left 09/05/2023    Procedure: LEFT KNEE OPEN REDUCTION INTERNAL  FIXATION PATELLA FRACTURE WITH;  Surgeon: Melba Dial MD;  Location: [facility]    OPEN REDUCTION INTERNAL FIXATION PATELLA Left 10/10/2023    Procedure: LEFT KNEE REVISION OPEN REDUCTION INTERNAL FIXATION PATELLA WITH POLY REMOVAL, PATELLAR;  Surgeon: Melba Dial MD;  Location:  OR    REMOVE HARDWARE KNEE Left 10/10/2023    Procedure: WITH POLY REMOVAL, PATELLAR;  Surgeon: Melba Dial MD;  Location: [facility]     Family History   Problem Relation Age of Onset    Osteoporosis Mother     Neurologic Disorder Mother         PD    Cerebrovascular Disease Mother     Hypertension Mother     Hyperlipidemia Mother     Cerebrovascular Disease Father     Diabetes Type 1 Sister     Cancer Maternal Grandmother         unknown type    Diabetes Type 2  No family hx of     Myocardial Infarction No family hx of     Prostate Cancer No family hx of     Colon Cancer No family hx of      Social History     Occupational History    Occupation: Semi-retired    Tobacco Use    Smoking status: Former     Types: Cigarettes     Passive exposure: Never (per pt)    Smokeless tobacco: Never    Tobacco comments:     Quit in 1987; smoked for 20 years; 1.5 ppd at his most.    Vaping Use    Vaping status: Never Used   Substance and Sexual Activity    Alcohol use: Yes     Comment: 2-3 glasses of wine/week    Drug use: No    Sexual activity: Not Currently   Social History Narrative    .    2 adult children.    2 grandchildren.    Left knee surgeries limit mobility.       No Known Allergies    Current Outpatient Medications   Medication Sig    acetaminophen (TYLENOL) 325 MG tablet Take 3 tablets (975 mg) by mouth every 6 hours as needed for mild pain    amoxicillin (AMOXIL) 500 MG capsule Take 4 capsules by mouth once as needed (1 hour prior to dental appointments 4 x 500 mg = 2,000 mg)    aspirin 81 MG EC  tablet Take 1 tablet (81 mg) by mouth 2 times daily    Cholecalciferol (D3 PO)     COMPOUND (CMPD RX) - PHARMACY TO MIX COMPOUNDED MEDICATION Hydro 2.5% Cr / Ketocon Crm.  Apply to the effected area on face twice daily as needed.    levothyroxine (SYNTHROID/LEVOTHROID) 150 MCG tablet TAKE ONE TABLET BY MOUTH ONE TIME DAILY    lisinopril (ZESTRIL) 5 MG tablet Take 1 tablet (5 mg) by mouth At Bedtime    multivitamin w/minerals (THERA-VIT-M) tablet Take 1 tablet by mouth daily    olopatadine (PATANOL) 0.1 % ophthalmic solution Place 1 drop into both eyes 2 times daily    order for DME Equipment being ordered: CPAP and supplies. LIFETIME need.    simvastatin (ZOCOR) 10 MG tablet TAKE ONE TABLET BY MOUTH AT BEDTIME    vitamin B-12 (CYANOCOBALAMIN) 1000 MCG tablet Take 1,000 mcg by mouth daily     Immunization History   Administered Date(s) Administered    COVID-19 12+ (2023-24) (MODERNA) 03/29/2024    COVID-19 12+ (2023-24) (Pfizer) 10/03/2023    COVID-19 Bivalent 12+ (Pfizer) 09/09/2022, 06/14/2023    COVID-19 MONOVALENT 12+ (Pfizer) 01/27/2021, 02/17/2021, 09/24/2021    COVID-19 Monovalent 12+ (Pfizer 2022) 04/13/2022    Influenza (H1N1) 01/13/2010    Influenza (High Dose) 3 valent vaccine 09/18/2013, 10/01/2014, 09/16/2015, 08/22/2016, 09/25/2017, 09/22/2018, 08/24/2020, 09/10/2021    Influenza (IIV3) PF 12/06/2006, 11/26/2007, 09/29/2009, 11/17/2010, 09/22/2011, 09/19/2012, 10/14/2012, 09/17/2015    Influenza Vaccine 65+ (Fluzone HD) 09/10/2021, 09/09/2022, 10/03/2023    Pneumo Conj 13-V (2010&after) 06/10/2015    Pneumococcal 23 valent 11/11/2009    RSV Vaccine (Abrysvo) 12/22/2023    TD,PF 7+ (Tenivac) 12/06/2006, 12/26/2016    Zoster recombinant adjuvanted (SHINGRIX) 05/16/2018, 08/06/2018    Zoster vaccine, live 12/10/2009     PREVENTATIVE HEALTH                                                      BMI: overweight  Blood pressure: elevated on current regimen, but normal readings at home  Prostate CA Screening:  "screening no longer indicated  Colon CA screening: screening no longer indicated  Lung CA screening: patient does not meet screening criteria  AAA screening: completed  Screening cholesterol: n/a - already being treated for this condition  Screening diabetes: DUE  Alcohol misuse screening: alcohol use reviewed - no intervention indicated at this time  Immunizations: reviewed; up to date     HEALTH RISK ASSESSMENT                                                      In general, how would you rate your overall physical health? good  Outside of work, how many days during the week do you exercise? 3 days/week  Outside of work, approximately how many minutes a day do you exercise? 45-60 minutes    If you drink alcohol do you typically have >3 drinks per day or >7 drinks per week? No     Assistance with daily activities: No    Safety concerns: No    Fall risk assessment: completed today (see ambulatory assessments)    Hearing concerns: No    In the past 6 months, have you been bothered by leaking of urine: Yes    Do you have a current opioid prescription? No  Do you use any other controlled substances or medications that are not prescribed by a provider? None    PHQ-2/PHQ-9 assessment: completed today (see ambulatory assessments)    Additional concerns today: No    Have you ever done Advance Care Planning? (For example, a Health Directive, POLST, or a discussion with a medical provider or your loved ones about your wishes): Yes    OBJECTIVE                                                      BP (!) 152/64   Pulse (!) 47   Temp 97.6  F (36.4  C) (Temporal)   Ht 1.778 m (5' 10\")   Wt 88.7 kg (195 lb 8 oz)   SpO2 98%   BMI 28.05 kg/m    Constitutional: well-appearing  Head, Ears, and Eyes: normocephalic; normal external auditory canal and pinna; tympanic membranes visualized and normal; normal lids and conjunctivae  Neck: supple, symmetric, no thyromegaly or lymphadenopathy  Respiratory: normal respiratory effort; " clear to auscultation bilaterally  Cardiovascular: regular rate and rhythm; no edema  Gastrointestinal: soft, non-tender, and non-distended; no organomegaly or masses  Musculoskeletal: normal gait and station  Psych: normal judgment and insight; normal mood and affect; recent and remote memory intact    Cognitive Impairment noted: No    ---    (Note was completed, in part, with [employer] voice-recognition software. Documentation was reviewed, but some grammatical, spelling, and word errors may remain.)

## 2024-07-10 NOTE — PATIENT INSTRUCTIONS
"    Scheduling an appointment can be hard sometimes.  Here are some tips:    For routine visits, try to schedule at least 3-6 months ahead of time.    For urgent issues: Please call [phone] and request to speak to your \"care team.\" Once speaking to a care team member, please ask them to send me a message requesting to be \"worked in.\" Please provide care team member with details re: your concern or symptom.    "

## 2024-07-11 LAB
ALBUMIN SERPL BCG-MCNC: 4.4 G/DL (ref 3.5–5.2)
ALP SERPL-CCNC: 62 U/L (ref 40–150)
ALT SERPL W P-5'-P-CCNC: 12 U/L (ref 0–70)
ANION GAP SERPL CALCULATED.3IONS-SCNC: 12 MMOL/L (ref 7–15)
AST SERPL W P-5'-P-CCNC: 22 U/L (ref 0–45)
BILIRUB SERPL-MCNC: 0.6 MG/DL
BUN SERPL-MCNC: 23.4 MG/DL (ref 8–23)
CALCIUM SERPL-MCNC: 9.5 MG/DL (ref 8.8–10.2)
CHLORIDE SERPL-SCNC: 102 MMOL/L (ref 98–107)
CHOLEST SERPL-MCNC: 178 MG/DL
CREAT SERPL-MCNC: 0.96 MG/DL (ref 0.67–1.17)
DEPRECATED HCO3 PLAS-SCNC: 24 MMOL/L (ref 22–29)
EGFRCR SERPLBLD CKD-EPI 2021: 80 ML/MIN/1.73M2
FASTING STATUS PATIENT QL REPORTED: YES
GLUCOSE SERPL-MCNC: 112 MG/DL (ref 70–99)
HDLC SERPL-MCNC: 73 MG/DL
LDLC SERPL CALC-MCNC: 93 MG/DL
NONHDLC SERPL-MCNC: 105 MG/DL
POTASSIUM SERPL-SCNC: 5.3 MMOL/L (ref 3.4–5.3)
PROT SERPL-MCNC: 7.3 G/DL (ref 6.4–8.3)
SODIUM SERPL-SCNC: 138 MMOL/L (ref 135–145)
TRIGL SERPL-MCNC: 60 MG/DL
TSH SERPL DL<=0.005 MIU/L-ACNC: 0.5 UIU/ML (ref 0.3–4.2)

## 2024-07-30 ENCOUNTER — HOSPITAL ENCOUNTER (OUTPATIENT)
Dept: CARDIOLOGY | Facility: CLINIC | Age: 79
Discharge: HOME | End: 2024-07-30
Attending: INTERNAL MEDICINE | Admitting: INTERNAL MEDICINE
Payer: MEDICARE

## 2024-07-30 DIAGNOSIS — I35.1 NONRHEUMATIC AORTIC VALVE INSUFFICIENCY: ICD-10-CM

## 2024-07-30 DIAGNOSIS — I71.21 ANEURYSM OF ASCENDING AORTA WITHOUT RUPTURE (H): ICD-10-CM

## 2024-07-30 LAB — LVEF ECHO: NORMAL

## 2024-07-30 PROCEDURE — 93306 TTE W/DOPPLER COMPLETE: CPT | Mod: 26 | Performed by: INTERNAL MEDICINE

## 2024-07-30 PROCEDURE — 93306 TTE W/DOPPLER COMPLETE: CPT

## 2024-08-02 ENCOUNTER — TELEPHONE (OUTPATIENT)
Dept: INTERNAL MEDICINE | Facility: CLINIC | Age: 79
End: 2024-08-02
Payer: MEDICARE

## 2024-08-02 NOTE — TELEPHONE ENCOUNTER
Reason for Call:  Appointment Request    Patient requesting this type of appt:  Sleep Eval    Requested provider:  Sleep at either  or     Reason patient unable to be scheduled: Not within requested timeframe    When does patient want to be seen/preferred time:  Sooner than scheduled    Comments: Patient is in need of new machine as his is no longer working    [facility] or would you prefer to receive a phone call?:   No preference   Okay to leave a detailed message?: Yes at Cell number on file:    Telephone Information:   Mobile [phone]       Call taken on 8/2/2024 at 3:39 PM by Susan Negrete

## 2024-08-05 NOTE — TELEPHONE ENCOUNTER
Patient scheduled for first available and on wait list. Clinic will reach out if sooner appointment becomes available.     Savanah ALVAREZ RN  [facility]

## 2024-10-23 ENCOUNTER — OFFICE VISIT (OUTPATIENT)
Dept: INTERNAL MEDICINE | Facility: CLINIC | Age: 79
End: 2024-10-23
Payer: MEDICARE

## 2024-10-23 VITALS
BODY MASS INDEX: 28.11 KG/M2 | SYSTOLIC BLOOD PRESSURE: 112 MMHG | HEART RATE: 73 BPM | OXYGEN SATURATION: 97 % | DIASTOLIC BLOOD PRESSURE: 54 MMHG | WEIGHT: 195.9 LBS | TEMPERATURE: 98.8 F

## 2024-10-23 DIAGNOSIS — R26.81 GAIT INSTABILITY: ICD-10-CM

## 2024-10-23 DIAGNOSIS — Z91.81 AT RISK FOR FALLS: ICD-10-CM

## 2024-10-23 DIAGNOSIS — M62.89 MUSCLE STIFFNESS: ICD-10-CM

## 2024-10-23 DIAGNOSIS — M79.10 MUSCLE SORENESS: Primary | ICD-10-CM

## 2024-10-23 DIAGNOSIS — M25.641 STIFFNESS OF JOINT, HAND, RIGHT: ICD-10-CM

## 2024-10-23 PROCEDURE — 99213 OFFICE O/P EST LOW 20 MIN: CPT | Performed by: INTERNAL MEDICINE

## 2024-10-23 NOTE — PROGRESS NOTES
ASSESSMENT/PLAN                                                      (M79.10) Muscle soreness  (primary encounter diagnosis)  (M62.89) Muscle stiffness  (M25.641) Stiffness of joint, hand, right  (R26.81) Gait instability  (Z91.81) At risk for falls  Comment: suspect patient's constellation of symptoms are related to using this low and difficult to get up from toilet for the last several months due to a remodel on his other bathroom.  Plan: recommend using a toilet safety frame (DME order provided) while using this ergonomically-challenging toilet; physical and occupational therapy referrals placed - patient will be contacted to schedule; if symptoms worsen, change, or do not improve, patient to contact MD.      Gladys Vazquez MD   [address] [address]  T: [phone], F: [phone]    SUBJECTIVE                                                      Mendez Greene is a very pleasant 78 year old male who presents bilateral upper extremity soreness, decreased range of motion, and stiffness:    Symptoms started several months ago. Symptoms started after a bathroom remodel forced him to use a spare toilet that is too low and quite difficult to get up from. Over time he has developed bilateral and symmetric upper extremity symptoms.  Reports bilateral forearm pain and soreness with supination and lifting items. Inability to fully straighten both elbows, fully extend palms and fingers, and fully abduct arms. Has become less dexterous due to hand stiffness.    No numbness or tingling.    No weakness.    OBJECTIVE                                                      /54   Pulse 73   Temp 98.8  F (37.1  C) (Temporal)   Wt 88.9 kg (195 lb 14.4 oz)   SpO2 97%   BMI 28.11 kg/m    Constitutional: well-appearing  Shoulder exam: normal-appearing and nontender; normal passive range of motion; abduction limited to 120 degrees  Arms/hands exam: normal-appearing and  nontender; normal wrist passive and active range of motion; passive and active elbow range of motion limited to 150-160 degrees; passive and active hand (palm and fingers) range of motion limited; no contractures noted  Psych: normal judgment and insight; normal mood and affect; recent and remote memory intact    ---    (Note documentation was completed, in part, with [employer] voice-recognition software. Documentation was reviewed, but some grammatical, spelling, and word errors may remain.)

## 2024-11-05 ENCOUNTER — MYC MEDICAL ADVICE (OUTPATIENT)
Dept: INTERNAL MEDICINE | Facility: CLINIC | Age: 79
End: 2024-11-05
Payer: MEDICARE

## 2024-11-05 NOTE — PROGRESS NOTES
OCCUPATIONAL THERAPY EVALUATION  Type of Visit: Evaluation              Subjective        Presenting condition or subjective complaint: (Patient-Rptd) It's my hands/fingers that are the issue for which I was referred  Date of onset: 10/23/24    Relevant medical history: (Patient-Rptd) Anemia; Bladder or bowel problems; Cancer; Heart problems; High blood pressure; Overweight; Significant weakness; Sleep disorder like apnea; Smoking; Substance use disorder   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, stage pT1, carcinoma in situ, stage Tis, N0, M0 s/p cystoprostatectomy with ileal neobladder formation    Pure hypercholesterolemia       Dates & types of surgery:    Past Surgical History:   Procedure Laterality Date    ARTHROPLASTY KNEE Left 08/23/2022    Procedure: LEFT TOTAL KNEE ARTHROPLASTY;  Surgeon: Melba Dial MD;  Location:  OR    ARTHROPLASTY REVISION KNEE Left 09/05/2023    Procedure: REVISION TOTAL KNEE ARTHROPLASTY PATELLA;  Surgeon: Melba Dial MD;  Location:  OR    BUNIONECTOMY Left     BUNIONECTOMY DIANA  11/28/2011    RT-Procedure:BUNIONECTOMY DIANA; Right Foot Dwaine Afton Bunionectomy with Metatarsal and Phalanx Osteotomy with 1st Metatarsal Phalangeal Joint Repair Right Foot; Surgeon:BAKARI ZAIDI; Location: [facility]    CATARACT EXTRACTION, BILATERAL      CV HEART CATHETERIZATION WITH POSSIBLE INTERVENTION N/A 11/07/2020    Procedure: Heart Catheterization with Possible Intervention;  Surgeon: Rishi Llanes MD;  Location:  HEART CARDIAC CATH LAB    CYSTECTOMY BLADDER, ILEAL  DIVERSION NEOBLADDER, COMBINED  2006    HERNIORRHAPHY INCISIONAL (LOCATION) N/A 08/01/2016    Procedure: HERNIORRHAPHY INCISIONAL (LOCATION);  Surgeon: Onofre Lua MD;  Location:  [facility]    OPEN REDUCTION INTERNAL FIXATION PATELLA Left 09/05/2023    Procedure: LEFT KNEE OPEN REDUCTION INTERNAL  FIXATION PATELLA FRACTURE WITH;  Surgeon: Melba Dial MD;  Location: [facility] OR    OPEN REDUCTION INTERNAL FIXATION PATELLA Left 10/10/2023    Procedure: LEFT KNEE REVISION OPEN REDUCTION INTERNAL FIXATION PATELLA WITH POLY REMOVAL, PATELLAR;  Surgeon: Melba Dial MD;  Location:  OR    REMOVE HARDWARE KNEE Left 10/10/2023    Procedure: WITH POLY REMOVAL, PATELLAR;  Surgeon: Melba Dial MD;  Location: [facility] OR      Prior diagnostic imaging/testing results:       No recent relevant imaging available.    Prior therapy history for the same diagnosis, illness or injury: (Patient-Rptd) No      Started remodeling bathroom in June,  has been using  a lower toilet with no UE support since. Pain started gradually with notable lack of strength, continuing over the last month or so worsening in the last week or two.  Patient feels he does not have pain unless he is pushing up from chair or with resistive activities. Patient reports that in the last ten days he feels has had increased difficulty with opening his hands (digit extension) and feels weaker. Steven reports he is independent with ADL and IADLs at baseline and feels significantly below his baseline due to fatigue and weakness.     Prior Level of Function  Ind with ADL, IADLs, driving at baseline    Living Environment  Social support: (Patient-Rptd) Alone   Type of home: (Patient-Rptd) House; 2-story; Basement   Stairs to enter the home: (Patient-Rptd) Yes (Patient-Rptd) 2 Is there a railing: (Patient-Rptd) No     Ramp: (Patient-Rptd) No   Stairs inside the home: (Patient-Rptd) Yes (Patient-Rptd) 40 Is there a railing: (Patient-Rptd) Yes     Help at home:  (Patient-Rptd) Self Cares (home health aide/personal care attendant, family, etc); Home management tasks (cooking, cleaning); Home and Yard maintenance tasks  Equipment owned: (Patient-Rptd) Straight Cane; Walker; Crutches; Dressing equipment     Employment: (Patient-Rptd) No    Hobbies/Interests: (Patient-Rptd) Birding, Photography, Gardening, Horse Racing    Patient goals for therapy: (Patient-Rptd) Open them wide     Objective   ADDITIONAL HISTORY:  Right hand dominant  Patient reports symptoms of pain, stiffness/loss of motion, and weakness/loss of strength  Transportation: drives      Functional Outcome Measure:       11/3/2024    10:09 AM   Upper Extremity Functional Index (  [address] [place] [address])   a.  Any of your usual work, housework or school activities 0-Extreme Difficulty    b.  Your usual hobbies, recreational or sporting activities 0-Extreme Difficulty    c.  Lifting a bag of groceries to waist level 1-Quite a bit of Difficulty    d.  Placing an object onto, or removing it from an overhead shelf 0-Extreme Difficulty    e.  Washing your hair or scalp 0-Extreme Difficulty    f.   Pushing up on your hands (e.g., from bathtub or chair) 2-Moderate Difficulty    g.  Preparing food (e.g., peeling, cutting) 1-Quite a bit of Difficulty    h.  Driving 3-A Little bit of Difficulty    j.   Dressing 1-Quite a bit of Difficulty    k.  Doing up buttons 1-Quite a bit of Difficulty    l.   Using tools or appliances 1-Quite a bit of Difficulty    m. Opening doors 3-A Little bit of Difficulty    n.  Cleaning 2-Moderate Difficulty    o.  Tying or lacing shoes 0-Extreme Difficulty    p.  Sleeping 4-No Difficulty    q.  Laundering clothes. (e.g., washing, ironing, folding) 2-Moderate Difficulty    r.   Opening a jar 0-Extreme Difficulty    s.  Throwing a ball 1-Quite a bit of Difficulty    t.   Carrying a small suitcase with your affected limb  3-A Little bit of Difficulty    Column Totals: /80 25        Patient-reported         PAIN:  Pain Level at Rest: 0/10  Pain Level with Use: 4/10  Pain Location: elbow  Pain Quality: Tender  Pain Frequency: intermittent  Pain is Worst: daytime  Pain is Exacerbated By: use  Pain is Relieved By: rest  Pain Progression: Unchanged    Minimal pain, primary concern is weakness. Patient reports forearm pain is primarily with use when pushing up from toilet, especially when doing this repeatedly due to bladder issues.     POSTURE: Forward Neck Posture and Rounded Forward Shoulders     EDEMA: None     SENSATION: WNL throughout all nerve distributions; per patient report     ROM:   Elbow ROM  Left AROM Right AROM    Extension -19 + -13 +   Flexion Full, painfree Full, painfree   Supination 59 64   Pronation Full, painfree Full, painfree     Patient is able to gain a few degrees, but less than full, with PROM of elbow extension, forearm supination      Wrist ROM  Left AROM Right AROM    Extension 61 51   Flexion 59 60   Radial Deviation (RD) DNT DNT   Ulnar Deviation (UD) DNT DNT     Full PROM of wrist flexion and extension      Hand ROM  Left AROM Right AROM    Index MP -69/full -55/full   PIP -65/full -66/full   DIP     Total Active Motion     Long MP -80/full -55/full   PIP -75/full -30/full   DIP     Total Active Motion     RING MP -80/full -45/full   PIP -70/full -20/full   DIP     Total Active Motion     Small MP -80/full -41/full   PIP -90/full -23/full   DIP     Total Active Motion         Patient with significant weakness of digit extension actively, with full PROM in extension anticipated to be secondary to flexor tightness combined with extensor weakness.     OBSERVATIONS/APPEARANCE: Patient appears fatigued, states he has been increasingly tired the last week or so    SPECIAL TESTS:      R Hand 9 Hole Peg Test: 59 seconds  L Hand 9 Hole Peg Test: 56 seconds      RESISTED TESTING: Resisted Testing (pain report)   Left Right   Elbow Extension 4-/5 4/5   Elbow Flexion 3+/5 3+/5   Wrist Extension  3+/5 3+/5   Wrist Flexion 3+/5 3+/5   Shoulder Flexion 4/5 4/5      Fatigued following MMT     STRENGTH:     Strength   (Measured in pounds)  Pain Report: - none  + mild    ++ moderate    +++ severe     Measured in pounds Left Right   Trial 1 16 19     Lateral Pinch  Measured in pounds Left Right   Trial 1 2.5 3.5     3 Point Pinch  Measured in pounds Left Right   Trial 1 2 3        PALPATION: Non tender to palpation. No palpable nodules in palmar fascia suggestive of Dupuytren's contracture at this time.            Assessment & Plan   CLINICAL IMPRESSIONS  Medical Diagnosis: Stiffness of joint, hand, right (M25.641)    Treatment Diagnosis: Weakness in B UEs    Impression/Assessment: Pt is a 79 year old male presenting to Occupational Therapy due to B UE Weakness.  The following significant findings have been identified: Impaired activity tolerance, Impaired coordination, Impaired ROM, Impaired strength, and Pain.  These identified deficits interfere with their ability to perform self care tasks, recreational activities, household chores, driving , and meal planning and preparation as compared to previous level of function.     Clinical Decision Making (Complexity):  Assessment of Occupational Performance: 5 or more Performance Deficits  Occupational Performance Limitations: bathing/showering, toileting, dressing, hygiene and grooming, driving and community mobility, home establishment and management, meal preparation and cleanup, shopping, and leisure activities  Clinical Decision Making (Complexity): Low complexity    PLAN OF CARE  Treatment Interventions:  Modalities:  US, Paraffin, and E-Stim  Therapeutic Exercise:  AROM, AAROM, PROM, Tendon Gliding, Blocking, Reverse Blocking, Place and Hold, Extensor Tracking, Isotonics, Isometrics, and Stabilization  Neuromuscular re-education:  Coordination/Dexterity, Kinesiotaping, Isometrics, and Stabilization  Manual Techniques:  Coordination/Dexterity, Friction  massage, and Myofascial release  Orthotic Fabrication:  Static  Self Care:  Self Care Tasks    Long Term Goals   OT Goal 1  Goal Identifier: IADLs  Goal Description: Steven will improve B strength to be able to open a new jar at PLOF with no pain  Rationale: In order to maximize safety and independence with performance of self-care activities  Goal Progress: Goal initiated  Target Date: 01/15/25      Frequency of Treatment: 1x/week  Duration of Treatment: 10 weeks     Recommended Referrals to Other Professionals:  NA  Education Assessment: Learner/Method: Patient;Significant Other;Demonstration;Pictures/Video;No Barriers to Learning  Education Comments: HEP     Risks and benefits of evaluation/treatment have been explained.   Patient/Family/caregiver agrees with Plan of Care.     Evaluation Time:    OT Eval, Low Complexity Minutes (95653): 40       Signing Clinician: YORDY RESENDIZ [facility]                                                                                   OUTPATIENT OCCUPATIONAL THERAPY      PLAN OF TREATMENT FOR OUTPATIENT REHABILITATION   Patient's Last Name, First Name, M.IKena  JcMendez ROSARIO YOB: 1945   Provider's Name   [facility]   Medical Record No.  [id number]     Onset Date: 10/23/24 Start of Care Date: 11/06/24     Medical Diagnosis:  Stiffness of joint, hand, right (M25.641)      OT Treatment Diagnosis:  Weakness in B UEs Plan of Treatment  Frequency/Duration:1x/week/10 weeks    Certification date from 11/06/24   To 01/15/25        See note for plan of treatment details and functional goals     YORDY RESENDIZ                         I CERTIFY THE NEED FOR THESE SERVICES FURNISHED UNDER        THIS PLAN OF TREATMENT AND WHILE UNDER MY CARE     (Physician attestation of this document indicates review and certification of the therapy plan).              Referring Provider:  Gladys Vazquez MD    Initial  Assessment  See Epic Evaluation- 11/06/24

## 2024-11-06 ENCOUNTER — NURSE TRIAGE (OUTPATIENT)
Dept: INTERNAL MEDICINE | Facility: CLINIC | Age: 79
End: 2024-11-06

## 2024-11-06 ENCOUNTER — OFFICE VISIT (OUTPATIENT)
Dept: INTERNAL MEDICINE | Facility: CLINIC | Age: 79
End: 2024-11-06
Payer: MEDICARE

## 2024-11-06 ENCOUNTER — MYC MEDICAL ADVICE (OUTPATIENT)
Dept: INTERNAL MEDICINE | Facility: CLINIC | Age: 79
End: 2024-11-06

## 2024-11-06 ENCOUNTER — THERAPY VISIT (OUTPATIENT)
Dept: OCCUPATIONAL THERAPY | Facility: CLINIC | Age: 79
End: 2024-11-06
Attending: INTERNAL MEDICINE
Payer: MEDICARE

## 2024-11-06 VITALS
WEIGHT: 180 LBS | BODY MASS INDEX: 25.77 KG/M2 | DIASTOLIC BLOOD PRESSURE: 75 MMHG | HEIGHT: 70 IN | TEMPERATURE: 97.3 F | SYSTOLIC BLOOD PRESSURE: 115 MMHG | HEART RATE: 75 BPM | RESPIRATION RATE: 18 BRPM

## 2024-11-06 DIAGNOSIS — R30.0 DYSURIA: Primary | ICD-10-CM

## 2024-11-06 DIAGNOSIS — M25.641 STIFFNESS OF JOINT, HAND, RIGHT: ICD-10-CM

## 2024-11-06 LAB
ALBUMIN UR-MCNC: 30 MG/DL
AMORPH CRY #/AREA URNS HPF: ABNORMAL /HPF
APPEARANCE UR: ABNORMAL
BACTERIA #/AREA URNS HPF: ABNORMAL /HPF
BILIRUB UR QL STRIP: NEGATIVE
COLOR UR AUTO: YELLOW
GLUCOSE UR STRIP-MCNC: NEGATIVE MG/DL
HGB UR QL STRIP: ABNORMAL
KETONES UR STRIP-MCNC: NEGATIVE MG/DL
LEUKOCYTE ESTERASE UR QL STRIP: ABNORMAL
NITRATE UR QL: POSITIVE
PH UR STRIP: 8.5 [PH] (ref 5–7)
RBC #/AREA URNS AUTO: ABNORMAL /HPF
SP GR UR STRIP: 1 (ref 1–1.03)
SQUAMOUS #/AREA URNS AUTO: ABNORMAL /LPF
TRI-PHOS CRY #/AREA URNS HPF: ABNORMAL /HPF
UROBILINOGEN UR STRIP-ACNC: 0.2 E.U./DL
WBC #/AREA URNS AUTO: ABNORMAL /HPF

## 2024-11-06 PROCEDURE — 87086 URINE CULTURE/COLONY COUNT: CPT | Performed by: INTERNAL MEDICINE

## 2024-11-06 PROCEDURE — 87186 SC STD MICRODIL/AGAR DIL: CPT | Performed by: INTERNAL MEDICINE

## 2024-11-06 PROCEDURE — 87088 URINE BACTERIA CULTURE: CPT | Performed by: INTERNAL MEDICINE

## 2024-11-06 PROCEDURE — 97110 THERAPEUTIC EXERCISES: CPT | Mod: GO | Performed by: SPECIALIST/TECHNOLOGIST

## 2024-11-06 PROCEDURE — 99213 OFFICE O/P EST LOW 20 MIN: CPT | Performed by: INTERNAL MEDICINE

## 2024-11-06 PROCEDURE — 97165 OT EVAL LOW COMPLEX 30 MIN: CPT | Mod: GO | Performed by: SPECIALIST/TECHNOLOGIST

## 2024-11-06 PROCEDURE — 81001 URINALYSIS AUTO W/SCOPE: CPT | Performed by: INTERNAL MEDICINE

## 2024-11-06 RX ORDER — CEFPODOXIME PROXETIL 100 MG/1
100 TABLET, FILM COATED ORAL 2 TIMES DAILY
Qty: 28 TABLET | Refills: 0 | Status: SHIPPED | OUTPATIENT
Start: 2024-11-06 | End: 2024-11-20

## 2024-11-06 ASSESSMENT — PAIN SCALES - GENERAL: PAINLEVEL_OUTOF10: MILD PAIN (3)

## 2024-11-06 NOTE — TELEPHONE ENCOUNTER
S-(situation): Back pain with urination pain. URI    B-(background): See my chart. Not sure he has a fever.  Pain with urination. URI symptoms and fatigue. Non productive cough.     A-(assessment): Needs appointment.     R-(recommendations): Patient is going to go to [facility] urgent care. Patient will increase his fluids.     Reason for Disposition   Side (flank) or lower back pain present    Additional Information   Negative: Little or no urine output is main symptom and patient is near death or dying   Negative: Urinary symptoms and patient does not have a urinary catheter   Negative: SEVERE lower abdominal pain   Negative: [1] Catheter accidentally pulled out by patient AND [2] bright red continuous bleeding (or trickling)   Negative: [1] Unable to urinate (or only a few drops) > 4 hours AND [2] bladder feels very full (e.g., palpable bladder or strong urge to urinate)   Negative: [1] Decreased urination and [2] drinking very little AND [2] dehydration suspected (e.g., dark urine, no urine > 12 hours, very dry mouth, very lightheaded)   Negative: Patient sounds very sick or weak to the triager   Negative: Fever > 100.4 F (38.0 C)    Protocols used: Hospice - Urinary Catheter (e.g., [place]) Symptoms and [id number], Urinary Symptoms-A-    Pati Pearson RN  [facility]

## 2024-11-06 NOTE — PROGRESS NOTES
"  ASSESSMENT/PLAN                                                      (R30.0) Dysuria  (primary encounter diagnosis)  Comment: UA suggestive of UTI.  Plan: culture pending; start Evans.     Gladys Vazquez MD   [address] [address]  T: [phone], F: [phone]    SUBJECTIVE                                                      Mendez Greene is a very pleasant 79 year old male who presents with painful urination:    Started yesterday. No urinary frequency, urgency, or hematuria. No abdominal, pelvic, or flank pain.  No fevers or chills.  No prior history of UTIs.    PMH significant for history of bladder cancer in 2006 s/p cystoprostectomy and neobladder creation.    OBJECTIVE                                                      /75 (BP Location: Left arm, Patient Position: Sitting, Cuff Size: Adult Regular)   Pulse 75   Temp 97.3  F (36.3  C)   Resp 18   Ht 1.778 m (5' 10\")   Wt 81.6 kg (180 lb)   BMI 25.83 kg/m    Constitutional: well-appearing  Psych: normal judgment and insight; normal mood and affect; recent and remote memory intact    UA significant for positive nitrites, small blood, and moderate leukocytes esterase.    ---    (Note documentation was completed, in part, with [email or web address] voice-recognition software. Documentation was reviewed, but some grammatical, spelling, and word errors may remain.)    "

## 2024-11-08 ENCOUNTER — APPOINTMENT (OUTPATIENT)
Dept: MRI IMAGING | Facility: CLINIC | Age: 79
DRG: 056 | End: 2024-11-08
Attending: EMERGENCY MEDICINE
Payer: MEDICARE

## 2024-11-08 ENCOUNTER — NURSE TRIAGE (OUTPATIENT)
Dept: INTERNAL MEDICINE | Facility: CLINIC | Age: 79
End: 2024-11-08

## 2024-11-08 ENCOUNTER — APPOINTMENT (OUTPATIENT)
Dept: GENERAL RADIOLOGY | Facility: CLINIC | Age: 79
DRG: 056 | End: 2024-11-08
Attending: EMERGENCY MEDICINE
Payer: MEDICARE

## 2024-11-08 ENCOUNTER — HOSPITAL ENCOUNTER (INPATIENT)
Facility: CLINIC | Age: 79
LOS: 7 days | Discharge: HOME | End: 2024-11-15
Attending: EMERGENCY MEDICINE | Admitting: STUDENT IN AN ORGANIZED HEALTH CARE EDUCATION/TRAINING PROGRAM
Payer: MEDICARE

## 2024-11-08 DIAGNOSIS — I5A MYOCARDIAL INJURY: Primary | ICD-10-CM

## 2024-11-08 DIAGNOSIS — I10 BENIGN ESSENTIAL HYPERTENSION: ICD-10-CM

## 2024-11-08 DIAGNOSIS — R53.1 GENERALIZED WEAKNESS: ICD-10-CM

## 2024-11-08 DIAGNOSIS — N30.01 ACUTE CYSTITIS WITH HEMATURIA: ICD-10-CM

## 2024-11-08 DIAGNOSIS — G12.20 MND (MOTOR NEURONE DISEASE) (H): ICD-10-CM

## 2024-11-08 DIAGNOSIS — I35.1 NONRHEUMATIC AORTIC VALVE INSUFFICIENCY: ICD-10-CM

## 2024-11-08 DIAGNOSIS — R13.12 OROPHARYNGEAL DYSPHAGIA: ICD-10-CM

## 2024-11-08 DIAGNOSIS — I71.21 ANEURYSM OF ASCENDING AORTA WITHOUT RUPTURE (H): ICD-10-CM

## 2024-11-08 DIAGNOSIS — I25.10 CORONARY ARTERY DISEASE INVOLVING NATIVE CORONARY ARTERY OF NATIVE HEART WITHOUT ANGINA PECTORIS: ICD-10-CM

## 2024-11-08 DIAGNOSIS — R79.89 ELEVATED TROPONIN: ICD-10-CM

## 2024-11-08 LAB
ALBUMIN SERPL BCG-MCNC: 3.7 G/DL (ref 3.5–5.2)
ALBUMIN UR-MCNC: 50 MG/DL
ALP SERPL-CCNC: 71 U/L (ref 40–150)
ALT SERPL W P-5'-P-CCNC: 184 U/L (ref 0–70)
ANION GAP SERPL CALCULATED.3IONS-SCNC: 14 MMOL/L (ref 7–15)
APPEARANCE UR: ABNORMAL
AST SERPL W P-5'-P-CCNC: 117 U/L (ref 0–45)
ATRIAL RATE - MUSE: 61 BPM
BACTERIA #/AREA URNS HPF: ABNORMAL /HPF
BASOPHILS # BLD AUTO: 0 10E3/UL (ref 0–0.2)
BASOPHILS NFR BLD AUTO: 0 %
BILIRUB SERPL-MCNC: 0.5 MG/DL
BILIRUB UR QL STRIP: NEGATIVE
BUN SERPL-MCNC: 57 MG/DL (ref 8–23)
CALCIUM SERPL-MCNC: 12.6 MG/DL (ref 8.8–10.4)
CHLORIDE SERPL-SCNC: 106 MMOL/L (ref 98–107)
COLOR UR AUTO: ABNORMAL
CREAT SERPL-MCNC: 0.75 MG/DL (ref 0.67–1.17)
DIASTOLIC BLOOD PRESSURE - MUSE: NORMAL MMHG
EGFRCR SERPLBLD CKD-EPI 2021: >90 ML/MIN/1.73M2
EOSINOPHIL # BLD AUTO: 0 10E3/UL (ref 0–0.7)
EOSINOPHIL NFR BLD AUTO: 0 %
ERYTHROCYTE [DISTWIDTH] IN BLOOD BY AUTOMATED COUNT: 13.2 % (ref 10–15)
GLUCOSE SERPL-MCNC: 98 MG/DL (ref 70–99)
GLUCOSE UR STRIP-MCNC: NEGATIVE MG/DL
HCO3 SERPL-SCNC: 19 MMOL/L (ref 22–29)
HCT VFR BLD AUTO: 41.8 % (ref 40–53)
HGB BLD-MCNC: 13.7 G/DL (ref 13.3–17.7)
HGB UR QL STRIP: ABNORMAL
HOLD SPECIMEN: NORMAL
HOLD SPECIMEN: NORMAL
IMM GRANULOCYTES # BLD: 0.3 10E3/UL
IMM GRANULOCYTES NFR BLD: 3 %
INTERPRETATION ECG - MUSE: NORMAL
KETONES UR STRIP-MCNC: ABNORMAL MG/DL
LEUKOCYTE ESTERASE UR QL STRIP: ABNORMAL
LYMPHOCYTES # BLD AUTO: 1 10E3/UL (ref 0.8–5.3)
LYMPHOCYTES NFR BLD AUTO: 10 %
MCH RBC QN AUTO: 33.6 PG (ref 26.5–33)
MCHC RBC AUTO-ENTMCNC: 32.8 G/DL (ref 31.5–36.5)
MCV RBC AUTO: 103 FL (ref 78–100)
MONOCYTES # BLD AUTO: 0.5 10E3/UL (ref 0–1.3)
MONOCYTES NFR BLD AUTO: 5 %
MUCOUS THREADS #/AREA URNS LPF: PRESENT /LPF
NEUTROPHILS # BLD AUTO: 8.3 10E3/UL (ref 1.6–8.3)
NEUTROPHILS NFR BLD AUTO: 83 %
NITRATE UR QL: POSITIVE
NRBC # BLD AUTO: 0 10E3/UL
NRBC BLD AUTO-RTO: 0 /100
NT-PROBNP SERPL-MCNC: 159 PG/ML (ref 0–1800)
P AXIS - MUSE: 48 DEGREES
PH UR STRIP: 8.5 [PH] (ref 5–7)
PLATELET # BLD AUTO: 376 10E3/UL (ref 150–450)
POTASSIUM SERPL-SCNC: 5.1 MMOL/L (ref 3.4–5.3)
PR INTERVAL - MUSE: 188 MS
PROT SERPL-MCNC: 6.4 G/DL (ref 6.4–8.3)
QRS DURATION - MUSE: 104 MS
QT - MUSE: 382 MS
QTC - MUSE: 384 MS
R AXIS - MUSE: -1 DEGREES
RBC # BLD AUTO: 4.08 10E6/UL (ref 4.4–5.9)
RBC URINE: 36 /HPF
SARS-COV-2 RNA RESP QL NAA+PROBE: NEGATIVE
SODIUM SERPL-SCNC: 139 MMOL/L (ref 135–145)
SP GR UR STRIP: 1.01 (ref 1–1.03)
SYSTOLIC BLOOD PRESSURE - MUSE: NORMAL MMHG
T AXIS - MUSE: -15 DEGREES
TRI-PHOS CRY #/AREA URNS HPF: ABNORMAL /HPF
TROPONIN T SERPL HS-MCNC: 812 NG/L
TROPONIN T SERPL HS-MCNC: 831 NG/L
UROBILINOGEN UR STRIP-MCNC: NORMAL MG/DL
VENTRICULAR RATE- MUSE: 61 BPM
WBC # BLD AUTO: 10 10E3/UL (ref 4–11)
WBC CLUMPS #/AREA URNS HPF: PRESENT /HPF
WBC URINE: 112 /HPF

## 2024-11-08 PROCEDURE — 99223 1ST HOSP IP/OBS HIGH 75: CPT | Mod: AI | Performed by: STUDENT IN AN ORGANIZED HEALTH CARE EDUCATION/TRAINING PROGRAM

## 2024-11-08 PROCEDURE — 70551 MRI BRAIN STEM W/O DYE: CPT | Mod: MG

## 2024-11-08 PROCEDURE — 96365 THER/PROPH/DIAG IV INF INIT: CPT

## 2024-11-08 PROCEDURE — G1010 CDSM STANSON: HCPCS

## 2024-11-08 PROCEDURE — 84155 ASSAY OF PROTEIN SERUM: CPT | Performed by: EMERGENCY MEDICINE

## 2024-11-08 PROCEDURE — 5A09357 ASSISTANCE WITH RESPIRATORY VENTILATION, LESS THAN 24 CONSECUTIVE HOURS, CONTINUOUS POSITIVE AIRWAY PRESSURE: ICD-10-PCS | Performed by: STUDENT IN AN ORGANIZED HEALTH CARE EDUCATION/TRAINING PROGRAM

## 2024-11-08 PROCEDURE — 84484 ASSAY OF TROPONIN QUANT: CPT | Performed by: EMERGENCY MEDICINE

## 2024-11-08 PROCEDURE — 36415 COLL VENOUS BLD VENIPUNCTURE: CPT | Performed by: EMERGENCY MEDICINE

## 2024-11-08 PROCEDURE — 85025 COMPLETE CBC W/AUTO DIFF WBC: CPT | Performed by: EMERGENCY MEDICINE

## 2024-11-08 PROCEDURE — 99285 EMERGENCY DEPT VISIT HI MDM: CPT | Mod: 25

## 2024-11-08 PROCEDURE — 120N000001 HC R&B MED SURG/OB

## 2024-11-08 PROCEDURE — 72141 MRI NECK SPINE W/O DYE: CPT | Mod: MF

## 2024-11-08 PROCEDURE — 999N000157 HC STATISTIC RCP TIME EA 10 MIN

## 2024-11-08 PROCEDURE — 85014 HEMATOCRIT: CPT | Performed by: EMERGENCY MEDICINE

## 2024-11-08 PROCEDURE — 71046 X-RAY EXAM CHEST 2 VIEWS: CPT

## 2024-11-08 PROCEDURE — 82435 ASSAY OF BLOOD CHLORIDE: CPT | Performed by: EMERGENCY MEDICINE

## 2024-11-08 PROCEDURE — 87186 SC STD MICRODIL/AGAR DIL: CPT | Performed by: EMERGENCY MEDICINE

## 2024-11-08 PROCEDURE — 81001 URINALYSIS AUTO W/SCOPE: CPT | Performed by: EMERGENCY MEDICINE

## 2024-11-08 PROCEDURE — 83880 ASSAY OF NATRIURETIC PEPTIDE: CPT | Performed by: EMERGENCY MEDICINE

## 2024-11-08 PROCEDURE — 87635 SARS-COV-2 COVID-19 AMP PRB: CPT | Performed by: EMERGENCY MEDICINE

## 2024-11-08 PROCEDURE — 250N000011 HC RX IP 250 OP 636: Performed by: EMERGENCY MEDICINE

## 2024-11-08 PROCEDURE — 93005 ELECTROCARDIOGRAM TRACING: CPT

## 2024-11-08 RX ORDER — HEPARIN SODIUM 10000 [USP'U]/100ML
0-5000 INJECTION, SOLUTION INTRAVENOUS CONTINUOUS
Status: DISCONTINUED | OUTPATIENT
Start: 2024-11-08 | End: 2024-11-09

## 2024-11-08 RX ORDER — ASPIRIN 81 MG/1
81 TABLET ORAL EVERY EVENING
COMMUNITY

## 2024-11-08 RX ORDER — CEFTRIAXONE 1 G/1
1 INJECTION, POWDER, FOR SOLUTION INTRAMUSCULAR; INTRAVENOUS EVERY 24 HOURS
Status: DISCONTINUED | OUTPATIENT
Start: 2024-11-09 | End: 2024-11-10

## 2024-11-08 RX ORDER — PROCHLORPERAZINE 25 MG
12.5 SUPPOSITORY, RECTAL RECTAL EVERY 12 HOURS PRN
Status: DISCONTINUED | OUTPATIENT
Start: 2024-11-08 | End: 2024-11-15 | Stop reason: HOSPADM

## 2024-11-08 RX ORDER — HEPARIN SODIUM 10000 [USP'U]/100ML
0-5000 INJECTION, SOLUTION INTRAVENOUS CONTINUOUS
Status: DISCONTINUED | OUTPATIENT
Start: 2024-11-08 | End: 2024-11-08

## 2024-11-08 RX ORDER — CEFTRIAXONE 1 G/1
1 INJECTION, POWDER, FOR SOLUTION INTRAMUSCULAR; INTRAVENOUS ONCE
Status: COMPLETED | OUTPATIENT
Start: 2024-11-08 | End: 2024-11-08

## 2024-11-08 RX ORDER — PROCHLORPERAZINE MALEATE 5 MG/1
5 TABLET ORAL EVERY 6 HOURS PRN
Status: DISCONTINUED | OUTPATIENT
Start: 2024-11-08 | End: 2024-11-15 | Stop reason: HOSPADM

## 2024-11-08 RX ORDER — ONDANSETRON 2 MG/ML
4 INJECTION INTRAMUSCULAR; INTRAVENOUS EVERY 6 HOURS PRN
Status: DISCONTINUED | OUTPATIENT
Start: 2024-11-08 | End: 2024-11-15 | Stop reason: HOSPADM

## 2024-11-08 RX ORDER — LIDOCAINE 40 MG/G
CREAM TOPICAL
Status: DISCONTINUED | OUTPATIENT
Start: 2024-11-08 | End: 2024-11-15 | Stop reason: HOSPADM

## 2024-11-08 RX ORDER — AMOXICILLIN 250 MG
2 CAPSULE ORAL 2 TIMES DAILY PRN
Status: DISCONTINUED | OUTPATIENT
Start: 2024-11-08 | End: 2024-11-15 | Stop reason: HOSPADM

## 2024-11-08 RX ORDER — AMOXICILLIN 250 MG
1 CAPSULE ORAL 2 TIMES DAILY PRN
Status: DISCONTINUED | OUTPATIENT
Start: 2024-11-08 | End: 2024-11-15 | Stop reason: HOSPADM

## 2024-11-08 RX ORDER — CALCIUM CARBONATE 500 MG/1
1000 TABLET, CHEWABLE ORAL 4 TIMES DAILY PRN
Status: DISCONTINUED | OUTPATIENT
Start: 2024-11-08 | End: 2024-11-15 | Stop reason: HOSPADM

## 2024-11-08 RX ORDER — LEVOTHYROXINE SODIUM 150 UG/1
150 TABLET ORAL DAILY
Status: DISCONTINUED | OUTPATIENT
Start: 2024-11-09 | End: 2024-11-15 | Stop reason: HOSPADM

## 2024-11-08 RX ORDER — SODIUM CHLORIDE 9 MG/ML
INJECTION, SOLUTION INTRAVENOUS CONTINUOUS
Status: ACTIVE | OUTPATIENT
Start: 2024-11-08 | End: 2024-11-09

## 2024-11-08 RX ORDER — ASPIRIN 81 MG/1
81 TABLET ORAL DAILY
Status: DISCONTINUED | OUTPATIENT
Start: 2024-11-09 | End: 2024-11-13

## 2024-11-08 RX ORDER — ONDANSETRON 4 MG/1
4 TABLET, ORALLY DISINTEGRATING ORAL EVERY 6 HOURS PRN
Status: DISCONTINUED | OUTPATIENT
Start: 2024-11-08 | End: 2024-11-15 | Stop reason: HOSPADM

## 2024-11-08 RX ORDER — FENTANYL CITRATE 50 UG/ML
50 INJECTION, SOLUTION INTRAMUSCULAR; INTRAVENOUS ONCE
Status: COMPLETED | OUTPATIENT
Start: 2024-11-08 | End: 2024-11-08

## 2024-11-08 RX ADMIN — HEPARIN SODIUM 950 UNITS/HR: 10000 INJECTION, SOLUTION INTRAVENOUS at 19:34

## 2024-11-08 RX ADMIN — FENTANYL CITRATE 50 MCG: 50 INJECTION INTRAMUSCULAR; INTRAVENOUS at 18:45

## 2024-11-08 RX ADMIN — CEFTRIAXONE SODIUM 1 G: 1 INJECTION, POWDER, FOR SOLUTION INTRAMUSCULAR; INTRAVENOUS at 17:49

## 2024-11-08 ASSESSMENT — ACTIVITIES OF DAILY LIVING (ADL)
ADLS_ACUITY_SCORE: 0

## 2024-11-08 ASSESSMENT — COLUMBIA-SUICIDE SEVERITY RATING SCALE - C-SSRS
6. HAVE YOU EVER DONE ANYTHING, STARTED TO DO ANYTHING, OR PREPARED TO DO ANYTHING TO END YOUR LIFE?: NO
1. IN THE PAST MONTH, HAVE YOU WISHED YOU WERE DEAD OR WISHED YOU COULD GO TO SLEEP AND NOT WAKE UP?: NO
2. HAVE YOU ACTUALLY HAD ANY THOUGHTS OF KILLING YOURSELF IN THE PAST MONTH?: NO

## 2024-11-08 NOTE — ED NOTES
[facility]  ED Nurse Handoff Report    ED Chief complaint: Extremity Weakness, Generalized Weakness, and Shortness of Breath      ED Diagnosis:   Final diagnoses:   None       Code Status: tbd    Allergies: No Known Allergies    Patient Story: Steven comes to the ER for difficulty swallowing and shortness of breath. He has a uti and his trop is elevated so he will be getting antibiotics and heparin.      Treatments and/or interventions provided: antibiotics/heparin  Patient's response to treatments and/or interventions: tbd    To be done/followed up on inpatient unit:  cont to monitor    Does this patient have any cognitive concerns?:  none    Activity level - Baseline/Home:  Independent  Activity Level - Current:   Stand with Assist    Patient's Preferred language: English   Needed?: No    Isolation: None  Infection: Not Applicable  Patient tested for COVID 19 prior to admission: NO  Bariatric?: No    Vital Signs:   Vitals:    11/08/24 1545 11/08/24 1600 11/08/24 1615 11/08/24 1630   BP: (!) 159/60 121/60 126/63 124/63   Pulse: 61 59 56 54   Resp:   14    Temp:       TempSrc:       SpO2:       Weight:       Height:           Cardiac Rhythm:     Was the PSS-3 completed:   Yes  What interventions are required if any?               Family Comments: wife at bedside  OBS brochure/video discussed/provided to patient/family: No              Name of person given brochure if not patient: na              Relationship to patient: na    For the majority of the shift this patient's behavior was Green.   Behavioral interventions performed were na.    ED NURSE PHONE NUMBER: [phone]

## 2024-11-08 NOTE — TELEPHONE ENCOUNTER
"S-(situation): Difficulty swallowing  and fatigue/ weakness.     B-(background): Patient is finding it difficult to swallow food and fluid. He is SOB in the conversation. He is fatigue and has weakness.    Poor oral intake and output.  Now on antibiotics for UTI.     A-(assessment): Patient was told to call 911. He does not want to do this but his wife will drive him to the [facility] ER now.     R-(recommendations): Patient was told safe transfer was 911. He declines and is  going to the ER via car.     Reason for Disposition   SEVERE difficulty breathing (e.g., struggling for each breath, speaks in single words)    Answer Assessment - Initial Assessment Questions  1. DESCRIPTION: \"Describe how you are feeling.\"      Very weak  for a week.   2. SEVERITY: \"How bad is it?\"  \"Can you stand and walk?\"    - MILD (0-3): Feels weak or tired, but does not interfere with work, school or normal activities.    - MODERATE (4-7): Able to stand and walk; weakness interferes with work, school, or normal activities.    - SEVERE (8-10): Unable to stand or walk; unable to do usual activities.      Moderate.   3. ONSET: \"When did these symptoms begin?\" (e.g., hours, days, weeks, months)      One week ago.   4. CAUSE: \"What do you think is causing the weakness or fatigue?\" (e.g., not drinking enough fluids, medical problem, trouble sleeping)      Not sure.   5. NEW MEDICINES:  \"Have you started on any new medicines recently?\" (e.g., opioid pain medicines, benzodiazepines, muscle relaxants, antidepressants, antihistamines, neuroleptics, beta blockers)      Yes, antibiotic.   6. OTHER SYMPTOMS: \"Do you have any other symptoms?\" (e.g., chest pain, fever, cough, SOB, vomiting, diarrhea, bleeding, other areas of pain)      Some SOB.  7. PREGNANCY: \"Is there any chance you are pregnant?\" \"When was your last menstrual period?\"      N/A    Protocols used: Weakness (Generalized) and Fatigue-A-    Pati Pearson RN  -[facility]

## 2024-11-08 NOTE — ED TRIAGE NOTES
Per nurse call:  Patient is finding it difficult to swallow food and fluid. He is SOB in the conversation. He is fatigue and has weakness.     Patient started on abx for UTI on 11/7. He has had 2 doses.     Patient and wife state that patient was completely normal 3 weeks ago.

## 2024-11-08 NOTE — ED PROVIDER NOTES
Emergency Department Note      History of Present Illness     Chief Complaint   Extremity Weakness, Generalized Weakness, and Shortness of Breath    HPI   Mendez Greene is a 79 year old male with a history of paroxysmal atrial fibrillation, hypertension, malignant neoplasm of bladder, CHA, CAD, and aortic insufficiency who presents with his spouse for evaluation of generalized weakness and shortness of breath. The patient reports that for the past three weeks he has had progressive generalized weakness. States that for the past week, he has developed a hoarse voice that causes him difficulty speaking. He also has a productive cough. He reports decreased appetite and oral intake, dehydration, and weight loss. Adds that he has shortness of breath and dyspnea on exertion and can only walk a few feet at a time. He is taking all of his medications but has trouble with this and has to take small sips of water at a time. Notes that he was going to get an outpatient lab draw today and meet with a doctor next week, but the nurse over the phone told him to go to the emergency department with concerns for a stroke. He denies headache, new neck pain, vision changes, chest pain, fever, and chills.     Independent Historian   None    Review of External Notes   I reviewed nurse triage note.  Patient was describing swallowing difficulty, fatigue, weakness, told to call 911.  Also seen 10/23/2024 for muscle soreness, stiffness, gait instability, increased fall risk.    Past Medical History     Medical History and Problem List   Aortic insufficiency   Hypertension  CAD  BCC  Hypothyroidism  CHA on CPAP  Paroxysmal atrial fibrillation   Malignant neoplasm of bladder  Hypercholesterolemia     Medications   Aspirin 81 mg  Cefpodoxime  Levothyroxine  Lisinopril   Simvastatin    Surgical History  Arthroplasty knee, left  Bunionectomy, left  Cataract, bilateral   Cardiac catheterization  Incisional herniorrhaphy  ORIF patella,  "left  Neobladder     Physical Exam     Patient Vitals for the past 24 hrs:   BP Temp Temp src Pulse Resp SpO2 Height Weight   11/08/24 1630 124/63 -- -- 54 -- -- -- --   11/08/24 1615 126/63 -- -- 56 14 -- -- --   11/08/24 1600 121/60 -- -- 59 -- -- -- --   11/08/24 1545 (!) 159/60 -- -- 61 -- -- -- --   11/08/24 1530 121/60 -- -- 54 14 -- -- --   11/08/24 1515 114/61 -- -- 57 17 93 % -- --   11/08/24 1500 114/71 -- -- 62 21 96 % -- --   11/08/24 1146 91/54 98  F (36.7  C) Temporal 68 20 96 % 1.778 m (5' 10\") 79.4 kg (175 lb)     Physical Exam  General: Alert, lying back on the chair in fast-track.  HENT: mucous membranes dry  CV: regular rate, regular rhythm, 2 out of 6 systolic ejection murmur.  Resp: normal effort, clear throughout, no crackles or wheezing  GI: abdomen soft and nontender, no guarding  MSK: no bony tenderness  Skin: appropriately warm and dry  Extremities: Muscle wasting to the forearms, thenar eminence, and hands.  Neuro: alert, clear speech, oriented.  Pupils equal round reactive to light, extraocular movements intact, symmetric smile, normal tongue protrusion, normal palate elevation.  Diffusely weak, 3 out of 5 strength in handgrips, wrist flexion and extension, biceps, triceps.  Left lower extremity weaker than the right secondary to pain.  4-5 strength in knee extension, dorsiflexion, plantarflexion bilaterally.  Fine sensation grossly intact bilateral upper and lower extremities.  Fasciculations noted in the upper extremities.  No clonus.  Psych: normal mood and affect      Diagnostics     Lab Results   Labs Ordered and Resulted from Time of ED Arrival to Time of ED Departure   COMPREHENSIVE METABOLIC PANEL - Abnormal       Result Value    Sodium 139      Potassium 5.1      Carbon Dioxide (CO2) 19 (*)     Anion Gap 14      Urea Nitrogen 57.0 (*)     Creatinine 0.75      GFR Estimate >90      Calcium 12.6 (*)     Chloride 106      Glucose 98      Alkaline Phosphatase 71       (*)     "  (*)     Protein Total 6.4      Albumin 3.7      Bilirubin Total 0.5     ROUTINE UA WITH MICROSCOPIC REFLEX TO CULTURE - Abnormal    Color Urine Light Yellow      Appearance Urine Cloudy (*)     Glucose Urine Negative      Bilirubin Urine Negative      Ketones Urine Trace (*)     Specific Gravity Urine 1.012      Blood Urine Trace (*)     pH Urine 8.5 (*)     Protein Albumin Urine 50 (*)     Urobilinogen Urine Normal      Nitrite Urine Positive (*)     Leukocyte Esterase Urine Large (*)     Bacteria Urine Many (*)     WBC Clumps Urine Present (*)     Mucus Urine Present (*)     Triple Phosphate Crystals Urine Many (*)     RBC Urine 36 (*)     WBC Urine 112 (*)    TROPONIN T, HIGH SENSITIVITY - Abnormal    Troponin T, High Sensitivity 812 (*)    CBC WITH PLATELETS AND DIFFERENTIAL - Abnormal    WBC Count 10.0      RBC Count 4.08 (*)     Hemoglobin 13.7      Hematocrit 41.8       (*)     MCH 33.6 (*)     MCHC 32.8      RDW 13.2      Platelet Count 376      % Neutrophils 83      % Lymphocytes 10      % Monocytes 5      % Eosinophils 0      % Basophils 0      % Immature Granulocytes 3      NRBCs per 100 WBC 0      Absolute Neutrophils 8.3      Absolute Lymphocytes 1.0      Absolute Monocytes 0.5      Absolute Eosinophils 0.0      Absolute Basophils 0.0      Absolute Immature Granulocytes 0.3      Absolute NRBCs 0.0     NT PROBNP INPATIENT - Normal    N terminal Pro BNP Inpatient 159     COVID-19 VIRUS (CORONAVIRUS) BY PCR   TROPONIN T, HIGH SENSITIVITY   URINE CULTURE       Imaging   MR Cervical Spine w/o Contrast   Final Result   IMPRESSION:   HEAD MRI:    1.  No acute intracranial abnormality.   2.  Mild chronic aged-related changes.      CERVICAL SPINE MRI:   1.  Multilevel spondylosis with variable degrees of foraminal narrowing, worst and severe at C3-C4 on the left, moderate-severe on the right.   2.  No significant spinal canal stenosis or definite abnormal spinal cord signal intensity.      MR  Brain w/o Contrast   Final Result   IMPRESSION:   HEAD MRI:    1.  No acute intracranial abnormality.   2.  Mild chronic aged-related changes.      CERVICAL SPINE MRI:   1.  Multilevel spondylosis with variable degrees of foraminal narrowing, worst and severe at C3-C4 on the left, moderate-severe on the right.   2.  No significant spinal canal stenosis or definite abnormal spinal cord signal intensity.      Chest XR,  PA & LAT   Final Result   IMPRESSION: There are no acute infiltrates. The cardiac silhouette is   not enlarged. Pulmonary vasculature is unremarkable.      CHERISE MOSQUEDA MD            SYSTEM ID:  [id number]          EKG   ECG taken at 1430, ECG read at 1444  Normal sinus rhythm  Inferior infarct, age undetermined    No change as compared to prior, dated 1/2/23.  Rate 61 bpm. TN interval 188 ms. QRS duration 104 ms. QT/QTc 382/384 ms. P-R-T axes 48 -1 -15.    Independent Interpretation   None    ED Course      Medications Administered   Medications   heparin loading dose for LOW INTENSITY TREATMENT * Give BEFORE starting heparin infusion (has no administration in time range)   heparin 25,000 units in 0.45% NaCl 250 mL ANTICOAGULANT infusion (has no administration in time range)   cefTRIAXone (ROCEPHIN) 1 g vial to attach to  mL bag for ADULTS or NS 50 mL bag for PEDS (1 g Intravenous $New Bag 11/8/24 6657)       Procedures   Procedures     Discussion of Management   Admitting Hospitalist, Dr. Mann    ED Course   ED Course as of 11/08/24 2152   Fri Nov 08, 2024   1411 I obtained history and examined the patient as noted above.    1730 I rechecked the patient and updated him regarding results.  He is resting quietly.        Additional Documentation  None    Medical Decision Making / Diagnosis     CMS Diagnoses: None    MIPS       None    MDM   Mendez Greene is a 79 year old male with a history of coronary artery disease, atrial fibrillation, hypertension, presenting today with progressively  worsening weakness, poor oral intake.  On exam, the patient is afebrile.  He has muscle wasting mostly involving the upper extremities, and some fasciculations.  EKG does not demonstrate any ischemic changes, but troponin returned elevated.  Patient has not been complaining of any chest pain, or other symptoms I will consider anginal equivalent including shortness of breath, vomiting, or nausea.  He was also noted to have UTI, though is afebrile today.  White blood cell count is normal.  LFTs are slightly elevated, etiology is unclear.  MRI was obtained, given history of difficulty swallowing, which is negative for evidence of stroke.  Patient does have multilevel spondylolysis with foraminal narrowing in the cervical spine.  He will be admitted for IV antibiotics, continued heparin drip, cardiology evaluation, and cardiac monitoring.    Disposition   The patient was admitted to the hospital.     Diagnosis     ICD-10-CM    1. Generalized weakness  R53.1       2. Elevated troponin  R79.89       3. Acute cystitis with hematuria  N30.01            Scribe Disclosure:  I, Coral Webb, am serving as a scribe at 2:02 PM on 11/8/2024 to document services personally performed by Hetal Stewart MD based on my observations and the provider's statements to me.        Hetal Stewart MD  11/08/24 3756     ADHD

## 2024-11-09 ENCOUNTER — APPOINTMENT (OUTPATIENT)
Dept: SPEECH THERAPY | Facility: CLINIC | Age: 79
DRG: 056 | End: 2024-11-09
Attending: STUDENT IN AN ORGANIZED HEALTH CARE EDUCATION/TRAINING PROGRAM
Payer: MEDICARE

## 2024-11-09 ENCOUNTER — APPOINTMENT (OUTPATIENT)
Dept: CARDIOLOGY | Facility: CLINIC | Age: 79
End: 2024-11-09
Attending: STUDENT IN AN ORGANIZED HEALTH CARE EDUCATION/TRAINING PROGRAM
Payer: MEDICARE

## 2024-11-09 LAB
ALBUMIN SERPL BCG-MCNC: 3.3 G/DL (ref 3.5–5.2)
ALP SERPL-CCNC: 64 U/L (ref 40–150)
ALT SERPL W P-5'-P-CCNC: 156 U/L (ref 0–70)
ANION GAP SERPL CALCULATED.3IONS-SCNC: 8 MMOL/L (ref 7–15)
AST SERPL W P-5'-P-CCNC: 91 U/L (ref 0–45)
BACTERIA UR CULT: ABNORMAL
BACTERIA UR CULT: ABNORMAL
BILIRUB SERPL-MCNC: 0.5 MG/DL
BUN SERPL-MCNC: 54.2 MG/DL (ref 8–23)
CALCIUM SERPL-MCNC: 11.7 MG/DL (ref 8.8–10.4)
CHLORIDE SERPL-SCNC: 108 MMOL/L (ref 98–107)
CREAT SERPL-MCNC: 0.62 MG/DL (ref 0.67–1.17)
EGFRCR SERPLBLD CKD-EPI 2021: >90 ML/MIN/1.73M2
ERYTHROCYTE [DISTWIDTH] IN BLOOD BY AUTOMATED COUNT: 13.5 % (ref 10–15)
GLUCOSE SERPL-MCNC: 129 MG/DL (ref 70–99)
HCO3 SERPL-SCNC: 23 MMOL/L (ref 22–29)
HCT VFR BLD AUTO: 39.4 % (ref 40–53)
HGB BLD-MCNC: 12.8 G/DL (ref 13.3–17.7)
LVEF ECHO: NORMAL
MCH RBC QN AUTO: 33.6 PG (ref 26.5–33)
MCHC RBC AUTO-ENTMCNC: 32.5 G/DL (ref 31.5–36.5)
MCV RBC AUTO: 103 FL (ref 78–100)
PLATELET # BLD AUTO: 309 10E3/UL (ref 150–450)
POTASSIUM SERPL-SCNC: 4.1 MMOL/L (ref 3.4–5.3)
PROT SERPL-MCNC: 5.7 G/DL (ref 6.4–8.3)
RBC # BLD AUTO: 3.81 10E6/UL (ref 4.4–5.9)
SODIUM SERPL-SCNC: 139 MMOL/L (ref 135–145)
UFH PPP CHRO-ACNC: 0.39 IU/ML
UFH PPP CHRO-ACNC: 0.47 IU/ML
WBC # BLD AUTO: 6.8 10E3/UL (ref 4–11)

## 2024-11-09 PROCEDURE — 250N000011 HC RX IP 250 OP 636: Performed by: STUDENT IN AN ORGANIZED HEALTH CARE EDUCATION/TRAINING PROGRAM

## 2024-11-09 PROCEDURE — 120N000001 HC R&B MED SURG/OB

## 2024-11-09 PROCEDURE — 92526 ORAL FUNCTION THERAPY: CPT | Mod: GN

## 2024-11-09 PROCEDURE — 93306 TTE W/DOPPLER COMPLETE: CPT | Mod: 26 | Performed by: INTERNAL MEDICINE

## 2024-11-09 PROCEDURE — 92610 EVALUATE SWALLOWING FUNCTION: CPT | Mod: GN

## 2024-11-09 PROCEDURE — 255N000002 HC RX 255 OP 636: Performed by: STUDENT IN AN ORGANIZED HEALTH CARE EDUCATION/TRAINING PROGRAM

## 2024-11-09 PROCEDURE — 258N000003 HC RX IP 258 OP 636: Performed by: STUDENT IN AN ORGANIZED HEALTH CARE EDUCATION/TRAINING PROGRAM

## 2024-11-09 PROCEDURE — 36415 COLL VENOUS BLD VENIPUNCTURE: CPT | Performed by: STUDENT IN AN ORGANIZED HEALTH CARE EDUCATION/TRAINING PROGRAM

## 2024-11-09 PROCEDURE — 85520 HEPARIN ASSAY: CPT | Performed by: STUDENT IN AN ORGANIZED HEALTH CARE EDUCATION/TRAINING PROGRAM

## 2024-11-09 PROCEDURE — 80053 COMPREHEN METABOLIC PANEL: CPT | Performed by: STUDENT IN AN ORGANIZED HEALTH CARE EDUCATION/TRAINING PROGRAM

## 2024-11-09 PROCEDURE — 250N000013 HC RX MED GY IP 250 OP 250 PS 637: Performed by: STUDENT IN AN ORGANIZED HEALTH CARE EDUCATION/TRAINING PROGRAM

## 2024-11-09 PROCEDURE — C8929 TTE W OR WO FOL WCON,DOPPLER: HCPCS

## 2024-11-09 PROCEDURE — 99233 SBSQ HOSP IP/OBS HIGH 50: CPT | Performed by: STUDENT IN AN ORGANIZED HEALTH CARE EDUCATION/TRAINING PROGRAM

## 2024-11-09 PROCEDURE — 85018 HEMOGLOBIN: CPT | Performed by: STUDENT IN AN ORGANIZED HEALTH CARE EDUCATION/TRAINING PROGRAM

## 2024-11-09 RX ORDER — SODIUM CHLORIDE 9 MG/ML
INJECTION, SOLUTION INTRAVENOUS CONTINUOUS
Status: ACTIVE | OUTPATIENT
Start: 2024-11-09 | End: 2024-11-09

## 2024-11-09 RX ADMIN — HEPARIN SODIUM 950 UNITS/HR: 10000 INJECTION, SOLUTION INTRAVENOUS at 13:46

## 2024-11-09 RX ADMIN — CEFTRIAXONE SODIUM 1 G: 1 INJECTION, POWDER, FOR SOLUTION INTRAMUSCULAR; INTRAVENOUS at 17:04

## 2024-11-09 RX ADMIN — SODIUM CHLORIDE: 900 INJECTION, SOLUTION INTRAVENOUS at 13:32

## 2024-11-09 RX ADMIN — PERFLUTREN 1.5 ML: 6.52 INJECTION, SUSPENSION INTRAVENOUS at 15:43

## 2024-11-09 RX ADMIN — SODIUM CHLORIDE: 900 INJECTION, SOLUTION INTRAVENOUS at 20:22

## 2024-11-09 RX ADMIN — ASPIRIN 81 MG: 81 TABLET, COATED ORAL at 09:01

## 2024-11-09 RX ADMIN — SODIUM CHLORIDE: 900 INJECTION, SOLUTION INTRAVENOUS at 00:09

## 2024-11-09 RX ADMIN — LEVOTHYROXINE SODIUM 150 MCG: 150 TABLET ORAL at 06:38

## 2024-11-09 NOTE — PROGRESS NOTES
Bedside Swallow Evaluation      11/09/24 0800   Appointment Info   Signing Clinician's Name / Credentials (SLP) Sally Giordano MA, CCC-SLP   General Information   Onset of Illness/Injury or Date of Surgery 11/08/24   Referring Physician Tristian Mann MD   Patient/Family Therapy Goal Statement (SLP) Help with swallowing   Pertinent History of Current Problem Per MD note: Mendez Greene is a 79 year old male admitted on 11/8/2024. He presents with generalized weakness/difficulty swallowing.   General Observations Sitting in bed, pleasant   Type of Evaluation   Type of Evaluation Swallow Evaluation   Oral Motor   Oral Musculature generally intact   Dentition (Oral Motor)   Dentition (Oral Motor) adequate dentition   Facial Symmetry (Oral Motor)   Facial Symmetry (Oral Motor) WNL   Lip Function (Oral Motor)   Lip Range of Motion (Oral Motor) WNL   Tongue Function (Oral Motor)   Tongue ROM (Oral Motor) WNL   Jaw Function (Oral Motor)   Jaw Function (Oral Motor) not tested   Cough/Swallow/Gag Reflex (Oral Motor)   Soft Palate/Velum (Oral Motor) WNL   Volitional Throat Clear/Cough (Oral Motor) impaired;reduced strength   Volitional Swallow (Oral Motor) mildly delayed   Vocal Quality/Secretion Management (Oral Motor)   Vocal Quality (Oral Motor) breathy   Secretion Management (Oral Motor) WNL   General Swallowing Observations   Past History of Dysphagia Patient repots progressive woresening of swallowing within the last 10 days. He reports it is with both liquids and solid foods. The foods he has difficulty with are dry, bread foods. He does not have much meat (steak, pork chop), and typically sticks with softer foods. No hx of speech therapy or VFSS.   Respiratory Support room air   Current Diet/Method of Nutritional Intake (General Swallowing Observations, NIS) regular diet   Swallowing Evaluation Clinical swallow evaluation   Clinical Swallow Evaluation   Feeding Assistance set up only required   Additional evaluation(s)  completed today No   Clinical Swallow Evaluation Textures Trialed thin liquids;slightly thick liquids;pureed   Clinical Swallow Eval: Thin Liquid Texture Trial   Mode of Presentation, Thin Liquids cup;self-fed   Volume of Liquid or Food Presented 1oz   Oral Phase of Swallow WFL   Pharyngeal Phase of Swallow impaired;coughing/choking;throat clearing   Diagnostic Statement Consistent throat clear and/or cough on 80% of single sips.   Clinical Swallow Eval: Slightly Thick Liquids   Mode of Presentation cup;self-fed   Volume Presented 3 oz   Oral Phase WFL   Pharyngeal Phase impaired;throat clearing   Diagnostic Statement Decrease in coughing (not present), but ongoing throat clearing noted in ~50% of trials. Taking single sips.   Clinical Swallow Evaluation: Puree Solid Texture Trial   Mode of Presentation, Puree spoon;self-fed   Volume of Puree Presented 4oz   Oral Phase, Puree WFL   Pharyngeal Phase, Puree impaired;repeated swallows   Diagnostic Statement Difficulty self-feeding but successful once set-up. Noted 2 swallows per tsp and occasional throat clear. No overt coughing   Esophageal Phase of Swallow   Patient reports or presents with symptoms of esophageal dysphagia No   Swallowing Recommendations   Diet Consistency Recommendations regular diet;slightly thick liquids (level 1)   Supervision Level for Intake distant supervision needed   Mode of Delivery Recommendations no straws;bolus size, small;slow rate of intake   Postural Recommendations none   Swallowing Maneuver Recommendations alternate food and liquid intake;extra swallow   Monitoring/Assistance Required (Eating/Swallowing) monitor for cough or change in vocal quality with intake   Recommended Feeding/Eating Techniques (Swallow Eval) maintain upright posture during/after eating for 30 minutes;set-up and prepare tray   Medication Administration Recommendations, Swallowing (SLP) Whole as tolerated   Instrumental Assessment Recommendations VFSS  (videofluoroscopic swallowing study)  (Consider VFSS IP vs OP pending hospital stay)   Comment, Swallowing Recommendations Patient with coughing and throat clearing noted consistently with thin liquids. Improved with slightly thick liquids, although throat clearing noted in 50%. Tolerated purees without difficutly. Refused cracker as this was a food he would not typically eat at home   General Therapy Interventions   Planned Therapy Interventions Dysphagia Treatment   Dysphagia treatment Modified diet education;Instruction of safe swallow strategies;Compensatory strategies for swallowing   Clinical Impression   Criteria for Skilled Therapeutic Interventions Met (SLP Eval) Yes, treatment indicated   SLP Diagnosis mild oral pharyngeal dysphagia   Risks & Benefits of therapy have been explained evaluation/treatment results reviewed;care plan/treatment goals reviewed;risks/benefits reviewed;current/potential barriers reviewed;participants voiced agreement with care plan;participants included;patient   Clinical Impression Comments Patient presents with mild oral pharyngeal dysphagia secondary to generalized weakness that it was noted in the past 10 days. He is demonstrating coughing with thin liquids, and self-limiting regular, advanced textures. Recommend continue regular diet with pt self-choosing softer foods and slightly thick liquids. Swallow precautions: avoid straws, sitting in the chair, single sips, softer/moisten foods, and double swallows as needed.   SLP Total Evaluation Time   Eval: oral/pharyngeal swallow function, clinical swallow Minutes (97334) 15   SLP Goals   Therapy Frequency (SLP Eval) daily   SLP Predicted Duration/Target Date for Goal Attainment 11/15/24   SLP Goals Swallow   SLP: Safely tolerate diet without signs/symptoms of aspiration Regular diet;Thin liquids;Independently   Interventions   Interventions Quick Adds Swallowing Dysfunction   Swallowing Intervention   Treatment of Swallowing  Dysfunction &/or Oral Function for Feeding Minutes (27045) 10   Symptoms Noted During/After Treatment None   Treatment Detail/Skilled Intervention Discussed trialing a modified diet (soft bite size vs easy to chew). However, pt reports he already chooses foods that are safe and easier for him to eat. Discussed staying on a regular diet and ongoing independently self-selection.   SLP Discharge Planning   SLP Plan monitor improvements with slightly thick   SLP Discharge Recommendation home with outpatient therapy services;Transitional Care Facility   SLP Rationale for DC Rec Patient may benefit from OP VFSS pending medical plan and OP speech therapy for monitoring of diet   SLP Brief overview of current status  Clinical swallow evaluation completed. Recommend continue regular diet with pt self-choosing softer foods and slightly thick liquids. Swallow precautions: avoid straws, sitting in the chair, single sips, softer/moisten foods, and double swallows as needed.   SLP Time and Intention   Total Session Time (sum of timed and untimed services) 25

## 2024-11-09 NOTE — PROGRESS NOTES
[facility]    Medicine Progress Note - Hospitalist Service    Date of Admission:  11/8/2024    Assessment & Plan      Mendez Greene is a 79 year old male admitted on 11/8/2024. He presents with generalized weakness/difficulty swallowing.      Acute cystitis with hematuria    Generalized weakness    Assessment: Presents with ongoing weakness and fatigue in the setting of recently diagnosed urinary tract infection.  On admission his count was elevated to 812 otherwise rest was workup is largely benign.    Plan:   -IV Ceftriaxone  -Follow UCs  -IV fluids continued for now  -Fall precautions  -PT eval      CHA on CPAP    Assessment/Plan: Continue PTA CPAP      CAD (coronary artery disease)    Elevated troponin    Assessment: Troponin elevated to 800, patient endorsed some dyspnea but no chest pain at this time.  PTA on 81 mg of aspirin daily.    Plan:   -IV heparin  -Daily ASA  -Trend troponin -> continues to stay quite elevated  -Echocardiogram pending  -Cardiology consulted      PAF (paroxysmal atrial fibrillation) (H)    Assessment/Plan: Not anticoagulated or on rate control at this time.  Will monitor on telemetry.      Ascending aortic aneurysm (H)    Assessment/Plan: obtain ECHO      Pure hypercholesterolemia    Benign essential hypertension    Assessment/Plan: Continue prior to admission lisinopril/Zocor      History of basal cell carcinoma    Assessment/Plan: Stable, follows an outpatient      Hypothyroidism    Assessment/Plan: Continue prior to admission levothyroxine            Diet: Snacks/Supplements Adult: Magic Cup; With Meals  Combination Diet Regular Diet    DVT Prophylaxis: Heparin gtt  Cortez Catheter: Not present  Lines: None     Cardiac Monitoring: ACTIVE order. Indication: AMI (NSTEMI/ STEMI) (48 hours)  Code Status: Full Code      Clinically Significant Risk Factors Present on Admission          # Hyperchloremia: Highest Cl = 108 mmol/L in last 2 days, will monitor as  appropriate       # Hypercalcemia: Highest Ca = 12.6 mg/dL in last 2 days, will monitor as appropriate    # Hypoalbuminemia: Lowest albumin = 3.3 g/dL at 11/9/2024 11:39 AM, will monitor as appropriate   # Drug Induced Platelet Defect: home medication list includes an antiplatelet medication   # Hypertension: Noted on problem list            # Severe Malnutrition: based on nutrition assessment            Disposition Plan     Medically Ready for Discharge: Anticipated in 2-4 Days             Tristian Mann MD  Hospitalist Service  [facility]  Securely message with [employer] (more info)  Text page via [employer] Paging/Directory   ______________________________________________________________________    Interval History     No CP/SOB  No fevers  No nausea / vomiting   Reports feeling improved  No new complaints    Physical Exam   Vital Signs: Temp: 97.7  F (36.5  C) Temp src: Oral BP: 131/58 Pulse: 53   Resp: 16 SpO2: 95 % O2 Device: None (Room air)    Weight: 171 lbs 8.29 oz    Constitutional: awake, alert, cooperative, no apparent distress.   Respiratory: CTABL   Cardiovascular: RRR with no m/r/g   GI: Normal bowel sounds, soft, non-distended, non-tender.   Skin: normal skin color, texture, turgor   Musculoskeletal: There is no redness, warmth, or swelling of the joints. Full range of motion noted.   Neurologic: Awake, alert, oriented to name, place and time. Francesca. Motor is 5 out of 5 bilaterally. Sensory is intact.   Neuropsychiatric: normal mood and affect  ----------------------------------------------------------------------------------------    Medical Decision Making       50 MINUTES SPENT BY ME on the date of service doing chart review, history, exam, documentation & further activities per the note.      Data

## 2024-11-09 NOTE — PLAN OF CARE
11/8/24 -- 2130-0700    Orientation: A&O x4  Aggression Stop Light: Green  Activity: SBA GBW  Diet/BS Checks: Regular diet, pt hesitant to try solid foods d/t difficulty swallowing. Gave mildly thickened water, patient was able to tolerate but did not care for the taste.  Tele: Sinus Vitor  IV Access/Drains: 2 R PIV; heparin infusing @ 950units/hr, NS @ 100ml/hr  Pain Management: denies pain this shift  Abnormal VS/Results: VSS on RA except bradycardia  Bowel/Bladder: Neurogenic bladder, incontinent at times  Skin/Wounds: Blanchable redness to L knee, scattered bruising, scab on scalp  Consults: -   D/C Disposition: pending    Other Info:   - UC came back positive for Proteus vulgaris & klebsiella oxytoca, oncall MD aware.  - Hep Xa came back in range @ 0.47, recheck in the AM  - Has CPAP from home, used overnight

## 2024-11-09 NOTE — CONSULTS
[facility]    Cardiology Consult Note-Cardiology      Date of Admission:  11/8/2024  Reason for Consult: Elevated troponin    Assessment & Plan: HVSL   Mendez Greene is a 79 year old male admitted on 11/8/2024.    #1 Likely type II NSTEMI, myocardial injury, unlikely ACS  #2 Suspected UTI - on IV ceftriaxone  #3 Mild-moderate aortic regurgitation, mild-moderate tricuspid regurgitation, mild pulmonary hypertension (RVSP 35-45 mmHg on TTE 7/30/2024)  #4 Hypertension  #5 Hyperlipidemia   #6 Hypothyroidism    Mr. Greene is an 79-year old male with the abovementioned history who presents with a 2-week history of shortness of breath, weakness and fatigue associated with weight loss in the context of a urinary tract infection.  His troponins are elevated although there is no significant delta, and this likely represents a type II NSTEMI with myocardial injury, likely secondary to his infection, as opposed to acute coronary syndrome.  In addition, he does not have signs or symptoms or heart failure and his NT-pro BNP is within normal limits.  We will check an echo to ensure there have been no significant changes to his heart's structure or function.  His symptoms seem to be more compatible with a systemic process that may or may not be entirely explained by his UTI.    Recommendations  - TTE pending  - can discontinue heparin  - continue aspirin, reasonable to hold statin at this point  - depending on the echo we will consider further cardiac evaluation, but at present he may benefit from more evaluation of his weakness and weight loss as well as continued treatment of his UTI    Jason Olsen [facility]    Clinically Significant Risk Factors Present on Admission          # Hyperchloremia: Highest Cl = 108 mmol/L in last 2 days, will monitor as appropriate       # Hypercalcemia: Highest Ca = 12.6 mg/dL in last 2 days, will monitor as appropriate    #  Hypoalbuminemia: Lowest albumin = 3.3 g/dL at 11/9/2024 11:39 AM, will monitor as appropriate   # Drug Induced Platelet Defect: home medication list includes an antiplatelet medication   # Hypertension: Noted on problem list            # Severe Malnutrition: based on nutrition assessment       ______________________________________________________________________    Chief Complaint   Shortness of breath    History of Present Illness   Mendez Greene is a 79 year old male with a past medical history of mild-moderate aortic regurgitation and mild-moderate tricuspid regurgitation, mild pulmonary hypertension (RVSP 35-45 mmHg on TTE 7/30/2024), hypertension, hyperlipidemia and hypothyroidism.  He present yesterday with a 2-week history of shortness of breath on minimal exertion and generalized weakness.  He has noted some weight loss, 10-15 lb, in the last few weeks, and has had difficulty in getting up from a seated position due to weakness.  He reports no chest pain and has had no lightheadedness/dizziness, palpitations, syncope, PND, orthopnea or peripheral edema.  He has a neobladder and was started on antibiotics on 11/07 for a suspected urinary tract infection, although he has no fevers or chills.  He reports no nausea or vomiting, no abdominal pain and no diaphoresis.      His vital signs have been within normal limits (heart rates 50s-60s bpm and BP 120s-130s/50s-60s mmHg).  An ECG yesterday demonstrated normal sinus rhythm and no new ischemic ST-segment or T-wave changes.  His labs showed normal white cell count, hemoglobin and platelet count, creatinine 0.62 and troponins of 812 - 831.  His NT pro BNP is 159.  An echocardiogram is pending.  He is currently being treated with IV ceftriaxone for his UTI.  He was on a heparin drip and is on aspirin 81 mg daily.    Of note an invasive coronary angiogram in 11/2020 demonstrated minimal coronary artery disease.  A subsequent nuclear perfusion stress test in 12/2022  was negative for inducible myocardial ischemia or infarction, with a fixed inferior wall defect worse on rest images suggestive of an artifact.  EF was 66% at rest.    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    Pure hypercholesterolemia        Past Surgical History   Past Surgical History:   Procedure Laterality Date    ARTHROPLASTY KNEE Left 08/23/2022    Procedure: LEFT TOTAL KNEE ARTHROPLASTY;  Surgeon: Melba Dial MD;  Location:  OR    ARTHROPLASTY REVISION KNEE Left 09/05/2023    Procedure: REVISION TOTAL KNEE ARTHROPLASTY PATELLA;  Surgeon: Melba Dial MD;  Location:  OR    BUNIONECTOMY Left     BUNIONECTOMY JOSE  11/28/2011    RT-Procedure:BUNIONECTOMY JOSE; Right Foot Dwaine Jose Bunionectomy with Metatarsal and Phalanx Osteotomy with 1st Metatarsal Phalangeal Joint Repair Right Foot; Surgeon:BAKARI ZAIDI; Location:[facility]    CATARACT EXTRACTION, BILATERAL      CV HEART CATHETERIZATION WITH POSSIBLE INTERVENTION N/A 11/07/2020    Procedure: Heart Catheterization with Possible Intervention;  Surgeon: Rishi Llanes MD;  Location:  HEART CARDIAC CATH LAB    CYSTECTOMY BLADDER, ILEAL DIVERSION NEOBLADDER, COMBINED  2006    HERNIORRHAPHY INCISIONAL (LOCATION) N/A 08/01/2016    Procedure: HERNIORRHAPHY INCISIONAL (LOCATION);  Surgeon: Onofre Lua MD;  Location: [facility]    OPEN REDUCTION INTERNAL FIXATION PATELLA Left 09/05/2023    Procedure: LEFT KNEE  OPEN REDUCTION INTERNAL  FIXATION PATELLA FRACTURE WITH;  Surgeon: Melba Dial MD;  Location:  OR    OPEN REDUCTION INTERNAL FIXATION PATELLA Left 10/10/2023    Procedure: LEFT KNEE REVISION OPEN REDUCTION INTERNAL FIXATION PATELLA WITH POLY REMOVAL, PATELLAR;  Surgeon: Melba Dial MD;  Location:  OR    REMOVE HARDWARE KNEE Left 10/10/2023    Procedure: WITH POLY REMOVAL, PATELLAR;  Surgeon: Melba Dial MD;  Location:  OR       Medications   I have reviewed this patient's current medications      Review of Systems    The 10 point Review of Systems is negative other than noted in the HPI or here.      Physical Exam   Vital Signs: Temp: 97.7  F (36.5  C) Temp src: Oral BP: 131/58 Pulse: 53   Resp: 16 SpO2: 95 % O2 Device: None (Room air)    Weight: 171 lbs 8.29 oz    General Appearance: Looks well  Respiratory: Clear lungs to auscultation  Cardiovascular: Regular rhythm, JVP not visible, normal S1 and S2, pansystolic murmur heard loudest at the lower left sternal border  GI: Abdomen soft and non-tender  Skin: Warm and well perfused      Medical Decision Making       50 MINUTES SPENT BY ME on the date of service doing chart review, history, exam, documentation & further activities per the note.      Data     I have personally reviewed the following data over the past 24 hrs:    6.8  \   12.8 (L)   / 309     139 108 (H) 54.2 (H) /  129 (H)   4.1 23 0.62 (L) \     ALT: 156 (H) AST: 91 (H) AP: 64 TBILI: 0.5   ALB: 3.3 (L) TOT PROTEIN: 5.7 (L) LIPASE: N/A     Trop: 831 (HH) BNP: N/A       Imaging results reviewed over the past 24 hrs:   Recent Results (from the past 24 hours)   MR Brain w/o Contrast    Narrative    EXAM: MR BRAIN W/O CONTRAST, MR CERVICAL SPINE W/O CONTRAST  LOCATION: [facility]  DATE: 11/8/2024    INDICATION: New arm weakness/fasciculations, speech difficulty, difficult swallowing  COMPARISON: None  TECHNIQUE:   1) Routine multiplanar multisequence  head MRI without intravenous contrast  2) Routine Cervical Spine MRI without IV contrast    FINDINGS:  Limited assessment due to poor signal in the setting of suboptimal patient positioning and mild motion artifact. Within this mild limitation:    HEAD MRI:  INTRACRANIAL CONTENTS: No acute/subacute infarct, acute hemorrhage, extra-axial fluid collection, or evidence of a mass. A few scattered foci of T2 prolongation within the bilateral cerebral white matter, nonspecific although most likely the sequela of   mild chronic microvascular ischemia. Mild generalized cerebral parenchymal volume loss. No hydrocephalus. Normal position of the cerebellar tonsils.     OSSEOUS STRUCTURES/SOFT TISSUES: No suspicious bone marrow signal intensity. The major intracranial vascular flow voids are maintained.     SINUSES/MASTOIDS: No evidence of significant paranasal sinus or mastoid mucosal disease.      CERVICAL SPINE:   Hyperlordotic cervical spine curvature, likely in part positional, with 1-2 mm stepwise degenerative anterolisthesis at C7-T3. Maintained vertebral body heights. Multilevel intervertebral disc height loss and desiccation, worst and moderate at C5-C7.    Multilevel posterior disc-osteophyte complexes most notably at C3-C4 and C5-C6 where central/left central complexes partially efface the ventral spinal cord. No significant spinal canal stenosis. Multilevel buckling of the ligamenta flava, greatest at   C3-C4 and C5-C6. Multilevel uncovertebral and facet arthrosis with severe left and moderate-severe right C3-C4 foraminal narrowing. Moderate bilateral C4-C5 foraminal narrowing, mild at C5-C7 on the left.    No suspicious bone marrow signal intensity. Suspected mild Modic type I degenerative change at C5-C7.     No definite abnormal spinal cord signal intensity.    Grossly unremarkable extraspinal structures.      Impression    IMPRESSION:  HEAD MRI:   1.  No acute intracranial abnormality.  2.  Mild chronic  aged-related changes.    CERVICAL SPINE MRI:  1.  Multilevel spondylosis with variable degrees of foraminal narrowing, worst and severe at C3-C4 on the left, moderate-severe on the right.  2.  No significant spinal canal stenosis or definite abnormal spinal cord signal intensity.   MR Cervical Spine w/o Contrast    Narrative    EXAM: MR BRAIN W/O CONTRAST, MR CERVICAL SPINE W/O CONTRAST  LOCATION: [facility]  DATE: 11/8/2024    INDICATION: New arm weakness/fasciculations, speech difficulty, difficult swallowing  COMPARISON: None  TECHNIQUE:   1) Routine multiplanar multisequence head MRI without intravenous contrast  2) Routine Cervical Spine MRI without IV contrast    FINDINGS:  Limited assessment due to poor signal in the setting of suboptimal patient positioning and mild motion artifact. Within this mild limitation:    HEAD MRI:  INTRACRANIAL CONTENTS: No acute/subacute infarct, acute hemorrhage, extra-axial fluid collection, or evidence of a mass. A few scattered foci of T2 prolongation within the bilateral cerebral white matter, nonspecific although most likely the sequela of   mild chronic microvascular ischemia. Mild generalized cerebral parenchymal volume loss. No hydrocephalus. Normal position of the cerebellar tonsils.     OSSEOUS STRUCTURES/SOFT TISSUES: No suspicious bone marrow signal intensity. The major intracranial vascular flow voids are maintained.     SINUSES/MASTOIDS: No evidence of significant paranasal sinus or mastoid mucosal disease.      CERVICAL SPINE:   Hyperlordotic cervical spine curvature, likely in part positional, with 1-2 mm stepwise degenerative anterolisthesis at C7-T3. Maintained vertebral body heights. Multilevel intervertebral disc height loss and desiccation, worst and moderate at C5-C7.    Multilevel posterior disc-osteophyte complexes most notably at C3-C4 and C5-C6 where central/left central complexes partially efface the ventral spinal cord. No  significant spinal canal stenosis. Multilevel buckling of the ligamenta flava, greatest at   C3-C4 and C5-C6. Multilevel uncovertebral and facet arthrosis with severe left and moderate-severe right C3-C4 foraminal narrowing. Moderate bilateral C4-C5 foraminal narrowing, mild at C5-C7 on the left.    No suspicious bone marrow signal intensity. Suspected mild Modic type I degenerative change at C5-C7.     No definite abnormal spinal cord signal intensity.    Grossly unremarkable extraspinal structures.      Impression    IMPRESSION:  HEAD MRI:   1.  No acute intracranial abnormality.  2.  Mild chronic aged-related changes.    CERVICAL SPINE MRI:  1.  Multilevel spondylosis with variable degrees of foraminal narrowing, worst and severe at C3-C4 on the left, moderate-severe on the right.  2.  No significant spinal canal stenosis or definite abnormal spinal cord signal intensity.

## 2024-11-09 NOTE — PLAN OF CARE
Goal Outcome Evaluation:    Orientation: AOx4.  Aggression Stop Light: Green  Activity: Up SBA GB/W  Diet/BS Checks: Reg diet. SLP recommending slightly thickened liquids.   Tele:  Sinus Fam  IV Access/Drains: Lower RUE PIV infusing NS @ 100ml/hr with int abx. Upper RUE PIV infusing hep gtt @ 950 units/hr.  Pain Management: Denies pain this shift.  Abnormal VS/Results: VSS on RA ex fam. HepXa 0.39, next recheck tomorrow AM.  Bowel/Bladder: Continent but occasionally dribbles in brief, hx of neobladder. No BM this shift.  Skin/Wounds: Scattered bruising throughout. Scab to scalp.   Consults: PT, SLP, Nutrition, Cardiology  D/C Disposition: Discharge pending clinical improvement.  Other Info: ECHO ordered, waiting for it to be done.

## 2024-11-09 NOTE — PROGRESS NOTES
"CLINICAL NUTRITION SERVICES  -  ASSESSMENT NOTE    Recommendations Ordered by Registered Dietitian (RD):   Magic cup 1x/day (290 kcal, 9 g protein)      Malnutrition:   Severe malnutrition           REASON FOR ASSESSMENT  Mendez Greene is a 79 year old male seen by Registered Dietitian for Admission Nutrition Risk Screen for positive unintentional weight loss and reduced oral intake. PMH includes aortic insufficiency, CAD, HTN, HLD, hypothyroidism. Admitted with 1-week hx of generalized weakness and difficulty swallowing.       NUTRITION HISTORY  Pt follows a regular diet at home. Over the past 2-3 weeks, has been eating poorly due to low appetite. Acutely worsening due to dysphagia over the past week or so. He reports significant weight loss as a result. Adds he has had zero physical activity during this time period.      CURRENT NUTRITION ORDERS  Diet Order:     Regular diet, Slightly thickened liquids - Recommended by SLP    Current Intake/Tolerance:  Tolerating diet, small intakes (eg cream of wheat, apple sauce, thickened cranberry juice). Pt struggling with after taste of thickened liquids.       NUTRITION FOCUSED PHYSICAL ASSESSMENT FOR DIAGNOSING MALNUTRITION)      Observed:    Subcutaneous fat loss (refer to documentation in Malnutrition section)    ANTHROPOMETRICS  Height: 5' 10\"  Weight: 171 lbs 8.29 oz (77.8 kg)   Body mass index is 24.61 kg/m .  Weight Status:  Normal BMI  IBW: 75.5 kg  Weight History:  Based on records, pt weighed 195 lbs on 10/23. Down to 171 lbs today. Suggests pt has lost 24 lbs in the roughly the past 2.5 weeks.   Wt Readings from Last 10 Encounters:   11/09/24 77.8 kg (171 lb 8.3 oz)   11/06/24 81.6 kg (180 lb)   10/23/24 88.9 kg (195 lb 14.4 oz)   07/10/24 88.7 kg (195 lb 8 oz)   04/23/24 83.5 kg (184 lb)   03/04/24 85.6 kg (188 lb 12.8 oz)   10/10/23 88 kg (194 lb)   09/05/23 88.2 kg (194 lb 6.4 oz)   08/29/23 90.7 kg (200 lb)   06/14/23 87.9 kg (193 lb 12.8 oz) "         LABS  Labs reviewed    MEDICATIONS  Medications reviewed      ASSESSED NUTRITION NEEDS PER APPROVED PRACTICE GUIDELINES:    Dosing Weight 78 kg (actual)   Estimated Energy Needs: 4974-2334 kcals (25-30 Kcal/Kg)  Justification: maintenance  Estimated Protein Needs: 80-95+ grams protein (1-1.2 g pro/Kg)  Justification: Repletion  Estimated Fluid Needs: 1711-1165  mL (1 mL/Kcal)  Justification: maintenance    MALNUTRITION:  % Weight Loss:  > 2% in 1 week (severe malnutrition)  % Intake:  </= 50% for >/= 5 days (severe malnutrition)  Subcutaneous Fat Loss:  Orbital region mild depletion, Clavicle region mild depletion   Muscle Loss:  None observed  Fluid Retention:  None noted    Malnutrition Diagnosis: Severe malnutrition  In Context of:  Acute illness or injury    NUTRITION DIAGNOSIS:  Inadequate oral intake related to reduced appetite, dysphagia as evidenced by intake <50% baseline x2-3 weeks with subsequent 24 lb weight loss.       INTERVENTIONS  Recommendations / Nutrition Prescription  See above    Goals  Pt to consume at least 75% of three nutritionally adequate meals per day (or equivalent via snacks/supplements).       MONITORING AND EVALUATION:  Progress towards goals will be monitored and evaluated per protocol and Practice Guidelines    Kimberley Rasheed RD, LD, CNSC

## 2024-11-09 NOTE — H&P
[facility]    History and Physical - Hospitalist Service       Date of Admission:  11/8/2024    Assessment & Plan      Mendez Greene is a 79 year old male admitted on 11/8/2024. He presents with generalized weakness/difficulty swallowing.      Acute cystitis with hematuria    Generalized weakness    Assessment: Presents with ongoing weakness and fatigue in the setting of recently diagnosed urinary tract infection.  On admission his count was elevated to 812 otherwise rest was workup is largely benign.    Plan:   -Admit to inpatient  -IV Ceftriaxone  -IV fluids overnight  -Fall precautions  -PT eval      CHA on CPAP    Assessment/Plan: Continue PTA CPAP      CAD (coronary artery disease)    Elevated troponin    Assessment: Troponin elevated to 800, patient endorsed some dyspnea but no chest pain at this time.  PTA on 81 mg of aspirin daily.    Plan:   -IV heparin  -ASA 81 mg daily  -Trend troponin  -Echocardiogram  -Consider cardiology consult pending eval      PAF (paroxysmal atrial fibrillation) (H)    Assessment/Plan: Not anticoagulated or on rate control at this time.  Will monitor on telemetry.      Ascending aortic aneurysm (H)    Assessment/Plan: obtain ECHO      Pure hypercholesterolemia    Benign essential hypertension    Assessment/Plan: Continue prior to admission lisinopril/Zocor      History of basal cell carcinoma    Assessment/Plan: Stable, follows an outpatient      Hypothyroidism    Assessment/Plan: Continue prior to admission levothyroxine            Diet:  Regular Diet  DVT Prophylaxis: Heparin gtt  Cortez Catheter: Not present  Lines: None     Cardiac Monitoring: None  Code Status:  FULL CODE    Clinically Significant Risk Factors Present on Admission            # Hypercalcemia: Highest Ca = 12.6 mg/dL in last 2 days, will monitor as appropriate      # Drug Induced Platelet Defect: home medication list includes an antiplatelet medication   # Hypertension: Noted on  "problem list           # Overweight: Estimated body mass index is 25.11 kg/m  as calculated from the following:    Height as of this encounter: 1.778 m (5' 10\").    Weight as of this encounter: 79.4 kg (175 lb).              Disposition Plan     Medically Ready for Discharge: Anticipated in 2-4 Days           Tristian Mann MD  Hospitalist Service  [facility]  Securely message with [employer] (more info)  Text page via [employer] Paging/Directory     ______________________________________________________________________    Chief Complaint     Weakness  SOB    History is obtained from the patient    History of Present Illness     Mendez Greene is a 79 year old male who has a past medical history pertinent for aortic insufficiency, coronary disease, hypertension, hyperlipidemia, hypothyroidism who presents for evaluation of generalized weakness and shortness of breath.    Patient reports that he has been dealing with ongoing shortness of breath and difficulty swallowing for the past week.  He denies any chest pain.  Patient reports that he has a neobladder, and was started on antibiotics on 11/07 for urinary tract infection.  He denies any fevers or chills at home.  His main complaints at this time is fatigue and weakness.  Denies any nausea or vomiting, has no abdominal pain.  He  does endorse some low back pain, but no localized weakness or numbness of his extremities.  She denies any recent falls or head trauma.  He has no slurred speech, no double vision or blurry vision.  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    Pure hypercholesterolemia        Past Surgical History   Past Surgical History:   Procedure Laterality Date    ARTHROPLASTY KNEE Left 08/23/2022    Procedure: LEFT TOTAL KNEE ARTHROPLASTY;  Surgeon: Melba Dial MD;  Location:  OR    ARTHROPLASTY REVISION KNEE Left 09/05/2023    Procedure: REVISION TOTAL KNEE ARTHROPLASTY PATELLA;  Surgeon: Melba Dial MD;  Location:  OR    BUNIONECTOMY Left     BUNIONECTOMY JOSE  11/28/2011    RT-Procedure:BUNIONECTOMY JOSE; Right Foot Dwaine Jose Bunionectomy with Metatarsal and Phalanx Osteotomy with 1st Metatarsal Phalangeal Joint Repair Right Foot; Surgeon:BAKARI ZAIDI; Location:[facility]    CATARACT EXTRACTION, BILATERAL      CV HEART CATHETERIZATION WITH POSSIBLE INTERVENTION N/A 11/07/2020    Procedure: Heart Catheterization with Possible Intervention;  Surgeon: Rishi Llanes MD;  Location:  HEART CARDIAC CATH LAB    CYSTECTOMY BLADDER, ILEAL DIVERSION NEOBLADDER, COMBINED  2006    HERNIORRHAPHY INCISIONAL (LOCATION) N/A 08/01/2016    Procedure: HERNIORRHAPHY INCISIONAL (LOCATION);  Surgeon: Onofre Lua MD;  Location: [facility]    OPEN REDUCTION INTERNAL FIXATION PATELLA Left 09/05/2023    Procedure: LEFT KNEE OPEN REDUCTION INTERNAL  FIXATION PATELLA FRACTURE WITH;  Surgeon: Melba Dial MD;  Location:  OR    OPEN REDUCTION INTERNAL FIXATION PATELLA Left 10/10/2023    Procedure: LEFT KNEE REVISION OPEN REDUCTION INTERNAL FIXATION PATELLA WITH POLY REMOVAL, PATELLAR;  Surgeon: Melba Dial MD;  Location:  OR    REMOVE HARDWARE KNEE Left 10/10/2023    Procedure: WITH POLY REMOVAL, PATELLAR;  Surgeon: Melba Dial MD;  Location:  OR       Prior to Admission Medications   Prior to Admission Medications   Prescriptions  Last Dose Informant Patient Reported? Taking?   Cholecalciferol (D3 PO) 11/8/2024  Yes Yes   acetaminophen (TYLENOL) 325 MG tablet  Self No Yes   Sig: Take 3 tablets (975 mg) by mouth every 6 hours as needed for mild pain   amoxicillin (AMOXIL) 500 MG capsule  Self Yes Yes   Sig: Take 4 capsules by mouth once as needed (1 hour prior to dental appointments 4 x 500 mg = 2,000 mg)   aspirin 81 MG EC tablet 11/7/2024  Yes Yes   Sig: Take 81 mg by mouth every evening.   cefpodoxime (VANTIN) 100 MG tablet 11/8/2024 Morning  No Yes   Sig: Take 1 tablet (100 mg) by mouth 2 times daily for 14 days.   levothyroxine (SYNTHROID/LEVOTHROID) 150 MCG tablet 11/8/2024  No Yes   Sig: Take 1 tablet (150 mcg) by mouth daily   lisinopril (ZESTRIL) 5 MG tablet 11/8/2024  No Yes   Sig: Take 1 tablet (5 mg) by mouth at bedtime   olopatadine (PATANOL) 0.1 % ophthalmic solution 11/8/2024 Self Yes Yes   Sig: Place 1 drop into both eyes 2 times daily   order for DME   No No   Sig: Equipment being ordered: CPAP and supplies. LIFETIME need.   simvastatin (ZOCOR) 10 MG tablet 11/8/2024  No Yes   Sig: Take 1 tablet (10 mg) by mouth daily   vitamin B-12 (CYANOCOBALAMIN) 1000 MCG tablet 11/8/2024 Self Yes Yes   Sig: Take 1,000 mcg by mouth daily      Facility-Administered Medications: None        Review of Systems    The 10 point Review of Systems is negative other than noted in the HPI or here.     Social History   I have reviewed this patient's social history and updated it with pertinent information if needed.  Social History     Tobacco Use    Smoking status: Former     Types: Cigarettes     Passive exposure: Never (per pt)    Smokeless tobacco: Never    Tobacco comments:     Quit in 1987; smoked for 20 years; 1.5 ppd at his most.    Vaping Use    Vaping status: Never Used   Substance Use Topics    Alcohol use: Yes     Comment: 2-3 glasses of wine/week    Drug use: No         Family History   I have reviewed this patient's family history and  updated it with pertinent information if needed.  Family History   Problem Relation Age of Onset    Osteoporosis Mother     Neurologic Disorder Mother         PD    Cerebrovascular Disease Mother     Hypertension Mother     Hyperlipidemia Mother     Cerebrovascular Disease Father     Diabetes Type 1 Sister     Cancer Maternal Grandmother         unknown type    Diabetes Type 2  No family hx of     Myocardial Infarction No family hx of     Prostate Cancer No family hx of     Colon Cancer No family hx of          Allergies   No Known Allergies  ------------------------------------------------------------------------     Physical Exam   Vital Signs: Temp: 98  F (36.7  C) Temp src: Temporal BP: 124/63 Pulse: 54   Resp: 14 SpO2: 93 % O2 Device: None (Room air)    Weight: 175 lbs 0 oz    Constitutional: awake, alert, cooperative, no apparent distress.   Eyes: Lids and lashes normal, pupils equal, round and reactive to light   ENT: Normocephalic, without obvious abnormality, atraumatic, sinuses nontender on palpation   Hematologic / Lymphatic: no cervical lymphadenopathy   Respiratory: CTABL   Cardiovascular: RRR with no m/r/g   GI: Normal bowel sounds, soft, non-distended, non-tender.   Skin: normal skin color, texture, turgor   Musculoskeletal: There is no redness, warmth, or swelling of the joints. Full range of motion noted.   Neurologic: Awake, alert, oriented to name, place and time. Cranial nerves II-XII are grossly intact. Motor is 5 out of 5 bilaterally. Sensory is intact.   Neuropsychiatric: normal mood and affect    ----------------------------------------------------------------------------------------------------------------------------------    Medical Decision Making       75 MINUTES SPENT BY ME on the date of service doing chart review, history, exam, documentation & further activities per the note.      Data   ------------------------- PAST 24 HR DATA REVIEWED  -----------------------------------------------    I have personally reviewed the following data over the past 24 hrs:    10.0  \   13.7   / 376     139 106 57.0 (H) /  98   5.1 19 (L) 0.75 \     ALT: 184 (H) AST: 117 (H) AP: 71 TBILI: 0.5   ALB: 3.7 TOT PROTEIN: 6.4 LIPASE: N/A     Trop: 831 (HH) BNP: 159       Imaging results reviewed over the past 24 hrs:   Recent Results (from the past 24 hours)   Chest XR,  PA & LAT    Narrative    CHEST TWO VIEWS 11/8/2024 3:45 PM     HISTORY: Shortness of breath.    COMPARISON: November 16, 2022       Impression    IMPRESSION: There are no acute infiltrates. The cardiac silhouette is  not enlarged. Pulmonary vasculature is unremarkable.    CHERISE MOSQUEDA MD         SYSTEM ID:  [id number]   MR Brain w/o Contrast    Narrative    EXAM: MR BRAIN W/O CONTRAST, MR CERVICAL SPINE W/O CONTRAST  LOCATION: [facility]  DATE: 11/8/2024    INDICATION: New arm weakness/fasciculations, speech difficulty, difficult swallowing  COMPARISON: None  TECHNIQUE:   1) Routine multiplanar multisequence head MRI without intravenous contrast  2) Routine Cervical Spine MRI without IV contrast    FINDINGS:  Limited assessment due to poor signal in the setting of suboptimal patient positioning and mild motion artifact. Within this mild limitation:    HEAD MRI:  INTRACRANIAL CONTENTS: No acute/subacute infarct, acute hemorrhage, extra-axial fluid collection, or evidence of a mass. A few scattered foci of T2 prolongation within the bilateral cerebral white matter, nonspecific although most likely the sequela of   mild chronic microvascular ischemia. Mild generalized cerebral parenchymal volume loss. No hydrocephalus. Normal position of the cerebellar tonsils.     OSSEOUS STRUCTURES/SOFT TISSUES: No suspicious bone marrow signal intensity. The major intracranial vascular flow voids are maintained.     SINUSES/MASTOIDS: No evidence of significant paranasal sinus or mastoid mucosal  disease.      CERVICAL SPINE:   Hyperlordotic cervical spine curvature, likely in part positional, with 1-2 mm stepwise degenerative anterolisthesis at C7-T3. Maintained vertebral body heights. Multilevel intervertebral disc height loss and desiccation, worst and moderate at C5-C7.    Multilevel posterior disc-osteophyte complexes most notably at C3-C4 and C5-C6 where central/left central complexes partially efface the ventral spinal cord. No significant spinal canal stenosis. Multilevel buckling of the ligamenta flava, greatest at   C3-C4 and C5-C6. Multilevel uncovertebral and facet arthrosis with severe left and moderate-severe right C3-C4 foraminal narrowing. Moderate bilateral C4-C5 foraminal narrowing, mild at C5-C7 on the left.    No suspicious bone marrow signal intensity. Suspected mild Modic type I degenerative change at C5-C7.     No definite abnormal spinal cord signal intensity.    Grossly unremarkable extraspinal structures.      Impression    IMPRESSION:  HEAD MRI:   1.  No acute intracranial abnormality.  2.  Mild chronic aged-related changes.    CERVICAL SPINE MRI:  1.  Multilevel spondylosis with variable degrees of foraminal narrowing, worst and severe at C3-C4 on the left, moderate-severe on the right.  2.  No significant spinal canal stenosis or definite abnormal spinal cord signal intensity.   MR Cervical Spine w/o Contrast    Narrative    EXAM: MR BRAIN W/O CONTRAST, MR CERVICAL SPINE W/O CONTRAST  LOCATION: [facility]  DATE: 11/8/2024    INDICATION: New arm weakness/fasciculations, speech difficulty, difficult swallowing  COMPARISON: None  TECHNIQUE:   1) Routine multiplanar multisequence head MRI without intravenous contrast  2) Routine Cervical Spine MRI without IV contrast    FINDINGS:  Limited assessment due to poor signal in the setting of suboptimal patient positioning and mild motion artifact. Within this mild limitation:    HEAD MRI:  INTRACRANIAL CONTENTS:  No acute/subacute infarct, acute hemorrhage, extra-axial fluid collection, or evidence of a mass. A few scattered foci of T2 prolongation within the bilateral cerebral white matter, nonspecific although most likely the sequela of   mild chronic microvascular ischemia. Mild generalized cerebral parenchymal volume loss. No hydrocephalus. Normal position of the cerebellar tonsils.     OSSEOUS STRUCTURES/SOFT TISSUES: No suspicious bone marrow signal intensity. The major intracranial vascular flow voids are maintained.     SINUSES/MASTOIDS: No evidence of significant paranasal sinus or mastoid mucosal disease.      CERVICAL SPINE:   Hyperlordotic cervical spine curvature, likely in part positional, with 1-2 mm stepwise degenerative anterolisthesis at C7-T3. Maintained vertebral body heights. Multilevel intervertebral disc height loss and desiccation, worst and moderate at C5-C7.    Multilevel posterior disc-osteophyte complexes most notably at C3-C4 and C5-C6 where central/left central complexes partially efface the ventral spinal cord. No significant spinal canal stenosis. Multilevel buckling of the ligamenta flava, greatest at   C3-C4 and C5-C6. Multilevel uncovertebral and facet arthrosis with severe left and moderate-severe right C3-C4 foraminal narrowing. Moderate bilateral C4-C5 foraminal narrowing, mild at C5-C7 on the left.    No suspicious bone marrow signal intensity. Suspected mild Modic type I degenerative change at C5-C7.     No definite abnormal spinal cord signal intensity.    Grossly unremarkable extraspinal structures.      Impression    IMPRESSION:  HEAD MRI:   1.  No acute intracranial abnormality.  2.  Mild chronic aged-related changes.    CERVICAL SPINE MRI:  1.  Multilevel spondylosis with variable degrees of foraminal narrowing, worst and severe at C3-C4 on the left, moderate-severe on the right.  2.  No significant spinal canal stenosis or definite abnormal spinal cord signal intensity.      ------------------------- ENCOUNTER LABS ----------------------------------------------------------------  Recent Labs   Lab 11/08/24  1525   WBC 10.0   HGB 13.7   *         POTASSIUM 5.1   CHLORIDE 106   CO2 19*   BUN 57.0*   CR 0.75   ANIONGAP 14   SIMONA 12.6*   GLC 98   ALBUMIN 3.7   PROTTOTAL 6.4   BILITOTAL 0.5   ALKPHOS 71   *   *       Most Recent 3 CBC's:  Recent Labs   Lab Test 11/08/24  1525 05/07/24  1153 03/21/24  1112   WBC 10.0 5.8 6.5   HGB 13.7 12.0* 11.5*   * 107* 101*    269 247     Most Recent 3 BMP's:  Recent Labs   Lab Test 11/08/24  1525 07/10/24  1226 03/04/24  1343    138 135   POTASSIUM 5.1 5.3 5.0   CHLORIDE 106 102 103   CO2 19* 24 22   BUN 57.0* 23.4* 20.8   CR 0.75 0.96 0.81   ANIONGAP 14 12 10   SIMONA 12.6* 9.5 9.3   GLC 98 112* 90     Most Recent 2 LFT's:  Recent Labs   Lab Test 11/08/24  1525 07/10/24  1226   * 22   * 12   ALKPHOS 71 62   BILITOTAL 0.5 0.6     Most Recent 3 INR's:No lab results found.  Most Recent 3 Troponin's:  Recent Labs   Lab Test 11/07/20  1358 11/07/20  1107 11/07/20  0629   TROPI 3.590* 4.056* 3.247*     Most Recent 3 BNP's:  Recent Labs   Lab Test 11/08/24  1525 11/16/22  1412   NTBNPI 159  --    NTBNP  --  127     Most Recent D-dimer:No lab results found.  Most Recent Cholesterol Panel:  Recent Labs   Lab Test 07/10/24  1226   CHOL 178   LDL 93   HDL 73   TRIG 60     Most Recent TSH and T4:  Recent Labs   Lab Test 07/10/24  1226 04/04/17  1020 02/01/17  0947   TSH 0.50   < > 8.09*   T4  --   --  0.88    < > = values in this interval not displayed.     Most Recent Urinalysis:  Recent Labs   Lab Test 11/08/24  1625 11/06/24  1155   COLOR Light Yellow Yellow   APPEARANCE Cloudy* Turbid*   URINEGLC Negative Negative   URINEBILI Negative Negative   URINEKETONE Trace* Negative   SG 1.012 1.005   UBLD Trace* Small*   URINEPH 8.5* 8.5*   PROTEIN 50* 30*   UROBILINOGEN  --  0.2   NITRITE Positive*  Positive*   LEUKEST Large* Moderate*   RBCU 36* 2-5*   WBCU 112* 25-50*     Most Recent ESR & CRP:No lab results found.

## 2024-11-09 NOTE — PHARMACY-ADMISSION MEDICATION HISTORY
Pharmacist Admission Medication History    Admission medication history is complete. The information provided in this note is only as accurate as the sources available at the time of the update.    Information Source(s): Patient and CareEverywhere/SureScripts via in-person    Pertinent Information: None    Changes made to PTA medication list:  Added: None  Deleted: Multivitamin, Hydro 2.5% Cr / Ketocon compound  Changed: None    Allergies reviewed with patient and updates made in EHR: yes    Medication History Completed By: Raegan Crump RPH 11/8/2024 7:00 PM    PTA Med List   Medication Sig Last Dose/Taking    acetaminophen (TYLENOL) 325 MG tablet Take 3 tablets (975 mg) by mouth every 6 hours as needed for mild pain Taking As Needed    amoxicillin (AMOXIL) 500 MG capsule Take 4 capsules by mouth once as needed (1 hour prior to dental appointments 4 x 500 mg = 2,000 mg) Taking As Needed    aspirin 81 MG EC tablet Take 81 mg by mouth every evening. 11/7/2024    cefpodoxime (VANTIN) 100 MG tablet Take 1 tablet (100 mg) by mouth 2 times daily for 14 days. 11/8/2024 Morning    Cholecalciferol (D3 PO)  11/8/2024    levothyroxine (SYNTHROID/LEVOTHROID) 150 MCG tablet Take 1 tablet (150 mcg) by mouth daily 11/8/2024    lisinopril (ZESTRIL) 5 MG tablet Take 1 tablet (5 mg) by mouth at bedtime 11/8/2024    olopatadine (PATANOL) 0.1 % ophthalmic solution Place 1 drop into both eyes 2 times daily 11/8/2024    simvastatin (ZOCOR) 10 MG tablet Take 1 tablet (10 mg) by mouth daily 11/8/2024    vitamin B-12 (CYANOCOBALAMIN) 1000 MCG tablet Take 1,000 mcg by mouth daily 11/8/2024

## 2024-11-09 NOTE — PROVIDER NOTIFICATION
MD Notification    Notified Person: MD    Notified Person Name: Dr. Conner Wade    Notification Date/Time: 11/8/24 @ 0230    Notification Interaction: Vocera Page    Purpose of Notification: FYI - UC came back positive for Proteus vulgaris & klebsiella oxytoca    Orders Received: No new orders    Comments:

## 2024-11-09 NOTE — PROGRESS NOTES
RECEIVING UNIT ED HANDOFF REVIEW    ED Nurse Handoff Report was reviewed by: Scott Mcdowell RN on November 8, 2024 at 9:19 PM

## 2024-11-10 ENCOUNTER — APPOINTMENT (OUTPATIENT)
Dept: PHYSICAL THERAPY | Facility: CLINIC | Age: 79
DRG: 056 | End: 2024-11-10
Attending: STUDENT IN AN ORGANIZED HEALTH CARE EDUCATION/TRAINING PROGRAM
Payer: MEDICARE

## 2024-11-10 ENCOUNTER — APPOINTMENT (OUTPATIENT)
Dept: SPEECH THERAPY | Facility: CLINIC | Age: 79
DRG: 056 | End: 2024-11-10
Payer: MEDICARE

## 2024-11-10 LAB
ANION GAP SERPL CALCULATED.3IONS-SCNC: 9 MMOL/L (ref 7–15)
BUN SERPL-MCNC: 43.4 MG/DL (ref 8–23)
CALCIUM SERPL-MCNC: 11.8 MG/DL (ref 8.8–10.4)
CHLORIDE SERPL-SCNC: 111 MMOL/L (ref 98–107)
CREAT SERPL-MCNC: 0.59 MG/DL (ref 0.67–1.17)
EGFRCR SERPLBLD CKD-EPI 2021: >90 ML/MIN/1.73M2
ERYTHROCYTE [DISTWIDTH] IN BLOOD BY AUTOMATED COUNT: 13.5 % (ref 10–15)
ERYTHROCYTE [DISTWIDTH] IN BLOOD BY AUTOMATED COUNT: 13.6 % (ref 10–15)
GLUCOSE SERPL-MCNC: 91 MG/DL (ref 70–99)
HCO3 SERPL-SCNC: 23 MMOL/L (ref 22–29)
HCT VFR BLD AUTO: 40.6 % (ref 40–53)
HCT VFR BLD AUTO: 41.6 % (ref 40–53)
HGB BLD-MCNC: 13.7 G/DL (ref 13.3–17.7)
HGB BLD-MCNC: 13.9 G/DL (ref 13.3–17.7)
MCH RBC QN AUTO: 34.5 PG (ref 26.5–33)
MCH RBC QN AUTO: 34.7 PG (ref 26.5–33)
MCHC RBC AUTO-ENTMCNC: 33.4 G/DL (ref 31.5–36.5)
MCHC RBC AUTO-ENTMCNC: 33.7 G/DL (ref 31.5–36.5)
MCV RBC AUTO: 103 FL (ref 78–100)
MCV RBC AUTO: 103 FL (ref 78–100)
PLATELET # BLD AUTO: 310 10E3/UL (ref 150–450)
PLATELET # BLD AUTO: 311 10E3/UL (ref 150–450)
POTASSIUM SERPL-SCNC: 4.3 MMOL/L (ref 3.4–5.3)
RBC # BLD AUTO: 3.95 10E6/UL (ref 4.4–5.9)
RBC # BLD AUTO: 4.03 10E6/UL (ref 4.4–5.9)
SODIUM SERPL-SCNC: 143 MMOL/L (ref 135–145)
TROPONIN T SERPL HS-MCNC: 1223 NG/L
UFH PPP CHRO-ACNC: 0.41 IU/ML
WBC # BLD AUTO: 6.6 10E3/UL (ref 4–11)
WBC # BLD AUTO: 8.8 10E3/UL (ref 4–11)

## 2024-11-10 PROCEDURE — 85520 HEPARIN ASSAY: CPT | Performed by: INTERNAL MEDICINE

## 2024-11-10 PROCEDURE — 80048 BASIC METABOLIC PNL TOTAL CA: CPT | Performed by: STUDENT IN AN ORGANIZED HEALTH CARE EDUCATION/TRAINING PROGRAM

## 2024-11-10 PROCEDURE — 85018 HEMOGLOBIN: CPT | Performed by: STUDENT IN AN ORGANIZED HEALTH CARE EDUCATION/TRAINING PROGRAM

## 2024-11-10 PROCEDURE — 36415 COLL VENOUS BLD VENIPUNCTURE: CPT | Performed by: INTERNAL MEDICINE

## 2024-11-10 PROCEDURE — 120N000001 HC R&B MED SURG/OB

## 2024-11-10 PROCEDURE — 84484 ASSAY OF TROPONIN QUANT: CPT | Performed by: STUDENT IN AN ORGANIZED HEALTH CARE EDUCATION/TRAINING PROGRAM

## 2024-11-10 PROCEDURE — 250N000011 HC RX IP 250 OP 636: Performed by: INTERNAL MEDICINE

## 2024-11-10 PROCEDURE — 82374 ASSAY BLOOD CARBON DIOXIDE: CPT | Performed by: STUDENT IN AN ORGANIZED HEALTH CARE EDUCATION/TRAINING PROGRAM

## 2024-11-10 PROCEDURE — 85014 HEMATOCRIT: CPT | Performed by: INTERNAL MEDICINE

## 2024-11-10 PROCEDURE — 250N000013 HC RX MED GY IP 250 OP 250 PS 637: Performed by: STUDENT IN AN ORGANIZED HEALTH CARE EDUCATION/TRAINING PROGRAM

## 2024-11-10 PROCEDURE — 999N000157 HC STATISTIC RCP TIME EA 10 MIN

## 2024-11-10 PROCEDURE — 36415 COLL VENOUS BLD VENIPUNCTURE: CPT | Performed by: STUDENT IN AN ORGANIZED HEALTH CARE EDUCATION/TRAINING PROGRAM

## 2024-11-10 PROCEDURE — 92526 ORAL FUNCTION THERAPY: CPT | Mod: GN | Performed by: SPEECH-LANGUAGE PATHOLOGIST

## 2024-11-10 PROCEDURE — 97530 THERAPEUTIC ACTIVITIES: CPT | Mod: GP

## 2024-11-10 PROCEDURE — 97116 GAIT TRAINING THERAPY: CPT | Mod: GP

## 2024-11-10 PROCEDURE — 99232 SBSQ HOSP IP/OBS MODERATE 35: CPT | Performed by: STUDENT IN AN ORGANIZED HEALTH CARE EDUCATION/TRAINING PROGRAM

## 2024-11-10 PROCEDURE — 97161 PT EVAL LOW COMPLEX 20 MIN: CPT | Mod: GP

## 2024-11-10 RX ORDER — CEFUROXIME AXETIL 500 MG/1
500 TABLET ORAL EVERY 12 HOURS SCHEDULED
Status: COMPLETED | OUTPATIENT
Start: 2024-11-10 | End: 2024-11-12

## 2024-11-10 RX ORDER — HEPARIN SODIUM 10000 [USP'U]/100ML
0-5000 INJECTION, SOLUTION INTRAVENOUS CONTINUOUS
Status: DISCONTINUED | OUTPATIENT
Start: 2024-11-10 | End: 2024-11-11

## 2024-11-10 RX ADMIN — LEVOTHYROXINE SODIUM 150 MCG: 150 TABLET ORAL at 06:44

## 2024-11-10 RX ADMIN — HEPARIN SODIUM 950 UNITS/HR: 10000 INJECTION, SOLUTION INTRAVENOUS at 16:10

## 2024-11-10 RX ADMIN — ASPIRIN 81 MG: 81 TABLET, COATED ORAL at 10:35

## 2024-11-10 RX ADMIN — CEFUROXIME AXETIL 500 MG: 500 TABLET ORAL at 20:24

## 2024-11-10 NOTE — PROGRESS NOTES
[facility]    Cardiology Consult Note-Cardiology    Date of Admission:  11/8/2024  Reason for Consult: Elevated troponin    Assessment & Plan: HVSL   Mendez Greene is a 79 year old male admitted on 11/8/2024.    #1 Likely type II NSTEMI, myocardial injury - rising troponin  #2 Suspected UTI - on IV ceftriaxone  #3 Moderate aortic regurgitation on TTE 11/8/2024  #4 Hypertension  #5 Hyperlipidemia   #6 Hypothyroidism    Mr. Greene is an 79-year old male with the abovementioned history who presents with a 2-week history of shortness of breath, weakness and fatigue associated with weight loss in the context of a urinary tract infection.  His troponins are elevated.  Despite him feeling better today compared to yesterday,and his resting echocardiogram showing a preserved ejection fraction and no RWMAs his troponin has risen further today (812  831 - 1,223).  He has had no chest pain overnight, but due to this significant troponin delta we will plan for invasive coronary angiography on Monday.  He does not have signs or symptoms or heart failure and his NT-pro BNP is within normal limits, so there is no indication for diuretics.  His symptoms seem to be more compatible with a systemic process that may or may not be entirely explained by his UTI.    Recommendations  - invasive angiogram +/- PCI tomorrow (ordered and consent obtained)  - NPO from midnight  - will restart heparin  - continue aspirin, reasonable to hold statin at this point  - he may benefit from more evaluation of a potential systemic process given his generalized weakness and weight loss as well as continued treatment of his UTI  - we will continue to follow with you    Jason Olsen [facility]      Physical Exam   Vital Signs: Temp: 98.5  F (36.9  C) Temp src: Oral BP: 112/54 Pulse: (!) 49   Resp: 18 SpO2: 96 % O2 Device: BiPAP/CPAP    Weight: 171 lbs 8.29 oz    General Appearance: Looks  well  Respiratory: Clear lungs to auscultation  Cardiovascular: Regular rhythm, JVP not visible, normal S1 and S2, pansystolic murmur heard loudest at the lower left sternal border  GI: Abdomen soft and non-tender  Skin: Warm and well perfused    Medical Decision Making       Data     I have personally reviewed the following data over the past 24 hrs:    6.6  \   13.9   / 311     143 111 (H) 43.4 (H) /  91   4.3 23 0.59 (L) \     Trop: 1,223 (HH) BNP: N/A       Imaging results reviewed over the past 24 hrs:   Recent Results (from the past 24 hours)   Echocardiogram Complete   Result Value    LVEF  60-65%    Narrative    [id number]  [id number]  [id number]  [id number]^MATHIEU^REYNA     [facility]  Echocardiography Laboratory  [address] [place] [address]     Name: JUAN MANUEL GRIMES  MRN: [id number]  : 1945  Study Date: 2024 02:56 PM  Age: 79 yrs  Gender: Male  Patient Location: [facility]  Reason For Study: Dyspnea  Ordering Physician: REYNA MURDOCK  Performed By: Tricia Santiago     BSA: 2.0 m2  Height: 70 in  Weight: 175 lb  HR: 49  BP: 93/65 mmHg  ______________________________________________________________________________  Procedure  Complete Portable Echo Adult. Definity (NDC #94133-648) given intravenously.  Contrast Definity. Technically difficult study.Extremely difficult acoustic  windows despite the use of contrast for endcardial border definition.  Technically difficult study.  ______________________________________________________________________________  Interpretation Summary     The rhythm was sinus bradycardia.  Left ventricular systolic function is normal.  The visual ejection fraction is 60-65%.  The left ventricle is normal in size.  The right ventricle is normal in structure, function and size.  The left atrium is moderate to severely dilated.  There is moderate (2+) aortic regurgitation.  Aortic root dilatation is present.(43mm(  Ascending aorta dilatation is  present.(45mm)  No change since 7/30/2024  ______________________________________________________________________________  Left Ventricle  The left ventricle is normal in size. There is normal left ventricular wall  thickness. Left ventricular systolic function is normal. The visual ejection  fraction is 60-65%. No regional wall motion abnormalities noted. There is no  thrombus seen in the left ventricle.     Right Ventricle  The right ventricle is normal in structure, function and size. There is no  mass or thrombus in the right ventricle.     Atria  The left atrium is moderate to severely dilated. Right atrial size is normal.  There is no atrial shunt seen. The left atrial appendage is not well  visualized.     Mitral Valve  The mitral valve leaflets appear normal. There is no evidence of stenosis,  fluttering, or prolapse. There is no mitral regurgitation noted. There is no  mitral valve stenosis.     Tricuspid Valve  Normal tricuspid valve. The right ventricular systolic pressure is elevated at  35.9 mmHg. Right ventricular systolic pressure is elevated, consistent with  mild to moderate pulmonary hypertension. There is no tricuspid stenosis.     Aortic Valve  Normal tricuspid aortic valve. There is moderate (2+) aortic regurgitation.  There is an eccentric jet of aortic insufficiency directed against the  anterior mitral leaflet. No aortic stenosis is present.     Pulmonic Valve  Normal pulmonic valve. There is no pulmonic valvular regurgitation. There is  no pulmonic valvular stenosis.     Vessels  Aortic root dilatation is present. Ascending aorta dilatation is present. The  inferior vena cava is normal. The pulmonary artery is normal size.     Pericardium  The pericardium appears normal. There is no pleural effusion.     Rhythm  The rhythm was sinus bradycardia.  ______________________________________________________________________________  MMode/2D Measurements & Calculations     Ao root diam: 4.3 cm  asc  Aorta Diam: 4.5 cm  LVOT diam: 2.4 cm  LVOT area: 4.7 cm2  Ao root diam index Ht(cm/m): 2.4  Ao root diam index BSA (cm/m2): 2.2  Asc Ao diam index BSA (cm/m2): 2.3  Asc Ao diam index Ht(cm/m): 2.5  LA Volume (BP): 117.0 ml  LA Volume Index (BP): 59.4 ml/m2  TAPSE: 2.9 cm     Doppler Measurements & Calculations  Ao V2 max: 206.8 cm/sec  Ao max P.0 mmHg  Ao V2 mean: 153.2 cm/sec  Ao mean PG: 10.6 mmHg  Ao V2 VTI: 52.9 cm  SILVIA(I,D): 3.9 cm2  SILVIA(V,D): 4.2 cm2  LV V1 max P.1 mmHg  LV V1 max: 187.9 cm/sec  LV V1 VTI: 44.4 cm  SV(LVOT): 207.3 ml  SI(LVOT): 105.1 ml/m2  PA acc time: 0.08 sec  TR max arnold: 299.8 cm/sec  TR max P.9 mmHg  AV Arnold Ratio (DI): 0.91  SILVIA Index (cm2/m2): 2.0  RV S Arnold: 15.4 cm/sec     ______________________________________________________________________________  Report approved by: Dr. Sachin Richmond 2024 04:12 PM

## 2024-11-10 NOTE — PLAN OF CARE
Goal Outcome Evaluation:       Patient remains stable with vitals notable for bradycardia (49). Tele shows sinus arrhythmia wit 1st degree AV block. Labs notable for troponin of 1223, Hospitalist is aware and Cardiology service contacted. Cardiology will stop by and see patient today. Patient also has hands and finger coordination difficulty. Also unable to touch finger to nose.  Per patient's wife it has been going on for 2 weeks. Dr. Mann was notified to see if neurology service is indicated. Other neuro checks are with in normal limits. Up in the hallways with assist of one and a walker and gait belt. Swallow study pending tomorrow.

## 2024-11-10 NOTE — PROGRESS NOTES
[facility]    Medicine Progress Note - Hospitalist Service    Date of Admission:  11/8/2024  Date of Service: 11/10/2024    Assessment & Plan      Mendez Greene is a 79 year old male admitted on 11/8/2024. He presents with generalized weakness/difficulty swallowing.      Acute cystitis with hematuria    Generalized weakness    Assessment: Presents with ongoing weakness and fatigue in the setting of recently diagnosed urinary tract infection.  On admission his count was elevated to 812 otherwise rest was workup is largely benign.  MRI Cervical Spine-> Multilevel spondylosis with variable degrees of foraminal narrowing, worst and severe at C3-C4 on the left, moderate-severe on the right. No acute intracranial abnormalities on MRI brain.     Plan:   -IV Ceftriaxone -> PO Ceftin BID  -Follow UCs  -IV fluids continued for now  -Fall precautions  -PT eval  -Neurology eval for UE weakness      HCA on CPAP    Assessment/Plan: Continue PTA CPAP      CAD (coronary artery disease)    Elevated troponin    Assessment: Troponin elevated to 800, patient endorsed some dyspnea but no chest pain at this time.  PTA on 81 mg of aspirin daily.    Plan:   -IV heparin continued  -Daily ASA  -Trend troponin -> continues to stay quite elevated up to 1200 today  -Echocardiogram -> The rhythm was sinus bradycardia. Left ventricular systolic function is normal. The visual ejection fraction is 60-65%. The left ventricle is normal in size. The right ventricle is normal in structure, function and size. The left atrium is moderate to severely dilated.  -Cardiology consulted -> Cor angio in AM      PAF (paroxysmal atrial fibrillation) (H)    Assessment/Plan: Not anticoagulated or on rate control at this time.  Will monitor on telemetry.      Ascending aortic aneurysm (H)    Assessment/Plan: obtained ECHO, see results -> Aortic root dilatation is present.(43mm( Ascending aorta dilatation is present.(45mm) No change since  7/30/2024      Pure hypercholesterolemia    Benign essential hypertension    Assessment/Plan: Continue prior to admission lisinopril/Zocor      History of basal cell carcinoma    Assessment/Plan: Stable, follows an outpatient      Hypothyroidism    Assessment/Plan: Continue prior to admission levothyroxine            Diet: Snacks/Supplements Adult: Magic Cup; With Meals  Combination Diet Regular Diet; Slightly Thick (level 1) (Up in a chair for meals, no straws, small bites/sips, liquids by spoon and alternate liquids/solids.); Minced and Moist Diet (level 5); Slightly Thick (level 1)  NPO for Medical/Clinical Reasons Except for: Meds, Ice Chips    DVT Prophylaxis: Heparin gtt  Cortez Catheter: Not present  Lines: None     Cardiac Monitoring: ACTIVE order. Indication: AMI (NSTEMI/ STEMI) (48 hours)  Code Status: Full Code      Clinically Significant Risk Factors          # Hyperchloremia: Highest Cl = 111 mmol/L in last 2 days, will monitor as appropriate       # Hypercalcemia: Highest Ca = 11.8 mg/dL in last 2 days, will monitor as appropriate    # Hypoalbuminemia: Lowest albumin = 3.3 g/dL at 11/9/2024 11:39 AM, will monitor as appropriate     # Hypertension: Noted on problem list             # Severe Malnutrition: based on nutrition assessment, PRESENT ON ADMISSION          Disposition Plan     Medically Ready for Discharge: Anticipated in 2-4 Days             Tristian Mann MD  Hospitalist Service  [facility]  Securely message with [employer] (more info)  Text page via [employer] Paging/Directory   ______________________________________________________________________    Interval History     No CP/SOB  No fevers  No nausea / vomiting   Reports ongoing weakness of UEs, bilaterally  But no neck pain or numbness  Reports feeling improved otherwise  No new complaints    Physical Exam   Vital Signs: Temp: 98.5  F (36.9  C) Temp src: Oral BP: 112/54 Pulse: (!) 49   Resp: 18 SpO2: 96 % O2 Device:  "BiPAP/CPAP    Weight: 171 lbs 8.29 oz    Constitutional: awake, alert, cooperative, no apparent distress.   Respiratory: CTABL   Cardiovascular: RRR with no m/r/g   GI: Normal bowel sounds, soft, non-distended, non-tender.   Skin: normal skin color, texture, turgor   Musculoskeletal: There is no redness, warmth, or swelling of the joints. Full range of motion noted.   Neurologic: Awake, alert, oriented to name, place and time. Francesca. Motor is 3 out of 5 in UE. Sensory is intact.   Neuropsychiatric: normal mood and affect  ----------------------------------------------------------------------------------------    Medical Decision Making       50 MINUTES SPENT BY ME on the date of service doing chart review, history, exam, documentation & further activities per the note.      Data        Recent Labs   Lab 11/10/24  0855 11/09/24  1139 11/08/24  1525   WBC 6.6 6.8 10.0   HGB 13.9 12.8* 13.7   HCT 41.6 39.4* 41.8   * 103* 103*    309 376     Recent Labs   Lab 11/10/24  0855 11/09/24  1139 11/08/24  1525   WBC 6.6 6.8 10.0   HGB 13.9 12.8* 13.7   HCT 41.6 39.4* 41.8   * 103* 103*    309 376     Recent Labs   Lab 11/10/24  0855 11/09/24  1139 11/08/24  1525    139 139   POTASSIUM 4.3 4.1 5.1   CHLORIDE 111* 108* 106   CO2 23 23 19*   ANIONGAP 9 8 14   GLC 91 129* 98   BUN 43.4* 54.2* 57.0*   CR 0.59* 0.62* 0.75   GFRESTIMATED >90 >90 >90   SIMONA 11.8* 11.7* 12.6*     Recent Labs   Lab 11/10/24  0855 11/09/24  1139 11/08/24  1525   CR 0.59* 0.62* 0.75     Recent Labs   Lab 11/10/24  0855 11/09/24  1139 11/08/24  1525   HGB 13.9 12.8* 13.7     No results for input(s): \"LACT\" in the last 168 hours.  No results for input(s): \"LIPASE\" in the last 168 hours.    "

## 2024-11-10 NOTE — PROGRESS NOTES
"   11/10/24 0800   Appointment Info   Signing Clinician's Name / Credentials (PT) Valentina Giron, PT, DPT   Living Environment   People in Home spouse   Current Living Arrangements house   Home Accessibility stairs to enter home   Number of Stairs, Main Entrance 3   Stair Railings, Main Entrance railings safe and in good condition   Transportation Anticipated family or friend will provide   Living Environment Comments Pt lives in a house with his spouse, reports a couple stairs to enter then all living is on main level   Self-Care   Usual Activity Tolerance good   Current Activity Tolerance fair   Regular Exercise Yes   Activity/Exercise Type walking   Exercise Amount/Frequency daily   Equipment Currently Used at Home none   Fall history within last six months yes   Number of times patient has fallen within last six months 1   Activity/Exercise/Self-Care Comment Pt is typically IND without an AD per report, states he has used a FWW in the past following knee surgeries. Pt also states he was attending OP PT for weakness and recently started with a hand specialist.   General Information   Onset of Illness/Injury or Date of Surgery 11/08/24   Referring Physician Tristian Mann MD   Patient/Family Therapy Goals Statement (PT) \"To go back home\"   Pertinent History of Current Problem (include personal factors and/or comorbidities that impact the POC) Pt is a 79 year old male admitted on 11/8/2024. He presents with generalized weakness/difficulty swallowing.   Existing Precautions/Restrictions fall   Cognition   Affect/Mental Status (Cognition) WFL   Orientation Status (Cognition) oriented x 4   Follows Commands (Cognition) WFL   Pain Assessment   Patient Currently in Pain No   Integumentary/Edema   Integumentary/Edema no deficits were identifed   Posture    Posture Forward head position;Protracted shoulders   Range of Motion (ROM)   Range of Motion ROM deficits secondary to weakness   ROM Comment Pt has impaired ROM and " strength in B hands, reports difficulty typing   Strength (Manual Muscle Testing)   Strength (Manual Muscle Testing) Able to perform R SLR;Able to perform L SLR;Deficits observed during functional mobility   Strength Comments Pt demonstrated generalized weakness with dynamic mobility. Impaired  strength noted bilaterally.   Bed Mobility   Comment, (Bed Mobility) Supine>sitting EOB completed w/ SBA   Transfers   Comment, (Transfers) Sit>Stand completed w/ SBA   Gait/Stairs (Locomotion)   Comment, (Gait/Stairs) Pt ambulated without an AD and SBA   Balance   Balance other (describe)   Balance Comments Pt demonstrated good sitting balance, required SBA for dynamic balance   Sensory Examination   Sensory Perception patient reports no sensory changes   Coordination   Coordination no deficits were identified   Muscle Tone   Muscle Tone no deficits were identified   Clinical Impression   Criteria for Skilled Therapeutic Intervention Yes, treatment indicated   PT Diagnosis (PT) Impaired functioanl mobility   Influenced by the following impairments Weakness, impaired activity tolerance   Functional limitations due to impairments Impaired gait and inability to complete ADL's   Clinical Presentation (PT Evaluation Complexity) stable   Clinical Presentation Rationale Clinical judgment   Clinical Decision Making (Complexity) low complexity   Planned Therapy Interventions (PT) balance training;bed mobility training;gait training;home exercise program;motor coordination training;neuromuscular re-education;patient/family education;postural re-education;ROM (range of motion);stair training;strengthening;stretching;transfer training;progressive activity/exercise;risk factor education;home program guidelines   Risk & Benefits of therapy have been explained evaluation/treatment results reviewed;risks/benefits reviewed;care plan/treatment goals reviewed;current/potential barriers reviewed;participants voiced agreement with care  plan;participants included;patient   PT Total Evaluation Time   PT Eval, Low Complexity Minutes (75289) 10   Physical Therapy Goals   PT Frequency Daily   PT Predicted Duration/Target Date for Goal Attainment 11/17/24   PT Goals Bed Mobility;Transfers;Gait;Stairs   PT: Bed Mobility Independent;Supine to/from sit;Rolling;Bridging;Within precautions   PT: Transfers Independent;Sit to/from stand;Bed to/from chair;Within precautions   PT: Gait Independent;Greater than 200 feet;Within precautions   PT: Stairs Modified independent;3 stairs;Rail on both sides   Interventions   Interventions Quick Adds Gait Training;Therapeutic Activity   Therapeutic Activity   Therapeutic Activities: dynamic activities to improve functional performance Minutes (32331) 15   Treatment Detail/Skilled Intervention Evaluaiton completed and treatment indicated. Close monitoring of VS completed throughout session, remained stable. Supine>sitting EOB completed w/ SBA, cues given for using UE to assist with scooting toward EOB to prepare for standing. Sit>stand completed w/ SBA, no overt LOB occurred without an AD. Post-ambulaiton, pt completed sit>supine w/ CGA, cues given for positioning in bed. Increased time spent for therapuetic listening regarding pt's impaired mobility in B hands. Discussed discharge planning, continuing OP hand therapy, and continued walking program in hospital with nursing. Pt left supine in bed with all needs in reach and bed alarm on.   Gait Training   Gait Training Minutes (81924) 10   Treatment Detail/Skilled Intervention Pt ambulated ~80 ft w/o an AD and CGA, progressed to SBA throughout. Pt demonstrated slower gait speed, cued for forward gaze, pacing, and upright posture. No overt LOB occurred throughout.   Distance in Feet ~80 ft   [place] Level (Gait Training) stand-by assist   Physical Assistance Level (Gait Training) verbal cues   PT Discharge Planning   PT Plan Progress ambulation distance, trial stairs w/  railing, repeated sit>stands   PT Discharge Recommendation (DC Rec) home with assist;home with outpatient physical therapy   PT Rationale for DC Rec Pt is moving below baseline mobility, currently requiring SBA without an AD due to impaired activity tolerance and generalized weakness at this time. Anticipate with further IP PT and pending pt's ability to complete stairs to enter his home that pt can return home with assist of spouse and continued OP PT and hand therapy in order to further address deficits.   PT Brief overview of current status SBA w/o an AD; Goals of therapy will be to address safe mobility and make recs for d/c to next level of care. Pt and RN will continue to follow all falls risk precautions as documented by RN staff while hospitalized.   Physical Therapy Time and Intention   Timed Code Treatment Minutes 25   Total Session Time (sum of timed and untimed services) 35

## 2024-11-10 NOTE — PLAN OF CARE
11/9/24 -- [id number]    Orientation: A&O x4  Aggression Stop Light: Green  Activity: SBA GBW  Diet/BS Checks: Reg diet w/ slightly thickened liquids  Tele: Sinus Fam  IV Access/Drains: 2 PIV SL.   Pain Management: Denies pain this shift.  Abnormal VS/Results: VSS on RA ex fam. HepXa 0.39, next recheck tomorrow AM.  Bowel/Bladder: Continent but occasionally dribbles in brief, hx of neobladder. No BM this shift.  Skin/Wounds: Scattered bruising throughout. Scab to scalp. Blanchable redness to coccyx  Consults: PT, SLP, Nutrition, Cardiology  D/C Disposition: pending    Other Info:   - CPAP overnight  - ECHO ordered, waiting for it to be done.

## 2024-11-10 NOTE — PROGRESS NOTES
Pt A&O 4. Neuros intact. VSS on RA. Tele sinus fam. Regular diet, mildly thickened liquids. Takes pills whole. Up with SBA, GB/W. Denies pain. Heparin drip discontinued. Pt scoring green on the Aggression Stop Light Tool. Discharge pending.

## 2024-11-11 ENCOUNTER — APPOINTMENT (OUTPATIENT)
Dept: PHYSICAL THERAPY | Facility: CLINIC | Age: 79
DRG: 056 | End: 2024-11-11
Payer: MEDICARE

## 2024-11-11 LAB
ANION GAP SERPL CALCULATED.3IONS-SCNC: 9 MMOL/L (ref 7–15)
BACTERIA UR CULT: ABNORMAL
BUN SERPL-MCNC: 40 MG/DL (ref 8–23)
CALCIUM SERPL-MCNC: 11.9 MG/DL (ref 8.8–10.4)
CHLORIDE SERPL-SCNC: 112 MMOL/L (ref 98–107)
CREAT SERPL-MCNC: 0.6 MG/DL (ref 0.67–1.17)
EGFRCR SERPLBLD CKD-EPI 2021: >90 ML/MIN/1.73M2
ERYTHROCYTE [DISTWIDTH] IN BLOOD BY AUTOMATED COUNT: 13.6 % (ref 10–15)
GLUCOSE SERPL-MCNC: 105 MG/DL (ref 70–99)
HCO3 SERPL-SCNC: 24 MMOL/L (ref 22–29)
HCT VFR BLD AUTO: 42.3 % (ref 40–53)
HGB BLD-MCNC: 14 G/DL (ref 13.3–17.7)
MCH RBC QN AUTO: 34.4 PG (ref 26.5–33)
MCHC RBC AUTO-ENTMCNC: 33.1 G/DL (ref 31.5–36.5)
MCV RBC AUTO: 104 FL (ref 78–100)
PLATELET # BLD AUTO: 307 10E3/UL (ref 150–450)
POTASSIUM SERPL-SCNC: 4.4 MMOL/L (ref 3.4–5.3)
RBC # BLD AUTO: 4.07 10E6/UL (ref 4.4–5.9)
SODIUM SERPL-SCNC: 145 MMOL/L (ref 135–145)
TROPONIN T SERPL HS-MCNC: 1346 NG/L
UFH PPP CHRO-ACNC: 0.37 IU/ML
WBC # BLD AUTO: 7.5 10E3/UL (ref 4–11)

## 2024-11-11 PROCEDURE — 93454 CORONARY ARTERY ANGIO S&I: CPT | Performed by: INTERNAL MEDICINE

## 2024-11-11 PROCEDURE — 272N000001 HC OR GENERAL SUPPLY STERILE: Performed by: INTERNAL MEDICINE

## 2024-11-11 PROCEDURE — 97530 THERAPEUTIC ACTIVITIES: CPT | Mod: GP | Performed by: PHYSICAL THERAPIST

## 2024-11-11 PROCEDURE — 250N000011 HC RX IP 250 OP 636: Performed by: INTERNAL MEDICINE

## 2024-11-11 PROCEDURE — 250N000009 HC RX 250: Performed by: INTERNAL MEDICINE

## 2024-11-11 PROCEDURE — 250N000013 HC RX MED GY IP 250 OP 250 PS 637: Performed by: STUDENT IN AN ORGANIZED HEALTH CARE EDUCATION/TRAINING PROGRAM

## 2024-11-11 PROCEDURE — 99153 MOD SED SAME PHYS/QHP EA: CPT | Performed by: INTERNAL MEDICINE

## 2024-11-11 PROCEDURE — 99152 MOD SED SAME PHYS/QHP 5/>YRS: CPT | Performed by: INTERNAL MEDICINE

## 2024-11-11 PROCEDURE — 36415 COLL VENOUS BLD VENIPUNCTURE: CPT | Performed by: STUDENT IN AN ORGANIZED HEALTH CARE EDUCATION/TRAINING PROGRAM

## 2024-11-11 PROCEDURE — C1769 GUIDE WIRE: HCPCS | Performed by: INTERNAL MEDICINE

## 2024-11-11 PROCEDURE — 80048 BASIC METABOLIC PNL TOTAL CA: CPT

## 2024-11-11 PROCEDURE — 99152 MOD SED SAME PHYS/QHP 5/>YRS: CPT | Mod: GC | Performed by: INTERNAL MEDICINE

## 2024-11-11 PROCEDURE — 85520 HEPARIN ASSAY: CPT | Performed by: STUDENT IN AN ORGANIZED HEALTH CARE EDUCATION/TRAINING PROGRAM

## 2024-11-11 PROCEDURE — C1894 INTRO/SHEATH, NON-LASER: HCPCS | Performed by: INTERNAL MEDICINE

## 2024-11-11 PROCEDURE — 97116 GAIT TRAINING THERAPY: CPT | Mod: GP | Performed by: PHYSICAL THERAPIST

## 2024-11-11 PROCEDURE — 99232 SBSQ HOSP IP/OBS MODERATE 35: CPT | Performed by: STUDENT IN AN ORGANIZED HEALTH CARE EDUCATION/TRAINING PROGRAM

## 2024-11-11 PROCEDURE — 85018 HEMOGLOBIN: CPT | Performed by: STUDENT IN AN ORGANIZED HEALTH CARE EDUCATION/TRAINING PROGRAM

## 2024-11-11 PROCEDURE — 85048 AUTOMATED LEUKOCYTE COUNT: CPT | Performed by: STUDENT IN AN ORGANIZED HEALTH CARE EDUCATION/TRAINING PROGRAM

## 2024-11-11 PROCEDURE — 84484 ASSAY OF TROPONIN QUANT: CPT | Performed by: STUDENT IN AN ORGANIZED HEALTH CARE EDUCATION/TRAINING PROGRAM

## 2024-11-11 PROCEDURE — 210N000002 HC R&B HEART CARE

## 2024-11-11 PROCEDURE — B2111ZZ FLUOROSCOPY OF MULTIPLE CORONARY ARTERIES USING LOW OSMOLAR CONTRAST: ICD-10-PCS | Performed by: INTERNAL MEDICINE

## 2024-11-11 PROCEDURE — 93454 CORONARY ARTERY ANGIO S&I: CPT | Mod: 26 | Performed by: INTERNAL MEDICINE

## 2024-11-11 PROCEDURE — C1887 CATHETER, GUIDING: HCPCS | Performed by: INTERNAL MEDICINE

## 2024-11-11 RX ORDER — OXYCODONE HYDROCHLORIDE 5 MG/1
10 TABLET ORAL EVERY 4 HOURS PRN
Status: DISCONTINUED | OUTPATIENT
Start: 2024-11-11 | End: 2024-11-15 | Stop reason: HOSPADM

## 2024-11-11 RX ORDER — LORAZEPAM 0.5 MG/1
0.5 TABLET ORAL
Status: DISCONTINUED | OUTPATIENT
Start: 2024-11-11 | End: 2024-11-13 | Stop reason: HOSPADM

## 2024-11-11 RX ORDER — ASPIRIN 81 MG/1
243 TABLET, CHEWABLE ORAL ONCE
Status: COMPLETED | OUTPATIENT
Start: 2024-11-11 | End: 2024-11-11

## 2024-11-11 RX ORDER — ACETAMINOPHEN 325 MG/1
650 TABLET ORAL EVERY 4 HOURS PRN
Status: DISCONTINUED | OUTPATIENT
Start: 2024-11-11 | End: 2024-11-15 | Stop reason: HOSPADM

## 2024-11-11 RX ORDER — FLUMAZENIL 0.1 MG/ML
0.2 INJECTION, SOLUTION INTRAVENOUS
Status: ACTIVE | OUTPATIENT
Start: 2024-11-11 | End: 2024-11-11

## 2024-11-11 RX ORDER — HEPARIN SODIUM 1000 [USP'U]/ML
INJECTION, SOLUTION INTRAVENOUS; SUBCUTANEOUS
Status: DISCONTINUED | OUTPATIENT
Start: 2024-11-11 | End: 2024-11-11 | Stop reason: HOSPADM

## 2024-11-11 RX ORDER — NITROGLYCERIN 5 MG/ML
VIAL (ML) INTRAVENOUS
Status: DISCONTINUED | OUTPATIENT
Start: 2024-11-11 | End: 2024-11-11 | Stop reason: HOSPADM

## 2024-11-11 RX ORDER — IOPAMIDOL 755 MG/ML
INJECTION, SOLUTION INTRAVASCULAR
Status: DISCONTINUED | OUTPATIENT
Start: 2024-11-11 | End: 2024-11-11 | Stop reason: HOSPADM

## 2024-11-11 RX ORDER — LIDOCAINE 40 MG/G
CREAM TOPICAL
Status: DISCONTINUED | OUTPATIENT
Start: 2024-11-11 | End: 2024-11-11

## 2024-11-11 RX ORDER — FENTANYL CITRATE 50 UG/ML
25 INJECTION, SOLUTION INTRAMUSCULAR; INTRAVENOUS
Status: DISCONTINUED | OUTPATIENT
Start: 2024-11-11 | End: 2024-11-15 | Stop reason: HOSPADM

## 2024-11-11 RX ORDER — ASPIRIN 325 MG
325 TABLET ORAL ONCE
Status: DISCONTINUED | OUTPATIENT
Start: 2024-11-11 | End: 2024-11-11

## 2024-11-11 RX ORDER — POTASSIUM CHLORIDE 1500 MG/1
20 TABLET, EXTENDED RELEASE ORAL
Status: DISCONTINUED | OUTPATIENT
Start: 2024-11-11 | End: 2024-11-13 | Stop reason: HOSPADM

## 2024-11-11 RX ORDER — NALOXONE HYDROCHLORIDE 0.4 MG/ML
0.4 INJECTION, SOLUTION INTRAMUSCULAR; INTRAVENOUS; SUBCUTANEOUS
Status: ACTIVE | OUTPATIENT
Start: 2024-11-11 | End: 2024-11-11

## 2024-11-11 RX ORDER — NALOXONE HYDROCHLORIDE 0.4 MG/ML
0.2 INJECTION, SOLUTION INTRAMUSCULAR; INTRAVENOUS; SUBCUTANEOUS
Status: ACTIVE | OUTPATIENT
Start: 2024-11-11 | End: 2024-11-11

## 2024-11-11 RX ORDER — ATROPINE SULFATE 0.1 MG/ML
0.5 INJECTION INTRAVENOUS
Status: ACTIVE | OUTPATIENT
Start: 2024-11-11 | End: 2024-11-11

## 2024-11-11 RX ORDER — LORAZEPAM 2 MG/ML
0.5 INJECTION INTRAMUSCULAR
Status: DISCONTINUED | OUTPATIENT
Start: 2024-11-11 | End: 2024-11-13 | Stop reason: HOSPADM

## 2024-11-11 RX ORDER — VERAPAMIL HYDROCHLORIDE 2.5 MG/ML
INJECTION, SOLUTION INTRAVENOUS
Status: DISCONTINUED | OUTPATIENT
Start: 2024-11-11 | End: 2024-11-11 | Stop reason: HOSPADM

## 2024-11-11 RX ORDER — OXYCODONE HYDROCHLORIDE 5 MG/1
5 TABLET ORAL EVERY 4 HOURS PRN
Status: DISCONTINUED | OUTPATIENT
Start: 2024-11-11 | End: 2024-11-15 | Stop reason: HOSPADM

## 2024-11-11 RX ADMIN — HEPARIN SODIUM 950 UNITS/HR: 10000 INJECTION, SOLUTION INTRAVENOUS at 13:45

## 2024-11-11 RX ADMIN — LEVOTHYROXINE SODIUM 150 MCG: 150 TABLET ORAL at 06:47

## 2024-11-11 RX ADMIN — ACETAMINOPHEN 650 MG: 325 TABLET, FILM COATED ORAL at 21:53

## 2024-11-11 RX ADMIN — CEFUROXIME AXETIL 500 MG: 500 TABLET ORAL at 08:24

## 2024-11-11 RX ADMIN — ASPIRIN 81 MG: 81 TABLET, COATED ORAL at 08:24

## 2024-11-11 RX ADMIN — CEFUROXIME AXETIL 500 MG: 500 TABLET ORAL at 21:47

## 2024-11-11 NOTE — PROGRESS NOTES
[facility]    Medicine Progress Note - Hospitalist Service    Date of Admission:  11/8/2024  Date of Service: 11/11/2024    Assessment & Plan      Mendez Greene is a 79 year old male admitted on 11/8/2024. He presents with generalized weakness/difficulty swallowing.      Generalized weakness    Assessment: Presents with ongoing weakness and fatigue in the setting of recently diagnosed urinary tract infection.  On admission his troponin was elevated to 812 otherwise rest of  workup is largely benign.  MRI Cervical Spine-> Multilevel spondylosis with variable degrees of foraminal narrowing, worst and severe at C3-C4 on the left, moderate-severe on the right. No acute intracranial abnormalities on MRI brain.     Plan:   -IV fluids stopped   -Fall precautions  -PT eval  -Neurology eval for UE weakness      Acute cystitis with hematuria    Assessment: UA highly suggestive of UTI, likely contributing to his weakness    Plan:  -IV Ceftriaxone -> PO Ceftin BID  -Follow UCs -> 10,000-50,000 CFU/mL Proteus vulgaris       CHA on CPAP    Assessment/Plan: Continue PTA CPAP      CAD (coronary artery disease)    Elevated troponin    Assessment: Troponin elevated to 800, patient endorsed some dyspnea but no chest pain at this time.  PTA on 81 mg of aspirin daily.    Plan:   -IV heparin continued due to rising trops  -Daily ASA  -Trend troponin -> continues to stay quite elevated up to 1200 today  -Echocardiogram -> The rhythm was sinus bradycardia. Left ventricular systolic function is normal. The visual ejection fraction is 60-65%. The left ventricle is normal in size. The right ventricle is normal in structure, function and size. The left atrium is moderate to severely dilated.  -Cardiology consulted -> Cor angio today      PAF (paroxysmal atrial fibrillation) (H)    Assessment/Plan: Not anticoagulated or on rate control at this time.  Will monitor on telemetry.      Ascending aortic aneurysm (H)     Assessment/Plan: obtained ECHO, see results -> Aortic root dilatation is present.(43mm( Ascending aorta dilatation is present.(45mm) No change since 7/30/2024      Pure hypercholesterolemia    Benign essential hypertension    Assessment/Plan: Continue prior to admission lisinopril/Zocor      History of basal cell carcinoma    Assessment/Plan: Stable, follows an outpatient      Hypothyroidism    Assessment/Plan: Continue prior to admission levothyroxine      Dysphagia    Assessment/Plan: SLP following -> VFSS tomorrow            Diet: Snacks/Supplements Adult: Magic Cup; With Meals  Combination Diet Regular Diet; Slightly Thick (level 1) (Up in a chair for meals, no straws, small bites/sips, liquids by spoon and alternate liquids/solids.); Minced and Moist Diet (level 5); Slightly Thick (level 1)    DVT Prophylaxis: Heparin gtt  Cortez Catheter: Not present  Lines: None     Cardiac Monitoring: ACTIVE order. Indication: Post- PCI/Angiogram (24 hours)  Code Status: Full Code      Clinically Significant Risk Factors          # Hyperchloremia: Highest Cl = 112 mmol/L in last 2 days, will monitor as appropriate       # Hypercalcemia: Highest Ca = 11.9 mg/dL in last 2 days, will monitor as appropriate    # Hypoalbuminemia: Lowest albumin = 3.3 g/dL at 11/9/2024 11:39 AM, will monitor as appropriate     # Hypertension: Noted on problem list             # Severe Malnutrition: based on nutrition assessment, PRESENT ON ADMISSION          Disposition Plan     Medically Ready for Discharge: Anticipated in 2-4 Days             Tristian Mann MD  Hospitalist Service  [facility]  Securely message with [employer] (more info)  Text page via [employer] Paging/Directory   ______________________________________________________________________    Interval History     No CP/SOB  No fevers  No nausea / vomiting   Cor Angio today  Reports ongoing weakness of UEs, bilaterally  But no neck pain or numbness  Reports feeling  "improved otherwise  No new complaints    Physical Exam   Vital Signs: Temp: 97.5  F (36.4  C) Temp src: Oral BP: 114/56 Pulse: (!) 49   Resp: 12 SpO2: 95 % O2 Device: None (Room air)    Weight: 171 lbs 8.29 oz    Constitutional: awake, alert, cooperative, no apparent distress.   Respiratory: CTABL   Cardiovascular: RRR with no m/r/g   GI: Normal bowel sounds, soft, non-distended, non-tender.   Skin: normal skin color, texture, turgor   Musculoskeletal: There is no redness, warmth, or swelling of the joints. Full range of motion noted.   Neurologic: Awake, alert, oriented to name, place and time. Francesca. Motor is 3 out of 5 in UE. Sensory is intact.   Neuropsychiatric: normal mood and affect  ----------------------------------------------------------------------------------------    Medical Decision Making       50 MINUTES SPENT BY ME on the date of service doing chart review, history, exam, documentation & further activities per the note.      Data        Recent Labs   Lab 11/11/24  0838 11/10/24  1643 11/10/24  0855   WBC 7.5 8.8 6.6   HGB 14.0 13.7 13.9   HCT 42.3 40.6 41.6   * 103* 103*    310 311     Recent Labs   Lab 11/11/24  0838 11/10/24  1643 11/10/24  0855   WBC 7.5 8.8 6.6   HGB 14.0 13.7 13.9   HCT 42.3 40.6 41.6   * 103* 103*    310 311     Recent Labs   Lab 11/11/24  0838 11/10/24  0855 11/09/24  1139    143 139   POTASSIUM 4.4 4.3 4.1   CHLORIDE 112* 111* 108*   CO2 24 23 23   ANIONGAP 9 9 8   * 91 129*   BUN 40.0* 43.4* 54.2*   CR 0.60* 0.59* 0.62*   GFRESTIMATED >90 >90 >90   SIMONA 11.9* 11.8* 11.7*     Recent Labs   Lab 11/11/24  0838 11/10/24  0855 11/09/24  1139   CR 0.60* 0.59* 0.62*     Recent Labs   Lab 11/11/24  0838 11/10/24  1643 11/10/24  0855   HGB 14.0 13.7 13.9     No results for input(s): \"LACT\" in the last 168 hours.  No results for input(s): \"LIPASE\" in the last 168 hours.    "

## 2024-11-11 NOTE — PLAN OF CARE
SLP: Pt currently NPO for angiogram - called RN and unknown time for procedure yet. SLP to HOLD VFSS, VFSS this PM vs tomorrow, pending timing of angiogram today/ scheduling.      Addendum: Angio scheduled for later this afternoon, therefore VFSS cancelled for today and will be completed 11/12. After angiogram today, OK to re-enter last SLP recommended diet of: minced/moist and slightly thick liquids with strategies: up in a chair for meals, liquids by spoon or very small sips, alternate liquids/solids with small bites.

## 2024-11-11 NOTE — PLAN OF CARE
A&O x4, horse voice. Pt denied pain. VSS, on RA. Up 1+ GB walker- Not OOB after angio. Tele: SR. Up to floor around 1445 from Angio- clean coronaries. R radial site WDL, CDI. TR band removed @ 1730. On Minced and moist, slightly thick liquids NO STRAW. Plan for video swallow tomorrow. Continue to monitor.

## 2024-11-11 NOTE — PLAN OF CARE
11/10/2-1100-1930    Summary:  admitted on 11/8/2024 with generalized weakness/difficulty swallowing.     Primary Diagnosis: Acute cystitis with hematuria    Generalized weakness    Orientation: A&O x4    Aggression Stop Light: Green    Activity: SBA GBW    Diet/BS Checks: Reg diet minced/ moist w/ slightly thickened liquids, NPO-[place]    Tele: Sinus Fam    IV Access/Drains: 2 R PIV . Hep 950 international unit(s)/ hr ongoing.     Pain Management: Denies pain this shift.    Abnormal VS/Results: VSS on RA ex fam. HepXa to recheck at 2146    Bowel/Bladder: Continent but dribbles in brief, hx of neobladder. No BM this shift.    Skin/Wounds: Scattered bruising. Scab to scalp.    Consults: PT, SLP, Nutrition, Cardiology, Neurology    D/C Disposition: pending-to home    Other Info:   Using CPAP whenever taking nap and also at night. NPO from [place] for invasive angiogram +/- PCI tomorrow. Plan for video swallow tomorrow. On Hep drip. Hep Xa to check at 2146. Neurology eval for UE weakness. On suicidal precautions.

## 2024-11-11 NOTE — PROGRESS NOTES
[facility]    ~Cardiology Progress Note~    Primary Cardiologist: Dr. Rocha     Date of Admission: 11/8/2024  Service Date: 11/11/2024    Summary:  Mr. Mendez Greene is a very pleasant 79 year old male with a past medical history of aortic insufficiency, coronary artery disease, HTN, HLD, hypothyroidism who was admitted on 11/8/2024 for shortness of breath, weakness and fatigue. Troponin was elevated. He is currently being treated for acute cystitis.      Interval November 11, 2024:  Minimal sleep overnight. Resting in bed. Denies chest pain. Reports dyspnea on exertion. Hemodynamically stable. K 4.4. Cr 0.60. Hgb 14.0.     Telemetry reviewed- sinus rhythm          Assessment:     NSTEMI  Troponin 812 -> 831 -> 1223  Currently chest pain free  EF 60-65% with no WMA    Moderate coronary artery calcifications on CT chest 12/2022    Acute cystitis   On abx per primary service     Moderate aortic regurgitation   Noted on echo 11/2024    4.   HTN    Controlled     5.   HLD  On simvastatin 20 mg daily PTA  LDL 7/2024- 93    6.  Aortic dilatation, measuring 4.3 cm     7.  Ascending aorta dilatation, measuring 4.5 cm      8.  Hypothyroidism             Plan:     Plan for coronary angiogram today. Consent signed and in chart   Remain NPO   Continue heparin gtt   Further recommendations pending the above procedure   Coordinating outpatient cardiology follow up   Cardiology to follow     Plan of care was formulated under the direction and guidance of Dr. Saavedra.         Sonam BLANDC  Physician Assistant   [facility]    20 minutes spent in coordination of care, and discussion with the patient and/or family regarding diagnostic results, prognosis, symptom management, risks and benefits of management options, and development of the plan of care of above.        Patient Active Problem List   Diagnosis    CHA on CPAP    CAD (coronary artery disease)    PAF (paroxysmal atrial  fibrillation) (H)    Ascending aortic aneurysm (H)    Aortic insufficiency    Pure hypercholesterolemia    History of basal cell carcinoma    Personal history of malignant neoplasm of bladder    Benign essential hypertension    Hypothyroidism    Stiffness of joint, hand, right    Generalized weakness    Elevated troponin    Acute cystitis with hematuria       Physical Exam   Temp: 97.8  F (36.6  C) Temp src: Oral BP: 131/64 Pulse: (!) 44   Resp: 16 SpO2: 91 % O2 Device: None (Room air)    Vitals:    11/08/24 1146 11/08/24 2212 11/09/24 0104   Weight: 79.4 kg (175 lb) 77.9 kg (171 lb 12.8 oz) 77.8 kg (171 lb 8.3 oz)     I/O last 3 completed shifts:  In: 383 [P.O.:380; I.V.:3]  Out: -     Constitutional: Appears stated age, well nourished, NAD.  Neck: Supple. JVD not visualized.   Respiratory: Non-labored. Lungs CTAB.  Cardiovascular: RRR, normal S1 and S2. No M/G/R. Bilateral lower extremities with no edema.   GI: Soft, non-distended, non-tender.  Skin: Warm and dry.   Musculoskeletal/Extremities: Symmetrical movement.  Neurologic: No gross focal deficits. Alert, awake.  Psychiatric: Affect appropriate. Mentation normal.    Medications   Current Facility-Administered Medications   Medication Dose Route Frequency Provider Last Rate Last Admin    heparin 25,000 units in 0.45% NaCl 250 mL ANTICOAGULANT infusion  0-5,000 Units/hr Intravenous Continuous Sergio Olsen MD 9.5 mL/hr at 11/10/24 1610 950 Units/hr at 11/10/24 1610    Patient is already receiving anticoagulation with heparin, enoxaparin (LOVENOX), warfarin (COUMADIN)  or other anticoagulant medication   Does not apply Continuous PRN Tristian Mann MD         Current Facility-Administered Medications   Medication Dose Route Frequency Provider Last Rate Last Admin    aspirin EC tablet 81 mg  81 mg Oral Daily Tristian Mann MD   81 mg at 11/11/24 0824    cefuroxime (CEFTIN) tablet 500 mg  500 mg Oral Q12H [facility] (08/20) Tristian Mann MD   500 mg at 11/11/24 0824     levothyroxine (SYNTHROID/LEVOTHROID) tablet 150 mcg  150 mcg Oral Daily Tristian Murdock MD   150 mcg at 24 0647    sodium chloride (PF) 0.9% PF flush 3 mL  3 mL Intracatheter Q8H Tristian Murdock MD   3 mL at 11/10/24 1612       Data   Last 24 hours labs personally reviewed.  Echo:   Recent Results (from the past 4320 hours)   Echocardiogram Complete   Result Value    LVEF  60-65%    [facility]    [id number]  [id number]  [id number]^MATHIEU^TRISTIAN     [facility]  Echocardiography Laboratory  [address] [place] [id number]     Name: JUAN MANUEL GRIMES  MRN: [id number]  : 1945  Study Date: 2024 02:56 PM  Age: 79 yrs  Gender: Male  Patient Location: [facility]  Reason For Study: Dyspnea  Ordering Physician: TRISTIAN MURDOCK  Performed By: Tricia Santiago     BSA: 2.0 m2  Height: 70 in  Weight: 175 lb  HR: 49  BP: 93/65 mmHg  ______________________________________________________________________________  Procedure  Complete Portable Echo Adult. Definity (NDC #77277-326) given intravenously.  Contrast Definity. Technically difficult study.Extremely difficult acoustic  windows despite the use of contrast for endcardial border definition.  Technically difficult study.  ______________________________________________________________________________  Interpretation Summary     The rhythm was sinus bradycardia.  Left ventricular systolic function is normal.  The visual ejection fraction is 60-65%.  The left ventricle is normal in size.  The right ventricle is normal in structure, function and size.  The left atrium is moderate to severely dilated.  There is moderate (2+) aortic regurgitation.  Aortic root dilatation is present.(43mm(  Ascending aorta dilatation is present.(45mm)  No change since 2024  ______________________________________________________________________________  Left Ventricle  The left ventricle is normal in size. There is normal left ventricular wall  thickness. Left  ventricular systolic function is normal. The visual ejection  fraction is 60-65%. No regional wall motion abnormalities noted. There is no  thrombus seen in the left ventricle.     Right Ventricle  The right ventricle is normal in structure, function and size. There is no  mass or thrombus in the right ventricle.     Atria  The left atrium is moderate to severely dilated. Right atrial size is normal.  There is no atrial shunt seen. The left atrial appendage is not well  visualized.     Mitral Valve  The mitral valve leaflets appear normal. There is no evidence of stenosis,  fluttering, or prolapse. There is no mitral regurgitation noted. There is no  mitral valve stenosis.     Tricuspid Valve  Normal tricuspid valve. The right ventricular systolic pressure is elevated at  35.9 mmHg. Right ventricular systolic pressure is elevated, consistent with  mild to moderate pulmonary hypertension. There is no tricuspid stenosis.     Aortic Valve  Normal tricuspid aortic valve. There is moderate (2+) aortic regurgitation.  There is an eccentric jet of aortic insufficiency directed against the  anterior mitral leaflet. No aortic stenosis is present.     Pulmonic Valve  Normal pulmonic valve. There is no pulmonic valvular regurgitation. There is  no pulmonic valvular stenosis.     Vessels  Aortic root dilatation is present. Ascending aorta dilatation is present. The  inferior vena cava is normal. The pulmonary artery is normal size.     Pericardium  The pericardium appears normal. There is no pleural effusion.     Rhythm  The rhythm was sinus bradycardia.  ______________________________________________________________________________  MMode/2D Measurements & Calculations     Ao root diam: 4.3 cm  asc Aorta Diam: 4.5 cm  LVOT diam: 2.4 cm  LVOT area: 4.7 cm2  Ao root diam index Ht(cm/m): 2.4  Ao root diam index BSA (cm/m2): 2.2  Asc Ao diam index BSA (cm/m2): 2.3  Asc Ao diam index Ht(cm/m): 2.5  LA Volume (BP): 117.0 ml  LA  Volume Index (BP): 59.4 ml/m2  TAPSE: 2.9 cm     Doppler Measurements & Calculations  Ao V2 max: 206.8 cm/sec  Ao max P.0 mmHg  Ao V2 mean: 153.2 cm/sec  Ao mean PG: 10.6 mmHg  Ao V2 VTI: 52.9 cm  SILVIA(I,D): 3.9 cm2  SILVIA(V,D): 4.2 cm2  LV V1 max P.1 mmHg  LV V1 max: 187.9 cm/sec  LV V1 VTI: 44.4 cm  SV(LVOT): 207.3 ml  SI(LVOT): 105.1 ml/m2  PA acc time: 0.08 sec  TR max arnold: 299.8 cm/sec  TR max P.9 mmHg  AV Arnold Ratio (DI): 0.91  SILVIA Index (cm2/m2): 2.0  RV S Arnold: 15.4 cm/sec     ______________________________________________________________________________  Report approved by: Dr. Sachin Richmond 2024 04:12 PM         Echocardiogram Complete   Result Value    LVEF  55-60%    Narrative    [id number]  [id number]  [id number]  [id number]^SERGIO^RIGO^R     [facility]  Echocardiography Laboratory  [address] [place] [address]     Name: JUAN MANUEL GRIMES  MRN: [id number]  : 1945  Study Date: 2024 01:32 PM  Age: 78 yrs  Gender: Male  Patient Location: [facility]  Reason For Study: Aneurysm of ascending aorta without rupture  Ordering Physician: RIGO HILL  Referring Physician: RIGO HILL  Performed By: Irene Henry RDCS     BSA: 2.1 m2  Height: 70 in  Weight: 192 lb  HR: 46  BP: 156/69 mmHg  ______________________________________________________________________________  Procedure  Complete Echo Adult.  ______________________________________________________________________________  Interpretation Summary     The visual ejection fraction is 55-60%.  The left atrium is moderately dilated.  There is mild to moderate (1-2+) tricuspid regurgitation.  Mild (35-45mmHg) pulmonary hypertension is present.  There is mild to moderate (1-2+) aortic regurgitation.  There is an eccentric jet of aortic insufficiency directed against the  anterior mitral leaflet.  Aortic root aneurysm is present.  Ascending aorta aneurysm is present.  Both above measurements 4.5 cm.  They were 4.1 cm in 2022.  ______________________________________________________________________________  Left Ventricle  The left ventricle is borderline dilated. Proximal septal thickening is noted.  There is normal left ventricular wall thickness. The visual ejection fraction  is 55-60%. Grade I or early diastolic dysfunction.     Right Ventricle  The right ventricle is normal in structure, function and size.     Atria  The left atrium is moderately dilated. The right atrium is mildly dilated.     Mitral Valve  The mitral valve leaflets appear normal. There is no evidence of stenosis,  fluttering, or prolapse.     Tricuspid Valve  The tricuspid valve is not well visualized, but is grossly normal. There is  mild to moderate (1-2+) tricuspid regurgitation. Mild (35-45mmHg) pulmonary  hypertension is present.     Aortic Valve  There is trivial trileaflet aortic sclerosis. There is mild to moderate (1-2+)  aortic regurgitation. There is an eccentric jet of aortic insufficiency  directed against the anterior mitral leaflet.     Pulmonic Valve  The pulmonic valve is not well seen, but is grossly normal. There is trace to  mild pulmonic valvular regurgitation.     Vessels  Aortic root aneurysm is present. Ascending aorta aneurysm is present. Dilation  of the inferior vena cava is present with normal respiratory variation in  diameter.     Pericardium  The pericardium is not well visualized.     Rhythm  Sinus rhythm was noted.  ______________________________________________________________________________  MMode/2D Measurements & Calculations  IVSd: 1.3 cm     LVIDd: 5.5 cm  LVIDs: 3.9 cm  LVPWd: 0.94 cm  IVC diam: 2.5 cm  FS: 28.4 %  LV mass(C)d: 240.0 grams  LV mass(C)dI: 117.0 grams/m2  Ao root diam: 4.1 cm  asc Aorta Diam: 4.5 cm  Ao root diam index Ht(cm/m): 2.3  Ao root diam index BSA (cm/m2): 2.0  Asc Ao diam index BSA (cm/m2): 2.2  Asc Ao diam index Ht(cm/m): 2.5  LA Volume (BP): 119.0 ml     LA Volume Index  (BP): 58.0 ml/m2  RV Base: 3.5 cm  RWT: 0.34  TAPSE: 2.8 cm     Doppler Measurements & Calculations  MV E max arnold: 83.3 cm/sec  MV A max arnold: 81.8 cm/sec  MV E/A: 1.0  MV max PG: 3.4 mmHg  MV mean P.4 mmHg  MV V2 VTI: 37.7 cm  MV dec time: 0.31 sec  AI P1/2t: 949.5 msec  PA acc time: 0.13 sec  TR max arnold: 286.5 cm/sec  TR max P.8 mmHg  E/E' av.8  Lateral E/e': 6.7  Medial E/e': 10.9  RV S Arnold: 11.3 cm/sec     ______________________________________________________________________________  Report approved by: Kun Carreon 2024 02:37 PM

## 2024-11-11 NOTE — PLAN OF CARE
Goal Outcome Evaluation:       Summary: history of basal cell carcinoma, A-fib, sleep apnea,   Primary Diagnosis: admitted for generalixed weakness, dificulty swallowing. Troponin elevated to 1323. Work up with Swallow study, coronary angiogram       ONLY POST BELOW FOR END OF SHIFT NOTE     Orientation: alert and oriented  Aggression Stop Light: green  Activity: assist of one walker and gait belt  Diet/BS Checks: NPO  Tele:  a-fib, bradycardia (40's)  IV Access/Drains: right arm PIV time two  Pain Management: denies pain  Abnormal VS/Results: vitals are with in normal limits. Troponin 1323. On Heparin drip. Cardiology following  Bowel/Bladder: incontinent of bladder  Skin/Wounds: with in normal limits  Consults: PT/OT/ Cardiology  D/C Disposition: pending clinical improvement  Other Info: Video swallow study postponed to tomorrow. Coronary angiogram in the afternoon. Heparin dripping at 950. Next  heparin 10a is in the morning.

## 2024-11-11 NOTE — PROGRESS NOTES
Patient presented for a stress echo today and stated that she is unable to walk on the treadmill due to 4 previous back surgeries .  Per protocol the patient was rescheduled for lexiscan nuclear stress test . Patient verbalized understanding of the appt as well as the instructions for the appt.    Summary:  admitted on 11/8/2024 with generalized weakness/difficulty swallowing.     Primary Diagnosis: Acute cystitis with hematuria    Generalized weakness    Orientation: A/O x4    Vitals/Tele: VSS on RA. Tele: Sinu fam with occasional PVC     IV Access/drains: 2 R PIV one infusing heparin @ 950. Replace for AM check    Diet: Minced moist /slightly thick liquid    Mobility: SBA BG/W    GI/: Incontinent of B/B    Wound/Skin: Intact ex scattered bruising    Consults: PT, Neurology, Cardiology, following    Discharge Plan: Pending      See Flow sheets for assessment

## 2024-11-11 NOTE — PLAN OF CARE
11/10/24 -- [id number]    Orientation: A&O x4  Aggression Stop Light: Green  Activity: A1 GBW  Diet/BS Checks: Regular and Slightly thick liquids, NPO since [place]  Tele: Sinus Vitor  IV Access/Drains: 2 R PIV, heparin infusing @ 950units/hr  Pain Management: denies pain this shift  Abnormal VS/Results: VSS on RA, except bradycardia  Bowel/Bladder: incontinent of b/b at times   Skin/Wounds: Scattered bruising, Scab on scalp  Consults: Neurology, Cardiology, PT  D/C Disposition: pending    Other Info:   - Plan for angiogram 11/11  - Plan for video swallow 11/11  - Wears CPAP at night   - Worsening weakness

## 2024-11-12 ENCOUNTER — APPOINTMENT (OUTPATIENT)
Dept: MRI IMAGING | Facility: CLINIC | Age: 79
DRG: 056 | End: 2024-11-12
Attending: PSYCHIATRY & NEUROLOGY
Payer: MEDICARE

## 2024-11-12 ENCOUNTER — APPOINTMENT (OUTPATIENT)
Dept: PHYSICAL THERAPY | Facility: CLINIC | Age: 79
DRG: 056 | End: 2024-11-12
Payer: MEDICARE

## 2024-11-12 ENCOUNTER — APPOINTMENT (OUTPATIENT)
Dept: SPEECH THERAPY | Facility: CLINIC | Age: 79
DRG: 056 | End: 2024-11-12
Payer: MEDICARE

## 2024-11-12 ENCOUNTER — APPOINTMENT (OUTPATIENT)
Dept: GENERAL RADIOLOGY | Facility: CLINIC | Age: 79
DRG: 056 | End: 2024-11-12
Attending: STUDENT IN AN ORGANIZED HEALTH CARE EDUCATION/TRAINING PROGRAM
Payer: MEDICARE

## 2024-11-12 LAB
ANION GAP SERPL CALCULATED.3IONS-SCNC: 13 MMOL/L (ref 7–15)
BUN SERPL-MCNC: 39.2 MG/DL (ref 8–23)
CALCIUM SERPL-MCNC: 11.7 MG/DL (ref 8.8–10.4)
CHLORIDE SERPL-SCNC: 114 MMOL/L (ref 98–107)
CK SERPL-CCNC: 267 U/L (ref 39–308)
CREAT SERPL-MCNC: 0.58 MG/DL (ref 0.67–1.17)
EGFRCR SERPLBLD CKD-EPI 2021: >90 ML/MIN/1.73M2
ERYTHROCYTE [DISTWIDTH] IN BLOOD BY AUTOMATED COUNT: 13.7 % (ref 10–15)
FOLATE SERPL-MCNC: 14.4 NG/ML (ref 4.6–34.8)
GLUCOSE SERPL-MCNC: 119 MG/DL (ref 70–99)
HCO3 SERPL-SCNC: 18 MMOL/L (ref 22–29)
HCT VFR BLD AUTO: 42.3 % (ref 40–53)
HGB BLD-MCNC: 13.8 G/DL (ref 13.3–17.7)
MCH RBC QN AUTO: 34 PG (ref 26.5–33)
MCHC RBC AUTO-ENTMCNC: 32.6 G/DL (ref 31.5–36.5)
MCV RBC AUTO: 104 FL (ref 78–100)
PLATELET # BLD AUTO: 290 10E3/UL (ref 150–450)
POTASSIUM SERPL-SCNC: 4.9 MMOL/L (ref 3.4–5.3)
RBC # BLD AUTO: 4.06 10E6/UL (ref 4.4–5.9)
SODIUM SERPL-SCNC: 145 MMOL/L (ref 135–145)
UFH PPP CHRO-ACNC: <0.1 IU/ML
WBC # BLD AUTO: 9.1 10E3/UL (ref 4–11)

## 2024-11-12 PROCEDURE — 80048 BASIC METABOLIC PNL TOTAL CA: CPT | Performed by: STUDENT IN AN ORGANIZED HEALTH CARE EDUCATION/TRAINING PROGRAM

## 2024-11-12 PROCEDURE — 86041 ACETYLCHOLN RCPTR BNDNG ANTB: CPT | Performed by: PSYCHIATRY & NEUROLOGY

## 2024-11-12 PROCEDURE — 86038 ANTINUCLEAR ANTIBODIES: CPT | Performed by: PSYCHIATRY & NEUROLOGY

## 2024-11-12 PROCEDURE — 36415 COLL VENOUS BLD VENIPUNCTURE: CPT | Performed by: STUDENT IN AN ORGANIZED HEALTH CARE EDUCATION/TRAINING PROGRAM

## 2024-11-12 PROCEDURE — 36415 COLL VENOUS BLD VENIPUNCTURE: CPT | Performed by: PSYCHIATRY & NEUROLOGY

## 2024-11-12 PROCEDURE — 255N000002 HC RX 255 OP 636: Performed by: PSYCHIATRY & NEUROLOGY

## 2024-11-12 PROCEDURE — 82607 VITAMIN B-12: CPT | Performed by: PSYCHIATRY & NEUROLOGY

## 2024-11-12 PROCEDURE — 85014 HEMATOCRIT: CPT | Performed by: STUDENT IN AN ORGANIZED HEALTH CARE EDUCATION/TRAINING PROGRAM

## 2024-11-12 PROCEDURE — 83516 IMMUNOASSAY NONANTIBODY: CPT | Performed by: PSYCHIATRY & NEUROLOGY

## 2024-11-12 PROCEDURE — 72158 MRI LUMBAR SPINE W/O & W/DYE: CPT | Mod: MF

## 2024-11-12 PROCEDURE — 97116 GAIT TRAINING THERAPY: CPT | Mod: GP

## 2024-11-12 PROCEDURE — 92526 ORAL FUNCTION THERAPY: CPT | Mod: GN | Performed by: SPEECH-LANGUAGE PATHOLOGIST

## 2024-11-12 PROCEDURE — 99233 SBSQ HOSP IP/OBS HIGH 50: CPT | Performed by: STUDENT IN AN ORGANIZED HEALTH CARE EDUCATION/TRAINING PROGRAM

## 2024-11-12 PROCEDURE — 82085 ASSAY OF ALDOLASE: CPT | Performed by: PSYCHIATRY & NEUROLOGY

## 2024-11-12 PROCEDURE — 92611 MOTION FLUOROSCOPY/SWALLOW: CPT | Mod: GN | Performed by: SPEECH-LANGUAGE PATHOLOGIST

## 2024-11-12 PROCEDURE — 72157 MRI CHEST SPINE W/O & W/DYE: CPT | Mod: MG

## 2024-11-12 PROCEDURE — 82550 ASSAY OF CK (CPK): CPT | Performed by: PSYCHIATRY & NEUROLOGY

## 2024-11-12 PROCEDURE — 258N000003 HC RX IP 258 OP 636: Performed by: STUDENT IN AN ORGANIZED HEALTH CARE EDUCATION/TRAINING PROGRAM

## 2024-11-12 PROCEDURE — 120N000001 HC R&B MED SURG/OB

## 2024-11-12 PROCEDURE — 74230 X-RAY XM SWLNG FUNCJ C+: CPT

## 2024-11-12 PROCEDURE — 82310 ASSAY OF CALCIUM: CPT | Performed by: STUDENT IN AN ORGANIZED HEALTH CARE EDUCATION/TRAINING PROGRAM

## 2024-11-12 PROCEDURE — 82746 ASSAY OF FOLIC ACID SERUM: CPT | Performed by: PSYCHIATRY & NEUROLOGY

## 2024-11-12 PROCEDURE — 82306 VITAMIN D 25 HYDROXY: CPT | Performed by: PSYCHIATRY & NEUROLOGY

## 2024-11-12 PROCEDURE — 85520 HEPARIN ASSAY: CPT | Performed by: STUDENT IN AN ORGANIZED HEALTH CARE EDUCATION/TRAINING PROGRAM

## 2024-11-12 PROCEDURE — G1010 CDSM STANSON: HCPCS

## 2024-11-12 PROCEDURE — A9585 GADOBUTROL INJECTION: HCPCS | Performed by: PSYCHIATRY & NEUROLOGY

## 2024-11-12 PROCEDURE — 86255 FLUORESCENT ANTIBODY SCREEN: CPT | Performed by: PSYCHIATRY & NEUROLOGY

## 2024-11-12 RX ORDER — BARIUM SULFATE 400 MG/ML
SUSPENSION ORAL ONCE
Status: COMPLETED | OUTPATIENT
Start: 2024-11-12 | End: 2024-11-12

## 2024-11-12 RX ORDER — GADOBUTROL 604.72 MG/ML
8 INJECTION INTRAVENOUS ONCE
Status: COMPLETED | OUTPATIENT
Start: 2024-11-12 | End: 2024-11-12

## 2024-11-12 RX ORDER — NALOXONE HYDROCHLORIDE 0.4 MG/ML
0.2 INJECTION, SOLUTION INTRAMUSCULAR; INTRAVENOUS; SUBCUTANEOUS
Status: DISCONTINUED | OUTPATIENT
Start: 2024-11-12 | End: 2024-11-15 | Stop reason: HOSPADM

## 2024-11-12 RX ORDER — NALOXONE HYDROCHLORIDE 0.4 MG/ML
0.4 INJECTION, SOLUTION INTRAMUSCULAR; INTRAVENOUS; SUBCUTANEOUS
Status: DISCONTINUED | OUTPATIENT
Start: 2024-11-12 | End: 2024-11-15 | Stop reason: HOSPADM

## 2024-11-12 RX ORDER — DEXTROSE MONOHYDRATE AND SODIUM CHLORIDE 5; .9 G/100ML; G/100ML
INJECTION, SOLUTION INTRAVENOUS CONTINUOUS
Status: DISCONTINUED | OUTPATIENT
Start: 2024-11-12 | End: 2024-11-14

## 2024-11-12 RX ADMIN — DEXTROSE AND SODIUM CHLORIDE: 5; 900 INJECTION, SOLUTION INTRAVENOUS at 14:55

## 2024-11-12 RX ADMIN — BARIUM SULFATE 10 ML: 400 SUSPENSION ORAL at 09:18

## 2024-11-12 RX ADMIN — GADOBUTROL 8 ML: 604.72 INJECTION INTRAVENOUS at 21:30

## 2024-11-12 NOTE — PROGRESS NOTES
11/12/24 0937   Appointment Info   Signing Clinician's Name / Credentials (PT) Joanna Pretty MS [facility] SLP   General Swallowing Observations   Swallowing Evaluation Videofluoroscopic swallow study (VFSS)   VFSS Evaluation   Radiologist Dr. Ny   Views Taken left lateral   Physical Location of Procedure [facility]   VFSS Textures Trialed thin liquids;slightly thick liquids;mildly thick liquids   VFSS Eval: Thin Liquid Texture Trial   Mode of Presentation, Thin Liquid spoon;fed by clinician   Order of Presentation 3   Preparatory Phase poor bolus control   Oral Phase, Thin Liquid impaired AP movement;residue in oral cavity;premature pharyngeal entry   Bolus Location When Swallow Triggered pyriforms   Pharyngeal Phase, Thin Liquid impaired hyolaryngeal excursion;impaired epiglottic movement;impaired tongue base retraction;residue in vallecula;residue in pyriform sinus;impaired pharyngoesophageal segment opening   Rosenbek's Penetration Aspiration Scale: Thin Liquid Trial Results 7 - contrast passes glottis, visible subglottic residue remains despite patient's response (aspiration)   Response to Aspiration unproductive reflexive cough   Diagnostic Statement Ineffective swallow with premature spillage penetration with aspiration.   VFSS Eval: Slightly Thick Liquids   Mode of Presentation spoon;fed by clinician   Order of Presentation 1   Preparatory Phase poor bolus control   Oral Phase impaired AP movement;residue in oral cavity;premature pharyngeal entry   Bolus Location When Swallow Triggered no swallow initiation at any location   Pharyngeal Phase impaired hyolaryngel excursion;impaired epiglottic movement;impaired tongue base retraction;pharyngeal wall coating;residue in vallecula;residue in pyriform sinus;impaired pharyngoesophageal segment opening   Rosenbek's Penetration Aspiration Scale 7 - contrast passes glottis, visable residue remains despite patient's response   Response to Aspiration unproductive  reflexive cough   Diagnostic Statement premature spillage to the pyriform sinuses with no swallow response and aspiration with a cough response.   VFSS Eval: Mildly Thick Liquids   Mode of Presentation spoon;fed by clinician   Order of Presentation 2   Preparatory Phase poor bolus control   Oral Phase impaired AP movement;residue in oral cavity;premature pharyngeal entry   Bolus Location When Swallow Triggered pyriforms   Pharyngeal Phase impaired hyolaryngel excursion;impaired epiglottic movement;impaired tongue base retraction;residue in vallecula;residue in pyriform sinus;impaired pharyngoesophageal segment opening   Rosenbek's Penetration Aspiration Scale 3 - contrast remains above the vocal cords, visible residue remains (penetration)   Diagnostic Statement Premature spillage no epiglottic inversion with upward movement of the bolus on BOT. Large amount of vallecular residue and piece meal swallow with penetration during the swallow likely on residue that eventually hits the cords.   Swallowing Recommendations   Diet Consistency Recommendations NPO;ice chips only   Supervision Level for Intake close supervision needed   Medication Administration Recommendations, Swallowing (SLP) non oral means.   General Therapy Interventions   Planned Therapy Interventions Dysphagia Treatment   Clinical Impression   Criteria for Skilled Therapeutic Interventions Met (SLP Eval) Yes, treatment indicated   SLP Diagnosis Moderate oral and severe pharyngeal dysphagia   Risks & Benefits of therapy have been explained evaluation/treatment results reviewed;care plan/treatment goals reviewed;risks/benefits reviewed;current/potential barriers reviewed;participants voiced agreement with care plan;participants included;patient   Clinical Impression Comments Patient presents with moderate oral and severe pharyngeal dysphagia on today's study. Deficits include, premature spillage of all liquids, reduced BOT retraction, no epiglottic  inversion, incomplete absent swallow with slightly thick liquids with penetration and eventual aspiration with a cough response. Reduced laryngeal elevation and contraction. He had significant vallecular residue with mildly thick liquids and incomplete swallows. A thin liquid given to initiate the swallow at the level of the pyriform sinuses with deep penetration and aspiration with a cough response. Patient has a severely impaired pharyngeal phase the swallow and suspect a bulbar involvement. Recommend: 1. NPO may have some ice chips for comfort only. 2. Consult general neurology and ENT. 3. SLP will continue to follow.   SLP Total Evaluation Time   Evaluation, videofluoroscopic eval of swallow function Minutes (12603) 15   Swallowing Intervention   Treatment of Swallowing Dysfunction &/or Oral Function for Feeding Minutes (90314) 10   Symptoms Noted During/After Treatment None   Treatment Detail/Skilled Intervention Patient provided education after the study on the anatomy and physiology of the swallow. Rationale for NPO and consideration for non oral means. he verbalized understanding and will speak with his wife and MD.   SLP Discharge Planning   SLP Plan pending decision on the TF.   SLP Discharge Recommendation home with home health   SLP Rationale for DC Rec Patient's swallow function is significantly below his baseline.   SLP Brief overview of current status  Significantly impaired swallow function and consider non oral means of nutrition.   SLP Time and Intention   Total Session Time (sum of timed and untimed services) 25

## 2024-11-12 NOTE — PLAN OF CARE
Pt A&O x4. Assist x2 w/ gb and walker. On a minced and moist diet. External catheter. On room air, PIV SL. Tele-SB. Denies any CP or SOB. Takes pills crushed with pudding. Plan for a video swallow study.

## 2024-11-12 NOTE — PLAN OF CARE
[id number] A&O x 4. Patient c/o neck stiffness/discomfort this evening, 4/10 pain. PRN tylenol provided. VSS, on Home CPAP settings. Up with Ax2 per previous RN, did not get out of bed during my shift. Incontinent of urine - external cath in place with good output. Tele: SB/SR (50s-60s).R radial site, WDL. Plan for Video swallow tomorrow.

## 2024-11-12 NOTE — CONSULTS
Neurology Consult Note  [facility]       [2024]                                                                                       Admission Date: 2024  Hospital Day: 5      Patient: Mendez Greene      : 1945  MRN:  [id number]     CC:      Chief Complaint   Patient presents with    Extremity Weakness    Generalized Weakness    Shortness of Breath       Consult Request:  Referring Provider:  Tristian Mann MD  Primary Care Provider:  Gladys Vazquez MD        HPI:  Chief Complaint:Extremity Weakness, Generalized Weakness, Shortness of Breath      History of Present Illness (HPI):  Mendez Greene is an 79-year-old male with a significant medical history of paroxysmal atrial fibrillation (AF), hypertension, malignant neoplasm of the bladder (treated), obstructive sleep apnea (CHA), coronary artery disease (CAD), and aortic insufficiency. He presents with his spouse for evaluation of generalized weakness and shortness of breath.    For the past three weeks, the patient has noticed progressive generalized weakness, which has worsened over the last week. He reports difficulty with speech due to a hoarse voice and has a productive cough. He is also experiencing decreased appetite, oral intake, dehydration, and weight loss. The patient notes shortness of breath and dyspnea on exertion, with the inability to walk more than a few feet without becoming winded. He describes taking his medications regularly but has difficulty swallowing pills and is now only able to sip small amounts of water at a time.    Additionally, he mentions that he was scheduled for an outpatient lab draw and a follow-up appointment with his doctor next week, but the nurse over the phone advised him to come to the emergency department due to concerns about a potential stroke. He denies any associated headache, new neck pain, vision changes, chest pain, fever, or chills.    Past Medical  History:  Paroxysmal atrial fibrillation  Hypertension  Malignant neoplasm of the bladder (treated)  Obstructive sleep apnea (CHA)  Coronary artery disease (CAD)  Aortic insufficiency  Medications:  Prescribed medications (not specified), but patient reports difficulty taking them due to swallowing difficulties.      Diagnostic Workup:  MRI Brain: No acute intracranial abnormalities identified. Mild age-related changes noted.  MRI Cervical Spine: Multilevel spondylosis with severe foraminal narrowing at C3-C4 on the left, moderate-severe on the right. No significant spinal canal stenosis or abnormal spinal cord signal intensity.    Labs: Elevated WBC (812 on admission), indicative of infection likely related to UTI. Urine cultures are pending to guide antibiotic therapy.          PAST MEDICAL HISTORY:  ALLERGIES: No Known Allergies  Tobacco:    History   Smoking Status    Former    Types: Cigarettes   Smokeless Tobacco    Never     Alcohol:  Social History    Substance and Sexual Activity      Alcohol use: Yes        Comment: 2-3 glasses of wine/week    MEDICATIONS:       CURRENTLY SCHEDULED MEDICATIONS   Current Facility-Administered Medications   Medication Dose Route Frequency Provider Last Rate Last Admin    aspirin EC tablet 81 mg  81 mg Oral Daily Tristian Mann MD   81 mg at 11/11/24 0824    cefuroxime (CEFTIN) tablet 500 mg  500 mg Oral Q12H [facility] (08/20) Tristian Mann MD   500 mg at 11/11/24 2147    levothyroxine (SYNTHROID/LEVOTHROID) tablet 150 mcg  150 mcg Oral Daily Tristian Mann MD   150 mcg at 11/11/24 0647    sodium chloride (PF) 0.9% PF flush 3 mL  3 mL Intracatheter Q8H Tristian Mann MD   3 mL at 11/11/24 2147          HOME MEDICATIONS  Medications Prior to Admission   Medication Sig Dispense Refill Last Dose/Taking    acetaminophen (TYLENOL) 325 MG tablet Take 3 tablets (975 mg) by mouth every 6 hours as needed for mild pain 100 tablet 0 Taking As Needed    amoxicillin (AMOXIL) 500 MG capsule Take 4  capsules by mouth once as needed (1 hour prior to dental appointments 4 x 500 mg = 2,000 mg)   Taking As Needed    aspirin 81 MG EC tablet Take 81 mg by mouth every evening.   11/7/2024    cefpodoxime (VANTIN) 100 MG tablet Take 1 tablet (100 mg) by mouth 2 times daily for 14 days. 28 tablet 0 11/8/2024 Morning    Cholecalciferol (D3 PO)    11/8/2024    levothyroxine (SYNTHROID/LEVOTHROID) 150 MCG tablet Take 1 tablet (150 mcg) by mouth daily 90 tablet 3 11/8/2024    lisinopril (ZESTRIL) 5 MG tablet Take 1 tablet (5 mg) by mouth at bedtime 90 tablet 3 11/8/2024    olopatadine (PATANOL) 0.1 % ophthalmic solution Place 1 drop into both eyes 2 times daily   11/8/2024    simvastatin (ZOCOR) 10 MG tablet Take 1 tablet (10 mg) by mouth daily 90 tablet 3 11/8/2024    vitamin B-12 (CYANOCOBALAMIN) 1000 MCG tablet Take 1,000 mcg by mouth daily   11/8/2024    order for DME Equipment being ordered: CPAP and supplies. LIFETIME need. 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, stage pT1, carcinoma in situ, stage Tis, N0, M0 s/p cystoprostatectomy with ileal neobladder formation    Pure hypercholesterolemia      SURGICAL HISTORY  Past Surgical History:   Procedure Laterality Date    ARTHROPLASTY KNEE Left 08/23/2022    Procedure: LEFT TOTAL KNEE ARTHROPLASTY;  Surgeon: Melba Dial MD;  Location:  OR    ARTHROPLASTY REVISION KNEE Left 09/05/2023    Procedure: REVISION TOTAL KNEE ARTHROPLASTY PATELLA;  Surgeon: Melba Dial MD;  Location:  OR    BUNIONECTOMY Left     BUNIONECTOMY [place]   11/28/2011    RT-Procedure:BUNIONECTOMY DIANA; Right Foot Dwaine Corning Bunionectomy with Metatarsal and Phalanx Osteotomy with 1st Metatarsal Phalangeal Joint Repair Right Foot; Surgeon:BAKARI ZAIDI; Location:[facility]    CATARACT EXTRACTION, BILATERAL      CV HEART CATHETERIZATION WITH POSSIBLE INTERVENTION N/A 11/07/2020    Procedure: Heart Catheterization with Possible Intervention;  Surgeon: Rishi Llanes MD;  Location:  HEART CARDIAC CATH LAB    CYSTECTOMY BLADDER, ILEAL DIVERSION NEOBLADDER, COMBINED  2006    HERNIORRHAPHY INCISIONAL (LOCATION) N/A 08/01/2016    Procedure: HERNIORRHAPHY INCISIONAL (LOCATION);  Surgeon: Onofre Lua MD;  Location: [facility]    OPEN REDUCTION INTERNAL FIXATION PATELLA Left 09/05/2023    Procedure: LEFT KNEE OPEN REDUCTION INTERNAL  FIXATION PATELLA FRACTURE WITH;  Surgeon: Melba Dial MD;  Location:  OR    OPEN REDUCTION INTERNAL FIXATION PATELLA Left 10/10/2023    Procedure: LEFT KNEE REVISION OPEN REDUCTION INTERNAL FIXATION PATELLA WITH POLY REMOVAL, PATELLAR;  Surgeon: Melba Dial MD;  Location:  OR    REMOVE HARDWARE KNEE Left 10/10/2023    Procedure: WITH POLY REMOVAL, PATELLAR;  Surgeon: Melba Dial MD;  Location:  OR     FAMILY HISTORY    Family History   Problem Relation Age of Onset    Osteoporosis Mother     Neurologic Disorder Mother         PD    Cerebrovascular Disease Mother     Hypertension Mother     Hyperlipidemia Mother     Cerebrovascular Disease Father     Diabetes Type 1 Sister     Cancer Maternal Grandmother         unknown type    Diabetes Type 2  No family hx of     Myocardial Infarction No family hx of     Prostate Cancer No family hx of     Colon Cancer No family hx of      SOCIAL HISTORY  Social History     Socioeconomic History    Marital status:      Spouse name: Alana    Number of children: 2    Years of education: 19   Occupational History    Occupation: Semi-retired    Tobacco  Use    Smoking status: Former     Types: Cigarettes     Passive exposure: Never (per pt)    Smokeless tobacco: Never    Tobacco comments:     Quit in 1987; smoked for 20 years; 1.5 ppd at his most.    Vaping Use    Vaping status: Never Used   Substance and Sexual Activity    Alcohol use: Yes     Comment: 2-3 glasses of wine/week    Drug use: No    Sexual activity: Not Currently   Other Topics Concern    Parent/sibling w/ CABG, MI or angioplasty before 65F 55M? No   Social History Narrative    .    2 adult children.    2 grandchildren.    Left knee surgeries limit mobility.     Social Drivers of Health     Financial Resource Strain: Low Risk  (11/8/2024)    Financial Resource Strain     Within the past 12 months, have you or your family members you live with been unable to get utilities (heat, electricity) when it was really needed?: No   Food Insecurity: Low Risk  (11/8/2024)    Food Insecurity     Within the past 12 months, did you worry that your food would run out before you got money to buy more?: No     Within the past 12 months, did the food you bought just not last and you didn t have money to get more?: No   Transportation Needs: Low Risk  (11/8/2024)    Transportation Needs     Within the past 12 months, has lack of transportation kept you from medical appointments, getting your medicines, non-medical meetings or appointments, work, or from getting things that you need?: No   Physical Activity: Sufficiently Active (7/8/2024)    Exercise Vital Sign     Days of Exercise per Week: 3 days     Minutes of Exercise per Session: 60 min   Stress: No Stress Concern Present (7/8/2024)    [employer] [place] [employer] - Occupational Stress Questionnaire     Feeling of Stress : Only a little   Social Connections: Unknown (7/8/2024)    Social Connection and Isolation Panel [NHANES]     Frequency of Social Gatherings with Friends and Family: Twice a week   Interpersonal Safety: Low Risk  (11/8/2024)     "Interpersonal Safety     Do you feel physically and emotionally safe where you currently live?: Yes     Within the past 12 months, have you been hit, slapped, kicked or otherwise physically hurt by someone?: No     Within the past 12 months, have you been humiliated or emotionally abused in other ways by your partner or ex-partner?: No   Housing Stability: Low Risk  (2024)    Housing Stability     Do you have housing? : Yes     Are you worried about losing your housing?: No            Height: 177.8 cm (5' 10\")     Temp: 98.1  F (36.7  C)   Weight: 77.8 kg (171 lb 8.3 oz)    Temp src: Oral         BP: 122/61         Estimated body mass index is 24.61 kg/m  as calculated from the following:    Height as of this encounter: 1.778 m (5' 10\").    Weight as of this encounter: 77.8 kg (171 lb 8.3 oz).    Resp: 18   SpO2: 96 %   O2 Device: None (Room air)     Blood Pressure:   BP Readings from Last 3 Encounters:   24 122/61   24 115/75   10/23/24 112/54     T24 : Temp (24hrs), Av.7  F (36.5  C), Min:97.5  F (36.4  C), Max:98.1  F (36.7  C)       NEUROLOGICAL EXAMINATION:    Level of Consciousness:  Normal.    Orientation:  Alert and oriented to time, place and person.    Memory:  Intact recent and remote memory.    Attention span and Concentration:  Unremarkable.    Language and Speech:  Normal (can name, repeat phrases, good spontaneous speech).    Fund of Knowledge:  Within acceptable range.     Cranial Nerves:  2,3,4,5,6,7,8,9,10,11,12 are unremarkable.  Mild tongue atrophy.  No fasciculations noted.    Sensory: Mild reduction to pinprick and vibration in the feet with normalization above the ankles bilaterally.      Motor: Weakness and atrophy more prominently in the upper than the lower extremities.:    Right/left:   Deltoids: 3/3  Biceps: 2/2   Triceps: 2/2   strength: 1/2  Finger extension: 2/2  Finger abduction: 1/1    Iliopsoas: 3/3  Quadriceps:4-/3  Hamstrings: 4 -/4 -   tibialis anterior: " 4/3  Gastrocnemius: 3/3    Isolated muscle fasciculations noted in tibialis anterior on the right.      Balance, Gait and Station:  Balance and gait poor.  Needs help standing up, but can walk 3-4 steps.    Deep Tendon Reflexes:  DTR's (biceps, triceps, brachioradialis, knee jerks, ankle jerks) absent in bilateral upper extremities, trace in both knees, absent in both ankles.  Plantar response: Equivocal bilaterally.       .      IMAGING RESULTS     Recent Results (from the past 24 hours)   Cardiac Catheterization    Narrative      Prox LAD lesion is 10% stenosed.    Prox RCA lesion is 10% stenosed.    Stable, nonobstructive coronary artery disease  Successful, uncomplicated right radial access with hemostasis achieved at   the end of the case with TR band placement     XR Video Swallow with SLP or OT    Narrative    VIDEO SWALLOWING EVALUATION   11/12/2024 9:22 AM     HISTORY: Increased difficulty swallowing.    COMPARISON: None.    FLUOROSCOPY TIME: .9 minutes.  SPOT IMAGES OR CINE RUNS: 4      Impression    IMPRESSION:    Thin: Aspiration.    Slightly thick: Aspiration.    Mildly thick: No penetration or aspiration.    This study only includes the cervical esophagus. See speech pathology  report for further description.    MARIO GEIGER MD         SYSTEM ID:  [id number]       _____________________________________________________________________________          ASSESSMENT     Generalized Weakness & Fatigue: Likely multifactorial, with the following potential contributors:      A. Primary neuromuscular disorder: Highly likely.  There appeared to be some mixed upper and lower motor neuron signs, but for the most part with progressive weakness, difficulty swallowing, and slurred speech, bulbar onset neuromuscular failure would be very possible.  Would think about the possibility of motor neuron disease, but could also consider cervical spinal stenosis superimposed upon multiple other issues described below as a  possibility.  Would recommend working on cardiovascular, general metabolic causes of weakness while initiating evaluation for primary motor neuron disease.  Supportive care should continue.    B. Cardiovascular concerns: Exacerbation of paroxysmal atrial fibrillation or worsening of aortic insufficiency could lead to decompensation, manifesting as fatigue and shortness of breath.  C. Cervical spondylosis: Foraminal narrowing at C3-C4 may be contributing to upper extremity weakness, though this is not an immediate concern for spinal cord compression based on MRI findings.  D. Dehydration: Contributing to worsening fatigue, diminished oral intake, and weight loss.  E.  Urinary tract infection (UTI) causing systemic symptoms, including generalized weakness, fatigue, and possibly contributing to dehydration.    Shortness of Breath: Likely multifactorial:  A. Dyspnea on exertion could be exacerbated by underlying heart failure or aortic insufficiency.  B. Pulmonary involvement: Chronic obstructive pulmonary disease (COPD), heart failure, or infection-related symptoms like pneumonia or upper respiratory tract infection might be contributing factors. Continue to assess oxygen saturation and respiratory effort, particularly in light of the patient's CHA and possible cardiac decompensation.    Hoarse Voice & Productive Cough:  -Likely related to an upper respiratory infection or secondary to aspiration due to difficulty swallowing (as the patient has reduced oral intake).   -Consider a chest X-ray if pneumonia is suspected, or further workup for potential vocal cord dysfunction.  - A SNIFF test could be performed to help aid with outpatient management.        RECOMMENDATIONS   Management of neurological dysfunction contributing to generalized weakness, shortness of breath should include physical therapy and likely outpatient follow-up of cervical spinal stenosis.    -Neurosurgery could be consulted as inpatient or outpatient  based on hospitalist inpatient choice.  -Would recommend fall precautions.  2.  Labs ordered to look for myopathic and neuropathic disorders along with motor neuron disease.  3.  Would recommend EMG.  4.  MRI of the thoracic and lumbosacral spine.       Brandon Ashley MD   Neurologist, [facility]   November 12, 2024 12:17 PM        A total time of 107 minutes was spent on the care of this patient on the date of the visit, outside of time spent performing any procedures. Please excuse any typographical errors, as this note was generated using a Voice Recognition Software.

## 2024-11-12 NOTE — PLAN OF CARE
Goal Outcome Evaluation:      Plan of Care Reviewed With: patient, spouse         A&O x 4. Up A-1 GB and walker. Alarms on for safety, weak and unsteady gait. Dyspneic with exertion. VSS, O2 stable on RA. Tele: Sinus fam. Denies pain. No chest pain reported. Incontinent of bladder, pt reports no BM for several days, passing flatus, declined suppository at this time. Video swallow study this AM, NPO post study per SLP, OK for ice chips if upright. Severe dysphagia. Frequent weak cough to clear secretions. Voice hoarse. Bilateral upper extremity weakness. Plan of care ongoing, MRI ordered.        Pt transferred to Room 702, all personal belongings sent with patient. Pt's wife aware of transfer to Neuro Unit. Report given to bedside RN.

## 2024-11-12 NOTE — PROGRESS NOTES
[facility]    ~Cardiology Progress Note~    Primary Cardiologist: Dr. Rocha     Date of Admission: 11/8/2024  Service Date: 11/12/2024    Summary:  Mr. Mendez Greene is a very pleasant 79 year old male with a past medical history of aortic insufficiency, coronary artery disease, HTN, HLD, hypothyroidism who was admitted on 11/8/2024 for shortness of breath, weakness and fatigue. Troponin was elevated. He is currently being treated for acute cystitis.      Interval November 12, 2024:  Discouraged by the results of his swallow study today. Denies dyspnea or chest discomfort. Reviewed angiogram results. Hemodynamically stable.     Telemetry reviewed- sinus rhythm          Assessment:     NSTEMI  Troponin 812 -> 831 -> 1223  Currently chest pain free  EF 60-65% with no WMA  Coronary angiogram 11/11- nonobstructive coronary artery disease     Moderate coronary artery calcifications on CT chest 12/2022    Acute cystitis   On abx per primary service     Moderate aortic regurgitation   Noted on echo 11/2024    4.   HTN    Controlled     5.   HLD  On simvastatin 20 mg daily PTA  LDL 7/2024- 93    6.  Aortic root dilatation, measuring 4.3 cm     7.  Ascending aorta dilatation, measuring 4.5 cm      8.  Hypothyroidism             Plan:     Coordinating outpatient cardiac MRI and cardiology follow up   Patient cleared to discharge from cardiology standpoint   Cardiology to sign off.     Plan of care was formulated under the direction and guidance of Dr. Saavedra.         Sonam BLANDC  Physician Assistant   [facility]      Patient Active Problem List   Diagnosis    CHA on CPAP    CAD (coronary artery disease)    PAF (paroxysmal atrial fibrillation) (H)    Ascending aortic aneurysm (H)    Aortic insufficiency    Pure hypercholesterolemia    History of basal cell carcinoma    Personal history of malignant neoplasm of bladder    Benign essential hypertension    Hypothyroidism     Stiffness of joint, hand, right    Generalized weakness    Elevated troponin    Acute cystitis with hematuria       Physical Exam   Temp: 97.8  F (36.6  C) Temp src: Oral BP: 122/61 Pulse: 71   Resp: 16 SpO2: 94 % O2 Device: BiPAP/CPAP    Vitals:    11/08/24 1146 11/08/24 2212 11/09/24 0104   Weight: 79.4 kg (175 lb) 77.9 kg (171 lb 12.8 oz) 77.8 kg (171 lb 8.3 oz)     I/O last 3 completed shifts:  In: -   Out: 1200 [Urine:1200]    Constitutional: Appears stated age, well nourished, NAD.  Neck: Supple. JVD not visualized.   Respiratory: Non-labored. Lungs CTAB.  Cardiovascular: RRR, normal S1 and S2. No M/G/R. Bilateral lower extremities with no edema.   GI: Soft, non-distended, non-tender.  Skin: Warm and dry.   Musculoskeletal/Extremities: Symmetrical movement. RRA access site without bruit.  Neurologic: No gross focal deficits. Alert, awake.  Psychiatric: Affect appropriate. Mentation normal.    Medications   Current Facility-Administered Medications   Medication Dose Route Frequency Provider Last Rate Last Admin    Patient is already receiving anticoagulation with heparin, enoxaparin (LOVENOX), warfarin (COUMADIN)  or other anticoagulant medication   Does not apply Continuous PRN Tristian Mann MD        Patient is NOT allergic to contrast dye   Does not apply DOES NOT GO TO Sergio Cueto MD         Current Facility-Administered Medications   Medication Dose Route Frequency Provider Last Rate Last Admin    aspirin EC tablet 81 mg  81 mg Oral Daily Tristian Mann MD   81 mg at 11/11/24 0824    cefuroxime (CEFTIN) tablet 500 mg  500 mg Oral Q12H [facility] (08/20) Tristian Mann MD   500 mg at 11/11/24 2147    levothyroxine (SYNTHROID/LEVOTHROID) tablet 150 mcg  150 mcg Oral Daily Tristian Mann MD   150 mcg at 11/11/24 0647    sodium chloride (PF) 0.9% PF flush 3 mL  3 mL Intracatheter Q8H Tristian Mann MD   3 mL at 11/11/24 2147       Data   Last 24 hours labs personally reviewed.  Echo:   Recent Results (from the  past 4320 hours)   Echocardiogram Complete   Result Value    LVEF  60-65%    Narrative    [id number]  [id number]  [id number]  [id number]^MATHIEU^REYNA     [facility]  Echocardiography Laboratory  [facility] [place] [address]     Name: JUAN MANUEL GRIMES  MRN: [id number]  : 1945  Study Date: 2024 02:56 PM  Age: 79 yrs  Gender: Male  Patient Location: [facility]  Reason For Study: Dyspnea  Ordering Physician: REYNA MURDOCK  Performed By: Tricia Santiago     BSA: 2.0 m2  Height: 70 in  Weight: 175 lb  HR: 49  BP: 93/65 mmHg  ______________________________________________________________________________  Procedure  Complete Portable Echo Adult. Definity (NDC #74365-674) given intravenously.  Contrast Definity. Technically difficult study.Extremely difficult acoustic  windows despite the use of contrast for endcardial border definition.  Technically difficult study.  ______________________________________________________________________________  Interpretation Summary     The rhythm was sinus bradycardia.  Left ventricular systolic function is normal.  The visual ejection fraction is 60-65%.  The left ventricle is normal in size.  The right ventricle is normal in structure, function and size.  The left atrium is moderate to severely dilated.  There is moderate (2+) aortic regurgitation.  Aortic root dilatation is present.(43mm(  Ascending aorta dilatation is present.(45mm)  No change since 2024  ______________________________________________________________________________  Left Ventricle  The left ventricle is normal in size. There is normal left ventricular wall  thickness. Left ventricular systolic function is normal. The visual ejection  fraction is 60-65%. No regional wall motion abnormalities noted. There is no  thrombus seen in the left ventricle.     Right Ventricle  The right ventricle is normal in structure, function and size. There is no  mass or thrombus in the right ventricle.      Atria  The left atrium is moderate to severely dilated. Right atrial size is normal.  There is no atrial shunt seen. The left atrial appendage is not well  visualized.     Mitral Valve  The mitral valve leaflets appear normal. There is no evidence of stenosis,  fluttering, or prolapse. There is no mitral regurgitation noted. There is no  mitral valve stenosis.     Tricuspid Valve  Normal tricuspid valve. The right ventricular systolic pressure is elevated at  35.9 mmHg. Right ventricular systolic pressure is elevated, consistent with  mild to moderate pulmonary hypertension. There is no tricuspid stenosis.     Aortic Valve  Normal tricuspid aortic valve. There is moderate (2+) aortic regurgitation.  There is an eccentric jet of aortic insufficiency directed against the  anterior mitral leaflet. No aortic stenosis is present.     Pulmonic Valve  Normal pulmonic valve. There is no pulmonic valvular regurgitation. There is  no pulmonic valvular stenosis.     Vessels  Aortic root dilatation is present. Ascending aorta dilatation is present. The  inferior vena cava is normal. The pulmonary artery is normal size.     Pericardium  The pericardium appears normal. There is no pleural effusion.     Rhythm  The rhythm was sinus bradycardia.  ______________________________________________________________________________  MMode/2D Measurements & Calculations     Ao root diam: 4.3 cm  asc Aorta Diam: 4.5 cm  LVOT diam: 2.4 cm  LVOT area: 4.7 cm2  Ao root diam index Ht(cm/m): 2.4  Ao root diam index BSA (cm/m2): 2.2  Asc Ao diam index BSA (cm/m2): 2.3  Asc Ao diam index Ht(cm/m): 2.5  LA Volume (BP): 117.0 ml  LA Volume Index (BP): 59.4 ml/m2  TAPSE: 2.9 cm     Doppler Measurements & Calculations  Ao V2 max: 206.8 cm/sec  Ao max P.0 mmHg  Ao V2 mean: 153.2 cm/sec  Ao mean PG: 10.6 mmHg  Ao V2 VTI: 52.9 cm  SILVIA(I,D): 3.9 cm2  SILVIA(V,D): 4.2 cm2  LV V1 max P.1 mmHg  LV V1 max: 187.9 cm/sec  LV V1 VTI: 44.4 cm  SV(LVOT):  207.3 ml  SI(LVOT): 105.1 ml/m2  PA acc time: 0.08 sec  TR max arnold: 299.8 cm/sec  TR max P.9 mmHg  AV Arnold Ratio (DI): 0.91  SILVIA Index (cm2/m2): 2.0  RV S Arnold: 15.4 cm/sec     ______________________________________________________________________________  Report approved by: Dr. Sachin Richmond 2024 04:12 PM         Echocardiogram Complete   Result Value    LVEF  55-60%    Narrative    [id number]  [id number]  [id number]  [id number]^SERGIO^RIGO^R     [facility]  Echocardiography Laboratory  [address] [place] [address]     Name: JUAN MANUEL GRIMES  MRN: [id number]  : 1945  Study Date: 2024 01:32 PM  Age: 78 yrs  Gender: Male  Patient Location: [facility]  Reason For Study: Aneurysm of ascending aorta without rupture  Ordering Physician: RIGO HILL  Referring Physician: RIGO HILL  Performed By: Irene Henry RDCS     BSA: 2.1 m2  Height: 70 in  Weight: 192 lb  HR: 46  BP: 156/69 mmHg  ______________________________________________________________________________  Procedure  Complete Echo Adult.  ______________________________________________________________________________  Interpretation Summary     The visual ejection fraction is 55-60%.  The left atrium is moderately dilated.  There is mild to moderate (1-2+) tricuspid regurgitation.  Mild (35-45mmHg) pulmonary hypertension is present.  There is mild to moderate (1-2+) aortic regurgitation.  There is an eccentric jet of aortic insufficiency directed against the  anterior mitral leaflet.  Aortic root aneurysm is present.  Ascending aorta aneurysm is present.  Both above measurements 4.5 cm. They were 4.1 cm in .  ______________________________________________________________________________  Left Ventricle  The left ventricle is borderline dilated. Proximal septal thickening is noted.  There is normal left ventricular wall thickness. The visual ejection fraction  is 55-60%. Grade I or early diastolic  dysfunction.     Right Ventricle  The right ventricle is normal in structure, function and size.     Atria  The left atrium is moderately dilated. The right atrium is mildly dilated.     Mitral Valve  The mitral valve leaflets appear normal. There is no evidence of stenosis,  fluttering, or prolapse.     Tricuspid Valve  The tricuspid valve is not well visualized, but is grossly normal. There is  mild to moderate (1-2+) tricuspid regurgitation. Mild (35-45mmHg) pulmonary  hypertension is present.     Aortic Valve  There is trivial trileaflet aortic sclerosis. There is mild to moderate (1-2+)  aortic regurgitation. There is an eccentric jet of aortic insufficiency  directed against the anterior mitral leaflet.     Pulmonic Valve  The pulmonic valve is not well seen, but is grossly normal. There is trace to  mild pulmonic valvular regurgitation.     Vessels  Aortic root aneurysm is present. Ascending aorta aneurysm is present. Dilation  of the inferior vena cava is present with normal respiratory variation in  diameter.     Pericardium  The pericardium is not well visualized.     Rhythm  Sinus rhythm was noted.  ______________________________________________________________________________  MMode/2D Measurements & Calculations  IVSd: 1.3 cm     LVIDd: 5.5 cm  LVIDs: 3.9 cm  LVPWd: 0.94 cm  IVC diam: 2.5 cm  FS: 28.4 %  LV mass(C)d: 240.0 grams  LV mass(C)dI: 117.0 grams/m2  Ao root diam: 4.1 cm  asc Aorta Diam: 4.5 cm  Ao root diam index Ht(cm/m): 2.3  Ao root diam index BSA (cm/m2): 2.0  Asc Ao diam index BSA (cm/m2): 2.2  Asc Ao diam index Ht(cm/m): 2.5  LA Volume (BP): 119.0 ml     LA Volume Index (BP): 58.0 ml/m2  RV Base: 3.5 cm  RWT: 0.34  TAPSE: 2.8 cm     Doppler Measurements & Calculations  MV E max jenna: 83.3 cm/sec  MV A max jenna: 81.8 cm/sec  MV E/A: 1.0  MV max PG: 3.4 mmHg  MV mean P.4 mmHg  MV V2 VTI: 37.7 cm  MV dec time: 0.31 sec  AI P1/2t: 949.5 msec  PA acc time: 0.13 sec  TR max jenna: 286.5  cm/sec  TR max P.8 mmHg  E/E' av.8  Lateral E/e': 6.7  Medial E/e': 10.9  RV S Arnold: 11.3 cm/sec     ______________________________________________________________________________  Report approved by: Kun Carreon 2024 02:37 PM

## 2024-11-12 NOTE — PROGRESS NOTES
[facility]    Medicine Progress Note - Hospitalist Service    Date of Admission:  11/8/2024    Assessment & Plan   Mendez Greene is a 79 year old male admitted on 11/8/2024. He presents with generalized weakness/difficulty swallowing. Suspect primary neuromuscular disorder as cause. Patient also found to have NSTEMI.      Generalized weakness and fatigue, progressive  BUE weakness, progressive  Severe dysphagia  Hoarse voice, progressive  Weight loss  Concern for primary neuromuscular disorder  Presents with ongoing weakness and fatigue in the setting of recently diagnosed urinary tract infection.  On admission his troponin was elevated to 812 otherwise rest of  workup is largely benign.  MRI Cervical Spine-> Multilevel spondylosis with variable degrees of foraminal narrowing, worst and severe at C3-C4 on the left, moderate-severe on the right. No acute intracranial abnormalities on MRI brain.   -NPO due to dysphagia, pending FT decision may opt for comfort feeds with outpatient follow up vs FT placement vs comfort care vs other.  -Patient to consider feeding tube  -IVF while NPO  -neurology consult     Acute cystitis with hematuria  -IV Ceftriaxone -> PO Ceftin BID to complete 5 day course  -Follow UCs 10,000-50,000 CFU/mL Proteus vulgaris      CHA on CPAP  - Continue PTA CPAP     CAD (coronary artery disease)  NSTEMI suspect type II  Troponin elevated to 800, patient endorsed some dyspnea but no chest pain at this time.  PTA on 81 mg of aspirin daily.  TTE shows normal LV EF 60-65%. No WMA. Dilated left atrium.   Cor angio 11/11 with minimal non-obstructive CAD  Suspect NSTEMI due to UTI vs myocarditis vs other.  -Daily 81mg ASA  -Cardiology signed off 11/12  -Plan outpatient cardiac MRI     PAF (paroxysmal atrial fibrillation)  Not anticoagulated or on rate control at this time.  Will monitor on telemetry.     Ascending aortic aneurysm   TTE shows Aortic root dilatation. (43mm(  Ascending aorta dilatation is present.(45mm) No change since 7/30/2024     HLD  Benign essential hypertension  Continue prior to admission lisinopril/Zocor     History of basal cell carcinoma  Stable, follows an outpatient     Hypothyroidism  Continue prior to admission levothyroxine          Diet: Snacks/Supplements Adult: Magic Cup; With Meals  NPO for Medical/Clinical Reasons Except for: Ice Chips    DVT Prophylaxis: Pneumatic Compression Devices  Cortez Catheter: Not present  Lines: None     Cardiac Monitoring: ACTIVE order. Indication: Post- PCI/Angiogram (24 hours)  Code Status: Full Code      Clinically Significant Risk Factors          # Hyperchloremia: Highest Cl = 114 mmol/L in last 2 days, will monitor as appropriate       # Hypercalcemia: Highest Ca = 11.9 mg/dL in last 2 days, will monitor as appropriate    # Hypoalbuminemia: Lowest albumin = 3.3 g/dL at 11/9/2024 11:39 AM, will monitor as appropriate     # Hypertension: Noted on problem list             # Severe Malnutrition: based on nutrition assessment, PRESENT ON ADMISSION          Disposition Plan     Medically Ready for Discharge: Anticipated in 2-4 Days             Shon Navarro MD  Hospitalist Service  [facility]  Securely message with Vouch[employer] (more info)  Text page via [facility] Paging/Directory   ______________________________________________________________________    Interval History   Patient seen and met today.  Patient's wife present at bedside in chair.  We had a long and edilberto discussion about the results of his swallow study demonstrating that he has severe dysphagia.  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  I was very clear with them that this is not a final diagnosis and we may not be able to diagnose that here.  Upon sharing this concern,  patient shared that he had also been concerned that he has something like ALS or Laine Gehrig's disease.  We will have neurology see.  Patient and wife have been waiting to see neurology for several days but they have not been consulted by prior provider.  We also discussed feeding tubes.  I discussed temporary and permanent feeding tubes.  He asked very good questions including whether the feeding tube will reverse the underlying disease.  I encouraged him to think about quality of life and what he needs to have quality of life.  He will consider his options.  They were both appropriately tearful and stricken about this discussion.    I did discuss with Dr. Ashley by phone about this case.  He agrees that this is highly concerning for some sort of mixed upper and lower motor neuron disease.  There was some initial testing ordered today, but likely patient will need to follow-up outpatient for EMG for further testing.    Discussed with speech-language pathologist about the findings of the swallow study    Physical Exam   Vital Signs: Temp: 98.1  F (36.7  C) Temp src: Oral BP: 122/61 Pulse: 63   Resp: 18 SpO2: 96 % O2 Device: None (Room air)    Weight: 171 lbs 8.29 oz    Constitutional: Awake, alert, cooperative, no apparent distress  Respiratory: Clear to auscultation bilaterally, no crackles or wheezing  Cardiovascular: Regular rate and rhythm, normal S1 and S2, and no murmur noted  GI: Normal bowel sounds, soft, non-distended, non-tender  Skin/Integumen: No rashes, no cyanosis, no edema  Other: Upper extremities appear somewhat atrophied.  4 out of 5  strength bilaterally.      Medical Decision Making       55 MINUTES SPENT BY ME on the date of service doing chart review, history, exam, documentation & further activities per the note.      Data   ------------------------- PAST 24 HR DATA REVIEWED -----------------------------------------------    I have personally reviewed the following data over the past 24  hrs:    9.1  \   13.8   / 290     145 114 (H) 39.2 (H) /  119 (H)   4.9 18 (L) 0.58 (L) \       Imaging results reviewed over the past 24 hrs:   Recent Results (from the past 24 hours)   XR Video Swallow with SLP or OT    Narrative    VIDEO SWALLOWING EVALUATION   11/12/2024 9:22 AM     HISTORY: Increased difficulty swallowing.    COMPARISON: None.    FLUOROSCOPY TIME: .9 minutes.  SPOT IMAGES OR CINE RUNS: 4      Impression    IMPRESSION:    Thin: Aspiration.    Slightly thick: Aspiration.    Mildly thick: No penetration or aspiration.    This study only includes the cervical esophagus. See speech pathology  report for further description.    MARIO GEIGER MD         SYSTEM ID:  [id number]

## 2024-11-13 ENCOUNTER — APPOINTMENT (OUTPATIENT)
Dept: INTERVENTIONAL RADIOLOGY/VASCULAR | Facility: CLINIC | Age: 79
DRG: 056 | End: 2024-11-13
Attending: PHYSICIAN ASSISTANT
Payer: MEDICARE

## 2024-11-13 ENCOUNTER — APPOINTMENT (OUTPATIENT)
Dept: SPEECH THERAPY | Facility: CLINIC | Age: 79
DRG: 056 | End: 2024-11-13
Payer: MEDICARE

## 2024-11-13 DIAGNOSIS — R53.1 GENERALIZED WEAKNESS: Primary | ICD-10-CM

## 2024-11-13 DIAGNOSIS — Z93.1 GASTROSTOMY TUBE IN PLACE (H): Primary | ICD-10-CM

## 2024-11-13 LAB
ANA PAT SER IF-IMP: ABNORMAL
ANA SER QL IF: ABNORMAL
ANA TITR SER IF: ABNORMAL {TITER}
GLUCOSE BLDC GLUCOMTR-MCNC: 106 MG/DL (ref 70–99)
GLUCOSE BLDC GLUCOMTR-MCNC: 114 MG/DL (ref 70–99)
GLUCOSE BLDC GLUCOMTR-MCNC: 131 MG/DL (ref 70–99)
INR PPP: 1.23 (ref 0.85–1.15)
VIT B12 SERPL-MCNC: 727 PG/ML (ref 232–1245)
VIT D+METAB SERPL-MCNC: 26 NG/ML (ref 20–50)

## 2024-11-13 PROCEDURE — 99233 SBSQ HOSP IP/OBS HIGH 50: CPT | Performed by: STUDENT IN AN ORGANIZED HEALTH CARE EDUCATION/TRAINING PROGRAM

## 2024-11-13 PROCEDURE — 250N000013 HC RX MED GY IP 250 OP 250 PS 637: Performed by: STUDENT IN AN ORGANIZED HEALTH CARE EDUCATION/TRAINING PROGRAM

## 2024-11-13 PROCEDURE — 120N000001 HC R&B MED SURG/OB

## 2024-11-13 PROCEDURE — 0DH63UZ INSERTION OF FEEDING DEVICE INTO STOMACH, PERCUTANEOUS APPROACH: ICD-10-PCS | Performed by: RADIOLOGY

## 2024-11-13 PROCEDURE — 85610 PROTHROMBIN TIME: CPT | Performed by: PHYSICIAN ASSISTANT

## 2024-11-13 PROCEDURE — 272N000187 HC ACCESSORY CR11

## 2024-11-13 PROCEDURE — 92526 ORAL FUNCTION THERAPY: CPT | Mod: GN | Performed by: SPEECH-LANGUAGE PATHOLOGIST

## 2024-11-13 PROCEDURE — 99152 MOD SED SAME PHYS/QHP 5/>YRS: CPT

## 2024-11-13 PROCEDURE — 250N000009 HC RX 250: Performed by: PHYSICIAN ASSISTANT

## 2024-11-13 PROCEDURE — 36415 COLL VENOUS BLD VENIPUNCTURE: CPT | Performed by: PHYSICIAN ASSISTANT

## 2024-11-13 PROCEDURE — 250N000011 HC RX IP 250 OP 636: Performed by: PHYSICIAN ASSISTANT

## 2024-11-13 RX ORDER — NALOXONE HYDROCHLORIDE 0.4 MG/ML
0.4 INJECTION, SOLUTION INTRAMUSCULAR; INTRAVENOUS; SUBCUTANEOUS
Status: DISCONTINUED | OUTPATIENT
Start: 2024-11-13 | End: 2024-11-13 | Stop reason: HOSPADM

## 2024-11-13 RX ORDER — DEXTROSE MONOHYDRATE 100 MG/ML
INJECTION, SOLUTION INTRAVENOUS CONTINUOUS PRN
Status: DISCONTINUED | OUTPATIENT
Start: 2024-11-13 | End: 2024-11-14

## 2024-11-13 RX ORDER — CEFAZOLIN SODIUM 2 G/100ML
2 INJECTION, SOLUTION INTRAVENOUS
Status: COMPLETED | OUTPATIENT
Start: 2024-11-13 | End: 2024-11-13

## 2024-11-13 RX ORDER — ASPIRIN 81 MG/1
81 TABLET, CHEWABLE ORAL DAILY
Status: DISCONTINUED | OUTPATIENT
Start: 2024-11-13 | End: 2024-11-15 | Stop reason: HOSPADM

## 2024-11-13 RX ORDER — NALOXONE HYDROCHLORIDE 0.4 MG/ML
0.2 INJECTION, SOLUTION INTRAMUSCULAR; INTRAVENOUS; SUBCUTANEOUS
Status: DISCONTINUED | OUTPATIENT
Start: 2024-11-13 | End: 2024-11-13 | Stop reason: HOSPADM

## 2024-11-13 RX ORDER — FENTANYL CITRATE 50 UG/ML
25-50 INJECTION, SOLUTION INTRAMUSCULAR; INTRAVENOUS EVERY 5 MIN PRN
Status: DISCONTINUED | OUTPATIENT
Start: 2024-11-13 | End: 2024-11-13 | Stop reason: HOSPADM

## 2024-11-13 RX ORDER — CARBOXYMETHYLCELLULOSE SODIUM 5 MG/ML
1 SOLUTION/ DROPS OPHTHALMIC
Status: DISCONTINUED | OUTPATIENT
Start: 2024-11-13 | End: 2024-11-15 | Stop reason: HOSPADM

## 2024-11-13 RX ORDER — FLUMAZENIL 0.1 MG/ML
0.2 INJECTION, SOLUTION INTRAVENOUS
Status: DISCONTINUED | OUTPATIENT
Start: 2024-11-13 | End: 2024-11-13 | Stop reason: HOSPADM

## 2024-11-13 RX ADMIN — ASPIRIN 81 MG CHEWABLE TABLET 81 MG: 81 TABLET CHEWABLE at 17:14

## 2024-11-13 RX ADMIN — FENTANYL CITRATE 25 MCG: 50 INJECTION, SOLUTION INTRAMUSCULAR; INTRAVENOUS at 10:41

## 2024-11-13 RX ADMIN — LIDOCAINE HYDROCHLORIDE 18 ML: 10 INJECTION, SOLUTION INFILTRATION; PERINEURAL at 10:51

## 2024-11-13 RX ADMIN — ACETAMINOPHEN 650 MG: 325 TABLET, FILM COATED ORAL at 17:14

## 2024-11-13 RX ADMIN — CEFAZOLIN SODIUM 2 G: 2 INJECTION, SOLUTION INTRAVENOUS at 10:35

## 2024-11-13 RX ADMIN — MIDAZOLAM 1 MG: 1 INJECTION INTRAMUSCULAR; INTRAVENOUS at 10:41

## 2024-11-13 NOTE — PLAN OF CARE
Goal Outcome Evaluation:       Patient was transferred this evening. Admitted with generalized weakness, difficulty swallowing, SOB and NSTEMI. Alert, oriented times 4. Neuro's with generalized weakness, BLE stronger than BUE's. Up with assist of 1. Hx of bladder cancer. Patient NPO. IVF infusing at 75 ml/hr. General neurology and hospitalist are following.

## 2024-11-13 NOTE — PROVIDER NOTIFICATION
MD Notification    Notified Person: MD    Notified Person Name: Ramon    Notification Date/Time: 8:00 am 11/13/24    Notification Interaction: Face to face    Purpose of Notification: Patient NPO. Aspirin 81 mg PO ordered. Can it be given another route, or a different med?    Orders Received: Okay to hold for now.    Comments:

## 2024-11-13 NOTE — PLAN OF CARE
Goal Outcome Evaluation:      Plan of Care Reviewed With: patient    Overall Patient Progress: no changeOverall Patient Progress: no change       Pt here with progressive weakness. A&O x4. Neuros intact ex generalized weakness, impaired swallowing. VSS. Tele fam dysrhythmia. NPO ex ice chips. Up with 1 GB walker. Denies pain. Hx bladder cancer. Pt scoring green on the Aggression Stop Light Tool. IVF at 75 mL/hr. Pt had MRI during this shift.

## 2024-11-13 NOTE — PLAN OF CARE
Goal Outcome Evaluation:    Pt here with progressive weakness. A&Ox4. Neuros generalized weakness, weak hand grasps, impaired swallowing. VSS x bradycardia. Tele bradyrhythmia. NPO diet, no liquids; ice chips given for comfort. Takes pills through G-tube. Incontinent of bladder. Up with Ax1 GBW. Denies pain. 75 mL D5 in NS discontinued. PEG tube placed today; TF started at 20 mL/hr at 1730 w/ 60 mL FWF Q4. BG monitored daily. Pt scoring green on the Aggression Stop Light Tool. Plan for EMG 11/15 and follow up with palliative outpatient. Plan to advance TF by 20 mL 11/14 at 1730 and hold TF 1 hour before and 1 hour after synthroid dose. Patient wears CPAP at night. Discharge pending.

## 2024-11-13 NOTE — PROGRESS NOTES
[facility]    Medicine Progress Note - Hospitalist Service    Date of Admission:  11/8/2024    Assessment & Plan   Mendez Greene is a 79 year old male admitted on 11/8/2024. He presents with generalized weakness/difficulty swallowing. Suspect primary neuromuscular disorder as cause. Patient also found to have NSTEMI.      Generalized weakness and fatigue, progressive  BUE weakness, progressive  Severe dysphagia  Hoarse voice, progressive  Weight loss  Concern for primary neuromuscular disorder  S/p percutaneous FT 11/13  Presents with ongoing weakness and fatigue in the setting of recently diagnosed urinary tract infection.  On admission his troponin was elevated to 812 otherwise rest of  workup is largely benign.  MRI Cervical Spine-> Multilevel spondylosis with variable degrees of foraminal narrowing, worst and severe at C3-C4 on the left, moderate-severe on the right. No acute intracranial abnormalities on MRI brain.   Neurology wants to rule out metabolic or other causes of weakness which are not apparent. Suspect etiology likely neurologic for symptoms.   -NPO, okay for ice chips  -nutrition consult for TF initiation  -consult   -neurology consult  -needs EMG, tentatively scheduled 11/15  -likely will discharge home 11/14 pending TF initiation  -outpatient palliative care follow up     Acute cystitis with hematuria  -IV Ceftriaxone -> PO Ceftin BID to complete 5 day course  -Follow UCs 10,000-50,000 CFU/mL Proteus vulgaris      CHA on CPAP  - Continue PTA CPAP     CAD (coronary artery disease)  NSTEMI suspect type II  Troponin elevated to 800, patient endorsed some dyspnea but no chest pain at this time.  PTA on 81 mg of aspirin daily.  TTE shows normal LV EF 60-65%. No WMA. Dilated left atrium.   Cor angio 11/11 with minimal non-obstructive CAD  Suspect NSTEMI due to UTI vs myocarditis vs other.  -Daily 81mg ASA  -Cardiology signed off 11/12  -Plan outpatient cardiac MRI     PAF  (paroxysmal atrial fibrillation)  Not anticoagulated or on rate control at this time.  Will monitor on telemetry.     Ascending aortic aneurysm   TTE shows Aortic root dilatation. (43mm( Ascending aorta dilatation is present.(45mm) No change since 7/30/2024     HLD  Benign essential hypertension  Continue prior to admission lisinopril/Zocor     History of basal cell carcinoma  Stable, follows an outpatient     Hypothyroidism  Continue prior to admission levothyroxine          Diet: Snacks/Supplements Adult: Magic Cup; With Meals  NPO for Medical/Clinical Reasons Except for: Other; Specify: NPO For oral intake and gastric feedings for 6 hours post insertion Gastrostomy G/GJ tube. May feed through Jejunal or Distal PORT immediately for GJ tubes ONLY.  Adult Formula Drip Feeding: Continuous Jevity 1.5; Gastrostomy; Goal Rate: 20; mL/hr; When ok to use G-tube, run TF at 20 mL/hr.  Will assess advancement 11/14.    DVT Prophylaxis: Pneumatic Compression Devices  Cortez Catheter: Not present  Lines: None     Cardiac Monitoring: ACTIVE order. Indication: Post- PCI/Angiogram (24 hours)  Code Status: Full Code      Clinically Significant Risk Factors          # Hyperchloremia: Highest Cl = 114 mmol/L in last 2 days, will monitor as appropriate       # Hypercalcemia: Highest Ca = 11.7 mg/dL in last 2 days, will monitor as appropriate    # Hypoalbuminemia: Lowest albumin = 3.3 g/dL at 11/9/2024 11:39 AM, will monitor as appropriate  # Coagulation Defect: INR = 1.23 (Ref range: 0.85 - 1.15) and/or PTT = N/A, will monitor for bleeding    # Hypertension: Noted on problem list             # Severe Malnutrition: based on nutrition assessment           Disposition Plan     Medically Ready for Discharge: Anticipated Tomorrow  Pending nutrition           Shon Navarro MD  Hospitalist Service  [facility]  Securely message with [employer] (more info)  Text page via [employer] Paging/Directory    ______________________________________________________________________    Interval History   Met patient this AM. Discussed TF again. Discussed r/b including ongoing risk of aspiration and pneumonia. Discussed comfort feeds. Will plan for palliative outpatient follow up.   Returned again around noon when wife at bedside. She had several questions about swallow function and next steps after discharge.   Discussed with bedside RN, IR.     Physical Exam   Vital Signs: Temp: 97.3  F (36.3  C) Temp src: Oral BP: 129/63 Pulse: 57   Resp: 16 SpO2: 94 % O2 Device: None (Room air) Oxygen Delivery: 2 LPM  Weight: 171 lbs 8.29 oz    Constitutional: Awake, alert, cooperative, no apparent distress  Respiratory: Clear to auscultation bilaterally, no crackles or wheezing  Cardiovascular: Regular rate and rhythm, normal S1 and S2, and no murmur noted  GI: Normal bowel sounds, soft, non-distended, non-tender  Skin/Integumen: No rashes, no cyanosis, no edema  Other: Upper extremities appear somewhat atrophied.  4 out of 5  strength bilaterally.      Medical Decision Making       60 MINUTES SPENT BY ME on the date of service doing chart review, history, exam, documentation & further activities per the note.      Data   ------------------------- PAST 24 HR DATA REVIEWED -----------------------------------------------    I have personally reviewed the following data over the past 24 hrs:    INR:  1.23 (H) PTT:  N/A   D-dimer:  N/A Fibrinogen:  N/A       Imaging results reviewed over the past 24 hrs:   Recent Results (from the past 24 hours)   MR Thoracic Spine w/o & w Contrast    Narrative    EXAM: MR THORACIC SPINE W/O and W CONTRAST, MR LUMBAR SPINE W/O and W CONTRAST  LOCATION: [facility]  DATE/TIME: 11/12/2024 9:30 PM CST    INDICATION: Upper and lower motor neuron weakness.  COMPARISON: None.  CONTRAST: 8 mL Gadavist  TECHNIQUE:   1) Routine Thoracic Spine MRI without and with IV contrast.  2)  Routine Lumbar Spine MRI without and with IV contrast.    FINDINGS:  THORACIC SPINE:  Vertebral body heights maintained. Scoliosis. Multilevel degenerative listhesis. Degenerative endplate edema and fatty marrow changes at T7-T8 (Modic type I and type II). Otherwise, no suspicious marrow signal. Multilevel spondylosis without high-grade   canal or foraminal stenosis. No abnormal cord signal. Thin syrinx versus prominence of the central canal from T5 through T11. No abnormal intrathecal enhancement, as imaged.    No extraspinal abnormality.    LUMBAR SPINE:   Transitional anatomy with lumbarization of S1 on the right. L5-S1 is defined on image 46 of series 5. Lumbar levocurvature. Stepwise retrolisthesis from L1 through L5. Minimal anterolisthesis of S1 on S2. Vertebral body heights maintained.  Degenerative   fatty marrow endplate changes at L5-S1 (Modic type II). Otherwise, no suspicious marrow signal abnormality. Normal distal spinal cord and cauda equina with conus medullaris at L2-L3. No abnormal intrathecal enhancement.     Prevertebral soft tissues are unremarkable. Dorsal paraspinal muscular atrophy. Visualized intra-abdominal structures are unremarkable.    T12-L1: Mild disc height loss. No significant disc herniation. No significant facet arthropathy. No significant canal or foraminal stenosis.     L1-L2: Mild disc height loss. Minimal bulge. Mild facet arthropathy. No significant canal or foraminal stenosis.    L2-L3: Mild disc height loss. Minimal bulge. Mild facet arthropathy. No significant canal or foraminal stenosis.     L3-L4: Mild disc height loss. Minimal bulge. Mild facet arthropathy. No significant canal or foraminal stenosis.    L4-L5: Moderate disc height loss. Minimal bulge. Mild facet arthropathy. No significant canal or foraminal stenosis.    L5-S1: Moderate disc height loss. Circumferential disc and osteophyte with contact of the exiting right L5 nerve root at the foramen. Mild facet  arthropathy. No significant canal stenosis. Mild bilateral foraminal stenosis, right greater than left.    S1-S2: Moderate disc height loss. Mild facet arthropathy. No significant canal or foraminal stenosis.      Impression    IMPRESSION:  1.  Mild thoracolumbar spondylitic changes without high-grade canal or foraminal stenosis.  2.  Thin thoracic cord syrinx versus prominence of the central canal from T5 through T11.  3.  No abnormal cord signal.   MR Lumbar Spine w/o & w Contrast    Narrative    EXAM: MR THORACIC SPINE W/O and W CONTRAST, MR LUMBAR SPINE W/O and W CONTRAST  LOCATION: [facility]  DATE/TIME: 11/12/2024 9:30 PM CST    INDICATION: Upper and lower motor neuron weakness.  COMPARISON: None.  CONTRAST: 8 mL Gadavist  TECHNIQUE:   1) Routine Thoracic Spine MRI without and with IV contrast.  2) Routine Lumbar Spine MRI without and with IV contrast.    FINDINGS:  THORACIC SPINE:  Vertebral body heights maintained. Scoliosis. Multilevel degenerative listhesis. Degenerative endplate edema and fatty marrow changes at T7-T8 (Modic type I and type II). Otherwise, no suspicious marrow signal. Multilevel spondylosis without high-grade   canal or foraminal stenosis. No abnormal cord signal. Thin syrinx versus prominence of the central canal from T5 through T11. No abnormal intrathecal enhancement, as imaged.    No extraspinal abnormality.    LUMBAR SPINE:   Transitional anatomy with lumbarization of S1 on the right. L5-S1 is defined on image 46 of series 5. Lumbar levocurvature. Stepwise retrolisthesis from L1 through L5. Minimal anterolisthesis of S1 on S2. Vertebral body heights maintained.  Degenerative   fatty marrow endplate changes at L5-S1 (Modic type II). Otherwise, no suspicious marrow signal abnormality. Normal distal spinal cord and cauda equina with conus medullaris at L2-L3. No abnormal intrathecal enhancement.     Prevertebral soft tissues are unremarkable. Dorsal paraspinal  muscular atrophy. Visualized intra-abdominal structures are unremarkable.    T12-L1: Mild disc height loss. No significant disc herniation. No significant facet arthropathy. No significant canal or foraminal stenosis.     L1-L2: Mild disc height loss. Minimal bulge. Mild facet arthropathy. No significant canal or foraminal stenosis.    L2-L3: Mild disc height loss. Minimal bulge. Mild facet arthropathy. No significant canal or foraminal stenosis.     L3-L4: Mild disc height loss. Minimal bulge. Mild facet arthropathy. No significant canal or foraminal stenosis.    L4-L5: Moderate disc height loss. Minimal bulge. Mild facet arthropathy. No significant canal or foraminal stenosis.    L5-S1: Moderate disc height loss. Circumferential disc and osteophyte with contact of the exiting right L5 nerve root at the foramen. Mild facet arthropathy. No significant canal stenosis. Mild bilateral foraminal stenosis, right greater than left.    S1-S2: Moderate disc height loss. Mild facet arthropathy. No significant canal or foraminal stenosis.      Impression    IMPRESSION:  1.  Mild thoracolumbar spondylitic changes without high-grade canal or foraminal stenosis.  2.  Thin thoracic cord syrinx versus prominence of the central canal from T5 through T11.  3.  No abnormal cord signal.   IR Gastrostomy Tube Percutaneous Plcmnt    Narrative    GASTROSTOMY TUBE PLACEMENT    An NG tube was placed in the stomach.  The stomach was distended with  air through the tube.  Ultrasound was used to claudia the inferior edge  of the lobe of the liver.  From a subxiphoid subhepatic approach,  lidocaine was administered on the skin of the anterior abdomen.  A  short incision was made at this point.  Two T fasteners were placed in  the body of the stomach in standard fashion.  A third puncture was  made into the body of the stomach and a wire directed into the fundus.   A  peel-away sheath was placed in the stomach and a gastrostomy  catheter was  advanced over the wire into the stomach.  The balloon was  inflated  and pulled back against the anterior wall of the stomach.   The sheath was removed.  Contrast was injected to confirm the catheter  position in the stomach.  The catheter was then secured in place.  The  T fasteners were secured on the skin with interrupted stitches.  There  were no initial complications.  The tube can be used in 6 hours if the  patient's exam is stable.    Sedation: A moderate level sedation achieved with 1 mg of midazolam                  25 mcg fentanyl    Sedation given by our nursing staff under my supervision.    Sedation time: 17 minutes    Fluoro time:  2.2 minutes  Air kerma: 18.21 mGy    Local anesthetic:  18 mL 1% lidocaine  Contrast: 30 mL of Omnipaque 240 administered into the enteric tract  without complication.    FINDINGS: Two spot fluoroscopic images demonstrate appropriate  placement of gastrostomy tube.      Impression    IMPRESSION: Successful placement of 18 [place] GENA gastrostomy tube and  two T tacks. Tube may be used after 6 hours if patient's vital signs  remain stable and no evidence of complication (peritoneal signs).    SRINIVASAN BURNETTE MD         SYSTEM ID:  [id number]

## 2024-11-13 NOTE — PROVIDER NOTIFICATION
MD Notification    Notified Person: MD    Notified Person Name: Ramon    Notification Date/Time: 3pm 11/13/24    Notification Interaction: Katie    Purpose of Notification: Hold IVF when starting TF?    Orders Received: Yes, stop IVF.    Comments:

## 2024-11-13 NOTE — CONSULTS
"  Interventional Radiology - Pre-Procedure Note:  11/13/2024    Procedure Requested: Gastrostomy tube placement  Requested by: Shon Navarro MD       Brief HPI: Mendez Greene is a 79 year old male with a past medical history significant for aortic insufficiency, coronary disease, hypertension, hyperlipidemia, and hypothyroidism. Pt presented 11/8 for evaluation of generalized weakness, difficulty swallowing, and shortness of breath. Pt has ongoing weakness and fatigue and dysphagia with failed swallow study. Given the ongoing concerns with dysphagia, IR is asked to place a gastrostomy tube for nutritional support.        NPO: Appropriately NPO  ANTICOAGULANTS: Aspirin cannot be held  ANTIBIOTICS: Ancef for procedure    ALLERGIES  No Known Allergies      LABS:  INR   Date Value Ref Range Status   11/13/2024 1.23 (H) 0.85 - 1.15 Final      Hemoglobin   Date Value Ref Range Status   11/12/2024 13.8 13.3 - 17.7 g/dL Final   11/06/2020 14.7 13.3 - 17.7 g/dL Final   ]  Platelet Count   Date Value Ref Range Status   11/12/2024 290 150 - 450 10e3/uL Final   11/06/2020 261 150 - 450 10e9/L Final     Creatinine   Date Value Ref Range Status   11/12/2024 0.58 (L) 0.67 - 1.17 mg/dL Final   11/07/2020 0.78 0.66 - 1.25 mg/dL Final     Potassium   Date Value Ref Range Status   11/12/2024 4.9 3.4 - 5.3 mmol/L Final   08/10/2022 4.4 3.4 - 5.3 mmol/L Final   11/07/2020 4.2 3.4 - 5.3 mmol/L Final     EXAM:  /58 (BP Location: Right arm)   Pulse 60   Temp 97.3  F (36.3  C) (Oral)   Resp 17   Ht 1.778 m (5' 10\")   Wt 77.8 kg (171 lb 8.3 oz)   SpO2 98%   BMI 24.61 kg/m    General:  Stable.  In no acute distress.    Neuro:  A&O x 3. Moves all extremities equally.  Resp:  Lungs clear to auscultation bilaterally, breathing unlabored on room air.  Cardio:  S1S2 and reg, without murmur, clicks or rubs  Abdomen:  Soft, non-distended, non-tender, positive bowel sounds.  Skin:  Without excoriations, ecchymosis, erythema, " lesions or open sores on visible skin.  MSK:  No gross motor weakness.  Sensation intact.  Proprioception intact.        ASSESSMENT/PLAN:   79 year old male with a past medical history significant for primary neuromuscular disorder as cause   -Gastrostomy tube with moderate sedation today    Procedure, risks/benefits, details, alternatives, and sedation education reviewed with patient, who verbalized understanding. All questions answered.  Total time: 60 minutes    Kylee Ching PA-C  Interventional Radiology  [facility] desk phone *[phone]  Covering IR 7a-3:30pm

## 2024-11-13 NOTE — IR NOTE
Patient Name: Mendez Greene  Medical Record Number: [id number]  Today's Date: 11/13/2024    Procedure: G-tube placement  Proceduralist: Dr. Khalil  Pathology present: no    Procedure Start: 1041  Procedure end: 1058  Sedation medications administered: Last Dose: 1041, Fentanyl 25 mcg, Versed 1 mg, Lidocaine 18 ml's.    Report given to: Moni Hennessy RN  : no    Other Notes: Pt will require 6 hrs of no use of G-tube post procedure. Pt arrived to IR room 1 from 702. Consent reviewed. Pt denies any questions or concerns regarding procedure. Pt positioned supine and monitored per protocol. Pt tolerated procedure without any noted complications.

## 2024-11-13 NOTE — PROGRESS NOTES
Neurology follow-up: 11/13/24    Patient admitted with worsening generalized muscle weakness and atrophy.  Details in my note original consult note from 11/8/2024.    Weakness multifactorial, likely related to general medical factors, cardiac factors, dehydration, UTI.  However, underlying primary neurological/neuromuscular disorder is also very likely.  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      Interval history and investigations   No change since yesterday.  Family agreed to having a PEG J-tube placed for effective nutrition.  Labs since yesterday show:  Component      Latest Ref Rng 11/12/2024  3:35 PM   CK Total      39 - 308 U/L 267    Vitamin B12      232 - 1,245 pg/mL 727    Folate      4.6 - 34.8 ng/mL 14.4    Vitamin D, Total (25-Hydroxy)      20 - 50 ng/mL 26      Vitamin D levels are low, patient should be initiated on 50,000 units weekly.  Rest of the labs are still pending.      Examination   Mental status appears normal- Alert oriented x 3.  Good insight into reasons of hospitalization.    Cranial Nerves: Pupils equal and reactive to light and accommodation.  Minimal lingual dysarthria.     Sensory: Mild evidence of peripheral neuropathy.      Motor: Generalized muscle atrophy and weakness as outlined in my note from 11/12.  Weakness most prominent in upper extremities, and C8, C7 myotomal related muscles, but seems to involve C5, C6, C7, C8 and all lumbosacral myotomes.    -No fasciculations noted today, but atrophy florid.  Balance, Gait and Station:  Balance and gait deferred.       Deep Tendon Reflexes:  DTR's (biceps, triceps, brachioradialis, knee jerks, ankle jerks) preserved and symmetric.        ASSESSMENT     Muscle weakness: Multifactorial: Likely related to underlying medical problems but  with superimposed neuromuscular condition.  For neuromuscular workup, so far CK levels within the high normal range.  All other labs including aldolase, biomarker panels pending.    Low vitamin D levels could point towards some element of vitamin D deficiency.  Would recommend starting 50,000 units weekly.       RECOMMENDATIONS   Start 50,000 units weekly vitamin D.  Await all labs as sent on 11/12.   EMG planned for 11/15.    Follow-up: Inpatient neurology will continue to follow.    I have discussed the above details with Mr. Greene at great length and they expressed understanding.  They seemed satisfied with this conversation, and had no further queries as of now.  he has our contact information and has been advised to stay in touch with us regarding any other issues that may arise.     Thank you for allowing me to participate in Mr. Greene's care.       Brandon Ashley MD   Neurologist, [facility]   November 13, 2024 9:55 AM      A total time of 59 minutes was spent on the care of this patient on the date of the visit, outside of time spent performing any procedures. Please excuse any typographical errors, as this note was generated using a Voice Recognition Software.

## 2024-11-13 NOTE — PROGRESS NOTES
Addendum:  MD Consult received - Registered Dietitian to order TF per Medical Nutrition Therapy     G-tube placed today.   EN orders entered as noted below.        CLINICAL NUTRITION SERVICES - REASSESSMENT NOTE      Recommendations:     If plan to begin EN, recommend:  Type of Feeding Tube: pending  Enteral Frequency:  Continuous  Enteral Regimen: Jevity 1.5 @ 60 mL/hr x 22 hr (hold 1 hr before and 1 hr after Synthroid dose)  Total Enteral Provisions: 1980 cals (25 cals/kg), 84 gm pro (1.1 gm/kg), 28 gm fiber, 1003 mL free water  Free Water Flush: 60 mL every 4 hrs (while on IVF)    Recommend begin TF at 20 mL/hr.  Increase by 20 mL every 24 hrs to goal rate of 60 mL/hr.    Once off IVF, recommend increase FWF to 150 mL every 4 hrs       Malnutrition: (11/9)  % Weight Loss:  > 2% in 1 week (severe malnutrition)  % Intake:  </= 50% for >/= 5 days (severe malnutrition)  Subcutaneous Fat Loss:  Orbital region mild depletion, Clavicle region mild depletion   Muscle Loss:  None observed  Fluid Retention:  None noted     Malnutrition Diagnosis: Severe malnutrition  In Context of:  Acute illness or injury       EVALUATION OF PROGRESS TOWARD GOALS   Diet:    NPO    Intake/Tolerance:    Chart reviewed    11/11: Coronary angiogram demonstrated minimal nonobstructive coronary artery disease     11/12: VFSS ---> Patient presents with moderate oral and severe pharyngeal dysphagia.  Rec NPO.     Pt currently NPO   IR consulted for G-tube placement    IVF @ 75 mL/hr    Pt on Synthroid once daily (will need to hold TF for 1 hr before and 1 hr after dose)    No BM noted since admit      ASSESSED NUTRITION NEEDS:  Dosing Weight 78 kg (11/9)   Estimated Energy Needs: 0118-3472 kcals (25-30 Kcal/Kg)  Justification: maintenance  Estimated Protein Needs: 80-95+ grams protein (1-1.2 g pro/Kg)  Justification: Repletion  Estimated Fluid Needs: 5738-8198  mL (1 mL/Kcal)  Justification: maintenance      NEW FINDINGS:     11/09/24 0104 77.8  kg (171 lb 8.3 oz)   11/08/24 2212 77.9 kg (171 lb 12.8 oz)   11/08/24 1146 79.4 kg (175 lb)       Previous Goals (11/9):   Pt to consume at least 75% of three nutritionally adequate meals per day (or equivalent via snacks/supplements).   Evaluation: Not met    Previous Nutrition Diagnosis (11/9):   Inadequate oral intake related to reduced appetite, dysphagia as evidenced by intake <50% baseline x2-3 weeks with subsequent 24 lb weight loss   Evaluation: Declining        CURRENT NUTRITION DIAGNOSIS  Inadequate protein-energy intake related to NPO status with dysphagia as evidenced by pt not meeting nutrition needs    INTERVENTIONS  Recommendations / Nutrition Prescription  NPO (per SLP)    If plan to begin EN, recommend:  Type of Feeding Tube: pending  Enteral Frequency:  Continuous  Enteral Regimen: Jevity 1.5 @ 60 mL/hr x 22 hr (hold 1 hr before and 1 hr after Synthroid dose)  Total Enteral Provisions: 1980 cals (25 cals/kg), 84 gm pro (1.1 gm/kg), 28 gm fiber, 1003 mL free water  Free Water Flush: 60 mL every 4 hrs (while on IVF)    Recommend begin TF at 20 mL/hr.  Increase by 20 mL every 24 hrs to goal rate of 60 mL/hr.    Once off IVF, recommend increase FWF to 150 mL every 4 hrs      Goals  Nutrition POC to be determined in the next 48 hrs      MONITORING AND EVALUATION:  Progress towards goals will be monitored and evaluated per protocol and Practice Guidelines

## 2024-11-13 NOTE — PRE-PROCEDURE
GENERAL PRE-PROCEDURE:   Procedure:  Percutaneous gastrostomy tube placement with moderate sedation  Date/Time:  11/13/2024 9:57 AM    Written consent obtained?: Yes    Risks and benefits: Risks, benefits and alternatives were discussed    DC Plan: Appropriate discharge home plan in place for patients who are going home after procedure   Consent given by:  Patient  Patient states understanding of procedure being performed: Yes    Patient's understanding of procedure matches consent: Yes    Procedure consent matches procedure scheduled: Yes    Expected level of sedation:  Moderate  Appropriately NPO:  Yes  ASA Class:  2  Mallampati  :  Grade 3- soft palate visible, posterior pharyngeal wall not visible  Lungs:  Lungs clear with good breath sounds bilaterally  Heart:  Normal heart sounds and rate  History & Physical reviewed:  History and physical reviewed and no updates needed  Statement of review:  I have reviewed the lab findings, diagnostic data, medications, and the plan for sedation

## 2024-11-13 NOTE — PROGRESS NOTES
Spoke with EMG clinic. They are aware of the order for EMG and will reach out to nurse about when they will be able to do it.

## 2024-11-13 NOTE — PROGRESS NOTES
"Palliative consult received.  Reviewed case independently and with Dr. Navarro.  It was determined that outpatient palliative referral is sufficient.  Inpatient/acute palliative consultation not needed at this time.  Referral placed.  Inpatient consult canceled.  Thanks.    Sally PEPE, JEOVANY  Palliative Medicine   [facility]  Securely message with [employer]   *If you are having trouble locating my name, please ensure \"[facility], [facility], and [facility]\" is checked under contacts tab, within the sites button. My name will be listed as Shobha Matthews.    "

## 2024-11-14 ENCOUNTER — APPOINTMENT (OUTPATIENT)
Dept: OCCUPATIONAL THERAPY | Facility: CLINIC | Age: 79
DRG: 056 | End: 2024-11-14
Attending: STUDENT IN AN ORGANIZED HEALTH CARE EDUCATION/TRAINING PROGRAM
Payer: MEDICARE

## 2024-11-14 ENCOUNTER — ENROLLMENT (OUTPATIENT)
Dept: HOME HEALTH SERVICES | Facility: HOME HEALTH | Age: 79
End: 2024-11-14
Payer: MEDICARE

## 2024-11-14 ENCOUNTER — APPOINTMENT (OUTPATIENT)
Dept: PHYSICAL THERAPY | Facility: CLINIC | Age: 79
DRG: 056 | End: 2024-11-14
Payer: MEDICARE

## 2024-11-14 LAB
ALDOLASE SERPL-CCNC: 35.7 U/L
GD1B GANGL IGG SER IA-ACNC: 10 IV
GD1B GANGL IGM SER IA-ACNC: 11 IV
GLUCOSE BLDC GLUCOMTR-MCNC: 125 MG/DL (ref 70–99)
GM1 GANGL IGG SER IA-ACNC: 4 IV
GM1 GANGL IGM SER IA-ACNC: 10 IV
GQ1B GANGL IGG SER IA-ACNC: 3 IV
GQ1B GANGL IGM SER IA-ACNC: 2 IV
MAGNESIUM SERPL-MCNC: 2.2 MG/DL (ref 1.7–2.3)
PHOSPHATE SERPL-MCNC: 3 MG/DL (ref 2.5–4.5)
POTASSIUM SERPL-SCNC: 4.5 MMOL/L (ref 3.4–5.3)

## 2024-11-14 PROCEDURE — 36415 COLL VENOUS BLD VENIPUNCTURE: CPT | Performed by: STUDENT IN AN ORGANIZED HEALTH CARE EDUCATION/TRAINING PROGRAM

## 2024-11-14 PROCEDURE — 99233 SBSQ HOSP IP/OBS HIGH 50: CPT | Performed by: STUDENT IN AN ORGANIZED HEALTH CARE EDUCATION/TRAINING PROGRAM

## 2024-11-14 PROCEDURE — 250N000013 HC RX MED GY IP 250 OP 250 PS 637: Performed by: STUDENT IN AN ORGANIZED HEALTH CARE EDUCATION/TRAINING PROGRAM

## 2024-11-14 PROCEDURE — 84100 ASSAY OF PHOSPHORUS: CPT | Performed by: STUDENT IN AN ORGANIZED HEALTH CARE EDUCATION/TRAINING PROGRAM

## 2024-11-14 PROCEDURE — 120N000001 HC R&B MED SURG/OB

## 2024-11-14 PROCEDURE — 97116 GAIT TRAINING THERAPY: CPT | Mod: GP

## 2024-11-14 PROCEDURE — 97535 SELF CARE MNGMENT TRAINING: CPT | Mod: GO

## 2024-11-14 PROCEDURE — 83735 ASSAY OF MAGNESIUM: CPT | Performed by: STUDENT IN AN ORGANIZED HEALTH CARE EDUCATION/TRAINING PROGRAM

## 2024-11-14 PROCEDURE — 97530 THERAPEUTIC ACTIVITIES: CPT | Mod: GP

## 2024-11-14 PROCEDURE — 97165 OT EVAL LOW COMPLEX 30 MIN: CPT | Mod: GO

## 2024-11-14 PROCEDURE — 84132 ASSAY OF SERUM POTASSIUM: CPT | Performed by: STUDENT IN AN ORGANIZED HEALTH CARE EDUCATION/TRAINING PROGRAM

## 2024-11-14 RX ORDER — SIMVASTATIN 10 MG
10 TABLET ORAL DAILY
Status: DISCONTINUED | OUTPATIENT
Start: 2024-11-14 | End: 2024-11-15 | Stop reason: HOSPADM

## 2024-11-14 RX ORDER — SALIVA STIMULANT COMB. NO.3
2 SPRAY, NON-AEROSOL (ML) MUCOUS MEMBRANE
Status: DISCONTINUED | OUTPATIENT
Start: 2024-11-14 | End: 2024-11-15 | Stop reason: HOSPADM

## 2024-11-14 RX ORDER — LISINOPRIL 2.5 MG/1
5 TABLET ORAL AT BEDTIME
Status: DISCONTINUED | OUTPATIENT
Start: 2024-11-14 | End: 2024-11-15 | Stop reason: HOSPADM

## 2024-11-14 RX ADMIN — ACETAMINOPHEN 650 MG: 325 TABLET, FILM COATED ORAL at 04:54

## 2024-11-14 RX ADMIN — SIMVASTATIN 10 MG: 10 TABLET, FILM COATED ORAL at 13:01

## 2024-11-14 RX ADMIN — LEVOTHYROXINE SODIUM 150 MCG: 150 TABLET ORAL at 06:33

## 2024-11-14 RX ADMIN — ASPIRIN 81 MG CHEWABLE TABLET 81 MG: 81 TABLET CHEWABLE at 09:09

## 2024-11-14 RX ADMIN — LISINOPRIL 5 MG: 2.5 TABLET ORAL at 22:21

## 2024-11-14 ASSESSMENT — ACTIVITIES OF DAILY LIVING (ADL)
DEPENDENT_IADLS:: INDEPENDENT
ADLS_ACUITY_SCORE: 0
PREVIOUS_RESPONSIBILITIES: MEAL PREP;HOUSEKEEPING;LAUNDRY;SHOPPING;YARDWORK;MEDICATION MANAGEMENT;FINANCES;DRIVING;WORK
ADLS_ACUITY_SCORE: 0

## 2024-11-14 NOTE — PROGRESS NOTES
[facility]    Medicine Progress Note - Hospitalist Service    Date of Admission:  11/8/2024    Assessment & Plan   Mendez Greene is a 79 year old male admitted on 11/8/2024. He presents with generalized weakness/difficulty swallowing. Suspect primary neuromuscular disorder as cause. Patient also found to have NSTEMI.      Generalized weakness and fatigue, progressive  BUE weakness, progressive  Severe dysphagia  Hoarse voice, progressive  Weight loss  Concern for primary neuromuscular disorder  S/p percutaneous FT 11/13  Presents with ongoing weakness and fatigue in the setting of recently diagnosed urinary tract infection.  On admission his troponin was elevated to 812 otherwise rest of  workup is largely benign.  MRI Cervical Spine-> Multilevel spondylosis with variable degrees of foraminal narrowing, worst and severe at C3-C4 on the left, moderate-severe on the right. No acute intracranial abnormalities on MRI brain.   Neurology wants to rule out metabolic or other causes of weakness which are not apparent. Suspect etiology likely neurologic for symptoms.   -NPO, okay for ice chips  -nutrition consult for TF initiation, likely will use nocturnal feeds at discharge  -consult   -neurology consult  -needs EMG, tentatively scheduled 11/15 inpatient  -likely will discharge home 11/14 pending TF initiation  -outpatient palliative care follow up  -expect patient to require tube feeds for the remainder of his life (>90 days)     Acute cystitis with hematuria  -IV Ceftriaxone -> PO Ceftin BID to complete 5 day course  -Follow UCs 10,000-50,000 CFU/mL Proteus vulgaris      CHA on CPAP  - Continue PTA CPAP     CAD (coronary artery disease)  NSTEMI suspect type II  Troponin elevated to 800, patient endorsed some dyspnea but no chest pain at this time.  PTA on 81 mg of aspirin daily.  TTE shows normal LV EF 60-65%. No WMA. Dilated left atrium.   Cor angio 11/11 with minimal non-obstructive  CAD  Suspect NSTEMI due to UTI vs myocarditis vs other.  -Daily 81mg ASA  -Cardiology signed off 11/12  -Plan outpatient cardiac MRI     PAF (paroxysmal atrial fibrillation)  Not anticoagulated or on rate control at this time.  Will monitor on telemetry.     Ascending aortic aneurysm   TTE shows Aortic root dilatation. (43mm( Ascending aorta dilatation is present.(45mm) No change since 7/30/2024     HLD  Benign essential hypertension  Continue prior to admission lisinopril/Zocor     History of basal cell carcinoma  Stable, follows an outpatient     Hypothyroidism  Continue prior to admission levothyroxine          Diet: NPO for Medical/Clinical Reasons Except for: Other; Specify: NPO For oral intake and gastric feedings for 6 hours post insertion Gastrostomy G/GJ tube. May feed through Jejunal or Distal PORT immediately for GJ tubes ONLY.  Adult Formula Drip Feeding: Continuous Jevity 1.5; Gastrostomy; Goal Rate: 60; mL/hr; When ok to use G-tube, begin TF at 20 mL/hr.  Increase by 20 mL every 24 hrs to goal rate of 60 mL/hr.  Be sure to hold TF for 1 hr before and 1 hr after Synthro...    DVT Prophylaxis: Pneumatic Compression Devices  Cortez Catheter: Not present  Lines: None     Cardiac Monitoring: None  Code Status: Full Code      Clinically Significant Risk Factors               # Hypoalbuminemia: Lowest albumin = 3.3 g/dL at 11/9/2024 11:39 AM, will monitor as appropriate  # Coagulation Defect: INR = 1.23 (Ref range: 0.85 - 1.15) and/or PTT = N/A, will monitor for bleeding    # Hypertension: Noted on problem list             # Severe Malnutrition: based on nutrition assessment    # Financial/Environmental Concerns: none         Disposition Plan     Medically Ready for Discharge: Anticipated Tomorrow  Pending nutrition, EMG           Shon Navarro MD  Hospitalist Service  [facility]  Securely message with [employer] (more info)  Text page via [employer] Paging/Directory    ______________________________________________________________________    Interval History   No new major symptoms. Does report dry mouth. Thinks some if it is now his mouth comes open while sleeping.   Long discussion with patient and wife. Discussed TF, follow ups, home care, symptom progression.  Discussed with CC/SW  Discussed with nutrition  Discussed with RN  Discussed with Dr. Ashley    Physical Exam   Vital Signs: Temp: 98.8  F (37.1  C) Temp src: Axillary BP: 120/65 Pulse: (!) 48   Resp: 16 SpO2: 94 % O2 Device: None (Room air)    Weight: 172 lbs 13.45 oz    Constitutional: Awake, alert, cooperative, no apparent distress  Respiratory: no increased WOB  Skin/Integumen: No rashes, no cyanosis, no edema  Other: Soft voice, unable to extend fingers.      Medical Decision Making       55 MINUTES SPENT BY ME on the date of service doing chart review, history, exam, documentation & further activities per the note.      Data   ------------------------- PAST 24 HR DATA REVIEWED -----------------------------------------------    I have personally reviewed the following data over the past 24 hrs:    N/A  \   N/A   / N/A     N/A N/A N/A /  125 (H)   4.5 N/A N/A \       Imaging results reviewed over the past 24 hrs:   No results found for this or any previous visit (from the past 24 hours).

## 2024-11-14 NOTE — PROGRESS NOTES
Neurology follow-up: 11/14/24    Patient admitted with worsening generalized muscle weakness and atrophy.  Details in my note original consult note from 11/8/2024.     Weakness appears to be multifactorial, likely related to general medical factors, cardiac factors, dehydration, UTI.  However, underlying primary neurological/neuromuscular disorder is also very likely.    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    Interval history and investigations   Patient had a PEG tube placed yesterday.  Doing well with the tube feeding.    Relevant investigations so far show raised aldolase level but high normal CK level.    - AMY is borderline positive with a very low titer, likely unrelated to cause of weakness.  -Vitamin D deficiency noted.  Replacement initiated.    Still pending: Myomarker panel, myasthenia gravis antibody panel, paraneoplastic panel, ganglioside antibody panel.    EMG scheduled for 11/15/2024.  Examination   Mental status appears normal- Alert oriented x 3.  Good insight into reasons of hospitalization.    Cranial Nerves: Pupils equal and reactive to light and accommodation.       Sensory:  No overt lateralizing sensory abnormalities to touch, pinprick.      Motor: Generalized muscle weakness and atrophy.  More prominent in upper extremities as compared to lower extremities, and distal > proximal.      Balance, Gait and Station:  Balance and gait deferred.       Deep Tendon Reflexes:  DTR's (biceps, triceps, brachioradialis, knee jerks) preserved and symmetric.      ASSESSMENT     Multifactorial weakness with underlying neuromuscular disorder.  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  EMG scheduled for tomorrow which  should help clarify diagnosis.       RECOMMENDATIONS   Continue supportive care, while waiting for diagnostic clarification with labs and EMG       Follow-up: Inpatient neurology will continue to follow.    I have discussed the above details with Mr. Greene and his wife at great length and they expressed understanding.  They seemed satisfied with this conversation, and had no further queries as of now.  he has our contact information and has been advised to stay in touch with us regarding any other issues that may arise.     Thank you for allowing me to participate in Mr. Greene's care.       Brandon Ashley MD   Neurologist, [facility]   November 14, 2024 10:56 AM      A total time of 41 minutes was spent on the care of this patient on the date of the visit, outside of time spent performing any procedures. Please excuse any typographical errors, as this note was generated using a Voice Recognition Software.

## 2024-11-14 NOTE — PROGRESS NOTES
11/14/24 0820   Appointment Info   Signing Clinician's Name / Credentials (OT) SANA Austin   Student Supervision On-site supervision provided   Rehab Comments (OT) 3 units - 1 unit eval of low complex, 2 units self-care   Living Environment   People in Home spouse   Current Living Arrangements house   Home Accessibility stairs to enter home   Number of Stairs, Main Entrance 3   Stair Railings, Main Entrance railings safe and in good condition   Transportation Anticipated family or friend will provide   Living Environment Comments Pt reports living in home w/ spouse. BR w/ tub shower (being renovated) and very low toilet (also renovated), no grab bars currently, but installing them w/ estee.   Self-Care   Usual Activity Tolerance good   Current Activity Tolerance fair   Regular Exercise Yes   Activity/Exercise Type walking   Exercise Amount/Frequency daily   Equipment Currently Used at Home none;other (see comments)  (Pt has walker, reacher and shower chair available.)   Fall history within last six months yes   Number of times patient has fallen within last six months 1   Activity/Exercise/Self-Care Comment Pt is typically IND without an AD per report, states he has used a FWW in the past following knee surgeries. Pt also states he was attending OP PT for weakness and recently started with a hand specialist d/t progressive weakness.   Instrumental Activities of Daily Living (IADL)   Previous Responsibilities meal prep;housekeeping;laundry;shopping;yardwork;medication management;finances;driving;work   IADL Comments Pt reports IND at baseline w/ IADLs. pt is a semi-retired . Pt wife able to assist as needed. Pt was driving   General Information   Onset of Illness/Injury or Date of Surgery 11/08/24   Referring Physician Dr. Navarro   Patient/Family Therapy Goal Statement (OT) return home   Additional Occupational Profile Info/Pertinent History of Current Problem per neurology, Weakness multifactorial,  likely related to general medical factors, cardiac factors, dehydration, UTI.  However, underlying primary neurological/neuromuscular disorder is also very likely.  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   Existing Precautions/Restrictions fall   Cognitive Status Examination   Orientation Status orientation to person, place and time   Affect/Mental Status (Cognitive) flat/blunted affect;WFL   Follows Commands WFL   Visual Perception   Visual Impairment/Limitations corrective lenses for reading   Sensory   Sensory Quick Adds sensation intact   Pain Assessment   Patient Currently in Pain No   Range of Motion Comprehensive   General Range of Motion bilateral upper extremity ROM WFL   Strength Comprehensive (MMT)   General Manual Muscle Testing (MMT) Assessment upper extremity strength deficits identified   Upper Extremity (Manual Muscle Testing)   Upper Extremity: Manual Muscle Testing (MMT) left shoulder strength deficit;right shoulder strength deficit;left elbow/forearm strength deficit;right elbow/forearm strength deficit  (BUE strenght: shoulder flex 3/5, elbow flex/ext 3/5, generalized weakness B )   Coordination   Upper Extremity Coordination Left UE impaired;Right UE impaired   Fine Motor Coordination Impaired coordination d/t liited funciton/weaknes of B hands. Pt overshoots when touching fingers to nose, very slow movements, uncoordinated   Bed Mobility   Bed Mobility supine-sit;scooting/bridging   Scooting/Bridging [place] (Bed Mobility) verbal cues;contact guard   Supine-Sit [place] (Bed Mobility) moderate assist (50% patient effort);verbal cues   Assistive Device (Bed Mobility) bed rails   Transfers   Transfers sit-stand transfer;toilet transfer   Sit-Stand Transfer   Sit-Stand [place]  (Transfers) verbal cues;contact guard   Assistive Device (Sit-Stand Transfers) walker, front-wheeled   Toilet Transfer   Type (Toilet Transfer) stand-sit;sit-stand   [place] Level (Toilet Transfer) moderate assist (50% patient effort);verbal cues   Assistive Device (Toilet Transfer) grab bars/safety frame;walker, front-wheeled   Activities of Daily Living   BADL Assessment/Intervention lower body dressing;clothing fastener management;grooming;toileting   Lower Body Dressing Assessment/Training   Position (Lower Body Dressing) edge of bed sitting   [place] Level (Lower Body Dressing) lower body dressing skills;doff;don;socks;pants/bottoms;moderate assist (50% patient effort)   Clothes Fastener Management   [place] Level (Clothes Fastener Management) clothes fastener management;maximum assist (25% patient effort)   Position (Clothes Fastener Management) edge of bed sitting   Grooming Assessment/Training   Position (Grooming) sink side   [place] Level (Grooming) grooming skills;wash face, hands;contact guard assist   Toileting   Position (Toileting) unsupported sitting   [place] Level (Toileting) toileting skills;adjust/manage clothing;perform perineal hygiene;moderate assist (50% patient effort)   Clinical Impression   Criteria for Skilled Therapeutic Interventions Met (OT) Yes, treatment indicated   OT Diagnosis ecreased independence w/ funcitonal activity   Influenced by the following impairments decreased activity tolerance, UE weakness, decreased fine motor skills, generalized weakness   OT Problem List-Impairments impacting ADL problems related to;activity tolerance impaired;motor control;strength;range of motion (ROM);balance;coordination   Assessment of Occupational Performance 3-5 Performance Deficits   Identified Performance Deficits bathing, dressing, g/h, home mgmt, driving   Planned Therapy Interventions (OT) ADL retraining;IADL retraining;strengthening;transfer training;home  "program guidelines;ROM;progressive activity/exercise;risk factor education   Clinical Decision Making Complexity (OT) problem focused assessment/low complexity   Risk & Benefits of therapy have been explained evaluation/treatment results reviewed;care plan/treatment goals reviewed;risks/benefits reviewed;current/potential barriers reviewed;patient   OT Total Evaluation Time   OT Eval, Low Complexity Minutes (93090) 8   OT Goals   Therapy Frequency (OT) Daily   OT Predicted Duration/Target Date for Goal Attainment 11/21/24   OT Goals Hygiene/Grooming;Lower Body Dressing;Toilet Transfer/Toileting;Home Management   OT: Hygiene/Grooming modified independent;using adaptive equipment;while standing   OT: Lower Body Dressing Modified independent;using adaptive equipment;including set-up/clothing retrieval   OT: Toilet Transfer/Toileting Supervision/stand-by assist;toilet transfer;cleaning and garment management;using adaptive equipment   OT: Home Management Modified independent;with light demand household tasks;using adaptive equipment;ambulatory level   Interventions   Interventions Quick Adds Self-Care/Home Management   Self-Care/Home Management   Self-Care/Home Mgmt/ADL, Compensatory, Meal Prep Minutes (06890) 30   Symptoms Noted During/After Treatment (Meal Preparation/Planning Training) fatigue;shortness of breath   Treatment Detail/Skilled Intervention Pt sleeping in bed upon arrival, agreeable to therapy. VSS throughout. Increased time as pt. sometimes slow to respond, slow processing speed. Sup>sit EOB using L bed rail, cues for hand placement, Shai for trunk upright. Pt sits EOB to doff socks, pt able to get skilled nursing, unable to complete, modA from OTS to doff and don. Pt sit>stand w/ CGA FWW and ambulates to BR, OTS manages IV pole. Stand>sit onto toilet w. Shai for safety, L grab bar. ModA for pericares and gown mgmt. ModA for sit>stand, cues to push up off toilet, pt states \"this toilet is even lowe than the one " "I have at home\" Transition sinkside w/ FWW, CGA. Pt washes face. difficulty noted w/ fine motor control, however pt manages washcloth w/ SBA and increased time. Pt returns to chair w/ CGA, FWW, stand>sit w/ cues and Shai.  Lack of controlled descent noted. Pt repositions self w/ supervision. OTS educates on energy conservation, home safety strats, pt receptive. OTS educates on built up hands for ADL tasks, pt interested. Pt in chair w/ alarm on, call light in hand, needs met. RN updated.   OT Discharge Planning   OT Plan g/h w/ built-up handle, toilet transfer safety, FB dressing, AE education   OT Discharge Recommendation (DC Rec) home with assist;home with home care occupational therapy;Transitional Care Facility   OT Rationale for DC Rec Pt currently below baseline d/t weakness, decreased activity tolerance, UE weakness and incoordination, unsteadiness. Pt reports IND at baseline w/ all I/ADLs, pt currently min-modA for LB dressing, CGA-Shai for functional mobility. Pt reports wife home to assist as needed. If pt wife able to provide assist, anticipate home w/ assist and possible home OT eval for safety w/ I/ADLs is appropriate. If wife unable to provide current level of assist safely, pt would benefit from continued skilled OT, possibly at TCU, to address aforementioned deficits for safe return to PLOF. Will follow and update recs accordingly.   OT Brief overview of current status min-modA LB dressing/pericares, setup SBA for g/h - trial built-up handles, Shai Bed mobility Goals of therapy will be to address safe mobility and make recs for d/c to next level of care. Pt and RN will continue to follow all falls risk precautions as documented by RN staff while hospitalized.   Total Session Time   Timed Code Treatment Minutes 30   Total Session Time (sum of timed and untimed services) 38     "

## 2024-11-14 NOTE — PLAN OF CARE
Goal Outcome Evaluation:      Plan of Care Reviewed With: patient    Overall Patient Progress: no changeOverall Patient Progress: no change         Pt here with progressive weakness. A&O x4. Neuros: generalized weakness, impaired swallowing. VSS ex bradycardic. Tele sinus fam. NPO ex ice chips for comfort. G tube placed yesterday, TF started at 20 mL/hr at 1730 on 11/13. TF rate to increase by 20 mL/hr q24 hrs, goal rate of 60 mL/hr.  60 mL flush q4 hr.  Stop tube feed for one hour prior to and following morning synthroid dose. Takes pills crushed in G tube. Up with 1 with GB and walker. Denies pain. Pt scoring green on the Aggression Stop Light Tool. Plan outpatient EMG 11/15. Discharge pending.  No BM this shift. Pt uses a brief + sanitary pad for urine, incontinent of urine.

## 2024-11-14 NOTE — PLAN OF CARE
Goal Outcome Evaluation:    Pt here with Progressive weakness. A&Ox4. Neuros generalized weakness, weak hand grasps, impaired swallowing and hoarse voice. VSS x bradycardia. Tele sinusbrady. NPO diet, no liquids; ice chips given for comfort. Takes pills through G-tube. Incontinent of bladder: wears brief with pad. Up with Ax1 GBW. Denies pain. PEG tube dressing changed, small amount of sanguineous drainage. TF hung and increased to 40 mL/hr at 1730 w/ 150 mL FWF given Q4. BG monitored daily. Pt scoring green on the Aggression Stop Light Tool. Plan for EMG 11/15. Plan to advance TF by 20 mL 11/15 at 1730 to get to goal rate of 60mL/hr. Hold TF 1 hour before and 1 hour after synthroid dose. Patient wears CPAP at night. Discharge pending to home w/ assist.

## 2024-11-14 NOTE — PROGRESS NOTES
[facility]     Patient is anticipated to discharge 11/15/24.  Patient agrees to have [facility] provide SN PT OT SLP services. Patient will receive a phone call from [facility] to schedule an initial home care visit post discharge from the hospital. Anticipated start of care date is [within 24-48 hours of discharge].     Please note: SOC date is based on availability of the day referral was received. If patients discharge date changes, please reach out to Clinical Liaison or the HUB to ensure SOC date is still appropriate for a safe discharge plan.     Thank you for the referral.     EUGENE Engel, LPN  [facility] Liaison   Cell: [phone]

## 2024-11-14 NOTE — PROGRESS NOTES
[facility]    Received request for benefit check should pt require home tube feeding. Mendez meets Medicare criteria for enteral tube feeds. Between Medicare and BCBS supplement, pt is covered at 100%.     Ordering provider must document anticipated SOTO of 90 days or more in patient s medical chart for Medicare to cover. If not documented, then there is no coverage and patient will be self-pay.    I spoke with pt's wife, Alana to introduce home infusion services and review benefits. They would like to proceed with [facility] for home TF needs. [facility] will coordinate TF teaching on the day of discharge. Formula and supplies will be delivered to pt on the day of discharge.     Thank you for the referral    Joanna Hickman RN  [facility] Liaison  [phone] (Mon thru Fri 8am - 5pm)  [phone] Office

## 2024-11-15 ENCOUNTER — TRANSFERRED RECORDS (OUTPATIENT)
Dept: HEALTH INFORMATION MANAGEMENT | Facility: CLINIC | Age: 79
End: 2024-11-15

## 2024-11-15 ENCOUNTER — PATIENT OUTREACH (OUTPATIENT)
Dept: CARE COORDINATION | Facility: CLINIC | Age: 79
End: 2024-11-15
Payer: MEDICARE

## 2024-11-15 ENCOUNTER — APPOINTMENT (OUTPATIENT)
Dept: OCCUPATIONAL THERAPY | Facility: CLINIC | Age: 79
DRG: 056 | End: 2024-11-15
Payer: MEDICARE

## 2024-11-15 ENCOUNTER — HOME CARE VISIT (OUTPATIENT)
Dept: HOME HEALTH SERVICES | Facility: HOME HEALTH | Age: 79
End: 2024-11-15
Payer: MEDICARE

## 2024-11-15 ENCOUNTER — APPOINTMENT (OUTPATIENT)
Dept: PHYSICAL THERAPY | Facility: CLINIC | Age: 79
DRG: 056 | End: 2024-11-15
Payer: MEDICARE

## 2024-11-15 ENCOUNTER — HOME INFUSION (OUTPATIENT)
Dept: HOME HEALTH SERVICES | Facility: HOME HEALTH | Age: 79
End: 2024-11-15
Payer: MEDICARE

## 2024-11-15 ENCOUNTER — HOME INFUSION BILLING (OUTPATIENT)
Dept: HOME HEALTH SERVICES | Facility: HOME HEALTH | Age: 79
End: 2024-11-15
Payer: MEDICARE

## 2024-11-15 ENCOUNTER — HOME INFUSION (OUTPATIENT)
Dept: HOME HEALTH SERVICES | Facility: HOME HEALTH | Age: 79
End: 2024-11-15

## 2024-11-15 VITALS
SYSTOLIC BLOOD PRESSURE: 124 MMHG | OXYGEN SATURATION: 97 % | DIASTOLIC BLOOD PRESSURE: 76 MMHG | TEMPERATURE: 97.4 F | RESPIRATION RATE: 16 BRPM | HEART RATE: 68 BPM

## 2024-11-15 VITALS
RESPIRATION RATE: 18 BRPM | TEMPERATURE: 96.7 F | BODY MASS INDEX: 24.74 KG/M2 | DIASTOLIC BLOOD PRESSURE: 62 MMHG | WEIGHT: 172.84 LBS | HEIGHT: 70 IN | HEART RATE: 43 BPM | OXYGEN SATURATION: 96 % | SYSTOLIC BLOOD PRESSURE: 124 MMHG

## 2024-11-15 VITALS — HEIGHT: 70 IN | WEIGHT: 172.84 LBS | BODY MASS INDEX: 24.74 KG/M2

## 2024-11-15 DIAGNOSIS — R13.10 DYSPHAGIA: Primary | ICD-10-CM

## 2024-11-15 LAB
ANION GAP SERPL CALCULATED.3IONS-SCNC: 7 MMOL/L (ref 7–15)
BUN SERPL-MCNC: 32.6 MG/DL (ref 8–23)
CALCIUM SERPL-MCNC: 11.5 MG/DL (ref 8.8–10.4)
CHLORIDE SERPL-SCNC: 109 MMOL/L (ref 98–107)
CREAT SERPL-MCNC: 0.57 MG/DL (ref 0.67–1.17)
EGFRCR SERPLBLD CKD-EPI 2021: >90 ML/MIN/1.73M2
GLUCOSE BLDC GLUCOMTR-MCNC: 119 MG/DL (ref 70–99)
GLUCOSE SERPL-MCNC: 106 MG/DL (ref 70–99)
HCO3 SERPL-SCNC: 24 MMOL/L (ref 22–29)
MAGNESIUM SERPL-MCNC: 2.1 MG/DL (ref 1.7–2.3)
PARANEOPLASTIC AB SER QL IF: NORMAL
PHOSPHATE SERPL-MCNC: 3.4 MG/DL (ref 2.5–4.5)
POTASSIUM SERPL-SCNC: 4.3 MMOL/L (ref 3.4–5.3)
SODIUM SERPL-SCNC: 140 MMOL/L (ref 135–145)

## 2024-11-15 PROCEDURE — 250N000013 HC RX MED GY IP 250 OP 250 PS 637: Performed by: STUDENT IN AN ORGANIZED HEALTH CARE EDUCATION/TRAINING PROGRAM

## 2024-11-15 PROCEDURE — 97535 SELF CARE MNGMENT TRAINING: CPT | Mod: GO

## 2024-11-15 PROCEDURE — 84100 ASSAY OF PHOSPHORUS: CPT | Performed by: STUDENT IN AN ORGANIZED HEALTH CARE EDUCATION/TRAINING PROGRAM

## 2024-11-15 PROCEDURE — 80048 BASIC METABOLIC PNL TOTAL CA: CPT | Performed by: STUDENT IN AN ORGANIZED HEALTH CARE EDUCATION/TRAINING PROGRAM

## 2024-11-15 PROCEDURE — 97530 THERAPEUTIC ACTIVITIES: CPT | Mod: GP | Performed by: PHYSICAL THERAPIST

## 2024-11-15 PROCEDURE — 99239 HOSP IP/OBS DSCHRG MGMT >30: CPT | Performed by: STUDENT IN AN ORGANIZED HEALTH CARE EDUCATION/TRAINING PROGRAM

## 2024-11-15 PROCEDURE — 97110 THERAPEUTIC EXERCISES: CPT | Mod: GO

## 2024-11-15 PROCEDURE — 97116 GAIT TRAINING THERAPY: CPT | Mod: GP | Performed by: PHYSICAL THERAPIST

## 2024-11-15 PROCEDURE — 83735 ASSAY OF MAGNESIUM: CPT | Performed by: STUDENT IN AN ORGANIZED HEALTH CARE EDUCATION/TRAINING PROGRAM

## 2024-11-15 PROCEDURE — 36415 COLL VENOUS BLD VENIPUNCTURE: CPT | Performed by: STUDENT IN AN ORGANIZED HEALTH CARE EDUCATION/TRAINING PROGRAM

## 2024-11-15 RX ORDER — RILUZOLE 50 MG/1
50 TABLET, FILM COATED ORAL EVERY 12 HOURS
Qty: 60 TABLET | Refills: 1 | Status: SHIPPED | OUTPATIENT
Start: 2024-11-15

## 2024-11-15 RX ORDER — SALIVA STIMULANT COMB. NO.3
2 SPRAY, NON-AEROSOL (ML) MUCOUS MEMBRANE
Qty: 44.3 ML | Refills: 1 | Status: SHIPPED | OUTPATIENT
Start: 2024-11-15

## 2024-11-15 RX ORDER — RILUZOLE 50 MG/1
50 TABLET, FILM COATED ORAL EVERY 12 HOURS SCHEDULED
Status: DISCONTINUED | OUTPATIENT
Start: 2024-11-15 | End: 2024-11-15 | Stop reason: HOSPADM

## 2024-11-15 RX ADMIN — ASPIRIN 81 MG CHEWABLE TABLET 81 MG: 81 TABLET CHEWABLE at 09:39

## 2024-11-15 RX ADMIN — SIMVASTATIN 10 MG: 10 TABLET, FILM COATED ORAL at 09:39

## 2024-11-15 RX ADMIN — LEVOTHYROXINE SODIUM 150 MCG: 150 TABLET ORAL at 06:34

## 2024-11-15 RX ADMIN — ACETAMINOPHEN 650 MG: 325 TABLET, FILM COATED ORAL at 05:41

## 2024-11-15 ASSESSMENT — ACTIVITIES OF DAILY LIVING (ADL)
ADLS_ACUITY_SCORE: 0

## 2024-11-15 NOTE — PROGRESS NOTES
Contacted patient while in at [facility] to arrange post acute follow up appointment.     Patient scheduled appointment  12/27/2024 @ 9:45 AM   DR. HILL  [facility]     CTA will contact patient on 12/26/2024 to remind of appointment.

## 2024-11-15 NOTE — PROGRESS NOTES
Neurology follow-up: 11/15/24    Patient admitted with generalized muscle weakness, most prominently in the upper extremities, but somewhat in the lower extremities is noted.  Has been having difficulty swallowing, and has atrophy in weakness in tongue musculature noted.        Interval history and investigations   Clinical status similar to yesterday.  Generalized muscle weakness continues.  Getting used to tube feeds.    Labs so far have shown an increased aldolase level, but otherwise unremarkable.    Component      Latest Ref Rng 11/12/2024  3:35 PM   GM1 Antibody IgG      0 - 50 IV 4    GM1 Antibody IgM      0 - 50 IV 10    GD1b Antibody IgG      0 - 50 IV 10    GD1b Antibody IgM      0 - 50 IV 11    GQ1b Antibody IgG      0 - 50 IV 3    GQ1b Antibody IgM      0 - 50 IV 2    AMY interpretation      Negative  Borderline Positive !    AMY pattern 1 Homogeneous    AMY titer 1 1:40    CK Total      39 - 308 U/L 267    Aldolase      1.2 - 7.6 U/L 35.7 (H)    Vitamin B12      232 - 1,245 pg/mL 727    Folate      4.6 - 34.8 ng/mL 14.4    Vitamin D, Total (25-Hydroxy)      20 - 50 ng/mL 26      MRI thoracic and lumbar spine largely unremarkable with exception of an inconsequential thoracolumbar syrinx:  IMPRESSION:  1.  Mild thoracolumbar spondylitic changes without high-grade canal or foraminal stenosis.  2.  Thin thoracic cord syrinx versus prominence of the central canal from T5 through T11.  3.  No abnormal cord signal.    Cervical spine MRI shows multilevel spondylosis most severe at C3-4 level.    EMG performed today.  Nerve conduction studies show generalized reduction in motor potential amplitudes, but rather normal sensory potential amplitudes.  Needle electrode examination shows widespread acute and chronic denervation in bilateral L4, L5, L3, C8, C7, C6 and thoracic paraspinal musculature.  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      Examination   Mental status appears normal- Alert oriented x 3.  Good insight into reasons of hospitalization.     Cranial Nerves: Pupils equal and reactive to light and accommodation.       Sensory:  No overt lateralizing sensory abnormalities to touch, pinprick.       Motor: Generalized muscle weakness and atrophy.  More prominent in upper extremities as compared to lower extremities, and distal > proximal.       Balance, Gait and Station:  Balance and gait deferred.       Deep Tendon Reflexes:  DTR's (biceps, triceps, brachioradialis, knee jerks) preserved and symmetric.      ASSESSMENT     Discussed all investigations performed, and the eventual diagnosis of motor neuron disease with the patient and his wife.  For future care, would recommend starting with a second opinion to confirm the diagnosis of MND/ALS.  Would recommend starting treatment with riluzole today.  Should be thinking about initiating Edaravone when possible.       RECOMMENDATIONS   Initiate riluzole 50 mg twice daily.    Follow-up:  - Inpatient neurology will continue to follow while patient is in the hospital.  Should follow-up with neurology as an outpatient.  - For a second opinion, would recommend patient be referred to the [facility].  I will put the referral letter as a separate note here today.        Continue to provide reorientation, reassurance, and stimulation during the day with lights on, blinds open, and the television on alternating with dark, quiet environment at night.  Interruptions during the night should be limited as much as possible.     Standard neuroprotective measures (post cardiac arrest) - MAP>65mmHg, avoidance of hyperventilation, fever, hyperglycemia, and hyperoxia (PaO2 >300mmHg) apply.        I have discussed the above details with Mr. Greene and the hospitalist team Dr Navarro at great length and they expressed understanding.  They seemed satisfied with this conversation, and had no  further queries as of now.  he has our contact information and has been advised to stay in touch with us regarding any other issues that may arise.     Thank you for allowing me to participate in Mr. Greene's care.       Brandon Ashley MD   Neurologist, [facility]   November 15, 2024 12:22 PM      A total time of 80 minutes was spent on the care of this patient on the date of the visit, outside of time spent performing any procedures. Please excuse any typographical errors, as this note was generated using a Voice Recognition Software.

## 2024-11-15 NOTE — PLAN OF CARE
Goal Outcome Evaluation:      Plan of Care Reviewed With: patient    Overall Patient Progress: no changeOverall Patient Progress: no change         Pt here with progressive weakness. A&O x4. Neuros: generalized weakness, weak  strength, impaired swallow. VSS, bradycardic. Tele fam dysrhythmia. NPO except ice chips for comfort.  On TF at 40 mL/hr with 150 mL flush q4, increase by 20 mL/hr q24 hr at 1730 - goal rate of 60 mL/hr. Pause TF for an hour before and after synthroid dose. Takes pills crushed in G tube. Up with 1, GBW. Denies pain. Pt scoring green on the Aggression Stop Light Tool. Plan EMG later today. Discharge will be to home w/ assist, discharge pending

## 2024-11-15 NOTE — CONSULTS
Care Management Discharge Note    Discharge Date: 11/15/2024       Discharge Disposition: Home, Home Care, Home Infusion for tube feeds    Discharge Services: None    Discharge DME: None    Discharge Transportation: family or friend will provide    Private pay costs discussed: Not applicable    Does the patient's insurance plan have a 3 day qualifying hospital stay waiver?  Yes     Which insurance plan 3 day waiver is available? ACO REACH    Will the waiver be used for post-acute placement? No    PAS Confirmation Code:  n/a  Patient/family educated on Medicare website which has current facility and service quality ratings: no    Education Provided on the Discharge Plan: Yes  Persons Notified of Discharge Plans: Patient, Spouse at bedside, [facility] and Discharging provider  Patient/Family in Agreement with the Plan: yes    Handoff Referral Completed: Yes, [facility] PCP: Internal handoff referral completed    Additional Information:  Writer met with patient and spouse at the bedside. They are aware of possible discharge to home with [facility] and [facility] homecare for RN/PT/OT/SLP.  Patient is pending procedure this afternoon and provider will see him after this for final recommendation(s).  Patient is happy to discharge to home wife was surprised to hear this news, however she is aware that we are lining up discharge services.  Patient and Spouse are aware that [facility] will deliver equipment to the home and schedule for teach in the home tonight.  Writer talked to Miriam with [facility] and she is aware that rounding provider will be entering the orders shortly.  Writer sent a follow up call to [facility] and left a message to call patients room to go over teach and time.  Patient and spouse have no further questions at this time.  No further needs anticipated.  Addendum: 11/15 10:13  Writer received a call from Danita with [facility] home  infusion they are in receipt of the orders, however, she noted in order to schedule the patient for a teach they need the electronically signed orders. Writer will send a message to provider with the above and alert bedside as provider plans on seeing patient after procedure.  Addendum: 11/15 15:02  Patient cleared for [facility]  Writer confirmed with Miriam that they have everything they need and will call patient's room to coordinate visit and teach for 11/15. Writer faxed supporting information and referral letter per Neuro request to [facility] for 2nd opinion to the following:  [place]   Neurology and Neurosurgery   [facility]   [address] [place] [address]   Phone: [phone]   Referral was faxed to Attn: Radha fax#[phone] (11/15/2024 3:02 p.m.)   Patient and Spouse given a copy of this letter of referral.   Discharging provider also asked Unit  to provide future reference for hospice agencies.    No further needs identified.    Kristi Serrato RN, BSN, ACM   Care Transitions Specialist  [facility]  Care Transitions Specialist  [address] [place]. [address]  [email or web address]  Office: [phone]  Fax: [phone]  [facility]

## 2024-11-15 NOTE — PROGRESS NOTES
"Referral letter to the neuromuscular division at the [facility] for a second opinion regarding the patient's diagnosis of motor neuron disease    Dear Colleague,     I am referring Mr. Mendez Greene (\"Steven\"), an 79-year-old male (born 1945), who presents with progressive generalized muscle weakness, primarily affecting the upper extremities, but also involving the lower extremities to a lesser degree. He has also developed difficulty swallowing, and atrophy is noted in the tongue musculature. His clinical status has overall deteriorated with continued muscle weakness in the bulbar, cervical, thoracic and lumbosacral segments; and he has recently been initiated on tube feeds.    Key History and Investigations:  Laboratory Findings:  Aldolase is elevated at 35.7 U/L (normal range: 1.2-7.6 U/L).  Other labs, including CK, Vitamin B12, Vitamin D, and folate, are within normal limits.  GM1, GD1b, and GQ1b antibodies are all within normal limits for both IgG and IgM.  AMY testing is borderline positive with a homogeneous pattern at a titer of 1:40.  MRI Results:  Cervical spine: Multilevel spondylosis, most severe at C3-C4.  Unlikely to be related to his weakness.  No abnormal cord signal noted.  Thoracolumbar spine: Mild spondylitic changes with a small syrinx from T5 to T11, but no abnormal cord signal.  Electromyography (EMG):  The EMG demonstrated generalized reduction in motor potential amplitudes with relatively normal sensory potential amplitudes. Needle electrode examination showed widespread acute and chronic denervation in bilateral L4, L5, L3, C8, C7, C6, and thoracic paraspinal musculature. 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    Diagnosis:  After discussing the clinical findings and investigations with Mr. Greene and his wife, we are concerned about the " diagnosis of motor neuron disease (MND), with a high index of suspicion for ALS. The EMG findings further support this diagnosis.    I have started Mr. Greene on riluzole and am considering the addition of edaravone when appropriate.     However, I would greatly appreciate your expert opinion regarding the confirmation of the diagnosis and any additional management steps or diagnostic work-up that may be warranted.    Thank you for your evaluation of this case. Please feel free to contact me directly if you require any further information.    Sincerely,      Brandon Ashley MD   Neurologist, [facility]   November 15, 2024 2:04 PM

## 2024-11-15 NOTE — PLAN OF CARE
"/62 (BP Location: Right arm)   Pulse (!) 43   Temp (!) 96.7  F (35.9  C) (Axillary)   Resp 18   Ht 1.778 m (5' 10\")   Wt 78.4 kg (172 lb 13.5 oz)   SpO2 96%   BMI 24.80 kg/m      Patient name: Mendez Greene    Summary: Patient here with generalized weakness, EMG completed today, tube feedings running but will stop at 3pm to switch to nighttime feedings, PEG tube WDL, incontinent x1 bladder, sore throat improved today, generalized weakness, with weaker upper extremities. Okay to discharge.    Sam Pulido, RN  [address] Neuro Unit    "

## 2024-11-15 NOTE — PROGRESS NOTES
"[facility] Nutrition SOC review     Type of therapy: Enteral  Initiation of therapy at home: No    Nutrition and Medical History  Reason for Nutrition Support: Swallowing difficulty; NPO  Physical findings from chart: 79 year old male admitted on 11/8/2024. He presents with generalized weakness/difficulty swallowing. Suspect primary neuromuscular disorder as cause. Patient also found to have NSTEMI.  Feeding tube type: G-Tube    Anthropometrics/Weight Goals  Height: 1.778 m (5' 10\")  Weight: 78.4 kg (172 lb 13.5 oz)  IBW Male (kg): 73  BMI (kg): 24.8  FHI Dosing Weight: 78 kg (171 lb 15.3 oz)  Weight history / comments:: 11/09/24 77.8 kg (171 lb 8.3 oz)  11/06/24 81.6 kg (180 lb)  10/23/24 88.9 kg (195 lb 14.4 oz)  07/10/24 88.7 kg (195 lb 8 oz)  04/23/24 83.5 kg (184 lb)  03/04/24 85.6 kg (188 lb 12.8 oz)  10/10/23 88 kg (194 lb)  09/05/23 88.2 kg (194 lb 6.4 oz)  08/29/23 90.7 kg (200 lb)  06/14/23 87.9 kg (193 lb 12.8 oz)    Current Nutrition Orders  Oral Diet: NPO    Enteral Nutrition Orders  Enteral Formula: Jevity 1.5  Rate/Frequency: 110ml/hr X 12 hrs/day ( 5.5 cartons/day)  Water Flushes: 150ml q 4 hours or 6x/day  Enteral Nutrition Provides: 1980 cals (25 cals/kg), 84 gm pro (1.1 gm/kg), 28 gm fiber, 1003 mL free water + flushes    Assessed Nutritional Needs  Kcal Needs: 0610-1610 kcals (25-30 Kcal/Kg)  Protein Needs: 80-95+ grams protein (1-1.2 g pro/Kg)  Fluid Needs: 9587-2756  mL (1 mL/Kcal)  Justification: maintenance  Nutrition Support is meeting what percent of needs: 100%    Pertinent Medications  Medications: reviewed    Lab Information  Labs: 11/15/24: of note: BUN 32.6,     Implementation  Intervention: SOC review. Per inpatient RD note: Pt reports tolerating TF well and is agreeable to start transitioning to night feedings today. Will increase TF rate to 60 mL/hr now  Turn TF off at 3pm this afternoon  Tonight resume TF at 85 mL/hr x 12 hrs (8pm-8am)  Tomorrow night (11/16) run " TF at goal of 110 mL/hr x 12 hrs (8pm-8am)  Continue with 150 mL FWF every 4 hrs    Plan  Follow up/Recommendations: Per inpatient RD recs: Will increase TF rate to 60 mL/hr now  Turn TF off at 3pm this afternoon  Tonight resume TF at 85 mL/hr x 12 hrs (8pm-8am)  Tomorrow night (11/16) run TF at goal of 110 mL/hr x 12 hrs (8pm-8am)  Continue with 150 mL FWF every 4 hrs      Molly Siemens, RD, CNSC, LD  [facility] Dietitian

## 2024-11-15 NOTE — DISCHARGE INSTRUCTIONS
For future reference in regards to comfort care/hospice services, you or a family member can contact any of the agencies below:     [facility]: [phone]   [email or web address]     [facility]: [phone]  [email or web address]     [facility]: [phone]  [email or web address]

## 2024-11-15 NOTE — PROGRESS NOTES
Chart reviewed, visited with pt this morning  TF currently at 40 mL/hr  FWF of 150 mL every 4 hrs  Pt reports tolerating TF well and is agreeable to start transitioning to night feedings today    Pt to have EMG today  Possible discharge home (with [facility])    PLAN:  Spoke with bedside RN regarding TF plan  Will increase TF rate to 60 mL/hr now  Turn TF off at 3pm this afternoon  Tonight resume TF at 85 mL/hr x 12 hrs (8pm-8am)  Tomorrow night (11/16) run TF at goal of 110 mL/hr x 12 hrs (8pm-8am)  Continue with 150 mL FWF every 4 hrs    Becky Soriano RD, LD  Clinical Dietitian - [facility]   Pager - [phone]

## 2024-11-16 ENCOUNTER — HOME CARE VISIT (OUTPATIENT)
Dept: HOME HEALTH SERVICES | Facility: HOME HEALTH | Age: 79
End: 2024-11-16
Payer: MEDICARE

## 2024-11-16 VITALS
OXYGEN SATURATION: 94 % | DIASTOLIC BLOOD PRESSURE: 68 MMHG | RESPIRATION RATE: 20 BRPM | TEMPERATURE: 97.5 F | SYSTOLIC BLOOD PRESSURE: 96 MMHG | HEART RATE: 52 BPM

## 2024-11-16 NOTE — PLAN OF CARE
Occupational Therapy Discharge Summary    Reason for therapy discharge:    Discharged to home with home therapy.    Progress towards therapy goal(s). See goals on Care Plan in [facility] electronic health record for goal details.  Goals partially met.  Barriers to achieving goals:   discharge from facility.    Therapy recommendation(s):    Continued therapy is recommended.  Rationale/Recommendations:  Pt currently below baseline d/t weakness, decreased activity tolerance, UE weakness and incoordination, unsteadiness. Pt reports IND at baseline w/ all I/ADLs, pt currently min-modA for LB dressing, CGA-Shai for functional mobility. Pt reports wife home to assist as needed. If pt wife able to provide assist, anticipate home w/ assist and possible home OT eval for safety w/ I/ADLs is appropriate. If wife unable to provide current level of assist safely, pt would benefit from continued skilled OT, possibly at TCU, to address aforementioned deficits for safe return to PLOF. Will follow and update recs accordingly..

## 2024-11-16 NOTE — PROGRESS NOTES
Education Provided:     Patient Education focused on enteral feeding. Patient s spouse, Alana, Requested another visit for tube, feeding instruction. Author went over teaching sheets in folder (filling TF bag, getting rid of air, loading tubing, changing rate on pump, priming, and how to start feeding, flushing prior to and after feeds. Spouse was able to return demonstrate proper tube feeding. Author informed spouse and son about [facility] website, went over alarms and trouble shooting.  Spouse was able to verbalize proper steps for flushing and medication, administration, G-tube care. Spouse and son feel comfortable moving forward independently with two feedings.. Next visit plan Pt, spouse and son feel comfortable moving forward independently with tube feedings. .       Yasmin Britt RN 11/16/24

## 2024-11-16 NOTE — PLAN OF CARE
Physical Therapy Discharge Summary    Reason for therapy discharge:    Discharged to home with home therapy.    Progress towards therapy goal(s). See goals on Care Plan in [facility] electronic health record for goal details.  Goals partially met.  Barriers to achieving goals:   discharge from facility.    Therapy recommendation(s):    Continued therapy is recommended.  Rationale/Recommendations:  to address functional mobility.

## 2024-11-16 NOTE — PLAN OF CARE
"Speech Language Therapy Discharge Summary    Reason for therapy discharge:    Discharged to home with home therapy.    Progress towards therapy goal(s). See goals on Care Plan in [facility] electronic health record for goal details.  Goals not met.  Barriers to achieving goals:   discharge from facility.    Therapy recommendation(s):    Continued therapy is recommended.  Rationale/Recommendations:  Pt will benefit from ongoing SLP for dysphagia management.  Pt was seen most recently by SLP 11/13/24 with note indicating \"Patient now with FT. Continue NPO except for ice chips for comfort and practice swallowing. Continues to need good oral hygiene\".                            "

## 2024-11-16 NOTE — PROGRESS NOTES
Education Provided:     Patient Education focused on Enteral teaching . Next visit plan Saturday November 16th, for another Enteral review with spouse and son..       Henna Badillo RN 11/15/24

## 2024-11-18 ENCOUNTER — TELEPHONE (OUTPATIENT)
Dept: INTERNAL MEDICINE | Facility: CLINIC | Age: 79
End: 2024-11-18
Payer: MEDICARE

## 2024-11-18 ENCOUNTER — HOME INFUSION BILLING (OUTPATIENT)
Dept: HOME HEALTH SERVICES | Facility: HOME HEALTH | Age: 79
End: 2024-11-18
Payer: MEDICARE

## 2024-11-18 ENCOUNTER — PATIENT OUTREACH (OUTPATIENT)
Dept: INTERNAL MEDICINE | Facility: CLINIC | Age: 79
End: 2024-11-18
Payer: MEDICARE

## 2024-11-18 NOTE — TELEPHONE ENCOUNTER
Home Care is calling regarding an established patient with [facility].       Requesting orders from: Gladys Vazquez  Provider is following patient: Yes  Is this a 60-day recertification request?  No    Orders Requested    Skilled Nursing  Request for initial certification (first set of orders)   Frequency:  2x for 1 week, then 1x a week for 8 weeks, and 3 PRNs    Physical Therapy  Request for initial evaluation and treatment (one time)     Occupational Therapy  Request for initial evaluation and treatment (one time)     Speech Therapy  Request for initial evaluation and treatment (one time)     Social Work  Request for initial evaluation and treatment (one time)     HHA (Home Health Aide)  Request for initial certification (first set of orders)   Frequency:  1x a week for 8 weeks    Pt is asking for catheter place for long term. Pt has neobladder and would need special catheter (incontinent and recurrent infections).   RN reports fall on 11/16/24, no injuries, slid out of bed on onto his bottom.     RN reporting his G-tube site looks great but looking for wound care orders for cleansing with wound cleanser, pat dry with gauze, cover with split gauze to be completed by caregiver on non-visitation days to monitor.     Verbal orders given for PT, OT, Speech Therapy, SW.  Information was gathered for SN, HHA.  Provider review needed.  RN will contact Home Care with information after provider review.  Confirmed ok to leave a detailed message with call back.  Contact information confirmed and updated as needed.    Charlette Auguste, KEVIN

## 2024-11-18 NOTE — PROGRESS NOTES
Nutrition Follow Up      Received message from field RN regarding size of feeding bag.  Current bag is the largest bag available.    Tried to reach pt today to discuss this and for TF assessment/review.       FHI RD left voice mail and Tiger Text messages with pt requesting return call or text    Molly Siemens, RD, CNSC, LD  [facility] Dietitian

## 2024-11-18 NOTE — TELEPHONE ENCOUNTER
Patient Contact    Attempt # 1 to Home Care    Was call answered?  No.  Unable to leave message. VM not set up.

## 2024-11-19 ENCOUNTER — TELEPHONE (OUTPATIENT)
Dept: CARDIOLOGY | Facility: CLINIC | Age: 79
End: 2024-11-19
Payer: MEDICARE

## 2024-11-19 ENCOUNTER — DOCUMENTATION ONLY (OUTPATIENT)
Dept: OTHER | Facility: CLINIC | Age: 79
End: 2024-11-19
Payer: MEDICARE

## 2024-11-19 NOTE — PROGRESS NOTES
[facility] Nutrition Follow up       Current Nutrition Orders  Oral Diet: NPO    Enteral Nutrition Orders  Enteral Formula: Jevity 1.5  Rate/Frequency: 110ml/hr X 12 hrs/day ( 5.5 cartons/day)  Water Flushes: 150ml q 4 hours or 6x/day  Enteral Nutrition Provides: 1980 cals (25 cals/kg), 84 gm pro (1.1 gm/kg), 28 gm fiber, 1003 mL free water + flushes    Implementation  Intervention: Spoke with pt's spouse today for f/up.  She is tearful due to pt's urinary issues.  She is hoping to receive some help during RN visit today.  TFs are going well.  They have new pump as feeds were infusing faster than 12 hrs/day.  Discussed feeding pumps having +/- 10% variance so if the 5.5 cartons infuse a littler faster or slower, that is expected.  Pt has not had BM since prior to admit as far as she knows.  Advised to begin stool softener today.  Reviewed water flushes and hydration needs as well  Education: Water flushes/hydration needs.  Crush, thoroughly dissolve stools softener in water before administering via feeding tube    Plan  Follow up/Recommendations: Continue current TFs and water flushes. Begin stool softener.  Spouse to review urinary concerns with RN/MD    Molly Siemens, RD, CNSC, LD  [facility] Dietitian

## 2024-11-19 NOTE — TELEPHONE ENCOUNTER
Tatyana ROMAN calling to report that wife is very upset and overwhelmed with patient's incontinence. She is refusing ED or UC despite RN and supervisior advising. Gave Tatyana message below that long term catheter is not approved by Dr. Vazquez. Writer will call wife and inform that per Dr. Vazquez neobladders are continent so catheter cannot be approved.     Triage Patient Outreach    Attempt # 1    Was call answered?  No.  Left voicemail to return call to Triage at [facility]      No voice mail for Cat unable to take verbal orders. Advised writer to call home care approval to main number at [phone] and speak with supervisor. Left voice mail for  Rebekah BROWN.   Ree Valdez RN

## 2024-11-19 NOTE — TELEPHONE ENCOUNTER
Patient was admitted to [facility] on 11/8/24 for shortness of breath, weakness, difficulty swallowing and fatigue. Troponin was elevated. He is currently being treated for acute cystitis.    PMH: aortic insufficiency, coronary artery disease, HTN, HLD, hypothyroidism.      11/9/24: Echo showed EF of 60-65% with no WMA    11/11/24: Coronary angiogram via RRA showed mild CAD.    Plan for OP cMRI.    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 He had a percutaneous G tube placed and was started on nocturnal tube feeds due to severe dysphagia. Pt seeking second opinion at [facility]. He would like to remain home for as long as possible. He was given information about hospice and referral was made to palliative care.    Pt was started on Rilutek (for ALS) at time of discharge.    Pt is scheduled for a cMRI on 12/4/24 at 1430, and an OV on 12/16/24 at 1430, both at our [place] location.      Writer attempted to call pt for a cardiology post discharge phone call, but no answer. VM left to call back with any non emergent cardiac related questions. Reminder left that the RRA access site should be healing without signs of infection, swelling or bleeding. Reminder left of appt's as scheduled above. Writer's phone number was provided. EDDY Thakur RN.

## 2024-11-20 NOTE — TELEPHONE ENCOUNTER
Relayed providers message to KEVIN Hartmann. Mary Kate agrees, states she was passing on patient's request for the catheter although she didn't think it would be approved. No further questions at this time.

## 2024-11-21 PROBLEM — N30.01 ACUTE CYSTITIS WITH HEMATURIA: Status: RESOLVED | Noted: 2024-11-08 | Resolved: 2024-11-21

## 2024-11-21 PROBLEM — R53.1 GENERALIZED WEAKNESS: Status: RESOLVED | Noted: 2024-11-08 | Resolved: 2024-11-21

## 2024-11-21 PROBLEM — R79.89 ELEVATED TROPONIN: Status: RESOLVED | Noted: 2024-11-08 | Resolved: 2024-11-21

## 2024-11-21 PROBLEM — M25.641 STIFFNESS OF JOINT, HAND, RIGHT: Status: RESOLVED | Noted: 2024-11-06 | Resolved: 2024-11-21

## 2024-11-22 ENCOUNTER — PATIENT OUTREACH (OUTPATIENT)
Dept: CARE COORDINATION | Facility: CLINIC | Age: 79
End: 2024-11-22

## 2024-11-22 NOTE — PROGRESS NOTES
Clinic Care Coordination Contact  [facility]/Voicemail    Clinical Data: Care Coordinator Outreach    Outreach Documentation Number of Outreach Attempt   11/22/2024   3:55 PM 1       Left message on patient's voicemail with call back information and requested return call.  Asked her for a call back if they need additional support with arrangements for hospice.        Plan:   [facility]  contacted [facility] who received an Emergency Hospice referral from PCP  prior to calling Patient.   Kirstin at [facility] stated she had just gotten off of the phone with Steven's spouse and they plan to admit him to hospice on Monday.       CC left message for spouse asking they call her if they need additional support.       SAMUEL Onofre   Care Coordination Team  [phone]

## 2024-11-23 ENCOUNTER — TELEPHONE (OUTPATIENT)
Dept: HOME HEALTH SERVICES | Facility: HOME HEALTH | Age: 79
End: 2024-11-23
Payer: MEDICARE

## 2024-11-24 NOTE — TELEPHONE ENCOUNTER
"Spouse called very frustrated, crying  and angry that the feeding pump is not working again.  \"I just shut it off!  I can't handle all this\" .  Despite calmly reviewing all the steps and using the laminated card the pump returned to the rate screen and would only run for a minute before it would alarm again.    [facility] Decision Pace Helpline given to patient [phone]  Instructed her to return call to [facility] if still unable to get pump to run successfully.     Ana Bush RN  [facility]  [email or web address]  [phone]   "

## 2024-11-25 ENCOUNTER — TELEPHONE (OUTPATIENT)
Dept: INTERNAL MEDICINE | Facility: CLINIC | Age: 79
End: 2024-11-25
Payer: MEDICARE

## 2024-11-25 ENCOUNTER — LAB REQUISITION (OUTPATIENT)
Dept: LAB | Facility: CLINIC | Age: 79
End: 2024-11-25
Payer: MEDICARE

## 2024-11-25 DIAGNOSIS — R39.9 LOWER URINARY TRACT SYMPTOMS (LUTS): Primary | ICD-10-CM

## 2024-11-25 DIAGNOSIS — N30.01 ACUTE CYSTITIS WITH HEMATURIA: ICD-10-CM

## 2024-11-25 LAB
ALBUMIN UR-MCNC: 50 MG/DL
APPEARANCE UR: ABNORMAL
BILIRUB UR QL STRIP: NEGATIVE
COLOR UR AUTO: YELLOW
GLUCOSE UR STRIP-MCNC: NEGATIVE MG/DL
HGB UR QL STRIP: ABNORMAL
KETONES UR STRIP-MCNC: NEGATIVE MG/DL
LEUKOCYTE ESTERASE UR QL STRIP: ABNORMAL
MUCOUS THREADS #/AREA URNS LPF: PRESENT /LPF
NITRATE UR QL: NEGATIVE
PH UR STRIP: 7.5 [PH] (ref 5–7)
RBC URINE: 7 /HPF
SP GR UR STRIP: 1.01 (ref 1–1.03)
TRI-PHOS CRY #/AREA URNS HPF: ABNORMAL /HPF
UROBILINOGEN UR STRIP-MCNC: NORMAL MG/DL
WBC URINE: >182 /HPF

## 2024-11-25 PROCEDURE — 87086 URINE CULTURE/COLONY COUNT: CPT | Mod: ORL | Performed by: INTERNAL MEDICINE

## 2024-11-25 PROCEDURE — 81001 URINALYSIS AUTO W/SCOPE: CPT | Mod: ORL | Performed by: INTERNAL MEDICINE

## 2024-11-25 RX ORDER — SULFAMETHOXAZOLE AND TRIMETHOPRIM 800; 160 MG/1; MG/1
1 TABLET ORAL 2 TIMES DAILY
Qty: 28 TABLET | Refills: 0 | Status: SHIPPED | OUTPATIENT
Start: 2024-11-25 | End: 2024-12-09

## 2024-11-25 NOTE — TELEPHONE ENCOUNTER
Home care nurse states placement of snowden catheter was discussed during VV 11-22. Home care is in the home now but needs verbal order for placement. Dr. Vazquez not currently available. Received verbal approval per Dr. Mohamud and RN will place now.     BP systolic consistently under 100 since start of care last week. Per Home care reading today blood pressure 94/48. RN is asking for hold order for lisinopril. OK to hold?      Hospice home evaluation scheduled for tonight at 5 PM.     Please call home care nurse and wife with PCP recommendation regarding lisinopril. Confirmed valid consent to communicate on file for wife.     Josefina Turpin RN

## 2024-11-25 NOTE — TELEPHONE ENCOUNTER
Relayed providers response to Rebekah.   Rebekah will relay medication change to family, and sp was successfully placed.

## 2024-11-26 ENCOUNTER — TELEPHONE (OUTPATIENT)
Dept: INTERNAL MEDICINE | Facility: CLINIC | Age: 79
End: 2024-11-26
Payer: MEDICARE

## 2024-11-26 LAB
ACHR BIND IGG+IGM SER IA-SCNC: 2.11 NMOL/L
ACHR MODULATING FLOW CYTOMETRY, S: POSITIVE
IMMUNOLOGIST REVIEW: ABNORMAL

## 2024-11-26 NOTE — TELEPHONE ENCOUNTER
Gladys Vazquez MD  [facility] - Primary Care  Please discuss results with patient (see result note).    *NOTE*: I think patient may have started hospice last night.    Thank you,    Livan         Abnormal UA. Prescribing Bactrim. Culture pending.   Written by Gladys Vazquez MD on 11/25/2024  4:46 PM CST

## 2024-11-27 ENCOUNTER — MEDICAL CORRESPONDENCE (OUTPATIENT)
Dept: HEALTH INFORMATION MANAGEMENT | Facility: CLINIC | Age: 79
End: 2024-11-27
Payer: MEDICARE

## 2024-11-27 ENCOUNTER — TELEPHONE (OUTPATIENT)
Dept: INTERNAL MEDICINE | Facility: CLINIC | Age: 79
End: 2024-11-27
Payer: MEDICARE

## 2024-11-27 DIAGNOSIS — Z53.9 DIAGNOSIS NOT YET DEFINED: Primary | ICD-10-CM

## 2024-11-27 LAB — BACTERIA UR CULT: NORMAL

## 2024-11-27 PROCEDURE — G0180 MD CERTIFICATION HHA PATIENT: HCPCS | Performed by: INTERNAL MEDICINE

## 2024-11-27 NOTE — TELEPHONE ENCOUNTER
Spoke with patient's wife but she was having a hard time hearing so discussed results with son, Jason. After further chart review final UC came back in early this morning and result note per Dr. Vazquez is to discontinue antibiotic therapy due to now growth but patient has not started this medication. Hospice nurse expected to arrive shortly. No current concerns or questions per son. Advised to call back if we can assist patient/family in any way. He agrees with plan.     Josefina Turpin RN

## 2024-11-28 ENCOUNTER — HOME INFUSION (OUTPATIENT)
Dept: HOME HEALTH SERVICES | Facility: HOME HEALTH | Age: 79
End: 2024-11-28
Payer: MEDICARE

## 2024-11-28 NOTE — TELEPHONE ENCOUNTER
Discussed results with neurology, Dr. Ashley.    His myasthenia gravis panel results have come back.    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 He contacted the patient and his wife to discuss this.    They will consider whether or not they would like to cancel their current hospice care and come into the hospital for a trial of IVIG.

## 2024-11-29 ENCOUNTER — HOME INFUSION BILLING (OUTPATIENT)
Dept: HOME HEALTH SERVICES | Facility: HOME HEALTH | Age: 79
End: 2024-11-29
Payer: MEDICARE

## 2024-11-29 PROCEDURE — B4035 ENTERAL FEED SUPP PUMP PER D: HCPCS

## 2024-11-29 PROCEDURE — B4152 EF CALORIE DENSE>/=1.5KCAL: HCPCS

## 2024-11-30 ENCOUNTER — APPOINTMENT (OUTPATIENT)
Dept: GENERAL RADIOLOGY | Facility: CLINIC | Age: 79
End: 2024-11-30
Attending: SOCIAL WORKER
Payer: MEDICARE

## 2024-11-30 ENCOUNTER — APPOINTMENT (OUTPATIENT)
Dept: GENERAL RADIOLOGY | Facility: CLINIC | Age: 79
DRG: 698 | End: 2024-11-30
Attending: SOCIAL WORKER
Payer: MEDICARE

## 2024-11-30 ENCOUNTER — HOSPITAL ENCOUNTER (INPATIENT)
Facility: CLINIC | Age: 79
End: 2024-11-30
Attending: SOCIAL WORKER | Admitting: SURGERY
Payer: MEDICARE

## 2024-11-30 DIAGNOSIS — J96.01 ACUTE RESPIRATORY FAILURE WITH HYPOXIA (H): ICD-10-CM

## 2024-11-30 DIAGNOSIS — G70.01 MYASTHENIC CRISIS (H): ICD-10-CM

## 2024-11-30 DIAGNOSIS — N17.9 ACUTE KIDNEY INJURY: ICD-10-CM

## 2024-11-30 DIAGNOSIS — Z87.81 STATUS POST OPEN REDUCTION WITH INTERNAL FIXATION OF FRACTURE: ICD-10-CM

## 2024-11-30 DIAGNOSIS — R57.9 SHOCK (H): ICD-10-CM

## 2024-11-30 DIAGNOSIS — J96.01 ACUTE HYPOXIC RESPIRATORY FAILURE (H): Primary | ICD-10-CM

## 2024-11-30 DIAGNOSIS — E87.5 HYPERKALEMIA: ICD-10-CM

## 2024-11-30 DIAGNOSIS — E78.5 HYPERLIPIDEMIA, UNSPECIFIED HYPERLIPIDEMIA TYPE: Chronic | ICD-10-CM

## 2024-11-30 DIAGNOSIS — Z98.890 STATUS POST OPEN REDUCTION WITH INTERNAL FIXATION OF FRACTURE: ICD-10-CM

## 2024-11-30 PROBLEM — K94.20 COMPLICATION OF FEEDING TUBE (H): Status: ACTIVE | Noted: 2024-11-30

## 2024-11-30 LAB
ALBUMIN SERPL BCG-MCNC: 3.2 G/DL (ref 3.5–5.2)
ALP SERPL-CCNC: 115 U/L (ref 40–150)
ALT SERPL W P-5'-P-CCNC: 48 U/L (ref 0–70)
ANION GAP SERPL CALCULATED.3IONS-SCNC: 15 MMOL/L (ref 7–15)
ANION GAP SERPL CALCULATED.3IONS-SCNC: 17 MMOL/L (ref 7–15)
AST SERPL W P-5'-P-CCNC: 23 U/L (ref 0–45)
BASE EXCESS BLDV CALC-SCNC: 1 MMOL/L (ref -3–3)
BASOPHILS # BLD AUTO: 0.1 10E3/UL (ref 0–0.2)
BASOPHILS NFR BLD AUTO: 1 %
BILIRUB SERPL-MCNC: 0.5 MG/DL
BUN SERPL-MCNC: 185.8 MG/DL (ref 8–23)
BUN SERPL-MCNC: 200.7 MG/DL (ref 8–23)
CALCIUM SERPL-MCNC: 10.2 MG/DL (ref 8.8–10.4)
CALCIUM SERPL-MCNC: 11.1 MG/DL (ref 8.8–10.4)
CHLORIDE SERPL-SCNC: 110 MMOL/L (ref 98–107)
CHLORIDE SERPL-SCNC: 113 MMOL/L (ref 98–107)
CREAT SERPL-MCNC: 2.95 MG/DL (ref 0.67–1.17)
CREAT SERPL-MCNC: 3.2 MG/DL (ref 0.67–1.17)
EGFRCR SERPLBLD CKD-EPI 2021: 19 ML/MIN/1.73M2
EGFRCR SERPLBLD CKD-EPI 2021: 21 ML/MIN/1.73M2
EOSINOPHIL # BLD AUTO: 0.2 10E3/UL (ref 0–0.7)
EOSINOPHIL NFR BLD AUTO: 1 %
ERYTHROCYTE [DISTWIDTH] IN BLOOD BY AUTOMATED COUNT: 13.5 % (ref 10–15)
GLUCOSE BLDC GLUCOMTR-MCNC: 106 MG/DL (ref 70–99)
GLUCOSE SERPL-MCNC: 124 MG/DL (ref 70–99)
GLUCOSE SERPL-MCNC: 138 MG/DL (ref 70–99)
HCO3 BLDV-SCNC: 27 MMOL/L (ref 21–28)
HCO3 SERPL-SCNC: 22 MMOL/L (ref 22–29)
HCO3 SERPL-SCNC: 24 MMOL/L (ref 22–29)
HCT VFR BLD AUTO: 46.5 % (ref 40–53)
HGB BLD-MCNC: 15.1 G/DL (ref 13.3–17.7)
HOLD SPECIMEN: NORMAL
IMM GRANULOCYTES # BLD: 0.6 10E3/UL
IMM GRANULOCYTES NFR BLD: 5 %
LACTATE BLD-SCNC: 2 MMOL/L
LYMPHOCYTES # BLD AUTO: 1.7 10E3/UL (ref 0.8–5.3)
LYMPHOCYTES NFR BLD AUTO: 13 %
MCH RBC QN AUTO: 33.9 PG (ref 26.5–33)
MCHC RBC AUTO-ENTMCNC: 32.5 G/DL (ref 31.5–36.5)
MCV RBC AUTO: 105 FL (ref 78–100)
MONOCYTES # BLD AUTO: 1.1 10E3/UL (ref 0–1.3)
MONOCYTES NFR BLD AUTO: 8 %
NEUTROPHILS # BLD AUTO: 9.6 10E3/UL (ref 1.6–8.3)
NEUTROPHILS NFR BLD AUTO: 72 %
NRBC # BLD AUTO: 0 10E3/UL
NRBC BLD AUTO-RTO: 0 /100
PCO2 BLDV: 50 MM HG (ref 40–50)
PH BLDV: 7.34 [PH] (ref 7.32–7.43)
PLATELET # BLD AUTO: 406 10E3/UL (ref 150–450)
PO2 BLDV: 33 MM HG (ref 25–47)
POTASSIUM SERPL-SCNC: 5.8 MMOL/L (ref 3.4–5.3)
POTASSIUM SERPL-SCNC: 6.5 MMOL/L (ref 3.4–5.3)
PROT SERPL-MCNC: 6.4 G/DL (ref 6.4–8.3)
RBC # BLD AUTO: 4.45 10E6/UL (ref 4.4–5.9)
SAO2 % BLDV: 60 % (ref 70–75)
SODIUM SERPL-SCNC: 150 MMOL/L (ref 135–145)
SODIUM SERPL-SCNC: 151 MMOL/L (ref 135–145)
TROPONIN T SERPL HS-MCNC: 836 NG/L
TROPONIN T SERPL HS-MCNC: 997 NG/L
WBC # BLD AUTO: 13.3 10E3/UL (ref 4–11)

## 2024-11-30 PROCEDURE — 94002 VENT MGMT INPAT INIT DAY: CPT

## 2024-11-30 PROCEDURE — 84484 ASSAY OF TROPONIN QUANT: CPT | Performed by: SOCIAL WORKER

## 2024-11-30 PROCEDURE — 71045 X-RAY EXAM CHEST 1 VIEW: CPT

## 2024-11-30 PROCEDURE — 250N000011 HC RX IP 250 OP 636: Mod: JZ | Performed by: SOCIAL WORKER

## 2024-11-30 PROCEDURE — B4035 ENTERAL FEED SUPP PUMP PER D: HCPCS

## 2024-11-30 PROCEDURE — 82040 ASSAY OF SERUM ALBUMIN: CPT | Performed by: SOCIAL WORKER

## 2024-11-30 PROCEDURE — 82962 GLUCOSE BLOOD TEST: CPT

## 2024-11-30 PROCEDURE — 94660 CPAP INITIATION&MGMT: CPT

## 2024-11-30 PROCEDURE — 82247 BILIRUBIN TOTAL: CPT | Performed by: SOCIAL WORKER

## 2024-11-30 PROCEDURE — 36415 COLL VENOUS BLD VENIPUNCTURE: CPT | Performed by: SOCIAL WORKER

## 2024-11-30 PROCEDURE — 99291 CRITICAL CARE FIRST HOUR: CPT | Mod: 25

## 2024-11-30 PROCEDURE — 96365 THER/PROPH/DIAG IV INF INIT: CPT

## 2024-11-30 PROCEDURE — 96375 TX/PRO/DX INJ NEW DRUG ADDON: CPT

## 2024-11-30 PROCEDURE — 82803 BLOOD GASES ANY COMBINATION: CPT

## 2024-11-30 PROCEDURE — 94640 AIRWAY INHALATION TREATMENT: CPT

## 2024-11-30 PROCEDURE — 5A1945Z RESPIRATORY VENTILATION, 24-96 CONSECUTIVE HOURS: ICD-10-PCS | Performed by: STUDENT IN AN ORGANIZED HEALTH CARE EDUCATION/TRAINING PROGRAM

## 2024-11-30 PROCEDURE — 250N000009 HC RX 250: Performed by: SOCIAL WORKER

## 2024-11-30 PROCEDURE — 999N000157 HC STATISTIC RCP TIME EA 10 MIN

## 2024-11-30 PROCEDURE — 93005 ELECTROCARDIOGRAM TRACING: CPT | Mod: 76

## 2024-11-30 PROCEDURE — 200N000001 HC R&B ICU

## 2024-11-30 PROCEDURE — 999N000065 XR CHEST PORT 1 VIEW

## 2024-11-30 PROCEDURE — 31500 INSERT EMERGENCY AIRWAY: CPT

## 2024-11-30 PROCEDURE — 80048 BASIC METABOLIC PNL TOTAL CA: CPT | Performed by: SOCIAL WORKER

## 2024-11-30 PROCEDURE — 85004 AUTOMATED DIFF WBC COUNT: CPT | Performed by: SOCIAL WORKER

## 2024-11-30 PROCEDURE — 87040 BLOOD CULTURE FOR BACTERIA: CPT | Performed by: SOCIAL WORKER

## 2024-11-30 PROCEDURE — 96366 THER/PROPH/DIAG IV INF ADDON: CPT

## 2024-11-30 PROCEDURE — 258N000003 HC RX IP 258 OP 636: Performed by: SOCIAL WORKER

## 2024-11-30 PROCEDURE — 93005 ELECTROCARDIOGRAM TRACING: CPT

## 2024-11-30 RX ORDER — KETAMINE HYDROCHLORIDE 10 MG/ML
100 INJECTION, SOLUTION INTRAMUSCULAR; INTRAVENOUS ONCE
Status: COMPLETED | OUTPATIENT
Start: 2024-11-30 | End: 2024-11-30

## 2024-11-30 RX ORDER — FENTANYL CITRATE-0.9 % NACL/PF 10 MCG/ML
100 PLASTIC BAG, INJECTION (ML) INTRAVENOUS ONCE
Status: COMPLETED | OUTPATIENT
Start: 2024-11-30 | End: 2024-11-30

## 2024-11-30 RX ORDER — PIPERACILLIN SODIUM, TAZOBACTAM SODIUM 3; .375 G/15ML; G/15ML
3.38 INJECTION, POWDER, LYOPHILIZED, FOR SOLUTION INTRAVENOUS ONCE
Status: COMPLETED | OUTPATIENT
Start: 2024-11-30 | End: 2024-12-01

## 2024-11-30 RX ORDER — ALBUTEROL SULFATE 5 MG/ML
10 SOLUTION RESPIRATORY (INHALATION) ONCE
Status: COMPLETED | OUTPATIENT
Start: 2024-11-30 | End: 2024-11-30

## 2024-11-30 RX ORDER — ROPIVACAINE IN 0.9% SOD CHL/PF 0.1 %
.01-.125 PLASTIC BAG, INJECTION (ML) EPIDURAL CONTINUOUS
Status: DISCONTINUED | OUTPATIENT
Start: 2024-11-30 | End: 2024-12-01

## 2024-11-30 RX ORDER — MIDAZOLAM HCL IN 0.9 % NACL/PF 1 MG/ML
1-8 PLASTIC BAG, INJECTION (ML) INTRAVENOUS CONTINUOUS
Status: DISCONTINUED | OUTPATIENT
Start: 2024-11-30 | End: 2024-12-02

## 2024-11-30 RX ORDER — CALCIUM GLUCONATE 20 MG/ML
1 INJECTION, SOLUTION INTRAVENOUS ONCE
Status: COMPLETED | OUTPATIENT
Start: 2024-11-30 | End: 2024-11-30

## 2024-11-30 RX ORDER — NICOTINE POLACRILEX 4 MG
15-30 LOZENGE BUCCAL
Status: DISCONTINUED | OUTPATIENT
Start: 2024-11-30 | End: 2024-12-11 | Stop reason: HOSPADM

## 2024-11-30 RX ORDER — DEXTROSE MONOHYDRATE 25 G/50ML
25-50 INJECTION, SOLUTION INTRAVENOUS
Status: DISCONTINUED | OUTPATIENT
Start: 2024-11-30 | End: 2024-12-11 | Stop reason: HOSPADM

## 2024-11-30 RX ADMIN — SODIUM CHLORIDE 1000 ML: 9 INJECTION, SOLUTION INTRAVENOUS at 21:15

## 2024-11-30 RX ADMIN — CALCIUM GLUCONATE 1 G: 20 INJECTION, SOLUTION INTRAVENOUS at 22:56

## 2024-11-30 RX ADMIN — MIDAZOLAM HYDROCHLORIDE 4 MG/HR: 1 INJECTION, SOLUTION INTRAVENOUS at 23:49

## 2024-11-30 RX ADMIN — SODIUM CHLORIDE 1000 ML: 9 INJECTION, SOLUTION INTRAVENOUS at 22:59

## 2024-11-30 RX ADMIN — Medication 100 MCG: at 20:42

## 2024-11-30 RX ADMIN — ALBUTEROL SULFATE 10 MG: 2.5 SOLUTION RESPIRATORY (INHALATION) at 22:24

## 2024-11-30 RX ADMIN — ROCURONIUM BROMIDE 40 MG: 10 INJECTION, SOLUTION INTRAVENOUS at 23:27

## 2024-11-30 RX ADMIN — KETAMINE HYDROCHLORIDE 100 MG: 10 INJECTION INTRAMUSCULAR; INTRAVENOUS at 23:47

## 2024-11-30 RX ADMIN — NOREPINEPHRINE BITARTRATE 0.05 MCG/KG/MIN: 0.02 INJECTION, SOLUTION INTRAVENOUS at 20:43

## 2024-11-30 ASSESSMENT — ACTIVITIES OF DAILY LIVING (ADL)
ADLS_ACUITY_SCORE: 56

## 2024-11-30 ASSESSMENT — COLUMBIA-SUICIDE SEVERITY RATING SCALE - C-SSRS
2. HAVE YOU ACTUALLY HAD ANY THOUGHTS OF KILLING YOURSELF IN THE PAST MONTH?: NO
1. IN THE PAST MONTH, HAVE YOU WISHED YOU WERE DEAD OR WISHED YOU COULD GO TO SLEEP AND NOT WAKE UP?: NO
6. HAVE YOU EVER DONE ANYTHING, STARTED TO DO ANYTHING, OR PREPARED TO DO ANYTHING TO END YOUR LIFE?: NO

## 2024-12-01 ENCOUNTER — APPOINTMENT (OUTPATIENT)
Dept: GENERAL RADIOLOGY | Facility: CLINIC | Age: 79
End: 2024-12-01
Attending: SURGERY
Payer: MEDICARE

## 2024-12-01 ENCOUNTER — APPOINTMENT (OUTPATIENT)
Dept: CT IMAGING | Facility: CLINIC | Age: 79
End: 2024-12-01
Attending: SOCIAL WORKER
Payer: MEDICARE

## 2024-12-01 ENCOUNTER — APPOINTMENT (OUTPATIENT)
Dept: CARDIOLOGY | Facility: CLINIC | Age: 79
DRG: 698 | End: 2024-12-01
Attending: STUDENT IN AN ORGANIZED HEALTH CARE EDUCATION/TRAINING PROGRAM
Payer: MEDICARE

## 2024-12-01 LAB
ALBUMIN SERPL BCG-MCNC: 3 G/DL (ref 3.5–5.2)
ALBUMIN UR-MCNC: 30 MG/DL
ALP SERPL-CCNC: 104 U/L (ref 40–150)
ALT SERPL W P-5'-P-CCNC: 44 U/L (ref 0–70)
ANION GAP SERPL CALCULATED.3IONS-SCNC: 12 MMOL/L (ref 7–15)
ANION GAP SERPL CALCULATED.3IONS-SCNC: 13 MMOL/L (ref 7–15)
ANION GAP SERPL CALCULATED.3IONS-SCNC: 9 MMOL/L (ref 7–15)
APPEARANCE UR: ABNORMAL
APTT PPP: 23 SECONDS (ref 22–38)
AST SERPL W P-5'-P-CCNC: 26 U/L (ref 0–45)
ATRIAL RATE - MUSE: NORMAL BPM
BASE EXCESS BLDV CALC-SCNC: -1.6 MMOL/L (ref -3–3)
BASE EXCESS BLDV CALC-SCNC: -3 MMOL/L (ref -3–3)
BILIRUB SERPL-MCNC: 0.5 MG/DL
BILIRUB UR QL STRIP: NEGATIVE
BUN SERPL-MCNC: 134.9 MG/DL (ref 8–23)
BUN SERPL-MCNC: 156 MG/DL (ref 8–23)
BUN SERPL-MCNC: 181.8 MG/DL (ref 8–23)
CA-I BLD-MCNC: 5.7 MG/DL (ref 4.4–5.2)
CALCIUM SERPL-MCNC: 10 MG/DL (ref 8.8–10.4)
CALCIUM SERPL-MCNC: 9.2 MG/DL (ref 8.8–10.4)
CALCIUM SERPL-MCNC: 9.9 MG/DL (ref 8.8–10.4)
CHLORIDE SERPL-SCNC: 117 MMOL/L (ref 98–107)
CHLORIDE SERPL-SCNC: 118 MMOL/L (ref 98–107)
CHLORIDE SERPL-SCNC: 119 MMOL/L (ref 98–107)
COLOR UR AUTO: ABNORMAL
CREAT SERPL-MCNC: 2.23 MG/DL (ref 0.67–1.17)
CREAT SERPL-MCNC: 2.39 MG/DL (ref 0.67–1.17)
CREAT SERPL-MCNC: 2.79 MG/DL (ref 0.67–1.17)
DIASTOLIC BLOOD PRESSURE - MUSE: NORMAL MMHG
EGFRCR SERPLBLD CKD-EPI 2021: 22 ML/MIN/1.73M2
EGFRCR SERPLBLD CKD-EPI 2021: 27 ML/MIN/1.73M2
EGFRCR SERPLBLD CKD-EPI 2021: 29 ML/MIN/1.73M2
ERYTHROCYTE [DISTWIDTH] IN BLOOD BY AUTOMATED COUNT: 13.4 % (ref 10–15)
FIBRINOGEN PPP-MCNC: 460 MG/DL (ref 170–510)
GLUCOSE BLDC GLUCOMTR-MCNC: 118 MG/DL (ref 70–99)
GLUCOSE BLDC GLUCOMTR-MCNC: 133 MG/DL (ref 70–99)
GLUCOSE BLDC GLUCOMTR-MCNC: 140 MG/DL (ref 70–99)
GLUCOSE BLDC GLUCOMTR-MCNC: 165 MG/DL (ref 70–99)
GLUCOSE BLDC GLUCOMTR-MCNC: 166 MG/DL (ref 70–99)
GLUCOSE BLDC GLUCOMTR-MCNC: 252 MG/DL (ref 70–99)
GLUCOSE SERPL-MCNC: 133 MG/DL (ref 70–99)
GLUCOSE SERPL-MCNC: 134 MG/DL (ref 70–99)
GLUCOSE SERPL-MCNC: 178 MG/DL (ref 70–99)
GLUCOSE UR STRIP-MCNC: NEGATIVE MG/DL
HCO3 BLDV-SCNC: 23 MMOL/L (ref 21–28)
HCO3 BLDV-SCNC: 25 MMOL/L (ref 21–28)
HCO3 SERPL-SCNC: 22 MMOL/L (ref 22–29)
HCO3 SERPL-SCNC: 22 MMOL/L (ref 22–29)
HCO3 SERPL-SCNC: 23 MMOL/L (ref 22–29)
HCT VFR BLD AUTO: 41.7 % (ref 40–53)
HGB BLD-MCNC: 13.3 G/DL (ref 13.3–17.7)
HGB UR QL STRIP: ABNORMAL
INR PPP: 1.32 (ref 0.85–1.15)
INTERPRETATION ECG - MUSE: NORMAL
KETONES UR STRIP-MCNC: NEGATIVE MG/DL
LACTATE SERPL-SCNC: 0.9 MMOL/L (ref 0.7–2)
LACTATE SERPL-SCNC: 1 MMOL/L (ref 0.7–2)
LEUKOCYTE ESTERASE UR QL STRIP: ABNORMAL
MAGNESIUM SERPL-MCNC: 3.1 MG/DL (ref 1.7–2.3)
MCH RBC QN AUTO: 33.8 PG (ref 26.5–33)
MCHC RBC AUTO-ENTMCNC: 31.9 G/DL (ref 31.5–36.5)
MCV RBC AUTO: 106 FL (ref 78–100)
NITRATE UR QL: NEGATIVE
O2/TOTAL GAS SETTING VFR VENT: 40 %
O2/TOTAL GAS SETTING VFR VENT: 40 %
OXYHGB MFR BLDV: 80 % (ref 70–75)
OXYHGB MFR BLDV: 86 % (ref 70–75)
P AXIS - MUSE: NORMAL DEGREES
PCO2 BLDV: 45 MM HG (ref 40–50)
PCO2 BLDV: 49 MM HG (ref 40–50)
PH BLDV: 7.32 [PH] (ref 7.32–7.43)
PH BLDV: 7.32 [PH] (ref 7.32–7.43)
PH UR STRIP: 6.5 [PH] (ref 5–7)
PHOSPHATE SERPL-MCNC: 7.8 MG/DL (ref 2.5–4.5)
PLATELET # BLD AUTO: 389 10E3/UL (ref 150–450)
PO2 BLDV: 50 MM HG (ref 25–47)
PO2 BLDV: 57 MM HG (ref 25–47)
POTASSIUM SERPL-SCNC: 4.8 MMOL/L (ref 3.4–5.3)
POTASSIUM SERPL-SCNC: 5.3 MMOL/L (ref 3.4–5.3)
POTASSIUM SERPL-SCNC: 5.8 MMOL/L (ref 3.4–5.3)
POTASSIUM SERPL-SCNC: 5.8 MMOL/L (ref 3.4–5.3)
POTASSIUM SERPL-SCNC: 6 MMOL/L (ref 3.4–5.3)
PR INTERVAL - MUSE: NORMAL MS
PROT SERPL-MCNC: 5.7 G/DL (ref 6.4–8.3)
QRS DURATION - MUSE: 168 MS
QT - MUSE: 366 MS
QTC - MUSE: 392 MS
R AXIS - MUSE: 18 DEGREES
RBC # BLD AUTO: 3.94 10E6/UL (ref 4.4–5.9)
RBC URINE: 23 /HPF
SAO2 % BLDV: 81.4 % (ref 70–75)
SAO2 % BLDV: 87.6 % (ref 70–75)
SODIUM SERPL-SCNC: 150 MMOL/L (ref 135–145)
SODIUM SERPL-SCNC: 152 MMOL/L (ref 135–145)
SODIUM SERPL-SCNC: 153 MMOL/L (ref 135–145)
SP GR UR STRIP: 1.01 (ref 1–1.03)
SYSTOLIC BLOOD PRESSURE - MUSE: NORMAL MMHG
T AXIS - MUSE: 62 DEGREES
UROBILINOGEN UR STRIP-MCNC: NORMAL MG/DL
VENTRICULAR RATE- MUSE: 69 BPM
WBC # BLD AUTO: 12.1 10E3/UL (ref 4–11)
WBC CLUMPS #/AREA URNS HPF: PRESENT /HPF
WBC URINE: >182 /HPF

## 2024-12-01 PROCEDURE — 250N000013 HC RX MED GY IP 250 OP 250 PS 637: Performed by: PSYCHIATRY & NEUROLOGY

## 2024-12-01 PROCEDURE — 94640 AIRWAY INHALATION TREATMENT: CPT

## 2024-12-01 PROCEDURE — 81003 URINALYSIS AUTO W/O SCOPE: CPT | Performed by: SOCIAL WORKER

## 2024-12-01 PROCEDURE — 82805 BLOOD GASES W/O2 SATURATION: CPT | Performed by: STUDENT IN AN ORGANIZED HEALTH CARE EDUCATION/TRAINING PROGRAM

## 2024-12-01 PROCEDURE — 85014 HEMATOCRIT: CPT | Performed by: SURGERY

## 2024-12-01 PROCEDURE — 250N000011 HC RX IP 250 OP 636: Performed by: SOCIAL WORKER

## 2024-12-01 PROCEDURE — 80069 RENAL FUNCTION PANEL: CPT | Performed by: SOCIAL WORKER

## 2024-12-01 PROCEDURE — 258N000003 HC RX IP 258 OP 636: Performed by: STUDENT IN AN ORGANIZED HEALTH CARE EDUCATION/TRAINING PROGRAM

## 2024-12-01 PROCEDURE — 82805 BLOOD GASES W/O2 SATURATION: CPT | Performed by: SURGERY

## 2024-12-01 PROCEDURE — 999N000157 HC STATISTIC RCP TIME EA 10 MIN

## 2024-12-01 PROCEDURE — 84132 ASSAY OF SERUM POTASSIUM: CPT | Performed by: SOCIAL WORKER

## 2024-12-01 PROCEDURE — 84100 ASSAY OF PHOSPHORUS: CPT | Performed by: SURGERY

## 2024-12-01 PROCEDURE — 99291 CRITICAL CARE FIRST HOUR: CPT | Mod: 25 | Performed by: SURGERY

## 2024-12-01 PROCEDURE — 258N000003 HC RX IP 258 OP 636: Performed by: SURGERY

## 2024-12-01 PROCEDURE — 85610 PROTHROMBIN TIME: CPT | Performed by: SURGERY

## 2024-12-01 PROCEDURE — 85384 FIBRINOGEN ACTIVITY: CPT | Performed by: SURGERY

## 2024-12-01 PROCEDURE — 94003 VENT MGMT INPAT SUBQ DAY: CPT

## 2024-12-01 PROCEDURE — 83605 ASSAY OF LACTIC ACID: CPT | Performed by: STUDENT IN AN ORGANIZED HEALTH CARE EDUCATION/TRAINING PROGRAM

## 2024-12-01 PROCEDURE — 93321 DOPPLER ECHO F-UP/LMTD STD: CPT | Mod: 26 | Performed by: INTERNAL MEDICINE

## 2024-12-01 PROCEDURE — 250N000011 HC RX IP 250 OP 636: Performed by: ANESTHESIOLOGY

## 2024-12-01 PROCEDURE — 99292 CRITICAL CARE ADDL 30 MIN: CPT | Mod: 25 | Performed by: SURGERY

## 2024-12-01 PROCEDURE — 250N000009 HC RX 250: Performed by: SURGERY

## 2024-12-01 PROCEDURE — 200N000001 HC R&B ICU

## 2024-12-01 PROCEDURE — 87086 URINE CULTURE/COLONY COUNT: CPT | Performed by: SOCIAL WORKER

## 2024-12-01 PROCEDURE — 74176 CT ABD & PELVIS W/O CONTRAST: CPT | Mod: MG

## 2024-12-01 PROCEDURE — 82330 ASSAY OF CALCIUM: CPT | Performed by: SURGERY

## 2024-12-01 PROCEDURE — 250N000012 HC RX MED GY IP 250 OP 636 PS 637: Performed by: SURGERY

## 2024-12-01 PROCEDURE — 82310 ASSAY OF CALCIUM: CPT | Performed by: STUDENT IN AN ORGANIZED HEALTH CARE EDUCATION/TRAINING PROGRAM

## 2024-12-01 PROCEDURE — 83735 ASSAY OF MAGNESIUM: CPT | Performed by: SURGERY

## 2024-12-01 PROCEDURE — 250N000009 HC RX 250: Performed by: SOCIAL WORKER

## 2024-12-01 PROCEDURE — 30243S1 TRANSFUSION OF NONAUTOLOGOUS GLOBULIN INTO CENTRAL VEIN, PERCUTANEOUS APPROACH: ICD-10-PCS | Performed by: PSYCHIATRY & NEUROLOGY

## 2024-12-01 PROCEDURE — 93325 DOPPLER ECHO COLOR FLOW MAPG: CPT | Mod: 26 | Performed by: INTERNAL MEDICINE

## 2024-12-01 PROCEDURE — 250N000011 HC RX IP 250 OP 636: Performed by: SURGERY

## 2024-12-01 PROCEDURE — 82040 ASSAY OF SERUM ALBUMIN: CPT | Performed by: SURGERY

## 2024-12-01 PROCEDURE — 36556 INSERT NON-TUNNEL CV CATH: CPT | Performed by: SURGERY

## 2024-12-01 PROCEDURE — 93325 DOPPLER ECHO COLOR FLOW MAPG: CPT

## 2024-12-01 PROCEDURE — 93308 TTE F-UP OR LMTD: CPT | Mod: 26 | Performed by: INTERNAL MEDICINE

## 2024-12-01 PROCEDURE — 83605 ASSAY OF LACTIC ACID: CPT | Performed by: SURGERY

## 2024-12-01 PROCEDURE — 82374 ASSAY BLOOD CARBON DIOXIDE: CPT | Performed by: STUDENT IN AN ORGANIZED HEALTH CARE EDUCATION/TRAINING PROGRAM

## 2024-12-01 PROCEDURE — B4035 ENTERAL FEED SUPP PUMP PER D: HCPCS

## 2024-12-01 PROCEDURE — 3E043XZ INTRODUCTION OF VASOPRESSOR INTO CENTRAL VEIN, PERCUTANEOUS APPROACH: ICD-10-PCS | Performed by: STUDENT IN AN ORGANIZED HEALTH CARE EDUCATION/TRAINING PROGRAM

## 2024-12-01 PROCEDURE — 99207 PR NO BILLABLE SERVICE THIS VISIT: CPT | Performed by: STUDENT IN AN ORGANIZED HEALTH CARE EDUCATION/TRAINING PROGRAM

## 2024-12-01 PROCEDURE — 255N000002 HC RX 255 OP 636: Performed by: STUDENT IN AN ORGANIZED HEALTH CARE EDUCATION/TRAINING PROGRAM

## 2024-12-01 PROCEDURE — 84132 ASSAY OF SERUM POTASSIUM: CPT | Performed by: SURGERY

## 2024-12-01 PROCEDURE — 80048 BASIC METABOLIC PNL TOTAL CA: CPT | Performed by: SURGERY

## 2024-12-01 PROCEDURE — 999N000065 XR CHEST PORT 1 VIEW

## 2024-12-01 PROCEDURE — 250N000013 HC RX MED GY IP 250 OP 250 PS 637: Performed by: SURGERY

## 2024-12-01 PROCEDURE — 85730 THROMBOPLASTIN TIME PARTIAL: CPT | Performed by: SURGERY

## 2024-12-01 PROCEDURE — 250N000011 HC RX IP 250 OP 636: Mod: JZ | Performed by: PSYCHIATRY & NEUROLOGY

## 2024-12-01 PROCEDURE — 99291 CRITICAL CARE FIRST HOUR: CPT | Mod: GC | Performed by: PSYCHIATRY & NEUROLOGY

## 2024-12-01 PROCEDURE — 258N000001 HC RX 258: Performed by: SURGERY

## 2024-12-01 RX ORDER — ACETAMINOPHEN 325 MG/10.15ML
650 LIQUID ORAL EVERY 6 HOURS PRN
Status: DISPENSED | OUTPATIENT
Start: 2024-12-01 | End: 2024-12-03

## 2024-12-01 RX ORDER — ATROPINE SULFATE 0.1 MG/ML
INJECTION INTRAVENOUS
Status: COMPLETED
Start: 2024-12-01 | End: 2024-12-01

## 2024-12-01 RX ORDER — ACETAMINOPHEN 325 MG/1
650 TABLET ORAL EVERY 24 HOURS
Status: COMPLETED | OUTPATIENT
Start: 2024-12-01 | End: 2024-12-05

## 2024-12-01 RX ORDER — DIPHENHYDRAMINE HYDROCHLORIDE 50 MG/ML
50 INJECTION INTRAMUSCULAR; INTRAVENOUS
Status: DISCONTINUED | OUTPATIENT
Start: 2024-12-01 | End: 2024-12-11 | Stop reason: HOSPADM

## 2024-12-01 RX ORDER — DIPHENHYDRAMINE HYDROCHLORIDE 50 MG/ML
50 INJECTION INTRAMUSCULAR; INTRAVENOUS EVERY 24 HOURS
Status: COMPLETED | OUTPATIENT
Start: 2024-12-01 | End: 2024-12-05

## 2024-12-01 RX ORDER — NICOTINE POLACRILEX 4 MG
15-30 LOZENGE BUCCAL
Status: DISCONTINUED | OUTPATIENT
Start: 2024-12-01 | End: 2024-12-01

## 2024-12-01 RX ORDER — DIPHENHYDRAMINE HCL 25 MG
50 CAPSULE ORAL
Status: DISCONTINUED | OUTPATIENT
Start: 2024-12-01 | End: 2024-12-11 | Stop reason: HOSPADM

## 2024-12-01 RX ORDER — METHYLPREDNISOLONE SODIUM SUCCINATE 125 MG/2ML
125 INJECTION INTRAMUSCULAR; INTRAVENOUS
Status: DISCONTINUED | OUTPATIENT
Start: 2024-12-01 | End: 2024-12-11 | Stop reason: HOSPADM

## 2024-12-01 RX ORDER — SIMVASTATIN 10 MG
10 TABLET ORAL EVERY EVENING
Status: DISCONTINUED | OUTPATIENT
Start: 2024-12-01 | End: 2024-12-11 | Stop reason: HOSPADM

## 2024-12-01 RX ORDER — OLOPATADINE HYDROCHLORIDE 1 MG/ML
1 SOLUTION/ DROPS OPHTHALMIC 2 TIMES DAILY
Status: DISCONTINUED | OUTPATIENT
Start: 2024-12-01 | End: 2024-12-11 | Stop reason: HOSPADM

## 2024-12-01 RX ORDER — CHLORHEXIDINE GLUCONATE ORAL RINSE 1.2 MG/ML
15 SOLUTION DENTAL EVERY 12 HOURS
Status: DISCONTINUED | OUTPATIENT
Start: 2024-12-01 | End: 2024-12-03

## 2024-12-01 RX ORDER — DEXTROSE MONOHYDRATE 50 MG/ML
INJECTION, SOLUTION INTRAVENOUS CONTINUOUS
Status: DISCONTINUED | OUTPATIENT
Start: 2024-12-01 | End: 2024-12-02

## 2024-12-01 RX ORDER — SODIUM CHLORIDE 9 MG/ML
INJECTION, SOLUTION INTRAVENOUS CONTINUOUS
Status: DISCONTINUED | OUTPATIENT
Start: 2024-12-01 | End: 2024-12-01 | Stop reason: ALTCHOICE

## 2024-12-01 RX ORDER — EPINEPHRINE IN 0.9 % SOD CHLOR 5 MG/250ML
.01-.3 PLASTIC BAG, INJECTION (ML) INTRAVENOUS CONTINUOUS
Status: DISCONTINUED | OUTPATIENT
Start: 2024-12-01 | End: 2024-12-02

## 2024-12-01 RX ORDER — DEXTROSE MONOHYDRATE AND SODIUM CHLORIDE 5; .45 G/100ML; G/100ML
INJECTION, SOLUTION INTRAVENOUS CONTINUOUS
Status: DISCONTINUED | OUTPATIENT
Start: 2024-12-01 | End: 2024-12-02

## 2024-12-01 RX ORDER — EPINEPHRINE IN 0.9 % SOD CHLOR 5 MG/250ML
.01-.3 PLASTIC BAG, INJECTION (ML) INTRAVENOUS CONTINUOUS
Status: DISCONTINUED | OUTPATIENT
Start: 2024-12-01 | End: 2024-12-01

## 2024-12-01 RX ORDER — LIDOCAINE HYDROCHLORIDE 20 MG/ML
JELLY TOPICAL ONCE
Status: COMPLETED | OUTPATIENT
Start: 2024-12-01 | End: 2024-12-01

## 2024-12-01 RX ORDER — ACETAMINOPHEN 325 MG/1
650 TABLET ORAL
Status: DISCONTINUED | OUTPATIENT
Start: 2024-12-01 | End: 2024-12-11 | Stop reason: HOSPADM

## 2024-12-01 RX ORDER — NOREPINEPHRINE BITARTRATE 0.02 MG/ML
.01-.6 INJECTION, SOLUTION INTRAVENOUS CONTINUOUS
Status: DISCONTINUED | OUTPATIENT
Start: 2024-12-01 | End: 2024-12-04

## 2024-12-01 RX ORDER — DEXTROSE MONOHYDRATE 25 G/50ML
25-50 INJECTION, SOLUTION INTRAVENOUS
Status: DISCONTINUED | OUTPATIENT
Start: 2024-12-01 | End: 2024-12-01

## 2024-12-01 RX ORDER — DEXTROSE MONOHYDRATE AND SODIUM CHLORIDE 5; .45 G/100ML; G/100ML
INJECTION, SOLUTION INTRAVENOUS CONTINUOUS
Status: ACTIVE | OUTPATIENT
Start: 2024-12-01 | End: 2024-12-01

## 2024-12-01 RX ORDER — DEXTROSE MONOHYDRATE 25 G/50ML
50 INJECTION, SOLUTION INTRAVENOUS ONCE
Status: COMPLETED | OUTPATIENT
Start: 2024-12-01 | End: 2024-12-01

## 2024-12-01 RX ORDER — DIPHENHYDRAMINE HCL 25 MG
50 CAPSULE ORAL EVERY 24 HOURS
Status: COMPLETED | OUTPATIENT
Start: 2024-12-01 | End: 2024-12-05

## 2024-12-01 RX ORDER — LEVOTHYROXINE SODIUM 150 UG/1
150 TABLET ORAL DAILY
Status: DISCONTINUED | OUTPATIENT
Start: 2024-12-01 | End: 2024-12-11 | Stop reason: HOSPADM

## 2024-12-01 RX ORDER — DEXMEDETOMIDINE HYDROCHLORIDE 4 UG/ML
.1-1.2 INJECTION, SOLUTION INTRAVENOUS CONTINUOUS
Status: DISCONTINUED | OUTPATIENT
Start: 2024-12-01 | End: 2024-12-02

## 2024-12-01 RX ORDER — LEVOTHYROXINE SODIUM 75 UG/1
150 TABLET ORAL
Status: DISCONTINUED | OUTPATIENT
Start: 2024-12-01 | End: 2024-12-01

## 2024-12-01 RX ORDER — CALCIUM CHLORIDE 100 MG/ML
1 INJECTION INTRAVENOUS; INTRAVENTRICULAR ONCE
Status: COMPLETED | OUTPATIENT
Start: 2024-12-01 | End: 2024-12-01

## 2024-12-01 RX ORDER — CYANOCOBALAMIN 1000 UG/ML
1000 INJECTION, SOLUTION INTRAMUSCULAR; SUBCUTANEOUS
Status: DISCONTINUED | OUTPATIENT
Start: 2024-12-01 | End: 2024-12-04

## 2024-12-01 RX ORDER — ALBUTEROL SULFATE 0.83 MG/ML
2.5 SOLUTION RESPIRATORY (INHALATION) ONCE
Status: COMPLETED | OUTPATIENT
Start: 2024-12-01 | End: 2024-12-01

## 2024-12-01 RX ORDER — PIPERACILLIN SODIUM, TAZOBACTAM SODIUM 3; .375 G/15ML; G/15ML
3.38 INJECTION, POWDER, LYOPHILIZED, FOR SOLUTION INTRAVENOUS EVERY 6 HOURS
Status: DISCONTINUED | OUTPATIENT
Start: 2024-12-01 | End: 2024-12-03

## 2024-12-01 RX ORDER — HEPARIN SODIUM 5000 [USP'U]/.5ML
5000 INJECTION, SOLUTION INTRAVENOUS; SUBCUTANEOUS EVERY 8 HOURS
Status: DISCONTINUED | OUTPATIENT
Start: 2024-12-02 | End: 2024-12-11 | Stop reason: HOSPADM

## 2024-12-01 RX ORDER — ENOXAPARIN SODIUM 100 MG/ML
30 INJECTION SUBCUTANEOUS EVERY 24 HOURS
Status: DISCONTINUED | OUTPATIENT
Start: 2024-12-01 | End: 2024-12-01

## 2024-12-01 RX ORDER — DEXTROSE MONOHYDRATE 100 MG/ML
INJECTION, SOLUTION INTRAVENOUS CONTINUOUS PRN
Status: DISCONTINUED | OUTPATIENT
Start: 2024-12-01 | End: 2024-12-11 | Stop reason: HOSPADM

## 2024-12-01 RX ORDER — SALIVA STIMULANT COMB. NO.3
2 SPRAY, NON-AEROSOL (ML) MUCOUS MEMBRANE 4 TIMES DAILY PRN
Status: DISCONTINUED | OUTPATIENT
Start: 2024-12-01 | End: 2024-12-11 | Stop reason: HOSPADM

## 2024-12-01 RX ORDER — ASPIRIN 81 MG/1
81 TABLET, CHEWABLE ORAL DAILY
Status: DISCONTINUED | OUTPATIENT
Start: 2024-12-01 | End: 2024-12-11 | Stop reason: HOSPADM

## 2024-12-01 RX ADMIN — PANTOPRAZOLE SODIUM 40 MG: 40 INJECTION, POWDER, FOR SOLUTION INTRAVENOUS at 08:43

## 2024-12-01 RX ADMIN — NOREPINEPHRINE BITARTRATE 0.2 MCG/KG/MIN: 0.02 INJECTION, SOLUTION INTRAVENOUS at 06:25

## 2024-12-01 RX ADMIN — DEXMEDETOMIDINE HYDROCHLORIDE 0.2 MCG/KG/HR: 400 INJECTION INTRAVENOUS at 02:55

## 2024-12-01 RX ADMIN — PIPERACILLIN AND TAZOBACTAM 3.38 G: 3; .375 INJECTION, POWDER, FOR SOLUTION INTRAVENOUS at 08:40

## 2024-12-01 RX ADMIN — HUMAN IMMUNOGLOBULIN G 30 G: 20 LIQUID INTRAVENOUS at 14:13

## 2024-12-01 RX ADMIN — SIMVASTATIN 10 MG: 10 TABLET, FILM COATED ORAL at 20:24

## 2024-12-01 RX ADMIN — ENOXAPARIN SODIUM 30 MG: 30 INJECTION SUBCUTANEOUS at 08:40

## 2024-12-01 RX ADMIN — NOREPINEPHRINE BITARTRATE 0.2 MCG/KG/MIN: 0.02 INJECTION, SOLUTION INTRAVENOUS at 18:43

## 2024-12-01 RX ADMIN — DEXMEDETOMIDINE HYDROCHLORIDE 0.4 MCG/KG/HR: 400 INJECTION INTRAVENOUS at 12:04

## 2024-12-01 RX ADMIN — DEXTROSE MONOHYDRATE 50 ML: 25 INJECTION, SOLUTION INTRAVENOUS at 03:46

## 2024-12-01 RX ADMIN — ALBUTEROL SULFATE 2.5 MG: 2.5 SOLUTION RESPIRATORY (INHALATION) at 03:44

## 2024-12-01 RX ADMIN — DIPHENHYDRAMINE HYDROCHLORIDE 50 MG: 50 INJECTION, SOLUTION INTRAMUSCULAR; INTRAVENOUS at 14:10

## 2024-12-01 RX ADMIN — DEXTROSE AND SODIUM CHLORIDE: 5; 450 INJECTION, SOLUTION INTRAVENOUS at 19:19

## 2024-12-01 RX ADMIN — CHLORHEXIDINE GLUCONATE 15 ML: 1.2 SOLUTION ORAL at 20:24

## 2024-12-01 RX ADMIN — PIPERACILLIN AND TAZOBACTAM 3.38 G: 3; .375 INJECTION, POWDER, FOR SOLUTION INTRAVENOUS at 16:52

## 2024-12-01 RX ADMIN — SODIUM CHLORIDE: 9 INJECTION, SOLUTION INTRAVENOUS at 19:38

## 2024-12-01 RX ADMIN — DEXTROSE MONOHYDRATE: 50 INJECTION, SOLUTION INTRAVENOUS at 23:56

## 2024-12-01 RX ADMIN — DEXTROSE AND SODIUM CHLORIDE: 5; 450 INJECTION, SOLUTION INTRAVENOUS at 05:36

## 2024-12-01 RX ADMIN — DEXTROSE AND SODIUM CHLORIDE: 5; 450 INJECTION, SOLUTION INTRAVENOUS at 03:29

## 2024-12-01 RX ADMIN — NOREPINEPHRINE BITARTRATE 0.2 MCG/KG/MIN: 0.02 INJECTION, SOLUTION INTRAVENOUS at 03:05

## 2024-12-01 RX ADMIN — CALCIUM CHLORIDE 1 G: 100 INJECTION INTRAVENOUS; INTRAVENTRICULAR at 03:56

## 2024-12-01 RX ADMIN — SODIUM BICARBONATE 50 MEQ: 84 INJECTION, SOLUTION INTRAVENOUS at 03:57

## 2024-12-01 RX ADMIN — PERFLUTREN 10 ML: 6.52 INJECTION, SUSPENSION INTRAVENOUS at 12:36

## 2024-12-01 RX ADMIN — NOREPINEPHRINE BITARTRATE 0.2 MCG/KG/MIN: 0.02 INJECTION, SOLUTION INTRAVENOUS at 03:01

## 2024-12-01 RX ADMIN — ACETAMINOPHEN 650 MG: 325 TABLET, FILM COATED ORAL at 14:10

## 2024-12-01 RX ADMIN — OLOPATADINE HYDROCHLORIDE 1 DROP: 1 SOLUTION/ DROPS OPHTHALMIC at 11:12

## 2024-12-01 RX ADMIN — SODIUM CHLORIDE, POTASSIUM CHLORIDE, SODIUM LACTATE AND CALCIUM CHLORIDE 1000 ML: 600; 310; 30; 20 INJECTION, SOLUTION INTRAVENOUS at 12:42

## 2024-12-01 RX ADMIN — SODIUM CHLORIDE 10 UNITS: 9 INJECTION, SOLUTION INTRAVENOUS at 03:52

## 2024-12-01 RX ADMIN — PIPERACILLIN AND TAZOBACTAM 3.38 G: 3; .375 INJECTION, POWDER, FOR SOLUTION INTRAVENOUS at 01:10

## 2024-12-01 RX ADMIN — CHLORHEXIDINE GLUCONATE 15 ML: 1.2 SOLUTION ORAL at 08:43

## 2024-12-01 RX ADMIN — NOREPINEPHRINE BITARTRATE 0.2 MCG/KG/MIN: 0.02 INJECTION, SOLUTION INTRAVENOUS at 22:17

## 2024-12-01 RX ADMIN — NOREPINEPHRINE BITARTRATE 0.15 MCG/KG/MIN: 0.02 INJECTION, SOLUTION INTRAVENOUS at 11:12

## 2024-12-01 RX ADMIN — ACETAMINOPHEN 650 MG: 325 TABLET, FILM COATED ORAL at 21:11

## 2024-12-01 RX ADMIN — PIPERACILLIN AND TAZOBACTAM 3.38 G: 3; .375 INJECTION, POWDER, FOR SOLUTION INTRAVENOUS at 20:24

## 2024-12-01 RX ADMIN — OLOPATADINE HYDROCHLORIDE 1 DROP: 1 SOLUTION/ DROPS OPHTHALMIC at 20:25

## 2024-12-01 RX ADMIN — VASOPRESSIN 2.4 UNITS/HR: 20 INJECTION, SOLUTION INTRAMUSCULAR; SUBCUTANEOUS at 04:50

## 2024-12-01 ASSESSMENT — ACTIVITIES OF DAILY LIVING (ADL)
ADLS_ACUITY_SCORE: 56
ADLS_ACUITY_SCORE: 63
ADLS_ACUITY_SCORE: 56
ADLS_ACUITY_SCORE: 63
ADLS_ACUITY_SCORE: 56
ADLS_ACUITY_SCORE: 63
ADLS_ACUITY_SCORE: 69
ADLS_ACUITY_SCORE: 63
ADLS_ACUITY_SCORE: 63
ADLS_ACUITY_SCORE: 56
ADLS_ACUITY_SCORE: 63
ADLS_ACUITY_SCORE: 56

## 2024-12-01 NOTE — ED TRIAGE NOTES
[facility]  ED Arrival Note      Means of Arrival: EMS  Comes from: Home    Story:Recent diagnosis of MyastheniaGravis. Today, the patient has been less responsive. Found to be unresponsive, hypoxic (60%), and hypotensive by EMS.         EMS/PD Interventions: PIV and Oxygen  EMS Medications: N/A    Meets Stroke Criteria? No  Meets Trauma Criteria? No        Directed to: Stabilization room  Belongings: Remain with patient           Triage Assessment (Adult)       Row Name 11/30/24 2036          Triage Assessment    Airway WDL WDL        Respiratory WDL    Respiratory WDL X        Skin Circulation/Temperature WDL    Skin Circulation/Temperature WDL WDL        Cardiac WDL    Cardiac WDL X        Peripheral/Neurovascular WDL    Peripheral Neurovascular WDL X        Cognitive/Neuro/Behavioral WDL    Cognitive/Neuro/Behavioral WDL X        [place] Coma Scale    Best Eye Response 3-->(E3) to speech     Best Motor Response 4-->(M4) withdraws from pain     Best Verbal Response 1-->(V1) none     [place] Coma Scale Score 8

## 2024-12-01 NOTE — PROGRESS NOTES
Intubation Note    Date of intubation: 11/30/2024  Time of intubation: 2330  Location of intubation: ED  Intubated by: Doctor  Size of ETT: 7.5   Location (cm) at lip: 24 @ lip.    ETT placement confirmed by: Bilateral breath sounds, Co2 color change.     Kita Jhaveri, RT  11/30/2024

## 2024-12-01 NOTE — PROGRESS NOTES
Patient was found unresponsive and hypoxic. He come to the ED on 15 L oxygen and sats was in 60s.  I put him on BIPAP 12/5 14 100%. His sats and breathing improved.    Kita Jhaveri, RT  11/30/2024

## 2024-12-01 NOTE — PROGRESS NOTES
[facility] ICU RESPIRATORY NOTE        Date of Admission: 11/30/2024    Date of Intubation (most recent): 11/30/2024    Reason for Mechanical Ventilation: Resp failure    Number of Days on Mechanical Ventilation: 2    Met Criteria for Spontaneous Breathing Trial: No    Reason for No Spontaneous Breathing Trial: Per MD    Bite Block: No    Significant Events Today: None    ABG Results:   Recent Labs   Lab 12/01/24  0313   O2PER 40         Current Vent Settings: FiO2 (%): 40 %, Resp: 15, Vent Mode: CMV/AC, Resp Rate (Set): 16 breaths/min, Tidal Volume (Set, mL): 450 mL, PEEP (cm H2O): 5 cmH2O, Resp Rate (Set): 16 breaths/min, Tidal Volume (Set, mL): 450 mL, PEEP (cm H2O): 5 cmH2O    Skin Assessment: Intact    Plan: Continue full vent support  and assess readiness for SBT    RT Nicolette on 12/1/2024 at 5:14 AM

## 2024-12-01 NOTE — PROGRESS NOTES
ICU Staff:     I examined the patient at the bedside in the [facility] ICU. I managed the patient at the bedside in the [facility] ICU for his critical illness. I reviewed the patient's medical records, progress notes, and imaging studies. The patient is an 79 year old man admitted to the ICU for mental status changes and acute respiratory failure due to muscular weakness most likely due to Myasthenia Gravis or possibly also ALS. The patient other medical problems include paroxysmal atrial fibrillation, the patient has a history of bladder cancer and has a neobladder, the patient had a prior UTI, CAD, ascending aortic aneurysm, aortic insufficiency, hypercholesterolemia, hypertension, hypothyroidism, and basal cell carcinoma. The patient's wife gave a telephone consent for the placement of a central line. The risks and the benefits of the central line placement were reviewed with the patient wife; she understood the risks and wished to proceed.   GEN: The patient is intubated and sedated.      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    Pure hypercholesterolemia      PAST SURGICAL HISTORY:  Past Surgical History:   Procedure Laterality Date    ARTHROPLASTY KNEE Left 08/23/2022    Procedure: LEFT TOTAL KNEE ARTHROPLASTY;  Surgeon: Melba Dial MD;  Location: [facility] OR    ARTHROPLASTY REVISION KNEE Left 09/05/2023    Procedure: REVISION TOTAL KNEE ARTHROPLASTY PATELLA;  Surgeon: Yojana  Melba NEELY MD;  Location:  OR    BUNIONECTOMY Left     BUNIONECTOMY DIANA  11/28/2011    RT-Procedure:BUNIONECTOMY DIANA; Right Foot Dwaine [place] Bunionectomy with Metatarsal and Phalanx Osteotomy with 1st Metatarsal Phalangeal Joint Repair Right Foot; Surgeon:BAKARI ZAIDI; Location:[facility]    CATARACT EXTRACTION, BILATERAL      CV CORONARY ANGIOGRAM N/A 11/11/2024    Procedure: Coronary Angiogram;  Surgeon: Krystian Machuca MD;  Location:  HEART CARDIAC CATH LAB    CV HEART CATHETERIZATION WITH POSSIBLE INTERVENTION N/A 11/07/2020    Procedure: Heart Catheterization with Possible Intervention;  Surgeon: Rishi Llanes MD;  Location:  HEART CARDIAC CATH LAB    CV LEFT HEART CATH N/A 11/11/2024    Procedure: Left Heart Catheterization;  Surgeon: Krystian Machuca MD;  Location:  HEART CARDIAC CATH LAB    CYSTECTOMY BLADDER, ILEAL DIVERSION NEOBLADDER, COMBINED  2006    HERNIORRHAPHY INCISIONAL (LOCATION) N/A 08/01/2016    Procedure: HERNIORRHAPHY INCISIONAL (LOCATION);  Surgeon: Onofre Lua MD;  Location: [facility]    IR GASTROSTOMY TUBE PERCUTANEOUS PLCMNT  11/13/2024    OPEN REDUCTION INTERNAL FIXATION PATELLA Left 09/05/2023    Procedure: LEFT KNEE OPEN REDUCTION INTERNAL  FIXATION PATELLA FRACTURE WITH;  Surgeon: Melba Dial MD;  Location:  OR    OPEN REDUCTION INTERNAL FIXATION PATELLA Left 10/10/2023    Procedure: LEFT KNEE REVISION OPEN REDUCTION INTERNAL FIXATION PATELLA WITH POLY REMOVAL, PATELLAR;  Surgeon: Melba Dial MD;  Location:  OR    REMOVE HARDWARE KNEE Left 10/10/2023    Procedure: WITH POLY REMOVAL, PATELLAR;  Surgeon: Melba Dial MD;  Location:  OR     MEDICATIONS:  Current Facility-Administered Medications   Medication Dose Route Frequency Provider Last Rate Last Admin    glucose gel 15-30 g  15-30 g Oral Q15 Min PRN Annabel Lan MD        Or    dextrose 50 % injection 25-50 mL  25-50 mL Intravenous Q15 Min PRN Annabel Lan  MD        Or    glucagon injection 1 mg  1 mg Subcutaneous Q15 Min PRN Annabel Lan MD        midazolam (VERSED) 100 mg/100 mL NS infusion - ADULT  1-8 mg/hr Intravenous Continuous Annabel Lan MD 6 mL/hr at 12/01/24 0101 6 mg/hr at 12/01/24 0101    norepinephrine (LEVOPHED) 4 mg in  mL PERIPHERAL infusion  0.01-0.125 mcg/kg/min Intravenous Continuous Annabel Lan MD 48.1 mL/hr at 12/01/24 0102 0.17 mcg/kg/min at 12/01/24 0102     ALLERGIES:  No Known Allergies     SOCIAL HISTORY:  Social History     Socioeconomic History    Marital status:      Spouse name: Alana    Number of children: 2    Years of education: 19   Occupational History    Occupation: Semi-retired    Tobacco Use    Smoking status: Former     Types: Cigarettes     Passive exposure: Never (per pt)    Smokeless tobacco: Never    Tobacco comments:     Quit in 1987; smoked for 20 years; 1.5 ppd at his most.    Vaping Use    Vaping status: Never Used   Substance and Sexual Activity    Alcohol use: Yes     Comment: 2-3 glasses of wine/week    Drug use: No    Sexual activity: Not Currently   Other Topics Concern    Parent/sibling w/ CABG, MI or angioplasty before 65F 55M? No   Social History Narrative    .    2 adult children.    2 grandchildren.    Left knee surgeries limit mobility.     Social Drivers of Health     Financial Resource Strain: Low Risk  (11/8/2024)    Financial Resource Strain     Within the past 12 months, have you or your family members you live with been unable to get utilities (heat, electricity) when it was really needed?: No   Food Insecurity: Low Risk  (11/8/2024)    Food Insecurity     Within the past 12 months, did you worry that your food would run out before you got money to buy more?: No     Within the past 12 months, did the food you bought just not last and you didn t have money to get more?: No   Transportation Needs: Low Risk  (11/8/2024)    Transportation Needs     Within the  past 12 months, has lack of transportation kept you from medical appointments, getting your medicines, non-medical meetings or appointments, work, or from getting things that you need?: No   Physical Activity: Sufficiently Active (7/8/2024)    Exercise Vital Sign     Days of Exercise per Week: 3 days     Minutes of Exercise per Session: 60 min   Stress: No Stress Concern Present (7/8/2024)    [place] [place] [employer] - Occupational Stress Questionnaire     Feeling of Stress : Only a little   Social Connections: Unknown (7/8/2024)    Social Connection and Isolation Panel [NHANES]     Frequency of Social Gatherings with Friends and Family: Twice a week   Interpersonal Safety: Low Risk  (11/8/2024)    Interpersonal Safety     Do you feel physically and emotionally safe where you currently live?: Yes     Within the past 12 months, have you been hit, slapped, kicked or otherwise physically hurt by someone?: No     Within the past 12 months, have you been humiliated or emotionally abused in other ways by your partner or ex-partner?: No   Housing Stability: Low Risk  (11/8/2024)    Housing Stability     Do you have housing? : Yes     Are you worried about losing your housing?: No     FAMILY HISTORY:  Family History   Problem Relation Age of Onset    Osteoporosis Mother     Neurologic Disorder Mother         PD    Cerebrovascular Disease Mother     Hypertension Mother     Hyperlipidemia Mother     Cerebrovascular Disease Father     Diabetes Type 1 Sister     Cancer Maternal Grandmother         unknown type    Diabetes Type 2  No family hx of     Myocardial Infarction No family hx of     Prostate Cancer No family hx of     Colon Cancer No family hx of       PHYSICAL EXAM:  Vital Signs: /71 (BP Location: Right arm)   Pulse 98   Temp 97.8  F (36.6  C) (Oral)   Resp 16   Wt 75.4 kg (166 lb 3.6 oz)   SpO2 98%   BMI 23.85 kg/m      HEENT: Pupils 2 mm bilaterally. Orally intubated.       Chest: CTA  bilaterally     Cor: RRR      ABD: Soft, no masses, G-tube in place.       Ext: Warm and well perfused.       Neuro: The patient is intubated and sedated.       A/P: The patient is an 79 year old man admitted to the ICU for mental status changes and acute respiratory failure due to muscular weakness most likely due to myasthenia gravis or possibly also ALS. The patient other medical problems include paroxysmal atrial fibrillation, the patient has a history of bladder cancer and has a neobladder, the patient had a prior UTI, CAD, ascending aortic aneurysm, aortic insufficiency, hypercholesterolemia, hypertension, hypothyroidism, and basal cell carcinoma.     Neuro: The patient is intubated and sedated     Cardiac:  The patient is in shock most likely due to sepsis; requires nor-epi drip.       Pulm: The patient is weak, most likely due to myasthenia gravis.  Will support the patient overnight using conventional vent settings and treat the  patient's myasthenia gravis using high dose IVIG; if IVIG fails then will transfer the patient for therapeutic plasma exchange at the [facility].     GI: The patient has a G-tube.  Will place the patient's G-tube to gravity tonight.\\ The patient has severe protein-calorie malnutrition with a weight loss of greater than 5% in 1 month.     : New MOHAMUD; will follow the patient's UO and serum Cr.  The patient has a neobladder and there will be expected changes in the patient's UA values.  Cortez is in place.    ENDO: The patient is hypothyroid; will continue levothyroxine 150 MCG every day.      HEME: The WBC is elevated to 13K/  Will continue to investigate for a source of infection.  No acute issues.      ID: The WBC is elevated to 13K/  Will continue to investigate for a source of infection. Empiric broad spectrum antibiotic coverage with Zosyn started in the ED.       CODE: Full Code.      I personally examined and evaluated the patient today in the [facility]  ICU. The patient  remains critically ill today.  All of the ICU patient care orders and treatments were placed at my direction. I personally managed the patient's ICU supportive measures that were required as the patient's critical illness creates a continuous threat for loss of life for the patient.  Key critical care decisions were made by me today to maintain life saving effective ICU supportive care.  I spent 45 minutes providing critical care services at the bedside, and on the critical care unit, evaluating the patient, directing care and reviewing the patient's laboratory values and the patient's radiologic imaging reports associated with the patient's critical illness.  I have evaluated all laboratory values and imaging studies from the past 24 hours.. I actively assessed this acute critically ill patient and I personally provided supportive interventions that were required because the patient has acute and persistent imminently life threatening organ system failure. The critical care provided required high complexity decision making and clinical evaluation as the patient has acute critical illness that impairs one or more vital organs. The patient has a high probability of imminent or life threatening deterioration. I personally spent 45 minutes of Critical Care Time which was exclusive of any time required to perform any bedside procedures.  The Critical Care Time was required to personally provide and direct critical care services at the bedside and on the critical care unit for this acutely critically ill patient the critical care team was required to coordinate the patient ICU care with the ED staff, to evaluate the patient in the ICU regarding their requirement for nor-epi an IV pressor agent, to discuss the patient for the patient with the patient wife, and to review the medical records, and imaging studies. I personally managed the patient's sedation, pain control and analgesia, metabolic abnormalities, nutritional  status and vasoactive medications.     Hadley De La O MD, PhD

## 2024-12-01 NOTE — PROGRESS NOTES
Critical care update.     80yo male reportedly diagnosed with MG and ALS within the past month. Progressive neuro and MSK decline this past month. Was admitted 1 week ago for UTI, sent home with catheter. Over past 24 hours at home, more lethargic, decreased urine output. Wife brought him to the ED. He was intubated for airway protection and started on vasopressor therapy. Although urine sample is pending, likely source of infection is his urine given his hx of recent infections. When I saw him this morning, he is on 0.1 of levo, and vasopressin. Plan for today.     -continue mechanical ventilation, currently on minimal vent settings. May need arterial line. Check serial ABG.   -consult neuro for plex vs IVIG.   -zosyn for UTI  -follow up cultures.   -snowden was placed this week.   -use peg tube for meds/nutrition  -serial labs.   -received 2L of LR in the ED, seems dry to me, will give him 1 additional liter now.   -hx of a fib, not on AC at home.     Critical care time independent of procedures 45 min.

## 2024-12-01 NOTE — ED PROVIDER NOTES
Emergency Department Note      History of Present Illness     Chief Complaint   Hypotension      HPI   Mendez Greene is a 79 year old male with a history of paroxysmal atrial fibrillation, CAD, ascending aortic aneurysm, aortic insufficiency, hypercholesterolemia, hypertension, hypothyroidism, and basal cell carcinoma who presents to the ED via EMS for hypotension. Per EMS, the patient is coming from home where he was found hypotensive with altered mental status. BP was measured at 60/40, blood sugars were normal, and oxygen was less than 50%. He was given fluids en route. Patient was diagnosed with ALS 2 weeks ago and then Myasthenia Gravis a couple days ago. He was supposed to receive an infusion, but was unable to do so due to holiday conflicts. Family was recently working on hospice and DNR paperwork, but family is stepping back from DNR. Wife and daughter provided verbal acknowledgement for patient to be full code.  Upon arrival to the ED, patient endorses breathing better than before. He denies any chest pain, abdominal pain, fever, or vomiting. Patient nodded yes to CPR and intubation if required.    Independent Historian   EMS as detailed above.  Family: Detailed conversation had with family, they state that patient was diagnosed with ALS 3 weeks ago.  Lasts follow with neurology recently and it was felt that patient had both ALS and had antibodies positive against myasthenia gravis.  They note that patient last night was seeming to respond with confusion to family, and was having episodes where he seemed to be breathing very shallowly and then breathing very deeply.  They have been trialing some trazodone and Ativan to help with sleep as his sleep has been poor in the past few days, did not receive any Ativan prior to arrival, did have some trazodone earlier in the evening.  They state that at baseline patient does not take anything by mouth except for ice chips and does all feeds through the G-tube.   They have been keeping up with his hydration.  They confirm that he is full code.    Review of External Notes   I reviewed the hospital admission from 11/8 - 11/15: Diagnosed with ALS on EMG, percutaneous feeding tube placed 11/13  I reviewed 11/8/24 echocardiogram with EF of 60-65%.  I reviewed 11/15/24 Cardiac cath with mild coronary artery disease.  I reviewed 11/25 urine culture, negative for any growth    Past Medical History     Medical History and Problem List   Obstructive sleep apnea on CPAP  CAD  Paroxysmal atrial fibrillation  Ascending aortic aneurysm  Aortic insufficiency  Pure hypercholesterolemia  Basal cell carcinoma  Malignant neoplasm of bladder  Benign essential hypertension  Hypothyroidism     Medications   Tylenol  Biotene mt  Jevity  Synthroid/Levothroid  Rilutek  Zocor  Bactrim  Aspirin  Patanol  Cyanocobalamin     Surgical History   Past Surgical History:   Procedure Laterality Date     ARTHROPLASTY KNEE Left 08/23/2022    Procedure: LEFT TOTAL KNEE ARTHROPLASTY;  Surgeon: Melba Dial MD;  Location:  OR     ARTHROPLASTY REVISION KNEE Left 09/05/2023    Procedure: REVISION TOTAL KNEE ARTHROPLASTY PATELLA;  Surgeon: Melba Dial MD;  Location:  OR     BUNIONECTOMY Left      BUNIONECTOMY DIANA  11/28/2011    RT-Procedure:BUNIONECTOMY DIANA; Right Foot [place] Bunionectomy with Metatarsal and Phalanx Osteotomy with 1st Metatarsal Phalangeal Joint Repair Right Foot; Surgeon:BAKARI ZAIDI; Location: [facility]     CATARACT EXTRACTION, BILATERAL       CV CORONARY ANGIOGRAM N/A 11/11/2024    Procedure: Coronary Angiogram;  Surgeon: Krystian Machuca MD;  Location:  HEART CARDIAC CATH LAB     CV HEART CATHETERIZATION WITH POSSIBLE INTERVENTION N/A 11/07/2020    Procedure: Heart Catheterization with Possible Intervention;  Surgeon: Rishi Llanes MD;  Location:  HEART CARDIAC CATH LAB     CV LEFT HEART CATH N/A 11/11/2024    Procedure: Left Heart Catheterization;   Surgeon: Krystian Machuca MD;  Location:  HEART CARDIAC CATH LAB     CYSTECTOMY BLADDER, ILEAL DIVERSION NEOBLADDER, COMBINED  2006     HERNIORRHAPHY INCISIONAL (LOCATION) N/A 08/01/2016    Procedure: HERNIORRHAPHY INCISIONAL (LOCATION);  Surgeon: Onofre Lua MD;  Location:  [facility]     IR GASTROSTOMY TUBE PERCUTANEOUS PLCMNT  11/13/2024     OPEN REDUCTION INTERNAL FIXATION PATELLA Left 09/05/2023    Procedure: LEFT KNEE OPEN REDUCTION INTERNAL  FIXATION PATELLA FRACTURE WITH;  Surgeon: Melba Dial MD;  Location:  OR     OPEN REDUCTION INTERNAL FIXATION PATELLA Left 10/10/2023    Procedure: LEFT KNEE REVISION OPEN REDUCTION INTERNAL FIXATION PATELLA WITH POLY REMOVAL, PATELLAR;  Surgeon: Melba Dial MD;  Location:  OR     REMOVE HARDWARE KNEE Left 10/10/2023    Procedure: WITH POLY REMOVAL, PATELLAR;  Surgeon: Melba Dial MD;  Location:  OR       Physical Exam     Patient Vitals for the past 24 hrs:   BP Temp Temp src Pulse Resp SpO2 Weight   12/01/24 0000 113/71 97.8  F (36.6  C) Oral 98 16 98 % --   11/30/24 2355 110/67 -- -- 98 16 99 % --   11/30/24 2350 119/68 -- -- 95 16 100 % --   11/30/24 2345 126/79 -- -- 95 16 -- --   11/30/24 2340 109/74 -- -- 93 16 100 % --   11/30/24 2335 116/73 -- -- 101 16 100 % --   11/30/24 2330 101/60 -- -- 101 12 100 % --   11/30/24 2325 118/65 -- -- 74 25 97 % --   11/30/24 2320 105/61 -- -- 73 30 97 % --   11/30/24 2315 103/64 -- -- 73 (!) 37 97 % --   11/30/24 2310 109/55 -- -- 76 18 98 % --   11/30/24 2305 105/66 -- -- 76 (!) 38 98 % --   11/30/24 2300 (!) 88/55 -- -- 75 25 99 % --   11/30/24 2255 (!) 88/55 -- -- 76 24 97 % --   11/30/24 2250 (!) 84/54 -- -- 77 19 96 % --   11/30/24 2245 (!) 88/55 -- -- 75 23 96 % --   11/30/24 2240 92/57 -- -- 70 -- 96 % --   11/30/24 2235 90/65 -- -- 72 30 98 % --   11/30/24 2230 96/57 -- -- 72 26 97 % --   11/30/24 2203 97/58 -- -- 68 15 96 % --   11/30/24 2202 -- -- -- 67 24 95 % --   11/30/24 2201  -- -- -- 68 15 96 % --   11/30/24 2200 101/57 -- -- 67 28 97 % --   11/30/24 2157 102/55 -- -- 68 10 96 % --   11/30/24 2148 97/51 -- -- 66 20 94 % --   11/30/24 2145 91/59 -- -- 66 13 97 % --   11/30/24 2126 -- -- -- 69 20 91 % --   11/30/24 2125 -- -- -- 72 17 93 % --   11/30/24 2124 101/62 -- -- 70 -- 93 % --   11/30/24 2121 103/67 -- -- 68 17 94 % --   11/30/24 2116 -- -- -- 74 26 92 % --   11/30/24 2115 112/69 -- -- 68 15 93 % --   11/30/24 2059 -- 97.3  F (36.3  C) Temporal -- -- -- --   11/30/24 2038 -- -- -- -- -- -- 75.4 kg (166 lb 3.6 oz)   11/30/24 2037 (!) 68/49 -- -- 72 22 -- --   11/30/24 2035 (!) 64/43 -- -- 97 -- -- --     Physical Exam  General: Overall nontoxic-appearing  HEENT: Pupils 3 mm equal round and reactive.  Mildly dry external lips.  Neuro: Alert.  Follows commands.  Squeezes with equal  strength, give some thumbs up sign when asked if his breathing is improved.  Wiggling toes and able to raise both legs equally off the bed.  Shakes his head to yes or no questions.  CV: Regular rate and rhythm, radial and DP pulses equal  Respiratory: No signs of respiratory distress, lungs clear to auscultation bilaterally   Abdomen: Soft, without rigidity or rebound throughout  MSK: No lower extremity swelling or tenderness     Diagnostics     Lab Results   Labs Ordered and Resulted from Time of ED Arrival to Time of ED Departure - No data to display    Imaging   No orders to display       EKG   EKG 2038  Idiopathic ventricular rhythm without obvious signs of ischemia  Rate 69  QTc 392    EKG 2138  Appears grossly unchanged from EKG from prior  Rate 67  QTc 392    Independent Interpretation   None    ED Course      Medications Administered   Medications - No data to display    Procedures   Procedures        Rapid Sequence Intubation      Procedure: Rapid Sequence Intubation    Consent: Written from Family     Risks Discussed: Need for multiple attempts, Need for surgical airway,  Dental/lip injury, and Hypoxia    Indication: Respiratory failure    Preparation: Preoxygenation with CPAP/BiPAP.     Sedation: Ketamine     Paralytic: Rocuronium    Procedure Detail:   The patient was intubated with a 7.5 endotracheal tube using Video Laryngoscopy.  Following intubation, the patient's breath sounds were Equal.  ET Tube placement was confirmed with Auscultation, Chest X-ray, and ETCO2.    Monitoring: Monitoring consisted of heart rate, cardiac monitor, continuous pulse oximetry, blood pressure checks, level of consciousness, IV access, constant attendance by RN, and MD attendance until patient stable or transfer of care.      Patient Status: The patient tolerated the procedure well: Yes. There were no complications    Discussion of Management   Neurology, Dr. Jones  Cardiology, Dr. Orozco  ICU, Dr. De La O    ED Course   ED Course as of 12/01/24 0354   Sat Nov 30, 2024 2032 I obtained history and examined the patient as noted above.     2101 I rechecked the patient. On levo and BP doing better.   2131 I rechecked the patient.   2156 I spoke with Dr. Lacey, neurologist, regarding patient's presentation and plan of care.    2210 I spoke with Dr. Orozco of cardiology regarding patient's presentation and plan of care.   2230 I rechecked the patient.   2311 I obtained consent from patient's family.   2323 I performed intubation procedure.   2334 I spoke with Dr. De La O, of the ICU, regarding patient's presentation and possible transfer of care.   2340 I spoke with patient's family.       Additional Documentation  None    Medical Decision Making / Diagnosis     CMS Diagnoses: The patient has signs of sepsis   Sepsis ED evaluation   The patient has signs of sepsis as evidenced by:  1. Presence of 2 SIRS criteria, suspected infection, AND  2. Organ dysfunction: Initial hypotension due to infection and Lactic Acidosis with value >2.0 due to infection    Time zero: 2100 on 11/31/24 as this was  "the time when Lactate was resulted and the level was greater than 2.    Note: Due to a national blood culture bottle shortage, reduced blood cultures may have been drawn on this patient.    Lactic Acid Results:  Recent Labs   Lab Test 12/01/24  0313 11/30/24  2100   LACT 0.9 2.0       3 Hour Bundle 6 Hour Bundle (Reassessment)   Blood Cultures before IV Antibiotics: Yes  Antibiotics given: see below  Prehospital fluid volume (mL):                     Total fluids given (ED +Pre-hospital):  Full 30 mL/kg bolus given based on weight: 2,260 mL   Repeat Lactic Acid Level: Ordered by reflex for 2 hours after initial lactic acid collection.  Vasopressors: Vasopressors started for septic shock due to persistent hypotension.  Repeat perfusion exam: I attest to having performed a repeat sepsis exam and assessment of perfusion at  2230 .   BMI Readings from Last 1 Encounters:   11/30/24 23.85 kg/m        Anti-infectives (From admission through now)      Start     Dose/Rate Route Frequency Ordered Stop    11/30/24 2245  piperacillin-tazobactam (ZOSYN) 3.375 g vial to attach to  mL bag        Note to Pharmacy: For [facility], [facility] and [facility]: For Zosyn-naive patients, use the \"Zosyn initial dose + extended infusion\" order panel.    3.375 g  over 30 Minutes Intravenous ONCE 11/30/24 2244 12/01/24 0140                MIPS       None    Critical Care  The critical condition was: Hyperkalemia, Renal Failure (acute), and Respiratory Failure (acute)/Respiratory Arrest    Critical interventions performed or strongly considered: Arrange Definitive Care: OR/cath lab/neurointervention/IR/endoscopy/dialysis/ICU, Non-invasive Ventilation: BiPAP/CPAP, Endotracheal intubation/surgical airway, and Infusion: naloxone, vasopressors, insulin, chronotropics, ionotropics, antiepileptics    Critical care time was 75 minutes exclusive of time spent on separately billable procedures.      DERECK   Mendez ABRAHAM Greene is a 79 year old male with above medical " history who presents to the emergency department with a chief complaint of altered mental status.  On arrival to the emergency department, patient was placed on BiPAP with good oxygenation.  He was alert and able to follow commands moving all 4 extremities equally.  He was able to answer yes/no questions and denied any chest pain abdominal pain or pain anywhere.  Given this feel that ACS, aortic dissection, less likely etiologies for his presentation.  Patient's bedside echocardiogram had showed poor cardiac windows however no obvious pericardial effusion seen, no free fluid in the belly, no abdominal aortic aneurysm.  Bilateral breath sounds heard, felt tension pneumo less likely as well.  Patient recently diagnosed with myasthenia gravis and with RT, patient was unable to generate any measurable inspiratory force therefore discussed elective intubation with patient and family.  They had already reversed the DNR DNI and stated that he was full code, patient confirmed this as well.  Please see intubation note above.  Laboratory workup shows significant abnormalities with hyponatremia, hyperkalemia, creatinine elevation and uremia.  Patient reportedly has been making good urine prior to arrival.  Troponin elevated as well, spoke with Dr. Orozco of cardiology who felt that this was likely not in the setting of ACS, likely downtrending from patient's recent cath procedure which did reassuringly show only moderate coronary artery disease.  Discussed with Dr Rhiannon Camara who states likely IVIG in the AM.  Patient received albuterol for shifting of potassium.  Discussed with Dr. De La O of the ICU who kindly accepted for admission.    Disposition   The patient was admitted to the hospital.     Diagnosis     ICD-10-CM    1. Acute respiratory failure with hypoxia (H)  J96.01       2. Myasthenic crisis (H)  G70.01       3. Shock (H)  R57.9       4. Acute kidney injury (H)  N17.9       5. Hyperkalemia  E87.5             Discharge Medications   New Prescriptions    No medications on file         Scribe Disclosure:  I, Stacey Titi, am serving as a scribe at 8:47 PM on 11/30/2024 to document services personally performed by Annabel Lan MD based on my observations and the provider's statements to me.        Annabel Lan MD  12/01/24 0354

## 2024-12-01 NOTE — CONSULTS
CLINICAL NUTRITION SERVICES  -  ASSESSMENT NOTE    Recommendations Ordered by Registered Dietitian (RD):     TF initiation today (trophic) ~  Type of Feeding Tube: PEG  Enteral Frequency: Continuous  Enteral Regimen: Novasource Renal @ 10 mL/hr = 240 mL, 480 kcal, 22g protein, 43g CHO, 122 mL free water, 0g fiber  Free Water Flush: 60 mL q 4 hours    Eventual goal ~  Novasource Renal (or equivalent) @ 40 ml/hr (960 ml) provides 1920 kcal (25 kcal/kg), 87 g pro (1.15 g/kg), 176 g CHO, 688 ml free water, and 0 g fiber daily.       Malnutrition:   % Weight Loss:  > 5% in 1 month (severe malnutrition)  % Intake:  NGA  Subcutaneous Fat Loss:  Orbital region mild depletion  Muscle Loss:  Temporal region mild-moderate depletion, Clavicle region mild depletion  Fluid Retention:  None noted    Malnutrition Diagnosis: Moderate malnutrition  In Context of:  Acute illness or injury     REASON FOR ASSESSMENT  Mendez Greene is a 79 year old male seen by Registered Dietitian for Provider Order - Registered Dietitian to Assess and Order TF per Medical Nutrition Therapy Protocol.    PMH of afib, hx of bladder cancer, CAD, ascending aortic aneurysm, aortic insufficiency, hypercholesterolemia, HTN, hypothyroidism, basal cell carcinoma, recently diagnosed with MG and ALS. Presents with mental status changes and acute respiratory failure.    NUTRITION HISTORY    - Visited patient in room. Currently intubated/sedated. Unable to obtain nutrition history at this time.  - Per review of previous RD notes November 2024, patient had been receiving cycled TF a couple weeks ago via G-tube.   Per [facility] note, was on the following regimen ~  Jevity 1.5 at goal 110 mL/hr x 12 hours   mL q 4 hours    CURRENT NUTRITION ORDERS  Diet Order: No diet ordered    Current Intake/Tolerance:  N/A    NUTRITION FOCUSED PHYSICAL ASSESSMENT FOR DIAGNOSING MALNUTRITION)  Yes - visual          Observed:    Muscle wasting (refer to  "documentation in Malnutrition section) and Subcutaneous fat loss (refer to documentation in Malnutrition section)    ANTHROPOMETRICS  Height: 5'10\"  Weight: 75.4 kg, 166 lbs 3.63 oz  Body mass index is 23.85 kg/m .  Weight Status:  Normal BMI  IBW: 75.5 kg  % IBW: 100%  Weight History: wt trending down, 15% wt loss in 1 month  Wt Readings from Last 10 Encounters:   11/30/24 75.4 kg (166 lb 3.6 oz)   11/14/24 78.4 kg (172 lb 13.5 oz)   11/15/24 78.4 kg (172 lb 13.5 oz)   11/06/24 81.6 kg (180 lb)   10/23/24 88.9 kg (195 lb 14.4 oz)   07/10/24 88.7 kg (195 lb 8 oz)   04/23/24 83.5 kg (184 lb)   03/04/24 85.6 kg (188 lb 12.8 oz)   10/10/23 88 kg (194 lb)   09/05/23 88.2 kg (194 lb 6.4 oz)     LABS  Na 153 (H)  Mg 3.1 (H), Phos 7.8 (H) - MOHAMUD   (H), Cr 2.39 (H) - MOHAMUD  -252 (H)    MEDICATIONS  D5 at 150 mL/hr  Levophed  Vasopressin    ASSESSED NUTRITION NEEDS PER APPROVED PRACTICE GUIDELINES:  Dosing Weight 75.4 kg  Estimated Energy Needs: 6381-8924 kcals (25-30 Kcal/Kg)  Justification: vented  Estimated Protein Needs: 75-90 grams protein (1-1.2 g pro/Kg)  Justification: elevated BUN, hypercatabolism with critical illness  Estimated Fluid Needs: 5322-9696 mL (1 mL/Kcal)  Justification: maintenance or per MD    NUTRITION DIAGNOSIS:  Inadequate oral intake related to NPO as evidenced by need for nutrition support to start today    NUTRITION INTERVENTIONS  Recommendations / Nutrition Prescription  See above    Implementation  Nutrition education: Not appropriate at this time due to patient condition  EN Composition, EN Schedule, and Feeding Tube Flush - ordered  Collaboration of care - discussed trophic feeds with intensivist    Nutrition Goals  EN to meet goal needs within 48-72 hours    MONITORING AND EVALUATION:  Progress towards goals will be monitored and evaluated per protocol and Practice Guidelines    Nichole Jarquin RD, LD  Clinical Dietitian - [facility]  "

## 2024-12-01 NOTE — ED NOTES
Lab called and informed Writer of critical troponin. MD notified, Repeat EKG ordered and completed. MD also called RT to perform NIF. Family remain at bedside

## 2024-12-01 NOTE — PHARMACY-ADMISSION MEDICATION HISTORY
Pharmacist Admission Medication History    Admission medication history is complete. The information provided in this note is only as accurate as the sources available at the time of the update.    Information Source(s): Family member and CareEverywhere/SureScripts via in-person    Pertinent Information:   1) Patient given a one time trazodone dose yesterday evening which made him confused per wife.      2) riluzole and Bactrim marked as not taking. Wife states provider stopped riluzole and urine culture was clean for Bactrim.     Changes made to PTA medication list:  Added: None  Deleted: None  Changed: Bactrim, riluzole per above    Allergies reviewed with patient and updates made in EHR: no    Medication History Completed By: Sharlene Kramer PharmD 11/30/2024 9:48 PM    PTA Med List   Medication Sig Last Dose/Taking    acetaminophen (TYLENOL) 325 MG tablet Take 3 tablets (975 mg) by mouth every 6 hours as needed for mild pain (Patient taking differently: Take 325-650 mg by mouth every 6 hours as needed for mild pain.) 11/30/2024    artificial saliva (BIOTENE MT) SOLN solution Swish and spit 2 sprays in mouth every hour as needed for dry mouth. Taking As Needed    aspirin 81 MG EC tablet Take 81 mg by mouth every evening. 11/30/2024 Morning    Cholecalciferol (D3 PO) Take 1 tablet by mouth daily. OTC: Unsure of strength. 11/29/2024    Jevity 1.5 Mandeep Place 1,320 mLs into G tube daily. Infuse via pump   110ml/hr X 12 hrs/day (5.5 cartons/day)  Water flush: 150ml every 4 hours or 6x/day Taking    levothyroxine (SYNTHROID/LEVOTHROID) 150 MCG tablet Take 1 tablet (150 mcg) by mouth daily 11/30/2024 Morning    olopatadine (PATANOL) 0.1 % ophthalmic solution Place 1 drop into both eyes 2 times daily as needed for allergies. Taking As Needed    simvastatin (ZOCOR) 10 MG tablet Take 1 tablet (10 mg) by mouth daily 11/30/2024    vitamin B-12 (CYANOCOBALAMIN) 1000 MCG tablet Take 1,000 mcg by mouth daily 11/29/2024

## 2024-12-01 NOTE — ED NOTES
Report received from KEVIN Chadwick. Pt care assumed. Pt noted resting in bed, Cpap remains on. Pt repositioned in bed. Cortez bag changed. Family brought to sit at bedside.

## 2024-12-01 NOTE — ED NOTES
Bed: ST02  Expected date:   Expected time:   Means of arrival:   Comments:  596 79m hypotensive, responsive to pain only. Recent diagnosis of Myastenia gravis. 90/70 60% on 15L non rebreather

## 2024-12-01 NOTE — CONSULTS
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    Mendez is unable to provide history given he is intubated and sedated, most of the history was obtained via wife by bedside, reportedly he has had an orthopedic surgery about a year ago after which he was slow to recover, he had issues going up the stairs and was taking slow cautious steps when walking, he was also having issues with progressive generalized weakness, dysarthria, weight loss, shortness of breath and decreased p.o. intake.  He was seen by Dr. Ashley who did a neurological workup including myasthenia panel which revealed positive AChR antibodies and elevated at 2.1.  Paraneoplastic panel was negative.  Aldolase level was high at 35.7, Ck ok, AMY was borderline positive. EMG performed on 11/15/2024 \"Nerve conduction studies show generalized reduction in motor potential amplitudes, but rather normal sensory potential amplitudes. Needle electrode examination shows widespread acute and chronic denervation in bilateral L4, L5, L3, C8, C7, C6 and thoracic paraspinal musculature. 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  Peg tube was placed on 11/13/2024.he was started on riluzole 50 mg twice daily for possible ALS. Neuroimaging including MRI brain, C/T/L spine was largely negative for any contributory pathology.  He has had a UTI about a week ago which was found after he presented w/ weakness and fatigue and was sent home catheter.     He presented to [facility] " "yesterday after he was encephalopathic, hypoxic and hypotensive.  On admission, BP 68/49, hypothermic needing Lisette hugger, he was intubated and started on pressors.CBC showing leukocytosis, lactate within normal limits, NCC consulted for further eval/management of MG exacerbation.       Evaluation Summarized    Prior neurological workup including myasthenia panel which revealed positive AChR  antibodies and elevated at 2.1.  Paraneoplastic panel was negative.  Aldolase level was high at 35.7, Ck ok, AMY was borderline positive. EMG performed on 11/15/2024 \"Nerve conduction studies show generalized reduction in motor potential amplitudes, but rather normal sensory potential amplitudes. Needle electrode examination shows widespread acute and chronic denervation in bilateral L4, L5, L3, C8, C7, C6 and thoracic paraspinal musculature. 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         Impression    Myasthenic crisis likely due to sepsis  MG (AChR antibody+)   Possible septic shock  Questionable ALS       Recommendations   -IVIG 0.4 g/kg  daily for a total of 5 days, will do sucrose free formulation given renal compromise, Plex deferred given hemodynamic instability (d/w CCM)  -Monitor NIF and FVC twice daily per RT  -Avoid myasthenic precipitants   -Infectious/metabolic workup and management per primary  -CT chest with contrast for thymoma evaluation      Please to refer addendum by attending for finalized assessment/recommendations     Patient Follow-up    - in 6-8 weeks with general neurology ([phone])    Thank you for this consult. We will continue to follow.     The Stroke Staff is Dr. AMBAR Archer MD  Vascular Neurology Fellow    To page me or covering stroke neurology team member, click " "here: AMCOM  Choose \"On Call\" tab at top, then select \"NEUROLOGY/ALL SITES\" from middle drop-down box, press Enter, then look for \"stroke\" or \"telestroke\" for your site.  _____________________________________________________    Clinically Significant Risk Factors Present on Admission        # Hyperkalemia: Highest K = 6.5 mmol/L in last 2 days, will monitor as appropriate  # Hypernatremia: Highest Na = 152 mmol/L in last 2 days, will monitor as appropriate  # Hyperchloremia: Highest Cl = 117 mmol/L in last 2 days, will monitor as appropriate       # Hypercalcemia: Highest Ca = 11.1 mg/dL in last 2 days, will monitor as appropriate    # Hypoalbuminemia: Lowest albumin = 3 g/dL at 12/1/2024  3:13 AM, will monitor as appropriate  # Coagulation Defect: INR = 1.32 (Ref range: 0.85 - 1.15) and/or PTT = 23 Seconds (Ref range: 22 - 38 Seconds), will monitor for bleeding  # Drug Induced Platelet Defect: home medication list includes an antiplatelet medication  # Acute Kidney Injury, unspecified: based on a >150% or 0.3 mg/dL increase in last creatinine compared to past 90 day average, will monitor renal function  # Hypertension: Noted on problem list   # Circulatory Shock: required vasopressors within past 24 hours                # Financial/Environmental Concerns:        # Coma: based on GCS score of <8    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 Authorizing Provider   acetaminophen (TYLENOL) 325 MG tablet Take 3 tablets (975 mg) by mouth every 6 hours as needed for mild pain  Patient taking differently: Take 325-650 mg by mouth every 6 hours as needed for mild pain. 9/5/23  Yes Vianney Gonzalez PA-C   artificial saliva (BIOTENE MT) SOLN solution Swish and spit 2 sprays in mouth every hour as needed for dry mouth. 11/15/24  Yes Shon Navarro MD   aspirin 81 MG EC tablet Take 81 mg by mouth every evening.   Yes Unknown, Entered By History   Cholecalciferol (D3 PO) Take 1 tablet by mouth daily. OTC: Unsure of strength.   Yes Reported, Patient   Jevity 1.5 Mandeep Place 1,320 mLs into G tube daily. Infuse via pump   110ml/hr X 12 hrs/day (5.5 cartons/day)  Water flush: 150ml every 4 hours or 6x/day 11/15/24  Yes Gladys Vazquez MD   levothyroxine (SYNTHROID/LEVOTHROID) 150 MCG tablet Take 1 tablet (150 mcg) by mouth daily 7/10/24  Yes Gladys Vazquez MD   olopatadine (PATANOL) 0.1 % ophthalmic solution Place 1 drop into both eyes 2 times daily as needed for allergies. 11/11/19  Yes Reported, Patient   simvastatin (ZOCOR) 10 MG tablet Take 1 tablet (10 mg) by mouth daily 7/10/24  Yes Gladys Vazquez MD   vitamin B-12 (CYANOCOBALAMIN) 1000 MCG tablet Take 1,000 mcg by mouth daily   Yes Reported, Patient   order for DME Equipment being ordered: CPAP and supplies. LIFETIME need. 12/5/17   Michell Day,    riluzole (RILUTEK) 50 MG tablet Take 1 tablet (50 mg) by mouth every 12 hours.  Patient not taking: Reported on 11/30/2024 11/15/24   Shon Navarro MD   sulfamethoxazole-trimethoprim (BACTRIM DS) 800-160 MG tablet Take 1 tablet by mouth 2 times daily for 14 days.  Patient not taking: Reported on 11/30/2024 11/25/24 12/9/24  Gladys Vazquez MD       Scheduled Meds  Current Facility-Administered Medications   Medication Dose Route Frequency  Provider Last Rate Last Admin    aspirin (ASA) chewable tablet 81 mg  81 mg Per G Tube Daily Hadley De La O MD        atropine 1 MG/10ML injection             chlorhexidine (PERIDEX) 0.12 % solution 15 mL  15 mL Mouth/Throat Q12H Hadley De La O MD   15 mL at 12/01/24 0843    cyanocobalamin injection 1,000 mcg  1,000 mcg Intramuscular Q30 Days Hadley De La O MD        [START ON 12/2/2024] heparin ANTICOAGULANT injection 5,000 Units  5,000 Units Subcutaneous Q8H Espinoza Hardin DO        levothyroxine (SYNTHROID/LEVOTHROID) tablet 150 mcg  150 mcg Per G Tube Daily Hadley De La O MD        lidocaine (XYLOCAINE) 2 % external gel   Topical Once Espinoza Hardin DO        olopatadine (PATANOL) 0.1 % ophthalmic solution 1 drop  1 drop Both Eyes BID Hadley De La O MD        pantoprazole (PROTONIX) 2 mg/mL suspension 40 mg  40 mg Per Feeding Tube QAM AC Hadley De La O MD        Or    pantoprazole (PROTONIX) IV push injection 40 mg  40 mg Intravenous QAM AC Hadley De La O MD   40 mg at 12/01/24 0843    piperacillin-tazobactam (ZOSYN) 3.375 g vial to attach to  mL bag  3.375 g Intravenous Q6H Mendez Bradshaw MD   3.375 g at 12/01/24 0840    simvastatin (ZOCOR) tablet 10 mg  10 mg Per G Tube QPM Hadley De La O MD           Infusion Meds  Current Facility-Administered Medications   Medication Dose Route Frequency Provider Last Rate Last Admin    dexmedeTOMIDine (PRECEDEX) 4 mcg/mL in sodium chloride 0.9 % 100 mL infusion  0.1-1.2 mcg/kg/hr Intravenous Continuous Hadley De La O MD 9.4 mL/hr at 12/01/24 0828 0.5 mcg/kg/hr at 12/01/24 0828    dextrose 5% and 0.45% NaCl infusion   Intravenous Continuous Hadley De La O  mL/hr at 12/01/24 0829 Rate Verify at 12/01/24 0829    EPINEPHrine (ADRENALIN) 5 mg in  mL infusion  0.01-0.3 mcg/kg/min Intravenous Continuous Espinoza Hardin DO        midazolam (VERSED) 100 mg/100 mL NS infusion - ADULT  1-8 mg/hr Intravenous  Continuous Annabel Lan MD   Paused at 12/01/24 0900    norepinephrine (LEVOPHED) 4 mg in  mL infusion PREMIX  0.01-0.6 mcg/kg/min Intravenous Continuous Hadley De La O MD 56.6 mL/hr at 12/01/24 0829 0.2 mcg/kg/min at 12/01/24 0829    vasopressin 0.2 units/mL in NS (PITRESSIN) standard conc infusion  2.4 Units/hr Intravenous Continuous Hadley De La O MD 12 mL/hr at 12/01/24 0827 2.4 Units/hr at 12/01/24 0827       Allergies   No Known Allergies       PHYSICAL EXAMINATION   Temp:  [95.4  F (35.2  C)-98.6  F (37  C)] 97.7  F (36.5  C)  Pulse:  [] 94  Resp:  [0-98] 19  BP: ()/(43-79) 112/74  FiO2 (%):  [40 %-100 %] 40 %  SpO2:  [91 %-100 %] 98 %    Neurologic (Limited unreliable exam on sedation)  Mental Status: Comatose, does not open eyes to voice, able to open eyes to repeated noxious stimulus, was following commands  Cranial Nerves:  visual fields blinks to threat bilaterally, PERRL, EOM could not be assessed, facial movements symmetric, hearing not formally tested but intact to conversation  Motor:  no abnormal movements, moving all 4 extremities to command(squeezing fingers and wiggling toes)  Reflexes:  toes down-going  Sensory: Unable to test  Coordination:   Unable to test  Station/Gait:  deferred        Imaging  I personally reviewed all imaging; relevant findings per HPI.    Labs Data   CBC  Recent Labs   Lab 12/01/24 0313 11/30/24 2058   WBC 12.1* 13.3*   RBC 3.94* 4.45   HGB 13.3 15.1   HCT 41.7 46.5    406     Basic Metabolic Panel   Recent Labs   Lab 12/01/24  0833 12/01/24  0405 12/01/24  0348 12/01/24  0313 12/01/24  0223 11/30/24 2236 11/30/24 2218 11/30/24 2058   NA  --   --   --  152*  --   --  150* 151*   POTASSIUM  --   --   --  5.8*  5.8* 6.0*  --  5.8* 6.5*   CHLORIDE  --   --   --  117*  --   --  113* 110*   CO2  --   --   --  22  --   --  22 24   BUN  --   --   --  181.8*  --   --  185.8* 200.7*   CR  --   --   --  2.79*  --   --  2.95* 3.20*    * 252* 165* 134*  --    < > 124* 138*   SIMONA  --   --   --  10.0  --   --  10.2 11.1*    < > = values in this interval not displayed.     Liver Panel  Recent Labs   Lab 12/01/24 0313 11/30/24 2058   PROTTOTAL 5.7* 6.4   ALBUMIN 3.0* 3.2*   BILITOTAL 0.5 0.5   ALKPHOS 104 115   AST 26 23   ALT 44 48     INR    Recent Labs   Lab Test 12/01/24 0223 11/13/24  0909   INR 1.32* 1.23*           Stroke Consult Data Data   This was a non-emergent, non-telestroke consult.

## 2024-12-01 NOTE — PROGRESS NOTES
[facility] ICU RESPIRATORY NOTE        Date of Admission: 11/30/2024    Date of Intubation (most recent):  11/30/24    Reason for Mechanical Ventilation: Resp failure    Number of Days on Mechanical Ventilation: 2    Met Criteria for Spontaneous Breathing Trial: No    Reason for No Spontaneous Breathing Trial: Per MD    Significant Events Today: None    ABG Results:   Recent Labs   Lab 12/01/24  1055 12/01/24  0313   O2PER 40 40         Current Vent Settings: FiO2 (%): 40 %, Resp: 22, Vent Mode: CMV/AC, Resp Rate (Set): 16 breaths/min, Tidal Volume (Set, mL): 450 mL, PEEP (cm H2O): 5 cmH2O, Resp Rate (Set): 16 breaths/min, Tidal Volume (Set, mL): 450 mL, PEEP (cm H2O): 5 cmH2O    Skin Assessment: Intact    Plan: Pt to remain on full vent support overnight    Jim Singer, RT

## 2024-12-01 NOTE — PLAN OF CARE
Problem: Adult Inpatient Plan of Care  Goal: Plan of Care Review  Description: The Plan of Care Review/Shift note should be completed every shift.  The Outcome Evaluation is a brief statement about your assessment that the patient is improving, declining, or no change.  This information will be displayed automatically on your shift  note.  Outcome: Progressing  Flowsheets (Taken 12/1/2024 0648)  Outcome Evaluation: Pt remained safe during this shift.  Plan of Care Reviewed With: patient  Overall Patient Progress: improving  Goal: Absence of Hospital-Acquired Illness or Injury  Intervention: Identify and Manage Fall Risk  Recent Flowsheet Documentation  Taken 12/1/2024 0400 by Tino Phillips RN  Safety Promotion/Fall Prevention:   clutter free environment maintained   lighting adjusted   room door open   room near nurse's station   safety round/check completed  Taken 12/1/2024 0000 by Tino Phillips RN  Safety Promotion/Fall Prevention:   clutter free environment maintained   lighting adjusted   room door open   room near nurse's station   safety round/check completed  Intervention: Prevent Skin Injury  Recent Flowsheet Documentation  Taken 12/1/2024 0600 by Tino Phillips RN  Body Position:   left   turned   side-lying 30 degrees  Taken 12/1/2024 0400 by Tino Phillips RN  Body Position:   right   turned   side-lying 30 degrees  Taken 12/1/2024 0200 by Tino Phillips RN  Body Position:   left   turned   side-lying 30 degrees  Taken 12/1/2024 0000 by Tino Phillips RN  Body Position: supine  Intervention: Prevent and Manage VTE (Venous Thromboembolism) Risk  Recent Flowsheet Documentation  Taken 12/1/2024 0400 by Tino Phillips RN  VTE Prevention/Management: SCDs on (sequential compression devices)  Taken 12/1/2024 0000 by Tino Phillips RN  VTE Prevention/Management: SCDs on (sequential compression devices)  Goal: Optimal Comfort and Wellbeing  Intervention: Provide Person-Centered Care  Recent Flowsheet  Documentation  Taken 12/1/2024 0400 by Tino Phillips RN  Trust Relationship/Rapport: care explained  Taken 12/1/2024 0000 by Tino Phillips RN  Trust Relationship/Rapport: care explained   Goal Outcome Evaluation:      Plan of Care Reviewed With: patient    Overall Patient Progress: improvingOverall Patient Progress: improving    Outcome Evaluation: Pt remained safe during this shift.    [id number]    Orientations: Sedated, NGA.  Vitals/Pain: Hypotensive most of the shift, pt is on levo 0.2 and vasopressin 2.4. Intermittent tachycardiac. Vent setting- 40% w/ PEEP 5. Neuro q4h, pt doesn't follow command, PERRLA and extremities movement with stimulus.   Tele: ST occasional PVC, Intermittent V run and peak T wave. Notified provider and given CaCl, Na Bicarb, Insulin, D50% and 1L of D5 0.45 NS.  Lines/Drains: R internal jugular placed during this shift, and 3x PIV.  Skin/Wounds: Blanchable redness at sacrum.   GI/: Cortez cath w/ cloudy, sydnie urine.Peg tube w/ gravity.  Labs: Abnormal/Trends, Electrolyte Replacement- K-5.8,Na-152,Trop-836 and Cr-2.79  Plan: Maintain MAP>65

## 2024-12-02 LAB
ANION GAP SERPL CALCULATED.3IONS-SCNC: 7 MMOL/L (ref 7–15)
ANION GAP SERPL CALCULATED.3IONS-SCNC: 8 MMOL/L (ref 7–15)
ANION GAP SERPL CALCULATED.3IONS-SCNC: 9 MMOL/L (ref 7–15)
BACTERIA UR CULT: NO GROWTH
BASE EXCESS BLDV CALC-SCNC: -3.7 MMOL/L (ref -3–3)
BUN SERPL-MCNC: 109.9 MG/DL (ref 8–23)
BUN SERPL-MCNC: 90.8 MG/DL (ref 8–23)
BUN SERPL-MCNC: 95.1 MG/DL (ref 8–23)
CALCIUM SERPL-MCNC: 8.4 MG/DL (ref 8.8–10.4)
CALCIUM SERPL-MCNC: 8.6 MG/DL (ref 8.8–10.4)
CALCIUM SERPL-MCNC: 8.7 MG/DL (ref 8.8–10.4)
CHLORIDE SERPL-SCNC: 123 MMOL/L (ref 98–107)
CHLORIDE SERPL-SCNC: 124 MMOL/L (ref 98–107)
CHLORIDE SERPL-SCNC: 124 MMOL/L (ref 98–107)
CREAT SERPL-MCNC: 1.76 MG/DL (ref 0.67–1.17)
CREAT SERPL-MCNC: 1.76 MG/DL (ref 0.67–1.17)
CREAT SERPL-MCNC: 1.97 MG/DL (ref 0.67–1.17)
EGFRCR SERPLBLD CKD-EPI 2021: 34 ML/MIN/1.73M2
EGFRCR SERPLBLD CKD-EPI 2021: 39 ML/MIN/1.73M2
EGFRCR SERPLBLD CKD-EPI 2021: 39 ML/MIN/1.73M2
GLUCOSE BLDC GLUCOMTR-MCNC: 125 MG/DL (ref 70–99)
GLUCOSE BLDC GLUCOMTR-MCNC: 126 MG/DL (ref 70–99)
GLUCOSE BLDC GLUCOMTR-MCNC: 80 MG/DL (ref 70–99)
GLUCOSE SERPL-MCNC: 153 MG/DL (ref 70–99)
GLUCOSE SERPL-MCNC: 94 MG/DL (ref 70–99)
GLUCOSE SERPL-MCNC: 97 MG/DL (ref 70–99)
HCO3 BLDV-SCNC: 23 MMOL/L (ref 21–28)
HCO3 SERPL-SCNC: 22 MMOL/L (ref 22–29)
IGG SERPL-MCNC: 950 MG/DL (ref 610–1616)
MAGNESIUM SERPL-MCNC: 2.2 MG/DL (ref 1.7–2.3)
O2/TOTAL GAS SETTING VFR VENT: 30 %
OXYHGB MFR BLDV: 75 % (ref 70–75)
PCO2 BLDV: 45 MM HG (ref 40–50)
PH BLDV: 7.31 [PH] (ref 7.32–7.43)
PHOSPHATE SERPL-MCNC: 4.2 MG/DL (ref 2.5–4.5)
PO2 BLDV: 41 MM HG (ref 25–47)
POTASSIUM SERPL-SCNC: 3.9 MMOL/L (ref 3.4–5.3)
POTASSIUM SERPL-SCNC: 3.9 MMOL/L (ref 3.4–5.3)
POTASSIUM SERPL-SCNC: 4 MMOL/L (ref 3.4–5.3)
SAO2 % BLDV: 76 % (ref 70–75)
SODIUM SERPL-SCNC: 153 MMOL/L (ref 135–145)
SODIUM SERPL-SCNC: 154 MMOL/L (ref 135–145)
SODIUM SERPL-SCNC: 154 MMOL/L (ref 135–145)

## 2024-12-02 PROCEDURE — 80048 BASIC METABOLIC PNL TOTAL CA: CPT | Performed by: INTERNAL MEDICINE

## 2024-12-02 PROCEDURE — B4035 ENTERAL FEED SUPP PUMP PER D: HCPCS

## 2024-12-02 PROCEDURE — 83735 ASSAY OF MAGNESIUM: CPT | Performed by: STUDENT IN AN ORGANIZED HEALTH CARE EDUCATION/TRAINING PROGRAM

## 2024-12-02 PROCEDURE — 250N000011 HC RX IP 250 OP 636: Performed by: INTERNAL MEDICINE

## 2024-12-02 PROCEDURE — 200N000001 HC R&B ICU

## 2024-12-02 PROCEDURE — 250N000011 HC RX IP 250 OP 636: Performed by: PSYCHIATRY & NEUROLOGY

## 2024-12-02 PROCEDURE — 82805 BLOOD GASES W/O2 SATURATION: CPT | Performed by: INTERNAL MEDICINE

## 2024-12-02 PROCEDURE — 82374 ASSAY BLOOD CARBON DIOXIDE: CPT | Performed by: INTERNAL MEDICINE

## 2024-12-02 PROCEDURE — 82310 ASSAY OF CALCIUM: CPT | Performed by: INTERNAL MEDICINE

## 2024-12-02 PROCEDURE — 250N000011 HC RX IP 250 OP 636: Performed by: STUDENT IN AN ORGANIZED HEALTH CARE EDUCATION/TRAINING PROGRAM

## 2024-12-02 PROCEDURE — 80048 BASIC METABOLIC PNL TOTAL CA: CPT | Performed by: STUDENT IN AN ORGANIZED HEALTH CARE EDUCATION/TRAINING PROGRAM

## 2024-12-02 PROCEDURE — 82565 ASSAY OF CREATININE: CPT | Performed by: STUDENT IN AN ORGANIZED HEALTH CARE EDUCATION/TRAINING PROGRAM

## 2024-12-02 PROCEDURE — 250N000011 HC RX IP 250 OP 636: Performed by: ANESTHESIOLOGY

## 2024-12-02 PROCEDURE — 250N000013 HC RX MED GY IP 250 OP 250 PS 637: Performed by: SURGERY

## 2024-12-02 PROCEDURE — 99291 CRITICAL CARE FIRST HOUR: CPT | Performed by: INTERNAL MEDICINE

## 2024-12-02 PROCEDURE — 94003 VENT MGMT INPAT SUBQ DAY: CPT

## 2024-12-02 PROCEDURE — 999N000157 HC STATISTIC RCP TIME EA 10 MIN

## 2024-12-02 PROCEDURE — 82784 ASSAY IGA/IGD/IGG/IGM EACH: CPT | Performed by: PSYCHIATRY & NEUROLOGY

## 2024-12-02 PROCEDURE — 84100 ASSAY OF PHOSPHORUS: CPT | Performed by: STUDENT IN AN ORGANIZED HEALTH CARE EDUCATION/TRAINING PROGRAM

## 2024-12-02 PROCEDURE — 99291 CRITICAL CARE FIRST HOUR: CPT | Mod: GC | Performed by: STUDENT IN AN ORGANIZED HEALTH CARE EDUCATION/TRAINING PROGRAM

## 2024-12-02 PROCEDURE — 258N000003 HC RX IP 258 OP 636: Performed by: SURGERY

## 2024-12-02 PROCEDURE — 250N000009 HC RX 250: Performed by: SURGERY

## 2024-12-02 PROCEDURE — 250N000013 HC RX MED GY IP 250 OP 250 PS 637: Performed by: PSYCHIATRY & NEUROLOGY

## 2024-12-02 RX ORDER — NALOXONE HYDROCHLORIDE 0.4 MG/ML
0.2 INJECTION, SOLUTION INTRAMUSCULAR; INTRAVENOUS; SUBCUTANEOUS
Status: DISCONTINUED | OUTPATIENT
Start: 2024-12-02 | End: 2024-12-11 | Stop reason: HOSPADM

## 2024-12-02 RX ORDER — FENTANYL CITRATE 0.05 MG/ML
25 INJECTION, SOLUTION INTRAMUSCULAR; INTRAVENOUS
Status: DISCONTINUED | OUTPATIENT
Start: 2024-12-02 | End: 2024-12-03

## 2024-12-02 RX ORDER — NALOXONE HYDROCHLORIDE 0.4 MG/ML
0.4 INJECTION, SOLUTION INTRAMUSCULAR; INTRAVENOUS; SUBCUTANEOUS
Status: DISCONTINUED | OUTPATIENT
Start: 2024-12-02 | End: 2024-12-11 | Stop reason: HOSPADM

## 2024-12-02 RX ORDER — PROPOFOL 10 MG/ML
5-75 INJECTION, EMULSION INTRAVENOUS CONTINUOUS
Status: DISCONTINUED | OUTPATIENT
Start: 2024-12-02 | End: 2024-12-03

## 2024-12-02 RX ADMIN — SIMVASTATIN 10 MG: 10 TABLET, FILM COATED ORAL at 20:21

## 2024-12-02 RX ADMIN — CHLORHEXIDINE GLUCONATE 15 ML: 1.2 SOLUTION ORAL at 07:58

## 2024-12-02 RX ADMIN — PIPERACILLIN AND TAZOBACTAM 3.38 G: 3; .375 INJECTION, POWDER, FOR SOLUTION INTRAVENOUS at 14:56

## 2024-12-02 RX ADMIN — HEPARIN SODIUM 5000 UNITS: 5000 INJECTION, SOLUTION INTRAVENOUS; SUBCUTANEOUS at 16:45

## 2024-12-02 RX ADMIN — OLOPATADINE HYDROCHLORIDE 1 DROP: 1 SOLUTION/ DROPS OPHTHALMIC at 09:05

## 2024-12-02 RX ADMIN — CHLORHEXIDINE GLUCONATE 15 ML: 1.2 SOLUTION ORAL at 20:21

## 2024-12-02 RX ADMIN — DIPHENHYDRAMINE HYDROCHLORIDE 50 MG: 50 INJECTION, SOLUTION INTRAMUSCULAR; INTRAVENOUS at 13:54

## 2024-12-02 RX ADMIN — ACETAMINOPHEN 650 MG: 325 TABLET, FILM COATED ORAL at 13:01

## 2024-12-02 RX ADMIN — PIPERACILLIN AND TAZOBACTAM 3.38 G: 3; .375 INJECTION, POWDER, FOR SOLUTION INTRAVENOUS at 02:40

## 2024-12-02 RX ADMIN — Medication 40 MG: at 07:59

## 2024-12-02 RX ADMIN — PIPERACILLIN AND TAZOBACTAM 3.38 G: 3; .375 INJECTION, POWDER, FOR SOLUTION INTRAVENOUS at 07:59

## 2024-12-02 RX ADMIN — DEXTROSE AND SODIUM CHLORIDE: 5; 450 INJECTION, SOLUTION INTRAVENOUS at 02:30

## 2024-12-02 RX ADMIN — ASPIRIN 81 MG CHEWABLE TABLET 81 MG: 81 TABLET CHEWABLE at 08:54

## 2024-12-02 RX ADMIN — HUMAN IMMUNOGLOBULIN G 30 G: 20 LIQUID INTRAVENOUS at 14:27

## 2024-12-02 RX ADMIN — ACETAMINOPHEN 650 MG: 325 SUSPENSION ORAL at 06:27

## 2024-12-02 RX ADMIN — LEVOTHYROXINE SODIUM 150 MCG: 150 TABLET ORAL at 08:53

## 2024-12-02 RX ADMIN — NOREPINEPHRINE BITARTRATE 0.12 MCG/KG/MIN: 0.02 INJECTION, SOLUTION INTRAVENOUS at 03:55

## 2024-12-02 RX ADMIN — PIPERACILLIN AND TAZOBACTAM 3.38 G: 3; .375 INJECTION, POWDER, FOR SOLUTION INTRAVENOUS at 20:19

## 2024-12-02 RX ADMIN — OLOPATADINE HYDROCHLORIDE 1 DROP: 1 SOLUTION/ DROPS OPHTHALMIC at 20:21

## 2024-12-02 RX ADMIN — NOREPINEPHRINE BITARTRATE 0.07 MCG/KG/MIN: 0.02 INJECTION, SOLUTION INTRAVENOUS at 12:57

## 2024-12-02 RX ADMIN — HEPARIN SODIUM 5000 UNITS: 5000 INJECTION, SOLUTION INTRAVENOUS; SUBCUTANEOUS at 08:54

## 2024-12-02 RX ADMIN — PROPOFOL 10 MCG/KG/MIN: 10 INJECTION, EMULSION INTRAVENOUS at 12:53

## 2024-12-02 ASSESSMENT — ACTIVITIES OF DAILY LIVING (ADL)
ADLS_ACUITY_SCORE: 77
DEPENDENT_IADLS:: CLEANING;COOKING;LAUNDRY;SHOPPING;MEAL PREPARATION;MEDICATION MANAGEMENT;MONEY MANAGEMENT;TRANSPORTATION;INCONTINENCE
ADLS_ACUITY_SCORE: 70
ADLS_ACUITY_SCORE: 77
ADLS_ACUITY_SCORE: 68
ADLS_ACUITY_SCORE: 77
ADLS_ACUITY_SCORE: 76
ADLS_ACUITY_SCORE: 77
ADLS_ACUITY_SCORE: 72
ADLS_ACUITY_SCORE: 70
ADLS_ACUITY_SCORE: 77
ADLS_ACUITY_SCORE: 73
ADLS_ACUITY_SCORE: 77
ADLS_ACUITY_SCORE: 72
ADLS_ACUITY_SCORE: 77
ADLS_ACUITY_SCORE: 73
ADLS_ACUITY_SCORE: 77
ADLS_ACUITY_SCORE: 70
ADLS_ACUITY_SCORE: 77
ADLS_ACUITY_SCORE: 77
ADLS_ACUITY_SCORE: 73

## 2024-12-02 NOTE — PROVIDER NOTIFICATION
Pt's HR intermittently drops as low as 36. B/P stable. Dr. Wilkinson notified. No further orders.

## 2024-12-02 NOTE — PLAN OF CARE
Goal Outcome Evaluation:    Orientation: Intubated, NGA.  Follows commands, able to TRUONG.  BUE weaker than BLE.  Neuro checks q 4 hours.  Pupils PERRLA.  Vitals/Pain: On levophed overnight, weaned from 0.2 to 0.1.  SR 90s until pt fell asleep when HR went down into the 40s. Vent settings: 30% w/ PEEP 5. Pt c/o pain mostly to throat, given acetaminophen x 2 this shift with good relief.  Lines/Drains: R-IJ TL, PIV x 3.  Skin/Wounds: Blanchable redness at sacrum. Nonblanchable redness to L-heel with some bogginess, blanchable redness to R-heel.  Prevalon boots initiated at start of shift.  GI/: Cortez cath w/ good uop, yellow with mucus (but expected due to bladder reconstruction with colon).  PEG tube with TF infusing at 10 ml/hr and 60 ml flush q 4 hrs.  Pt denies nausea.  Labs: AM sodium up to 154. Will pass on to day shift.      Problem: Adult Inpatient Plan of Care  Goal: Absence of Hospital-Acquired Illness or Injury  Intervention: Prevent Skin Injury  Recent Flowsheet Documentation  Taken 12/2/2024 0600 by Nidia Cavazos, RN  Body Position:   turned   side-lying   heels elevated   neutral head position   upper extremity elevated  Taken 12/2/2024 0400 by Nidia Cavazos, RN  Body Position:   turned   side-lying   heels elevated   neutral head position   upper extremity elevated  Skin Protection:   adhesive use limited   incontinence pads utilized   protective footwear used   pulse oximeter probe site changed   silicone foam dressing in place   transparent dressing maintained  Taken 12/2/2024 0200 by Nidia Cavazos, RN  Body Position:   turned   side-lying   heels elevated   neutral head position   upper extremity elevated  Taken 12/2/2024 0000 by Nidia Cavazos, RN  Body Position:   turned   side-lying   heels elevated   neutral head position   upper extremity elevated  Skin Protection:   adhesive use limited   incontinence pads utilized   protective footwear used   pulse oximeter probe site changed    silicone foam dressing in place   transparent dressing maintained  Taken 12/1/2024 2200 by Nidia Cavazos RN  Body Position:   turned   side-lying   heels elevated   neutral head position   upper extremity elevated  Taken 12/1/2024 2000 by Nidia Cavazos RN  Body Position:   turned   side-lying   heels elevated   neutral head position   upper extremity elevated  Skin Protection:   adhesive use limited   incontinence pads utilized   protective footwear used   pulse oximeter probe site changed   silicone foam dressing in place   transparent dressing maintained     Problem: Adult Inpatient Plan of Care  Goal: Optimal Comfort and Wellbeing  Outcome: Progressing  Intervention: Monitor Pain and Promote Comfort  Recent Flowsheet Documentation  Taken 12/2/2024 0000 by Nidia Cavazos RN  Pain Management Interventions:   care clustered   environmental changes   pillow support provided   quiet environment facilitated   repositioned   rest  Taken 12/1/2024 2100 by Nidia Cavazos RN  Pain Management Interventions:   medication (see MAR)   care clustered   emotional support   environmental changes   pillow support provided   quiet environment facilitated   repositioned   rest  Intervention: Provide Person-Centered Care  Recent Flowsheet Documentation  Taken 12/2/2024 0400 by Nidia Cavazos RN  Trust Relationship/Rapport:   care explained   choices provided   empathic listening provided   emotional support provided   reassurance provided   thoughts/feelings acknowledged   questions encouraged   questions answered  Taken 12/2/2024 0000 by Nidia Cavazos RN  Trust Relationship/Rapport:   care explained   choices provided   empathic listening provided   emotional support provided   reassurance provided   thoughts/feelings acknowledged   questions encouraged   questions answered  Taken 12/1/2024 2000 by Nidia Cavazos RN  Trust Relationship/Rapport:   care explained   choices provided   empathic listening  provided   emotional support provided   reassurance provided   thoughts/feelings acknowledged   questions encouraged   questions answered     Problem: Restraint, Nonviolent  Goal: Absence of Harm or Injury  Intervention: Protect Dignity, Rights and Personal Wellbeing  Recent Flowsheet Documentation  Taken 12/2/2024 0400 by Nidia Cavazos, RN  Trust Relationship/Rapport:   care explained   choices provided   empathic listening provided   emotional support provided   reassurance provided   thoughts/feelings acknowledged   questions encouraged   questions answered  Taken 12/2/2024 0000 by Nidia Cavazos, KEVIN  Trust Relationship/Rapport:   care explained   choices provided   empathic listening provided   emotional support provided   reassurance provided   thoughts/feelings acknowledged   questions encouraged   questions answered  Taken 12/1/2024 2000 by Nidia Cavazos, KEVIN  Trust Relationship/Rapport:   care explained   choices provided   empathic listening provided   emotional support provided   reassurance provided   thoughts/feelings acknowledged   questions encouraged   questions answered

## 2024-12-02 NOTE — PROGRESS NOTES
Chart reviewed, patient was seen for a complete assessment yesterday -- see note dated 12/1/24 for details    Currently running trophic TF as follows ~    Nutrition Support Enteral:  Type of Feeding Tube: G-tube   Enteral Frequency:  Continuous  Enteral Regimen: Novasource Renal at 10 mL/hr x 22 hours per day (holding TF 1 hour before and 1 hour after Synthroid administration)  Total Enteral Provisions: 440 kcal (6 kcal/kg), 20 g protein (0.3 g/kg), 40 g CHO, 0 g fiber, 158 mL H2O  Free Water Flush: 200 mL every 2 hours     Na 154 (H) - Flushes increased today as above  K/Mg/Phos NL (much improved from yest) - MOHAMUD  .9 (H), Cr 1.97 (H) - MOHAMUD   I/O 6247/3275, wt 78.1 kg (up overall)    No stool yet   Norepi drip     ASSESSED NUTRITION NEEDS PER APPROVED PRACTICE GUIDELINES:  Dosing Weight 75.4 kg  Estimated Energy Needs: 2397-1466 kcals (25-30 Kcal/Kg)  Justification: vented  Estimated Protein Needs: 75-90 grams protein (1-1.2 g pro/Kg)  Justification: elevated BUN, hypercatabolism with critical illness  Estimated Fluid Needs: 2012-2611 mL (1 mL/Kcal)  Justification: maintenance or per MD    Discussed during rounds --> Will increase TF towards goal     Orders written to increase TF rate to 15 mL/hr now and advance every 8 hours by 15 mL to goal   Novasource Renal at 45 mL/hr x 22 hours per day (hold TF 1 hour before and 1 hour after Synthroid) = 1980 kcal (26 kcal/kg), 90 g protein (1.2 g/kg), 181 g CHO, 0 g fiber, 710 mL H2O      Haley Chang, RD, LD, CNSC   Clinical Dietitian - [facility]

## 2024-12-02 NOTE — PLAN OF CARE
Problem: Adult Inpatient Plan of Care  Goal: Optimal Comfort and Wellbeing  Intervention: Provide Person-Centered Care  Recent Flowsheet Documentation  Taken 12/2/2024 1600 by Ama Sullivan RN  Trust Relationship/Rapport:   care explained   choices provided   emotional support provided   reassurance provided   questions encouraged   questions answered  Taken 12/2/2024 1200 by Ama Sullivan RN  Trust Relationship/Rapport:   care explained   choices provided   emotional support provided   reassurance provided   questions encouraged   questions answered  Taken 12/2/2024 0800 by Ama Sullivan RN  Trust Relationship/Rapport:   care explained   choices provided   emotional support provided   reassurance provided  Goal Outcome Evaluation:  Pt tolerated CPAP 5/5 for 6 hours. Decision made to keep pt on the vent until tomorrow. Precedex gtt d/c'd due to bradycardia. Tylenol prn pain.  Pt placed back on full vent support and propofol gtt started to allow pt to rest overnight. Pt received IVIG this afternoon. Levophed weaned as tolerated to keep MAP >65. TF goal rate increased. Adequate UOP per [place]. Family updated at bedside.

## 2024-12-02 NOTE — PLAN OF CARE
Problem: Adult Inpatient Plan of Care  Goal: Absence of Hospital-Acquired Illness or Injury  Intervention: Prevent and Manage VTE (Venous Thromboembolism) Risk  Recent Flowsheet Documentation  Taken 12/1/2024 1600 by Juliet Schaffer RN  VTE Prevention/Management: SCDs on (sequential compression devices)  Taken 12/1/2024 1200 by Juliet Schaffer RN  VTE Prevention/Management: SCDs on (sequential compression devices)  Taken 12/1/2024 0800 by Juliet Schaffer RN  VTE Prevention/Management: SCDs on (sequential compression devices)     Problem: Adult Inpatient Plan of Care  Goal: Optimal Comfort and Wellbeing  Intervention: Provide Person-Centered Care  Recent Flowsheet Documentation  Taken 12/1/2024 1600 by Juliet Schaffer RN  Trust Relationship/Rapport:   care explained   choices provided   emotional support provided   empathic listening provided   questions answered   questions encouraged   reassurance provided   thoughts/feelings acknowledged  Taken 12/1/2024 1200 by Juliet Schaffer RN  Trust Relationship/Rapport:   care explained   choices provided   emotional support provided   empathic listening provided   questions answered   questions encouraged   reassurance provided   thoughts/feelings acknowledged  Taken 12/1/2024 0800 by Juliet Schaffer RN  Trust Relationship/Rapport:   care explained   choices provided   emotional support provided   empathic listening provided   questions answered   questions encouraged   reassurance provided   thoughts/feelings acknowledged     Problem: Risk for Delirium  Goal: Improved Behavioral Control  Intervention: Minimize Safety Risk  Recent Flowsheet Documentation  Taken 12/1/2024 1600 by Juliet Schaffer RN  Enhanced Safety Measures:   review medications for side effects with activity   room near unit station  Trust Relationship/Rapport:   care explained   choices provided   emotional support provided   empathic  listening provided   questions answered   questions encouraged   reassurance provided   thoughts/feelings acknowledged  Taken 12/1/2024 1200 by Juliet Schaffer RN  Enhanced Safety Measures:   review medications for side effects with activity   room near unit station  Trust Relationship/Rapport:   care explained   choices provided   emotional support provided   empathic listening provided   questions answered   questions encouraged   reassurance provided   thoughts/feelings acknowledged  Taken 12/1/2024 0800 by Juliet Schaffer RN  Enhanced Safety Measures:   review medications for side effects with activity   room near unit station  Trust Relationship/Rapport:   care explained   choices provided   emotional support provided   empathic listening provided   questions answered   questions encouraged   reassurance provided   thoughts/feelings acknowledged   Goal Outcome Evaluation:  Pt remains intubated, fiO2 weaned from 40 to 30%, peep 5. Vaso weaned off, levo weaned as able. Versed off, dex low dose. Pt appears comfortable and denied pain. Immune globulin given (1st dose), tolerated. Tube feed via peg tube started, ok to use PEG per sp Arredondo, 1 L bolus given. Family by bedside, supportive, questions encouraged/answered. Will cont to closely monitor.

## 2024-12-02 NOTE — CONSULTS
Care Management Initial Consult    General Information  Assessment completed with: Spouse or significant other, Children, spouse Alana, raghu Winslow  Type of CM/SW Visit: Initial Assessment    Primary Care Provider verified and updated as needed: Yes   Readmission within the last 30 days: unable to assess      Reason for Consult: other (see comments) (Elevated Risk Score)  Advance Care Planning: Advance Care Planning Reviewed: present on chart          Communication Assessment  Patient's communication style: spoken language (English or Bilingual)    Hearing Difficulty or Deaf: yes   Wear Glasses or Blind: other (see comments) (pt unable to answer)    Cognitive  Cognitive/Neuro/Behavioral: .WDL except  Level of Consciousness: sedated  Arousal Level: arouses to voice  Orientation: other (see comments) (NGA)  Mood/Behavior: calm, cooperative  Best Language: 3 - Mute  Speech: endotracheal tube    Living Environment:   People in home: spouse  Alana  Current living Arrangements: house      Able to return to prior arrangements: other (see comments)       Family/Social Support:  Care provided by: child(amena), homecare agency  Provides care for: no one  Marital Status:   Support system: Wife, Children  Alana       Description of Support System: Supportive, Involved         Current Resources:   Patient receiving home care services: Yes ([facility])        Community Resources:    Equipment currently used at home: hospital bed  Supplies currently used at home: Incontinence Supplies, Nutritional Supplements, Enteral Nutrition & Supplies, Wipes, Gloves, Other (snowden catheter supplies)    Employment/Financial:  Employment Status: retired        Financial Concerns: none   Referral to Financial Worker: No       Does the patient's insurance plan have a 3 day qualifying hospital stay waiver?  Yes, [employer]    Lifestyle & Psychosocial Needs:  Social Drivers of Health     Food Insecurity: Unknown  (12/1/2024)    Food Insecurity     Within the past 12 months, did you worry that your food would run out before you got money to buy more?: Patient unable to answer     Within the past 12 months, did the food you bought just not last and you didn t have money to get more?: Patient unable to answer   Depression: Not at risk (7/10/2024)    PHQ-2     PHQ-2 Score: 1   Housing Stability: Unknown (12/1/2024)    Housing Stability     Do you have housing? : Patient unable to answer     Are you worried about losing your housing?: Patient unable to answer   Tobacco Use: Medium Risk (11/22/2024)    Patient History     Smoking Tobacco Use: Former     Smokeless Tobacco Use: Never     Passive Exposure: Never   Financial Resource Strain: Unknown (12/1/2024)    Financial Resource Strain     Within the past 12 months, have you or your family members you live with been unable to get utilities (heat, electricity) when it was really needed?: Patient unable to answer   Alcohol Use: Not on file   Transportation Needs: Unknown (12/1/2024)    Transportation Needs     Within the past 12 months, has lack of transportation kept you from medical appointments, getting your medicines, non-medical meetings or appointments, work, or from getting things that you need?: Patient unable to answer   Physical Activity: Sufficiently Active (7/8/2024)    Exercise Vital Sign     Days of Exercise per Week: 3 days     Minutes of Exercise per Session: 60 min   Interpersonal Safety: Unknown (12/1/2024)    Interpersonal Safety     Do you feel physically and emotionally safe where you currently live?: Patient unable to answer     Within the past 12 months, have you been hit, slapped, kicked or otherwise physically hurt by someone?: Patient unable to answer     Within the past 12 months, have you been humiliated or emotionally abused in other ways by your partner or ex-partner?: Patient unable to answer   Stress: No Stress Concern Present (7/8/2024)    [employer]  [place] [employer] - Occupational Stress Questionnaire     Feeling of Stress : Only a little   Social Connections: Unknown (7/8/2024)    Social Connection and Isolation Panel [NHANES]     Frequency of Communication with Friends and Family: Not on file     Frequency of Social Gatherings with Friends and Family: Twice a week     Attends Tenriism Services: Not on file     Active Member of Clubs or Organizations: Not on file     Attends Club or Organization Meetings: Not on file     Marital Status: Not on file   Health Literacy: Not on file       Functional Status:  Prior to admission patient needed assistance:   Dependent ADLs:: Bathing, Dressing, Eating, Grooming, Incontinence, Transfers, Positioning, Toileting  Dependent IADLs:: Cleaning, Cooking, Laundry, Shopping, Meal Preparation, Medication Management, Money Management, Transportation, Incontinence       Mental Health Status:          Chemical Dependency Status:                Values/Beliefs:  Spiritual, Cultural Beliefs, Tenriism Practices, Values that affect care: no               Discussed  Partnership in Safe Discharge Planning  document with patient/family: No    Additional Information:  Per consult for Elevated Risk Score, met with patient's spouse Alana and pt's son Goldy as pt remains intubated.  Per Alana, prior to admit, patient was on [facility] and was receiving noctural TFs via G-tube with [facility] support.  Alana shared that pt was bed bound and had a hospital bed through [facility].  Alana shared that they were paying for private duty cares for patient as well.  Alana mentioned that [facility] had been in communication with her and discussed that pt would be dis-enrolled from hospice.  Informed Alana that [facility] would be called to confirm that pt was dis-enrolled.  Goldy shared that with pt starting new tx IVig, they are hopeful patient will have a better outcome.  Discussed that PT/OT will  evaluate patient for discharge recommendations.  Discussed ARU vs TCU.  Called [facility] and  spoke to Wali and informed her of pt's admission and family being informed of pt's dis-enrollment of [facility].  Per Wali, she will have the  call Alana to discuss confirmation of hospice for the patient.     Next Steps: Await PT/OT/speech eval.  Care Management will continue to follow for discharge planning.     Lianet Day, KEVIN Day RN, BSN, OCN   Inpatient Care Coordination ICU  [facility]  Office: [phone]

## 2024-12-02 NOTE — PROGRESS NOTES
[facility]    Patient is currently on service with [facility] for enteral nutrition (Jevity 1.5, 110ml/hr x12hrs).     Liaison will follow along. If applicable at discharge, please include Home Infusion Referral in discharge orders.     Thank you,    Joanna Hickman RN  [facility] Liaison  [phone] (M-F 8a-5p)  [phone] Office

## 2024-12-02 NOTE — PLAN OF CARE
Goal Outcome Evaluation:      Plan of Care Reviewed With: spouse          Outcome Evaluation: Discharge pending progress and PT eval.

## 2024-12-02 NOTE — PROGRESS NOTES
Critical Care Progress Note      12/02/2024    Name: Mendez Greene MRN#: [id number]   Age: 79 year old YOB: 1945     Hsptl Day# 2  ICU DAY #    MV DAY #             Problem List:   Active Problems:    Complication of feeding tube (H)    Acute renal failure (ARF) (H)    Acute hypoxic respiratory failure (H)           Summary/Hospital Course:     79-year-old male with history of paroxysmal atrial fibrillation, hypertension, bladder cancer, obstructive sleep apnea CKD presented to our hospital in early November because of progressive weakness hoarse voice shortness of breath and dysphagia admitted for neuromuscular weakness, acute respiratory failure and septic shock.           Assessment and plan :     Mendez Greene IS a 79 year old male admitted on 11/30/2024 for admitted for neuromuscular weakness, acute respiratory failure and septic shock.     I have personally reviewed the daily labs, imaging studies, cultures and discussed the case with referring physician and consulting physicians.     My assessment and plan by system for this patient is as follows:    Neurology/Psychiatry:   Progressive weakness over last few weaks. Concern for ALS and started on riluzole and also at the same time he had nerve conduction study with suggestion of motor neuron disease. Ab for MG discovered.   Concern then became for MG crisis however, picture not full clear.     1. MG crisis   2. Analgesia  3. Anxiety  Plan  -Wean off Precedex due to bradycaria  -Propofol and prn Fentanyl available if needed   -Rass goal 0/-1  -IVIG and accompanying meds per neuro  -Appreciate NCC input     Cardiovascular:   1.Hemodynamics -shock- weaning norepi. Off vasopressin  2.Rhythm - NSR  3. Ischemia - none  Plan   -Wean norepi as able  -Goal MAP >65  -Consider holding statin    Pulmonary/Ventilator Management:   1. Airway -ETT  2. Oxygenation/ventilation/mechanics- lowering FiO2 and PEEP- PS today  Plan  - PS during the day, give  break  -[facility] prefers to keep patient intubated one more day until can get 2nd dosing of IVIG   -Daily VBGs  -Daily NIFs  -Daily SAT and SBT    GI and Nutrition :   1. GI Ppx  2. Enteral feedings  Plan  -Continue Tfs  -Continue PPI    Renal/Fluids/Electrolytes:   1. Hypernatremia and hyperchloremia- 2.7L FW deficit  2. Electrolyte abnormality  3. Acid/base status- acidotic favoring metabolic cause  4. Volume status- initially hypovolemia then closer to euvolemia after resuscitation  5. MOHAMUD with sever uremia- improving with fluid resuscitation     Plan  - Stop D5W in 0.45% NaCl-adding to sodium/chloride load along with medications in saline  -Increase FWFs from 60mL q4h to 200mL q2h  - BMP q6h; adjust FWFs.  If worsening or not correcting on repeated BMPs, add D5W  - monitor function and electrolytes as needed with replacement per ICU protocols.  - generally avoid nephrotoxic agents such as NSAID, IV contrast unless specifically required  - adjust medications as needed for renal clearance  - follow I/O's strictly  -Cortez catheter needed    Infectious Disease:   1. Septic shock- leukocytosis improving with antibiotics. Concern for UTI. Cultures in process but so far unrevealing    Plan  - Continue Zosyn, follow cultures    Endocrine:   1. Hypothyroidism  2. Stress induced hyperglycemia    Plan  - ICU insulin protocol, goal sugar <180  - Synthroid    Hematology/Oncology:   1. Leukocytosis- improving  2. No Anemia, no signs, symptoms of active blood loss    Plan  - subcutaneous heparin for DVT ppx      MSKL/Rheum:  1. Generalized weakness 2/2 neuro condition   Plan  - Needs PT/OT       IV/Access:   1. Venous access - CVC   2. Arterial access - None  3.  Plan  - central access required and necessary      ICU Prophylaxis:   1. DVT: Hep Subq/ LMWH/mechanical  2. Stress Ulcer: PPI/H2 blocker  3. Restraints:  Nonviolent soft two point restraints required and necessary for patient safety and continued cares and good effect as  patient continues to pull at necessary lines, tubes despite education and distraction. Will readdress daily.   4. Feeding - tube feeds  5. Family Update:Yes, bedside  6. Disposition - ICU             Key Medications:     Current Facility-Administered Medications   Medication Dose Route Frequency Provider Last Rate Last Admin    acetaminophen (TYLENOL) tablet 650 mg  650 mg Oral Q24H Papo Archer MD   650 mg at 12/02/24 1301    aspirin (ASA) chewable tablet 81 mg  81 mg Per G Tube Daily Hadley De La O MD   81 mg at 12/02/24 0854    chlorhexidine (PERIDEX) 0.12 % solution 15 mL  15 mL Mouth/Throat Q12H Hadley De La O MD   15 mL at 12/02/24 0758    cyanocobalamin injection 1,000 mcg  1,000 mcg Intramuscular Q30 Days Hadley De La O MD        diphenhydrAMINE (BENADRYL) capsule 50 mg  50 mg Oral Q24H Papo Archer MD        Or    diphenhydrAMINE (BENADRYL) injection 50 mg  50 mg Intravenous Q24H Papo Archer MD   50 mg at 12/02/24 1354    heparin ANTICOAGULANT injection 5,000 Units  5,000 Units Subcutaneous Q8H Espinoza Hardin DO   5,000 Units at 12/02/24 1645    immune globulin - sucrose free 10 % injection 30 g  30 g Intravenous Q24H Papo Archer MD   30 g at 12/02/24 1427    levothyroxine (SYNTHROID/LEVOTHROID) tablet 150 mcg  150 mcg Per G Tube Daily Hadley De La O MD   150 mcg at 12/02/24 0853    olopatadine (PATANOL) 0.1 % ophthalmic solution 1 drop  1 drop Both Eyes BID Hadley De La O MD   1 drop at 12/02/24 0905    pantoprazole (PROTONIX) 2 mg/mL suspension 40 mg  40 mg Per Feeding Tube QAM AC Hadley De La O MD   40 mg at 12/02/24 0759    Or    pantoprazole (PROTONIX) IV push injection 40 mg  40 mg Intravenous QAM AC Hadley De La O MD   40 mg at 12/01/24 0843    piperacillin-tazobactam (ZOSYN) 3.375 g vial to attach to  mL bag  3.375 g Intravenous Q6H Mendez Bradshaw MD   3.375 g at 12/02/24 1456    simvastatin  (ZOCOR) tablet 10 mg  10 mg Per G Tube QPM Hadley De La O MD   10 mg at 12/01/24 2024     Current Facility-Administered Medications   Medication Dose Route Frequency Provider Last Rate Last Admin    dextrose 10% infusion   Intravenous Continuous PRN Espinoza Hardin,         propofol (DIPRIVAN) infusion  5-75 mcg/kg/min Intravenous Continuous Joao Dukes MD 7 mL/hr at 12/02/24 1359 15 mcg/kg/min at 12/02/24 1359    And    Medication Instruction   Does not apply Continuous PRN Joao Dukes MD        norepinephrine (LEVOPHED) 4 mg in  mL infusion PREMIX  0.01-0.6 mcg/kg/min Intravenous Continuous Hadley De La O MD 14.1 mL/hr at 12/02/24 1600 0.05 mcg/kg/min at 12/02/24 1600               Physical Examination:   Temp:  [96.4  F (35.8  C)-100.4  F (38  C)] 97  F (36.1  C)  Pulse:  [] 37  Resp:  [11-34] 16  BP: ()/() 111/62  FiO2 (%):  [30 %-40 %] 30 %  SpO2:  [97 %-100 %] 100 %    Intake/Output Summary (Last 24 hours) at 12/2/2024 1737  Last data filed at 12/2/2024 1600  Gross per 24 hour   Intake 5675.51 ml   Output 3035 ml   Net 2640.51 ml     Wt Readings from Last 4 Encounters:   12/02/24 78.1 kg (172 lb 3.2 oz)   11/14/24 78.4 kg (172 lb 13.5 oz)   11/15/24 78.4 kg (172 lb 13.5 oz)   11/06/24 81.6 kg (180 lb)     BP - Mean:  [] 85  FiO2 (%): 30 %, Resp: 16, Vent Mode: CMV/AC, Resp Rate (Set): 16 breaths/min, Tidal Volume (Set, mL): 450 mL, PEEP (cm H2O): 5 cmH2O, Pressure Support (cm H2O): 5 cmH2O, Resp Rate (Set): 16 breaths/min, Tidal Volume (Set, mL): 450 mL, PEEP (cm H2O): 5 cmH2O  Recent Labs   Lab 12/02/24  0911 12/01/24  1055 12/01/24  0313   O2PER 30 40 40       GEN: no acute distress, alert  HEENT: ETT, OG, and CVC in place.   PULM: unlabored synchronous with vent, clear anteriorly    CV/COR: RR S1S2   ABD: soft nontender, hypoactive bowel sounds, no mass  EXT:  no Edema   warm  NEURO: Following commands, opening eyes, cranial nerves intact, Moving all  "extremities but generalized weakness  LINES: clean, dry intact         Data:   All data and imaging reviewed     ROUTINE ICU LABS (Last four results)  CMP  Recent Labs   Lab 12/02/24  1259 12/02/24  0912 12/02/24  0546 12/02/24  0008 12/01/24  1651 12/01/24  1646 12/01/24  1046 12/01/24  0348 12/01/24  0313 11/30/24  2218 11/30/24  2058   *  --  154*  --   --  150* 153*  --  152*   < > 151*   POTASSIUM 4.0  --  3.9  --   --  4.8 5.3  --  5.8*  5.8*   < > 6.5*   CHLORIDE 124*  --  123*  --   --  118* 119*  --  117*   < > 110*   CO2 22  --  22  --   --  23 22  --  22   < > 24   ANIONGAP 8  --  9  --   --  9 12  --  13   < > 17*   GLC 97 126* 153* 125*   < > 133* 178*   < > 134*   < > 138*   BUN 95.1*  --  109.9*  --   --  134.9* 156.0*  --  181.8*   < > 200.7*   CR 1.76*  --  1.97*  --   --  2.23* 2.39*  --  2.79*   < > 3.20*   GFRESTIMATED 39*  --  34*  --   --  29* 27*  --  22*   < > 19*   SIMONA 8.6*  --  8.7*  --   --  9.2 9.9  --  10.0   < > 11.1*   MAG  --   --  2.2  --   --   --   --   --  3.1*  --   --    PHOS  --   --  4.2  --   --   --   --   --  7.8*  --   --    PROTTOTAL  --   --   --   --   --   --   --   --  5.7*  --  6.4   ALBUMIN  --   --   --   --   --   --   --   --  3.0*  --  3.2*   BILITOTAL  --   --   --   --   --   --   --   --  0.5  --  0.5   ALKPHOS  --   --   --   --   --   --   --   --  104  --  115   AST  --   --   --   --   --   --   --   --  26  --  23   ALT  --   --   --   --   --   --   --   --  44  --  48    < > = values in this interval not displayed.     CBC  Recent Labs   Lab 12/01/24 0313 11/30/24 2058   WBC 12.1* 13.3*   RBC 3.94* 4.45   HGB 13.3 15.1   HCT 41.7 46.5   * 105*   MCH 33.8* 33.9*   MCHC 31.9 32.5   RDW 13.4 13.5    406     INR  Recent Labs   Lab 12/01/24  0223   INR 1.32*     Arterial Blood Gas  Recent Labs   Lab 12/02/24  0911 12/01/24  1055 12/01/24  0313   O2PER 30 40 40       All cultures:  No results for input(s): \"CULT\" in the last 168 " hours.  No results found for this or any previous visit (from the past 24 hours).      [facility]  CRITICAL CARE STAFF NOTE    I am managing these acute and ongoing critical issues resulting in critical condition that impairs one or more vital organ systems, incur a high probability of imminent or life-threatening deterioration in the patient's condition and providing frequent personal assessment and manipulation of medications and life support equipment.     1. Septic shock  2. Acute hypoxic respiratory failure  3. Myasthenia gravis crisis (suspected)  4. MOHAMUD  5. Hypernatremia     ICU Interventions: ventilator management, parenteral medications necessitating continuous monitoring including vasoactive medications, medication for heart rhythm control, insulin infusion, and/or sedative/opiates , and interpretation of lab values, cardiac output, cxr, pulse oximetry, blood gases, and/or information/data stored in computers     We have discussed the patient in detail and I agree with the findings, assessment, and plan as documented when this note was cosigned on this day. The plan was formulated in conjunction with pharmacy, ICU nurses, and respiratory therapist. I have evaluated all laboratory values and imaging studies for the past 24 hours. I have reviewed all the consults that have been ordered and are active for this patient.      Critical Care Time: 70 min.  I spent this time (excluding procedures) personally providing and directing critical care services at the bedside and on the critical care unit.      Joao Dukes MD  [facility],  of Medicine  Pulmonary/Critical Care Medicine  December 2, 2024      Clinically Significant Risk Factors        # Hyperkalemia: Highest K = 6.5 mmol/L in last 2 days, will monitor as appropriate  # Hypernatremia: Highest Na = 154 mmol/L in last 2 days, will monitor as appropriate  # Hyperchloremia: Highest Cl = 124 mmol/L in last 2 days,  will monitor as appropriate       # Hypercalcemia: Highest Ca = 11.1 mg/dL in last 2 days, will monitor as appropriate    # Hypoalbuminemia: Lowest albumin = 3 g/dL at 12/1/2024  3:13 AM, will monitor as appropriate  # Coagulation Defect: INR = 1.32 (Ref range: 0.85 - 1.15) and/or PTT = 23 Seconds (Ref range: 22 - 38 Seconds), will monitor for bleeding    # Hypertension: Noted on problem list             # Moderate Malnutrition: based on nutrition assessment, PRESENT ON ADMISSION   # Financial/Environmental Concerns: none

## 2024-12-02 NOTE — PROGRESS NOTES
[facility]    Vascular Neurology Progress Note    Interval History     The patient was seen and examined at the bedside in the ICU.  Currently the patient is intubated.  Received 1 dose of IVIG yesterday.  Overnight no fever, bradycardic at baseline, blood pressure stable on the lower side with lowest MAP 65 when systolic was at home 99/53, the patient is following command despite being on the ventilator, RASS was between -2 to -1, on propofol at 15, minimal vent settings with SpO2 100% at FiO2 30%, PEEP of 5, sodium 154.    Hospital Course     Chief complaint: Hypotension    The patient is an 79-year-old male with history of paroxysmal atrial fibrillation, hypertension, bladder cancer, obstructive sleep apnea CKD presented to our hospital in early November because of progressive weakness hoarse voice shortness of breath and dysphagia, there was a concern for amyotrophic lateral sclerosis of the patient was started on riluzole and also at the same time he had nerve conduction study with suggestion of motor neuron disease.  However acetylcholine receptor binding antibody was 2.11 so he was diagnosed with myasthenia gravis during current hospitalization.  The patient was intubated for airway protection and started to receive IVIG on December 1.  Plan to complete 5 doses of IVIG on December 5.    Assessment and Plan     1.  Likely true myasthenia gravis diagnosis because of antibody positive study  -Continue IVIG 5 doses, last dose will be on December 5  -CT chest for thymoma screening  -Caution use of IV magnesium, beta-blocker, statin, macrolides, aminoglycosides, and fluoroquinolone, and as much as possible avoid them due to to potential exacerbation of myasthenia crisis.  -Continue to intubate him for today, tomorrow with potentially extubate the patient if he remains stable.    2. Urosepsis  - On Zosyn     DVT Prophylaxis: PCD    Patient Follow-up    - Follow up in 1 week with his  "outpatient neurologist    We will continue to follow.     The Stroke Staff is Dr. Nayak.    Talon Espinal MD  Vascular Neurology Fellow    To page me or covering stroke neurology team member, click here: AMCOM  Choose \"On Call\" tab at top, then select \"NEUROLOGY/ALL SITES\" from middle drop-down box, press Enter, then look for \"stroke\" or \"telestroke\" for your site.    Physical Examination     Temp: (!) 96.6  F (35.9  C) Temp src: Esophageal BP: 120/61 Pulse: (!) 43   Resp: 16 SpO2: 99 % O2 Device: Mechanical Ventilator      The patient is intubated, on Precedex, opening his eyes spontaneously, tracking with his eyes, following command, bilateral upper extremities symmetrically weak barely against gravity 3+/5, same for bilateral lower extremity 3+/5, no sensory deficit, upward gaze sustained for more than 10 seconds, extraocular movements intact    "

## 2024-12-02 NOTE — PROGRESS NOTES
[facility] ICU RESPIRATORY NOTE        Date of Admission: 11/30/2024    Date of Intubation (most recent): 11/30/2024    Reason for Mechanical Ventilation: Respiratory failure.    Number of Days on Mechanical Ventilation: 3    Met Criteria for Spontaneous Breathing Trial: No    Reason for No Spontaneous Breathing Trial: per MD.    Bite Block: No    Significant Events Today: None.    ABG Results:   Recent Labs   Lab 12/01/24  1055 12/01/24  0313   O2PER 40 40         Current Vent Settings: FiO2 (%): 30 %, Resp: 16, Vent Mode: CMV/AC, Resp Rate (Set): 16 breaths/min, Tidal Volume (Set, mL): 450 mL, PEEP (cm H2O): 5 cmH2O, Resp Rate (Set): 16 breaths/min, Tidal Volume (Set, mL): 450 mL, PEEP (cm H2O): 5 cmH2O    Skin Assessment: intact and clean.    Plan: continue on full support and assess for weaning readiness.    Kita Jhaveri, RT on 12/2/2024 at 4:53 AM

## 2024-12-02 NOTE — PROCEDURES
Procedure note:      INSERT right internal jugular venous central line     Venous central line, right internal jugular , INSERTION PROCEDURE NOTE  (NON-OR)     Procedure Date: 1 Dec 2024     Performing Physician:  Dr. Hadley De La O     Pre-Procedure Diagnosis:    shock    Post-Procedure Diagnosis:  shock     Procedure:  right internal jugular venous central line    Indications:  Shock on pressor agents     Estimated Blood Loss: 10 ml    Complications: none     Clinical History:     The patient was hypotensive, required  pressor agents.  Full informed consent via telephone the patient's wife today.  The risks and the benefits discussed included the risks of bleeding infection, possible injury to he artery, vein, and nerves, and possible pneumothorax.    The family members understood and wished to proceed.      Procedure:       The patient was placed in the Trendelenburg position. The right neck was prepped with Chlorhexidine.  Strict sterile conditions were maintained, cap, mask, and sterile gloves were worn,   Head to toe draping for the patient.  5 ml of   Lidocaine 1% anesthetic was infiltrated into the skin.  The right internal jugular venous central line catheter was placed in a sterile fashion percutaneously using US guidance.  Seldinger technique. The right internal jugular venous central line was sutured in place using 3-0 silk sutures. An occlusive sterile dressing and antibacterial disc were applied.  The total number of needle stick attempts was 1. The CXR demonstrated that the line was in good position and there was no pneumothorax.      Condition: critical     Hadley De La O Jr, MD, PhD

## 2024-12-03 ENCOUNTER — APPOINTMENT (OUTPATIENT)
Dept: SPEECH THERAPY | Facility: CLINIC | Age: 79
End: 2024-12-03
Attending: STUDENT IN AN ORGANIZED HEALTH CARE EDUCATION/TRAINING PROGRAM
Payer: MEDICARE

## 2024-12-03 LAB
ANION GAP SERPL CALCULATED.3IONS-SCNC: 7 MMOL/L (ref 7–15)
ANION GAP SERPL CALCULATED.3IONS-SCNC: 7 MMOL/L (ref 7–15)
ANION GAP SERPL CALCULATED.3IONS-SCNC: 8 MMOL/L (ref 7–15)
ANION GAP SERPL CALCULATED.3IONS-SCNC: 9 MMOL/L (ref 7–15)
BASE EXCESS BLDV CALC-SCNC: -1.7 MMOL/L (ref -3–3)
BASE EXCESS BLDV CALC-SCNC: -2.3 MMOL/L (ref -3–3)
BASE EXCESS BLDV CALC-SCNC: -3.1 MMOL/L (ref -3–3)
BUN SERPL-MCNC: 66.3 MG/DL (ref 8–23)
BUN SERPL-MCNC: 71.2 MG/DL (ref 8–23)
BUN SERPL-MCNC: 79.1 MG/DL (ref 8–23)
BUN SERPL-MCNC: 83.7 MG/DL (ref 8–23)
CALCIUM SERPL-MCNC: 7.9 MG/DL (ref 8.8–10.4)
CALCIUM SERPL-MCNC: 8.4 MG/DL (ref 8.8–10.4)
CHLORIDE SERPL-SCNC: 117 MMOL/L (ref 98–107)
CHLORIDE SERPL-SCNC: 122 MMOL/L (ref 98–107)
CHLORIDE SERPL-SCNC: 123 MMOL/L (ref 98–107)
CHLORIDE SERPL-SCNC: 125 MMOL/L (ref 98–107)
CREAT SERPL-MCNC: 1.4 MG/DL (ref 0.67–1.17)
CREAT SERPL-MCNC: 1.44 MG/DL (ref 0.67–1.17)
CREAT SERPL-MCNC: 1.56 MG/DL (ref 0.67–1.17)
CREAT SERPL-MCNC: 1.61 MG/DL (ref 0.67–1.17)
EGFRCR SERPLBLD CKD-EPI 2021: 43 ML/MIN/1.73M2
EGFRCR SERPLBLD CKD-EPI 2021: 45 ML/MIN/1.73M2
EGFRCR SERPLBLD CKD-EPI 2021: 49 ML/MIN/1.73M2
EGFRCR SERPLBLD CKD-EPI 2021: 51 ML/MIN/1.73M2
ERYTHROCYTE [DISTWIDTH] IN BLOOD BY AUTOMATED COUNT: 13.6 % (ref 10–15)
GLUCOSE BLDC GLUCOMTR-MCNC: 91 MG/DL (ref 70–99)
GLUCOSE BLDC GLUCOMTR-MCNC: 96 MG/DL (ref 70–99)
GLUCOSE SERPL-MCNC: 128 MG/DL (ref 70–99)
GLUCOSE SERPL-MCNC: 132 MG/DL (ref 70–99)
GLUCOSE SERPL-MCNC: 98 MG/DL (ref 70–99)
GLUCOSE SERPL-MCNC: 98 MG/DL (ref 70–99)
HCO3 BLDV-SCNC: 23 MMOL/L (ref 21–28)
HCO3 BLDV-SCNC: 23 MMOL/L (ref 21–28)
HCO3 BLDV-SCNC: 24 MMOL/L (ref 21–28)
HCO3 SERPL-SCNC: 20 MMOL/L (ref 22–29)
HCO3 SERPL-SCNC: 21 MMOL/L (ref 22–29)
HCO3 SERPL-SCNC: 22 MMOL/L (ref 22–29)
HCO3 SERPL-SCNC: 22 MMOL/L (ref 22–29)
HCT VFR BLD AUTO: 30.8 % (ref 40–53)
HGB BLD-MCNC: 9.6 G/DL (ref 13.3–17.7)
MAGNESIUM SERPL-MCNC: 2 MG/DL (ref 1.7–2.3)
MCH RBC QN AUTO: 34.3 PG (ref 26.5–33)
MCHC RBC AUTO-ENTMCNC: 31.2 G/DL (ref 31.5–36.5)
MCV RBC AUTO: 110 FL (ref 78–100)
O2/TOTAL GAS SETTING VFR VENT: 0 %
O2/TOTAL GAS SETTING VFR VENT: 3 %
O2/TOTAL GAS SETTING VFR VENT: 30 %
OXYHGB MFR BLDV: 57 % (ref 70–75)
OXYHGB MFR BLDV: 59 % (ref 70–75)
OXYHGB MFR BLDV: 78 % (ref 70–75)
PCO2 BLDV: 42 MM HG (ref 40–50)
PCO2 BLDV: 43 MM HG (ref 40–50)
PCO2 BLDV: 46 MM HG (ref 40–50)
PH BLDV: 7.31 [PH] (ref 7.32–7.43)
PH BLDV: 7.35 [PH] (ref 7.32–7.43)
PH BLDV: 7.35 [PH] (ref 7.32–7.43)
PHOSPHATE SERPL-MCNC: 3 MG/DL (ref 2.5–4.5)
PLAT MORPH BLD: NORMAL
PLATELET # BLD AUTO: 206 10E3/UL (ref 150–450)
PO2 BLDV: 29 MM HG (ref 25–47)
PO2 BLDV: 30 MM HG (ref 25–47)
PO2 BLDV: 43 MM HG (ref 25–47)
POTASSIUM SERPL-SCNC: 3.3 MMOL/L (ref 3.4–5.3)
POTASSIUM SERPL-SCNC: 3.6 MMOL/L (ref 3.4–5.3)
POTASSIUM SERPL-SCNC: 3.7 MMOL/L (ref 3.4–5.3)
POTASSIUM SERPL-SCNC: 3.8 MMOL/L (ref 3.4–5.3)
RBC # BLD AUTO: 2.8 10E6/UL (ref 4.4–5.9)
RBC MORPH BLD: NORMAL
SAO2 % BLDV: 57.9 % (ref 70–75)
SAO2 % BLDV: 60.4 % (ref 70–75)
SAO2 % BLDV: 79.2 % (ref 70–75)
SODIUM SERPL-SCNC: 146 MMOL/L (ref 135–145)
SODIUM SERPL-SCNC: 151 MMOL/L (ref 135–145)
SODIUM SERPL-SCNC: 152 MMOL/L (ref 135–145)
SODIUM SERPL-SCNC: 154 MMOL/L (ref 135–145)
WBC # BLD AUTO: 7.6 10E3/UL (ref 4–11)

## 2024-12-03 PROCEDURE — 250N000011 HC RX IP 250 OP 636: Performed by: INTERNAL MEDICINE

## 2024-12-03 PROCEDURE — 250N000013 HC RX MED GY IP 250 OP 250 PS 637: Performed by: PHYSICIAN ASSISTANT

## 2024-12-03 PROCEDURE — 85041 AUTOMATED RBC COUNT: CPT | Performed by: INTERNAL MEDICINE

## 2024-12-03 PROCEDURE — 83735 ASSAY OF MAGNESIUM: CPT | Performed by: STUDENT IN AN ORGANIZED HEALTH CARE EDUCATION/TRAINING PROGRAM

## 2024-12-03 PROCEDURE — 85018 HEMOGLOBIN: CPT | Performed by: INTERNAL MEDICINE

## 2024-12-03 PROCEDURE — 250N000013 HC RX MED GY IP 250 OP 250 PS 637: Performed by: SURGERY

## 2024-12-03 PROCEDURE — 999N000009 HC STATISTIC AIRWAY CARE

## 2024-12-03 PROCEDURE — 999N000253 HC STATISTIC WEANING TRIALS

## 2024-12-03 PROCEDURE — 999N000259 HC STATISTIC EXTUBATION

## 2024-12-03 PROCEDURE — 999N000157 HC STATISTIC RCP TIME EA 10 MIN

## 2024-12-03 PROCEDURE — 200N000001 HC R&B ICU

## 2024-12-03 PROCEDURE — 250N000011 HC RX IP 250 OP 636: Mod: JZ | Performed by: PSYCHIATRY & NEUROLOGY

## 2024-12-03 PROCEDURE — 92526 ORAL FUNCTION THERAPY: CPT | Mod: GN | Performed by: SPEECH-LANGUAGE PATHOLOGIST

## 2024-12-03 PROCEDURE — 250N000011 HC RX IP 250 OP 636: Performed by: STUDENT IN AN ORGANIZED HEALTH CARE EDUCATION/TRAINING PROGRAM

## 2024-12-03 PROCEDURE — 80048 BASIC METABOLIC PNL TOTAL CA: CPT | Performed by: INTERNAL MEDICINE

## 2024-12-03 PROCEDURE — 250N000013 HC RX MED GY IP 250 OP 250 PS 637: Performed by: PSYCHIATRY & NEUROLOGY

## 2024-12-03 PROCEDURE — 250N000013 HC RX MED GY IP 250 OP 250 PS 637: Performed by: STUDENT IN AN ORGANIZED HEALTH CARE EDUCATION/TRAINING PROGRAM

## 2024-12-03 PROCEDURE — 250N000009 HC RX 250: Performed by: SURGERY

## 2024-12-03 PROCEDURE — 82374 ASSAY BLOOD CARBON DIOXIDE: CPT | Performed by: INTERNAL MEDICINE

## 2024-12-03 PROCEDURE — 258N000003 HC RX IP 258 OP 636: Performed by: INTERNAL MEDICINE

## 2024-12-03 PROCEDURE — 94003 VENT MGMT INPAT SUBQ DAY: CPT

## 2024-12-03 PROCEDURE — 82805 BLOOD GASES W/O2 SATURATION: CPT | Performed by: INTERNAL MEDICINE

## 2024-12-03 PROCEDURE — 84100 ASSAY OF PHOSPHORUS: CPT | Performed by: STUDENT IN AN ORGANIZED HEALTH CARE EDUCATION/TRAINING PROGRAM

## 2024-12-03 PROCEDURE — B4035 ENTERAL FEED SUPP PUMP PER D: HCPCS

## 2024-12-03 PROCEDURE — 94150 VITAL CAPACITY TEST: CPT

## 2024-12-03 PROCEDURE — 99291 CRITICAL CARE FIRST HOUR: CPT | Performed by: INTERNAL MEDICINE

## 2024-12-03 PROCEDURE — 99291 CRITICAL CARE FIRST HOUR: CPT | Mod: GC | Performed by: STUDENT IN AN ORGANIZED HEALTH CARE EDUCATION/TRAINING PROGRAM

## 2024-12-03 PROCEDURE — 92610 EVALUATE SWALLOWING FUNCTION: CPT | Mod: GN | Performed by: SPEECH-LANGUAGE PATHOLOGIST

## 2024-12-03 PROCEDURE — 250N000011 HC RX IP 250 OP 636: Performed by: ANESTHESIOLOGY

## 2024-12-03 RX ORDER — PYRIDOSTIGMINE BROMIDE 60 MG/1
60 TABLET ORAL EVERY 8 HOURS SCHEDULED
Status: DISCONTINUED | OUTPATIENT
Start: 2024-12-03 | End: 2024-12-11 | Stop reason: HOSPADM

## 2024-12-03 RX ORDER — CEFTRIAXONE 2 G/1
2 INJECTION, POWDER, FOR SOLUTION INTRAMUSCULAR; INTRAVENOUS EVERY 24 HOURS
Status: COMPLETED | OUTPATIENT
Start: 2024-12-04 | End: 2024-12-07

## 2024-12-03 RX ORDER — PIPERACILLIN SODIUM, TAZOBACTAM SODIUM 3; .375 G/15ML; G/15ML
3.38 INJECTION, POWDER, LYOPHILIZED, FOR SOLUTION INTRAVENOUS EVERY 6 HOURS
Status: COMPLETED | OUTPATIENT
Start: 2024-12-03 | End: 2024-12-03

## 2024-12-03 RX ORDER — DEXTROSE MONOHYDRATE 50 MG/ML
INJECTION, SOLUTION INTRAVENOUS CONTINUOUS
Status: DISCONTINUED | OUTPATIENT
Start: 2024-12-03 | End: 2024-12-03

## 2024-12-03 RX ADMIN — PYRIDOSTIGMINE BROMIDE 60 MG: 60 TABLET ORAL at 12:53

## 2024-12-03 RX ADMIN — PIPERACILLIN AND TAZOBACTAM 3.38 G: 3; .375 INJECTION, POWDER, FOR SOLUTION INTRAVENOUS at 14:06

## 2024-12-03 RX ADMIN — DIPHENHYDRAMINE HYDROCHLORIDE 50 MG: 50 INJECTION, SOLUTION INTRAMUSCULAR; INTRAVENOUS at 13:55

## 2024-12-03 RX ADMIN — CHLORHEXIDINE GLUCONATE 15 ML: 1.2 SOLUTION ORAL at 08:14

## 2024-12-03 RX ADMIN — ACETAMINOPHEN 650 MG: 325 TABLET, FILM COATED ORAL at 14:01

## 2024-12-03 RX ADMIN — PYRIDOSTIGMINE BROMIDE 60 MG: 60 TABLET ORAL at 21:27

## 2024-12-03 RX ADMIN — PIPERACILLIN AND TAZOBACTAM 3.38 G: 3; .375 INJECTION, POWDER, FOR SOLUTION INTRAVENOUS at 02:17

## 2024-12-03 RX ADMIN — FENTANYL CITRATE 25 MCG: 50 INJECTION, SOLUTION INTRAMUSCULAR; INTRAVENOUS at 06:48

## 2024-12-03 RX ADMIN — HEPARIN SODIUM 5000 UNITS: 5000 INJECTION, SOLUTION INTRAVENOUS; SUBCUTANEOUS at 00:18

## 2024-12-03 RX ADMIN — NOREPINEPHRINE BITARTRATE 0.04 MCG/KG/MIN: 0.02 INJECTION, SOLUTION INTRAVENOUS at 06:36

## 2024-12-03 RX ADMIN — LEVOTHYROXINE SODIUM 150 MCG: 150 TABLET ORAL at 08:14

## 2024-12-03 RX ADMIN — OLOPATADINE HYDROCHLORIDE 1 DROP: 1 SOLUTION/ DROPS OPHTHALMIC at 20:37

## 2024-12-03 RX ADMIN — OLOPATADINE HYDROCHLORIDE 1 DROP: 1 SOLUTION/ DROPS OPHTHALMIC at 08:14

## 2024-12-03 RX ADMIN — PIPERACILLIN AND TAZOBACTAM 3.38 G: 3; .375 INJECTION, POWDER, FOR SOLUTION INTRAVENOUS at 20:38

## 2024-12-03 RX ADMIN — Medication 5 MG: at 21:28

## 2024-12-03 RX ADMIN — ASPIRIN 81 MG CHEWABLE TABLET 81 MG: 81 TABLET CHEWABLE at 08:14

## 2024-12-03 RX ADMIN — Medication 40 MG: at 06:43

## 2024-12-03 RX ADMIN — HUMAN IMMUNOGLOBULIN G 30 G: 20 LIQUID INTRAVENOUS at 14:37

## 2024-12-03 RX ADMIN — DEXTROSE MONOHYDRATE: 50 INJECTION, SOLUTION INTRAVENOUS at 09:45

## 2024-12-03 RX ADMIN — PIPERACILLIN AND TAZOBACTAM 3.38 G: 3; .375 INJECTION, POWDER, FOR SOLUTION INTRAVENOUS at 08:14

## 2024-12-03 RX ADMIN — HEPARIN SODIUM 5000 UNITS: 5000 INJECTION, SOLUTION INTRAVENOUS; SUBCUTANEOUS at 08:14

## 2024-12-03 RX ADMIN — PROPOFOL 15 MCG/KG/MIN: 10 INJECTION, EMULSION INTRAVENOUS at 02:27

## 2024-12-03 RX ADMIN — PYRIDOSTIGMINE BROMIDE 60 MG: 60 TABLET ORAL at 16:27

## 2024-12-03 RX ADMIN — HEPARIN SODIUM 5000 UNITS: 5000 INJECTION, SOLUTION INTRAVENOUS; SUBCUTANEOUS at 17:21

## 2024-12-03 ASSESSMENT — ACTIVITIES OF DAILY LIVING (ADL)
ADLS_ACUITY_SCORE: 69
ADLS_ACUITY_SCORE: 69
ADLS_ACUITY_SCORE: 73
ADLS_ACUITY_SCORE: 73
ADLS_ACUITY_SCORE: 69
ADLS_ACUITY_SCORE: 73
ADLS_ACUITY_SCORE: 69
ADLS_ACUITY_SCORE: 75
ADLS_ACUITY_SCORE: 69
ADLS_ACUITY_SCORE: 73
ADLS_ACUITY_SCORE: 69
ADLS_ACUITY_SCORE: 69
ADLS_ACUITY_SCORE: 73
ADLS_ACUITY_SCORE: 69
ADLS_ACUITY_SCORE: 69

## 2024-12-03 NOTE — PROGRESS NOTES
"   12/03/24 0945   Appointment Info   Signing Clinician's Name / Credentials (SLP) Ml Roberts MS CCC-SLP   General Information   Onset of Illness/Injury or Date of Surgery 12/01/24   Referring Physician Dr. De La O   Patient/Family Therapy Goal Statement (SLP) Ice chips!   Pertinent History of Current Problem \"The patient is an 79-year-old male with history of paroxysmal atrial fibrillation, hypertension, bladder cancer, obstructive sleep apnea CKD presented to our hospital in early November because of progressive weakness hoarse voice shortness of breath and dysphagia, there was a concern for amyotrophic lateral sclerosis of the patient was started on riluzole and also at the same time he had nerve conduction study with suggestion of motor neuron disease.  However acetylcholine receptor binding antibody was 2.11 so he was diagnosed with myasthenia gravis during current hospitalization.  The patient was intubated for airway protection and started to receive IVIG on December 1.  Plan to complete 5 doses of IVIG on December 5. \" Pt intubated 3 days and extubated 12/3 at 0924. RN reported that pt frequently requesting ice chips.   General Observations Pt alert with wife at bedside. Pt/wife with excellent recall of previous SLP services and reported notable improvement in swallow function from discharge until ~1 week ago. Pt was tolerating unlimited ice chips. They have completed frequent and thorough oral cares. Pt hopeful to return to oral diet.   Type of Evaluation   Type of Evaluation Swallow Evaluation   Oral Motor   Oral Musculature generally intact   Dentition (Oral Motor)   Dentition (Oral Motor) adequate dentition   Facial Symmetry (Oral Motor)   Facial Symmetry (Oral Motor) WNL   Lip Function (Oral Motor)   Lip Range of Motion (Oral Motor) WNL   Tongue Function (Oral Motor)   Tongue ROM (Oral Motor) WNL   Vocal Quality/Secretion Management (Oral Motor)   Vocal Quality (Oral Motor) hypophonic;breathy "   Secretion Management (Oral Motor) WNL   General Swallowing Observations   Current Diet/Method of Nutritional Intake (General Swallowing Observations, NIS) NPO;gastrostomy tube (PEG)   Respiratory Support room air   Past History of Dysphagia Moderate to severe dysphagia on VFSS during recent admission; pt tolerating unlimited ice chips at home per report   Clinical Swallow Evaluation   Feeding Assistance dependent   Swallowing Recommendations   Diet Consistency Recommendations NPO;ice chips only   Supervision Level for Intake 1:1 supervision needed   Mode of Delivery Recommendations bolus size, small;slow rate of intake   Monitoring/Assistance Required (Eating/Swallowing) monitor for cough or change in vocal quality with intake   Recommended Feeding/Eating Techniques (Swallow Eval) maintain upright posture during/after eating for 30 minutes;set-up and prepare tray   Medication Administration Recommendations, Swallowing (SLP) non oral   Instrumental Assessment Recommendations VFSS (videofluoroscopic swallowing study)  (repeat when pt medically stable/out of the ICU)   General Therapy Interventions   Planned Therapy Interventions Dysphagia Treatment   Dysphagia treatment Modified diet education;Instruction of safe swallow strategies;Compensatory strategies for swallowing   Clinical Impression   Criteria for Skilled Therapeutic Interventions Met (SLP Eval) Yes, treatment indicated   SLP Diagnosis Moderate to severe oropharyngeal dysphagia   Risks & Benefits of therapy have been explained evaluation/treatment results reviewed;care plan/treatment goals reviewed;risks/benefits reviewed;current/potential barriers reviewed;participants voiced agreement with care plan;participants included;patient;spouse/significant other   Clinical Impression Comments Pt appears to be  near his baseline function based on SLP discharge on 11/15 with moderate to severe dysphagia on VFSS on 11/12, though limited clinical swallow evaluation to  ice chips only given known deficits on VFSS and now admission requiring intubation/MG diagnosis and high risk of aspiration. Pt accepted oral cares and ice chips x~25. Adequate and timely mastication and timing with no overt s/sx of aspiration throughout. Created collaborative recommendations and plan. Offered limited ice chips per hour for comfort. Pt and wife both requested unlimited ice chips with both familiar with strategies of when to hold ice chips/monitoring for s/sx of aspiration. Both very familar with importance of oral cares. Agreed on plan to continue ice chip trials for now, keep pt swallowing until completion of IVIG, then consider repeating VFSS to assess ability for futher PO trials and compare to VFSS on 11/12. Did offer if pt would like to eat/drink for comfort, that is also an option to accept risk of aspiration. Pt/wife would like to continue to be cautious and avoid any respiratory compromise. Continue PEG for primary nutrition/hydration/meds. Continue excellent and thorough oral cares. Okay for ice chips for comfort and practice swallowing when pt is alert and upright.   SLP Total Evaluation Time   Eval: oral/pharyngeal swallow function, clinical swallow Minutes (44642) 20   SLP Goals   Therapy Frequency (SLP Eval) daily   SLP Predicted Duration/Target Date for Goal Attainment 12/24/24   SLP Goals Swallow   SLP: Safely tolerate diet without signs/symptoms of aspiration Pureed diet;Thin liquids;With use of swallow precautions;Independently   Swallowing Intervention   Treatment of Swallowing Dysfunction &/or Oral Function for Feeding Minutes (40063) 20   Symptoms Noted During/After Treatment None   Treatment Detail/Skilled Intervention Education provided to pt and wife. Cues throughout ice chips trials for effortful swallows and rest breaks required. Reviewed all questions and created POC.   SLP Discharge Planning   SLP Plan ice chips, exercises contraindicated in MG crisis, oral cares, monitor,  education, repeat VFSS after IVIG complete?   SLP Discharge Recommendation home with home health   SLP Rationale for DC Rec Patient's swallow function is near the discharge function of his last hospitalization, however, that is significantly below his baseline.   SLP Brief overview of current status  Continue PEG for primary nutrition/hydration/meds. Continue excellent and thorough oral cares. Okay for ice chips for comfort and practice swallowing when pt is alert and upright.   SLP Time and Intention   Total Session Time (sum of timed and untimed services) 40

## 2024-12-03 NOTE — PROGRESS NOTES
NIF/VC performed by patient on mechanical ventilator this morning:    NIF: -24.5 cm H2O with good effort  VC: 525 mL with a fair effort    All Edmondson, RT on 12/3/2024 at 8:52 AM

## 2024-12-03 NOTE — PROGRESS NOTES
Placed on SBT 5/5 30% at 0716, tolerated well. ETT removed at 0924 per MD order. Large amount of oral secretions post-tube removal. Lung sounds are clear, no stridor noted. Vital signs post-extubation:    HR: 80/min RR: 20/min SpO2: 98% on 3 LPM nasal cannula    Will continue to monitor with NIF/VC     RT Velvet on 12/3/2024 at 9:36 AM

## 2024-12-03 NOTE — PROGRESS NOTES
[facility]    Vascular Neurology Progress Note    Interval History     The patient was seen and examined at the bedside in the ICU.  Currently the patient is intubated.  Received 2nd dose of IVIG yesterday.  Overnight no fever, bradycardic at baseline, blood pressure stable on the lower side with lowest MAP 65 when systolic was at home 99/53, the patient is following command despite being on the ventilator, RASS was between -2 to -1, on propofol at 15, minimal vent settings with SpO2 100% at FiO2 30%, PEEP of 5, sodium 154.    Hospital Course     Chief complaint: Hypotension    The patient is an 79-year-old male with history of paroxysmal atrial fibrillation, hypertension, bladder cancer, obstructive sleep apnea CKD presented to our hospital in early November because of progressive weakness hoarse voice shortness of breath and dysphagia, there was a concern for amyotrophic lateral sclerosis of the patient was started on riluzole and also at the same time he had nerve conduction study with suggestion of motor neuron disease.  However acetylcholine receptor binding antibody was 2.11 so he was diagnosed with myasthenia gravis during current hospitalization.  The patient was intubated for airway protection and started to receive IVIG on December 1.  Plan to complete 5 doses of IVIG on December 5.    Assessment and Plan     1.  Myasthenic crisis secondary to urosepsis. 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 Questionable ALS diagnosis. Dual diagnosis is uncommon but will defer the further work up with his outpatient neurologist. For now our goal is to stabilize the crisis.   -Continue IVIG 5 doses, last dose will be on December 5  -CT chest for thymoma screening  -Caution use of IV magnesium, beta-blocker, statin, macrolides, aminoglycosides, and fluoroquinolone, and as much as possible avoid them due to to potential exacerbation of myasthenia crisis.  -Mestitnon started at 60 mg TID    2. Urosepsis  -On  "Zosyn     DVT Prophylaxis: PCD    Patient Follow-up    - Follow up in 1 week with his outpatient neurologist    We will continue to follow.     The Stroke Staff is Dr. Nayak.    Talon Espinal MD  Vascular Neurology Fellow    To page me or covering stroke neurology team member, click here: AMCOM  Choose \"On Call\" tab at top, then select \"NEUROLOGY/ALL SITES\" from middle drop-down box, press Enter, then look for \"stroke\" or \"telestroke\" for your site.    Physical Examination     Temp: (!) 96.6  F (35.9  C) Temp src: Esophageal BP: 97/55 Pulse: 60   Resp: 19 SpO2: 98 % O2 Device: Mechanical Ventilator      The patient is intubated, on Precedex, opening his eyes spontaneously, tracking with his eyes, following command, bilateral upper extremities symmetrically weak barely against gravity 3+/5, same for bilateral lower extremity 3+/5, no sensory deficit, upward gaze sustained for more than 10 seconds, extraocular movements intact    "

## 2024-12-03 NOTE — PLAN OF CARE
"Goal Outcome Evaluation:      Plan of Care Reviewed With: patient    Overall Patient Progress: improvingOverall Patient Progress: improving     Neuro: RASS -1. PERRL. Follows commands.   Cardiac: Bradycardic with HR 35-60. Levo titrated for MAP >65.  Resp: Remains vented 30%, PEEP 5.  GI/: No BM. TF at goal. [place] with good UOP.  Pain: Pain this am, PRN fent given once.   Lines: R internal jugular triple lumen  Gtts: Prop 15, Levo 0.04  Labs/imaging: Na+ 154    Plan: Possible extubation today. Continue IVIG      *see Epic flowsheets for full nursing assessment findings       Problem: Adult Inpatient Plan of Care  Goal: Plan of Care Review  Description: The Plan of Care Review/Shift note should be completed every shift.  The Outcome Evaluation is a brief statement about your assessment that the patient is improving, declining, or no change.  This information will be displayed automatically on your shift  note.  Outcome: Progressing  Flowsheets (Taken 12/3/2024 0659)  Plan of Care Reviewed With: patient  Overall Patient Progress: improving  Goal: Patient-Specific Goal (Individualized)  Description: You can add care plan individualizations to a care plan. Examples of Individualization might be:  \"Parent requests to be called daily at 9am for status\", \"I have a hard time hearing out of my right ear\", or \"Do not touch me to wake me up as it startles  me\".  Outcome: Progressing  Goal: Absence of Hospital-Acquired Illness or Injury  Outcome: Progressing  Intervention: Identify and Manage Fall Risk  Recent Flowsheet Documentation  Taken 12/3/2024 0400 by Diamond Gibbs, RN  Safety Promotion/Fall Prevention:   clutter free environment maintained   increased rounding and observation   increase visualization of patient   lighting adjusted   room door open   room organization consistent   safety round/check completed   supervised activity  Taken 12/3/2024 0000 by Diamond Gibbs, RN  Safety Promotion/Fall Prevention:   clutter " free environment maintained   increased rounding and observation   increase visualization of patient   lighting adjusted   room door open   room organization consistent   safety round/check completed   supervised activity  Taken 12/2/2024 2000 by Diamond Gibbs RN  Safety Promotion/Fall Prevention:   clutter free environment maintained   increased rounding and observation   increase visualization of patient   lighting adjusted   room door open   room organization consistent   safety round/check completed   supervised activity  Intervention: Prevent Skin Injury  Recent Flowsheet Documentation  Taken 12/3/2024 0600 by Diamond Gibbs RN  Body Position:   turned   side-lying 30 degrees   heels elevated   upper extremity elevated  Taken 12/3/2024 0400 by Diamond Gibbs RN  Body Position:   turned   side-lying 30 degrees   heels elevated   upper extremity elevated  Taken 12/3/2024 0200 by Diamond Gibbs RN  Body Position:   turned   side-lying 30 degrees   heels elevated   upper extremity elevated  Taken 12/3/2024 0000 by Diamond Gibbs RN  Body Position:   turned   side-lying 30 degrees   heels elevated   upper extremity elevated  Taken 12/2/2024 2200 by Diamond Gibbs RN  Body Position:   turned   side-lying 30 degrees   heels elevated   upper extremity elevated  Taken 12/2/2024 2000 by Diamond Gibbs RN  Body Position:   turned   side-lying 30 degrees   heels elevated   upper extremity elevated  Intervention: Prevent and Manage VTE (Venous Thromboembolism) Risk  Recent Flowsheet Documentation  Taken 12/3/2024 0400 by Diamond Gibbs RN  VTE Prevention/Management: SCDs on (sequential compression devices)  Taken 12/3/2024 0000 by Diamond Gibbs RN  VTE Prevention/Management: SCDs on (sequential compression devices)  Taken 12/2/2024 2000 by Diamond Gibbs RN  VTE Prevention/Management: SCDs on (sequential compression devices)  Goal: Optimal Comfort and Wellbeing  Outcome: Progressing  Intervention: Monitor Pain and Promote  Comfort  Recent Flowsheet Documentation  Taken 12/3/2024 0648 by Diamond Gibbs RN  Pain Management Interventions: medication (see MAR)  Intervention: Provide Person-Centered Care  Recent Flowsheet Documentation  Taken 12/3/2024 0400 by Diamond Gibbs RN  Trust Relationship/Rapport:   care explained   choices provided  Taken 12/3/2024 0000 by Diamond Gibbs RN  Trust Relationship/Rapport:   care explained   choices provided  Taken 12/2/2024 2000 by Diamond Gibbs RN  Trust Relationship/Rapport:   care explained   choices provided  Goal: Readiness for Transition of Care  Outcome: Progressing     Problem: Restraint, Nonviolent  Goal: Absence of Harm or Injury  Outcome: Progressing  Intervention: Implement Least Restrictive Safety Strategies  Recent Flowsheet Documentation  Taken 12/3/2024 0400 by Diamond Gibbs RN  Medical Device Protection:   IV pole/bag removed from visual field   torso covered   tubing secured  Taken 12/3/2024 0000 by Diamond Gibbs RN  Medical Device Protection:   IV pole/bag removed from visual field   torso covered   tubing secured  Taken 12/2/2024 2000 by Diamond Gibbs RN  Medical Device Protection:   IV pole/bag removed from visual field   torso covered   tubing secured  Intervention: Protect Dignity, Rights and Personal Wellbeing  Recent Flowsheet Documentation  Taken 12/3/2024 0400 by Diamond Gibbs RN  Trust Relationship/Rapport:   care explained   choices provided  Taken 12/3/2024 0000 by Diamond Gibbs RN  Trust Relationship/Rapport:   care explained   choices provided  Taken 12/2/2024 2000 by Diamond Gibbs RN  Trust Relationship/Rapport:   care explained   choices provided  Intervention: Protect Skin and Joint Integrity  Recent Flowsheet Documentation  Taken 12/3/2024 0600 by Diamond Gibbs RN  Body Position:   turned   side-lying 30 degrees   heels elevated   upper extremity elevated  Taken 12/3/2024 0400 by Diamond Gibbs RN  Body Position:   turned   side-lying 30 degrees   heels elevated    upper extremity elevated  Taken 12/3/2024 0200 by Diamond Gibbs RN  Body Position:   turned   side-lying 30 degrees   heels elevated   upper extremity elevated  Taken 12/3/2024 0000 by Diamond Gibbs RN  Body Position:   turned   side-lying 30 degrees   heels elevated   upper extremity elevated  Taken 12/2/2024 2200 by Dimaond Gibbs RN  Body Position:   turned   side-lying 30 degrees   heels elevated   upper extremity elevated  Taken 12/2/2024 2000 by Diamond Gibbs RN  Body Position:   turned   side-lying 30 degrees   heels elevated   upper extremity elevated     Problem: Gas Exchange Impaired  Goal: Optimal Gas Exchange  Outcome: Progressing  Intervention: Optimize Oxygenation and Ventilation  Recent Flowsheet Documentation  Taken 12/3/2024 0600 by Diamond Gibbs RN  Head of Bed (HOB) Positioning: HOB at 30 degrees  Taken 12/3/2024 0400 by Diamond Gibbs RN  Airway/Ventilation Management:   airway patency maintained   calming measures promoted   pulmonary hygiene promoted  Head of Bed (HOB) Positioning: HOB at 30 degrees  Taken 12/3/2024 0200 by Diamond Gibbs RN  Head of Bed (HOB) Positioning: HOB at 30 degrees  Taken 12/3/2024 0000 by Diamond Gibbs RN  Airway/Ventilation Management:   airway patency maintained   calming measures promoted   pulmonary hygiene promoted  Head of Bed (HOB) Positioning: HOB at 30 degrees  Taken 12/2/2024 2200 by Diamond Gibbs RN  Head of Bed (HOB) Positioning: HOB at 30 degrees  Taken 12/2/2024 2000 by Diamond Gibbs RN  Airway/Ventilation Management:   airway patency maintained   calming measures promoted   pulmonary hygiene promoted  Head of Bed (HOB) Positioning: HOB at 30 degrees     Problem: Skin Injury Risk Increased  Goal: Skin Health and Integrity  Outcome: Progressing  Intervention: Plan: Nurse Driven Intervention: Moisture Management  Recent Flowsheet Documentation  Taken 12/3/2024 0400 by Diamond Gibbs RN  Moisture Interventions:   No brief in bed   Incontinence pad   Urinary  collection device   Barrier ointment (CriticAid, Triad paste)  Bathing/Skin Care:   back care   wipes, CHG  Taken 12/3/2024 0200 by Diamond Gibbs RN  Bathing/Skin Care:   bath, chlorhexidine wipes   wipes, CHG   dressed/undressed   electrode patches/site rotation   linen changed  Taken 12/3/2024 0000 by Diamond Gibbs RN  Moisture Interventions:   No brief in bed   Incontinence pad   Urinary collection device   Barrier ointment (CriticAid, Triad paste)  Taken 12/2/2024 2000 by Diamond Gibbs RN  Moisture Interventions:   No brief in bed   Incontinence pad   Urinary collection device   Barrier ointment (CriticAid, Triad paste)  Intervention: Plan: Nurse Driven Intervention: Friction and Shear  Recent Flowsheet Documentation  Taken 12/3/2024 0400 by Diamond Gibbs RN  Friction/Shear Interventions:   HOB 30 degrees or less   Silicone foam sacral dressing   Pad bony prominence (elbow pads, heel pads, ear protectors)   Assistive lifting device (portable/ceiling lift, etc.)   Repositioning device (TAP system, etc.)  Taken 12/3/2024 0000 by Diamond Gibbs RN  Friction/Shear Interventions:   HOB 30 degrees or less   Silicone foam sacral dressing   Pad bony prominence (elbow pads, heel pads, ear protectors)   Assistive lifting device (portable/ceiling lift, etc.)   Repositioning device (TAP system, etc.)  Taken 12/2/2024 2000 by Diamond Gibbs RN  Friction/Shear Interventions:   HOB 30 degrees or less   Silicone foam sacral dressing   Pad bony prominence (elbow pads, heel pads, ear protectors)   Assistive lifting device (portable/ceiling lift, etc.)   Repositioning device (TAP system, etc.)  Intervention: Optimize Skin Protection  Recent Flowsheet Documentation  Taken 12/3/2024 0600 by Diamond Gibbs RN  Head of Bed (HOB) Positioning: HOB at 30 degrees  Taken 12/3/2024 0400 by Diamond Gibbs RN  Activity Management: activity adjusted per tolerance  Head of Bed (HOB) Positioning: HOB at 30 degrees  Taken 12/3/2024 0200 by Bernie  KEVIN Fields  Head of Bed (HOB) Positioning: HOB at 30 degrees  Taken 12/3/2024 0000 by Diamond Gibbs RN  Activity Management: activity adjusted per tolerance  Head of Bed (HOB) Positioning: HOB at 30 degrees  Taken 12/2/2024 2200 by Diamond Gibbs RN  Head of Bed (HOB) Positioning: HOB at 30 degrees  Taken 12/2/2024 2000 by Diamond Gibbs RN  Activity Management: activity adjusted per tolerance  Head of Bed (HOB) Positioning: HOB at 30 degrees     Problem: Risk for Delirium  Goal: Optimal Coping  Outcome: Progressing  Intervention: Optimize Psychosocial Adjustment to Delirium  Recent Flowsheet Documentation  Taken 12/3/2024 0400 by Diamond Gibbs RN  Supportive Measures: relaxation techniques promoted  Taken 12/3/2024 0000 by Diamond Gibbs RN  Supportive Measures: relaxation techniques promoted  Taken 12/2/2024 2000 by Diamond Gibbs RN  Supportive Measures: relaxation techniques promoted  Goal: Improved Behavioral Control  Outcome: Progressing  Intervention: Prevent and Manage Agitation  Recent Flowsheet Documentation  Taken 12/3/2024 0400 by Diamond Gibbs RN  Environment Familiarity/Consistency: daily routine followed  Taken 12/3/2024 0000 by Diamond Gibbs RN  Environment Familiarity/Consistency: daily routine followed  Taken 12/2/2024 2000 by Diamond Gibbs RN  Environment Familiarity/Consistency: daily routine followed  Intervention: Minimize Safety Risk  Recent Flowsheet Documentation  Taken 12/3/2024 0400 by Diamond Gibbs RN  Enhanced Safety Measures:   monitor patients coagulation values   pain management   review medications for side effects with activity  Trust Relationship/Rapport:   care explained   choices provided  Taken 12/3/2024 0000 by Diamond Gibbs RN  Enhanced Safety Measures:   monitor patients coagulation values   pain management   review medications for side effects with activity  Trust Relationship/Rapport:   care explained   choices provided  Taken 12/2/2024 2000 by Diamond Gibbs RN  Enhanced Safety  Measures:   monitor patients coagulation values   pain management   review medications for side effects with activity  Trust Relationship/Rapport:   care explained   choices provided  Goal: Improved Attention and Thought Clarity  Outcome: Progressing  Intervention: Maximize Cognitive Function  Recent Flowsheet Documentation  Taken 12/3/2024 0400 by Diamond Gibbs RN  Sensory Stimulation Regulation:   auditory stimulation minimized   care clustered   lighting decreased   quiet environment promoted  Reorientation Measures:   calendar in view   clock in view  Taken 12/3/2024 0000 by Diamond Gibbs RN  Sensory Stimulation Regulation:   auditory stimulation minimized   care clustered   lighting decreased   quiet environment promoted  Reorientation Measures:   calendar in view   clock in view  Taken 12/2/2024 2000 by Diamond Gibbs RN  Sensory Stimulation Regulation:   auditory stimulation minimized   care clustered   lighting decreased   quiet environment promoted  Reorientation Measures:   calendar in view   clock in view  Goal: Improved Sleep  Outcome: Progressing     Problem: Mechanical Ventilation Invasive  Goal: Effective Communication  Outcome: Progressing  Intervention: Ensure Effective Communication  Recent Flowsheet Documentation  Taken 12/3/2024 0400 by Diamond Gibbs RN  Trust Relationship/Rapport:   care explained   choices provided  Taken 12/3/2024 0000 by Diamond Gibbs RN  Trust Relationship/Rapport:   care explained   choices provided  Taken 12/2/2024 2000 by Diamond Gibbs RN  Trust Relationship/Rapport:   care explained   choices provided  Goal: Optimal Device Function  Outcome: Progressing  Intervention: Optimize Device Care and Function  Recent Flowsheet Documentation  Taken 12/3/2024 0600 by Diamond Gibbs RN  Oral Care:   swabbed with antiseptic solution   suction provided  Taken 12/3/2024 0400 by Diamond Gibbs RN  Airway/Ventilation Management:   airway patency maintained   calming measures promoted    pulmonary hygiene promoted  Oral Care:   swabbed with antiseptic solution   suction provided  Taken 12/3/2024 0200 by Diamond Gibbs RN  Oral Care:   swabbed with antiseptic solution   suction provided  Taken 12/3/2024 0000 by Diamond Gibbs RN  Airway/Ventilation Management:   airway patency maintained   calming measures promoted   pulmonary hygiene promoted  Oral Care:   swabbed with antiseptic solution   suction provided  Taken 12/2/2024 2200 by Diamond Gibbs RN  Oral Care:   swabbed with antiseptic solution   suction provided  Taken 12/2/2024 2000 by Diamond Gibbs RN  Airway/Ventilation Management:   airway patency maintained   calming measures promoted   pulmonary hygiene promoted  Oral Care:   swabbed with antiseptic solution   suction provided  Goal: Mechanical Ventilation Liberation  Outcome: Progressing  Intervention: Promote Extubation and Mechanical Ventilation Liberation  Recent Flowsheet Documentation  Taken 12/3/2024 0400 by Diamond Gibbs RN  Medication Review/Management:   medications reviewed   high-risk medications identified  Environmental Support:   calm environment promoted   distractions minimized   environmental consistency promoted   personal routine supported  Taken 12/3/2024 0000 by Diamond Gibbs RN  Medication Review/Management:   medications reviewed   high-risk medications identified  Environmental Support:   calm environment promoted   distractions minimized   environmental consistency promoted   personal routine supported  Taken 12/2/2024 2000 by Diamond Gibbs RN  Medication Review/Management:   medications reviewed   high-risk medications identified  Environmental Support:   calm environment promoted   distractions minimized   environmental consistency promoted   personal routine supported  Goal: Optimal Nutrition Delivery  Outcome: Progressing  Intervention: Optimize Nutrition Delivery  Recent Flowsheet Documentation  Taken 12/3/2024 0400 by Diamond Gibbs RN  Nutrition Support  Management: weight trending reviewed  Taken 12/3/2024 0000 by Diamond Gibbs RN  Nutrition Support Management: weight trending reviewed  Taken 12/2/2024 2000 by Diamond Gibbs RN  Nutrition Support Management: weight trending reviewed  Goal: Absence of Device-Related Skin and Tissue Injury  Outcome: Progressing  Intervention: Maintain Skin and Tissue Health  Recent Flowsheet Documentation  Taken 12/3/2024 0400 by Diamond Gibbs RN  Device Skin Pressure Protection:   absorbent pad utilized/changed   adhesive use limited   positioning supports utilized   pressure points protected   skin-to-device areas padded   tubing/devices free from skin contact  Taken 12/3/2024 0000 by Diamond Gibbs RN  Device Skin Pressure Protection:   absorbent pad utilized/changed   adhesive use limited   positioning supports utilized   pressure points protected   skin-to-device areas padded   tubing/devices free from skin contact  Taken 12/2/2024 2000 by Diamond Gibbs RN  Device Skin Pressure Protection:   absorbent pad utilized/changed   adhesive use limited   positioning supports utilized   pressure points protected   skin-to-device areas padded   tubing/devices free from skin contact  Goal: Absence of Ventilator-Induced Lung Injury  Outcome: Progressing  Intervention: Facilitate Lung-Protection Measures  Recent Flowsheet Documentation  Taken 12/3/2024 0400 by Diamond Gibbs RN  Lung Protection Measures:   ventilator synchrony promoted   ventilator waveforms monitored  Taken 12/3/2024 0000 by Diamond Gibbs RN  Lung Protection Measures:   ventilator synchrony promoted   ventilator waveforms monitored  Taken 12/2/2024 2000 by Diamond Gibbs RN  Lung Protection Measures:   ventilator synchrony promoted   ventilator waveforms monitored  Intervention: Prevent Ventilator-Associated Pneumonia  Recent Flowsheet Documentation  Taken 12/3/2024 0600 by Diamond Gibbs RN  Oral Care:   swabbed with antiseptic solution   suction provided  Head of Bed (HOB)  Positioning: HOB at 30 degrees  Taken 12/3/2024 0400 by Diamond Gibbs RN  Oral Care:   swabbed with antiseptic solution   suction provided  Head of Bed (HOB) Positioning: HOB at 30 degrees  Taken 12/3/2024 0200 by Diamond Gibbs RN  Oral Care:   swabbed with antiseptic solution   suction provided  Head of Bed (HOB) Positioning: HOB at 30 degrees  Taken 12/3/2024 0000 by Diamond Gibbs RN  Oral Care:   swabbed with antiseptic solution   suction provided  Head of Bed (HOB) Positioning: HOB at 30 degrees  Taken 12/2/2024 2200 by Diamond Gibbs RN  Oral Care:   swabbed with antiseptic solution   suction provided  Head of Bed (HOB) Positioning: HOB at 30 degrees  Taken 12/2/2024 2000 by Diamond Gibbs RN  Oral Care:   swabbed with antiseptic solution   suction provided  Head of Bed (HOB) Positioning: HOB at 30 degrees

## 2024-12-03 NOTE — PROGRESS NOTES
NIF/VC performed by patient post-extubation:    NIF: greater than -40 cm H2O  VC: 1,280 mL     Both performed with good effort with HOB elevated     Will continue to monitor    All Edmondson, RT on 12/3/2024 at 4:50 PM

## 2024-12-03 NOTE — PROGRESS NOTES
Antimicrobial Stewardship Team Note    Antimicrobial Stewardship Program - A joint venture between [facility] and [facility] to optimize antibiotic management.     Patient: Mendez Greene  MRN: [id number]  Allergies: Patient has no known allergies.    Brief Summary:  Steven is a 79 year old male admitted 11/30 due to hypotension and altered mental status. PMH: recent ALS & Myasthenia diagnosis 1 month ago, afib, hx of bladder cancer, CHA,  HTN, basal cell carcinoma. During recent hospital stay 11/8/24 pt had feeding tube and snowden catheter placed.     On admission pt showed signs of urosepsis as well as concern for aspiration pneumonia so was started on Zosyn 12/1/24. They were intubated due to respiratory failure on 11/30/24. Snowden catheter was exchanged 11/30. WBC count was 13.3 on admission, normal at 7.6 today (12/3). Extubated to 3 LPM O2 this morning. Afebrile since 12/1.    CXR 11/30:  Slight atelectasis/scarring of the right midlung. Lungs otherwise appear clear. Top normal heart size. Normal pulmonary vascularity. No pleural effusion or pneumothorax.  CXR 12/1: Both lungs are inflated and clear.     Cultures: no growth from snowden catheter 12/1/24 and no growth from blood culture 11/30/24.          Active Anti-infective Medications   (From admission, onward)                 Start     Stop    12/01/24 0830  piperacillin-tazobactam  3.375 g,   Intravenous,   EVERY 6 HOURS        Aspiration Pneumonia, Sepsis       --                  Assessment: Acute respiratory failure with concern for septic shock  Patient was admitted to ICU requiring intubation for respiratory failure, reports progressive weakness over last few weeks after recent hospitalization with concern for ALS and myasthenia gravis, neurology workup is in progress. He is on day 3 of empiric Zosyn which was started for possible UTI vs pneumonia, has been clinically stable.  Negative blood & urine cultures. Chest  X-ray also negative. Since cultures show no signs of bacterial growth, imaging is negative, and patient is clinically improving, it is appropriate to discontinue Zosyn.     Recommendations:  Stop Zosyn.    Discussed with ID Staff MD [place], PharmD, BCIDP  Sadaf Cardenas, Pharmacy Student           Vital Signs/Clinical Features:  Vitals         12/01 0700  12/02 0659 12/02 0700  12/03 0659 12/03 0700  12/03 1246   Most Recent      Temp ( F) 95.2 -  100.4    96.4 -  97.5    96.6 -  97.3     96.8 (36) 12/03 0900    Pulse 39 -  102    37 -  71    47 -  64     59 12/03 1230    Resp 3 -  40    10 -  27    15 -  25     18 12/03 1230    BP 71/48 -  156/70    93/57 -  140/67    91/73 -  110/57     91/73 12/03 1230    SpO2 (%) 95 -  100    98 -  100    97 -  100     100 12/03 1230            Labs  Estimated Creatinine Clearance: 42.6 mL/min (A) (based on SCr of 1.56 mg/dL (H)).  Recent Labs   Lab Test 12/01/24  1646 12/02/24  0546 12/02/24  1259 12/02/24  1808 12/02/24  2358 12/03/24  0446   CR 2.23* 1.97* 1.76* 1.76* 1.61* 1.56*       Recent Labs   Lab Test 11/07/18  1048 05/06/19  1002 11/06/20  1644 11/06/20  2020 03/04/24  1343 03/21/24  1112 11/10/24  1643 11/11/24  0838 11/12/24  1035 11/30/24  2058 12/01/24  0313 12/03/24  0446   WBC 5.2   < > 8.5   < > 7.3   < > 8.8 7.5 9.1 13.3* 12.1* 7.6   ANEU 3.1  --  6.4  --  4.3  --   --   --   --   --   --   --    ALYM 1.3  --  1.3  --  2.2  --   --   --   --   --   --   --    VINAY 0.5  --  0.7  --  0.5  --   --   --   --   --   --   --    AEOS 0.4  --  0.1  --  0.1  --   --   --   --   --   --   --    HGB 13.3   < > 14.9   < > 11.3*   < > 13.7 14.0 13.8 15.1 13.3 9.6*   HCT 39.6*   < > 42.6   < > 34.9*   < > 40.6 42.3 42.3 46.5 41.7 30.8*      < > 99   < > 100   < > 103* 104* 104* 105* 106* 110*      < > 253   < > 407   < > 310 307 290 406 389 206    < > = values in this interval not displayed.       Recent Labs   Lab Test  24  1343 07/10/24  1226 24  1525 24  1139 24  2058 24  0313   BILITOTAL 0.4 0.6 0.5 0.5 0.5 0.5   ALKPHOS 65 62 71 64 115 104   ALBUMIN 3.9 4.4 3.7 3.3* 3.2* 3.0*   AST 21 22 117* 91* 23 26   ALT 19 12 184* 156* 48 44       Recent Labs   Lab Test 24  2100 24  0313 24  1055   LACT 2.0 0.9 1.0             Culture Results:  7-Day Micro Results       Procedure Component Value Units Date/Time    Urine Culture [[id number]] Collected: 24 1116    Order Status: Completed Lab Status: Final result Updated: 24 1149    Specimen: Urine, Cortez Catheter      Culture No Growth    Blood Culture Peripheral Blood [[id number]]  (Normal) Collected: 24    Order Status: Completed Lab Status: Preliminary result Updated: 24 2231    Specimen: Peripheral Blood      Culture No growth after 2 days            Recent Labs   Lab Test 24  1155 24  1625 24  1300 24  1116   URINEPH 8.5* 8.5* 7.5* 6.5   NITRITE Positive* Positive* Negative Negative   LEUKEST Moderate* Large* Large* Large*   WBCU 25-50* 112* >182* >182*                         Imaging: Echocardiogram Limited    Result Date: 2024  [id number]^EDMOND^BASILIO  [facility] [address] [place] [id number]  Name: JUAN MANUEL GRIMES MRN: [id number] : 1945 Study Date: 2024 12:00 PM Age: 79 yrs Gender: Male Patient Location: [facility] Reason For Study: Shock Ordering Physician: BASILIO PRUITT Referring Physician: Gladys Vazquez Performed By: Karen Tucker  BSA: 1.9 m2 Height: 70 in Weight: 166 lb HR: 77 BP: 112/74 mmHg ______________________________________________________________________________ Procedure Limited Portable Echo Adult. Definity (NDC #02142-174) given intravenously. ______________________________________________________________________________ Interpretation Summary  Limited echo.  Patient intubated and sedated. Echo is quite technically difficult due to poor acoustic windows and irregular rhythm.  There is grossly normal left and right ventricular function. There are no severe valvular regurgitation appreciated. Aortic regurgitation is probably mild. There is no pericardial effusion. ______________________________________________________________________________ Left Ventricle Regional wall motion abnormalities cannot be excluded due to limited visualization.  Aortic Valve eccentric aortic regurgitation which appears grossly mild.  Vessels The aortic root is not well visualized. Dilated IVC, patient undergoing mechanical ventilation.  ______________________________________________________________________________ Doppler Measurements & Calculations MV E max jenna: 61.0 cm/sec MV A max jenna: 43.0 cm/sec MV E/A: 1.4 MV dec slope: 154.7 cm/sec2 MV dec time: 0.39 sec PA acc time: 0.12 sec  ______________________________________________________________________________ Report approved by: MD Norma Broussard 12/01/2024 01:41 PM       XR Chest Port 1 View    Result Date: 12/1/2024  EXAM: XR CHEST PORT 1 VIEW LOCATION: [facility] DATE: 12/1/2024 INDICATION: CVC placement. COMPARISON: Portable chest single view 11/30/2024 at 2338 hours.     IMPRESSION: Interval placement of right IJ catheter, tip in satisfactory position in the SVC. Endotracheal tube tip 6 cm above the linda. Both lungs are inflated and clear. No adenopathy or effusion. Normal cardiac size and pulmonary vascularity. Mildly  atherosclerotic thoracic aorta. Degenerative changes both shoulders and the spine. Mild right convex thoracic curve. Monitoring leads overlying the chest.    CT Abdomen Pelvis w/o Contrast    Result Date: 12/1/2024  EXAM: CT ABDOMEN PELVIS W/O CONTRAST LOCATION: [facility] DATE: 12/1/2024 INDICATION: Renal failure, shock, possible infection. COMPARISON: CT abdomen  and pelvis without/with IV contrast 2/21/2006. TECHNIQUE: CT scan of the abdomen and pelvis was performed without IV contrast. Multiplanar reformats were obtained. Dose reduction techniques were used. CONTRAST: None. FINDINGS: LOWER CHEST: A few strands of linear atelectasis have developed in the lower lungs. No pleural effusion on either side. Normal cardiac size. Coronary artery and aortic valvular calcifications. No pericardial effusion. HEPATOBILIARY: Distended gallbladder without calcified stones, biliary dilatation or adjacent inflammation. No discrete hepatic lesion. PANCREAS: Normal. SPLEEN: Normal. ADRENAL GLANDS: Normal. KIDNEYS/BLADDER: On the right, tiny sand-like calyceal tip stones have developed in the collecting system (images 69, 83, 92 and 103, series 4). On the left, tiny sand-like stones have developed in the upper aspect in midportion (images 58 and 90, series  4). No hydronephrosis or hydroureter on either side. Interval postoperative changes of cystoprostatectomy, ureteroileal diversion and neobladder formation. Cortez catheter within the neobladder. BOWEL: Formed stool material and scattered gas within normal caliber colon. No mechanical obstruction or free gas. Scattered colonic diverticulosis, more apparent at the rectosigmoid, without acute inflammation or secondary complications. Normal appendix. Gastrostomy tube has been placed, satisfactory positioning. LYMPH NODES: No suspicious abdominopelvic adenopathy. Bilateral pelvic lymphadenectomy. VASCULATURE: Atherosclerotic and mildly ectatic distal abdominal aorta measuring 2.5 x 2.4 cm (image 102, series 4), previously 2.3 x 2.3 cm. Normal caliber IVC. PELVIC ORGANS: Interval cystoprostatectomy, ureteroileal diversion and neobladder formation. Bilateral pelvic lymphadenectomy. Normal appendix. Sigmoid diverticulosis. No free fluid. MUSCULOSKELETAL: Degenerative changes involving the spine and joints of the pelvis. Resolved small  fat-containing ventral umbilical hernia.     IMPRESSION: 1.  Tiny sand-like calyceal stones have developed in the collecting system of both kidneys. No hydronephrosis or hydroureter on either side. Interval postoperative changes of cystoprostatectomy, ureteroileal diversion and neobladder formation. Cortez catheter within the neobladder. 2.  Scattered colonic diverticulosis, more apparent distally at the rectosigmoid, without acute inflammation or secondary complications. Normal appendix. Gastrostomy has been placed, satisfactory positioning. 3.  Normal cardiac size. Coronary artery and aortic valvular calcifications. No pericardial effusion. Atherosclerotic and mildly ectatic distal abdominal aorta, slightly more apparent. No aneurysm. 4.  Degenerative changes involving the spine and joints of the pelvis. Resolved small fat-containing ventral umbilical hernia.     XR Chest Port 1 View    Result Date: 11/30/2024  EXAM: XR CHEST PORT 1 VIEW LOCATION: [facility] DATE: 11/30/2024 INDICATION: POst intubation COMPARISON: Earlier today.     IMPRESSION: Endotracheal tube in satisfactory position terminating 4 cm above the linda. Slight atelectasis/scarring of the right midlung. Lungs otherwise appear clear. Top normal heart size. Normal pulmonary vascularity. No pleural effusion or pneumothorax.    XR Chest Port 1 View    Result Date: 11/30/2024  EXAM: XR CHEST PORT 1 VIEW LOCATION: [facility] DATE: 11/30/2024 INDICATION: Hypoxia, hypotension, please eval any pulmonary infiltrate COMPARISON: Chest x-ray 11/8/2024     IMPRESSION: The left apex was not included on the radiograph. Cardiac silhouette size is within normal limits. Calcified atherosclerotic changes of the aortic arch. Subsegmental atelectasis, right perihilar region. Visualized lungs are otherwise clear.

## 2024-12-03 NOTE — PROGRESS NOTES
[facility] ICU RESPIRATORY NOTE        Date of Admission: 11/30/2024    Date of Intubation (most recent): 11/30/2024    Reason for Mechanical Ventilation: Respiratory failure    Number of Days on Mechanical Ventilation: 4    Met Criteria for Spontaneous Breathing Trial: No    Reason for No Spontaneous Breathing Trial: Per MD    Bite Block: No    Significant Events Today: None overnight    ABG Results:   Recent Labs   Lab 12/03/24  0446 12/02/24  0911 12/01/24  1055 12/01/24  0313   O2PER 30 30 40 40         Current Vent Settings: FiO2 (%): 30 %, Resp: 15, Vent Mode: CMV/AC, Resp Rate (Set): 16 breaths/min, Tidal Volume (Set, mL): 450 mL, PEEP (cm H2O): 5 cmH2O, Pressure Support (cm H2O): 5 cmH2O, Resp Rate (Set): 16 breaths/min, Tidal Volume (Set, mL): 450 mL, PEEP (cm H2O): 5 cmH2O    Skin Assessment: Intact      Yo Grimes, RT on 12/3/2024 at 6:46 AM

## 2024-12-04 ENCOUNTER — APPOINTMENT (OUTPATIENT)
Dept: OCCUPATIONAL THERAPY | Facility: CLINIC | Age: 79
End: 2024-12-04
Attending: STUDENT IN AN ORGANIZED HEALTH CARE EDUCATION/TRAINING PROGRAM
Payer: MEDICARE

## 2024-12-04 ENCOUNTER — APPOINTMENT (OUTPATIENT)
Dept: SPEECH THERAPY | Facility: CLINIC | Age: 79
End: 2024-12-04
Payer: MEDICARE

## 2024-12-04 ENCOUNTER — APPOINTMENT (OUTPATIENT)
Dept: PHYSICAL THERAPY | Facility: CLINIC | Age: 79
End: 2024-12-04
Attending: STUDENT IN AN ORGANIZED HEALTH CARE EDUCATION/TRAINING PROGRAM
Payer: MEDICARE

## 2024-12-04 PROBLEM — G70.01: Status: ACTIVE | Noted: 2024-12-04

## 2024-12-04 LAB
ANION GAP SERPL CALCULATED.3IONS-SCNC: 6 MMOL/L (ref 7–15)
ANION GAP SERPL CALCULATED.3IONS-SCNC: 6 MMOL/L (ref 7–15)
ATRIAL RATE - MUSE: 41 BPM
ATRIAL RATE - MUSE: 42 BPM
BASE EXCESS BLDV CALC-SCNC: -2.2 MMOL/L (ref -3–3)
BUN SERPL-MCNC: 57.6 MG/DL (ref 8–23)
BUN SERPL-MCNC: 61.5 MG/DL (ref 8–23)
CALCIUM SERPL-MCNC: 7.9 MG/DL (ref 8.8–10.4)
CALCIUM SERPL-MCNC: 8 MG/DL (ref 8.8–10.4)
CHLORIDE SERPL-SCNC: 115 MMOL/L (ref 98–107)
CHLORIDE SERPL-SCNC: 117 MMOL/L (ref 98–107)
CREAT SERPL-MCNC: 1.26 MG/DL (ref 0.67–1.17)
CREAT SERPL-MCNC: 1.31 MG/DL (ref 0.67–1.17)
DIASTOLIC BLOOD PRESSURE - MUSE: NORMAL MMHG
DIASTOLIC BLOOD PRESSURE - MUSE: NORMAL MMHG
EGFRCR SERPLBLD CKD-EPI 2021: 55 ML/MIN/1.73M2
EGFRCR SERPLBLD CKD-EPI 2021: 58 ML/MIN/1.73M2
ERYTHROCYTE [DISTWIDTH] IN BLOOD BY AUTOMATED COUNT: 13.4 % (ref 10–15)
GLUCOSE BLDC GLUCOMTR-MCNC: 97 MG/DL (ref 70–99)
GLUCOSE SERPL-MCNC: 101 MG/DL (ref 70–99)
GLUCOSE SERPL-MCNC: 94 MG/DL (ref 70–99)
HCO3 BLDV-SCNC: 23 MMOL/L (ref 21–28)
HCO3 SERPL-SCNC: 21 MMOL/L (ref 22–29)
HCO3 SERPL-SCNC: 22 MMOL/L (ref 22–29)
HCT VFR BLD AUTO: 28.3 % (ref 40–53)
HGB BLD-MCNC: 9.1 G/DL (ref 13.3–17.7)
INTERPRETATION ECG - MUSE: NORMAL
INTERPRETATION ECG - MUSE: NORMAL
MAGNESIUM SERPL-MCNC: 1.8 MG/DL (ref 1.7–2.3)
MCH RBC QN AUTO: 34.3 PG (ref 26.5–33)
MCHC RBC AUTO-ENTMCNC: 32.2 G/DL (ref 31.5–36.5)
MCV RBC AUTO: 107 FL (ref 78–100)
O2/TOTAL GAS SETTING VFR VENT: 25 %
OXYHGB MFR BLDV: 68 % (ref 70–75)
P AXIS - MUSE: 1 DEGREES
P AXIS - MUSE: 8 DEGREES
PCO2 BLDV: 43 MM HG (ref 40–50)
PH BLDV: 7.34 [PH] (ref 7.32–7.43)
PHOSPHATE SERPL-MCNC: 2.2 MG/DL (ref 2.5–4.5)
PLATELET # BLD AUTO: 170 10E3/UL (ref 150–450)
PO2 BLDV: 35 MM HG (ref 25–47)
POTASSIUM SERPL-SCNC: 3.2 MMOL/L (ref 3.4–5.3)
POTASSIUM SERPL-SCNC: 3.3 MMOL/L (ref 3.4–5.3)
PR INTERVAL - MUSE: 196 MS
PR INTERVAL - MUSE: 204 MS
QRS DURATION - MUSE: 110 MS
QRS DURATION - MUSE: 112 MS
QT - MUSE: 482 MS
QT - MUSE: 496 MS
QTC - MUSE: 402 MS
QTC - MUSE: 409 MS
R AXIS - MUSE: -16 DEGREES
R AXIS - MUSE: 20 DEGREES
RBC # BLD AUTO: 2.65 10E6/UL (ref 4.4–5.9)
SAO2 % BLDV: 68.8 % (ref 70–75)
SODIUM SERPL-SCNC: 143 MMOL/L (ref 135–145)
SODIUM SERPL-SCNC: 144 MMOL/L (ref 135–145)
SYSTOLIC BLOOD PRESSURE - MUSE: NORMAL MMHG
SYSTOLIC BLOOD PRESSURE - MUSE: NORMAL MMHG
T AXIS - MUSE: 12 DEGREES
T AXIS - MUSE: 61 DEGREES
VENTRICULAR RATE- MUSE: 41 BPM
VENTRICULAR RATE- MUSE: 42 BPM
WBC # BLD AUTO: 7.2 10E3/UL (ref 4–11)

## 2024-12-04 PROCEDURE — 93005 ELECTROCARDIOGRAM TRACING: CPT

## 2024-12-04 PROCEDURE — 99233 SBSQ HOSP IP/OBS HIGH 50: CPT | Performed by: INTERNAL MEDICINE

## 2024-12-04 PROCEDURE — 82310 ASSAY OF CALCIUM: CPT | Performed by: INTERNAL MEDICINE

## 2024-12-04 PROCEDURE — 99233 SBSQ HOSP IP/OBS HIGH 50: CPT | Performed by: PHYSICIAN ASSISTANT

## 2024-12-04 PROCEDURE — 250N000011 HC RX IP 250 OP 636: Performed by: INTERNAL MEDICINE

## 2024-12-04 PROCEDURE — B4035 ENTERAL FEED SUPP PUMP PER D: HCPCS

## 2024-12-04 PROCEDURE — 93010 ELECTROCARDIOGRAM REPORT: CPT | Performed by: INTERNAL MEDICINE

## 2024-12-04 PROCEDURE — 80048 BASIC METABOLIC PNL TOTAL CA: CPT | Performed by: INTERNAL MEDICINE

## 2024-12-04 PROCEDURE — 250N000013 HC RX MED GY IP 250 OP 250 PS 637: Performed by: PHYSICIAN ASSISTANT

## 2024-12-04 PROCEDURE — 250N000013 HC RX MED GY IP 250 OP 250 PS 637: Performed by: INTERNAL MEDICINE

## 2024-12-04 PROCEDURE — 250N000009 HC RX 250: Performed by: PHYSICIAN ASSISTANT

## 2024-12-04 PROCEDURE — 258N000003 HC RX IP 258 OP 636: Performed by: PHYSICIAN ASSISTANT

## 2024-12-04 PROCEDURE — 92526 ORAL FUNCTION THERAPY: CPT | Mod: GN

## 2024-12-04 PROCEDURE — 84100 ASSAY OF PHOSPHORUS: CPT | Performed by: INTERNAL MEDICINE

## 2024-12-04 PROCEDURE — 250N000013 HC RX MED GY IP 250 OP 250 PS 637: Performed by: SURGERY

## 2024-12-04 PROCEDURE — 250N000011 HC RX IP 250 OP 636: Mod: JZ | Performed by: PSYCHIATRY & NEUROLOGY

## 2024-12-04 PROCEDURE — 97161 PT EVAL LOW COMPLEX 20 MIN: CPT | Mod: GP

## 2024-12-04 PROCEDURE — 250N000011 HC RX IP 250 OP 636: Performed by: STUDENT IN AN ORGANIZED HEALTH CARE EDUCATION/TRAINING PROGRAM

## 2024-12-04 PROCEDURE — 250N000013 HC RX MED GY IP 250 OP 250 PS 637: Performed by: PSYCHIATRY & NEUROLOGY

## 2024-12-04 PROCEDURE — 83735 ASSAY OF MAGNESIUM: CPT | Performed by: INTERNAL MEDICINE

## 2024-12-04 PROCEDURE — 82805 BLOOD GASES W/O2 SATURATION: CPT | Performed by: INTERNAL MEDICINE

## 2024-12-04 PROCEDURE — 82565 ASSAY OF CREATININE: CPT | Performed by: INTERNAL MEDICINE

## 2024-12-04 PROCEDURE — 99418 PROLNG IP/OBS E/M EA 15 MIN: CPT | Performed by: PHYSICIAN ASSISTANT

## 2024-12-04 PROCEDURE — 94150 VITAL CAPACITY TEST: CPT

## 2024-12-04 PROCEDURE — 97530 THERAPEUTIC ACTIVITIES: CPT | Mod: GP

## 2024-12-04 PROCEDURE — 999N000157 HC STATISTIC RCP TIME EA 10 MIN

## 2024-12-04 PROCEDURE — 250N000013 HC RX MED GY IP 250 OP 250 PS 637: Performed by: STUDENT IN AN ORGANIZED HEALTH CARE EDUCATION/TRAINING PROGRAM

## 2024-12-04 PROCEDURE — 97535 SELF CARE MNGMENT TRAINING: CPT | Mod: GO

## 2024-12-04 PROCEDURE — 97165 OT EVAL LOW COMPLEX 30 MIN: CPT | Mod: GO

## 2024-12-04 PROCEDURE — 85027 COMPLETE CBC AUTOMATED: CPT | Performed by: INTERNAL MEDICINE

## 2024-12-04 PROCEDURE — 94660 CPAP INITIATION&MGMT: CPT

## 2024-12-04 PROCEDURE — 120N000001 HC R&B MED SURG/OB

## 2024-12-04 PROCEDURE — 99291 CRITICAL CARE FIRST HOUR: CPT | Mod: GC | Performed by: STUDENT IN AN ORGANIZED HEALTH CARE EDUCATION/TRAINING PROGRAM

## 2024-12-04 RX ORDER — POTASSIUM CHLORIDE 1.5 G/1.58G
40 POWDER, FOR SOLUTION ORAL
Status: COMPLETED | OUTPATIENT
Start: 2024-12-04 | End: 2024-12-04

## 2024-12-04 RX ADMIN — HEPARIN SODIUM 5000 UNITS: 5000 INJECTION, SOLUTION INTRAVENOUS; SUBCUTANEOUS at 00:14

## 2024-12-04 RX ADMIN — LEVOTHYROXINE SODIUM 150 MCG: 150 TABLET ORAL at 09:04

## 2024-12-04 RX ADMIN — HEPARIN SODIUM 5000 UNITS: 5000 INJECTION, SOLUTION INTRAVENOUS; SUBCUTANEOUS at 09:05

## 2024-12-04 RX ADMIN — HUMAN IMMUNOGLOBULIN G 30 G: 20 LIQUID INTRAVENOUS at 13:48

## 2024-12-04 RX ADMIN — SODIUM PHOSPHATE, MONOBASIC, MONOHYDRATE AND SODIUM PHOSPHATE, DIBASIC, ANHYDROUS 15 MMOL: 142; 276 INJECTION, SOLUTION INTRAVENOUS at 16:11

## 2024-12-04 RX ADMIN — DIPHENHYDRAMINE HYDROCHLORIDE 50 MG: 50 INJECTION, SOLUTION INTRAMUSCULAR; INTRAVENOUS at 13:36

## 2024-12-04 RX ADMIN — HEPARIN SODIUM 5000 UNITS: 5000 INJECTION, SOLUTION INTRAVENOUS; SUBCUTANEOUS at 16:12

## 2024-12-04 RX ADMIN — CEFTRIAXONE SODIUM 2 G: 2 INJECTION, POWDER, FOR SOLUTION INTRAMUSCULAR; INTRAVENOUS at 03:46

## 2024-12-04 RX ADMIN — POTASSIUM CHLORIDE 40 MEQ: 1.5 POWDER, FOR SOLUTION ORAL at 09:56

## 2024-12-04 RX ADMIN — Medication 5 MG: at 20:51

## 2024-12-04 RX ADMIN — PYRIDOSTIGMINE BROMIDE 60 MG: 60 TABLET ORAL at 20:51

## 2024-12-04 RX ADMIN — ACETAMINOPHEN 650 MG: 325 TABLET, FILM COATED ORAL at 13:36

## 2024-12-04 RX ADMIN — OLOPATADINE HYDROCHLORIDE 1 DROP: 1 SOLUTION/ DROPS OPHTHALMIC at 09:05

## 2024-12-04 RX ADMIN — POTASSIUM CHLORIDE 40 MEQ: 1.5 POWDER, FOR SOLUTION ORAL at 09:04

## 2024-12-04 RX ADMIN — PYRIDOSTIGMINE BROMIDE 60 MG: 60 TABLET ORAL at 05:47

## 2024-12-04 RX ADMIN — PYRIDOSTIGMINE BROMIDE 60 MG: 60 TABLET ORAL at 13:37

## 2024-12-04 RX ADMIN — ASPIRIN 81 MG CHEWABLE TABLET 81 MG: 81 TABLET CHEWABLE at 09:04

## 2024-12-04 RX ADMIN — OLOPATADINE HYDROCHLORIDE 1 DROP: 1 SOLUTION/ DROPS OPHTHALMIC at 20:52

## 2024-12-04 ASSESSMENT — ACTIVITIES OF DAILY LIVING (ADL)
ADLS_ACUITY_SCORE: 74
ADLS_ACUITY_SCORE: 74
ADLS_ACUITY_SCORE: 78
ADLS_ACUITY_SCORE: 74
ADLS_ACUITY_SCORE: 78
ADLS_ACUITY_SCORE: 78
ADLS_ACUITY_SCORE: 74
ADLS_ACUITY_SCORE: 78
ADLS_ACUITY_SCORE: 74
ADLS_ACUITY_SCORE: 78
ADLS_ACUITY_SCORE: 78
ADLS_ACUITY_SCORE: 80
ADLS_ACUITY_SCORE: 78
ADLS_ACUITY_SCORE: 74
ADLS_ACUITY_SCORE: 80
ADLS_ACUITY_SCORE: 74

## 2024-12-04 NOTE — PLAN OF CARE
"  Neuro: Oriented x4, awake, PERRL 3, generalized weakness and impaired swallow, afebrile  Pain: pain with movement otherwise denies, tylenol given prior to IVIG, utilizing rest/repositioning  CV: Sinus fam - verified with 12 lead today. As low as 38. Lowered when sleeping and when IVIG infusing. 2/2 pulses, trace edema, BP WDL  Resp: Clear RA, occasional oral secretions.   GI: TF at goal. 45ml/hr/ q2h 100ml h2o. Diarrhea. Multiple watery stools.   : Chronic snowden - adequate output. Mucous. Bladder spasm x1  Skin: coccyx is red/blanchable - no mepilex due to diarrhea. Scattered bruising. RIJ CVC removed today.   Lines:  2x PIV Left arm    Additional:  IVIG day 4/5    Plan:  Transfer out of ICU  PT/OT  SLP  Continue MG meds and monitor for effectiveness    Jed ALAS, RN on 12/4/2024 at 3:30 PM        Problem: Adult Inpatient Plan of Care  Goal: Plan of Care Review  Description: The Plan of Care Review/Shift note should be completed every shift.  The Outcome Evaluation is a brief statement about your assessment that the patient is improving, declining, or no change.  This information will be displayed automatically on your shift  note.  Outcome: Progressing  Goal: Patient-Specific Goal (Individualized)  Description: You can add care plan individualizations to a care plan. Examples of Individualization might be:  \"Parent requests to be called daily at 9am for status\", \"I have a hard time hearing out of my right ear\", or \"Do not touch me to wake me up as it startles  me\".  Outcome: Progressing  Goal: Absence of Hospital-Acquired Illness or Injury  Outcome: Progressing  Intervention: Identify and Manage Fall Risk  Recent Flowsheet Documentation  Taken 12/4/2024 1200 by Jed Alas, RN  Safety Promotion/Fall Prevention:   clutter free environment maintained   lighting adjusted   room door open   room near nurse's station   room organization consistent   safety round/check completed   supervised " activity  Taken 12/4/2024 0800 by Jed Alas RN  Safety Promotion/Fall Prevention:   clutter free environment maintained   lighting adjusted   room door open   room near nurse's station   room organization consistent   safety round/check completed   supervised activity  Intervention: Prevent Skin Injury  Recent Flowsheet Documentation  Taken 12/4/2024 1400 by Jed Alas RN  Body Position:   turned   heels elevated   legs elevated   lower extremity elevated   upper extremity elevated  Taken 12/4/2024 1200 by Jed Alas RN  Body Position:   turned   heels elevated   legs elevated   lower extremity elevated   upper extremity elevated  Skin Protection:   adhesive use limited   incontinence pads utilized   protective footwear used   pulse oximeter probe site changed   silicone foam dressing in place   skin to device areas padded   transparent dressing maintained  Taken 12/4/2024 1000 by Jed Alas RN  Body Position:   turned   heels elevated   legs elevated   lower extremity elevated   upper extremity elevated  Taken 12/4/2024 0800 by Jed Alas RN  Body Position:   turned   heels elevated   legs elevated   lower extremity elevated   upper extremity elevated  Skin Protection:   adhesive use limited   incontinence pads utilized   protective footwear used   pulse oximeter probe site changed   silicone foam dressing in place   skin to device areas padded   transparent dressing maintained  Intervention: Prevent and Manage VTE (Venous Thromboembolism) Risk  Recent Flowsheet Documentation  Taken 12/4/2024 1200 by Jed Alas RN  VTE Prevention/Management: SCDs on (sequential compression devices)  Taken 12/4/2024 0800 by Jed Alas RN  VTE Prevention/Management: SCDs on (sequential compression devices)  Goal: Optimal Comfort and Wellbeing  Outcome: Progressing  Intervention: Monitor Pain and Promote Comfort  Recent Flowsheet  Documentation  Taken 12/4/2024 1200 by Jed Alas, RN  Pain Management Interventions:   repositioned   rest  Intervention: Provide Person-Centered Care  Recent Flowsheet Documentation  Taken 12/4/2024 1200 by Jed Alas, RN  Trust Relationship/Rapport:   care explained   choices provided   emotional support provided   reassurance provided   questions answered  Taken 12/4/2024 0800 by Jed Alas, RN  Trust Relationship/Rapport:   care explained   choices provided   emotional support provided   reassurance provided   questions answered  Goal: Readiness for Transition of Care  Outcome: Progressing   Goal Outcome Evaluation:                         Smoking

## 2024-12-04 NOTE — PLAN OF CARE
"Goal Outcome Evaluation:      Plan of Care Reviewed With: patient    Overall Patient Progress: improvingOverall Patient Progress: improving     Neuro: Pleasant and cooperative - follows commands. Oriented x4. Makes needs known - uses soft touch call light.   Cardiac: Tele SB with HR 35-40s. BP stable.   Resp: Lung sounds clear. On room air. Attempted CPAP overnight but pt wanted it off.  GI/: PEG w/ TF at goal. Loose BM x4 - uses bedpan. Chronic snowden w/ good UOP.  Lines: right IJ    Plan: Possible transfer out of ICU      *see Epic flowsheets for full nursing assessment findings       Problem: Adult Inpatient Plan of Care  Goal: Plan of Care Review  Description: The Plan of Care Review/Shift note should be completed every shift.  The Outcome Evaluation is a brief statement about your assessment that the patient is improving, declining, or no change.  This information will be displayed automatically on your shift  note.  Outcome: Progressing  Flowsheets (Taken 12/4/2024 0447)  Plan of Care Reviewed With: patient  Overall Patient Progress: improving  Goal: Patient-Specific Goal (Individualized)  Description: You can add care plan individualizations to a care plan. Examples of Individualization might be:  \"Parent requests to be called daily at 9am for status\", \"I have a hard time hearing out of my right ear\", or \"Do not touch me to wake me up as it startles  me\".  Outcome: Progressing  Goal: Absence of Hospital-Acquired Illness or Injury  Outcome: Progressing  Intervention: Identify and Manage Fall Risk  Recent Flowsheet Documentation  Taken 12/4/2024 0400 by Diamond Gibbs, RN  Safety Promotion/Fall Prevention:   clutter free environment maintained   lighting adjusted   room door open   room near nurse's station   room organization consistent   safety round/check completed   supervised activity  Taken 12/4/2024 0000 by Diamond Gibbs, RN  Safety Promotion/Fall Prevention:   clutter free environment maintained   " lighting adjusted   room door open   room near nurse's station   room organization consistent   safety round/check completed   supervised activity  Taken 12/3/2024 2000 by Diamond Gibbs RN  Safety Promotion/Fall Prevention:   clutter free environment maintained   lighting adjusted   room door open   room near nurse's station   room organization consistent   safety round/check completed   supervised activity  Intervention: Prevent Skin Injury  Recent Flowsheet Documentation  Taken 12/4/2024 0400 by Diamond Gibbs RN  Body Position:   turned   side-lying   heels elevated   upper extremity elevated  Skin Protection:   adhesive use limited   incontinence pads utilized   protective footwear used   pulse oximeter probe site changed   silicone foam dressing in place   skin to device areas padded   transparent dressing maintained  Taken 12/4/2024 0200 by Diamond Gibbs RN  Body Position:   turned   side-lying   heels elevated   upper extremity elevated  Taken 12/4/2024 0000 by Diamond Gibbs RN  Body Position:   turned   side-lying   heels elevated   upper extremity elevated  Skin Protection:   adhesive use limited   incontinence pads utilized   protective footwear used   pulse oximeter probe site changed   silicone foam dressing in place   skin to device areas padded   transparent dressing maintained  Taken 12/3/2024 2200 by Diamond Gibbs RN  Body Position:   turned   side-lying   heels elevated   upper extremity elevated  Taken 12/3/2024 2000 by Diamond Gibbs RN  Body Position:   turned   side-lying   heels elevated   upper extremity elevated  Skin Protection:   adhesive use limited   incontinence pads utilized   protective footwear used   pulse oximeter probe site changed   silicone foam dressing in place   skin to device areas padded   transparent dressing maintained  Intervention: Prevent and Manage VTE (Venous Thromboembolism) Risk  Recent Flowsheet Documentation  Taken 12/4/2024 0400 by Diamond Gibbs RN  VTE  Prevention/Management: SCDs on (sequential compression devices)  Taken 12/4/2024 0000 by Diamond Gibbs RN  VTE Prevention/Management: SCDs on (sequential compression devices)  Taken 12/3/2024 2000 by Diamond Gibbs RN  VTE Prevention/Management: SCDs on (sequential compression devices)  Goal: Optimal Comfort and Wellbeing  Outcome: Progressing  Intervention: Provide Person-Centered Care  Recent Flowsheet Documentation  Taken 12/4/2024 0400 by Diamond Gibbs RN  Trust Relationship/Rapport:   care explained   choices provided   emotional support provided   reassurance provided   questions answered  Taken 12/4/2024 0000 by Diamond Gibbs RN  Trust Relationship/Rapport:   care explained   choices provided   emotional support provided   reassurance provided   questions answered  Taken 12/3/2024 2000 by Diamond Gibbs RN  Trust Relationship/Rapport:   care explained   choices provided   emotional support provided   reassurance provided   questions answered  Goal: Readiness for Transition of Care  Outcome: Progressing     Problem: Gas Exchange Impaired  Goal: Optimal Gas Exchange  Outcome: Progressing  Intervention: Optimize Oxygenation and Ventilation  Recent Flowsheet Documentation  Taken 12/4/2024 0400 by Diamond Gibbs RN  Airway/Ventilation Management: airway patency maintained  Head of Bed (HOB) Positioning: HOB at 30-45 degrees  Taken 12/4/2024 0200 by Diamond Gibbs RN  Head of Bed (HOB) Positioning: HOB at 30-45 degrees  Taken 12/4/2024 0000 by Diamond Gibbs RN  Airway/Ventilation Management: airway patency maintained  Head of Bed (HOB) Positioning: HOB at 30-45 degrees  Taken 12/3/2024 2200 by Diamond Gibbs RN  Head of Bed (HOB) Positioning: HOB at 30-45 degrees  Taken 12/3/2024 2000 by Diamond Gibbs RN  Airway/Ventilation Management: airway patency maintained  Head of Bed (HOB) Positioning: HOB at 30-45 degrees     Problem: Skin Injury Risk Increased  Goal: Skin Health and Integrity  Outcome:  Progressing  Intervention: Plan: Nurse Driven Intervention: Moisture Management  Recent Flowsheet Documentation  Taken 12/4/2024 0400 by Diamodn Gibbs RN  Moisture Interventions:   No brief in bed   Incontinence pad   Urinary collection device   Perineal cleanser  Bathing/Skin Care: incontinence care  Taken 12/4/2024 0200 by Diamond Gibbs RN  Bathing/Skin Care: incontinence care  Taken 12/4/2024 0000 by Diamond Gibbs RN  Moisture Interventions:   No brief in bed   Incontinence pad   Urinary collection device   Perineal cleanser  Taken 12/3/2024 2200 by Diamond Gibbs RN  Bathing/Skin Care: incontinence care  Taken 12/3/2024 2100 by Diamond Gibbs RN  Bathing/Skin Care:   back care   bath, chlorhexidine wipes   bath, complete   wipes, CHG   dressed/undressed   electrode patches/site rotation   incontinence care   linen changed  Taken 12/3/2024 2000 by Diamond Gibbs RN  Moisture Interventions:   No brief in bed   Incontinence pad   Urinary collection device   Perineal cleanser  Taken 12/3/2024 1900 by Diamond Gibbs RN  Bathing/Skin Care: incontinence care  Intervention: Plan: Nurse Driven Intervention: Friction and Shear  Recent Flowsheet Documentation  Taken 12/4/2024 0400 by Diamond Gibbs RN  Friction/Shear Interventions:   Silicone foam sacral dressing   Pad bony prominence (elbow pads, heel pads, ear protectors)   Assistive lifting device (portable/ceiling lift, etc.)   Repositioning device (TAP system, etc.)  Taken 12/4/2024 0000 by Diamond Gibbs RN  Friction/Shear Interventions:   Silicone foam sacral dressing   Pad bony prominence (elbow pads, heel pads, ear protectors)   Assistive lifting device (portable/ceiling lift, etc.)   Repositioning device (TAP system, etc.)  Taken 12/3/2024 2000 by Diamond Gibbs RN  Friction/Shear Interventions:   Silicone foam sacral dressing   Pad bony prominence (elbow pads, heel pads, ear protectors)   Assistive lifting device (portable/ceiling lift, etc.)   Repositioning device  (TAP system, etc.)  Intervention: Optimize Skin Protection  Recent Flowsheet Documentation  Taken 12/4/2024 0400 by Diamond Gibbs RN  Skin Protection:   adhesive use limited   incontinence pads utilized   protective footwear used   pulse oximeter probe site changed   silicone foam dressing in place   skin to device areas padded   transparent dressing maintained  Activity Management: activity adjusted per tolerance  Head of Bed (HOB) Positioning: HOB at 30-45 degrees  Taken 12/4/2024 0200 by Diamond Gibbs RN  Head of Bed (HOB) Positioning: HOB at 30-45 degrees  Taken 12/4/2024 0000 by Diamond Gibbs RN  Skin Protection:   adhesive use limited   incontinence pads utilized   protective footwear used   pulse oximeter probe site changed   silicone foam dressing in place   skin to device areas padded   transparent dressing maintained  Activity Management: activity adjusted per tolerance  Head of Bed (HOB) Positioning: HOB at 30-45 degrees  Taken 12/3/2024 2200 by Diamond Gibbs RN  Head of Bed (HOB) Positioning: HOB at 30-45 degrees  Taken 12/3/2024 2000 by Diamond Gibbs RN  Skin Protection:   adhesive use limited   incontinence pads utilized   protective footwear used   pulse oximeter probe site changed   silicone foam dressing in place   skin to device areas padded   transparent dressing maintained  Activity Management: activity adjusted per tolerance  Head of Bed (HOB) Positioning: HOB at 30-45 degrees     Problem: Risk for Delirium  Goal: Optimal Coping  Outcome: Progressing  Intervention: Optimize Psychosocial Adjustment to Delirium  Recent Flowsheet Documentation  Taken 12/4/2024 0400 by Diamond Gibbs RN  Supportive Measures:   active listening utilized   positive reinforcement provided   relaxation techniques promoted  Taken 12/4/2024 0000 by Diamond Gibbs RN  Supportive Measures:   active listening utilized   positive reinforcement provided   relaxation techniques promoted  Taken 12/3/2024 2000 by Diamond Gibbs  RN  Supportive Measures:   active listening utilized   positive reinforcement provided   relaxation techniques promoted  Goal: Improved Behavioral Control  Outcome: Progressing  Intervention: Prevent and Manage Agitation  Recent Flowsheet Documentation  Taken 12/4/2024 0400 by Diamond Gibbs RN  Environment Familiarity/Consistency: daily routine followed  Taken 12/4/2024 0000 by Diamond Gibbs RN  Environment Familiarity/Consistency: daily routine followed  Taken 12/3/2024 2000 by Diamond Gibbs RN  Environment Familiarity/Consistency: daily routine followed  Intervention: Minimize Safety Risk  Recent Flowsheet Documentation  Taken 12/4/2024 0400 by Diamond Gibbs RN  Enhanced Safety Measures:   monitor patients coagulation values   pain management   review medications for side effects with activity   room near unit station  Trust Relationship/Rapport:   care explained   choices provided   emotional support provided   reassurance provided   questions answered  Taken 12/4/2024 0000 by Diamond Gibbs RN  Enhanced Safety Measures:   monitor patients coagulation values   pain management   review medications for side effects with activity   room near unit station  Trust Relationship/Rapport:   care explained   choices provided   emotional support provided   reassurance provided   questions answered  Taken 12/3/2024 2000 by Diamond Gibbs RN  Enhanced Safety Measures:   monitor patients coagulation values   pain management   review medications for side effects with activity   room near unit station  Trust Relationship/Rapport:   care explained   choices provided   emotional support provided   reassurance provided   questions answered  Goal: Improved Attention and Thought Clarity  Outcome: Progressing  Intervention: Maximize Cognitive Function  Recent Flowsheet Documentation  Taken 12/4/2024 0400 by Diamond Gibbs RN  Sensory Stimulation Regulation:   care clustered   lighting decreased   quiet environment promoted  Reorientation  Measures:   calendar in view   clock in view  Taken 12/4/2024 0000 by Diamond Gibbs, RN  Sensory Stimulation Regulation:   care clustered   lighting decreased   quiet environment promoted  Reorientation Measures:   calendar in view   clock in view  Taken 12/3/2024 2000 by Diamond Gibbs RN  Sensory Stimulation Regulation:   care clustered   lighting decreased   quiet environment promoted  Reorientation Measures:   calendar in view   clock in view  Goal: Improved Sleep  Outcome: Progressing

## 2024-12-04 NOTE — PLAN OF CARE
Problem: Adult Inpatient Plan of Care  Goal: Optimal Comfort and Wellbeing  Intervention: Provide Person-Centered Care  Recent Flowsheet Documentation  Taken 12/3/2024 1600 by Ama Sullivan RN  Trust Relationship/Rapport:   care explained   choices provided   emotional support provided   reassurance provided  Taken 12/3/2024 1200 by Ama Sullivan RN  Trust Relationship/Rapport:   care explained   choices provided   emotional support provided   reassurance provided  Taken 12/3/2024 1000 by Ama Sullivan RN  Trust Relationship/Rapport:   questions encouraged   questions answered   emotional support provided  Taken 12/3/2024 0800 by Ama Sullivan RN  Trust Relationship/Rapport:   care explained   choices provided   emotional support provided   reassurance provided     Problem: Restraint, Nonviolent  Goal: Absence of Harm or Injury  Intervention: Protect Dignity, Rights and Personal Wellbeing  Recent Flowsheet Documentation  Taken 12/3/2024 1600 by Ama Sullivan RN  Trust Relationship/Rapport:   care explained   choices provided   emotional support provided   reassurance provided  Taken 12/3/2024 1200 by Ama Sullivan RN  Trust Relationship/Rapport:   care explained   choices provided   emotional support provided   reassurance provided  Taken 12/3/2024 1000 by Ama Sullivan RN  Trust Relationship/Rapport:   questions encouraged   questions answered   emotional support provided  Taken 12/3/2024 0800 by Ama Sullivan RN  Trust Relationship/Rapport:   care explained   choices provided   emotional support provided   reassurance provided  Goal Outcome Evaluation:  Pt extubated to NC this morning. Lungs clear. Pt now on RA. Levo gtt weaned off. Pt up to chair for 2 hours. Good urine output per snowden. CPAP at bedside for naps/bedtime. Pt received IVIG and was started on Mestinon. Pt able to have ice chips per speech. Pt requires total care as pt has limited mobility. Soft touch call light provided. Family updated at bedside.

## 2024-12-04 NOTE — PROGRESS NOTES
12/04/24 0835   Appointment Info   Signing Clinician's Name / Credentials (OT) Kellen Wyman, OTR/L   Living Environment   People in Home spouse   Current Living Arrangements house   Home Accessibility stairs to enter home   Number of Stairs, Main Entrance 2   Stair Railings, Main Entrance railings safe and in good condition   Transportation Anticipated family or friend will provide   Living Environment Comments Pt lives at home w/ spouse, has walk in shower w/ shower chair and grab bars   Self-Care   Usual Activity Tolerance good   Current Activity Tolerance poor   Regular Exercise Yes   Activity/Exercise Type walking   Exercise Amount/Frequency daily   Fall history within last six months yes   Number of times patient has fallen within last six months 1  (fell in his garden)   Activity/Exercise/Self-Care Comment Pt reports independence w/ ADL tasks and mobility at baseline w/out AD but recently has not been able to walk d/t significant weakness.   Instrumental Activities of Daily Living (IADL)   IADL Comments Pt reports independence w/ home mgmt, was driving prior to admission   General Information   Onset of Illness/Injury or Date of Surgery 11/30/24   Referring Physician Talon Espinal MD   Patient/Family Therapy Goal Statement (OT) get stronger   Additional Occupational Profile Info/Pertinent History of Current Problem 79-year-old male with history of paroxysmal atrial fibrillation, hypertension, bladder cancer, obstructive sleep apnea CKD presented to our hospital in early November because of progressive weakness hoarse voice shortness of breath and dysphagia admitted for neuromuscular weakness, acute respiratory failure and septic shock.   Existing Precautions/Restrictions fall   Cognitive Status Examination   Orientation Status orientation to person, place and time   Cognitive Status Comments Pt oriented x4, following commands w/ some delay and needing repetition at times. Pt appearing quite fatigued this  AM. Will continue to monitor   Visual Perception   Visual Impairment/Limitations corrective lenses for reading   Pain Assessment   Patient Currently in Pain Yes, see Vital Sign flowsheet   Posture   Posture protracted shoulders;forward head position   Range of Motion Comprehensive   Comment, General Range of Motion BUE limited. B shoulder flex to ~75 deg, elbow flex ~45deg, very limited AROM of digits. Pt significantly limited by pain and stiffness, weakness   Strength Comprehensive (MMT)   Comment, General Manual Muscle Testing (MMT) Assessment BUE 2/5 throughout   Muscle Tone Assessment   Muscle Tone Comments pt w/ inc tone in B hands/elbows, very tight and painful w/ passive extension. Worse on R side   Coordination   Upper Extremity Coordination Left UE impaired;Right UE impaired   Bed Mobility   Bed Mobility supine-sit;sit-supine   Scooting/Bridging [place] (Bed Mobility) maximum assist (25% patient effort);2 person assist;verbal cues   Supine-Sit [place] (Bed Mobility) maximum assist (25% patient effort);2 person assist;verbal cues   Transfers   Transfers sit-stand transfer   Sit-Stand Transfer   Sit-Stand [place] (Transfers) maximum assist (25% patient effort);2 person assist;verbal cues   Sit/Stand Transfer Comments w/ ellis stedy   Balance   Balance Comments varying levels of assist at EOB from Mod-CGA   Activities of Daily Living   BADL Assessment/Intervention bathing;upper body dressing;lower body dressing;grooming;toileting   Bathing Assessment/Intervention   [place] Level (Bathing) dependent (less than 25% patient effort)   Comment, (Bathing) per clinical judgement   Upper Body Dressing Assessment/Training   Comment, (Upper Body Dressing) per clinical judgement   [place] Level (Upper Body Dressing) maximum assist (25% patient effort)   Lower Body Dressing Assessment/Training   [place] Level (Lower Body Dressing) dependent (less than 25% patient effort)   Grooming  Assessment/Training   [place] Level (Grooming) moderate assist (50% patient effort)   Toileting   [place] Level (Toileting) dependent (less than 25% patient effort)   Comment, (Toileting) per clinical judgement   Clinical Impression   Criteria for Skilled Therapeutic Interventions Met (OT) Yes, treatment indicated   OT Diagnosis decreased I/ADL independence   OT Problem List-Impairments impacting ADL problems related to;activity tolerance impaired;motor control;strength;range of motion (ROM);balance;coordination;mobility;pain;postural control;muscle tone   Assessment of Occupational Performance 3-5 Performance Deficits   Identified Performance Deficits bathing, dressing, toileting, functional mobility   Planned Therapy Interventions (OT) ADL retraining;IADL retraining;strengthening;transfer training;home program guidelines;ROM;progressive activity/exercise;risk factor education;cognition;fine motor coordination training;neuromuscular re-education;motor coordination training;stretching   Clinical Decision Making Complexity (OT) detailed assessment/moderate complexity   Risk & Benefits of therapy have been explained evaluation/treatment results reviewed;care plan/treatment goals reviewed;risks/benefits reviewed;current/potential barriers reviewed;patient   OT Total Evaluation Time   OT Eval, Low Complexity Minutes (20521) 10   OT Goals   Therapy Frequency (OT) Daily   OT Predicted Duration/Target Date for Goal Attainment 12/11/24   OT Goals Hygiene/Grooming;Lower Body Dressing;Toilet Transfer/Toileting;Upper Body Dressing;Transfers;OT Goal 1   OT: Hygiene/Grooming supervision/stand-by assist  (seated EOB)   OT: Upper Body Dressing Supervision/stand-by assist   OT: Lower Body Dressing Supervision/stand-by assist;using adaptive equipment   OT: Transfer Supervision/stand-by assist;with assistive device   OT: Toilet Transfer/Toileting Supervision/stand-by assist;toilet transfer;cleaning and garment  management;using adaptive equipment   OT: Goal 1 Pt will complete BUE HEP to progress strength/coordination for increased I/ADL independence   Interventions   Interventions Quick Adds Self-Care/Home Management   Self-Care/Home Management   Self-Care/Home Mgmt/ADL, Compensatory, Meal Prep Minutes (19099) 25   Symptoms Noted During/After Treatment (Meal Preparation/Planning Training) fatigue;increased pain;shortness of breath   Treatment Detail/Skilled Intervention Pt in bed upon arrival, agreeable to therapy. Co-eval w/ PT d/t complex mobility needs and limited tolerance for therapy at this time. Inc time for room set up and line mgmt. VSS throughout on RA, HR resting in 40s. Engaged in gentle AAROM in bed, noted inc tone in B hands/elbows, pt very stiff and sore w/ movement.  Supine<>sit w/ Max Ax2. Pt sat EOB ~15 min w/ varying levels of assist needed for balance. Intially Mod A w/ posterior lean. Scooted hips forward and cued pt to shift weight forward, then he was able to sit w/ CGA-Min. engaged in gentle A/AROM EOB. Pt needing Mod A to wipe his face d/t significant BUE weakness. Placed SS, assisted to lift BUE to bar then pt able to maintain grasp. Pt stood from EOB w/ Max Ax2, stood ~20sec before needing to return to sit. Pt quite fatigued, VSS. Pt declines getting up to chair and wants to lie down. Returned to supine w/ maxAx2. Inc time spent positioning for comfort, BUE elevated on pillows to help manage edema. Left in bed w/ all needs met, alarm on and RN present at exit.   OT Discharge Planning   OT Plan progress SS transfers, UE strengthening and monitor edema, seated g/h   OT Discharge Recommendation (DC Rec) [facility]-Motivated patient will benefit from intensive, interdisciplinary therapy.  Anticipate will be able to tolerate 3 hours of therapy per day   OT Rationale for DC Rec Pt well below baseline, limited by significant weakness, pain, poor activity tolerance. Pt would benefit from  intensive rehab at [facility] to progress I/ADL independence prior to returning home. Pt was previously independent and living at home w/ spouse.   OT Brief overview of current status Goals of therapy will be to address safe mobility and make recs for d/c to next level of care. Pt and RN will continue to follow all falls risk precautions as documented by RN staff while hospitalized. Ax2 bed mob and sit<>stand w/ SS. Rec nursing use lift   Total Session Time   Timed Code Treatment Minutes 25   Total Session Time (sum of timed and untimed services) 35

## 2024-12-04 NOTE — PROGRESS NOTES
During evening and night, pt performed NIF/VC.  NIV: -40 and VC 1400 mL, performed well with a good effort.   RT will continue to follow.    Rickey Machuca, RT.

## 2024-12-04 NOTE — PROGRESS NOTES
12/04/24 0911   Appointment Info   Signing Clinician's Name / Credentials (PT) Nena Pacheco DPT   Living Environment   People in Home spouse   Current Living Arrangements house   Home Accessibility stairs to enter home   Number of Stairs, Main Entrance 3   Stair Railings, Main Entrance railings safe and in good condition   Transportation Anticipated family or friend will provide   Living Environment Comments Pt lives with his spouse, 3 NE[address], all needs met on main level.   Self-Care   Usual Activity Tolerance good   Current Activity Tolerance poor   Equipment Currently Used at Home shower chair;grab bar, tub/shower   Fall history within last six months yes   Number of times patient has fallen within last six months 1   Activity/Exercise/Self-Care Comment Pt reports IND at baseline   General Information   Onset of Illness/Injury or Date of Surgery 12/01/24   Referring Physician Talon Espinal MD   Patient/Family Therapy Goals Statement (PT) Return home   Pertinent History of Current Problem (include personal factors and/or comorbidities that impact the POC) 79-year-old male with history of paroxysmal atrial fibrillation, hypertension, bladder cancer, obstructive sleep apnea CKD presented to our hospital in early November because of progressive weakness hoarse voice shortness of breath and dysphagia admitted for neuromuscular weakness, acute respiratory failure and septic shock.   Existing Precautions/Restrictions fall   Cognition   Affect/Mental Status (Cognition) WFL   Orientation Status (Cognition) oriented x 4   Follows Commands (Cognition) WFL   Pain Assessment   Patient Currently in Pain No   Integumentary/Edema   Integumentary/Edema Comments Chronic snowden, R IJ line   Posture    Posture Forward head position;Protracted shoulders   Range of Motion (ROM)   Range of Motion ROM deficits secondary to weakness   ROM Comment Pt has impaired ROM in B UEs and LEs   Strength (Manual Muscle Testing)   Strength (Manual  Muscle Testing) Deficits observed during functional mobility   Bed Mobility   Comment, (Bed Mobility) Supine to sit Max A x2   Transfers   Comment, (Transfers) Sit to stand Max A x2 w/ SS   Gait/Stairs (Locomotion)   Comment, (Gait/Stairs) Not tested d/t weakness   Balance   Balance Comments Poor seated   Sensory Examination   Sensory Perception patient reports no sensory changes   Clinical Impression   Criteria for Skilled Therapeutic Intervention Yes, treatment indicated   PT Diagnosis (PT) Impaired functional mobility and gait   Influenced by the following impairments Pain, weakness, decreased activity tolerance, impaired balance   Functional limitations due to impairments Limited functional mobility requiring AD and assist   Clinical Presentation (PT Evaluation Complexity) stable   Clinical Presentation Rationale Based on PMH, current status, and social support   Clinical Decision Making (Complexity) low complexity   Planned Therapy Interventions (PT) balance training;bed mobility training;gait training;stair training;transfer training;progressive activity/exercise;strengthening   Risk & Benefits of therapy have been explained evaluation/treatment results reviewed;care plan/treatment goals reviewed;risks/benefits reviewed;current/potential barriers reviewed;participants voiced agreement with care plan;participants included;patient   PT Total Evaluation Time   PT Eval, Low Complexity Minutes (78290) 10   Physical Therapy Goals   PT Frequency Daily   PT Predicted Duration/Target Date for Goal Attainment 12/11/24   PT Goals Bed Mobility;Transfers;Gait;Stairs   PT: Bed Mobility Independent;Supine to/from sit   PT: Transfers Modified independent;Sit to/from stand;Assistive device   PT: Gait Modified independent;Assistive device;50 feet   PT: Stairs Supervision/stand-by assist;3 stairs   Therapeutic Activity   Therapeutic Activities: dynamic activities to improve functional performance Minutes (22338) 25   Symptoms  Noted During/After Treatment Fatigue   Treatment Detail/Skilled Intervention Co-treat w/ OT d/t complexity of pt. Pt in supine in bed upon therapist arrival, agreeable to PT. Time managing lines and tubes, setting up room. Pt sitting up, requiring Max A to scoot hips forward. Pt requiring Min-Mod A x2 for inital sitting balance. VCs for upright posture, hand placement, PLB. Vitals monitored throughout session, see VSFS for details. OT at trunk providing Mod A for support, progressed to moments of CGA. PT infront of pt, providing Min A - SBA at shoulders and UEs. Therapist guiding pt through LAQs. Pt sat EOB for total of 15 minutes. Pt stood in SS w/ Mod A x2, reports significant fatigue, sat back EOB, assist to control descent. Pt declined further mobility. Pt transferred to supine w/ Max A x2. Pt total assist x2 to boost in bed. Pillow support, all needs w/in reach, bed alarm on, RN updated and in room.   PT Discharge Planning   PT Plan Progress bed mob, seated balance, STS w/ SS   PT Discharge Recommendation (DC Rec) [facility] patient will benefit from intensive, interdisciplinary therapy.  Anticipate will be able to tolerate 3 hours of therapy per day   PT Rationale for DC Rec Pt significantly below baseline, currently Ax2 for bed mob and transfers w/ SS. Pt w/ good motivation. Recommend ARU to increase strength and functional mobility.   PT Brief overview of current status Ax2, lift. Goals of therapy will be to address safe mobility and make recs for d/c to next level of care. Pt and RN will continue to follow all falls risk precautions as documented by RN staff while hospitalized   Physical Therapy Time and Intention   Timed Code Treatment Minutes 25   Total Session Time (sum of timed and untimed services) 35

## 2024-12-04 NOTE — PROGRESS NOTES
Care Management Follow Up    Length of Stay (days): 4    Expected Discharge Date: 12/11/2024     Concerns to be Addressed:       Patient plan of care discussed at interdisciplinary rounds: Yes    Anticipated Discharge Disposition:  [facility]              Anticipated Discharge Services:    Anticipated Discharge DME:      Patient/family educated on Medicare website which has current facility and service quality ratings:    Education Provided on the Discharge Plan:    Patient/Family in Agreement with the Plan: yes    Referrals Placed by CM/SW:    Private pay costs discussed: Not applicable    Discussed  Partnership in Safe Discharge Planning  document with patient/family: No     Handoff Completed: Yes, [facility] PCP: Internal handoff referral completed    Additional Information:  Per chart review, PT/OT & Speech recommend ARU.  RNCC had previously spoke to pt's family regarding [facility].  Referral sent to [facility] via DOD.    Next Steps: Care Management will continue to follow for discharge planning.     KEVIN Guaman RN, BSN, OCN   Inpatient Care Coordination ICU  [facility]  Office: [phone]

## 2024-12-04 NOTE — PROGRESS NOTES
Critical Care Progress Note      12/04/2024    Name: Mendez Greene MRN#: [id number]   Age: 79 year old YOB: 1945     Hsptl Day# 4  ICU DAY # 4    MV DAY # 0             Problem List:   Active Problems:    Complication of feeding tube (H)    Acute renal failure (ARF) (H)    Acute hypoxic respiratory failure (H)           Summary/Hospital Course:     79-year-old male with history of paroxysmal atrial fibrillation, hypertension, bladder cancer, obstructive sleep apnea CKD presented to our hospital in early November because of progressive weakness hoarse voice shortness of breath and dysphagia admitted for neuromuscular weakness, acute respiratory failure and septic shock.       Assessment and plan :     Mendez Greene IS a 79 year old male admitted on 11/30/2024 for admitted for neuromuscular weakness, acute respiratory failure and septic shock.     I have personally reviewed the daily labs, imaging studies, cultures and discussed the case with referring physician and consulting physicians.     My assessment and plan by system for this patient is as follows:    Neurology/Psychiatry:   Progressive weakness over last few weaks. Concern for ALS and started on riluzole and also at the same time he had nerve conduction study with suggestion of motor neuron disease. Ab for MG discovered. At this NCC team wondering about ALS diagnosis but will defer to outpt.   Concern then became for MG crisis however, picture not full clear.     1. MG crisis   2. Analgesia  3. Anxiety  Plan    -IVIG and accompanying meds per neuro- 5 doses total (last dose 12/5)  -Started by neuro on Mestinon  -Appreciate NCC input - team wants CT Chest with contrast but discontinued due severe MOHAMUD at this time; can be performed at later time after more renal recovery  -Following extubation, NIFs and VBGs excellent; can continue to monitor NIFs  -PT/OT/Speech consults in- speech noted for modified diet    Cardiovascular:   1.Hemodynamics  -shock- weaning norepi.   2.Rhythm - NSR; bradycardia intermittently made worse since given Mestinon  3. Ischemia - none  Plan   -Off norepi since yesterday  -ECG to assess bradycardia  -Goal MAP >65  -Hold statin and beta-blockers with concern for MG crisis     Pulmonary/Ventilator Management:   1. Airway -ETT  2. Oxygenation/ventilation/mechanics- lowering FiO2 and PEEP- PS today  Plan  -Extubated 12/4 after 2nd dose of IVIG  -NIF/VC and VBGs stable since extubation; can stop VBGs but can continue NIFs for continued monitoring q12hr and prn  -Home CPAP setting -7 cmH20 for tonight; if VBGs show CO2 retention, can instead order BiPap/AVAPs    GI and Nutrition :   1. GI Ppx- only for intubation  2. Enteral feedings- via chronic Peg tube  3. Constipation- loose stool since the Mestinon after a week of constipation.  Plan  -Continue Tfs via PEG tube  -has prn constipation regimen    Renal/Fluids/Electrolytes:   1. Hypernatremia and hyperchloremia- Na normalized with D5W (stopped 12/3 pm) and FWFs (reduced rate to 100mL q2h today)  2. MOHAMUD with sever uremia- improving with fluid resuscitation and FWF but still well above baseline  3. Acid/base status- acidotic favoring metabolic cause  4. Volume status- initially hypovolemia then closer to euvolemia after resuscitation    Plan  - Off D5W and reduced FWF to 100cc q2h  - Monitor BMP daily after repeat Bmp in evening   -Hold Ct Chest with contrast recommended by neuro until renal function closer to baseline   - monitor function and electrolytes as needed with replacement per ICU protocols.  - generally avoid nephrotoxic agents such as NSAID, IV contrast unless specifically required  - adjust medications as needed for renal clearance  - follow I/O's strictly  -Cortez catheter needed- chronic following bladder reconstruction (from bladder cancer)    Infectious Disease:   1. Septic shock- leukocytosis and vasopressors improving with antibiotics. Concern for UTI (+UA) though  cultures negative. Potentially culture negative shock.   Avoid macrolides, aminoglycosides, and fluoroquinolone   Plan  - Completed Zosyn x3d  - Ceftriaxone for 2-4 more additional days for total of 5-7d for culture negative sepsis (ordered for 4 more doses currently but can be adjusted)    Endocrine:   1. Hypothyroidism  2. Stress induced hyperglycemia    Plan  - ICU insulin protocol, goal sugar <180  - Synthroid    Hematology/Oncology:   1. Leukocytosis- resolved   2. Mild Anemia, no signs, symptoms of active blood loss    Plan  - subcutaneous heparin for DVT ppx      MSKL/Rheum:  1. Generalized weakness 2/2 neuro condition   Plan  - Needs PT/OT       IV/Access:   1. Venous access - CVC - remove and ensure PIVs  2. Arterial access - None  3.  Plan  - central access not required and necessary      ICU Prophylaxis:   1. DVT: Hep Subq/mechanical  2. Stress Ulcer: None  3. Restraints:  None  4. Feeding - tube feeds  5. Family Update:Yes, bedside  6. Disposition - Transfer to neuro floor under IM team             Key Medications:     Current Facility-Administered Medications   Medication Dose Route Frequency Provider Last Rate Last Admin    acetaminophen (TYLENOL) tablet 650 mg  650 mg Oral Q24H Papo Archer MD   650 mg at 12/03/24 1401    aspirin (ASA) chewable tablet 81 mg  81 mg Per G Tube Daily Hadley De La O MD   81 mg at 12/04/24 0904    cefTRIAXone (ROCEPHIN) 2 g vial to attach to  ml bag for ADULTS or NS 50 ml bag for PEDS  2 g Intravenous Q24H Joao Dukes MD   2 g at 12/04/24 0346    cyanocobalamin injection 1,000 mcg  1,000 mcg Intramuscular Q30 Days Hadley De La O MD        diphenhydrAMINE (BENADRYL) capsule 50 mg  50 mg Oral Q24H Papo Archer MD        Or    diphenhydrAMINE (BENADRYL) injection 50 mg  50 mg Intravenous Q24H Papo Archer MD   50 mg at 12/03/24 1355    heparin ANTICOAGULANT injection 5,000 Units  5,000 Units Subcutaneous Q8H  Espinoza Hardin DO   5,000 Units at 12/04/24 0905    immune globulin - sucrose free 10 % injection 30 g  30 g Intravenous Q24H Papo Archer MD   30 g at 12/03/24 1437    levothyroxine (SYNTHROID/LEVOTHROID) tablet 150 mcg  150 mcg Per G Tube Daily Hadley De La O MD   150 mcg at 12/04/24 0904    melatonin tablet 5 mg  5 mg Oral or NG Tube At Bedtime Gordy Mcrae PA-C   5 mg at 12/03/24 2128    olopatadine (PATANOL) 0.1 % ophthalmic solution 1 drop  1 drop Both Eyes BID Hadley De La O MD   1 drop at 12/04/24 0905    pyRIDostigmine (MESTINON) tablet 60 mg  60 mg Oral Q8H [facility] Talon Espinal MD   60 mg at 12/04/24 0547    [Held by provider] simvastatin (ZOCOR) tablet 10 mg  10 mg Per G Tube QPM Hadley De La O MD   10 mg at 12/02/24 2021     Current Facility-Administered Medications   Medication Dose Route Frequency Provider Last Rate Last Admin    dextrose 10% infusion   Intravenous Continuous PRN Espinoza Hardin DO        norepinephrine (LEVOPHED) 4 mg in  mL infusion PREMIX  0.01-0.6 mcg/kg/min Intravenous Continuous Hadley De La O MD   Stopped at 12/03/24 1500               Physical Examination:   Temp:  [97.5  F (36.4  C)-98.2  F (36.8  C)] 98.2  F (36.8  C)  Pulse:  [37-56] 45  Resp:  [0-29] 24  BP: ()/(46-76) 90/58  SpO2:  [87 %-100 %] 99 %    Intake/Output Summary (Last 24 hours) at 12/2/2024 1737  Last data filed at 12/2/2024 1600  Gross per 24 hour   Intake 5675.51 ml   Output 3035 ml   Net 2640.51 ml     Wt Readings from Last 4 Encounters:   12/04/24 82 kg (180 lb 12.4 oz)   11/14/24 78.4 kg (172 lb 13.5 oz)   11/15/24 78.4 kg (172 lb 13.5 oz)   11/06/24 81.6 kg (180 lb)     BP - Mean:  [] 68  FiO2 (%): 30 %, Resp: 24, Vent Mode: CPAP/PS, Resp Rate (Set): 16 breaths/min, Tidal Volume (Set, mL): 450 mL, PEEP (cm H2O): 5 cmH2O, Pressure Support (cm H2O): 5 cmH2O, Resp Rate (Set): 16 breaths/min, Tidal Volume (Set, mL): 450 mL, PEEP (cm H2O): 5  cmH2O  Recent Labs   Lab 12/04/24  0012 12/03/24  1802 12/03/24  1244 12/03/24  0446   O2PER 25 0 3 30       GEN: no acute distress, alert, laying flat in bed after CVC removal  PULM: CTAB   CV/COR: Bradycardia, S1S2   ABD: soft nontender, hypoactive bowel sounds, no mass  EXT:  no Edema   warm  NEURO: Following commands, opening eyes, cranial nerves intact, Moving all extremities but generalized weakness (farzad in proximal muscles)  LINES: clean, dry intact         Data:   All data and imaging reviewed     ROUTINE ICU LABS (Last four results)  CMP  Recent Labs   Lab 12/04/24  0533 12/04/24  0017 12/04/24  0012 12/03/24  1802 12/03/24  1244 12/03/24  0456 12/03/24  0446 12/02/24  0912 12/02/24  0546 12/01/24  0348 12/01/24  0313 11/30/24  2218 11/30/24  2058     --  143 146* 151*  --  154*   < > 154*   < > 152*   < > 151*   POTASSIUM 3.3*  --  3.2* 3.3* 3.6  --  3.7   < > 3.9   < > 5.8*  5.8*   < > 6.5*   CHLORIDE 117*  --  115* 117* 122*  --  125*   < > 123*   < > 117*   < > 110*   CO2 21*  --  22 22 22  --  20*   < > 22   < > 22   < > 24   ANIONGAP 6*  --  6* 7 7  --  9   < > 9   < > 13   < > 17*   GLC 94 97 101* 128* 132*   < > 98   < > 153*   < > 134*   < > 138*   BUN 57.6*  --  61.5* 66.3* 71.2*  --  79.1*   < > 109.9*   < > 181.8*   < > 200.7*   CR 1.26*  --  1.31* 1.44* 1.40*  --  1.56*   < > 1.97*   < > 2.79*   < > 3.20*   GFRESTIMATED 58*  --  55* 49* 51*  --  45*   < > 34*   < > 22*   < > 19*   SIMONA 8.0*  --  7.9* 7.9* 8.4*  --  8.4*   < > 8.7*   < > 10.0   < > 11.1*   MAG 1.8  --   --   --   --   --  2.0  --  2.2  --  3.1*  --   --    PHOS 2.2*  --   --   --   --   --  3.0  --  4.2  --  7.8*  --   --    PROTTOTAL  --   --   --   --   --   --   --   --   --   --  5.7*  --  6.4   ALBUMIN  --   --   --   --   --   --   --   --   --   --  3.0*  --  3.2*   BILITOTAL  --   --   --   --   --   --   --   --   --   --  0.5  --  0.5   ALKPHOS  --   --   --   --   --   --   --   --   --   --  104  --  115   AST   "--   --   --   --   --   --   --   --   --   --  26  --  23   ALT  --   --   --   --   --   --   --   --   --   --  44  --  48    < > = values in this interval not displayed.     CBC  Recent Labs   Lab 12/04/24  0012 12/03/24  0446 12/01/24  0313 11/30/24  2058   WBC 7.2 7.6 12.1* 13.3*   RBC 2.65* 2.80* 3.94* 4.45   HGB 9.1* 9.6* 13.3 15.1   HCT 28.3* 30.8* 41.7 46.5   * 110* 106* 105*   MCH 34.3* 34.3* 33.8* 33.9*   MCHC 32.2 31.2* 31.9 32.5   RDW 13.4 13.6 13.4 13.5    206 389 406     INR  Recent Labs   Lab 12/01/24  0223   INR 1.32*     Arterial Blood Gas  Recent Labs   Lab 12/04/24  0012 12/03/24  1802 12/03/24  1244 12/03/24  0446   O2PER 25 0 3 30       All cultures:  No results for input(s): \"CULT\" in the last 168 hours.  No results found for this or any previous visit (from the past 24 hours).      [facility]  CRITICAL CARE STAFF NOTE    I am managing these acute and ongoing critical issues resulting in critical condition that impairs one or more vital organ systems, incur a high probability of imminent or life-threatening deterioration in the patient's condition and providing frequent personal assessment and manipulation of medications and life support equipment.     1. Septic shock  2. Acute hypoxic respiratory failure  3. Myasthenia gravis crisis (suspected)  4. MOHAMUD  5. Hypernatremia     ICU Interventions: ventilator management, parenteral medications necessitating continuous monitoring including vasoactive medications, medication for heart rhythm control, insulin infusion, and/or sedative/opiates , and interpretation of lab values, cardiac output, cxr, pulse oximetry, blood gases, and/or information/data stored in computers     We have discussed the patient in detail and I agree with the findings, assessment, and plan as documented when this note was cosigned on this day. The plan was formulated in conjunction with pharmacy, ICU nurses, and respiratory therapist. I have " "evaluated all laboratory values and imaging studies for the past 24 hours. I have reviewed all the consults that have been ordered and are active for this patient.      Critical Care Time: 30 min.  I spent this time (excluding procedures) personally providing and directing critical care services at the bedside and on the critical care unit.      Joao Dukes MD  [facility],  of Medicine  Pulmonary/Critical Care Medicine  December 4, 2024      Clinically Significant Risk Factors        # Hypokalemia: Lowest K = 3.2 mmol/L in last 2 days, will replace as needed  # Hypernatremia: Highest Na = 154 mmol/L in last 2 days, will monitor as appropriate  # Hyperchloremia: Highest Cl = 125 mmol/L in last 2 days, will monitor as appropriate          # Hypoalbuminemia: Lowest albumin = 3 g/dL at 12/1/2024  3:13 AM, will monitor as appropriate       # Hypertension: Noted on problem list            # Overweight: Estimated body mass index is 25.94 kg/m  as calculated from the following:    Height as of 11/15/24: 1.778 m (5' 10\").    Weight as of this encounter: 82 kg (180 lb 12.4 oz)., PRESENT ON ADMISSION  # Moderate Malnutrition: based on nutrition assessment, PRESENT ON ADMISSION   # Financial/Environmental Concerns: none                    "

## 2024-12-04 NOTE — CONSULTS
[facility]  Consult Note - Hospitalist Service     Date of Admission:  11/30/2024  Consult Requested by: ICU  Reason for Consult: transfer of care  PRIMARY CARE PROVIDER:    Gladys Vazquez    Assessment & Plan   Mendez Greene is a 79 year old male with PMH of paroxysmal atrial fibrillation, CAD, ascending aortic aneurysm, HLD, HTN, hypothyroidism, ALS, myasthenia gravis, CHA admitted to ICU service on 11/30/2024 for acute respiratory failure and AMS.    Patient was recently hospitalized at [facility] from 11/8/24-11/15/24 for progressive weakness and fatigue, difficulty swallowing. He was seen by neurology who were concerned for motor neuron disease with high index of suspicion for ALS. He was started on Riluzole and Edaravone and referred to [facility] neuromuscular division for second opinion. Discharged home with palliative care referral. After discharge, his ACh receptor binding Ab came back positive, strongly suggestive of myasthenia gravis.    Presented back to [facility] ED 11/30 with hypotension and acute respiratory failure. Family rescinded patient's DNR/DNI for patient to be full code and patient reportedly nodded in  agreement. Patient was intubated in the ED and admitted to ICU on pressors. Started on broad spectrum antibiotics for possible septic shock due to UTI. Neurology consulted, started patient on IVIG for likely myasthenic crisis and patient was initiated on Mestinon. Extubated with pressors stopped 12/3. Transferred to hospitalist service on 12/4.    Myasthenic crisis  Acute hypoxic respiratory failure, resolved  *Secondary to urosepsis (see below). Questionable ALS diagnosis which is being deferred to his primary neurologist.  - Neurocrit following while in ICU:  - Continue IVIG x 5 doses, last dose 12/5  - Needs CT chest for thymoma screening (to be done once renal function has improved)  - Started Mestinon 60 mg TID by neuro on 12/3  - Caution use of IV magnesium,  beta-blocker, statin, macrolides, aminoglycosides, and fluoroquinolone, and as much as possible avoid them due to to potential exacerbation of myasthenia crisis.   - General neurology consult to follow once transferred out of ICU  - Monitor respiratory status closely with NIFs, post-extubation VBGs look good and currently saturating 99% on RA  - Continue CPAP at HS  - Continue PT/OT/SLP  - SW consult for discharge planning    Urosepsis  Septic shock, resolved  Chronic indwelling Cortez catheter  *WBC 13.3 on admission, afebrile, lactic 2.0. UA with infectious markers. Hypotensive requiring pressors initially, now weaned. Urine culture 12/1 NGTD.  - S/p IV Zosyn (12/1 - 12/3) then narrowed to IV Rocephin (12/3 - present) to complete 5-7 day course  - Blood culture 11/30 NGTD  - Continue Cortez cares    MOHAMUD, improving  *Creatinine 3.20 on admission now down to 1.26 with IVF. Baseline 0.5.  - Continue free water flushes per G-tube  - Avoid nephrotoxic medications  - Monitor I&O  - Repeat BMP tomorrow AM    Sinus bradycardia  *HR 40s to upper 50s, dips the most when sleeping. Asymptomatic. Mostly unchanged after discontinuing Levophed 12/3. EKG sinus bradycardia with no high grade blocks.  *Chart review shows similar HR 40-50s last hospitalization, and he has a few OP visits with documented sinus bradycardia (7/10/24, 3/4/24).  - Monitor while on Mestinon, defer any dose adjustments to neuro  - Continuous telemetry    Moderate to severe oropharyngeal dysphagia s/p G-tube  - RD following for TF    CAD  HLD  Paroxysmal atrial fibrillation  - Continue PTA Aspirin  - PTA Simvastatin currently on hold in the setting of myasthenia crisis  - No rate control medications at baseline    Elevated troponin, improving  Recent NSTEMI (11/8/24)  *Troponin 997 on admission (down from 1,346 last admission), trended down. EKG w/o acute ischemic changes. No complaints of chest pain. Cardiac cath 11/11/24 showed only mild CAD. ED provider  "spoke with cardiology, no concern for ACS.  *Echo 12/1/24 difficult study but grossly normal left and right ventricular function, no severe valvular regurgitation, probably mild aortic regurgitation, no pericardial effusion.  - Plan for outpatient cardiac MRI    Hypernatremia, resolved  Hypochloremia, resolved  *Sodium peaked at 154, chloride 125. Treated with IV D5W, discontinued 12/3 PM.  *Sodium now down to 144, chloride 117  - Repeat BMP tomorrow AM    Hypokalemia  *K 3.3  - RN replacement protocol    CHA  - Continue CPAP at HS    Hypothyroidism  - Continue PTA Synthroid    Ascending aortic aneurysm  Aortic root dilatation  *Noted to be stable on echo 11/9/24  - Outpatient follow up for surveillance    Clinically Significant Risk Factors        # Hypokalemia: Lowest K = 3.2 mmol/L in last 2 days, will replace as needed  # Hypernatremia: Highest Na = 154 mmol/L in last 2 days, will monitor as appropriate  # Hyperchloremia: Highest Cl = 125 mmol/L in last 2 days, will monitor as appropriate          # Hypoalbuminemia: Lowest albumin = 3 g/dL at 12/1/2024  3:13 AM, will monitor as appropriate     # Hypertension: Noted on problem list            # Overweight: Estimated body mass index is 25.94 kg/m  as calculated from the following:    Height as of 11/15/24: 1.778 m (5' 10\").    Weight as of this encounter: 82 kg (180 lb 12.4 oz)., PRESENT ON ADMISSION  # Moderate Malnutrition: based on nutrition assessment, PRESENT ON ADMISSION     # Financial/Environmental Concerns: none           Diet: Adult Formula Drip Feeding: Continuous Novasource Renal; Gastrostomy; Goal Rate: 45; mL/hr; Increase TF rate to 15 mL/hr now and advance every 8 hours by 15 mL to goal. Hold TF 1 hour before and 1 hour after Synthroid administration.; Do not advan...  NPO for Medical/Clinical Reasons Except for: Ice Chips, NPO but receiving Tube Feeding     DVT Prophylaxis: Heparin SQ   Cortez Catheter: PRESENT, indication: ?  (Error. Value could " not be saved.), Other (Comment)  Lines: None     Cardiac Monitoring: ACTIVE order. Indication: ICU  Code Status: Full Code      Disposition Plan       Expected Discharge Date: 12/11/2024              Entered: Lou Lindquist PA-C 12/04/2024, 2:22 PM        The patient's care was discussed with the Attending Physician, Dr. Rome, Bedside Nurse, Patient, and Patient's Family.    The patient has been discussed with Dr. Rome, who agrees with the assessment and plan at this time.    At this time, I'd like to thank ICU for consulting the Hospitalist service.  We will assume care of this patient.        Lou Lindquist PA-C  [facility]  Securely message with the Vocera Web Console (learn more here)      ______________________________________________________________________    Chief Complaint   Respiratory failure and low blood pressure    History is obtained from the patient and EMR.    History of Present Illness   Mendez Greene is a 79 year old male with PMH of paroxysmal atrial fibrillation, CAD, ascending aortic aneurysm, HLD, HTN, hypothyroidism, ALS, myasthenia gravis, CHA admitted to ICU service on 11/30/2024 for acute respiratory failure and AMS.    Patient was recently hospitalized at [facility] from 11/8/24-11/15/24 for progressive weakness and fatigue, difficulty swallowing. He was seen by neurology who were concerned for motor neuron disease with high index of suspicion for ALS. He was started on Riluzole and Edaravone and referred to [facility] neuromuscular division for second opinion. Discharged home with palliative care referral. After discharge, his ACh receptor binding Ab came back positive, strongly suggestive of myasthenia gravis.    Presented back to [facility] ED 11/30 with hypotension and acute respiratory failure. Family rescinded patient's DNR/DNI for patient to be full code and patient reportedly nodded in  agreement. Patient was intubated in the ED and admitted to ICU  on pressors. Started on broad spectrum antibiotics for possible septic shock due to UTI. Neurology consulted, started patient on IVIG for likely myasthenic crisis and patient was initiated on Mestinon. Extubated with pressors stopped 12/3. Transferred to hospitalist service on 12/4.    Patient denies chest pain, dyspnea, nausea, vomiting. Feels tired and generally weak. Discussed code status, patient was initially considering DNR/DNI but agreed to remain FULL CODE after discussion with his wife at bedside.    Past Medical History    I have reviewed this patient's medical history and updated it with pertinent information if needed.   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 Taking?   Cholecalciferol (D3 PO) 11/29/2024  Yes Yes   Sig: Take 1 tablet by mouth daily. OTC: Unsure of strength.   Jevity 1.5 Mandeep   No Yes   Sig: Place 1,320 mLs into G tube daily. Infuse via pump   110ml/hr X 12 hrs/day (5.5 cartons/day)  Water flush: 150ml every 4 hours or 6x/day   acetaminophen (TYLENOL) 325 MG tablet 11/30/2024 Self No Yes   Sig: Take 3 tablets (975 mg) by mouth every 6 hours as needed for mild pain   Patient taking differently: Take 325-650 mg by mouth every 6 hours as needed for mild pain.   artificial saliva (BIOTENE MT) SOLN solution   No Yes   Sig: Swish and spit 2 sprays in mouth every hour as  needed for dry mouth.   aspirin 81 MG EC tablet 11/30/2024 Morning  Yes Yes   Sig: Take 81 mg by mouth every evening.   levothyroxine (SYNTHROID/LEVOTHROID) 150 MCG tablet 11/30/2024 Morning  No Yes   Sig: Take 1 tablet (150 mcg) by mouth daily   olopatadine (PATANOL) 0.1 % ophthalmic solution  Self Yes Yes   Sig: Place 1 drop into both eyes 2 times daily as needed for allergies.   order for DME   No No   Sig: Equipment being ordered: CPAP and supplies. LIFETIME need.   riluzole (RILUTEK) 50 MG tablet Not Taking  No No   Sig: Take 1 tablet (50 mg) by mouth every 12 hours.   Patient not taking: Reported on 11/30/2024   simvastatin (ZOCOR) 10 MG tablet 11/30/2024  No Yes   Sig: Take 1 tablet (10 mg) by mouth daily   sulfamethoxazole-trimethoprim (BACTRIM DS) 800-160 MG tablet Not Taking  No No   Sig: Take 1 tablet by mouth 2 times daily for 14 days.   Patient not taking: Reported on 11/30/2024   vitamin B-12 (CYANOCOBALAMIN) 1000 MCG tablet 11/29/2024 Self Yes Yes   Sig: Take 1,000 mcg by mouth daily      Facility-Administered Medications: None     Allergies   No Known Allergies    Physical Exam   Vital Signs: Temp: 98.2  F (36.8  C) Temp src: Oral BP: 96/62 Pulse: (!) 48   Resp: 24 SpO2: 99 % O2 Device: None (Room air) Oxygen Delivery: 2 LPM  Weight: 180 lbs 12.44 oz    Constitutional: Awake, alert, cooperative, no apparent distress. Appears generally weak and fatigued.  ENT: Normocephalic, without obvious abnormality, atraumatic. Normal sclera.    Neck: Supple, symmetrical, trachea midline  Pulmonary: No increased work of breathing, diminished  Cardiovascular: Regular rate and rhythm  GI: Normal bowel sounds, soft, non-distended, (+) G-tube in place  Skin: Visualized skin appeared clear.  Neuro: Oriented x 3. Moves all extremities spontaneously. No focal neuro deficits.  Psych:  Normal affect and mood  Extremities: Normal muscle tone. No gross deformities noted. Calves non-tender b/l. No pitting edema b/l  JUAN    Medical Decision Making       75 MINUTES SPENT BY ME on the date of service doing chart review, history, exam, documentation & further activities per the note.         Data   Results for orders placed or performed during the hospital encounter of 11/30/24 (from the past 24 hours)   Basic metabolic panel   Result Value Ref Range    Sodium 146 (H) 135 - 145 mmol/L    Potassium 3.3 (L) 3.4 - 5.3 mmol/L    Chloride 117 (H) 98 - 107 mmol/L    Carbon Dioxide (CO2) 22 22 - 29 mmol/L    Anion Gap 7 7 - 15 mmol/L    Urea Nitrogen 66.3 (H) 8.0 - 23.0 mg/dL    Creatinine 1.44 (H) 0.67 - 1.17 mg/dL    GFR Estimate 49 (L) >60 mL/min/1.73m2    Calcium 7.9 (L) 8.8 - 10.4 mg/dL    Glucose 128 (H) 70 - 99 mg/dL   Blood gas venous   Result Value Ref Range    pH Venous 7.35 7.32 - 7.43    pCO2 Venous 42 40 - 50 mm Hg    pO2 Venous 30 25 - 47 mm Hg    Bicarbonate Venous 23 21 - 28 mmol/L    Base Excess/Deficit Venous -2.3 -3.0 - 3.0 mmol/L    FIO2 0     Oxyhemoglobin Venous 59 (L) 70 - 75 %    O2 Sat, Venous 60.4 (L) 70.0 - 75.0 %    Narrative    In healthy individuals, oxyhemoglobin (O2Hb) and oxygen saturation (SO2) are approximately equal. In the presence of dyshemoglobins, oxyhemoglobin can be considerably lower than oxygen saturation.   Basic metabolic panel   Result Value Ref Range    Sodium 143 135 - 145 mmol/L    Potassium 3.2 (L) 3.4 - 5.3 mmol/L    Chloride 115 (H) 98 - 107 mmol/L    Carbon Dioxide (CO2) 22 22 - 29 mmol/L    Anion Gap 6 (L) 7 - 15 mmol/L    Urea Nitrogen 61.5 (H) 8.0 - 23.0 mg/dL    Creatinine 1.31 (H) 0.67 - 1.17 mg/dL    GFR Estimate 55 (L) >60 mL/min/1.73m2    Calcium 7.9 (L) 8.8 - 10.4 mg/dL    Glucose 101 (H) 70 - 99 mg/dL   Blood gas venous   Result Value Ref Range    pH Venous 7.34 7.32 - 7.43    pCO2 Venous 43 40 - 50 mm Hg    pO2 Venous 35 25 - 47 mm Hg    Bicarbonate Venous 23 21 - 28 mmol/L    Base Excess/Deficit Venous -2.2 -3.0 - 3.0 mmol/L    FIO2 25     Oxyhemoglobin Venous 68 (L) 70 - 75  %    O2 Sat, Venous 68.8 (L) 70.0 - 75.0 %    Narrative    In healthy individuals, oxyhemoglobin (O2Hb) and oxygen saturation (SO2) are approximately equal. In the presence of dyshemoglobins, oxyhemoglobin can be considerably lower than oxygen saturation.   CBC with platelets   Result Value Ref Range    WBC Count 7.2 4.0 - 11.0 10e3/uL    RBC Count 2.65 (L) 4.40 - 5.90 10e6/uL    Hemoglobin 9.1 (L) 13.3 - 17.7 g/dL    Hematocrit 28.3 (L) 40.0 - 53.0 %     (H) 78 - 100 fL    MCH 34.3 (H) 26.5 - 33.0 pg    MCHC 32.2 31.5 - 36.5 g/dL    RDW 13.4 10.0 - 15.0 %    Platelet Count 170 150 - 450 10e3/uL   Glucose by meter   Result Value Ref Range    GLUCOSE BY METER POCT 97 70 - 99 mg/dL   Basic metabolic panel   Result Value Ref Range    Sodium 144 135 - 145 mmol/L    Potassium 3.3 (L) 3.4 - 5.3 mmol/L    Chloride 117 (H) 98 - 107 mmol/L    Carbon Dioxide (CO2) 21 (L) 22 - 29 mmol/L    Anion Gap 6 (L) 7 - 15 mmol/L    Urea Nitrogen 57.6 (H) 8.0 - 23.0 mg/dL    Creatinine 1.26 (H) 0.67 - 1.17 mg/dL    GFR Estimate 58 (L) >60 mL/min/1.73m2    Calcium 8.0 (L) 8.8 - 10.4 mg/dL    Glucose 94 70 - 99 mg/dL   Phosphorus   Result Value Ref Range    Phosphorus 2.2 (L) 2.5 - 4.5 mg/dL   Magnesium   Result Value Ref Range    Magnesium 1.8 1.7 - 2.3 mg/dL   EKG 12-lead, tracing only   Result Value Ref Range    Systolic Blood Pressure  mmHg    Diastolic Blood Pressure  mmHg    Ventricular Rate 42 BPM    Atrial Rate 42 BPM    NE Interval 196 ms    QRS Duration 110 ms     ms    QTc 402 ms    P Axis 8 degrees    R AXIS 20 degrees    T Axis 61 degrees    Interpretation ECG       Sinus bradycardia  Abnormal ECG  When compared with ECG of 30-Nov-2024 21:38,  Sinus rhythm has replaced Wide QRS rhythm  Vent. rate has decreased by  25 bpm     EKG 12-lead, tracing only   Result Value Ref Range    Systolic Blood Pressure  mmHg    Diastolic Blood Pressure  mmHg    Ventricular Rate 41 BPM    Atrial Rate 41 BPM    NE Interval 204 ms     QRS Duration 112 ms     ms    QTc 409 ms    P Axis 1 degrees    R AXIS -16 degrees    T Axis 12 degrees    Interpretation ECG       Sinus bradycardia  Abnormal ECG  When compared with ECG of 04-Dec-2024 13:04, (unconfirmed)  No significant change was found

## 2024-12-04 NOTE — PROGRESS NOTES
Critical Care Progress Note      12/03/2024    Name: Mendez Greene MRN#: [id number]   Age: 79 year old YOB: 1945     Hsptl Day# 3  ICU DAY # 3    MV DAY # 3             Problem List:   Active Problems:    Complication of feeding tube (H)    Acute renal failure (ARF) (H)    Acute hypoxic respiratory failure (H)           Summary/Hospital Course:     79-year-old male with history of paroxysmal atrial fibrillation, hypertension, bladder cancer, obstructive sleep apnea CKD presented to our hospital in early November because of progressive weakness hoarse voice shortness of breath and dysphagia admitted for neuromuscular weakness, acute respiratory failure and septic shock.       Assessment and plan :     Mendez Greene IS a 79 year old male admitted on 11/30/2024 for admitted for neuromuscular weakness, acute respiratory failure and septic shock.     I have personally reviewed the daily labs, imaging studies, cultures and discussed the case with referring physician and consulting physicians.     My assessment and plan by system for this patient is as follows:    Neurology/Psychiatry:   Progressive weakness over last few weaks. Concern for ALS and started on riluzole and also at the same time he had nerve conduction study with suggestion of motor neuron disease. Ab for MG discovered. At this NCC team wondering about ALS diagnosis but will defer to outpt.   Concern then became for MG crisis however, picture not full clear.     1. MG crisis   2. Analgesia  3. Anxiety  Plan  -SAT - stopping Propofol for extubation  -IVIG and accompanying meds per neuro- 5 doses total (last dose 12/5)  -Appreciate NCC input - team wants CT Chest with contrast but discontinued due severe MOHAMUD at this time; can be performed at later time after more renal recovery  -Following extubation, monitor q6h with NIFs and VBGs  -PT/OT/Speech consults    Cardiovascular:   1.Hemodynamics -shock- weaning norepi.   2.Rhythm - NSR  3.  Ischemia - none  Plan   -Wean norepi as able- on low dose this morning  -Goal MAP >65  -Hold statin and beta-blockers with concern for MG crisis     Pulmonary/Ventilator Management:   1. Airway -ETT  2. Oxygenation/ventilation/mechanics- lowering FiO2 and PEEP- PS today  Plan  -Daily SAT and SBT- passed- will extubated  -NIF/VC good before extubation  -NIF/VC q6h with Vbgs q6h today following extubation for 24hrs  -Home CPAP setting -7 cmH20 for tonight; if VBGs show CO2 retention, can instead order BiPap/AVAPs    GI and Nutrition :   1. GI Ppx- only for intubation  2. Enteral feedings- via chronic Peg tube  Plan  -Continue Tfs  -Stop PPI with extubation    Renal/Fluids/Electrolytes:   1. Hypernatremia and hyperchloremia- Na unchanged with continued 2.7L FW deficit still despite increased FWF yesterday  2. MOHAMUD with sever uremia- improving with fluid resuscitation but still well above baseline  3. Acid/base status- acidotic favoring metabolic cause  4. Volume status- initially hypovolemia then closer to euvolemia after resuscitation    Plan  - Start D5W and continue FWFs from 60mL q4h to 200mL q2h  - BMP q6h; if Na drops too fast, stop D5W  - monitor function and electrolytes as needed with replacement per ICU protocols.  - generally avoid nephrotoxic agents such as NSAID, IV contrast unless specifically required  - adjust medications as needed for renal clearance  - follow I/O's strictly  -Cortez catheter needed- chronic following bladder reconstruction (from bladder cancer)    Infectious Disease:   1. Septic shock- leukocytosis and vasopressors improving with antibiotics. Concern for UTI (+UA) though cultures negative. Potentially culture negative shock.   Avoid macrolides, aminoglycosides, and fluoroquinolone   Plan  - Change Zosyn to ceftriaxone; complete 5-7d (ordered for 7d total of antibiotics)    Endocrine:   1. Hypothyroidism  2. Stress induced hyperglycemia    Plan  - ICU insulin protocol, goal sugar <180  -  Synthroid    Hematology/Oncology:   1. Leukocytosis- improving  2. Mild Anemia, no signs, symptoms of active blood loss    Plan  - subcutaneous heparin for DVT ppx      MSKL/Rheum:  1. Generalized weakness 2/2 neuro condition   Plan  - Needs PT/OT       IV/Access:   1. Venous access - CVC   2. Arterial access - None  3.  Plan  - central access required and necessary      ICU Prophylaxis:   1. DVT: Hep Subq/mechanical  2. Stress Ulcer: None  3. Restraints:  None  4. Feeding - tube feeds  5. Family Update:Yes, bedside  6. Disposition - ICU; after extubation, if NIF/VC/VBGs stable, transfer out tomorrow             Key Medications:     Current Facility-Administered Medications   Medication Dose Route Frequency Provider Last Rate Last Admin    acetaminophen (TYLENOL) tablet 650 mg  650 mg Oral Q24H Papo Archer MD   650 mg at 12/03/24 1401    aspirin (ASA) chewable tablet 81 mg  81 mg Per G Tube Daily Hadley De La O MD   81 mg at 12/03/24 0814    [START ON 12/4/2024] cefTRIAXone (ROCEPHIN) 2 g vial to attach to  ml bag for ADULTS or NS 50 ml bag for PEDS  2 g Intravenous Q24H Joao Dukes MD        cyanocobalamin injection 1,000 mcg  1,000 mcg Intramuscular Q30 Days Hadley De La O MD        diphenhydrAMINE (BENADRYL) capsule 50 mg  50 mg Oral Q24H Papo Archer MD        Or    diphenhydrAMINE (BENADRYL) injection 50 mg  50 mg Intravenous Q24H Papo Archer MD   50 mg at 12/03/24 1355    heparin ANTICOAGULANT injection 5,000 Units  5,000 Units Subcutaneous Q8H Espinoza Hardin DO   5,000 Units at 12/03/24 1721    immune globulin - sucrose free 10 % injection 30 g  30 g Intravenous Q24H Papo Archer MD   30 g at 12/03/24 1437    levothyroxine (SYNTHROID/LEVOTHROID) tablet 150 mcg  150 mcg Per G Tube Daily Hadley De La O MD   150 mcg at 12/03/24 0814    olopatadine (PATANOL) 0.1 % ophthalmic solution 1 drop  1 drop Both Eyes BID Jairon  Hadley Sandhu MD   1 drop at 12/03/24 0814    piperacillin-tazobactam (ZOSYN) 3.375 g vial to attach to  mL bag  3.375 g Intravenous Q6H Joao Dukes MD        pyRIDostigmine (MESTINON) tablet 60 mg  60 mg Oral Q8H Talon Conte MD   60 mg at 12/03/24 1627    simvastatin (ZOCOR) tablet 10 mg  10 mg Per G Tube QPM Hadley De La O MD   10 mg at 12/02/24 2021     Current Facility-Administered Medications   Medication Dose Route Frequency Provider Last Rate Last Admin    dextrose 10% infusion   Intravenous Continuous PRN Espinoza Hardin DO        dextrose 5% infusion   Intravenous Continuous Joao Dukes  mL/hr at 12/03/24 0945 New Bag at 12/03/24 0945    norepinephrine (LEVOPHED) 4 mg in  mL infusion PREMIX  0.01-0.6 mcg/kg/min Intravenous Continuous Hadley De La O MD   Stopped at 12/03/24 1500               Physical Examination:   Temp:  [96.6  F (35.9  C)-97.5  F (36.4  C)] 97.5  F (36.4  C)  Pulse:  [38-64] 39  Resp:  [10-25] 19  BP: ()/(46-76) 93/52  FiO2 (%):  [30 %] 30 %  SpO2:  [97 %-100 %] 99 %    Intake/Output Summary (Last 24 hours) at 12/2/2024 1737  Last data filed at 12/2/2024 1600  Gross per 24 hour   Intake 5675.51 ml   Output 3035 ml   Net 2640.51 ml     Wt Readings from Last 4 Encounters:   12/03/24 79.8 kg (176 lb)   11/14/24 78.4 kg (172 lb 13.5 oz)   11/15/24 78.4 kg (172 lb 13.5 oz)   11/06/24 81.6 kg (180 lb)     BP - Mean:  [] 72  FiO2 (%): 30 %, Resp: 19, Vent Mode: CPAP/PS, Resp Rate (Set): 16 breaths/min, Tidal Volume (Set, mL): 450 mL, PEEP (cm H2O): 5 cmH2O, Pressure Support (cm H2O): 5 cmH2O, Resp Rate (Set): 16 breaths/min, Tidal Volume (Set, mL): 450 mL, PEEP (cm H2O): 5 cmH2O  Recent Labs   Lab 12/03/24  1802 12/03/24  1244 12/03/24  0446 12/02/24  0911   O2PER 0 3 30 30       GEN: no acute distress, alert  HEENT: ETT, OG, and CVC in place.   PULM: unlabored synchronous with vent, clear anteriorly    CV/COR: RR S1S2   ABD: soft  nontender, hypoactive bowel sounds, no mass  EXT:  no Edema   warm  NEURO: Following commands, opening eyes, cranial nerves intact, Moving all extremities but generalized weakness (farzad in proximal muscles)  LINES: clean, dry intact         Data:   All data and imaging reviewed     ROUTINE ICU LABS (Last four results)  CMP  Recent Labs   Lab 12/03/24  1802 12/03/24  1244 12/03/24  0456 12/03/24  0446 12/02/24  2358 12/02/24  0912 12/02/24  0546 12/01/24  0348 12/01/24  0313 11/30/24  2218 11/30/24 2058   * 151*  --  154* 152*   < > 154*   < > 152*   < > 151*   POTASSIUM 3.3* 3.6  --  3.7 3.8   < > 3.9   < > 5.8*  5.8*   < > 6.5*   CHLORIDE 117* 122*  --  125* 123*   < > 123*   < > 117*   < > 110*   CO2 22 22  --  20* 21*   < > 22   < > 22   < > 24   ANIONGAP 7 7  --  9 8   < > 9   < > 13   < > 17*   * 132* 91 98 96  98   < > 153*   < > 134*   < > 138*   BUN 66.3* 71.2*  --  79.1* 83.7*   < > 109.9*   < > 181.8*   < > 200.7*   CR 1.44* 1.40*  --  1.56* 1.61*   < > 1.97*   < > 2.79*   < > 3.20*   GFRESTIMATED 49* 51*  --  45* 43*   < > 34*   < > 22*   < > 19*   SIMONA 7.9* 8.4*  --  8.4* 8.4*   < > 8.7*   < > 10.0   < > 11.1*   MAG  --   --   --  2.0  --   --  2.2  --  3.1*  --   --    PHOS  --   --   --  3.0  --   --  4.2  --  7.8*  --   --    PROTTOTAL  --   --   --   --   --   --   --   --  5.7*  --  6.4   ALBUMIN  --   --   --   --   --   --   --   --  3.0*  --  3.2*   BILITOTAL  --   --   --   --   --   --   --   --  0.5  --  0.5   ALKPHOS  --   --   --   --   --   --   --   --  104  --  115   AST  --   --   --   --   --   --   --   --  26  --  23   ALT  --   --   --   --   --   --   --   --  44  --  48    < > = values in this interval not displayed.     CBC  Recent Labs   Lab 12/03/24  0446 12/01/24 0313 11/30/24 2058   WBC 7.6 12.1* 13.3*   RBC 2.80* 3.94* 4.45   HGB 9.6* 13.3 15.1   HCT 30.8* 41.7 46.5   * 106* 105*   MCH 34.3* 33.8* 33.9*   MCHC 31.2* 31.9 32.5   RDW 13.6 13.4 13.5   PLT  "[id number]     INR  Recent Labs   Lab 12/01/24  0223   INR 1.32*     Arterial Blood Gas  Recent Labs   Lab 12/03/24  1802 12/03/24  1244 12/03/24  0446 12/02/24  0911   O2PER 0 3 30 30       All cultures:  No results for input(s): \"CULT\" in the last 168 hours.  No results found for this or any previous visit (from the past 24 hours).      [facility]  CRITICAL CARE STAFF NOTE    I am managing these acute and ongoing critical issues resulting in critical condition that impairs one or more vital organ systems, incur a high probability of imminent or life-threatening deterioration in the patient's condition and providing frequent personal assessment and manipulation of medications and life support equipment.     1. Septic shock  2. Acute hypoxic respiratory failure  3. Myasthenia gravis crisis (suspected)  4. MOHAMUD  5. Hypernatremia     ICU Interventions: ventilator management, parenteral medications necessitating continuous monitoring including vasoactive medications, medication for heart rhythm control, insulin infusion, and/or sedative/opiates , and interpretation of lab values, cardiac output, cxr, pulse oximetry, blood gases, and/or information/data stored in computers     We have discussed the patient in detail and I agree with the findings, assessment, and plan as documented when this note was cosigned on this day. The plan was formulated in conjunction with pharmacy, ICU nurses, and respiratory therapist. I have evaluated all laboratory values and imaging studies for the past 24 hours. I have reviewed all the consults that have been ordered and are active for this patient.      Critical Care Time: 50 min.  I spent this time (excluding procedures) personally providing and directing critical care services at the bedside and on the critical care unit.      Joao Dukes MD  [facility],  of Medicine  Pulmonary/Critical Care Medicine  December 3, 2024      Clinically " "Significant Risk Factors        # Hypokalemia: Lowest K = 3.3 mmol/L in last 2 days, will replace as needed  # Hypernatremia: Highest Na = 154 mmol/L in last 2 days, will monitor as appropriate  # Hyperchloremia: Highest Cl = 125 mmol/L in last 2 days, will monitor as appropriate          # Hypoalbuminemia: Lowest albumin = 3 g/dL at 12/1/2024  3:13 AM, will monitor as appropriate    # Coagulation Defect: INR = 1.32 (Ref range: 0.85 - 1.15) and/or PTT = 23 Seconds (Ref range: 22 - 38 Seconds), will monitor for bleeding    # Hypertension: Noted on problem list            # Overweight: Estimated body mass index is 25.25 kg/m  as calculated from the following:    Height as of 11/15/24: 1.778 m (5' 10\").    Weight as of this encounter: 79.8 kg (176 lb)., PRESENT ON ADMISSION  # Moderate Malnutrition: based on nutrition assessment, PRESENT ON ADMISSION   # Financial/Environmental Concerns: none                    "

## 2024-12-04 NOTE — PROGRESS NOTES
[facility]    Vascular Neurology Progress Note    Interval History     The patient is seen at the bedside this morning, he was extubated yesterday, he is following command.  No fever overnight, bradycardic at baseline heart rate ranging in the 40s, blood pressure stable with MAP from , saturating well 98% on room air.  The patient endorsed improvement of strength after Mestinon intake through G-tube.    Hospital Course     Chief complaint: Hypotension    The patient is an 79-year-old male with history of paroxysmal atrial fibrillation, hypertension, bladder cancer, obstructive sleep apnea CKD presented to our hospital in early November because of progressive weakness hoarse voice shortness of breath and dysphagia, there was a concern for amyotrophic lateral sclerosis of the patient was started on riluzole and also at the same time he had nerve conduction study with suggestion of motor neuron disease.  However acetylcholine receptor binding antibody was 2.11 so he was diagnosed with myasthenia gravis during current hospitalization.  The patient was intubated for airway protection and started to receive IVIG on December 1.  Plan to complete 5 doses of IVIG on December 5.     December 3: Patient extubated, Mestinon started, complaining of diarrhea  December 4: Endorsing improvement of strength, related to Mestinon probably    Assessment and Plan     1.  Myasthenic crisis secondary to urosepsis. 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  Regarding the question of diagnosis of ALS will be deferred to his primary neurologist.  -Continue IVIG 5 doses, last dose will be on December 5  -CT chest for thymoma screening  -Caution use of IV magnesium, beta-blocker, statin, macrolides, aminoglycosides, and fluoroquinolone, and as much as possible avoid them due to to potential exacerbation of myasthenia crisis.  -Mestitnon at 60 mg TID    2. Urosepsis  -On Zosyn     DVT Prophylaxis: PCD    Patient Follow-up    -  "Follow up in 1 week with his outpatient neurologist    We will continue to follow.     The Stroke Staff is Dr. Nayak.    Talon Espinal MD  Vascular Neurology Fellow    To page me or covering stroke neurology team member, click here: [facility]  Choose \"On Call\" tab at top, then select \"NEUROLOGY/ALL SITES\" from middle drop-down box, press Enter, then look for \"stroke\" or \"telestroke\" for your site.    Physical Examination     Temp: 97.8  F (36.6  C) Temp src: Axillary BP: 100/50 Pulse: (!) 40   Resp: 20 SpO2: 96 % O2 Device: None (Room air) Oxygen Delivery: 2 LPM    The patient is alert, following command, hoarse voice.   Pupils are equal and reactive to light bilaterally, extraocular movement intact, no ptosis noted.  Contraction of bilateral hands noted.  Patient's upper extremities are weak although antigravity, 4/5 upper extremities, 2/5 lower extremities.  No sensory deficit.    "

## 2024-12-05 ENCOUNTER — APPOINTMENT (OUTPATIENT)
Dept: SPEECH THERAPY | Facility: CLINIC | Age: 79
End: 2024-12-05
Payer: MEDICARE

## 2024-12-05 ENCOUNTER — APPOINTMENT (OUTPATIENT)
Dept: OCCUPATIONAL THERAPY | Facility: CLINIC | Age: 79
End: 2024-12-05
Payer: MEDICARE

## 2024-12-05 VITALS
TEMPERATURE: 98.7 F | SYSTOLIC BLOOD PRESSURE: 134 MMHG | RESPIRATION RATE: 18 BRPM | HEART RATE: 40 BPM | BODY MASS INDEX: 25.94 KG/M2 | WEIGHT: 180.78 LBS | OXYGEN SATURATION: 97 % | DIASTOLIC BLOOD PRESSURE: 54 MMHG

## 2024-12-05 LAB
ALBUMIN SERPL BCG-MCNC: 2.2 G/DL (ref 3.5–5.2)
ALP SERPL-CCNC: 71 U/L (ref 40–150)
ALT SERPL W P-5'-P-CCNC: 29 U/L (ref 0–70)
ANION GAP SERPL CALCULATED.3IONS-SCNC: 6 MMOL/L (ref 7–15)
ANION GAP SERPL CALCULATED.3IONS-SCNC: 6 MMOL/L (ref 7–15)
AST SERPL W P-5'-P-CCNC: 37 U/L (ref 0–45)
BACTERIA BLD CULT: NO GROWTH
BILIRUB SERPL-MCNC: 0.2 MG/DL
BUN SERPL-MCNC: 42.5 MG/DL (ref 8–23)
BUN SERPL-MCNC: 42.5 MG/DL (ref 8–23)
CALCIUM SERPL-MCNC: 8.3 MG/DL (ref 8.8–10.4)
CALCIUM SERPL-MCNC: 8.3 MG/DL (ref 8.8–10.4)
CHLORIDE SERPL-SCNC: 120 MMOL/L (ref 98–107)
CHLORIDE SERPL-SCNC: 120 MMOL/L (ref 98–107)
CREAT SERPL-MCNC: 0.99 MG/DL (ref 0.67–1.17)
CREAT SERPL-MCNC: 0.99 MG/DL (ref 0.67–1.17)
EGFRCR SERPLBLD CKD-EPI 2021: 77 ML/MIN/1.73M2
EGFRCR SERPLBLD CKD-EPI 2021: 77 ML/MIN/1.73M2
ERYTHROCYTE [DISTWIDTH] IN BLOOD BY AUTOMATED COUNT: 13.4 % (ref 10–15)
GLUCOSE SERPL-MCNC: 110 MG/DL (ref 70–99)
GLUCOSE SERPL-MCNC: 110 MG/DL (ref 70–99)
HCO3 SERPL-SCNC: 20 MMOL/L (ref 22–29)
HCO3 SERPL-SCNC: 20 MMOL/L (ref 22–29)
HCT VFR BLD AUTO: 29.5 % (ref 40–53)
HGB BLD-MCNC: 9.5 G/DL (ref 13.3–17.7)
MAGNESIUM SERPL-MCNC: 1.9 MG/DL (ref 1.7–2.3)
MCH RBC QN AUTO: 34.2 PG (ref 26.5–33)
MCHC RBC AUTO-ENTMCNC: 32.2 G/DL (ref 31.5–36.5)
MCV RBC AUTO: 106 FL (ref 78–100)
PHOSPHATE SERPL-MCNC: 2.1 MG/DL (ref 2.5–4.5)
PLATELET # BLD AUTO: 167 10E3/UL (ref 150–450)
POTASSIUM SERPL-SCNC: 3.5 MMOL/L (ref 3.4–5.3)
POTASSIUM SERPL-SCNC: 3.5 MMOL/L (ref 3.4–5.3)
PROT SERPL-MCNC: 6 G/DL (ref 6.4–8.3)
RBC # BLD AUTO: 2.78 10E6/UL (ref 4.4–5.9)
SODIUM SERPL-SCNC: 146 MMOL/L (ref 135–145)
SODIUM SERPL-SCNC: 146 MMOL/L (ref 135–145)
WBC # BLD AUTO: 8.2 10E3/UL (ref 4–11)

## 2024-12-05 PROCEDURE — 94150 VITAL CAPACITY TEST: CPT

## 2024-12-05 PROCEDURE — 258N000003 HC RX IP 258 OP 636: Performed by: INTERNAL MEDICINE

## 2024-12-05 PROCEDURE — 250N000011 HC RX IP 250 OP 636: Performed by: PHYSICIAN ASSISTANT

## 2024-12-05 PROCEDURE — 84100 ASSAY OF PHOSPHORUS: CPT | Performed by: PHYSICIAN ASSISTANT

## 2024-12-05 PROCEDURE — B4035 ENTERAL FEED SUPP PUMP PER D: HCPCS

## 2024-12-05 PROCEDURE — 83735 ASSAY OF MAGNESIUM: CPT | Performed by: PHYSICIAN ASSISTANT

## 2024-12-05 PROCEDURE — 97530 THERAPEUTIC ACTIVITIES: CPT | Mod: GO

## 2024-12-05 PROCEDURE — 250N000013 HC RX MED GY IP 250 OP 250 PS 637: Performed by: PHYSICIAN ASSISTANT

## 2024-12-05 PROCEDURE — 99232 SBSQ HOSP IP/OBS MODERATE 35: CPT | Performed by: INTERNAL MEDICINE

## 2024-12-05 PROCEDURE — 82435 ASSAY OF BLOOD CHLORIDE: CPT | Performed by: PHYSICIAN ASSISTANT

## 2024-12-05 PROCEDURE — 82565 ASSAY OF CREATININE: CPT | Performed by: PHYSICIAN ASSISTANT

## 2024-12-05 PROCEDURE — 97535 SELF CARE MNGMENT TRAINING: CPT | Mod: GO

## 2024-12-05 PROCEDURE — 120N000001 HC R&B MED SURG/OB

## 2024-12-05 PROCEDURE — 250N000009 HC RX 250: Performed by: INTERNAL MEDICINE

## 2024-12-05 PROCEDURE — 84075 ASSAY ALKALINE PHOSPHATASE: CPT | Performed by: PHYSICIAN ASSISTANT

## 2024-12-05 PROCEDURE — 92526 ORAL FUNCTION THERAPY: CPT | Mod: GN

## 2024-12-05 PROCEDURE — 999N000157 HC STATISTIC RCP TIME EA 10 MIN

## 2024-12-05 PROCEDURE — 36415 COLL VENOUS BLD VENIPUNCTURE: CPT | Performed by: PHYSICIAN ASSISTANT

## 2024-12-05 PROCEDURE — 85027 COMPLETE CBC AUTOMATED: CPT | Performed by: PHYSICIAN ASSISTANT

## 2024-12-05 RX ADMIN — HEPARIN SODIUM 5000 UNITS: 5000 INJECTION, SOLUTION INTRAVENOUS; SUBCUTANEOUS at 01:58

## 2024-12-05 RX ADMIN — PYRIDOSTIGMINE BROMIDE 60 MG: 60 TABLET ORAL at 06:05

## 2024-12-05 RX ADMIN — OLOPATADINE HYDROCHLORIDE 1 DROP: 1 SOLUTION/ DROPS OPHTHALMIC at 21:07

## 2024-12-05 RX ADMIN — PYRIDOSTIGMINE BROMIDE 60 MG: 60 TABLET ORAL at 22:54

## 2024-12-05 RX ADMIN — PYRIDOSTIGMINE BROMIDE 60 MG: 60 TABLET ORAL at 13:41

## 2024-12-05 RX ADMIN — HEPARIN SODIUM 5000 UNITS: 5000 INJECTION, SOLUTION INTRAVENOUS; SUBCUTANEOUS at 17:05

## 2024-12-05 RX ADMIN — ACETAMINOPHEN 650 MG: 325 TABLET, FILM COATED ORAL at 13:40

## 2024-12-05 RX ADMIN — SODIUM PHOSPHATE, MONOBASIC, MONOHYDRATE AND SODIUM PHOSPHATE, DIBASIC, ANHYDROUS 15 MMOL: 142; 276 INJECTION, SOLUTION INTRAVENOUS at 11:55

## 2024-12-05 RX ADMIN — HEPARIN SODIUM 5000 UNITS: 5000 INJECTION, SOLUTION INTRAVENOUS; SUBCUTANEOUS at 09:31

## 2024-12-05 RX ADMIN — OLOPATADINE HYDROCHLORIDE 1 DROP: 1 SOLUTION/ DROPS OPHTHALMIC at 09:37

## 2024-12-05 RX ADMIN — DIPHENHYDRAMINE HYDROCHLORIDE 50 MG: 50 INJECTION, SOLUTION INTRAMUSCULAR; INTRAVENOUS at 14:15

## 2024-12-05 RX ADMIN — ASPIRIN 81 MG CHEWABLE TABLET 81 MG: 81 TABLET CHEWABLE at 09:30

## 2024-12-05 RX ADMIN — HUMAN IMMUNOGLOBULIN G 30 G: 20 LIQUID INTRAVENOUS at 14:32

## 2024-12-05 RX ADMIN — CEFTRIAXONE SODIUM 2 G: 2 INJECTION, POWDER, FOR SOLUTION INTRAMUSCULAR; INTRAVENOUS at 04:32

## 2024-12-05 RX ADMIN — Medication 5 MG: at 22:54

## 2024-12-05 RX ADMIN — LEVOTHYROXINE SODIUM 150 MCG: 150 TABLET ORAL at 09:30

## 2024-12-05 ASSESSMENT — ACTIVITIES OF DAILY LIVING (ADL)
ADLS_ACUITY_SCORE: 74
ADLS_ACUITY_SCORE: 84
ADLS_ACUITY_SCORE: 74
ADLS_ACUITY_SCORE: 73
ADLS_ACUITY_SCORE: 74
ADLS_ACUITY_SCORE: 73
ADLS_ACUITY_SCORE: 74
ADLS_ACUITY_SCORE: 74
ADLS_ACUITY_SCORE: 84
ADLS_ACUITY_SCORE: 84
ADLS_ACUITY_SCORE: 77
ADLS_ACUITY_SCORE: 74
ADLS_ACUITY_SCORE: 84
ADLS_ACUITY_SCORE: 82
ADLS_ACUITY_SCORE: 73
ADLS_ACUITY_SCORE: 74
ADLS_ACUITY_SCORE: 74
ADLS_ACUITY_SCORE: 84
ADLS_ACUITY_SCORE: 74
ADLS_ACUITY_SCORE: 84

## 2024-12-05 NOTE — PROGRESS NOTES
[facility]  Hospitalist Progress Note  Murtaza Kinney MD  12/05/2024    Assessment & Plan   Mendez Greene is a 79 year old male with PMH of paroxysmal atrial fibrillation, CAD, ascending aortic aneurysm, HLD, HTN, hypothyroidism, ALS, myasthenia gravis, CHA admitted to ICU service on 11/30/2024 for acute respiratory failure and AMS.     Patient was recently hospitalized at [facility] from 11/8/24-11/15/24 for progressive weakness and fatigue, difficulty swallowing. He was seen by neurology who were concerned for motor neuron disease with high index of suspicion for ALS. He was started on Riluzole and Edaravone and referred to [facility] neuromuscular division for second opinion. Discharged home with palliative care referral. After discharge, his ACh receptor binding Ab came back positive, strongly suggestive of myasthenia gravis.     Presented back to [facility] ED 11/30 with hypotension and acute respiratory failure. Family rescinded patient's DNR/DNI for patient to be full code and patient reportedly nodded in  agreement. Patient was intubated in the ED and admitted to ICU on pressors. Started on broad spectrum antibiotics for possible septic shock due to UTI. Neurology consulted, started patient on IVIG for likely myasthenic crisis and patient was initiated on Mestinon. Extubated with pressors stopped 12/3. Transferred to hospitalist service on 12/4.     Myasthenic crisis  Acute hypoxic respiratory failure, resolved  *Secondary to urosepsis (see below). Questionable ALS diagnosis which is being deferred to his primary neurologist.  - Neurocrit following while in ICU:  - Continue IVIG x 5 doses, last dose 12/5  - Needs CT chest for thymoma screening (to be done once renal function has improved)  - continue Mestinon 60 mg TID by neuro on 12/3  - Caution use of IV magnesium, beta-blocker, statin, macrolides, aminoglycosides, and fluoroquinolone, and as much as possible avoid them due to to  potential exacerbation of myasthenia crisis.   - General neurology consult to follow once transferred out of ICU  - Monitor respiratory status closely with NIFs, post-extubation VBGs look good and currently saturating 99% on RA  - Continue CPAP at HS  - Continue PT/OT/SLP  - 12/5 NIF -40cm H20 and VC 1100  - SW consult for discharge planning     Urosepsis  Septic shock, resolved  Chronic indwelling Snowden catheter  *WBC 13.3 on admission, afebrile, lactic 2.0. UA with infectious markers. Hypotensive requiring pressors initially, now weaned. Urine culture 12/1 NGTD.  - S/p IV Zosyn (12/1 - 12/3) then narrowed to IV Rocephin (12/3 - present) to complete 5-7 day course  - Blood culture 11/30 NGTD  - has snowden catheter in neobladder, with urine leaking around the snowden, will order snowden catheter flush, suspect obstruction.     MOHAMUD, improving  *Creatinine 3.20 on admission now down to 1.26 with IVF. Baseline 0.5.  - Continue free water flushes per G-tube  - Avoid nephrotoxic medications  - Monitor I&O  - Cr back to baseline     Sinus bradycardia  *HR 40s to upper 50s, dips the most when sleeping. Asymptomatic. Mostly unchanged after discontinuing Levophed 12/3. EKG sinus bradycardia with no high grade blocks.  *Chart review shows similar HR 40-50s last hospitalization, and he has a few OP visits with documented sinus bradycardia (7/10/24, 3/4/24).  - Monitor while on Mestinon, defer any dose adjustments to neuro  - Continuous telemetry     Moderate to severe oropharyngeal dysphagia s/p G-tube  - RD following for TF  - VFSS on 12/6  - continue new TF formula     CAD  HLD  Paroxysmal atrial fibrillation  - Continue PTA Aspirin  - PTA Simvastatin currently on hold in the setting of myasthenia crisis  - No rate control medications at baseline     Elevated troponin, improving  Recent NSTEMI (11/8/24)  *Troponin 997 on admission (down from 1,346 last admission), trended down. EKG w/o acute ischemic changes. No complaints of  "chest pain. Cardiac cath 11/11/24 showed only mild CAD. ED provider spoke with cardiology, no concern for ACS.  *Echo 12/1/24 difficult study but grossly normal left and right ventricular function, no severe valvular regurgitation, probably mild aortic regurgitation, no pericardial effusion.  - Plan for outpatient cardiac MRI     Hypernatremia, resolved  Hypochloremia, resolved  *Sodium peaked at 154, chloride 125. Treated with IV D5W, discontinued 12/3 PM.  *Sodium now down to 144, chloride 117  - adjust free water      Hypokalemia  *K 3.3  - RN replacement protocol     CHA  - Continue CPAP at HS     Hypothyroidism  - Continue PTA Synthroid     Ascending aortic aneurysm  Aortic root dilatation  *Noted to be stable on echo 11/9/24  - Outpatient follow up for surveillance        Clinically Significant Risk Factors         # Hypokalemia: Lowest K = 3.2 mmol/L in last 2 days, will replace as needed  # Hypernatremia: Highest Na = 154 mmol/L in last 2 days, will monitor as appropriate  # Hyperchloremia: Highest Cl = 125 mmol/L in last 2 days, will monitor as appropriate           # Hypoalbuminemia: Lowest albumin = 3 g/dL at 12/1/2024  3:13 AM, will monitor as appropriate     # Hypertension: Noted on problem list             # Overweight: Estimated body mass index is 25.94 kg/m  as calculated from the following:    Height as of 11/15/24: 1.778 m (5' 10\").    Weight as of this encounter: 82 kg (180 lb 12.4 oz)., PRESENT ON ADMISSION  # Moderate Malnutrition: based on nutrition assessment, PRESENT ON ADMISSION      # Financial/Environmental Concerns: none       Diet: Adult Formula Drip Feeding: Continuous Novasource Renal; Gastrostomy; Goal Rate: 45; mL/hr; Increase TF rate to 15 mL/hr now and advance every 8 hours by 15 mL to goal. Hold TF 1 hour before and 1 hour after Synthroid administration.; Do not advan...  NPO for Medical/Clinical Reasons Except for: Ice Chips, NPO but receiving Tube Feeding     DVT Prophylaxis: " Heparin SQ   Snowden Catheter: PRESENT, indication: ?  (Error. Value could not be saved.), Other (Comment)  Lines: None     Cardiac Monitoring: ACTIVE order. Indication: ICU  Code Status: Full Code          Disposition Plan     Expected Discharge Date:     Disposition:     Interval History   -- chart reviewed  -- discussed with RN  -- snowden leaking around    -Data reviewed today: I reviewed all new labs and imaging over the last 24 hours. I personally reviewed no images or EKG's today.    Physical Exam    , Blood pressure (!) 142/63, pulse (!) 40, temperature 99.1  F (37.3  C), temperature source Axillary, resp. rate 16, weight 82 kg (180 lb 12.4 oz), SpO2 98%.  Vitals:    12/02/24 0600 12/03/24 0300 12/04/24 0400   Weight: 78.1 kg (172 lb 3.2 oz) 79.8 kg (176 lb) 82 kg (180 lb 12.4 oz)     Vital Signs with Ranges  Temp:  [97.3  F (36.3  C)-99.1  F (37.3  C)] 99.1  F (37.3  C)  Pulse:  [39-49] 40  Resp:  [16] 16  BP: (119-142)/() 142/63  SpO2:  [95 %-98 %] 98 %  I/O's Last 24 hours  I/O last 3 completed shifts:  In: 835 [I.V.:250; NG/GT:405]  Out: 1050 [Urine:1050]    Constitutional: Awake, alert, cooperative, no apparent distress  Respiratory: Clear to auscultation bilaterally, no crackles or wheezing  Cardiovascular: Regular rate and rhythm, normal S1 and S2, and no murmur noted  GI: Normal bowel sounds, soft, non-distended, non-tender  Skin/Integumen: No rashes, no cyanosis, no edema  Other:      Medications   All medications were reviewed.  Current Facility-Administered Medications   Medication Dose Route Frequency Provider Last Rate Last Admin    dextrose 10% infusion   Intravenous Continuous PRN Lou Lindquist PA-C         Current Facility-Administered Medications   Medication Dose Route Frequency Provider Last Rate Last Admin    aspirin (ASA) chewable tablet 81 mg  81 mg Per G Tube Daily Lou Lindquist PA-C   81 mg at 12/05/24 0930    cefTRIAXone (ROCEPHIN) 2 g vial to attach to  ml bag  for ADULTS or NS 50 ml bag for PEDS  2 g Intravenous Q24H Lou Lindquist PA-C 200 mL/hr at 12/05/24 0432 2 g at 12/05/24 0432    heparin ANTICOAGULANT injection 5,000 Units  5,000 Units Subcutaneous Q8H Lou Lindquist PA-C   5,000 Units at 12/05/24 0931    levothyroxine (SYNTHROID/LEVOTHROID) tablet 150 mcg  150 mcg Per G Tube Daily Lou Lindquist PA-C   150 mcg at 12/05/24 0930    melatonin tablet 5 mg  5 mg Oral or NG Tube At Bedtime Lou Lindquist PA-C   5 mg at 12/04/24 2051    olopatadine (PATANOL) 0.1 % ophthalmic solution 1 drop  1 drop Both Eyes BID Lou Lindquist PA-C   1 drop at 12/05/24 0937    pyRIDostigmine (MESTINON) tablet 60 mg  60 mg Oral Q8H TY Lou Lindquist PA-C   60 mg at 12/05/24 1341    [Held by provider] simvastatin (ZOCOR) tablet 10 mg  10 mg Per G Tube QPM Hadley De La O MD   10 mg at 12/02/24 2021    sodium phosphate 15 mmol in NS 250mL intermittent infusion  15 mmol Intravenous Once Murtaza Kinney MD 66 mL/hr at 12/05/24 1155 15 mmol at 12/05/24 1155        Data   Recent Labs   Lab 12/05/24  0721 12/04/24  0533 12/04/24  0017 12/04/24  0012 12/03/24  0456 12/03/24  0446 12/01/24  0348 12/01/24  0313 12/01/24  0223   WBC 8.2  --   --  7.2  --  7.6  --  12.1*  --    HGB 9.5*  --   --  9.1*  --  9.6*  --  13.3  --    *  --   --  107*  --  110*  --  106*  --      --   --  170  --  206  --  389  --    INR  --   --   --   --   --   --   --   --  1.32*   *  146* 144  --  143   < > 154*   < > 152*  --    POTASSIUM 3.5  3.5 3.3*  --  3.2*   < > 3.7   < > 5.8*  5.8* 6.0*   CHLORIDE 120*  120* 117*  --  115*   < > 125*   < > 117*  --    CO2 20*  20* 21*  --  22   < > 20*   < > 22  --    BUN 42.5*  42.5* 57.6*  --  61.5*   < > 79.1*   < > 181.8*  --    CR 0.99  0.99 1.26*  --  1.31*   < > 1.56*   < > 2.79*  --    ANIONGAP 6*  6* 6*  --  6*   < > 9   < > 13  --    SIMONA 8.3*  8.3* 8.0*  --  7.9*   < > 8.4*   < > 10.0  --    GLC  110*  110* 94 97 101*   < > 98   < > 134*  --    ALBUMIN 2.2*  --   --   --   --   --   --  3.0*  --    PROTTOTAL 6.0*  --   --   --   --   --   --  5.7*  --    BILITOTAL 0.2  --   --   --   --   --   --  0.5  --    ALKPHOS 71  --   --   --   --   --   --  104  --    ALT 29  --   --   --   --   --   --  44  --    AST 37  --   --   --   --   --   --  26  --     < > = values in this interval not displayed.       No results found for this or any previous visit (from the past 24 hours).    Murtaza Kinney MD  Text Page  (7am to 6pm)

## 2024-12-05 NOTE — PLAN OF CARE
Date & Time: 12/5 1500-1900  Diagnosis: Myasthenic crisis  Procedures: NA  Orientation/Cognitive: AOx4  VS/O2: VSS, RA  Mobility: A2 + lift  Diet: NPO + ice chips, continuous TF   Pain Management: Denies - PRNs available  Neuro: RUE 2/5, LUE & BLE 3/5, weak   Bowel & Bladder: Chronic snowden, BMx1  Skin: WDL  Abnormal Labs: Na 146, BUN 42.5, anion 6, phos 2.1 (replaced, recheck in AM), hgb 9.5  Tele: SB, now discontinued  IV Access/Drips/Fluids: R PIVx2 SL  Drains: Snowden - if snowden starts to leak, flush with 30ml and pull back to get rid of the mucous  Tests/Imaging: See imaging  Consults: Neurology, hospitalist  Discharge Plan: Pending - ARU

## 2024-12-05 NOTE — PLAN OF CARE
/57 (BP Location: Right arm)   Pulse (!) 39   Temp 98  F (36.7  C) (Oral)   Resp 16   Wt 82 kg (180 lb 12.4 oz)   SpO2 98%   BMI 25.94 kg/m      Patient name: Mendez Greene    Summary: Patient her with myathsena gravis exacerbation. Very pleasant, oriented x4, has generalized weakness in all extremities, cpap applied, tube feeding running, chronic snowden in place.    Sam Pulido RN  [facility]

## 2024-12-05 NOTE — PROGRESS NOTES
NIF/VC performed by patient at 1135:    NIF: greater than -40 cm H2O  VC: 1,100 mL     Both performed with good effort with HOB elevated     All Edmondson, RT on 12/5/2024 at 11:42 AM

## 2024-12-05 NOTE — CONSULTS
DATE OF SERVICE : 12/5/2024    DATE OF ADMISSION: 11/30/2024    NEUROLOGICAL CONSULTATION    REQUESTED BY Dr. De La O, Hadley Sandhu MD    SOURCE OF INFORMATION:Patient and  EHR    REASON FOR CONSULTATION:     Myasthenia crises    HISTORY OF PRESENT ILLNESS:     He is a 79 years old with a history of paroxysmal atrial fibrillation coronary artery disease aortic aneurysm hypertension hyperlipidemia hypothyroidism, prior diagnosis of ALS, myasthenia gravis CHA admitted to ICU 11/30/2024 with concerns for acute respiratory failure and altered mental status.  He was recently hospitalized at [facility] 11/8/2024 to 11/15/2024 for progressive weakness and fatigue difficulty swallowing.  He was seen by neurology started rilutek and edavarone . After discharge, his ACh receptor binding Ab came back positive, strongly suggestive of myasthenia gravis.    11/30 acute respiratory failure s/p intubation.  Started on IVIG last dose 12/5 for likely myasthenia crisis and was initiated on Mestinon.  He was extubated 12/3.  Started on Mestinon 60 mg 3 times daily 12/3.       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, stage pT1, carcinoma in situ, stage Tis, N0, M0 s/p cystoprostatectomy with ileal neobladder formation    Pure hypercholesterolemia          PSHx:   Past Surgical History:   Procedure Laterality Date    ARTHROPLASTY KNEE Left 08/23/2022    Procedure: LEFT TOTAL KNEE ARTHROPLASTY;  Surgeon: Melba Dial MD;  Location: [facility] OR    ARTHROPLASTY REVISION KNEE Left 09/05/2023    Procedure: REVISION TOTAL KNEE ARTHROPLASTY  PATELLA;  Surgeon: Melba Dial MD;  Location:  OR    BUNIONECTOMY Left     BUNIONECTOMY DIANA  11/28/2011    RT-Procedure:BUNIONECTOMY DIANA; Right Foot Dwaine Monterey Bunionectomy with Metatarsal and Phalanx Osteotomy with 1st Metatarsal Phalangeal Joint Repair Right Foot; Surgeon:BAKARI ZAIDI; Location:[facility]    CATARACT EXTRACTION, BILATERAL      CV CORONARY ANGIOGRAM N/A 11/11/2024    Procedure: Coronary Angiogram;  Surgeon: Krystian Machuca MD;  Location:  HEART CARDIAC CATH LAB    CV HEART CATHETERIZATION WITH POSSIBLE INTERVENTION N/A 11/07/2020    Procedure: Heart Catheterization with Possible Intervention;  Surgeon: Rishi Llanes MD;  Location:  HEART CARDIAC CATH LAB    CV LEFT HEART CATH N/A 11/11/2024    Procedure: Left Heart Catheterization;  Surgeon: Krystian Machuca MD;  Location:  HEART CARDIAC CATH LAB    CYSTECTOMY BLADDER, ILEAL DIVERSION NEOBLADDER, COMBINED  2006    HERNIORRHAPHY INCISIONAL (LOCATION) N/A 08/01/2016    Procedure: HERNIORRHAPHY INCISIONAL (LOCATION);  Surgeon: Onofre Lua MD;  Location: [facility]    IR GASTROSTOMY TUBE PERCUTANEOUS PLCMNT  11/13/2024    OPEN REDUCTION INTERNAL FIXATION PATELLA Left 09/05/2023    Procedure: LEFT KNEE OPEN REDUCTION INTERNAL  FIXATION PATELLA FRACTURE WITH;  Surgeon: Melba Dial MD;  Location:  OR    OPEN REDUCTION INTERNAL FIXATION PATELLA Left 10/10/2023    Procedure: LEFT KNEE REVISION OPEN REDUCTION INTERNAL FIXATION PATELLA WITH POLY REMOVAL, PATELLAR;  Surgeon: Melba Dial MD;  Location:  OR    REMOVE HARDWARE KNEE Left 10/10/2023    Procedure: WITH POLY REMOVAL, PATELLAR;  Surgeon: Melba Dial MD;  Location:  OR       Medications Prior to Admission   Medication Sig Dispense Refill Last Dose/Taking    acetaminophen (TYLENOL) 325 MG tablet Take 3 tablets (975 mg) by mouth every 6 hours as needed for mild pain (Patient taking differently: Take 325-650 mg by mouth every 6 hours  as needed for mild pain.) 100 tablet 0 11/30/2024    artificial saliva (BIOTENE MT) SOLN solution Swish and spit 2 sprays in mouth every hour as needed for dry mouth. 44.3 mL 1 Taking As Needed    aspirin 81 MG EC tablet Take 81 mg by mouth every evening.   11/30/2024 Morning    Cholecalciferol (D3 PO) Take 1 tablet by mouth daily. OTC: Unsure of strength.   11/29/2024    Jevity 1.5 Mandeep Place 1,320 mLs into G tube daily. Infuse via pump   110ml/hr X 12 hrs/day (5.5 cartons/day)  Water flush: 150ml every 4 hours or 6x/day 97561 mL 11 Taking    levothyroxine (SYNTHROID/LEVOTHROID) 150 MCG tablet Take 1 tablet (150 mcg) by mouth daily 90 tablet 3 11/30/2024 Morning    olopatadine (PATANOL) 0.1 % ophthalmic solution Place 1 drop into both eyes 2 times daily as needed for allergies.   Taking As Needed    simvastatin (ZOCOR) 10 MG tablet Take 1 tablet (10 mg) by mouth daily 90 tablet 3 11/30/2024    vitamin B-12 (CYANOCOBALAMIN) 1000 MCG tablet Take 1,000 mcg by mouth daily   11/29/2024    order for DME Equipment being ordered: CPAP and supplies. LIFETIME need. 1 each 0     riluzole (RILUTEK) 50 MG tablet Take 1 tablet (50 mg) by mouth every 12 hours. (Patient not taking: Reported on 11/30/2024) 60 tablet 1 Not Taking    sulfamethoxazole-trimethoprim (BACTRIM DS) 800-160 MG tablet Take 1 tablet by mouth 2 times daily for 14 days. (Patient not taking: Reported on 11/30/2024) 28 tablet 0 Not Taking     Current Facility-Administered Medications   Medication Dose Route Frequency Provider Last Rate Last Admin    acetaminophen (TYLENOL) tablet 650 mg  650 mg Oral Q24H Lou Lindquist PA-C   650 mg at 12/04/24 1336    acetaminophen (TYLENOL) tablet 650 mg  650 mg Oral Q24H PRN Lou Lindqiust PA-C   650 mg at 12/01/24 2111    artificial saliva (BIOTENE MT) solution 2 spray  2 spray Swish & Spit 4x Daily PRN Lou Lindquist PA-C        aspirin (ASA) chewable tablet 81 mg  81 mg Per G Tube Daily Lou Lindquist,  PRAVEEN   81 mg at 12/05/24 0930    cefTRIAXone (ROCEPHIN) 2 g vial to attach to  ml bag for ADULTS or NS 50 ml bag for PEDS  2 g Intravenous Q24H Lou Lindquist PA-C 200 mL/hr at 12/05/24 0432 2 g at 12/05/24 0432    dextrose 10% infusion   Intravenous Continuous PRN Lou Lindquist PA-C        glucose gel 15-30 g  15-30 g Oral Q15 Min PRN Lou Lindquist PA-C        Or    dextrose 50 % injection 25-50 mL  25-50 mL Intravenous Q15 Min PRN Lou Lindquist PA-C        Or    glucagon injection 1 mg  1 mg Subcutaneous Q15 Min PRN Lou Lindquist PA-C        diphenhydrAMINE (BENADRYL) capsule 50 mg  50 mg Oral Q24H Lou Lindquist PA-C        Or    diphenhydrAMINE (BENADRYL) injection 50 mg  50 mg Intravenous Q24H Lou Lindquist PA-C   50 mg at 12/04/24 1336    diphenhydrAMINE (BENADRYL) capsule 50 mg  50 mg Oral Q24H PRN Lou Lindquist PA-C        Or    diphenhydrAMINE (BENADRYL) injection 50 mg  50 mg Intravenous Q24H PRN Lou Lindquist PA-C        diphenhydrAMINE (BENADRYL) injection 50 mg  50 mg Intravenous Once PRN Lou Lindquist PA-C        EPINEPHrine (ADRENALIN) kit 0.3 mg  0.3 mg Intramuscular Q3 Min PRN Lou Lindquist PA-C        famotidine (PEPCID) injection 20 mg  20 mg Intravenous Once PRN Lou Lindquist PA-C        heparin ANTICOAGULANT injection 5,000 Units  5,000 Units Subcutaneous Q8H Lou Lindquist PA-C   5,000 Units at 12/05/24 0931    immune globulin - sucrose free 10 % injection 30 g  30 g Intravenous Q24H Lou Lindquist PA-C   30 g at 12/04/24 1348    levothyroxine (SYNTHROID/LEVOTHROID) tablet 150 mcg  150 mcg Per G Tube Daily Lou Lindquist PA-C   150 mcg at 12/05/24 0930    melatonin tablet 5 mg  5 mg Oral or NG Tube At Bedtime Lou Lindquist PA-C   5 mg at 12/04/24 2051    methylPREDNISolone Na Suc (solu-MEDROL) injection 125 mg  125 mg Intravenous Once PRN Lou Lindquist PA-C        naloxone  (NARCAN) injection 0.2 mg  0.2 mg Intravenous Q2 Min PRN Lou Lindquist PA-C        Or    naloxone (NARCAN) injection 0.4 mg  0.4 mg Intravenous Q2 Min PRN Lou Lindquist PA-C        Or    naloxone (NARCAN) injection 0.2 mg  0.2 mg Intramuscular Q2 Min PRN Lou Lindquist PA-C        Or    naloxone (NARCAN) injection 0.4 mg  0.4 mg Intramuscular Q2 Min PRN Lou Lindquist PA-C        olopatadine (PATANOL) 0.1 % ophthalmic solution 1 drop  1 drop Both Eyes BID Lou Lindquist PA-C   1 drop at 12/05/24 0937    pyRIDostigmine (MESTINON) tablet 60 mg  60 mg Oral Q8H TY Lou Lindquist PA-C   60 mg at 12/05/24 0605    [Held by provider] simvastatin (ZOCOR) tablet 10 mg  10 mg Per G Tube QPM Hadley De La O MD   10 mg at 12/02/24 2021    sodium phosphate 15 mmol in NS 250mL intermittent infusion  15 mmol Intravenous Once Murtaza Kinney MD         Current Facility-Administered Medications   Medication Dose Route Frequency Provider Last Rate Last Admin    acetaminophen (TYLENOL) tablet 650 mg  650 mg Oral Q24H Lou Lindquist PA-C   650 mg at 12/04/24 1336    acetaminophen (TYLENOL) tablet 650 mg  650 mg Oral Q24H PRN Lou Lindquist PA-C   650 mg at 12/01/24 2111    artificial saliva (BIOTENE MT) solution 2 spray  2 spray Swish & Spit 4x Daily PRN Lou Lindquist PA-C        aspirin (ASA) chewable tablet 81 mg  81 mg Per G Tube Daily Lou Lindquist PA-C   81 mg at 12/05/24 0930    cefTRIAXone (ROCEPHIN) 2 g vial to attach to  ml bag for ADULTS or NS 50 ml bag for PEDS  2 g Intravenous Q24H Lou Lindquist PA-C 200 mL/hr at 12/05/24 0432 2 g at 12/05/24 0432    dextrose 10% infusion   Intravenous Continuous PRN Lou Lindquist PA-C        glucose gel 15-30 g  15-30 g Oral Q15 Min PRN Lou Lindquist PA-C        Or    dextrose 50 % injection 25-50 mL  25-50 mL Intravenous Q15 Min PRN Lou Lindquist PA-C        Or    glucagon injection 1  mg  1 mg Subcutaneous Q15 Min PRN Lou Lindquist PA-C        diphenhydrAMINE (BENADRYL) capsule 50 mg  50 mg Oral Q24H Lou Lindquist PA-C        Or    diphenhydrAMINE (BENADRYL) injection 50 mg  50 mg Intravenous Q24H Lou Lindquist PA-C   50 mg at 12/04/24 1336    diphenhydrAMINE (BENADRYL) capsule 50 mg  50 mg Oral Q24H PRN Lou Lindquist PA-C        Or    diphenhydrAMINE (BENADRYL) injection 50 mg  50 mg Intravenous Q24H PRN Lou Lindquist PA-C        diphenhydrAMINE (BENADRYL) injection 50 mg  50 mg Intravenous Once PRN Lou Lindquist PA-C        EPINEPHrine (ADRENALIN) kit 0.3 mg  0.3 mg Intramuscular Q3 Min PRN Lou Lindquist PA-C        famotidine (PEPCID) injection 20 mg  20 mg Intravenous Once PRN Lou Lindquist PA-C        heparin ANTICOAGULANT injection 5,000 Units  5,000 Units Subcutaneous Q8H Lou Lindquist PA-C   5,000 Units at 12/05/24 0931    immune globulin - sucrose free 10 % injection 30 g  30 g Intravenous Q24H Lou Lindquist PA-C   30 g at 12/04/24 1348    levothyroxine (SYNTHROID/LEVOTHROID) tablet 150 mcg  150 mcg Per G Tube Daily Lou Lindquist PA-C   150 mcg at 12/05/24 0930    melatonin tablet 5 mg  5 mg Oral or NG Tube At Bedtime Lou Lindquist PA-C   5 mg at 12/04/24 2051    methylPREDNISolone Na Suc (solu-MEDROL) injection 125 mg  125 mg Intravenous Once PRN Lou Lindquist PA-C        naloxone (NARCAN) injection 0.2 mg  0.2 mg Intravenous Q2 Min PRN Lou Lindquist PA-C        Or    naloxone (NARCAN) injection 0.4 mg  0.4 mg Intravenous Q2 Min PRN Lou Lindquist PA-C        Or    naloxone (NARCAN) injection 0.2 mg  0.2 mg Intramuscular Q2 Min PRN Lou Lindquist PA-C        Or    naloxone (NARCAN) injection 0.4 mg  0.4 mg Intramuscular Q2 Min PRN Lou Lindquist PA-C        olopatadine (PATANOL) 0.1 % ophthalmic solution 1 drop  1 drop Both Eyes BID Lou Lindquist PA-C   1 drop at  12/05/24 0937    pyRIDostigmine (MESTINON) tablet 60 mg  60 mg Oral Q8H Lou Ivey PA-C   60 mg at 12/05/24 0605    [Held by provider] simvastatin (ZOCOR) tablet 10 mg  10 mg Per G Tube QPM Hadley De La O MD   10 mg at 12/02/24 2021    sodium phosphate 15 mmol in NS 250mL intermittent infusion  15 mmol Intravenous Once Murtaza Kinney MD              No Known Allergies      SocHx:  reports that he has quit smoking. His smoking use included cigarettes. He has never been exposed to tobacco smoke. He has never used smokeless tobacco. He reports current alcohol use. He reports that he does not use drugs.    Family History   Problem Relation Age of Onset    Osteoporosis Mother     Neurologic Disorder Mother         PD    Cerebrovascular Disease Mother     Hypertension Mother     Hyperlipidemia Mother     Cerebrovascular Disease Father     Diabetes Type 1 Sister     Cancer Maternal Grandmother         unknown type    Diabetes Type 2  No family hx of     Myocardial Infarction No family hx of     Prostate Cancer No family hx of     Colon Cancer No family hx of          PHYSICAL EXAM  /54   Pulse (!) 40   Temp 97.3  F (36.3  C) (Axillary)   Resp 16   Wt 82 kg (180 lb 12.4 oz)   SpO2 97%   BMI 25.94 kg/m        No acute distress no labored breathing normal mood    Alert and oriented to self follows simple commands.  Spontaneous speech and compression is normal no dysarthria    Pupils equal and round extraocular movements are intact face is symmetric hearing normal to conversation tongue is in midline    Able to move the left upper limb and bilateral lower extremities.  Limited movement in the right upper extremity mostly proximally  No tremors or involuntary movements      Lab and X-ray:   Recent Labs   Lab Test 12/05/24  0721 12/01/24  0313 12/01/24  0223 11/08/24  1525 07/10/24  1226   WBC 8.2   < >  --    < >  --    HGB 9.5*   < >  --    < >  --       < >  --    < >  --    INR  --    --  1.32*   < >  --    POTASSIUM 3.5  3.5   < > 6.0*   < > 5.3   LDL  --   --   --   --  93    < > = values in this interval not displayed.     Recent Labs   Lab Test 12/05/24  0721 12/04/24  0533   POTASSIUM 3.5  3.5 3.3*   CHLORIDE 120*  120* 117*   BUN 42.5*  42.5* 57.6*     Recent Labs   Lab Test 12/05/24  0721 12/04/24  0012 12/01/24 0313 12/01/24 0223 11/30/24 2058 11/13/24  0909   WBC 8.2 7.2   < >  --    < >  --    HGB 9.5* 9.1*   < >  --    < >  --    * 107*   < >  --    < >  --     170   < >  --    < >  --    INR  --   --   --  1.32*  --  1.23*    < > = values in this interval not displayed.     Recent Labs   Lab Test 12/05/24 0721 12/01/24 0313   AST 37 26   ALT 29 44   ALKPHOS 71 104     Recent Labs   Lab Test 07/10/24  1226 08/29/23  0949   HDL 73 75   LDL 93 67     No lab results found.    Laboratory results were personally interpreted and reviewed in detail.  Imaging studies reviewed and interpreted in detail      Summary: List Problems:   Patient Active Problem List   Diagnosis    CHA on CPAP    CAD (coronary artery disease)    PAF (paroxysmal atrial fibrillation) (H)    Ascending aortic aneurysm (H)    Aortic insufficiency    Pure hypercholesterolemia    History of basal cell carcinoma    Personal history of malignant neoplasm of bladder    Benign essential hypertension    Hypothyroidism    Complication of feeding tube (H)    Acute renal failure (ARF) (H)    Acute hypoxic respiratory failure (H)    Myasthenic crisis (H)       ASSESSMENT:     79 years old paroxysmal A-fib, hypertension hyperlipidemia hypothyroidism presenting with acute respiratory failure and altered mental status.Progressive weakness fatigability difficulty swallowing.      + acetylcholine receptor binding antibodies    Myasthenic crises - IVIG x 4 days - last dose today 12/5 - improving     - Mestinon 60 mg TID     - Therapies     - Follow up Neurology outpatient       Thank you for the opportunity to  provide consultation on Mendez Greene

## 2024-12-05 NOTE — PLAN OF CARE
Goal Outcome Evaluation:    Overall Patient Progress: improvingOverall Patient Progress: improving    Pt here with Myasthenia Gravis exacerbation. A&O x4, forgerful. Vitor HR, otherwise VSS, on RA. LS clear. Tele SBD. CMS and Neuro's intact except for generalized weakness, RUE 2/5 strength, weak , pronator drift, LUE 4/5, BLE 3/5, generalized edema. Denies pain. TR q2hrs, lift. NPO, G-tube site CDI, patent, TF formula changed, start at rate of 25mL/hr. FWF continue at  100mL/q2hrs. Meds given through TF. Video swallow tomorrow. +BS, passing flatus, last BM report 12/4. Chronic Cortez, patent, adequate output. Last IVIG this afternoon. Phos 2.1, replaced, lab redraw scheduled for tomorrow morning. Pt scoring green on Aggression Stop Light Tool. ARU at time of discharge, pending.

## 2024-12-05 NOTE — PROGRESS NOTES
"CLINICAL NUTRITION SERVICES - REASSESSMENT NOTE      Recommendations Ordered by Registered Dietitian (RD):     Will plan to transition pt back to standard TF formula (the one he was on PTA, has several cases at home):  Type of Feeding Tube: G-tube  Enteral Frequency:  Continuous  Enteral Regimen: Jevity 1.5 @ 55 mL/hr x 22 hrs (hold for 1 hr before and 1 hr after Synthroid dose)  Total Enteral Provisions: 1815 cals (24 cals/kg, 96% needs), 77 gm pro (1 gm/kg), 25 gm fiber, 920 mL free water  Free Water Flush: (12/4 - MD order) 100 mL every 2 hrs    Start new TF formula at 25 mL/hr.  Increase by 10 mL every 12 hrs to goal rate of 55 mL/hr.     Malnutrition: (12/1)  % Weight Loss:  > 5% in 1 month (severe malnutrition)  % Intake:  NGA  Subcutaneous Fat Loss:  Orbital region mild depletion  Muscle Loss:  Temporal region mild-moderate depletion, Clavicle region mild depletion  Fluid Retention:  None noted     Malnutrition Diagnosis: Moderate malnutrition  In Context of:  Acute illness or injury       EVALUATION OF PROGRESS TOWARD GOALS   Diet:    NPO    Nutrition Support:    Type of Feeding Tube: G-tube   Enteral Frequency:  Continuous  Enteral Regimen: Novasource Renal at 45 mL/hr x 22 hours per day (holding TF 1 hour before and 1 hour after Synthroid administration)  Total Enteral Provisions: 1980 kcal (26 kcal/kg), 90 g protein (1.2 g/kg), 181 g CHO, 0 g fiber, 710 mL H2O   Free Water Flush: (12/4 - MD order) 100 mL every 2 hours     Intake/Tolerance:    Chart reviewed  Pt on Synthroid once daily (need to hold TF for 1 hr before and 1 hr after dose)    Visited with pt and wife this morning  \"They shaved his beard - he has had that for 40 years!\"  Wife notes that when pt started Jevity 1.5 @ home, he was running 110 mL/hr x 12 hrs ---> \"it gave him horrible diarrhea\"  She states that the rate was then decreased to 30 mL/hr - infusing 2 cartons of Jevity 1.5 ---> \"he did ok with that\"  Wife reports that pt hadn't " pooped in a week, up until yesterday  Pt now with loose stools  Pt tells me that he is feeling hungry    Pt is ok with resuming home TF formula (wife states she has several cases of TF at home)      ASSESSED NUTRITION NEEDS:  Dosing Weight 75.4 kg (11/30)  Estimated Energy Needs: 8747-6304 kcals (25-30 Kcal/Kg)  Justification: maintenance  Estimated Protein Needs: 75-90 grams protein (1-1.2 g pro/Kg)  Justification: CKD, maintenance  Estimated Fluid Needs: 3105-0118 mL (1 mL/Kcal)  Justification: maintenance or per MD      NEW FINDINGS:     12/5: Na 146 (H)           K 3.5           BUN 42.5 kg           Cr 0.99           Mg 1.9           Phos 2.1 (L)    Wt up from admit  12/04/24 0400 82 kg (180 lb 12.4 oz) Bed scale   12/03/24 0300 79.8 kg (176 lb) Bed scale   12/02/24 0600 78.1 kg (172 lb 3.2 oz) Bed scale   11/30/24 2038 75.4 kg (166 lb 3.6 oz) --       Previous Goals (12/1):   EN to meet goal needs within 48-72 hours   Evaluation: Met    Previous Nutrition Diagnosis (12/1):   Inadequate oral intake related to NPO as evidenced by need for nutrition support to start today   Evaluation: Improving        CURRENT NUTRITION DIAGNOSIS  No nutrition diagnosis identified at this time     INTERVENTIONS  Recommendations / Nutrition Prescription  NPO    Will plan to transition pt back to standard TF formula (the one he was on PTA, has several cases at home):  Type of Feeding Tube: G-tube  Enteral Frequency:  Continuous  Enteral Regimen: Jevity 1.5 @ 55 mL/hr x 22 hrs (hold for 1 hr before and 1 hr after Synthroid dose)  Total Enteral Provisions: 1815 cals (24 cals/kg, 96% needs), 77 gm pro (1 gm/kg), 25 gm fiber, 920 mL free water  Free Water Flush: (12/4 - MD order) 100 mL every 2 hrs    Start new TF formula at 25 mL/hr.  Increase by 10 mL every 12 hrs to goal rate of 55 mL/hr.      Goals  EN to meet % estimated needs      MONITORING AND EVALUATION:  Progress towards goals will be monitored and evaluated per  protocol and Practice Guidelines

## 2024-12-05 NOTE — INTERIM SUMMARY
No further recommendation from stroke neurology, will have general neurology take over.   Thank you for the consult. Will sign off.     Talon Espinal MD  Stroke fellow

## 2024-12-06 ENCOUNTER — APPOINTMENT (OUTPATIENT)
Dept: SPEECH THERAPY | Facility: CLINIC | Age: 79
End: 2024-12-06
Payer: MEDICARE

## 2024-12-06 ENCOUNTER — APPOINTMENT (OUTPATIENT)
Dept: OCCUPATIONAL THERAPY | Facility: CLINIC | Age: 79
End: 2024-12-06
Payer: MEDICARE

## 2024-12-06 LAB
ALBUMIN SERPL BCG-MCNC: 2.4 G/DL (ref 3.5–5.2)
ALP SERPL-CCNC: 70 U/L (ref 40–150)
ALT SERPL W P-5'-P-CCNC: 34 U/L (ref 0–70)
ANION GAP SERPL CALCULATED.3IONS-SCNC: 6 MMOL/L (ref 7–15)
AST SERPL W P-5'-P-CCNC: 43 U/L (ref 0–45)
BILIRUB SERPL-MCNC: 0.2 MG/DL
BUN SERPL-MCNC: 37.8 MG/DL (ref 8–23)
CALCIUM SERPL-MCNC: 8.9 MG/DL (ref 8.8–10.4)
CHLORIDE SERPL-SCNC: 120 MMOL/L (ref 98–107)
CREAT SERPL-MCNC: 0.8 MG/DL (ref 0.67–1.17)
EGFRCR SERPLBLD CKD-EPI 2021: 89 ML/MIN/1.73M2
ERYTHROCYTE [DISTWIDTH] IN BLOOD BY AUTOMATED COUNT: 13.7 % (ref 10–15)
GLUCOSE BLDC GLUCOMTR-MCNC: 97 MG/DL (ref 70–99)
GLUCOSE SERPL-MCNC: 108 MG/DL (ref 70–99)
HCO3 SERPL-SCNC: 20 MMOL/L (ref 22–29)
HCT VFR BLD AUTO: 30.3 % (ref 40–53)
HGB BLD-MCNC: 9.6 G/DL (ref 13.3–17.7)
MAGNESIUM SERPL-MCNC: 2 MG/DL (ref 1.7–2.3)
MCH RBC QN AUTO: 33.6 PG (ref 26.5–33)
MCHC RBC AUTO-ENTMCNC: 31.7 G/DL (ref 31.5–36.5)
MCV RBC AUTO: 106 FL (ref 78–100)
PHOSPHATE SERPL-MCNC: 1.9 MG/DL (ref 2.5–4.5)
PLATELET # BLD AUTO: 164 10E3/UL (ref 150–450)
POTASSIUM SERPL-SCNC: 3.7 MMOL/L (ref 3.4–5.3)
PROT SERPL-MCNC: 6.8 G/DL (ref 6.4–8.3)
RBC # BLD AUTO: 2.86 10E6/UL (ref 4.4–5.9)
SODIUM SERPL-SCNC: 146 MMOL/L (ref 135–145)
T4 FREE SERPL-MCNC: 0.86 NG/DL (ref 0.9–1.7)
TSH SERPL DL<=0.005 MIU/L-ACNC: 8.56 UIU/ML (ref 0.3–4.2)
WBC # BLD AUTO: 7.5 10E3/UL (ref 4–11)

## 2024-12-06 PROCEDURE — 97535 SELF CARE MNGMENT TRAINING: CPT | Mod: GO

## 2024-12-06 PROCEDURE — 84100 ASSAY OF PHOSPHORUS: CPT | Performed by: PHYSICIAN ASSISTANT

## 2024-12-06 PROCEDURE — 250N000011 HC RX IP 250 OP 636: Performed by: PHYSICIAN ASSISTANT

## 2024-12-06 PROCEDURE — 80053 COMPREHEN METABOLIC PANEL: CPT | Performed by: PHYSICIAN ASSISTANT

## 2024-12-06 PROCEDURE — 250N000013 HC RX MED GY IP 250 OP 250 PS 637: Performed by: PHYSICIAN ASSISTANT

## 2024-12-06 PROCEDURE — 84439 ASSAY OF FREE THYROXINE: CPT | Performed by: INTERNAL MEDICINE

## 2024-12-06 PROCEDURE — 36415 COLL VENOUS BLD VENIPUNCTURE: CPT | Performed by: INTERNAL MEDICINE

## 2024-12-06 PROCEDURE — 84443 ASSAY THYROID STIM HORMONE: CPT | Performed by: INTERNAL MEDICINE

## 2024-12-06 PROCEDURE — 36415 COLL VENOUS BLD VENIPUNCTURE: CPT | Performed by: PHYSICIAN ASSISTANT

## 2024-12-06 PROCEDURE — 97110 THERAPEUTIC EXERCISES: CPT | Mod: GO

## 2024-12-06 PROCEDURE — 85027 COMPLETE CBC AUTOMATED: CPT | Performed by: PHYSICIAN ASSISTANT

## 2024-12-06 PROCEDURE — 250N000013 HC RX MED GY IP 250 OP 250 PS 637: Performed by: INTERNAL MEDICINE

## 2024-12-06 PROCEDURE — 999N000157 HC STATISTIC RCP TIME EA 10 MIN

## 2024-12-06 PROCEDURE — 83735 ASSAY OF MAGNESIUM: CPT | Performed by: PHYSICIAN ASSISTANT

## 2024-12-06 PROCEDURE — 120N000001 HC R&B MED SURG/OB

## 2024-12-06 PROCEDURE — 92526 ORAL FUNCTION THERAPY: CPT | Mod: GN

## 2024-12-06 PROCEDURE — 94150 VITAL CAPACITY TEST: CPT

## 2024-12-06 PROCEDURE — B4035 ENTERAL FEED SUPP PUMP PER D: HCPCS

## 2024-12-06 RX ADMIN — POTASSIUM & SODIUM PHOSPHATES POWDER PACK 280-160-250 MG 2 PACKET: 280-160-250 PACK at 13:28

## 2024-12-06 RX ADMIN — Medication 5 MG: at 21:35

## 2024-12-06 RX ADMIN — OLOPATADINE HYDROCHLORIDE 1 DROP: 1 SOLUTION/ DROPS OPHTHALMIC at 21:35

## 2024-12-06 RX ADMIN — PYRIDOSTIGMINE BROMIDE 60 MG: 60 TABLET ORAL at 13:28

## 2024-12-06 RX ADMIN — POTASSIUM & SODIUM PHOSPHATES POWDER PACK 280-160-250 MG 2 PACKET: 280-160-250 PACK at 21:34

## 2024-12-06 RX ADMIN — OLOPATADINE HYDROCHLORIDE 1 DROP: 1 SOLUTION/ DROPS OPHTHALMIC at 09:10

## 2024-12-06 RX ADMIN — HEPARIN SODIUM 5000 UNITS: 5000 INJECTION, SOLUTION INTRAVENOUS; SUBCUTANEOUS at 16:15

## 2024-12-06 RX ADMIN — LEVOTHYROXINE SODIUM 150 MCG: 150 TABLET ORAL at 09:16

## 2024-12-06 RX ADMIN — HEPARIN SODIUM 5000 UNITS: 5000 INJECTION, SOLUTION INTRAVENOUS; SUBCUTANEOUS at 09:33

## 2024-12-06 RX ADMIN — ASPIRIN 81 MG CHEWABLE TABLET 81 MG: 81 TABLET CHEWABLE at 09:12

## 2024-12-06 RX ADMIN — POTASSIUM & SODIUM PHOSPHATES POWDER PACK 280-160-250 MG 2 PACKET: 280-160-250 PACK at 16:15

## 2024-12-06 RX ADMIN — PYRIDOSTIGMINE BROMIDE 60 MG: 60 TABLET ORAL at 21:35

## 2024-12-06 RX ADMIN — CEFTRIAXONE SODIUM 2 G: 2 INJECTION, POWDER, FOR SOLUTION INTRAMUSCULAR; INTRAVENOUS at 04:10

## 2024-12-06 RX ADMIN — HEPARIN SODIUM 5000 UNITS: 5000 INJECTION, SOLUTION INTRAVENOUS; SUBCUTANEOUS at 00:49

## 2024-12-06 RX ADMIN — PYRIDOSTIGMINE BROMIDE 60 MG: 60 TABLET ORAL at 05:56

## 2024-12-06 ASSESSMENT — ACTIVITIES OF DAILY LIVING (ADL)
ADLS_ACUITY_SCORE: 84

## 2024-12-06 NOTE — PROVIDER NOTIFICATION
MD Notification    Notified Person: MD    Notified Person Name: Margarita NEELY    Notification Date/Time: December 6, 2024 at 1030    Notification Interaction: phone call     Purpose of Notification: Noted that Pts R pupil is bigger than L pupil, both pupils are round and reactive to light. LLE has a drift, per Pt this is baseline due to hx of knee surgery.     Orders Received: Provider aware, no change to plan of care.    Comments:

## 2024-12-06 NOTE — PROGRESS NOTES
Rehab Admissions:  Met with pt and spouse to provide them with information regarding referral to [facility], including location, parking, visitor policy, what to bring, ELOS, and about the intensive rehab program. Pt reports he was (I) with all activity 5 weeks ago, but has newly been diagnosed with myasthenia gravis. He lives with his wife with main level living, and his wife is able to assist him as needed at home. Will continue to follow pt's trajectory with therapies to see how he is tolerating therapies and hopefully improving while in the hospital.     Thank you for the referral, we will continue to follow this patient for post acute placement.     Determination of admission is based upon the patient's need for an intensive, interdisciplinary approach to rehabilitation, their ability to progress, their ability to tolerate intensive therapies, their need for daily physician supervision, their need for twenty four hour nursing assistance, and their ability and willingness to participate in such a program.    Dali Blas CM  Rehab Liaison/  [facility]  12/6/2024    1:00 PM

## 2024-12-06 NOTE — PROGRESS NOTES
Care Management Follow Up    Length of Stay (days): 6    Expected Discharge Date: 12/09/2024     Concerns to be Addressed:       Patient plan of care discussed at interdisciplinary rounds: Yes    Anticipated Discharge Disposition:  Acute Rehab              Anticipated Discharge Services:    Anticipated Discharge DME:      Patient/family educated on Medicare website which has current facility and service quality ratings:    Education Provided on the Discharge Plan:  yes  Patient/Family in Agreement with the Plan: yes    Referrals Placed by CM/SW:    Private pay costs discussed: Not applicable    Discussed  Partnership in Safe Discharge Planning  document with patient/family: No     Handoff Completed: Yes, [facility] PCP: Internal handoff referral completed    Additional Information:  Met with patient and his wife to answer questions about ARU.  Patient is very motivated to go to ARU and has good family support.      Next Steps: Continue to follow.    Arleth Monreal RN, BSN, PHN  Inpatient Care Coordination    Phone: [phone]

## 2024-12-06 NOTE — PLAN OF CARE
Goal Outcome Evaluation:      Plan of Care Reviewed With: patient    Overall Patient Progress: improvingOverall Patient Progress: improving         Reason for Admission: Myasthenic Crisis     Cognitive/Mentation: A/Ox 4  Neuros/CMS: Intact ex RUE drift,  weak grasps, weak dorsi/plantar flexion. LUE 4/5, RUE 3/5. BLE 3/5. Slow finger to nose in LUE only (per Pt hasn't been able to perfrom in RUE during this hospital stay). L pupil 2 mm, R pupil 3 mm, both round brisk and reactive to light.   Tele: Sinus fam, HR as low as 37 this shift, Pt is asymptomatic and reports low HR at baseline. MD Kinney aware.  /GI: Chronic snowden, flushed x1 this morning due to leakage. Last BM 12/5.   Pulmonary: LS clear in upper lobes and diminished bases, Pt sounds congested with intermittent cough, MD aware.  Pain: denied.     Drains/Lines: Peg tube, 2 IVs  Skin: scattered bruising, blanchable redness on coccyx, mepilex in place.  Activity: Assist x 2  with lift.  Diet: NPO ex ice chips. Jevity 1.5 infusing at 35 ml/hr (Goal rate is 55 ml/hr). Free water flush 100 ml Q2H programmed. Takes pills crushed through peg tube.     Therapies recs: ARU  Discharge: pending    Aggression Stoplight Tool: green    End of shift summary: On K and Phos protocol, Phos replaced. Daily weights done.Tube feeding advanced from 25 ml/hr to 35 ml/hr at 1412. Per orders to increase by 10 mL every 12 hrs to goal rate of 55 mL/hr. NIF/FVC done by respiratory.

## 2024-12-06 NOTE — PROGRESS NOTES
ASSESSMENT:       79 years old paroxysmal A-fib, hypertension hyperlipidemia hypothyroidism presenting with acute respiratory failure and altered mental status.Progressive weakness fatigability difficulty swallowing.       + acetylcholine receptor binding antibodies     Myasthenic crises - IVIG x 4 days - last 12/5 - improving      - Mestinon 60 mg TID - noted bradycardia if continued consider decreasing dose of mestinon 30 mg TID       - Therapies      - Follow up Neurology outpatient      Neobladder chronic indwelling catheter with leakage - management per primary team informed bedside RN          24 HOUR EVENTS:      Clinically stable residual bulbar dysarthria       HPI:    He is a 79 years old with a history of paroxysmal atrial fibrillation coronary artery disease aortic aneurysm hypertension hyperlipidemia hypothyroidism, prior diagnosis of ALS, myasthenia gravis CHA admitted to ICU 11/30/2024 with concerns for acute respiratory failure and altered mental status.  He was recently hospitalized at [facility] 11/8/2024 to 11/15/2024 for progressive weakness and fatigue difficulty swallowing.  He was seen by neurology started rilutek and lex . After discharge, his ACh receptor binding Ab came back positive, strongly suggestive of myasthenia gravis.     11/30 acute respiratory failure s/p intubation.  Started on IVIG last dose 12/5 for likely myasthenia crisis and was initiated on Mestinon.  He was extubated 12/3.  Started on Mestinon 60 mg 3 times daily 12/3.    EXAMINATION:   Past medical history, family history, social history and review of systems are unchanged except as noted below.    VITAL SIGNS:     BP (!) 145/65 (BP Location: Left arm)   Pulse (!) 41   Temp 97.6  F (36.4  C) (Axillary)   Resp 16   Wt 82 kg (180 lb 12.4 oz)   SpO2 99%   BMI 25.94 kg/m      Alert and oriented to current situations follows commands.  Pupils right mild asymmetry (physiologic) extraocular movements  were intact face is symmetric  Speech-mild bulbar dysarthria  Limited right shoulder abduction.  Able to move left arm against gravity.  Bilateral lower extremities and soft cushion boots (patient had history left patellar injury)    PERTINENT DATA:  Lab and X-ray:   Recent Labs   Lab Test 12/05/24  0721 12/01/24 0313 12/01/24 0223 11/08/24  1525 07/10/24  1226   WBC 8.2   < >  --    < >  --    HGB 9.5*   < >  --    < >  --       < >  --    < >  --    INR  --   --  1.32*   < >  --    POTASSIUM 3.5  3.5   < > 6.0*   < > 5.3   LDL  --   --   --   --  93    < > = values in this interval not displayed.     [unfilled]  Recent Labs   Lab Test 12/05/24 0721 12/04/24  0533   POTASSIUM 3.5  3.5 3.3*   CHLORIDE 120*  120* 117*   BUN 42.5*  42.5* 57.6*     Recent Labs   Lab Test 12/05/24  0721 12/04/24  0012 12/01/24 0313 12/01/24 0223 11/30/24  2058 11/13/24  0909   WBC 8.2 7.2   < >  --    < >  --    HGB 9.5* 9.1*   < >  --    < >  --    * 107*   < >  --    < >  --     170   < >  --    < >  --    INR  --   --   --  1.32*  --  1.23*    < > = values in this interval not displayed.     Recent Labs   Lab Test 12/05/24 0721 12/01/24 0313   AST 37 26   ALT 29 44   ALKPHOS 71 104     Recent Labs   Lab Test 07/10/24  1226 08/29/23  0949   HDL 73 75   LDL 93 67     No lab results found.    Laboratory results were personally interpreted and reviewed in detail.    Imaging studies reviewed and interpreted in detail    Summary: List Problems:   Patient Active Problem List   Diagnosis    CHA on CPAP    CAD (coronary artery disease)    PAF (paroxysmal atrial fibrillation) (H)    Ascending aortic aneurysm (H)    Aortic insufficiency    Pure hypercholesterolemia    History of basal cell carcinoma    Personal history of malignant neoplasm of bladder    Benign essential hypertension    Hypothyroidism    Complication of feeding tube (H)    Acute renal failure (ARF) (H)    Acute hypoxic respiratory failure  (H)    Myasthenic crisis (H)

## 2024-12-06 NOTE — PLAN OF CARE
Goal Outcome Evaluation:      Plan of Care Reviewed With: patient    Overall Patient Progress: no changeOverall Patient Progress: no change         Pt here with myasthenic crisis and ALS. A&O x4. Neuros: RUE 2/5, LUE & BLE 3/5, weak  & difficulty opening hands. VSS. NPO ex ice chips.  Tube feed running in G tube at 25 mL/hr; new formula started overnight around 0200 to increase by 15 mL/hr q24 hours, fwf 100 mL q2 hours. Up with 2 & lift. Denies pain. Pt scoring green on the Aggression Stop Light Tool. Discharge plan pending.  Video swallow scheduled for today, although pt reported desire to do this on Monday as he isn't feeling up to it.

## 2024-12-06 NOTE — PROGRESS NOTES
Pt achieved VC of 1280ml, and NIF greater then -40 on disposable NIF. Will continue to follow BID per order.

## 2024-12-07 ENCOUNTER — APPOINTMENT (OUTPATIENT)
Dept: PHYSICAL THERAPY | Facility: CLINIC | Age: 79
End: 2024-12-07
Payer: MEDICARE

## 2024-12-07 ENCOUNTER — APPOINTMENT (OUTPATIENT)
Dept: OCCUPATIONAL THERAPY | Facility: CLINIC | Age: 79
End: 2024-12-07
Payer: MEDICARE

## 2024-12-07 LAB
ALBUMIN SERPL BCG-MCNC: 2.5 G/DL (ref 3.5–5.2)
ALP SERPL-CCNC: 74 U/L (ref 40–150)
ALT SERPL W P-5'-P-CCNC: 37 U/L (ref 0–70)
ANION GAP SERPL CALCULATED.3IONS-SCNC: 8 MMOL/L (ref 7–15)
AST SERPL W P-5'-P-CCNC: 44 U/L (ref 0–45)
BILIRUB SERPL-MCNC: 0.2 MG/DL
BUN SERPL-MCNC: 31.7 MG/DL (ref 8–23)
CALCIUM SERPL-MCNC: 8.9 MG/DL (ref 8.8–10.4)
CHLORIDE SERPL-SCNC: 120 MMOL/L (ref 98–107)
CREAT SERPL-MCNC: 0.7 MG/DL (ref 0.67–1.17)
EGFRCR SERPLBLD CKD-EPI 2021: >90 ML/MIN/1.73M2
ERYTHROCYTE [DISTWIDTH] IN BLOOD BY AUTOMATED COUNT: 13.8 % (ref 10–15)
GLUCOSE SERPL-MCNC: 107 MG/DL (ref 70–99)
HCO3 SERPL-SCNC: 21 MMOL/L (ref 22–29)
HCT VFR BLD AUTO: 29.1 % (ref 40–53)
HGB BLD-MCNC: 9.7 G/DL (ref 13.3–17.7)
MAGNESIUM SERPL-MCNC: 2 MG/DL (ref 1.7–2.3)
MCH RBC QN AUTO: 34.5 PG (ref 26.5–33)
MCHC RBC AUTO-ENTMCNC: 33.3 G/DL (ref 31.5–36.5)
MCV RBC AUTO: 104 FL (ref 78–100)
PHOSPHATE SERPL-MCNC: 2.5 MG/DL (ref 2.5–4.5)
PHOSPHATE SERPL-MCNC: 2.7 MG/DL (ref 2.5–4.5)
PLATELET # BLD AUTO: 158 10E3/UL (ref 150–450)
POTASSIUM SERPL-SCNC: 4 MMOL/L (ref 3.4–5.3)
PROT SERPL-MCNC: 6.5 G/DL (ref 6.4–8.3)
RBC # BLD AUTO: 2.81 10E6/UL (ref 4.4–5.9)
SODIUM SERPL-SCNC: 149 MMOL/L (ref 135–145)
WBC # BLD AUTO: 7.2 10E3/UL (ref 4–11)

## 2024-12-07 PROCEDURE — 250N000011 HC RX IP 250 OP 636: Performed by: PHYSICIAN ASSISTANT

## 2024-12-07 PROCEDURE — 120N000001 HC R&B MED SURG/OB

## 2024-12-07 PROCEDURE — 250N000013 HC RX MED GY IP 250 OP 250 PS 637: Performed by: INTERNAL MEDICINE

## 2024-12-07 PROCEDURE — 85014 HEMATOCRIT: CPT | Performed by: PHYSICIAN ASSISTANT

## 2024-12-07 PROCEDURE — 84100 ASSAY OF PHOSPHORUS: CPT | Performed by: INTERNAL MEDICINE

## 2024-12-07 PROCEDURE — 94150 VITAL CAPACITY TEST: CPT

## 2024-12-07 PROCEDURE — 97530 THERAPEUTIC ACTIVITIES: CPT | Mod: GP

## 2024-12-07 PROCEDURE — 36415 COLL VENOUS BLD VENIPUNCTURE: CPT | Performed by: INTERNAL MEDICINE

## 2024-12-07 PROCEDURE — 84100 ASSAY OF PHOSPHORUS: CPT | Performed by: PHYSICIAN ASSISTANT

## 2024-12-07 PROCEDURE — 80053 COMPREHEN METABOLIC PANEL: CPT | Performed by: PHYSICIAN ASSISTANT

## 2024-12-07 PROCEDURE — 83735 ASSAY OF MAGNESIUM: CPT | Performed by: PHYSICIAN ASSISTANT

## 2024-12-07 PROCEDURE — 250N000013 HC RX MED GY IP 250 OP 250 PS 637: Performed by: PHYSICIAN ASSISTANT

## 2024-12-07 PROCEDURE — B4035 ENTERAL FEED SUPP PUMP PER D: HCPCS

## 2024-12-07 PROCEDURE — 97116 GAIT TRAINING THERAPY: CPT | Mod: GP

## 2024-12-07 PROCEDURE — 97110 THERAPEUTIC EXERCISES: CPT | Mod: GO

## 2024-12-07 PROCEDURE — 99232 SBSQ HOSP IP/OBS MODERATE 35: CPT | Performed by: INTERNAL MEDICINE

## 2024-12-07 PROCEDURE — 36415 COLL VENOUS BLD VENIPUNCTURE: CPT | Performed by: PHYSICIAN ASSISTANT

## 2024-12-07 PROCEDURE — 85048 AUTOMATED LEUKOCYTE COUNT: CPT | Performed by: PHYSICIAN ASSISTANT

## 2024-12-07 RX ORDER — GUAIFENESIN 200 MG/10ML
200 LIQUID ORAL EVERY 4 HOURS PRN
Status: DISCONTINUED | OUTPATIENT
Start: 2024-12-07 | End: 2024-12-11 | Stop reason: HOSPADM

## 2024-12-07 RX ADMIN — HEPARIN SODIUM 5000 UNITS: 5000 INJECTION, SOLUTION INTRAVENOUS; SUBCUTANEOUS at 01:14

## 2024-12-07 RX ADMIN — LEVOTHYROXINE SODIUM 150 MCG: 150 TABLET ORAL at 09:55

## 2024-12-07 RX ADMIN — GUAIFENESIN 200 MG: 200 SOLUTION ORAL at 17:29

## 2024-12-07 RX ADMIN — PYRIDOSTIGMINE BROMIDE 60 MG: 60 TABLET ORAL at 06:08

## 2024-12-07 RX ADMIN — HEPARIN SODIUM 5000 UNITS: 5000 INJECTION, SOLUTION INTRAVENOUS; SUBCUTANEOUS at 09:57

## 2024-12-07 RX ADMIN — PYRIDOSTIGMINE BROMIDE 60 MG: 60 TABLET ORAL at 14:13

## 2024-12-07 RX ADMIN — ASPIRIN 81 MG CHEWABLE TABLET 81 MG: 81 TABLET CHEWABLE at 09:57

## 2024-12-07 RX ADMIN — PYRIDOSTIGMINE BROMIDE 60 MG: 60 TABLET ORAL at 22:19

## 2024-12-07 RX ADMIN — CEFTRIAXONE SODIUM 2 G: 2 INJECTION, POWDER, FOR SOLUTION INTRAMUSCULAR; INTRAVENOUS at 04:12

## 2024-12-07 RX ADMIN — Medication 5 MG: at 22:19

## 2024-12-07 RX ADMIN — OLOPATADINE HYDROCHLORIDE 1 DROP: 1 SOLUTION/ DROPS OPHTHALMIC at 22:19

## 2024-12-07 RX ADMIN — OLOPATADINE HYDROCHLORIDE 1 DROP: 1 SOLUTION/ DROPS OPHTHALMIC at 09:57

## 2024-12-07 RX ADMIN — HEPARIN SODIUM 5000 UNITS: 5000 INJECTION, SOLUTION INTRAVENOUS; SUBCUTANEOUS at 17:28

## 2024-12-07 ASSESSMENT — ACTIVITIES OF DAILY LIVING (ADL)
ADLS_ACUITY_SCORE: 84
ADLS_ACUITY_SCORE: 80
ADLS_ACUITY_SCORE: 84
ADLS_ACUITY_SCORE: 80
ADLS_ACUITY_SCORE: 84
ADLS_ACUITY_SCORE: 84
ADLS_ACUITY_SCORE: 80
ADLS_ACUITY_SCORE: 84
ADLS_ACUITY_SCORE: 84
ADLS_ACUITY_SCORE: 80
ADLS_ACUITY_SCORE: 84
ADLS_ACUITY_SCORE: 80
ADLS_ACUITY_SCORE: 84

## 2024-12-07 NOTE — PLAN OF CARE
1900 - 0700       Orientation: AO x4    Vitals/Tele: SB, VSS on room air, cpap hs    IV Access/drains: PIV sl, Chronic snowden    Diet: Tube feed    Mobility: Lift, turn and repo    GI/: Snowden, bed pan    Wound/Skin: Scattered bruising    Consults    Discharge Plan: Pending      See Flow sheets for assessment

## 2024-12-07 NOTE — PROGRESS NOTES
[facility]  Hospitalist Progress Note  Murtaza Kinney MD  12/07/2024    Assessment & Plan   Mendez Greene is a 79 year old male with PMH of paroxysmal atrial fibrillation, CAD, ascending aortic aneurysm, HLD, HTN, hypothyroidism, ALS, myasthenia gravis, CHA admitted to ICU service on 11/30/2024 for acute respiratory failure and AMS.     Patient was recently hospitalized at [facility] from 11/8/24-11/15/24 for progressive weakness and fatigue, difficulty swallowing. He was seen by neurology who were concerned for motor neuron disease with high index of suspicion for ALS. He was started on Riluzole and Edaravone and referred to [facility] neuromuscular division for second opinion. Discharged home with palliative care referral. After discharge, his ACh receptor binding Ab came back positive, strongly suggestive of myasthenia gravis.     Presented back to [facility] ED 11/30 with hypotension and acute respiratory failure. Family rescinded patient's DNR/DNI for patient to be full code and patient reportedly nodded in  agreement. Patient was intubated in the ED and admitted to ICU on pressors. Started on broad spectrum antibiotics for possible septic shock due to UTI. Neurology consulted, started patient on IVIG for likely myasthenic crisis and patient was initiated on Mestinon. Extubated with pressors stopped 12/3. Transferred to hospitalist service on 12/4.     Myasthenic crisis  Acute hypoxic respiratory failure, resolved  *Secondary to urosepsis (see below). Questionable ALS diagnosis which is being deferred to his primary neurologist.  - Neurocrit following while in ICU:  - Continue IVIG x 5 doses, last dose 12/5  - Needs CT chest for thymoma screening (to be done once renal function has improved)  - continue Mestinon 60 mg TID by neuro on 12/3  - Caution use of IV magnesium, beta-blocker, statin, macrolides, aminoglycosides, and fluoroquinolone, and as much as possible avoid them due to to  potential exacerbation of myasthenia crisis.   - General neurology following  - Monitor respiratory status closely with NIFs, post-extubation VBGs look good and currently saturating 99% on RA  - Continue CPAP at HS  - Continue PT/OT/SLP  - continues to improve his core muscles and extremities  -  consult for discharge planning     Urosepsis  Septic shock, resolved  Chronic indwelling Snowden catheter  *WBC 13.3 on admission, afebrile, lactic 2.0. UA with infectious markers. Hypotensive requiring pressors initially, now weaned. Urine culture 12/1 NGTD.  - S/p IV Zosyn (12/1 - 12/3) then narrowed to IV Rocephin (12/3 - present) to complete 5-7 day course  - Blood culture 11/30 NGTD  - has snowden catheter in neobladder, with urine leaking around the snowden, will order snowden catheter flush, suspect obstruction.     MOHAMUD, improving  *Creatinine 3.20 on admission now down to 1.26 with IVF. Baseline 0.5.  - Continue free water flushes per G-tube  - Avoid nephrotoxic medications  - Monitor I&O  - Cr back to baseline     Sinus bradycardia  *HR 40s to upper 50s, dips the most when sleeping. Asymptomatic. Mostly unchanged after discontinuing Levophed 12/3. EKG sinus bradycardia with no high grade blocks.  *Chart review shows similar HR 40-50s last hospitalization, and he has a few OP visits with documented sinus bradycardia (7/10/24, 3/4/24).  - Monitor while on Mestinon, defer any dose adjustments to neuro  - Continuous telemetry     Moderate to severe oropharyngeal dysphagia s/p G-tube  - RD following for TF  - VFSS on 12/6  - continue new TF formula     CAD  HLD  Paroxysmal atrial fibrillation  - Continue PTA Aspirin  - PTA Simvastatin currently on hold in the setting of myasthenia crisis  - No rate control medications at baseline     Elevated troponin, improving  Recent NSTEMI (11/8/24)  *Troponin 997 on admission (down from 1,346 last admission), trended down. EKG w/o acute ischemic changes. No complaints of chest pain.  "Cardiac cath 11/11/24 showed only mild CAD. ED provider spoke with cardiology, no concern for ACS.  *Echo 12/1/24 difficult study but grossly normal left and right ventricular function, no severe valvular regurgitation, probably mild aortic regurgitation, no pericardial effusion.  - Plan for outpatient cardiac MRI     Hypernatremia, resolved  Hypochloremia, resolved  *Sodium peaked at 154, chloride 125. Treated with IV D5W, discontinued 12/3 PM.  *Sodium now down to 144, chloride 117  - adjust free water      Hypokalemia  *K 3.3  - RN replacement protocol     CHA  - Continue CPAP at HS     Hypothyroidism  - Continue PTA Synthroid     Ascending aortic aneurysm  Aortic root dilatation  *Noted to be stable on echo 11/9/24  - Outpatient follow up for surveillance        Clinically Significant Risk Factors         # Hypokalemia: Lowest K = 3.2 mmol/L in last 2 days, will replace as needed  # Hypernatremia: Highest Na = 154 mmol/L in last 2 days, will monitor as appropriate  # Hyperchloremia: Highest Cl = 125 mmol/L in last 2 days, will monitor as appropriate           # Hypoalbuminemia: Lowest albumin = 3 g/dL at 12/1/2024  3:13 AM, will monitor as appropriate     # Hypertension: Noted on problem list             # Overweight: Estimated body mass index is 25.94 kg/m  as calculated from the following:    Height as of 11/15/24: 1.778 m (5' 10\").    Weight as of this encounter: 82 kg (180 lb 12.4 oz)., PRESENT ON ADMISSION  # Moderate Malnutrition: based on nutrition assessment, PRESENT ON ADMISSION      # Financial/Environmental Concerns: none       Diet: Adult Formula Drip Feeding: Continuous Novasource Renal; Gastrostomy; Goal Rate: 45; mL/hr; Increase TF rate to 15 mL/hr now and advance every 8 hours by 15 mL to goal. Hold TF 1 hour before and 1 hour after Synthroid administration.; Do not advan...  NPO for Medical/Clinical Reasons Except for: Ice Chips, NPO but receiving Tube Feeding     DVT Prophylaxis: Heparin SQ "   Cortez Catheter: PRESENT, indication: ?  (Error. Value could not be saved.), Other (Comment)  Lines: None     Cardiac Monitoring: ACTIVE order. Indication: ICU  Code Status: Full Code          Disposition Plan     Expected Discharge Date:   -- 12/9 to TCU    Disposition:   -- to TCU    Interval History   -- bladder manual irrigation better  -- patient has a lot of congestion symptoms  -- discussed with RN      -Data reviewed today: I reviewed all new labs and imaging over the last 24 hours. I personally reviewed no images or EKG's today.    Physical Exam    , Blood pressure 136/56, pulse (!) 40, temperature 97.7  F (36.5  C), temperature source Axillary, resp. rate 20, weight 82.2 kg (181 lb 3.5 oz), SpO2 95%.  Vitals:    12/03/24 0300 12/04/24 0400 12/06/24 1500   Weight: 79.8 kg (176 lb) 82 kg (180 lb 12.4 oz) 82.2 kg (181 lb 3.5 oz)     Vital Signs with Ranges  Temp:  [97.2  F (36.2  C)-99  F (37.2  C)] 97.7  F (36.5  C)  Pulse:  [38-45] 40  Resp:  [19-20] 20  BP: (130-155)/(48-62) 136/56  SpO2:  [95 %-98 %] 95 %  I/O's Last 24 hours  I/O last 3 completed shifts:  In: 160 [NG/GT:160]  Out: 1780 [Urine:1780]    Constitutional: Awake, alert, cooperative, no apparent distress  Respiratory: Clear to auscultation bilaterally, no crackles or wheezing  Cardiovascular: Regular rate and rhythm, normal S1 and S2,   GI: Normal bowel sounds, soft, non-distended, non-tender  Skin/Integumen: No rashes, no cyanosis, no edema  Other:      Medications   All medications were reviewed.  Current Facility-Administered Medications   Medication Dose Route Frequency Provider Last Rate Last Admin    dextrose 10% infusion   Intravenous Continuous PRN Lou Lindquist PA-C         Current Facility-Administered Medications   Medication Dose Route Frequency Provider Last Rate Last Admin    aspirin (ASA) chewable tablet 81 mg  81 mg Per G Tube Daily Lou Lindquist PA-C   81 mg at 12/07/24 0957    heparin ANTICOAGULANT injection  5,000 Units  5,000 Units Subcutaneous Q8H Lou Lindquist PA-C   5,000 Units at 12/07/24 0957    levothyroxine (SYNTHROID/LEVOTHROID) tablet 150 mcg  150 mcg Per G Tube Daily Lou Lindquist PA-C   150 mcg at 12/07/24 0955    melatonin tablet 5 mg  5 mg Oral or NG Tube At Bedtime Lou Lindquist PA-C   5 mg at 12/06/24 2135    olopatadine (PATANOL) 0.1 % ophthalmic solution 1 drop  1 drop Both Eyes BID Lou Lindquist PA-C   1 drop at 12/07/24 0957    pyRIDostigmine (MESTINON) tablet 60 mg  60 mg Oral Q8H [facility] Lou Lindquist PA-C   60 mg at 12/07/24 1413    [Held by provider] simvastatin (ZOCOR) tablet 10 mg  10 mg Per G Tube QPM Hadley De La O MD   10 mg at 12/02/24 2021        Data   Recent Labs   Lab 12/07/24  1126 12/06/24  1101 12/06/24  0611 12/05/24  0721 12/01/24  0313 12/01/24  0223   WBC 7.2 7.5  --  8.2   < >  --    HGB 9.7* 9.6*  --  9.5*   < >  --    * 106*  --  106*   < >  --     164  --  167   < >  --    INR  --   --   --   --   --  1.32*   * 146*  --  146*  146*   < >  --    POTASSIUM 4.0 3.7  --  3.5  3.5   < > 6.0*   CHLORIDE 120* 120*  --  120*  120*   < >  --    CO2 21* 20*  --  20*  20*   < >  --    BUN 31.7* 37.8*  --  42.5*  42.5*   < >  --    CR 0.70 0.80  --  0.99  0.99   < >  --    ANIONGAP 8 6*  --  6*  6*   < >  --    SIMONA 8.9 8.9  --  8.3*  8.3*   < >  --    * 108* 97 110*  110*   < >  --    ALBUMIN 2.5* 2.4*  --  2.2*   < >  --    PROTTOTAL 6.5 6.8  --  6.0*   < >  --    BILITOTAL 0.2 0.2  --  0.2   < >  --    ALKPHOS 74 70  --  71   < >  --    ALT 37 34  --  29   < >  --    AST 44 43  --  37   < >  --     < > = values in this interval not displayed.       No results found for this or any previous visit (from the past 24 hours).    Murtaza Kinney MD  Text Page  (7am to 6pm)

## 2024-12-07 NOTE — PROGRESS NOTES
Neurology Note  [facility]    Patient Name: Mendez Greene  MRN: [id number]  : 1945      Subjective:  He is laying down comfortably in bed.  He feels better.  His strength in his right arm has improved.  He is able to lift it up and yesterday could not do that.  He denies any blurry vision, double vision or shortness of breath.  No confusion.  He is able to identify the president as Arely.      Summary:  79M with history of pAF, HTN, bladder cancer, CHA, CKD. Admitted to [facility] in early November with 3 weeks of progressive weakness, hoarse voice, decreased oral intake, shortness of breath, dyspnea on exertion, and dysphagia. Family notes he'd had some slower decline over the year prior to that since knee surgeries. During [facility] workup (with Dr Ashley of [facility], his notes/workup summarized here) 2024 he had MRI brain and total spine (all without izzy) that showed no CNS pathology to explain his symptoms, did have ?small thoracolumbar syrinx. Exam at that time: cognition intact, intact cranial nerves, mild tongue atrophy without tongue fasciculations, motor weakness/atrophy more prominent in upper > lower extremities and worse distally > proximally, isolated fasciculations in the R tibialis anterior, absent DTRs throughout, equivocal plantar reflexes. Had UTI at time of admission; also elevated troponins, had coronary angio (mild CAD, no interventions) and TTE with normal LVEF. 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 Don't have original report, unclear if fasciculations present or if rep stim done. Labs notable for elevated aldolase (35.7). He was started on riluzole, had PEG placement, and was discharged; plan was for second opinion at [place]. After discharge he had deterioration at  home (couldn't get out of bed, trouble urinating, complex needs at home) and was transitioned to hospice care. Additional labs came back after discharge including negative paraneoplastic panel and positive myasthenia panel with AchR binding Ab 2.11 he then was started on Mestinon.  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, stage pT1, carcinoma in situ, stage Tis, N0, M0 s/p cystoprostatectomy with ileal neobladder formation    Pure hypercholesterolemia      Past Surgical History:  Past Surgical History:   Procedure Laterality Date    ARTHROPLASTY KNEE Left 08/23/2022    Procedure: LEFT TOTAL KNEE ARTHROPLASTY;  Surgeon: Melba Dial MD;  Location:  OR    ARTHROPLASTY REVISION KNEE Left 09/05/2023    Procedure: REVISION TOTAL KNEE ARTHROPLASTY PATELLA;  Surgeon: Melba Dial MD;  Location:  OR    BUNIONECTOMY Left     BUNIONECTOMY JOSE  11/28/2011    RT-Procedure:BUNIONECTOMY JOSE; Right Foot Dwaine Jose Bunionectomy with Metatarsal and Phalanx Osteotomy with 1st Metatarsal Phalangeal Joint Repair Right Foot; Surgeon:BAKARI ZAIDI; Location: [facility]    CATARACT EXTRACTION, BILATERAL      CV CORONARY ANGIOGRAM N/A 11/11/2024    Procedure: Coronary Angiogram;  Surgeon: Krystian Machuca MD;  Location:  HEART CARDIAC CATH LAB    CV HEART CATHETERIZATION WITH POSSIBLE INTERVENTION N/A 11/07/2020    Procedure: Heart Catheterization with Possible Intervention;  Surgeon: Rishi Llanes MD;  Location:  HEART CARDIAC CATH LAB    CV LEFT HEART CATH N/A 11/11/2024    Procedure:  Left Heart Catheterization;  Surgeon: Krystian Machuca MD;  Location:  HEART CARDIAC CATH LAB    CYSTECTOMY BLADDER, ILEAL DIVERSION NEOBLADDER, COMBINED  2006    HERNIORRHAPHY INCISIONAL (LOCATION) N/A 08/01/2016    Procedure: HERNIORRHAPHY INCISIONAL (LOCATION);  Surgeon: Onofre Lua MD;  Location:  [facility]    IR GASTROSTOMY TUBE PERCUTANEOUS PLCMNT  11/13/2024    OPEN REDUCTION INTERNAL FIXATION PATELLA Left 09/05/2023    Procedure: LEFT KNEE OPEN REDUCTION INTERNAL  FIXATION PATELLA FRACTURE WITH;  Surgeon: Melba Dial MD;  Location:  OR    OPEN REDUCTION INTERNAL FIXATION PATELLA Left 10/10/2023    Procedure: LEFT KNEE REVISION OPEN REDUCTION INTERNAL FIXATION PATELLA WITH POLY REMOVAL, PATELLAR;  Surgeon: Melba Dial MD;  Location:  OR    REMOVE HARDWARE KNEE Left 10/10/2023    Procedure: WITH POLY REMOVAL, PATELLAR;  Surgeon: Melba Dial MD;  Location:  OR     Medications:  No current outpatient medications on file.     Allergies:  No Known Allergies  Family History:  Family History   Problem Relation Age of Onset    Osteoporosis Mother     Neurologic Disorder Mother         PD    Cerebrovascular Disease Mother     Hypertension Mother     Hyperlipidemia Mother     Cerebrovascular Disease Father     Diabetes Type 1 Sister     Cancer Maternal Grandmother         unknown type    Diabetes Type 2  No family hx of     Myocardial Infarction No family hx of     Prostate Cancer No family hx of     Colon Cancer No family hx of      Social History:  Social History     Socioeconomic History    Marital status:      Spouse name: Alana    Number of children: 2    Years of education: 19    Highest education level: Not on file   Occupational History    Occupation: Semi-retired    Tobacco Use    Smoking status: Former     Types: Cigarettes     Passive exposure: Never (per pt)    Smokeless tobacco: Never    Tobacco comments:     Quit in 1987; smoked for 20 years; 1.5  ppd at his most.    Vaping Use    Vaping status: Never Used   Substance and Sexual Activity    Alcohol use: Yes     Comment: 2-3 glasses of wine/week    Drug use: No    Sexual activity: Not Currently   Other Topics Concern    Parent/sibling w/ CABG, MI or angioplasty before 65F 55M? No   Social History Narrative    .    2 adult children.    2 grandchildren.    Left knee surgeries limit mobility.     Social Drivers of Health     Financial Resource Strain: Unknown (12/1/2024)    Financial Resource Strain     Within the past 12 months, have you or your family members you live with been unable to get utilities (heat, electricity) when it was really needed?: Patient unable to answer   Food Insecurity: Unknown (12/1/2024)    Food Insecurity     Within the past 12 months, did you worry that your food would run out before you got money to buy more?: Patient unable to answer     Within the past 12 months, did the food you bought just not last and you didn t have money to get more?: Patient unable to answer   Transportation Needs: Unknown (12/1/2024)    Transportation Needs     Within the past 12 months, has lack of transportation kept you from medical appointments, getting your medicines, non-medical meetings or appointments, work, or from getting things that you need?: Patient unable to answer   Physical Activity: Sufficiently Active (7/8/2024)    Exercise Vital Sign     Days of Exercise per Week: 3 days     Minutes of Exercise per Session: 60 min   Stress: No Stress Concern Present (7/8/2024)    [employer] - Occupational Stress Questionnaire     Feeling of Stress : Only a little   Social Connections: Unknown (7/8/2024)    Social Connection and Isolation Panel [NHANES]     Frequency of Communication with Friends and Family: Not on file     Frequency of Social Gatherings with Friends and Family: Twice a week     Attends Taoism Services: Not on file     Active Member of Clubs or  Organizations: Not on file     Attends Club or Organization Meetings: Not on file     Marital Status: Not on file   Interpersonal Safety: Unknown (12/1/2024)    Interpersonal Safety     Do you feel physically and emotionally safe where you currently live?: Patient unable to answer     Within the past 12 months, have you been hit, slapped, kicked or otherwise physically hurt by someone?: Patient unable to answer     Within the past 12 months, have you been humiliated or emotionally abused in other ways by your partner or ex-partner?: Patient unable to answer   Housing Stability: Unknown (12/1/2024)    Housing Stability     Do you have housing? : Patient unable to answer     Are you worried about losing your housing?: Patient unable to answer         Vital Signs:  /62 (BP Location: Left arm)   Pulse (!) 38   Temp 97.2  F (36.2  C) (Oral)   Resp 20   Wt 82.2 kg (181 lb 3.5 oz)   SpO2 98%   BMI 26.00 kg/m        Neurological examination  Mental Status: The patient is alert and oriented. Recent memory is normal. The person is  attentive with normal concentration. Language is fluent. Speech is of normal dylan and  character. The speech is nondysarthric. Fund of knowledge appears normal  Cranial Nerve I: Not tested.  Cranial Nerve II: The pupils are equally round and reactive to light.  Cranial Nerves III, IV, VI: The extraocular movements are full in all directions of gaze without  ophthalmoplegia or nystagmus.  Cranial Nerve V: Light touch is intact and symmetric in the V1, V2, V3 divisions of both  trigeminal nerves.  Cranial Nerve VII: Facial movements are symmetric.  Cranial Nerve XI: Normal shoulder shrug.  Muscle Strength: The strength was 4/5 in right arm and legs.  Left arm was normal.  Reflexes: The reflexes are 2/4 for biceps, patellar tendon reflexes bilaterally and  symmetrically.  Sensory: The sensory examination is normal for light touch bilaterally and symmetrically.  Cerebellar: The  cerebellar examination is normal to finger to nose test.    Review of Diagnostics:      CT Abdomen Pelvis w/o Contrast    Result Date: 12/1/2024  EXAM: CT ABDOMEN PELVIS W/O CONTRAST LOCATION: [facility] DATE: 12/1/2024 INDICATION: Renal failure, shock, possible infection. COMPARISON: CT abdomen and pelvis without/with IV contrast 2/21/2006.    IMPRESSION: 1.  Tiny sand-like calyceal stones have developed in the collecting system of both kidneys. No hydronephrosis or hydroureter on either side. Interval postoperative changes of cystoprostatectomy, ureteroileal diversion and neobladder formation. Cortez catheter within the neobladder. 2.  Scattered colonic diverticulosis, more apparent distally at the rectosigmoid, without acute inflammation or secondary complications. Normal appendix. Gastrostomy has been placed, satisfactory positioning. 3.  Normal cardiac size. Coronary artery and aortic valvular calcifications. No pericardial effusion. Atherosclerotic and mildly ectatic distal abdominal aorta, slightly more apparent. No aneurysm. 4.  Degenerative changes involving the spine and joints of the pelvis. Resolved small fat-containing ventral umbilical hernia.     XR Chest Port 1 View    Result Date: 11/30/2024  EXAM: XR CHEST PORT 1 VIEW LOCATION: [facility] DATE: 11/30/2024 INDICATION: POst intubation COMPARISON: Earlier today.     IMPRESSION: Endotracheal tube in satisfactory position terminating 4 cm above the linda. Slight atelectasis/scarring of the right midlung. Lungs otherwise appear clear. Top normal heart size. Normal pulmonary vascularity. No pleural effusion or pneumothorax.      MR Thoracic Spine w/o & w Contrast    Result Date: 11/12/2024  EXAM: MR THORACIC SPINE W/O and W CONTRAST, MR LUMBAR SPINE W/O and W CONTRAST LOCATION: [facility] DATE/TIME: 11/12/2024 9:30 PM CST INDICATION: Upper and lower motor neuron weakness. n  left. S1-S2: Moderate disc height loss. Mild facet arthropathy. No significant canal or foraminal stenosis.     IMPRESSION: 1.  Mild thoracolumbar spondylitic changes without high-grade canal or foraminal stenosis. 2.  Thin thoracic cord syrinx versus prominence of the central canal from T5 through T11. 3.  No abnormal cord signal.         MR Brain w/o Contrast    Result Date: 11/8/2024  EXAM: MR BRAIN W/O CONTRAST, MR CERVICAL SPINE W/O CONTRAST LOCATION: [facility] DATE: 11/8/2024 INDICATION: New arm weakness/fasciculations, speech difficulty, difficult swallowing COMPARISON: None     IMPRESSION: HEAD MRI: 1.  No acute intracranial abnormality. 2.  Mild chronic aged-related changes. CERVICAL SPINE MRI: 1.  Multilevel spondylosis with variable degrees of foraminal narrowing, worst and severe at C3-C4 on the left, moderate-severe on the right. 2.  No significant spinal canal stenosis or definite abnormal spinal cord signal intensity.  Images reviewed personally.      Vitamin B12:   Lab Results   Component Value Date    B12 727 11/12/2024    B12 531 05/06/2019         Complete Blood Count:    Recent Labs   Lab Test 12/06/24  1101 12/05/24  0721 12/04/24  0012 12/01/24  0313 11/30/24  2058 11/09/24  1139 11/08/24  1525 05/07/24  1153   WBC 7.5 8.2 7.2   < > 13.3*   < > 10.0 5.8   RBC 2.86* 2.78* 2.65*   < > 4.45   < > 4.08* 3.49*   HGB 9.6* 9.5* 9.1*   < > 15.1   < > 13.7 12.0*   HCT 30.3* 29.5* 28.3*   < > 46.5   < > 41.8 37.3*    167 170   < > 406   < > 376 269   LYMPH  --   --   --   --  13  --  10 37    < > = values in this interval not displayed.        HgA1c:   Lab Results   Component Value Date    A1C 5.0 08/10/2022        Thyroid Stimulating Hormone:   Lab Results   Component Value Date    TSH 8.56 12/06/2024    TSH 0.53 08/10/2022    TSH 0.74 08/04/2020          Recent Labs   Lab Test 12/06/24  1101 12/06/24  0611 12/05/24  0721   *  --  146*  146*   POTASSIUM 3.7  --   3.5  3.5   CHLORIDE 120*  --  120*  120*   CO2 20*  --  20*  20*   BUN 37.8*  --  42.5*  42.5*   CR 0.80  --  0.99  0.99   ANIONGAP 6*  --  6*  6*   SIMONA 8.9  --  8.3*  8.3*   * 97 110*  110*     CMP:  Recent Labs   Lab 12/07/24  0020 12/06/24  1101 12/05/24  0721 12/04/24  0533 12/02/24  0546 12/01/24 0313 11/30/24 2058   PHOS 2.7 1.9* 2.1* 2.2*   < > 7.8*  --    PROTTOTAL  --  6.8 6.0*  --   --  5.7* 6.4   ALBUMIN  --  2.4* 2.2*  --   --  3.0* 3.2*   ALKPHOS  --  70 71  --   --  104 115   AST  --  43 37  --   --  26 23   ALT  --  34 29  --   --  44 48   BILITOTAL  --  0.2 0.2  --   --  0.5 0.5    < > = values in this interval not displayed.         Impression:  Mr. Greene is a 79 year old With a past medical history of atrial fibrillation, coronary artery disease, hyperlipidemia, hypertension was admitted on October 30, 2024 for respiratory failure and later found to have myasthenia gravis.  He was started on IVIG and completed this on December 5.  He was extubated on December 3.  Mestinon was started at 60 mg 3 times daily on December 3.    Plan:    -Continue Mestinon 60 mg 3 times a day as needed as this has been helping.  He has been having bradycardia but apparently his baseline resting heart rate is in the high 30s for many years.    -Monitor closely for any respiratory issues as patients with myasthenia gravis can have sudden respiratory decline.  -May need immunosuppressive therapy in the future if Mestinon does not work well but that can be discussed as outpatient.      Nael Fabian MD  Neurology      Thank you very much  for allowing me to participate in the care of this patient.

## 2024-12-07 NOTE — PLAN OF CARE
Goal Outcome Evaluation:      Plan of Care Reviewed With: patient, spouse, child      Reason for Admission: Acute hypoxic respiratory failure and Myasthenia Gravis.    Cognitive/Mentation: A/Ox 4  Neuros/CMS: Intact ex Extremity weakness: RUE 2-3/5, LUE 4/5, weak bilateral hand  strength, BLEs 3/5 with moderate dorsi/plantar flexions.   VS: Bradycardic, otherwise WDL on RA.   Tele: SB.  /GI: Continent. Last BM 12/3.   Pulmonary: LS diminished in bases.  Productive cough.  Pain: Denies at rest.     Drains/Lines: PIV saline locked. Snowden patent with adequate o/p.  Skin: blanchable redness to sacrum, foam dressing in place.  Activity: Assist x 2 with lift.  Diet: NPO with tube feed infusing at 35 ml/hr.  Due for 10 ml rate increase at 0200.     Therapies recs: TBD  Discharge: Pending    Aggression Stoplight Tool: green    End of shift summary: Stable exam.  Repositioned q 2 hrs.  Snowden monitored for leaking at insertion site. Did not require snowden irrigation.

## 2024-12-08 ENCOUNTER — APPOINTMENT (OUTPATIENT)
Dept: OCCUPATIONAL THERAPY | Facility: CLINIC | Age: 79
End: 2024-12-08
Payer: MEDICARE

## 2024-12-08 ENCOUNTER — APPOINTMENT (OUTPATIENT)
Dept: SPEECH THERAPY | Facility: CLINIC | Age: 79
End: 2024-12-08
Payer: MEDICARE

## 2024-12-08 ENCOUNTER — APPOINTMENT (OUTPATIENT)
Dept: PHYSICAL THERAPY | Facility: CLINIC | Age: 79
End: 2024-12-08
Payer: MEDICARE

## 2024-12-08 LAB
ALBUMIN SERPL BCG-MCNC: 2.6 G/DL (ref 3.5–5.2)
ALP SERPL-CCNC: 78 U/L (ref 40–150)
ALT SERPL W P-5'-P-CCNC: 45 U/L (ref 0–70)
ANION GAP SERPL CALCULATED.3IONS-SCNC: 8 MMOL/L (ref 7–15)
AST SERPL W P-5'-P-CCNC: 45 U/L (ref 0–45)
BILIRUB SERPL-MCNC: 0.3 MG/DL
BUN SERPL-MCNC: 34.4 MG/DL (ref 8–23)
CALCIUM SERPL-MCNC: 9.2 MG/DL (ref 8.8–10.4)
CHLORIDE SERPL-SCNC: 118 MMOL/L (ref 98–107)
CREAT SERPL-MCNC: 0.7 MG/DL (ref 0.67–1.17)
EGFRCR SERPLBLD CKD-EPI 2021: >90 ML/MIN/1.73M2
ERYTHROCYTE [DISTWIDTH] IN BLOOD BY AUTOMATED COUNT: 13.9 % (ref 10–15)
GLUCOSE SERPL-MCNC: 122 MG/DL (ref 70–99)
HCO3 SERPL-SCNC: 22 MMOL/L (ref 22–29)
HCT VFR BLD AUTO: 30.6 % (ref 40–53)
HGB BLD-MCNC: 9.9 G/DL (ref 13.3–17.7)
MAGNESIUM SERPL-MCNC: 2 MG/DL (ref 1.7–2.3)
MCH RBC QN AUTO: 34.1 PG (ref 26.5–33)
MCHC RBC AUTO-ENTMCNC: 32.4 G/DL (ref 31.5–36.5)
MCV RBC AUTO: 106 FL (ref 78–100)
PHOSPHATE SERPL-MCNC: 2.1 MG/DL (ref 2.5–4.5)
PLATELET # BLD AUTO: 146 10E3/UL (ref 150–450)
POTASSIUM SERPL-SCNC: 4.3 MMOL/L (ref 3.4–5.3)
PROT SERPL-MCNC: 6.6 G/DL (ref 6.4–8.3)
RBC # BLD AUTO: 2.9 10E6/UL (ref 4.4–5.9)
SODIUM SERPL-SCNC: 148 MMOL/L (ref 135–145)
WBC # BLD AUTO: 8.4 10E3/UL (ref 4–11)

## 2024-12-08 PROCEDURE — 94150 VITAL CAPACITY TEST: CPT

## 2024-12-08 PROCEDURE — 36415 COLL VENOUS BLD VENIPUNCTURE: CPT | Performed by: PHYSICIAN ASSISTANT

## 2024-12-08 PROCEDURE — 250N000013 HC RX MED GY IP 250 OP 250 PS 637: Performed by: PHYSICIAN ASSISTANT

## 2024-12-08 PROCEDURE — 92526 ORAL FUNCTION THERAPY: CPT | Mod: GN | Performed by: SPEECH-LANGUAGE PATHOLOGIST

## 2024-12-08 PROCEDURE — 99232 SBSQ HOSP IP/OBS MODERATE 35: CPT | Performed by: INTERNAL MEDICINE

## 2024-12-08 PROCEDURE — 85018 HEMOGLOBIN: CPT | Performed by: PHYSICIAN ASSISTANT

## 2024-12-08 PROCEDURE — 84100 ASSAY OF PHOSPHORUS: CPT | Performed by: PHYSICIAN ASSISTANT

## 2024-12-08 PROCEDURE — 120N000001 HC R&B MED SURG/OB

## 2024-12-08 PROCEDURE — 250N000011 HC RX IP 250 OP 636: Performed by: PHYSICIAN ASSISTANT

## 2024-12-08 PROCEDURE — 85049 AUTOMATED PLATELET COUNT: CPT | Performed by: PHYSICIAN ASSISTANT

## 2024-12-08 PROCEDURE — 999N000157 HC STATISTIC RCP TIME EA 10 MIN

## 2024-12-08 PROCEDURE — 97530 THERAPEUTIC ACTIVITIES: CPT | Mod: GP

## 2024-12-08 PROCEDURE — 97112 NEUROMUSCULAR REEDUCATION: CPT | Mod: GO

## 2024-12-08 PROCEDURE — B4035 ENTERAL FEED SUPP PUMP PER D: HCPCS

## 2024-12-08 PROCEDURE — 97116 GAIT TRAINING THERAPY: CPT | Mod: GP

## 2024-12-08 PROCEDURE — 97535 SELF CARE MNGMENT TRAINING: CPT | Mod: GO

## 2024-12-08 PROCEDURE — 82040 ASSAY OF SERUM ALBUMIN: CPT | Performed by: PHYSICIAN ASSISTANT

## 2024-12-08 PROCEDURE — 250N000013 HC RX MED GY IP 250 OP 250 PS 637: Performed by: INTERNAL MEDICINE

## 2024-12-08 PROCEDURE — 83735 ASSAY OF MAGNESIUM: CPT | Performed by: PHYSICIAN ASSISTANT

## 2024-12-08 RX ORDER — ACETAMINOPHEN 325 MG/1
325-650 TABLET ORAL EVERY 6 HOURS PRN
DISCHARGE
Start: 2024-12-08

## 2024-12-08 RX ORDER — POLYETHYLENE GLYCOL 3350 17 G/17G
17 POWDER, FOR SOLUTION ORAL 2 TIMES DAILY PRN
Status: DISCONTINUED | OUTPATIENT
Start: 2024-12-08 | End: 2024-12-11 | Stop reason: HOSPADM

## 2024-12-08 RX ORDER — SENNOSIDES 8.6 MG
8.6 TABLET ORAL 2 TIMES DAILY PRN
Status: DISCONTINUED | OUTPATIENT
Start: 2024-12-08 | End: 2024-12-11 | Stop reason: HOSPADM

## 2024-12-08 RX ORDER — GUAIFENESIN 200 MG/10ML
200 LIQUID ORAL EVERY 4 HOURS PRN
DISCHARGE
Start: 2024-12-08 | End: 2025-01-10

## 2024-12-08 RX ORDER — BISACODYL 10 MG
10 SUPPOSITORY, RECTAL RECTAL DAILY PRN
Status: DISCONTINUED | OUTPATIENT
Start: 2024-12-08 | End: 2024-12-11 | Stop reason: HOSPADM

## 2024-12-08 RX ORDER — PYRIDOSTIGMINE BROMIDE 60 MG/1
60 TABLET ORAL EVERY 8 HOURS
Status: ON HOLD | DISCHARGE
Start: 2024-12-08 | End: 2024-12-31

## 2024-12-08 RX ADMIN — HEPARIN SODIUM 5000 UNITS: 5000 INJECTION, SOLUTION INTRAVENOUS; SUBCUTANEOUS at 00:18

## 2024-12-08 RX ADMIN — HEPARIN SODIUM 5000 UNITS: 5000 INJECTION, SOLUTION INTRAVENOUS; SUBCUTANEOUS at 08:12

## 2024-12-08 RX ADMIN — Medication 5 MG: at 21:32

## 2024-12-08 RX ADMIN — POTASSIUM & SODIUM PHOSPHATES POWDER PACK 280-160-250 MG 1 PACKET: 280-160-250 PACK at 21:32

## 2024-12-08 RX ADMIN — ASPIRIN 81 MG CHEWABLE TABLET 81 MG: 81 TABLET CHEWABLE at 08:12

## 2024-12-08 RX ADMIN — OLOPATADINE HYDROCHLORIDE 1 DROP: 1 SOLUTION/ DROPS OPHTHALMIC at 21:58

## 2024-12-08 RX ADMIN — PYRIDOSTIGMINE BROMIDE 60 MG: 60 TABLET ORAL at 21:32

## 2024-12-08 RX ADMIN — LEVOTHYROXINE SODIUM 150 MCG: 150 TABLET ORAL at 08:12

## 2024-12-08 RX ADMIN — PYRIDOSTIGMINE BROMIDE 60 MG: 60 TABLET ORAL at 14:25

## 2024-12-08 RX ADMIN — POTASSIUM & SODIUM PHOSPHATES POWDER PACK 280-160-250 MG 1 PACKET: 280-160-250 PACK at 15:01

## 2024-12-08 RX ADMIN — POTASSIUM & SODIUM PHOSPHATES POWDER PACK 280-160-250 MG 1 PACKET: 280-160-250 PACK at 17:53

## 2024-12-08 RX ADMIN — PYRIDOSTIGMINE BROMIDE 60 MG: 60 TABLET ORAL at 06:57

## 2024-12-08 RX ADMIN — HEPARIN SODIUM 5000 UNITS: 5000 INJECTION, SOLUTION INTRAVENOUS; SUBCUTANEOUS at 16:47

## 2024-12-08 RX ADMIN — GUAIFENESIN 200 MG: 200 SOLUTION ORAL at 14:30

## 2024-12-08 RX ADMIN — OLOPATADINE HYDROCHLORIDE 1 DROP: 1 SOLUTION/ DROPS OPHTHALMIC at 08:12

## 2024-12-08 RX ADMIN — GUAIFENESIN 200 MG: 200 SOLUTION ORAL at 08:12

## 2024-12-08 ASSESSMENT — ACTIVITIES OF DAILY LIVING (ADL)
ADLS_ACUITY_SCORE: 84
ADLS_ACUITY_SCORE: 88
ADLS_ACUITY_SCORE: 84
ADLS_ACUITY_SCORE: 88
ADLS_ACUITY_SCORE: 88
ADLS_ACUITY_SCORE: 84
ADLS_ACUITY_SCORE: 88
ADLS_ACUITY_SCORE: 84
ADLS_ACUITY_SCORE: 88
ADLS_ACUITY_SCORE: 84
ADLS_ACUITY_SCORE: 84
ADLS_ACUITY_SCORE: 88

## 2024-12-08 NOTE — PROGRESS NOTES
[facility]  Hospitalist Progress Note  Murtaza Kinney MD  12/08/2024    Assessment & Plan   Mendez Greene is a 79 year old male with PMH of paroxysmal atrial fibrillation, CAD, ascending aortic aneurysm, HLD, HTN, hypothyroidism, ALS, myasthenia gravis, CHA admitted to ICU service on 11/30/2024 for acute respiratory failure and AMS.     Patient was recently hospitalized at [facility] from 11/8/24-11/15/24 for progressive weakness and fatigue, difficulty swallowing. He was seen by neurology who were concerned for motor neuron disease with high index of suspicion for ALS. He was started on Riluzole and Edaravone and referred to [facility] neuromuscular division for second opinion. Discharged home with palliative care referral. After discharge, his ACh receptor binding Ab came back positive, strongly suggestive of myasthenia gravis.     Presented back to [facility] ED 11/30 with hypotension and acute respiratory failure. Family rescinded patient's DNR/DNI for patient to be full code and patient reportedly nodded in  agreement. Patient was intubated in the ED and admitted to ICU on pressors. Started on broad spectrum antibiotics for possible septic shock due to UTI. Neurology consulted, started patient on IVIG for likely myasthenic crisis and patient was initiated on Mestinon. Extubated with pressors stopped 12/3. Transferred to hospitalist service on 12/4.     Myasthenic crisis  Acute hypoxic respiratory failure, resolved  *Secondary to urosepsis (see below). Questionable ALS diagnosis which is being deferred to his primary neurologist.  - Neurocrit following while in ICU:  - Continue IVIG x 5 doses, last dose 12/5  - Needs CT chest for thymoma screening (to be done once renal function has improved)  - continue Mestinon 60 mg TID by neuro on 12/3  - Caution use of IV magnesium, beta-blocker, statin, macrolides, aminoglycosides, and fluoroquinolone, and as much as possible avoid them due to to  potential exacerbation of myasthenia crisis.   - General neurology following  - Monitor respiratory status closely with NIFs, post-extubation VBGs look good and currently saturating 99% on RA  - Continue CPAP at HS  - Continue PT/OT/SLP  - continues to improve his core muscles and extremities  -  consult for discharge planning for ARU     Urosepsis  Septic shock, resolved  Chronic indwelling Snowden catheter  *WBC 13.3 on admission, afebrile, lactic 2.0. UA with infectious markers. Hypotensive requiring pressors initially, now weaned. Urine culture 12/1 NGTD.  - S/p IV Zosyn (12/1 - 12/3) then narrowed to IV Rocephin (12/3 - present) to complete 5-7 day course  - Blood culture 11/30 NGTD  - has snowden catheter in neobladder, with urine leaking that has now resolved     MOHAMUD, improving  *Creatinine 3.20 on admission now down to 1.26 with IVF. Baseline 0.5.  - Continue free water flushes per G-tube  - Avoid nephrotoxic medications  - Monitor I&O  - Cr back to baseline     Sinus bradycardia  *HR 40s to upper 50s, dips the most when sleeping. Asymptomatic. Mostly unchanged after discontinuing Levophed 12/3. EKG sinus bradycardia with no high grade blocks.  *Chart review shows similar HR 40-50s last hospitalization, and he has a few OP visits with documented sinus bradycardia (7/10/24, 3/4/24).  - Monitor while on Mestinon, defer any dose adjustments to neuro  - Continuous telemetry     Moderate to severe oropharyngeal dysphagia s/p G-tube  - RD following for TF  - VFSS on 12/6  - continue new TF formula     CAD  HLD  Paroxysmal atrial fibrillation  - Continue PTA Aspirin  - PTA Simvastatin currently on hold in the setting of myasthenia crisis  - No rate control medications at baseline     Elevated troponin, improving  Recent NSTEMI (11/8/24)  *Troponin 997 on admission (down from 1,346 last admission), trended down. EKG w/o acute ischemic changes. No complaints of chest pain. Cardiac cath 11/11/24 showed only mild CAD. ED  "provider spoke with cardiology, no concern for ACS.  *Echo 12/1/24 difficult study but grossly normal left and right ventricular function, no severe valvular regurgitation, probably mild aortic regurgitation, no pericardial effusion.  - Plan for outpatient cardiac MRI     Hypernatremia, resolved  Hypochloremia, resolved  *Sodium peaked at 154, chloride 125. Treated with IV D5W, discontinued 12/3 PM.  *Sodium now down to 144, chloride 117  - adjust free water      Hypokalemia  *K 3.3  - RN replacement protocol     CHA  - Continue CPAP at HS     Hypothyroidism  - Continue PTA Synthroid     Ascending aortic aneurysm  Aortic root dilatation  *Noted to be stable on echo 11/9/24  - Outpatient follow up for surveillance        Clinically Significant Risk Factors         # Hypokalemia: Lowest K = 3.2 mmol/L in last 2 days, will replace as needed  # Hypernatremia: Highest Na = 154 mmol/L in last 2 days, will monitor as appropriate  # Hyperchloremia: Highest Cl = 125 mmol/L in last 2 days, will monitor as appropriate           # Hypoalbuminemia: Lowest albumin = 3 g/dL at 12/1/2024  3:13 AM, will monitor as appropriate     # Hypertension: Noted on problem list             # Overweight: Estimated body mass index is 25.94 kg/m  as calculated from the following:    Height as of 11/15/24: 1.778 m (5' 10\").    Weight as of this encounter: 82 kg (180 lb 12.4 oz)., PRESENT ON ADMISSION  # Moderate Malnutrition: based on nutrition assessment, PRESENT ON ADMISSION      # Financial/Environmental Concerns: none       Diet: Adult Formula Drip Feeding: Continuous Novasource Renal; Gastrostomy; Goal Rate: 45; mL/hr; Increase TF rate to 15 mL/hr now and advance every 8 hours by 15 mL to goal. Hold TF 1 hour before and 1 hour after Synthroid administration.; Do not advan...  NPO for Medical/Clinical Reasons Except for: Ice Chips, NPO but receiving Tube Feeding     DVT Prophylaxis: Heparin SQ   Cortez Catheter: PRESENT, indication: ?  (Error. " Value could not be saved.), Other (Comment)  Lines: None     Cardiac Monitoring: ACTIVE order. Indication: ICU  Code Status: Full Code          Disposition Plan     Expected Discharge Date:   -- 12/9 to ARU    Disposition:   -- to ARU       Interval History   -- bladder manual irrigation better  -- patient has a lot of congestion symptoms  -- discussed with RN      -Data reviewed today: I reviewed all new labs and imaging over the last 24 hours. I personally reviewed no images or EKG's today.    Physical Exam    , Blood pressure 130/78, pulse 67, temperature 97.8  F (36.6  C), temperature source Oral, resp. rate 17, weight 82.2 kg (181 lb 3.5 oz), SpO2 96%.  Vitals:    12/03/24 0300 12/04/24 0400 12/06/24 1500   Weight: 79.8 kg (176 lb) 82 kg (180 lb 12.4 oz) 82.2 kg (181 lb 3.5 oz)     Vital Signs with Ranges  Temp:  [97.6  F (36.4  C)-98.3  F (36.8  C)] 97.8  F (36.6  C)  Pulse:  [39-67] 67  Resp:  [17-20] 17  BP: (128-153)/(54-78) 130/78  SpO2:  [96 %-98 %] 96 %  I/O's Last 24 hours  I/O last 3 completed shifts:  In: 930 [NG/GT:430]  Out: 1100 [Urine:1100]    Constitutional: Awake, alert, cooperative, no apparent distress  Respiratory: Clear to auscultation bilaterally, no crackles or wheezing  Cardiovascular: Regular rate and rhythm, normal S1 and S2,   GI: Normal bowel sounds, soft, non-distended, non-tender, G-tube  Skin/Integumen: No rashes, no cyanosis, no edema  Other:      Medications   All medications were reviewed.  Current Facility-Administered Medications   Medication Dose Route Frequency Provider Last Rate Last Admin    dextrose 10% infusion   Intravenous Continuous PRN Lou Lindquist PA-C         Current Facility-Administered Medications   Medication Dose Route Frequency Provider Last Rate Last Admin    aspirin (ASA) chewable tablet 81 mg  81 mg Per G Tube Daily Lou Lindquist PA-C   81 mg at 12/08/24 0812    heparin ANTICOAGULANT injection 5,000 Units  5,000 Units Subcutaneous Q8H  Lou Lindquist PA-C   5,000 Units at 12/08/24 0812    levothyroxine (SYNTHROID/LEVOTHROID) tablet 150 mcg  150 mcg Per G Tube Daily Lou Lindquist PA-C   150 mcg at 12/08/24 0812    melatonin tablet 5 mg  5 mg Oral or NG Tube At Bedtime Lou Lindquist PA-C   5 mg at 12/07/24 2219    olopatadine (PATANOL) 0.1 % ophthalmic solution 1 drop  1 drop Both Eyes BID Lou Lindquist PA-C   1 drop at 12/08/24 0812    potassium & sodium phosphates (NEUTRA-PHOS) Packet 1 packet  1 packet Oral or Feeding Tube Q4H Murtaza Kinney MD   1 packet at 12/08/24 1501    pyRIDostigmine (MESTINON) tablet 60 mg  60 mg Oral Q8H TY Lou Lindquist PA-C   60 mg at 12/08/24 1425    [Held by provider] simvastatin (ZOCOR) tablet 10 mg  10 mg Per G Tube QPM Hadley De La O MD   10 mg at 12/02/24 2021        Data   Recent Labs   Lab 12/08/24  1223 12/07/24  1126 12/06/24  1101   WBC 8.4 7.2 7.5   HGB 9.9* 9.7* 9.6*   * 104* 106*   * 158 164   * 149* 146*   POTASSIUM 4.3 4.0 3.7   CHLORIDE 118* 120* 120*   CO2 22 21* 20*   BUN 34.4* 31.7* 37.8*   CR 0.70 0.70 0.80   ANIONGAP 8 8 6*   SIMONA 9.2 8.9 8.9   * 107* 108*   ALBUMIN 2.6* 2.5* 2.4*   PROTTOTAL 6.6 6.5 6.8   BILITOTAL 0.3 0.2 0.2   ALKPHOS 78 74 70   ALT 45 37 34   AST 45 44 43       No results found for this or any previous visit (from the past 24 hours).    Mrutaza Kinney MD  Text Page  (7am to 6pm)

## 2024-12-08 NOTE — PLAN OF CARE
Reason for Admission: MG, progressive weakness, fatigue, difficulty swallowing     Cognitive/Mentation: A/Ox 4  Neuros/CMS: Intact ex left/right sided weakness--more apparent on the right side, trace generalized edema. Weak  strength and hands are contracted.   VS: Stable.   Tele: Bradycardia.  /GI: Cortez. Irrigated x1. Last BM 12/7/2024.   Pulmonary: LS clear. Coughs.  Pain: denies.      Drains/Lines: R lower PIV WDL except some dry drainage. R upper PIV WDL.   Skin: Scattered bruising.  Activity: Assist x 2 with lift.  Diet: Tube feeding, continuous at goal rate of 55mL. Takes pills by tube feed. Eats ice chips.      Therapies recs: ARU  Discharge: TBD     Aggression Stoplight Tool: Green     End of shift summary: Pt had multiple requests throughout the night to be on the bed rangel.   Cortez irrigated x1.

## 2024-12-08 NOTE — PROGRESS NOTES
Neurology Note  [facility]    Patient Name: Mendez Greene  MRN: [id number]  : 1945      Subjective:  He continues to feel better regarding his myasthenia gravis.  He was able to walk and even use the commode and sit up in a chair.  He feels he has more energy.  He denies any side effects from the Mestinon.  His wife feels his voice is stronger.  He denies any blurry vision, double vision or shortness of breath.    Summary:  79M with history of pAF, HTN, bladder cancer, CHA, CKD. Admitted to [facility] in early November with 3 weeks of progressive weakness, hoarse voice, decreased oral intake, shortness of breath, dyspnea on exertion, and dysphagia. Family notes he'd had some slower decline over the year prior to that since knee surgeries. During [facility] workup (with Dr Ashley of [facility], his notes/workup summarized here) 2024 he had MRI brain and total spine (all without izzy) that showed no CNS pathology to explain his symptoms, did have ?small thoracolumbar syrinx. Exam at that time: cognition intact, intact cranial nerves, mild tongue atrophy without tongue fasciculations, motor weakness/atrophy more prominent in upper > lower extremities and worse distally > proximally, isolated fasciculations in the R tibialis anterior, absent DTRs throughout, equivocal plantar reflexes. Had UTI at time of admission; also elevated troponins, had coronary angio (mild CAD, no interventions) and TTE with normal LVEF. 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 Don't have original report, unclear if fasciculations present or if rep stim done. Labs notable for elevated aldolase (35.7). He was started on riluzole, had PEG placement, and was discharged; plan was for second opinion at [facility]. After discharge he  had deterioration at home (couldn't get out of bed, trouble urinating, complex needs at home) and was transitioned to hospice care. Additional labs came back after discharge including negative paraneoplastic panel and positive myasthenia panel with AchR binding Ab 2.11 he then was started on Mestinon.  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, stage pT1, carcinoma in situ, stage Tis, N0, M0 s/p cystoprostatectomy with ileal neobladder formation    Pure hypercholesterolemia      Past Surgical History:  Past Surgical History:   Procedure Laterality Date    ARTHROPLASTY KNEE Left 08/23/2022    Procedure: LEFT TOTAL KNEE ARTHROPLASTY;  Surgeon: Melba Dial MD;  Location:  OR    ARTHROPLASTY REVISION KNEE Left 09/05/2023    Procedure: REVISION TOTAL KNEE ARTHROPLASTY PATELLA;  Surgeon: Melba Dial MD;  Location:  OR    BUNIONECTOMY Left     BUNIONECTOMY DIANA  11/28/2011    RT-Procedure:BUNIONECTOMY DIANA; Right Foot Dwaine Lyon Bunionectomy with Metatarsal and Phalanx Osteotomy with 1st Metatarsal Phalangeal Joint Repair Right Foot; Surgeon:BAKARI ZAIDI; Location: [facility]    CATARACT EXTRACTION, BILATERAL      CV CORONARY ANGIOGRAM N/A 11/11/2024    Procedure: Coronary Angiogram;  Surgeon: Krystian Machuca MD;  Location:  HEART CARDIAC CATH LAB    CV HEART CATHETERIZATION WITH POSSIBLE INTERVENTION N/A 11/07/2020    Procedure: Heart Catheterization with Possible Intervention;  Surgeon: Rishi Llanes MD;  Location:  HEART CARDIAC CATH LAB    CV LEFT HEART CATH N/A  11/11/2024    Procedure: Left Heart Catheterization;  Surgeon: Krystian Machuca MD;  Location:  HEART CARDIAC CATH LAB    CYSTECTOMY BLADDER, ILEAL DIVERSION NEOBLADDER, COMBINED  2006    HERNIORRHAPHY INCISIONAL (LOCATION) N/A 08/01/2016    Procedure: HERNIORRHAPHY INCISIONAL (LOCATION);  Surgeon: Onofre Lua MD;  Location:  [facility]    IR GASTROSTOMY TUBE PERCUTANEOUS PLCMNT  11/13/2024    OPEN REDUCTION INTERNAL FIXATION PATELLA Left 09/05/2023    Procedure: LEFT KNEE OPEN REDUCTION INTERNAL  FIXATION PATELLA FRACTURE WITH;  Surgeon: Melba Dial MD;  Location:  OR    OPEN REDUCTION INTERNAL FIXATION PATELLA Left 10/10/2023    Procedure: LEFT KNEE REVISION OPEN REDUCTION INTERNAL FIXATION PATELLA WITH POLY REMOVAL, PATELLAR;  Surgeon: Melba Dial MD;  Location:  OR    REMOVE HARDWARE KNEE Left 10/10/2023    Procedure: WITH POLY REMOVAL, PATELLAR;  Surgeon: Melba Dial MD;  Location:  OR     Medications:  No current outpatient medications on file.     Allergies:  No Known Allergies  Family History:  Family History   Problem Relation Age of Onset    Osteoporosis Mother     Neurologic Disorder Mother         PD    Cerebrovascular Disease Mother     Hypertension Mother     Hyperlipidemia Mother     Cerebrovascular Disease Father     Diabetes Type 1 Sister     Cancer Maternal Grandmother         unknown type    Diabetes Type 2  No family hx of     Myocardial Infarction No family hx of     Prostate Cancer No family hx of     Colon Cancer No family hx of      Social History:  Social History     Socioeconomic History    Marital status:      Spouse name: Alana    Number of children: 2    Years of education: 19    Highest education level: Not on file   Occupational History    Occupation: Semi-retired    Tobacco Use    Smoking status: Former     Types: Cigarettes     Passive exposure: Never (per pt)    Smokeless tobacco: Never    Tobacco comments:     Quit in 1987;  smoked for 20 years; 1.5 ppd at his most.    Vaping Use    Vaping status: Never Used   Substance and Sexual Activity    Alcohol use: Yes     Comment: 2-3 glasses of wine/week    Drug use: No    Sexual activity: Not Currently   Other Topics Concern    Parent/sibling w/ CABG, MI or angioplasty before 65F 55M? No   Social History Narrative    .    2 adult children.    2 grandchildren.    Left knee surgeries limit mobility.     Social Drivers of Health     Financial Resource Strain: Unknown (12/1/2024)    Financial Resource Strain     Within the past 12 months, have you or your family members you live with been unable to get utilities (heat, electricity) when it was really needed?: Patient unable to answer   Food Insecurity: Unknown (12/1/2024)    Food Insecurity     Within the past 12 months, did you worry that your food would run out before you got money to buy more?: Patient unable to answer     Within the past 12 months, did the food you bought just not last and you didn t have money to get more?: Patient unable to answer   Transportation Needs: Unknown (12/1/2024)    Transportation Needs     Within the past 12 months, has lack of transportation kept you from medical appointments, getting your medicines, non-medical meetings or appointments, work, or from getting things that you need?: Patient unable to answer   Physical Activity: Sufficiently Active (7/8/2024)    Exercise Vital Sign     Days of Exercise per Week: 3 days     Minutes of Exercise per Session: 60 min   Stress: No Stress Concern Present (7/8/2024)    [employer] [place] [employer] - Occupational Stress Questionnaire     Feeling of Stress : Only a little   Social Connections: Unknown (7/8/2024)    Social Connection and Isolation Panel [NHANES]     Frequency of Communication with Friends and Family: Not on file     Frequency of Social Gatherings with Friends and Family: Twice a week     Attends Caodaism Services: Not on file     Active  Member of Clubs or Organizations: Not on file     Attends Club or Organization Meetings: Not on file     Marital Status: Not on file   Interpersonal Safety: Unknown (12/1/2024)    Interpersonal Safety     Do you feel physically and emotionally safe where you currently live?: Patient unable to answer     Within the past 12 months, have you been hit, slapped, kicked or otherwise physically hurt by someone?: Patient unable to answer     Within the past 12 months, have you been humiliated or emotionally abused in other ways by your partner or ex-partner?: Patient unable to answer   Housing Stability: Unknown (12/1/2024)    Housing Stability     Do you have housing? : Patient unable to answer     Are you worried about losing your housing?: Patient unable to answer         Vital Signs:  BP (!) 153/57 (BP Location: Left arm, Patient Position: Supine, Cuff Size: Adult Regular)   Pulse (!) 41   Temp 97.6  F (36.4  C) (Oral)   Resp 20   Wt 82.2 kg (181 lb 3.5 oz)   SpO2 97%   BMI 26.00 kg/m        Neurological examination  Mental Status: The patient is alert and oriented. Recent memory is normal. The person is  attentive with normal concentration. Language is fluent. Speech is of normal dylan and  character. The speech is nondysarthric. Fund of knowledge appears normal  Cranial Nerve I: Not tested.  Cranial Nerve II: The pupils are equally round and reactive to light.  Cranial Nerves III, IV, VI: The extraocular movements are full in all directions of gaze without  ophthalmoplegia or nystagmus.  Cranial Nerve V: Light touch is intact and symmetric in the V1, V2, V3 divisions of both  trigeminal nerves.  Cranial Nerve VII: Facial movements are symmetric.  Cranial Nerve XI: Normal shoulder shrug.  Muscle Strength: The strength was 4/5 right arm and legs.  Left arm normal power biceps and triceps but 4/5 in deltoids  Sensory: The sensory examination is normal for light touch bilaterally and symmetrically.  Cerebellar:  The cerebellar examination is normal to finger to nose test.    Review of Diagnostics:      CT Abdomen Pelvis w/o Contrast    Result Date: 12/1/2024  EXAM: CT ABDOMEN PELVIS W/O CONTRAST LOCATION: [facility] DATE: 12/1/2024 INDICATION: Renal failure, shock, possible infection. COMPARISON: CT abdomen and pelvis without/with IV contrast 2/21/2006.    IMPRESSION: 1.  Tiny sand-like calyceal stones have developed in the collecting system of both kidneys. No hydronephrosis or hydroureter on either side. Interval postoperative changes of cystoprostatectomy, ureteroileal diversion and neobladder formation. Cortez catheter within the neobladder. 2.  Scattered colonic diverticulosis, more apparent distally at the rectosigmoid, without acute inflammation or secondary complications. Normal appendix. Gastrostomy has been placed, satisfactory positioning. 3.  Normal cardiac size. Coronary artery and aortic valvular calcifications. No pericardial effusion. Atherosclerotic and mildly ectatic distal abdominal aorta, slightly more apparent. No aneurysm. 4.  Degenerative changes involving the spine and joints of the pelvis. Resolved small fat-containing ventral umbilical hernia.     XR Chest Port 1 View    Result Date: 11/30/2024  EXAM: XR CHEST PORT 1 VIEW LOCATION: [facility] DATE: 11/30/2024 INDICATION: POst intubation COMPARISON: Earlier today.     IMPRESSION: Endotracheal tube in satisfactory position terminating 4 cm above the linda. Slight atelectasis/scarring of the right midlung. Lungs otherwise appear clear. Top normal heart size. Normal pulmonary vascularity. No pleural effusion or pneumothorax.      MR Thoracic Spine w/o & w Contrast    Result Date: 11/12/2024  EXAM: MR THORACIC SPINE W/O and W CONTRAST, MR LUMBAR SPINE W/O and W CONTRAST LOCATION: [facility] DATE/TIME: 11/12/2024 9:30 PM CST INDICATION: Upper and lower motor neuron weakness. n  left. S1-S2: Moderate disc height loss. Mild facet arthropathy. No significant canal or foraminal stenosis.     IMPRESSION: 1.  Mild thoracolumbar spondylitic changes without high-grade canal or foraminal stenosis. 2.  Thin thoracic cord syrinx versus prominence of the central canal from T5 through T11. 3.  No abnormal cord signal.         MR Brain w/o Contrast    Result Date: 11/8/2024  EXAM: MR BRAIN W/O CONTRAST, MR CERVICAL SPINE W/O CONTRAST LOCATION: [facility] DATE: 11/8/2024 INDICATION: New arm weakness/fasciculations, speech difficulty, difficult swallowing COMPARISON: None     IMPRESSION: HEAD MRI: 1.  No acute intracranial abnormality. 2.  Mild chronic aged-related changes. CERVICAL SPINE MRI: 1.  Multilevel spondylosis with variable degrees of foraminal narrowing, worst and severe at C3-C4 on the left, moderate-severe on the right. 2.  No significant spinal canal stenosis or definite abnormal spinal cord signal intensity.  Images reviewed personally.      Vitamin B12:   Lab Results   Component Value Date    B12 727 11/12/2024    B12 531 05/06/2019         Complete Blood Count:    Recent Labs   Lab Test 12/06/24  1101 12/05/24  0721 12/04/24  0012 12/01/24  0313 11/30/24  2058 11/09/24  1139 11/08/24  1525 05/07/24  1153   WBC 7.5 8.2 7.2   < > 13.3*   < > 10.0 5.8   RBC 2.86* 2.78* 2.65*   < > 4.45   < > 4.08* 3.49*   HGB 9.6* 9.5* 9.1*   < > 15.1   < > 13.7 12.0*   HCT 30.3* 29.5* 28.3*   < > 46.5   < > 41.8 37.3*    167 170   < > 406   < > 376 269   LYMPH  --   --   --   --  13  --  10 37    < > = values in this interval not displayed.        HgA1c:   Lab Results   Component Value Date    A1C 5.0 08/10/2022        Thyroid Stimulating Hormone:   Lab Results   Component Value Date    TSH 8.56 12/06/2024    TSH 0.53 08/10/2022    TSH 0.74 08/04/2020          Recent Labs   Lab Test 12/07/24  1126 12/06/24  1101   * 146*   POTASSIUM 4.0 3.7   CHLORIDE 120* 120*   CO2  21* 20*   BUN 31.7* 37.8*   CR 0.70 0.80   ANIONGAP 8 6*   SIMONA 8.9 8.9   * 108*     CMP:  Recent Labs   Lab 12/07/24  1126 12/07/24  0020 12/06/24  1101 12/05/24  0721   PHOS 2.5 2.7 1.9* 2.1*   PROTTOTAL 6.5  --  6.8 6.0*   ALBUMIN 2.5*  --  2.4* 2.2*   ALKPHOS 74  --  70 71   AST 44  --  43 37   ALT 37  --  34 29   BILITOTAL 0.2  --  0.2 0.2         Impression:  Mr. Greene is a 79 year old With a past medical history of atrial fibrillation, coronary artery disease, hyperlipidemia, hypertension was admitted on October 30, 2024 for respiratory failure and later found to have myasthenia gravis.  He was started on IVIG and completed this on December 5.  He was extubated on December 3.  Mestinon was started at 60 mg 3 times daily on December 3.    Plan:    -Continue Mestinon 60 mg 3 times a day as needed as this has been helping.  He has been having bradycardia but apparently his baseline resting heart rate is in the high 30s for many years.    -Monitor closely for any respiratory issues as patients with myasthenia gravis can have sudden respiratory decline.  -May need immunosuppressive therapy in the future if Mestinon does not work well but that can be discussed as outpatient.  -Can follow-up with me or Dr. Ashley as outpatient.  I have provided my clinic number.  No further recommendations at this time.  Please contact us if any concerns arise.  Neurology will sign off.      Nael Fabian MD  Neurology      Thank you very much  for allowing me to participate in the care of this patient.

## 2024-12-08 NOTE — PROGRESS NOTES
Reason for Admission: MG, progressive weakness, fatigue, difficulty swallowing    Cognitive/Mentation: A/Ox 4  Neuros/CMS: Intact ex left/right sided weakness--more apparent on the right side, trace generalized edema. Weak  strength and hands are contracted.   VS: Stable.   Tele: Bradycardia.  /GI: Cortez. Last BM 12/7/2024.   Pulmonary: LS clear. Coughs.  Pain: denies.     Drains/Lines: R lower PIV WDL except some dry drainage. R upper PIV WDL. Both flushed, saline locked, caps changed.   Skin: Scattered bruising.  Activity: Assist x 2 with lift.  Diet: Tube feeding, continuous. Takes pills by tube feed. Eats ice chips.     Therapies recs: ARU  Discharge: TBD    Aggression Stoplight Tool: Green    End of shift summary: Pt spit up some bloody sputum today, one occurrence. He is on continuous tube feeding, increased today from 45 to 55. He sat up in the chair for 2 hours, with lift, per PT could use ellis steady for next.

## 2024-12-09 ENCOUNTER — APPOINTMENT (OUTPATIENT)
Dept: PHYSICAL THERAPY | Facility: CLINIC | Age: 79
DRG: 698 | End: 2024-12-09
Payer: MEDICARE

## 2024-12-09 ENCOUNTER — APPOINTMENT (OUTPATIENT)
Dept: GENERAL RADIOLOGY | Facility: CLINIC | Age: 79
End: 2024-12-09
Attending: SURGERY
Payer: MEDICARE

## 2024-12-09 ENCOUNTER — APPOINTMENT (OUTPATIENT)
Dept: SPEECH THERAPY | Facility: CLINIC | Age: 79
DRG: 698 | End: 2024-12-09
Payer: MEDICARE

## 2024-12-09 LAB
ALBUMIN SERPL BCG-MCNC: 2.9 G/DL (ref 3.5–5.2)
ALP SERPL-CCNC: 91 U/L (ref 40–150)
ALT SERPL W P-5'-P-CCNC: 55 U/L (ref 0–70)
ANION GAP SERPL CALCULATED.3IONS-SCNC: 7 MMOL/L (ref 7–15)
AST SERPL W P-5'-P-CCNC: 51 U/L (ref 0–45)
BILIRUB SERPL-MCNC: 0.3 MG/DL
BUN SERPL-MCNC: 37.3 MG/DL (ref 8–23)
CALCIUM SERPL-MCNC: 9.3 MG/DL (ref 8.8–10.4)
CHLORIDE SERPL-SCNC: 118 MMOL/L (ref 98–107)
CREAT SERPL-MCNC: 0.61 MG/DL (ref 0.67–1.17)
EGFRCR SERPLBLD CKD-EPI 2021: >90 ML/MIN/1.73M2
ERYTHROCYTE [DISTWIDTH] IN BLOOD BY AUTOMATED COUNT: 14.3 % (ref 10–15)
GLUCOSE SERPL-MCNC: 109 MG/DL (ref 70–99)
HCO3 SERPL-SCNC: 24 MMOL/L (ref 22–29)
HCT VFR BLD AUTO: 32 % (ref 40–53)
HGB BLD-MCNC: 10.2 G/DL (ref 13.3–17.7)
MAGNESIUM SERPL-MCNC: 2.1 MG/DL (ref 1.7–2.3)
MCH RBC QN AUTO: 33.9 PG (ref 26.5–33)
MCHC RBC AUTO-ENTMCNC: 31.9 G/DL (ref 31.5–36.5)
MCV RBC AUTO: 106 FL (ref 78–100)
PHOSPHATE SERPL-MCNC: 3 MG/DL (ref 2.5–4.5)
PLATELET # BLD AUTO: 161 10E3/UL (ref 150–450)
POTASSIUM SERPL-SCNC: 4.5 MMOL/L (ref 3.4–5.3)
PROT SERPL-MCNC: 6.9 G/DL (ref 6.4–8.3)
RBC # BLD AUTO: 3.01 10E6/UL (ref 4.4–5.9)
SODIUM SERPL-SCNC: 149 MMOL/L (ref 135–145)
WBC # BLD AUTO: 8.1 10E3/UL (ref 4–11)

## 2024-12-09 PROCEDURE — 120N000001 HC R&B MED SURG/OB

## 2024-12-09 PROCEDURE — 83735 ASSAY OF MAGNESIUM: CPT | Performed by: PHYSICIAN ASSISTANT

## 2024-12-09 PROCEDURE — 99233 SBSQ HOSP IP/OBS HIGH 50: CPT | Performed by: HOSPITALIST

## 2024-12-09 PROCEDURE — 92611 MOTION FLUOROSCOPY/SWALLOW: CPT | Mod: GN | Performed by: SPEECH-LANGUAGE PATHOLOGIST

## 2024-12-09 PROCEDURE — 97116 GAIT TRAINING THERAPY: CPT | Mod: GP

## 2024-12-09 PROCEDURE — 80053 COMPREHEN METABOLIC PANEL: CPT | Performed by: PHYSICIAN ASSISTANT

## 2024-12-09 PROCEDURE — 97530 THERAPEUTIC ACTIVITIES: CPT | Mod: GP

## 2024-12-09 PROCEDURE — 85018 HEMOGLOBIN: CPT | Performed by: PHYSICIAN ASSISTANT

## 2024-12-09 PROCEDURE — 85041 AUTOMATED RBC COUNT: CPT | Performed by: PHYSICIAN ASSISTANT

## 2024-12-09 PROCEDURE — 36415 COLL VENOUS BLD VENIPUNCTURE: CPT | Performed by: PHYSICIAN ASSISTANT

## 2024-12-09 PROCEDURE — 250N000013 HC RX MED GY IP 250 OP 250 PS 637: Performed by: PHYSICIAN ASSISTANT

## 2024-12-09 PROCEDURE — 74230 X-RAY XM SWLNG FUNCJ C+: CPT

## 2024-12-09 PROCEDURE — 84100 ASSAY OF PHOSPHORUS: CPT | Performed by: PHYSICIAN ASSISTANT

## 2024-12-09 PROCEDURE — 250N000011 HC RX IP 250 OP 636: Performed by: PHYSICIAN ASSISTANT

## 2024-12-09 PROCEDURE — 94150 VITAL CAPACITY TEST: CPT

## 2024-12-09 PROCEDURE — B4035 ENTERAL FEED SUPP PUMP PER D: HCPCS

## 2024-12-09 PROCEDURE — 999N000157 HC STATISTIC RCP TIME EA 10 MIN

## 2024-12-09 PROCEDURE — 92526 ORAL FUNCTION THERAPY: CPT | Mod: GN | Performed by: SPEECH-LANGUAGE PATHOLOGIST

## 2024-12-09 RX ORDER — BARIUM SULFATE 400 MG/ML
SUSPENSION ORAL ONCE
Status: COMPLETED | OUTPATIENT
Start: 2024-12-09 | End: 2024-12-09

## 2024-12-09 RX ADMIN — OLOPATADINE HYDROCHLORIDE 1 DROP: 1 SOLUTION/ DROPS OPHTHALMIC at 21:01

## 2024-12-09 RX ADMIN — BARIUM SULFATE 10 ML: 400 SUSPENSION ORAL at 10:33

## 2024-12-09 RX ADMIN — LEVOTHYROXINE SODIUM 150 MCG: 150 TABLET ORAL at 09:28

## 2024-12-09 RX ADMIN — PYRIDOSTIGMINE BROMIDE 60 MG: 60 TABLET ORAL at 05:55

## 2024-12-09 RX ADMIN — HEPARIN SODIUM 5000 UNITS: 5000 INJECTION, SOLUTION INTRAVENOUS; SUBCUTANEOUS at 09:28

## 2024-12-09 RX ADMIN — Medication 5 MG: at 21:02

## 2024-12-09 RX ADMIN — OLOPATADINE HYDROCHLORIDE 1 DROP: 1 SOLUTION/ DROPS OPHTHALMIC at 09:54

## 2024-12-09 RX ADMIN — PYRIDOSTIGMINE BROMIDE 60 MG: 60 TABLET ORAL at 14:07

## 2024-12-09 RX ADMIN — ASPIRIN 81 MG CHEWABLE TABLET 81 MG: 81 TABLET CHEWABLE at 09:28

## 2024-12-09 RX ADMIN — HEPARIN SODIUM 5000 UNITS: 5000 INJECTION, SOLUTION INTRAVENOUS; SUBCUTANEOUS at 01:04

## 2024-12-09 RX ADMIN — HEPARIN SODIUM 5000 UNITS: 5000 INJECTION, SOLUTION INTRAVENOUS; SUBCUTANEOUS at 18:34

## 2024-12-09 RX ADMIN — PYRIDOSTIGMINE BROMIDE 60 MG: 60 TABLET ORAL at 21:02

## 2024-12-09 ASSESSMENT — ACTIVITIES OF DAILY LIVING (ADL)
ADLS_ACUITY_SCORE: 88
ADLS_ACUITY_SCORE: 84
ADLS_ACUITY_SCORE: 88
ADLS_ACUITY_SCORE: 84
ADLS_ACUITY_SCORE: 88
ADLS_ACUITY_SCORE: 88
ADLS_ACUITY_SCORE: 84
ADLS_ACUITY_SCORE: 88
ADLS_ACUITY_SCORE: 84

## 2024-12-09 NOTE — INTERIM SUMMARY
[facility] Pre-Admission Screen    Referral Source:  [facility] 1728-01  Admit date to referring facility: 11/30/2024    Physical Medicine and Rehab Consult Completed: No    Rehab Diagnosis:    Neurologic Conditions 03.8 Neuromuscular Disorders; new diagnosis of myasthenia gravis with myasthenia crisis     Justification for Acute Inpatient Rehabilitation  Mendez Greene is a 79 year old male with PMH of paroxysmal atrial fibrillation, CAD, ascending aortic aneurysm, HLD, HTN, hypothyroidism, CHA admitted to ICU service on 11/30/2024 for acute respiratory failure and AMS. Patient was recently hospitalized at [facility] from 11/8/24-11/15/24 for progressive weakness and fatigue, difficulty swallowing. He was seen by neurology who were concerned for motor neuron disease with high index of suspicion for ALS. EMG consistent with MND. He was started on Riluzole and Edaravone and referred to [facility] neuromuscular division for second opinion. Discharged home with palliative care referral. After discharge, his ACh receptor binding Ab came back positive, strongly suggestive of myasthenia gravis. Presented back to [facility] ED 11/30 with hypotension and acute respiratory failure. Family rescinded patient's DNR/DNI for patient to be full code and patient reportedly nodded in  agreement. Patient was intubated in the ED and admitted to ICU on pressors. Started on broad spectrum antibiotics for possible septic shock due to UTI. Neurology consulted, started patient on IVIGx 5 doses for myasthenic crisis and patient was initiated on Mestinon. Extubated with pressors stopped 12/3. Transferred to hospitalist service on 12/4. Hospital course has been notable for urosepsis with chronic Cortez catheter (completed abx course), MOHAMUD, electrolyte derangements, bradycardia, hypertension. He is now stable for transfer to inpatient rehab with continued medical management of these  conditions.    Pt will benefit from medical management at least 3 times per week, 24 hour rehab nursing cares and intensive rehab, not available at a lower level of care. At baseline, pt is a retired  who is independent with all ADL, IADL and mobility, living in a house with his wife. He has 3 NEELAM but otherwise his needs are met on one level. Currently, he is very far below baseline, needing up to 2 assist for ADL and mobility. Pt needs assist of 2 for pivot transfers. He is able to ambulate only 10 ft with Min A of 1 and walker but with close chair follow of 2nd person. Patient requires an intensive inpatient rehab program to address the following acute impairments: dysphagia, impaired activity tolerance, impaired balance, impaired strength, and impaired weight shifting. He is at significantly high risk for aspiration and falls, readmission to the hospital, without acute rehab level of care.     Current Active Medical Management Needs/Risks for Clinical Complications  The patient requires the high level of rehabilitation physician supervision that accompanies the provision of intensive rehabilitation therapy.  The patient needs the services of the rehabilitation physician at least 3 times per week to assess the patient medically and functionally and to modify the course of treatment as needed to maximize the patient's capacity to benefit from the rehabilitation process.    Neuromuscular:  Pt is at risk for neuromuscular decline, falls with injury in setting of myasthenia crisis. Continue neuro checks, assess for neuromuscular recovery. PMR to assess strength, ROM, pain and positioning needs. Pt is at risk for impaired skin integrity due to immobility. Pt is gaining strength and use of core muscles and extremities. Caution use of IV magnesium, beta-blocker, statin, macrolides, aminoglycosides, and fluoroquinolone, and as much as possible avoid them due to to potential exacerbation of myasthenia crisis.  Continue Mestinon 60 mg 3 times a day as needed as this has been helping. He has been having bradycardia but apparently his baseline resting heart rate is in the high 30s for many years. May need immunosuppressive therapy in the future if Mestinon does not work well but that can be discussed as outpatient. Follow-up with Dr. Fabian or Dr. Ashley as outpatient.   Respiratory: Monitor closely for any respiratory issues as patients with myasthenia gravis can have sudden respiratory decline. Currently stable on RA. Continue CPAP at HS.   MOHAMUD: Pt is at risk for recurrent MOHAMUD. Continue free water flushes per G-tube. Avoid nephrotoxic medications, monitor I&O and electrolytes (especially sodium, potassium and chloride).  Cardiac/BP: Pt is at risk for hypertension and cardiac decompensation. BP has been as high as 170's/69 in the last 24 hours; assess for need for anti-hypertensives. Pt has a long history of bradycardia, asymptomatic. EKG sinus bradycardia with no high grade blocks. Monitor HR while on Mestinon, defer any dose adjustments to neuro. Continue PTA Aspirin. PTA Simvastatin currently on hold in the setting of myasthenia crisis-- will continue to hold on discharge.  Recent NSTEMI 11/8/24; no chest pain and only mild CAD noted. Plan for outpatient cardiac MRI.   Dysphagia/nutrition: Pt is at high risk for aspiration pna and respiratory decline. PEG tube placed 11/13. VFSS completed 12/9 revealing aspiration with all liquids but not with puree. Significant residue with puree so pt was advised to continue NPO with ice chips for comfort. Cough noted to be getting stronger.  Pt is at risk for malnutrition and dehydration and nutrition is following as well.   : Pt is at risk for UTI and retention. Cortez catheter in neobladder; Pt has required flushes. Trial of void completed 12/10; assess for continued retention.    Hypernatremia: Pt requires continued management of sodium levels and required fluid on 12/10 for Na of  152.   Anemia: Pt requires continued assessment and management of anemia, with Hgb drop to 7.8 on 12/11; likely dilutional from receiving fluids. Pt is at risk for dizziness, falls.    Past Medical/Surgical History   Surgery in the past 100 days: Yes, PEG tube placed   Additional relevant past medical history: CHA, hypothyroid, aortic root dilation stable on ECHO,     Level of Functioning Prior to Admission:    LIVING ENVIRONMENT   People in Home: spouse   Current Living Arrangements: house   Home Accessibility: stairs to enter home    Number of Stairs, Main Entrance: 3    Stair Railings, Main Entrance: railings safe and in good condition     Transportation Anticipated: family or friend will provide   Living Environment Comments: Pt lives with his spouse, 3 NEELAM, all needs met on main level.    SELF-CARE   Usual Activity Tolerance: good   Regular Exercise: Yes    Activity/Exercise Type: walking    Exercise Amount/Frequency: daily   Equipment Currently Used at Home: shower chair, grab bar, tub/shower   Activity/Exercise/Self-Care Comment: Pt reports IND at baseline    Additional Comments: Pt's wife was assisting him at home after last/recent hospital stay.    Level of Function: GG Scale (Section GG Functional Ability and Goals; CMS's CHRISTENSEN Version 3.0 Manual effective 10.1.2019):  PT Current Function Goals for Rehab   Bed Rolling 3 Partial/moderate assistance 6 Independent   Supine to Sit 3 Partial/moderate assistance 6 Independent   Sit to Stand 1 Dependent (Mod A of 2) 6 Independent   Transfer 1 Dependent (Mod A of 2) 4 Supervision or touching assistance   Ambulation 1 Dependent (Min A +WW and chair follow) 4 Supervision or touching assistance   Stairs 88 Not attempted due to safety 3 Partial/moderate assistance     OT Current Function Goals for Rehab   Feeding 2 Substantial/maximal assistance 5 Setup or clean-up assistance   Grooming 2 Substantial/maximal assistance 5 Setup or clean-up assistance   Bathing Not  completed 3 Partial/moderate assistance   Upper Body Dressing 2 Substantial/maximal assistance 6 Independent   Lower Body Dressing 1 Dependent  4 Supervision or touching assistance   Toileting 1 Dependent 4 Supervision or touching assistance   Toilet Transfer 1 Dependent (A of 2 and Pretty Charlton) 4 Supervision or touching assistance   Tub/Shower Transfer 88 Not attempted due to safety 3 Partial/moderate assistance   Cognition Not Assessed Independent     SLP Current Function Goals for Rehab   Swallow Impaired Tolerate least restrictive diet without signs & symptoms of aspiration and adhere to safe swallow strategies   Communication Not Impaired Not applicable       Current Diet:  NPO and Cycled tube feeds; ice chips for comfort with supervision/assist    Summary Statement:  Pt is an 79 year old retired  who is typically independent at home with his wife at baseline. Currently, he is very far below his functional baseline and needs medical management and rehabilitation due to new diagnosis of myasthenia gravis with myasthenia crisis. Pt needs management of his BP, neuromuscular status, voiding trials, cardiac status and dysphagia. Per SLP as evidenced by VFSS, Patient continues to aspirate all liquids given and significant residue with puree but no aspiration. High risk for aspiration continue NPO with TF and ice chips for comfort and to practice swallows. He has progressed from being fully lift dependent to now able to transfer sit to stand with Min-Mod A of 2 and is able to walk 10 ft with assistance and close chair follow. He still has significant needs for all ADL, including grooming and self feeding due to impaired use of hands and arms. Anticipate that pt will continue to progress well with intensive rehab and be able to discharge home with assistance from family and home care.     Expected Therapies and Services Required During Inpatient Rehab Admission  Intensity of Therapy: Patient requires intensive  therapies not available in a lesser level of care. Patient is motivated, making gains, and can tolerate 3 hours of therapy a day.  Physical Therapy: 75 minutes per day, 6 days a week for 21 days  Occupational Therapy: 75 minutes per day, 6 days a week for 21 days  Speech and Language Therapy: 30 minutes per day, 6 days a week for 21 days  Rehabilitation Nursing Needs: Patient requires 24 hour Rehab Nursing to manage bladder program, vitals, medication education, positioning, carryover of new rehab techniques, care coordination, skin integrity, assess neurologic status, provide safe environment for patient at falls risk, and monitor nutritional intake.    Precautions/restrictions/special needs:   Precautions: fall precautions; aspiration precautions   Restrictions: NA   Special Needs: lift and tube feedings    Expected Level of Improvement: SBA with ADL and mobility with use of assistive devices in the home environment, except for Min A with bathing and on stairs to enter the home due to fall risk.   Expected Length of time to achieve: 21 days    Anticipated Discharge Needs:  Anticipated Discharge Destination: Home  Anticipated Discharge Support: Family member  24/7 support available : Yes  Identified caregiver(s):  Spouse who is retired and 2 adult sons who live in the area  Anticipated Discharge Needs: Home with homecare and Tube Feeds    Identified challenges/barriers:  3 NEELAM    Liaison signature/date/time: Dali Blas CM 12/11/24    Physician statement of review and agreement:  I have reviewed and am in agreement of the need for IRF stay to address above functional and medical needs. In addition to above statements address, Patient requires intensive active and ongoing therapeutic intervention and multiple therapies; Patient requires medical supervision; Expected to actively participate in the intensive rehab program; Sufficiently stable to actively participate; Expectation for measurable improvement  in functional capacity or adaption to impairments.    MD signature/date/time:

## 2024-12-09 NOTE — PROGRESS NOTES
"CLINICAL NUTRITION SERVICES - REASSESSMENT NOTE      Recommendations:     Recommend continue with current EN regimen       Malnutrition: (12/1)  % Weight Loss:  > 5% in 1 month (severe malnutrition)  % Intake:  NGA  Subcutaneous Fat Loss:  Orbital region mild depletion  Muscle Loss:  Temporal region mild-moderate depletion, Clavicle region mild depletion  Fluid Retention:  None noted     Malnutrition Diagnosis: Moderate malnutrition  In Context of:  Acute illness or injury       EVALUATION OF PROGRESS TOWARD GOALS   Diet:    NPO    Nutrition Support:    Type of Feeding Tube: G-tube  Enteral Frequency:  Continuous  Enteral Regimen: Jevity 1.5 @ 55 mL/hr x 22 hrs (hold for 1 hr before and 1 hr after Synthroid dose)  Total Enteral Provisions: 1815 cals (24 cals/kg, 96% needs), 77 gm pro (1 gm/kg), 25 gm fiber, 920 mL free water  Free Water Flush: (12/8 - MD order) 100 mL every 4 hrs (600 mL)  TOTAL (TF + FWF) = 1520 mL    Intake/Tolerance:    Chart reviewed  Pt tolerating TF at goal rate    12/8: BM x3    Visited with pt this morning  Tells me that he is eating ice chips - \"they are evaluating my swallow today\"  Confirms + BMs - \"not as bad as when rate was 110 mL/hr.  But I did soil the bed last night because they were too slow to respond\"      ASSESSED NUTRITION NEEDS:  Dosing Weight 75.4 kg (11/30)  Estimated Energy Needs: 1774-8112 kcals (25-30 Kcal/Kg)  Justification: maintenance  Estimated Protein Needs: 75-90 grams protein (1-1.2 g pro/Kg)  Justification: CKD, maintenance  Estimated Fluid Needs: 9824-0328 mL (1 mL/Kcal)  Justification: maintenance or per MD      NEW FINDINGS:     Records reflect wt is up 15# in 1 week (??)    12/06/24 1500 82.2 kg (181 lb 3.5 oz) --   12/04/24 0400 82 kg (180 lb 12.4 oz) Bed scale   12/03/24 0300 79.8 kg (176 lb) Bed scale   12/02/24 0600 78.1 kg (172 lb 3.2 oz) Bed scale   11/30/24 2038 75.4 kg (166 lb 3.6 oz) --         Previous Goals (12/5):   EN to meet % estimated " needs   Evaluation: Met    Previous Nutrition Diagnosis (12/5):   No nutrition diagnosis identified at this time   Evaluation: No change        CURRENT NUTRITION DIAGNOSIS  No nutrition diagnosis identified at this time     INTERVENTIONS  Recommendations / Nutrition Prescription  NPO    Recommend continue with current EN regimen      Goals  EN to meet % estimated needs      MONITORING AND EVALUATION:  Progress towards goals will be monitored and evaluated per protocol and Practice Guidelines

## 2024-12-09 NOTE — PROGRESS NOTES
NIF -40 cmH2O    VC 1520 mL    Both NIF/VC achieved with good effort while in bed.     RT will continue to follow.     Mirella Zepeda, RT  12/9/2024

## 2024-12-09 NOTE — PROGRESS NOTES
12/09/24 1106   Appointment Info   Signing Clinician's Name / Credentials (SLP) Joanna Pretty MS CCC SLP   General Information   Onset of Illness/Injury or Date of Surgery 11/30/24   Referring Physician Lou Lindquist PA-C   Patient/Family Therapy Goal Statement (SLP) To have a root beer   Pertinent History of Current Problem Per MD notes; Mendez Greene is a 79 year old male with PMH of paroxysmal atrial fibrillation, CAD, ascending aortic aneurysm, HLD, HTN, hypothyroidism, ALS, myasthenia gravis, CHA admitted to ICU service on 11/30/2024 for acute respiratory failure and AMS.     Patient was recently hospitalized at [facility] from 11/8/24-11/15/24 for progressive weakness and fatigue, difficulty swallowing. He was seen by neurology who were concerned for motor neuron disease with high index of suspicion for ALS. He was started on Riluzole and Edaravone and referred to [facility] neuromuscular division for second opinion. Discharged home with palliative care referral. After discharge, his ACh receptor binding Ab came back positive, strongly suggestive of myasthenia gravis.     Presented back to [facility] ED 11/30 with hypotension and acute respiratory failure. Family rescinded patient's DNR/DNI for patient to be full code and patient reportedly nodded in  agreement. Patient was intubated in the ED and admitted to ICU on pressors. Started on broad spectrum antibiotics for possible septic shock due to UTI. Neurology consulted, started patient on IVIG for likely myasthenic crisis and patient was initiated on Mestinon. Extubated with pressors stopped 12/3. Transferred to hospitalist service on 12/4.   General Observations Pleasant and cooperative   Type of Evaluation   Type of Evaluation Swallow Evaluation   General Swallowing Observations   Swallowing Evaluation Videofluoroscopic swallow study (VFSS)   VFSS Evaluation   Radiologist Dr. Love   Views Taken left lateral   Physical Location of Procedure [facility]   VFSS Textures  Trialed slightly thick liquids;mildly thick liquids;moderately thick liquids/liquidized;pureed   VFSS Eval: Thin Liquid Texture Trial   Mode of Presentation, Thin Liquid spoon;cup;fed by clinician   Order of Presentation 1 2   Preparatory Phase poor bolus control   Oral Phase, Thin Liquid impaired AP movement;premature pharyngeal entry   Bolus Location When Swallow Triggered pyriforms   Pharyngeal Phase, Thin Liquid impaired hyolaryngeal excursion;impaired epiglottic movement;impaired tongue base retraction;residue in vallecula;residue in pyriform sinus;impaired pharyngoesophageal segment opening   Rosenbek's Penetration Aspiration Scale: Thin Liquid Trial Results 7 - contrast passes glottis, visible subglottic residue remains despite patient's response (aspiration)   Response to Aspiration unproductive reflexive cough   Diagnostic Statement Penetration to the cords by spoon and aspiration via the cup.   VFSS Eval: Slightly Thick Liquids   Mode of Presentation spoon;fed by clinician   Order of Presentation 3   Preparatory Phase poor bolus control   Oral Phase premature pharyngeal entry   Bolus Location When Swallow Triggered pyriforms   Pharyngeal Phase impaired hyolaryngel excursion;impaired epiglottic movement;impaired tongue base retraction;residue in vallecula;residue in pyriform sinus;impaired pharyngoesophageal segment opening   Rosenbek's Penetration Aspiration Scale 7 - contrast passes glottis, visable residue remains despite patient's response   Response to Aspiration unproductive reflexive cough   Diagnostic Statement Fabricio aspiration before the swallow due to loss of bolus and delayed swallow.   VFSS Eval: Mildly Thick Liquids   Mode of Presentation spoon;fed by clinician   Order of Presentation 4   Preparatory Phase poor bolus control   Oral Phase residue in oral cavity;impaired AP movement;premature pharyngeal entry   Bolus Location When Swallow Triggered pyriforms   Pharyngeal Phase impaired  hyolaryngel excursion;impaired epiglottic movement;impaired tongue base retraction;residue in vallecula;residue in pyriform sinus;impaired pharyngoesophageal segment opening   Rosenbek's Penetration Aspiration Scale 7 - contrast passes glottis, visible subglottic residue remains despite patient's response (aspiration)   Diagnostic Statement Deep penetration with eventual aspiration.   VFSS Evaluation: Puree Solid Texture Trial   Mode of Presentation, Puree spoon;fed by clinician   Order of Presentation 5   Preparatory Phase poor bolus control   Oral Phase, Puree impaired AP movement;premature pharyngeal entry;residue in oral cavity   Bolus Location When Swallow Triggered pyriforms   Pharyngeal Phase, Puree impaired hyolaryngel excursion;impaired epiglottic movement;impaired tongue base retraction;pharyngeal wall coating;residue in vallecula;residue in pyriform sinus;impaired pharyngoesophageal segment opening   Rosenbek's Penetration Aspiration Scale: Puree Food Trial Results 1 - no aspiration, contrast does not enter airway   Diagnostic Statement Reduced AP movement with premature entry and with spillage to the pyriform sinuses with moderate large amount of residue that reduced with a liquid but moderate residue left in the pyriform sinuses.   Swallowing Recommendations   Diet Consistency Recommendations NPO;ice chips only   Supervision Level for Intake 1:1 supervision needed   Mode of Delivery Recommendations bolus size, small   Medication Administration Recommendations, Swallowing (SLP) Via the feeding tube   General Therapy Interventions   Planned Therapy Interventions Dysphagia Treatment   Clinical Impression   Criteria for Skilled Therapeutic Interventions Met (SLP Eval) Yes, treatment indicated   SLP Diagnosis Moderate to severe oral and pharyngeal dysphagia   Risks & Benefits of therapy have been explained evaluation/treatment results reviewed;care plan/treatment goals reviewed;risks/benefits  reviewed;current/potential barriers reviewed;participants voiced agreement with care plan;participants included;patient;spouse/significant other   Clinical Impression Comments Patient presents with moderate/severe dysphagia on today's study. Similiar to his initial video. He contnues to have decreased AP movement of the bolus with premature spillage to the pyriform sinuses with all textures. Deep penetration on thin liquids to the level of the cords by spoon and aspiration via the cup with reflexive cough. He had edilberto aspiration of slightly thick liquids before the swallow. Aspiration of mildly thick liquids. Puree was piecemeal with spillage to the pryiform sinuses with moderate residue in valleculae and pryiform sinuses. Additional swallow reduced vallecular residue but moderate amount remained in the pyrifrom sinuse. Of note he was able to cough out all the barium residue between each swallow. So strength of cough has improving. He continues to be a high risk for aspiration with all textures at this time.     Recommend: 1. Continue NPO with TF. Freuquent oral cares and ice chips for comfort and practice swallows. Swallow cough swallow with ice chips.   SLP Total Evaluation Time   Evaluation, videofluoroscopic eval of swallow function Minutes (99140) 16   SLP Goals   Therapy Frequency (SLP Eval) 5 times/week   SLP Predicted Duration/Target Date for Goal Attainment 12/23/24   SLP Goals Swallow   SLP: Safely tolerate diet without signs/symptoms of aspiration One P.O. texture   Swallowing Intervention   Treatment of Swallowing Dysfunction &/or Oral Function for Feeding Minutes (78627) 13   Symptoms Noted During/After Treatment None   Treatment Detail/Skilled Intervention Patient seen after the study to provide education on the anatomy and physiology. Had him complete the swallow, cough swallow sequence with an ice chip and he coughed up the remaining baruim that was left in his pyriform sinuses.   SLP Discharge  Planning   SLP Plan continue ice chips   SLP Discharge Recommendation Acute Rehab Center-Motivated patient will benefit from intensive, interdisciplinary therapy.  Anticipate will be able to tolerate 3 hours of therapy per day   SLP Rationale for DC Rec Patient's swallow function is significantly below baseline prior to first admission.   SLP Brief overview of current status  Video swallow study completed. Patient continues to aspirate all liquids given and significant residue with pureebut no aspiration. High risk for aspiration continue NPO with TF and ice chips for comfort and to practice swallows.   SLP Time and Intention   Total Session Time (sum of timed and untimed services) 29

## 2024-12-09 NOTE — PLAN OF CARE
Reason for Admission: Myasthenic Crisis, ARF    Cognitive/Mentation: A/Ox 4  Neuros/CMS: Intact ex generalized weakness. LUE 4/5, RUE 3/5, BLE 3/5, R drift  VS: VSS x HR(fam)   Tele: n/a.  /GI: Continent. Last BM 12/8.   Pulmonary: LS diminished in lung bases.  Pain: denies.     Drains/Lines: 2 R forearm PIV SL  Skin: blanchable redness on sacrum, protective mepilex in place.   Activity: Assist x 2 with lift.  Diet: NPO. PEG tube with TF infusing 55mL with q4 100mL flushes    Therapies recs: ARU  Discharge: Pending    Aggression Stoplight Tool: Green    End of shift summary: Swallow study completed today.

## 2024-12-09 NOTE — PROGRESS NOTES
Care Management Follow Up    Length of Stay (days): 9    Expected Discharge Date: 12/10/2024     Concerns to be Addressed:       Patient plan of care discussed at interdisciplinary rounds: Yes    Anticipated Discharge Disposition:                Anticipated Discharge Services:    Anticipated Discharge DME:      Patient/family educated on Medicare website which has current facility and service quality ratings:    Education Provided on the Discharge Plan:    Patient/Family in Agreement with the Plan: yes    Referrals Placed by CM/SW:    Private pay costs discussed: Not applicable    Discussed  Partnership in Safe Discharge Planning  document with patient/family: No     Handoff Completed: No, handoff not indicated or clinically appropriate    Additional Information:  ARU asked for transport to be set up for tomorrow. SW set up a stretcher ride for patient between [id number]. SW updated ARU liaison.     Next Steps: continue discharge planning    RONAK Moss

## 2024-12-09 NOTE — PLAN OF CARE
Reason for Admission: Myasthenic Crisis, ARF     Cognitive/Mentation: A/Ox 4  Neuros/CMS: Intact ex generalized weakness LUE 4/5, RUE 3/5, BLE 3/5, R drift, moderate hand  and ankle strength on flexions  VS: VSS x bradycardic. On CPAP overnight.   Tele: N/A  /GI: Continent. On FC, draining well. Did not require flushing. Last BM 12/8.   Pulmonary: LS diminished in lung bases; with infrequent coughs.  Pain: Denies     Drains/Lines: 2 R PIV SL.   Skin: blanchable redness on sacrum, protective mepilex in place.  Activity: Assist x 2 with lift.  Diet: NPO. PEG tube with TF infusing @ 55mL (goal rate) with Q4H 100mL flushes     Therapies recs: ARU  Discharge: Pending     Aggression Stoplight Tool: green     End of shift summary: Fair sleep between cares. Turned and repositioned. FC care done.

## 2024-12-09 NOTE — PROGRESS NOTES
[facility]  Hospitalist Progress Note        Ramiro Rome MD   12/09/2024        Interval History:        Patient seen and examined with his wife present in room  - reports feeling better with improvement in upper extremity strength and NIF -40 cm H2O (12/9); still with some upper extremity flexion weakness  - Neurology signed off 12/8; completed 5 doses of IVIG on 12/5/24; to continue Mestinon 60 mg PO TID; neurology suggest outpatient follow-up  - completed 7 days of antibiotics course on 12/7/24  - care coordinator/ following for disposition to acute rehab, likely 12/10/24  - will plan to have void of trail at ARU in 1-2 days  - evaluated by SLP (12/9), note that he continues to aspirate all textures but improved ability to cough; SLP recommend continued NPO; nutrition following for tube feeds         Assessment and Plan:        Mendez Greene is a 79 year old male with PMH of paroxysmal atrial fibrillation (not on anticoagulation PTA), CAD, ascending aortic aneurysm, HLD, HTN, hypothyroidism, ALS, myasthenia gravis, CHA admitted to ICU service on 11/30/2024 for acute respiratory failure and AMS.     He was recently hospitalized at [facility] from 11/8/24-11/15/24 for progressive weakness and fatigue, difficulty swallowing. He was seen by neurology who were concerned for motor neuron disease with high index of suspicion for ALS. He was started on Riluzole and Edaravone and referred to [facility] neuromuscular division for second opinion. Discharged home with palliative care referral. After discharge, his ACh receptor binding Ab came back positive, strongly suggestive of myasthenia gravis.     He presented back to [facility] ED 11/30 with hypotension and acute respiratory failure. Family rescinded patient's DNR/DNI for patient to be full code and patient reportedly nodded in  agreement. Patient was intubated in the ED and admitted to ICU on pressors. Started on broad spectrum antibiotics  for possible septic shock due to UTI. Neurology consulted, started patient on IVIG for likely myasthenic crisis and patient was initiated on Mestinon. Extubated with pressors stopped 12/3. Transferred to hospitalist service on 12/4.     Myasthenic crisis  Acute hypoxic respiratory failure requiring mechanical ventilation, resolved  *Secondary to urosepsis (see below). Questionable ALS diagnosis which is being deferred to his primary neurologist.  - Neurocrit followed while in ICU  - clinically improving with improvement in upper extremity strength and NIF -40 cm H2O (12/9); still with some upper extremity flexion weakness  - Neurology signed off 12/8; completed 5 doses of IVIG on 12/5/24; to continue Mestinon 60 mg PO TID; neurology suggest outpatient follow-up  - completed 7 days of antibiotics course on 12/7/24  - care coordinator/ following for disposition to acute rehab, likely 12/10/24    - Caution use of IV magnesium, beta-blocker, statin, macrolides, aminoglycosides, and fluoroquinolone, and as much as possible avoid them due to to potential exacerbation of myasthenia crisis. ; Holding PTA statin  -respiratory status currently stable on room air  - Continue CPAP at HS  - Continue PT/OT/SLP     Urosepsis  Septic shock, resolved  H/o neobladder  *WBC 13.3 on admission, afebrile, lactic 2.0. UA with infectious markers. Hypotensive requiring pressors initially, now weaned. Urine culture 12/1 NGTD.  - S/p IV Zosyn (12/1 - 12/3) then narrowed to IV Rocephin (12/3 - 12/7)  - Blood culture 11/30 NGTD  - has snowden catheter in neobladder, with urine leaking that has now resolved  - will plan to have void of trail at ARU in 1-2 days     MOHAMUD, improved  *Creatinine 3.20 on admission now improved back to baseline with hydration; Cr 0.6 (12/9)   - Continue free water flushes per G-tube  - Avoid nephrotoxic medications  - Monitor I&O     Sinus bradycardia  *HR 40s to upper 50s, dips the most when sleeping.  Asymptomatic. Mostly unchanged after discontinuing Levophed 12/3. EKG sinus bradycardia with no high grade blocks.  *Chart review shows similar HR 40-50s last hospitalization, and he has a few OP visits with documented sinus bradycardia (7/10/24, 3/4/24).  - Monitor while on Mestinon, defer any dose adjustments to neuro  - Continuous telemetry     Moderate to severe oropharyngeal dysphagia s/p G-tube  - RD following for TF  - VFSS on 12/9  - evaluated by SLP (12/9), note that he continues to aspirate all textures but improved ability to cough; SLP recommend continued NPO; nutrition following for tube feeds     CAD  HLD  Paroxysmal atrial fibrillation  - Continue PTA Aspirin  - PTA Simvastatin currently on hold in the setting of myasthenia crisis-- will continue to hold on discharge  - No rate control medications at baseline     Elevated troponin, improving  Recent NSTEMI (11/8/24)  *Troponin 997 on admission (down from 1,346 last admission), trended down. EKG w/o acute ischemic changes. No complaints of chest pain. Cardiac cath 11/11/24 showed only mild CAD. ED provider spoke with cardiology, no concern for ACS.  *Echo 12/1/24 difficult study but grossly normal left and right ventricular function, no severe valvular regurgitation, probably mild aortic regurgitation, no pericardial effusion.  - Plan for outpatient cardiac MRI     Hypernatremia, resolved  Hypochloremia, resolved  *Sodium peaked at 154, chloride 125. Treated with IV D5W, discontinued 12/3 PM.  *Sodium in high 140s (149 on 12/9), chloride 118  - will increase free water flushes to 125 ml q 4 hrs     Hypokalemia-- resolved     CHA  - Continue CPAP at HS     Hypothyroidism  - Continue PTA Synthroid     Ascending aortic aneurysm  Aortic root dilatation  *Noted to be stable on echo 11/9/24  - Outpatient follow up for surveillance    Diet:   NPO for Medical/Clinical Reasons Except for: Ice Chips, NPO but receiving Tube Feeding  Adult Formula Drip Feeding:  "Continuous Jevity 1.5; Gastrostomy; Goal Rate: 55; mL/hr; Start new formula at 25 mL/hr.  Increase by 10 mL every 12 hrs to goal rate of 55 mL/hr.  Hold TF 1 hour before and 1 hour after Synthroid administration.; Do no...  Diet      DVT Prophylaxis: PCD boots    Code status: full code    Disposition:   Medically Ready for Discharge: Anticipated Tomorrow plan for discharge to ARU on 12/10/24    Care plan discussed with patient and his wife present in room along with nursing      Clinically Significant Risk Factors         # Hypernatremia: Highest Na = 149 mmol/L in last 2 days, will monitor as appropriate  # Hyperchloremia: Highest Cl = 120 mmol/L in last 2 days, will monitor as appropriate            # Hypoalbuminemia: Lowest albumin = 2.2 g/dL at 12/5/2024  7:21 AM, will monitor as appropriate       # Hypertension: Noted on problem list              # Overweight: Estimated body mass index is 26 kg/m  as calculated from the following:    Height as of 11/15/24: 1.778 m (5' 10\").    Weight as of this encounter: 82.2 kg (181 lb 3.5 oz).   # Moderate Malnutrition: based on nutrition assessment      # Financial/Environmental Concerns: none                            Physical Exam:      Blood pressure (!) 166/67, pulse 50, temperature 98.5  F (36.9  C), temperature source Oral, resp. rate 18, weight 82.2 kg (181 lb 3.5 oz), SpO2 98%.  Vitals:    12/03/24 0300 12/04/24 0400 12/06/24 1500   Weight: 79.8 kg (176 lb) 82 kg (180 lb 12.4 oz) 82.2 kg (181 lb 3.5 oz)     Vital Signs with Ranges  Temp:  [97.6  F (36.4  C)-98.5  F (36.9  C)] 98.5  F (36.9  C)  Pulse:  [41-67] 50  Resp:  [17-18] 18  BP: (130-166)/(57-78) 166/67  SpO2:  [96 %-99 %] 98 %  I/O's Last 24 hours  I/O last 3 completed shifts:  In: 1131 [NG/GT:685]  Out: 1610 [Urine:1610]    Constitutional: Alert, awake and oriented X 3; resting comfortably in no apparent distress       Oral cavity: Moist mucosa   Cardiovascular: Normal s1 s2, regular rate and rhythm, no " murmur   Lungs: B/l clear to auscultation, no wheezes or crepitations   Abdomen: Soft, nt, nd, no guarding, rigidity or rebound; BS +; G tube in place   LE : No edema   Musculoskeletal/Neuro improvement in upper extremity strength and NIF -40 cm H2O (12/9); motor strength 5/5; still with some upper extremity flexion weakness   Psychiatry: normal mood and affect  Cortez catheter in place            Medications:        Current Facility-Administered Medications   Medication Dose Route Frequency Provider Last Rate Last Admin    aspirin (ASA) chewable tablet 81 mg  81 mg Per G Tube Daily Lou Lindquist PA-C   81 mg at 12/08/24 0812    heparin ANTICOAGULANT injection 5,000 Units  5,000 Units Subcutaneous Q8H Lou Lindquist PA-C   5,000 Units at 12/09/24 0104    levothyroxine (SYNTHROID/LEVOTHROID) tablet 150 mcg  150 mcg Per G Tube Daily Lou Lindquist PA-C   150 mcg at 12/08/24 0812    melatonin tablet 5 mg  5 mg Oral or NG Tube At Bedtime Lou Lindquist PA-C   5 mg at 12/08/24 2132    olopatadine (PATANOL) 0.1 % ophthalmic solution 1 drop  1 drop Both Eyes BID Lou Lindquist PA-C   1 drop at 12/08/24 2158    pyRIDostigmine (MESTINON) tablet 60 mg  60 mg Oral Q8H TY Lou Lindquist PA-C   60 mg at 12/09/24 0555    [Held by provider] simvastatin (ZOCOR) tablet 10 mg  10 mg Per G Tube QPM Hadley De La O MD   10 mg at 12/02/24 2021     PRN Meds:   Current Facility-Administered Medications   Medication Dose Route Frequency Provider Last Rate Last Admin    acetaminophen (TYLENOL) tablet 650 mg  650 mg Oral Q24H PRN Lou Lindquist PA-C   650 mg at 12/01/24 2111    artificial saliva (BIOTENE MT) solution 2 spray  2 spray Swish & Spit 4x Daily PRN Lou Lindquist PA-C        bisacodyl (DULCOLAX) suppository 10 mg  10 mg Rectal Daily PRN Murtaza Kinney MD        dextrose 10% infusion   Intravenous Continuous PRN Lou Lindquist PA-C        glucose gel 15-30 g  15-30 g  Oral Q15 Min PRN Lou Lindquist PA-C        Or    dextrose 50 % injection 25-50 mL  25-50 mL Intravenous Q15 Min PRN Lou Lindquist PA-C        Or    glucagon injection 1 mg  1 mg Subcutaneous Q15 Min PRN Lou Lindquist PA-C        diphenhydrAMINE (BENADRYL) capsule 50 mg  50 mg Oral Q24H PRN Lou Lindquist PA-C        Or    diphenhydrAMINE (BENADRYL) injection 50 mg  50 mg Intravenous Q24H PRN Lou Lindquist PA-C        diphenhydrAMINE (BENADRYL) injection 50 mg  50 mg Intravenous Once PRN Lou Lindquist PA-C        EPINEPHrine (ADRENALIN) kit 0.3 mg  0.3 mg Intramuscular Q3 Min PRN Lou Lindquist PA-C        famotidine (PEPCID) injection 20 mg  20 mg Intravenous Once PRN Lou Lindquist PA-C        guaiFENesin (ROBITUSSIN) 20 mg/mL solution 200 mg  200 mg Oral or Feeding Tube Q4H PRN Murtaza Kinney MD   200 mg at 12/08/24 1430    methylPREDNISolone Na Suc (solu-MEDROL) injection 125 mg  125 mg Intravenous Once PRN Lou Lindquist PA-C        naloxone (NARCAN) injection 0.2 mg  0.2 mg Intravenous Q2 Min PRN Lou Lindqusit PA-C        Or    naloxone (NARCAN) injection 0.4 mg  0.4 mg Intravenous Q2 Min PRN Lou Lindquist PA-JOSEPH        Or    naloxone (NARCAN) injection 0.2 mg  0.2 mg Intramuscular Q2 Min PRN Lou Lindquist PA-JOSEPH        Or    naloxone (NARCAN) injection 0.4 mg  0.4 mg Intramuscular Q2 Min PRN Lou Lindquist PA-C        polyethylene glycol (MIRALAX) Packet 17 g  17 g Oral BID PRN Murtaza Kinney MD        sennosides (SENOKOT) tablet 8.6 mg  8.6 mg Oral BID PRN Murtaza Kinney MD                Data:      All new lab and imaging data was reviewed.   Recent Labs   Lab Test 12/08/24  1223 12/07/24  1126 12/06/24  1101 12/01/24  0313 12/01/24  0223 11/30/24 2058 11/13/24  0909   WBC 8.4 7.2 7.5   < >  --    < >  --    HGB 9.9* 9.7* 9.6*   < >  --    < >  --    * 104* 106*   < >  --    < >  --    * 158 164    < >  --    < >  --    INR  --   --   --   --  1.32*  --  1.23*    < > = values in this interval not displayed.      Recent Labs   Lab Test 12/08/24  1223 12/07/24  1126 12/06/24  1101   * 149* 146*   POTASSIUM 4.3 4.0 3.7   CHLORIDE 118* 120* 120*   CO2 22 21* 20*   BUN 34.4* 31.7* 37.8*   CR 0.70 0.70 0.80   ANIONGAP 8 8 6*   SIMONA 9.2 8.9 8.9   * 107* 108*     Recent Labs   Lab Test 11/07/20  1358 11/07/20  1107 11/07/20  0629   TROPI 3.590* 4.056* 3.247*

## 2024-12-09 NOTE — PLAN OF CARE
[id number]  Goal Outcome Evaluation:  Orientations: A/Ox4  Neuros/CMS: Intact ex left/right sided weakness--more apparent on the right side. Weak  strength and hands are contracted.   Vitals/Pain: VSS on RA. Denies any pain.  Tele: N/A  Lines/Drains:  PIV SL.  Skin/Wounds: intact. Scattered bruising.   GI/: snowden in place. Bmx1.soft. BS+. Denies n/v.  Labs: Abnormal/Trends, Electrolyte Replacement- K+,phos. Re-check at AM.  Ambulation/Assist: Ax2 w/lift.   Diet: pt on continues TF; at goal. Pills crushed through TF.   Sleep Quality: slept bet cares.   Plan: discharge plan TBD

## 2024-12-09 NOTE — PROGRESS NOTES
Reason for Admission: MG, progressive weakness, fatigue, difficulty swallowing     Cognitive/Mentation: A/Ox 4  Neuros/CMS: Intact ex left/right sided weakness--more apparent on the right side, trace generalized edema. Weak  strength and hands are contracted.   VS: stable.   Tele: bradycardia.  /GI: Cortez. Last BM 12/8/2024. Uses bedpan.  Pulmonary: LS clear, coughs.  Pain: denies.     Drains/Lines: R lower PIV WDL except some dry drainage. R upper PIV WDL. Both flushed, saline locked, caps changed.   Skin: Scattered bruising  Activity: Assist x 2 with lift.  Diet: tube feeding,, continuous. Takes pills by tube feed, crushed, eats ice chips.     Therapies recs: ARU  Discharge: TBD    Aggression Stoplight Tool: green    End of shift summary: Up to the chair twice, had several BM, appears to be improving in strength. Did have one occurrence of a bloody nose.

## 2024-12-10 ENCOUNTER — APPOINTMENT (OUTPATIENT)
Dept: PHYSICAL THERAPY | Facility: CLINIC | Age: 79
DRG: 698 | End: 2024-12-10
Payer: MEDICARE

## 2024-12-10 ENCOUNTER — APPOINTMENT (OUTPATIENT)
Dept: OCCUPATIONAL THERAPY | Facility: CLINIC | Age: 79
DRG: 698 | End: 2024-12-10
Payer: MEDICARE

## 2024-12-10 ENCOUNTER — APPOINTMENT (OUTPATIENT)
Dept: SPEECH THERAPY | Facility: CLINIC | Age: 79
DRG: 698 | End: 2024-12-10
Payer: MEDICARE

## 2024-12-10 LAB
ALBUMIN SERPL BCG-MCNC: 2.8 G/DL (ref 3.5–5.2)
ALP SERPL-CCNC: 82 U/L (ref 40–150)
ALT SERPL W P-5'-P-CCNC: 82 U/L (ref 0–70)
ANION GAP SERPL CALCULATED.3IONS-SCNC: 6 MMOL/L (ref 7–15)
AST SERPL W P-5'-P-CCNC: 73 U/L (ref 0–45)
BILIRUB SERPL-MCNC: 0.3 MG/DL
BUN SERPL-MCNC: 43 MG/DL (ref 8–23)
CALCIUM SERPL-MCNC: 9.4 MG/DL (ref 8.8–10.4)
CHLORIDE SERPL-SCNC: 122 MMOL/L (ref 98–107)
CREAT SERPL-MCNC: 0.6 MG/DL (ref 0.67–1.17)
EGFRCR SERPLBLD CKD-EPI 2021: >90 ML/MIN/1.73M2
ERYTHROCYTE [DISTWIDTH] IN BLOOD BY AUTOMATED COUNT: 14.8 % (ref 10–15)
GLUCOSE SERPL-MCNC: 98 MG/DL (ref 70–99)
HCO3 SERPL-SCNC: 24 MMOL/L (ref 22–29)
HCT VFR BLD AUTO: 27 % (ref 40–53)
HGB BLD-MCNC: 8.5 G/DL (ref 13.3–17.7)
MAGNESIUM SERPL-MCNC: 2.1 MG/DL (ref 1.7–2.3)
MCH RBC QN AUTO: 34 PG (ref 26.5–33)
MCHC RBC AUTO-ENTMCNC: 31.5 G/DL (ref 31.5–36.5)
MCV RBC AUTO: 108 FL (ref 78–100)
PHOSPHATE SERPL-MCNC: 3.1 MG/DL (ref 2.5–4.5)
PLATELET # BLD AUTO: 146 10E3/UL (ref 150–450)
POTASSIUM SERPL-SCNC: 4.3 MMOL/L (ref 3.4–5.3)
PROT SERPL-MCNC: 6.2 G/DL (ref 6.4–8.3)
RBC # BLD AUTO: 2.5 10E6/UL (ref 4.4–5.9)
SODIUM SERPL-SCNC: 152 MMOL/L (ref 135–145)
WBC # BLD AUTO: 8.9 10E3/UL (ref 4–11)

## 2024-12-10 PROCEDURE — 97116 GAIT TRAINING THERAPY: CPT | Mod: GP | Performed by: PHYSICAL THERAPIST

## 2024-12-10 PROCEDURE — 97112 NEUROMUSCULAR REEDUCATION: CPT | Mod: GO

## 2024-12-10 PROCEDURE — 82040 ASSAY OF SERUM ALBUMIN: CPT | Performed by: PHYSICIAN ASSISTANT

## 2024-12-10 PROCEDURE — 92526 ORAL FUNCTION THERAPY: CPT | Mod: GN | Performed by: SPEECH-LANGUAGE PATHOLOGIST

## 2024-12-10 PROCEDURE — 85027 COMPLETE CBC AUTOMATED: CPT | Performed by: PHYSICIAN ASSISTANT

## 2024-12-10 PROCEDURE — 250N000011 HC RX IP 250 OP 636: Performed by: PHYSICIAN ASSISTANT

## 2024-12-10 PROCEDURE — 120N000001 HC R&B MED SURG/OB

## 2024-12-10 PROCEDURE — 36415 COLL VENOUS BLD VENIPUNCTURE: CPT | Performed by: PHYSICIAN ASSISTANT

## 2024-12-10 PROCEDURE — 94150 VITAL CAPACITY TEST: CPT

## 2024-12-10 PROCEDURE — 97535 SELF CARE MNGMENT TRAINING: CPT | Mod: GO

## 2024-12-10 PROCEDURE — 84100 ASSAY OF PHOSPHORUS: CPT | Performed by: PHYSICIAN ASSISTANT

## 2024-12-10 PROCEDURE — 999N000157 HC STATISTIC RCP TIME EA 10 MIN

## 2024-12-10 PROCEDURE — 250N000013 HC RX MED GY IP 250 OP 250 PS 637: Performed by: PHYSICIAN ASSISTANT

## 2024-12-10 PROCEDURE — B4035 ENTERAL FEED SUPP PUMP PER D: HCPCS

## 2024-12-10 PROCEDURE — 99232 SBSQ HOSP IP/OBS MODERATE 35: CPT | Performed by: HOSPITALIST

## 2024-12-10 PROCEDURE — 97530 THERAPEUTIC ACTIVITIES: CPT | Mod: GP | Performed by: PHYSICAL THERAPIST

## 2024-12-10 PROCEDURE — 258N000003 HC RX IP 258 OP 636: Performed by: HOSPITALIST

## 2024-12-10 PROCEDURE — 83735 ASSAY OF MAGNESIUM: CPT | Performed by: PHYSICIAN ASSISTANT

## 2024-12-10 RX ORDER — DEXTROSE MONOHYDRATE 50 MG/ML
INJECTION, SOLUTION INTRAVENOUS CONTINUOUS
Status: ACTIVE | OUTPATIENT
Start: 2024-12-10 | End: 2024-12-10

## 2024-12-10 RX ADMIN — HEPARIN SODIUM 5000 UNITS: 5000 INJECTION, SOLUTION INTRAVENOUS; SUBCUTANEOUS at 16:33

## 2024-12-10 RX ADMIN — OLOPATADINE HYDROCHLORIDE 1 DROP: 1 SOLUTION/ DROPS OPHTHALMIC at 08:46

## 2024-12-10 RX ADMIN — OLOPATADINE HYDROCHLORIDE 1 DROP: 1 SOLUTION/ DROPS OPHTHALMIC at 20:37

## 2024-12-10 RX ADMIN — PYRIDOSTIGMINE BROMIDE 60 MG: 60 TABLET ORAL at 20:37

## 2024-12-10 RX ADMIN — PYRIDOSTIGMINE BROMIDE 60 MG: 60 TABLET ORAL at 16:33

## 2024-12-10 RX ADMIN — LEVOTHYROXINE SODIUM 150 MCG: 150 TABLET ORAL at 08:46

## 2024-12-10 RX ADMIN — DEXTROSE MONOHYDRATE: 50 INJECTION, SOLUTION INTRAVENOUS at 12:34

## 2024-12-10 RX ADMIN — PYRIDOSTIGMINE BROMIDE 60 MG: 60 TABLET ORAL at 05:59

## 2024-12-10 RX ADMIN — HEPARIN SODIUM 5000 UNITS: 5000 INJECTION, SOLUTION INTRAVENOUS; SUBCUTANEOUS at 08:47

## 2024-12-10 RX ADMIN — ASPIRIN 81 MG CHEWABLE TABLET 81 MG: 81 TABLET CHEWABLE at 08:46

## 2024-12-10 RX ADMIN — Medication 5 MG: at 20:37

## 2024-12-10 RX ADMIN — HEPARIN SODIUM 5000 UNITS: 5000 INJECTION, SOLUTION INTRAVENOUS; SUBCUTANEOUS at 23:22

## 2024-12-10 RX ADMIN — HEPARIN SODIUM 5000 UNITS: 5000 INJECTION, SOLUTION INTRAVENOUS; SUBCUTANEOUS at 00:56

## 2024-12-10 ASSESSMENT — ACTIVITIES OF DAILY LIVING (ADL)
ADLS_ACUITY_SCORE: 85
ADLS_ACUITY_SCORE: 88
ADLS_ACUITY_SCORE: 83
ADLS_ACUITY_SCORE: 88
ADLS_ACUITY_SCORE: 83
ADLS_ACUITY_SCORE: 87
ADLS_ACUITY_SCORE: 85
ADLS_ACUITY_SCORE: 85
ADLS_ACUITY_SCORE: 88
ADLS_ACUITY_SCORE: 85
ADLS_ACUITY_SCORE: 83
ADLS_ACUITY_SCORE: 83
ADLS_ACUITY_SCORE: 88
ADLS_ACUITY_SCORE: 90
ADLS_ACUITY_SCORE: 85
ADLS_ACUITY_SCORE: 88
ADLS_ACUITY_SCORE: 85
ADLS_ACUITY_SCORE: 87
ADLS_ACUITY_SCORE: 85
ADLS_ACUITY_SCORE: 88
ADLS_ACUITY_SCORE: 85

## 2024-12-10 NOTE — PLAN OF CARE
Goal Outcome Evaluation:      Plan of Care Reviewed With: patient, spouse      Reason for Admission: acute respiratory distress; myasthenia gravis crisis.    Cognitive/Mentation: A/Ox 4  Neuros/CMS: Intact ex weakness in extremities: LUE 4/5, RUE 3/5, BLEs 4/5.  Weak  R hand, moderate  L hand.  VS: WDL RA.   Tele: NA.  /GI: Snowden irrigated x1 for mucus build up - snowden removed at 1400. Due to void.Continent. Last BM t.   Pulmonary: LS diminished.  Pain: denies.     Drains/Lines: PIV saline locked  Skin: Scattered bruising and sacrum red, blanchable.  Activity: Assist x 2 with GB and walker..  Diet: NPO. Tube feed running at goal rate.     Therapies recs: ARU  Discharge: Likely tomorrow.    Aggression Stoplight Tool: green    End of shift summary: Na+ 152 free water flushes increased to 150 ml q 4 hrs and pt receiving 1000 ml of D5.  Pt felt light headed while transferring from bed to chair and back, Sx improved quickly.

## 2024-12-10 NOTE — PROGRESS NOTES
NIF -40+ cmH2O    VC 1580 mL    Patient gave good effort during both values.     Mirella Zepeda, RT  12/10/2024

## 2024-12-10 NOTE — PROGRESS NOTES
Care Management Follow Up    Length of Stay (days): 10    Expected Discharge Date: 12/11/2024     Concerns to be Addressed: discharge planning   Patient plan of care discussed at interdisciplinary rounds: Yes    Anticipated Discharge Disposition:  [facility]  Anticipated Discharge Services:  therapies, transport    Referrals Placed by CM/SW:  ARU  Private pay costs discussed: Not applicable today    Additional Information:  Patient had plans today to discharge to ARU if a bed was available. It is not available today, so ride was cancelled and rescheduled for tomorrow. [facility] stretcher is set up for tomorrow 12/11 between [id number] to go to [facility].     NORAH Sampson, LGSW   Social Work   [facility]

## 2024-12-10 NOTE — PLAN OF CARE
Reason for Admission: Myasthenic Crisis, acute renal failure    Cognitive/Mentation: A/Ox 4  Neuros/CMS: Intact ex generalized weakness, R drift, moderate had , LUE 4/5, RUE 3/5, BLE 3/5  VS: stable ex bradycardic.  CPAP worn at night.   Tele: N/A.  /GI: Continent. Last BM 12/9/24.  Cortez in place.  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 Needs irrigation if leaking.   Pulmonary: LS diminished in lung bases, with infrequent coughs.  Pain: denies.     Drains/Lines: PIV SL  Skin: blanchable redness on sacrum, protective mepilex in place. Scattered bruising  Activity: Assist x 2 with lift.  Diet: NPO except ice chips. Takes pills crushed in PEG tube.  TF infusing at 55mL/hr (goal rate) with q4 hour 125mL flushes.     Therapies recs: ARU  Discharge: plan is for today 12/10 - [facility] stretcher ride - ride scheduled between [id number].    Aggression Stoplight Tool: green    End of shift summary: Pt was pleasant and stable this shift.  Cortez irrigated around 0600 this morning.

## 2024-12-10 NOTE — PROGRESS NOTES
[facility]  Hospitalist Progress Note        Ramiro Rome MD   12/10/2024        Interval History:        - No acute issues overnight, denies any active complaints  - Na trending up to 152, will increase free water flushes further to 150 ml q 4hrs; will also start D5 at 100 ml/hr X 10 hrs  - repeat BMP 12/11  - will also remove Cortez catheter 12/10 for trial of void  - SW following for disposition to ARU, likely 12/11         Assessment and Plan:        Mendez Greene is a 79 year old male with PMH of paroxysmal atrial fibrillation (not on anticoagulation PTA), CAD, ascending aortic aneurysm, HLD, HTN, hypothyroidism, ALS, myasthenia gravis, CHA admitted to ICU service on 11/30/2024 for acute respiratory failure and AMS.     He was recently hospitalized at [facility] from 11/8/24-11/15/24 for progressive weakness and fatigue, difficulty swallowing. He was seen by neurology who were concerned for motor neuron disease with high index of suspicion for ALS. He was started on Riluzole and Edaravone and referred to [facility] neuromuscular division for second opinion. Discharged home with palliative care referral. After discharge, his ACh receptor binding Ab came back positive, strongly suggestive of myasthenia gravis.     He presented back to [facility] ED 11/30 with hypotension and acute respiratory failure. Family rescinded patient's DNR/DNI for patient to be full code and patient reportedly nodded in  agreement. Patient was intubated in the ED and admitted to ICU on pressors. Started on broad spectrum antibiotics for possible septic shock due to UTI. Neurology consulted, started patient on IVIG for likely myasthenic crisis and patient was initiated on Mestinon. Extubated with pressors stopped 12/3. Transferred to hospitalist service on 12/4.     Myasthenic crisis  Acute hypoxic respiratory failure requiring mechanical ventilation, resolved  *Secondary to urosepsis (see below). Questionable ALS diagnosis  which is being deferred to his primary neurologist.  - Neurocrit followed while in ICU  - clinically improving with improvement in upper extremity strength and NIF -40 cm H2O (12/10); still with some upper extremity flexion weakness  - Neurology signed off 12/8; completed 5 doses of IVIG on 12/5/24; to continue Mestinon 60 mg PO TID; neurology suggest outpatient follow-up  - completed 7 days of antibiotics course on 12/7/24  - care coordinator/ following for disposition to acute rehab, likely 12/11/24    - Caution use of IV magnesium, beta-blocker, statin, macrolides, aminoglycosides, and fluoroquinolone, and as much as possible avoid them due to to potential exacerbation of myasthenia crisis. ; Holding PTA statin  -respiratory status currently stable on room air  - Continue CPAP at HS  - Continue PT/OT/SLP     Urosepsis  Septic shock, resolved  H/o neobladder  *WBC 13.3 on admission, afebrile, lactic 2.0. UA with infectious markers. Hypotensive requiring pressors initially, now weaned. Urine culture 12/1 NGTD.  - S/p IV Zosyn (12/1 - 12/3) then narrowed to IV Rocephin (12/3 - 12/7)  - Blood culture 11/30 NGTD  - has snowden catheter in neobladder, with urine leaking that has now resolved  - will discontinue Snowden for grail of void (12/10)     MOHAMUD, improved  Hypernatremia  Hyperchloremia  *Creatinine 3.20 on admission now improved back to baseline with hydration; Cr 0.6 (12/9)   - Na had proved to 140s, no slowly trending up again to 152  - will increase free water flushes further to 150 ml q 4hrs (12/10); will also start D5 at 100 ml/hr X 10 hrs  -monitor BMP  - Avoid nephrotoxic medications  - Monitor I&O     Sinus bradycardia  *HR 40s to upper 50s, dips the most when sleeping. Asymptomatic. Mostly unchanged after discontinuing Levophed 12/3. EKG sinus bradycardia with no high grade blocks.  *Chart review shows similar HR 40-50s last hospitalization, and he has a few OP visits with documented sinus  bradycardia (7/10/24, 3/4/24).  - Monitor while on Mestinon, defer any dose adjustments to neuro  - Continuous telemetry     Moderate to severe oropharyngeal dysphagia s/p G-tube  - RD following for TF  - VFSS on 12/9  - evaluated by SLP (12/9), note that he continues to aspirate all textures but improved ability to cough; SLP recommend continued NPO; nutrition following for tube feeds     CAD  HLD  Paroxysmal atrial fibrillation  - Continue PTA Aspirin  - PTA Simvastatin currently on hold in the setting of myasthenia crisis-- will continue to hold on discharge  - No rate control medications at baseline     Elevated troponin, improving  Recent NSTEMI (11/8/24)  *Troponin 997 on admission (down from 1,346 last admission), trended down. EKG w/o acute ischemic changes. No complaints of chest pain. Cardiac cath 11/11/24 showed only mild CAD. ED provider spoke with cardiology, no concern for ACS.  *Echo 12/1/24 difficult study but grossly normal left and right ventricular function, no severe valvular regurgitation, probably mild aortic regurgitation, no pericardial effusion.  - Plan for outpatient cardiac MRI      Hypokalemia-- resolved     CHA  - Continue CPAP at HS     Hypothyroidism  - Continue PTA Synthroid     Ascending aortic aneurysm  Aortic root dilatation  *Noted to be stable on echo 11/9/24  - Outpatient follow up for surveillance    Diet:   NPO for Medical/Clinical Reasons Except for: Ice Chips, NPO but receiving Tube Feeding  Adult Formula Drip Feeding: Continuous Jevity 1.5; Gastrostomy; Goal Rate: 55; mL/hr; Start new formula at 25 mL/hr.  Increase by 10 mL every 12 hrs to goal rate of 55 mL/hr.  Hold TF 1 hour before and 1 hour after Synthroid administration.; Do no...  Diet      DVT Prophylaxis: PCD boots    Code status: full code    Disposition:   Medically Ready for Discharge: Anticipated Tomorrow plan for discharge to ARU    Care plan discussed with patient and nursing along with   "    Clinically Significant Risk Factors         # Hypernatremia: Highest Na = 149 mmol/L in last 2 days, will monitor as appropriate  # Hyperchloremia: Highest Cl = 118 mmol/L in last 2 days, will monitor as appropriate          # Hypoalbuminemia: Lowest albumin = 2.2 g/dL at 12/5/2024  7:21 AM, will monitor as appropriate       # Hypertension: Noted on problem list              # Overweight: Estimated body mass index is 26 kg/m  as calculated from the following:    Height as of 11/15/24: 1.778 m (5' 10\").    Weight as of this encounter: 82.2 kg (181 lb 3.5 oz).   # Moderate Malnutrition: based on nutrition assessment      # Financial/Environmental Concerns: none                            Physical Exam:      Blood pressure (!) 141/63, pulse (!) 46, temperature 97  F (36.1  C), temperature source Axillary, resp. rate 16, weight 82.2 kg (181 lb 3.5 oz), SpO2 97%.  Vitals:    12/03/24 0300 12/04/24 0400 12/06/24 1500   Weight: 79.8 kg (176 lb) 82 kg (180 lb 12.4 oz) 82.2 kg (181 lb 3.5 oz)     Vital Signs with Ranges  Temp:  [97  F (36.1  C)-98.5  F (36.9  C)] 97  F (36.1  C)  Pulse:  [46-57] 46  Resp:  [16-18] 16  BP: (130-176)/(57-71) 141/63  SpO2:  [96 %-99 %] 97 %  I/O's Last 24 hours  I/O last 3 completed shifts:  In: 1100 [NG/GT:605]  Out: 1550 [Urine:1550]    Constitutional: Alert, awake and oriented X 3; resting comfortably in no apparent distress       Oral cavity: Moist mucosa   Cardiovascular: Normal s1 s2, regular rate and rhythm, no murmur   Lungs: B/l clear to auscultation, no wheezes or crepitations   Abdomen: Soft, nt, nd, no guarding, rigidity or rebound; BS +; G tube in place   LE : No edema   Musculoskeletal/Neuro improvement in upper extremity strength and NIF -40 cm H2O (12/10); motor strength 5/5; still with some upper extremity flexion weakness   Psychiatry: normal mood and affect  Cortez catheter in place            Medications:        Current Facility-Administered Medications   Medication Dose " Route Frequency Provider Last Rate Last Admin    aspirin (ASA) chewable tablet 81 mg  81 mg Per G Tube Daily Lou Lindquist PA-C   81 mg at 12/09/24 0928    heparin ANTICOAGULANT injection 5,000 Units  5,000 Units Subcutaneous Q8H Lou Lindquist PA-C   5,000 Units at 12/10/24 0056    levothyroxine (SYNTHROID/LEVOTHROID) tablet 150 mcg  150 mcg Per G Tube Daily Lou Lindquist PA-C   150 mcg at 12/09/24 0928    melatonin tablet 5 mg  5 mg Oral or NG Tube At Bedtime Lou Lindquist PA-C   5 mg at 12/09/24 2102    olopatadine (PATANOL) 0.1 % ophthalmic solution 1 drop  1 drop Both Eyes BID Lou Lindquist PA-C   1 drop at 12/09/24 2101    pyRIDostigmine (MESTINON) tablet 60 mg  60 mg Oral Q8H TY Lou Lindquist PA-C   60 mg at 12/10/24 0559    [Held by provider] simvastatin (ZOCOR) tablet 10 mg  10 mg Per G Tube QPM Hadley De La O MD   10 mg at 12/02/24 2021     PRN Meds:   Current Facility-Administered Medications   Medication Dose Route Frequency Provider Last Rate Last Admin    acetaminophen (TYLENOL) tablet 650 mg  650 mg Oral Q24H PRN Lou Lindquist PA-C   650 mg at 12/01/24 2111    artificial saliva (BIOTENE MT) solution 2 spray  2 spray Swish & Spit 4x Daily PRN Lou Lindquist PA-C        bisacodyl (DULCOLAX) suppository 10 mg  10 mg Rectal Daily PRN Murtaza Kinney MD        dextrose 10% infusion   Intravenous Continuous PRN Lou Lindquist PA-C        glucose gel 15-30 g  15-30 g Oral Q15 Min PRN Ramiro Rome MD        Or    dextrose 50 % injection 25-50 mL  25-50 mL Intravenous Q15 Min PRN Ramiro Rome MD        Or    glucagon injection 1 mg  1 mg Subcutaneous Q15 Min PRN Ramiro Rome MD        diphenhydrAMINE (BENADRYL) capsule 50 mg  50 mg Oral Q24H PRN Lou Lindquist PA-C        Or    diphenhydrAMINE (BENADRYL) injection 50 mg  50 mg Intravenous Q24H PRN Lou Lindquist PA-C        diphenhydrAMINE (BENADRYL)  injection 50 mg  50 mg Intravenous Once PRN Lou Lindquist PA-C        EPINEPHrine (ADRENALIN) kit 0.3 mg  0.3 mg Intramuscular Q3 Min PRN Lou Lindquist PA-C        famotidine (PEPCID) injection 20 mg  20 mg Intravenous Once PRN Lou Lindquist PA-C        guaiFENesin (ROBITUSSIN) 20 mg/mL solution 200 mg  200 mg Oral or Feeding Tube Q4H PRN Murtaza Kinney MD   200 mg at 12/08/24 1430    methylPREDNISolone Na Suc (solu-MEDROL) injection 125 mg  125 mg Intravenous Once PRN Lou Lindquist PA-C        naloxone (NARCAN) injection 0.2 mg  0.2 mg Intravenous Q2 Min PRN Lou Lindquist PA-C        Or    naloxone (NARCAN) injection 0.4 mg  0.4 mg Intravenous Q2 Min PRN Lou Lindquist PA-C        Or    naloxone (NARCAN) injection 0.2 mg  0.2 mg Intramuscular Q2 Min PRN Lou Lindquist PA-C        Or    naloxone (NARCAN) injection 0.4 mg  0.4 mg Intramuscular Q2 Min PRN Lou Lindquist PA-C        polyethylene glycol (MIRALAX) Packet 17 g  17 g Oral BID PRN Murtaza Kinney MD        sennosides (SENOKOT) tablet 8.6 mg  8.6 mg Oral BID PRN Murtaza Kinney MD                Data:      All new lab and imaging data was reviewed.   Recent Labs   Lab Test 12/09/24  0815 12/08/24  1223 12/07/24  1126 12/01/24  0313 12/01/24  0223 11/30/24 2058 11/13/24  0909   WBC 8.1 8.4 7.2   < >  --    < >  --    HGB 10.2* 9.9* 9.7*   < >  --    < >  --    * 106* 104*   < >  --    < >  --     146* 158   < >  --    < >  --    INR  --   --   --   --  1.32*  --  1.23*    < > = values in this interval not displayed.      Recent Labs   Lab Test 12/09/24  0815 12/08/24  1223 12/07/24  1126   * 148* 149*   POTASSIUM 4.5 4.3 4.0   CHLORIDE 118* 118* 120*   CO2 24 22 21*   BUN 37.3* 34.4* 31.7*   CR 0.61* 0.70 0.70   ANIONGAP 7 8 8   SIMONA 9.3 9.2 8.9   * 122* 107*     Recent Labs   Lab Test 11/07/20  1358 11/07/20  1107 11/07/20  0629   TROPI 3.590* 4.056* 3.247*

## 2024-12-10 NOTE — PLAN OF CARE
Goal Outcome Evaluation:      Plan of Care Reviewed With: patient, spouse    Overall Patient Progress: improvingOverall Patient Progress: improving    Reason for Admission: Myasthenic Crisis, ARF     Cognitive/Mentation: A/Ox 4  Neuros/CMS: Intact ex generalized weakness LUE 4/5, RUE 3/5, BLE 3/5, R drift, moderate hand  and ankle strength on flexions  VS: VSS ex bradycardic. On CPAP overnight.   Tele: N/A  /GI: Continent. Cortez, draining well. Irrigated x1. If leaking, needs irrigation-see sticky note. Last BM 12/9.   Pulmonary: LS diminished in lung bases; with infrequent coughs.  Pain: Denies     Drains/Lines: 2 R PIV SL.   Skin: blanchable redness on sacrum, protective mepilex in place.  Activity: Assist x 2 with lift.  Diet: NPO ex ice chips. PEG tube with TF infusing @ 55mL (goal rate) with Q4H 100mL flushes     Therapies recs: ARU  Discharge: Tomorrow 12/10 - [facility] stretcher ride - ride time of [id number]     Aggression Stoplight Tool: green     End of shift summary: Cortez was leaking, irrigation completed. Otherwise patient rested in bed and worked with Therapy. ARU tomorrow. Wife @ bedside most of shift.

## 2024-12-11 ENCOUNTER — HOSPITAL ENCOUNTER (INPATIENT)
Facility: CLINIC | Age: 79
DRG: 057 | End: 2024-12-11
Attending: PHYSICAL MEDICINE & REHABILITATION | Admitting: PHYSICAL MEDICINE & REHABILITATION
Payer: MEDICARE

## 2024-12-11 VITALS
TEMPERATURE: 98.2 F | RESPIRATION RATE: 20 BRPM | DIASTOLIC BLOOD PRESSURE: 49 MMHG | SYSTOLIC BLOOD PRESSURE: 121 MMHG | BODY MASS INDEX: 26 KG/M2 | WEIGHT: 181.22 LBS | HEART RATE: 45 BPM | OXYGEN SATURATION: 97 %

## 2024-12-11 DIAGNOSIS — G70.01 MYASTHENIC CRISIS (H): ICD-10-CM

## 2024-12-11 DIAGNOSIS — R13.10 DYSPHAGIA: ICD-10-CM

## 2024-12-11 DIAGNOSIS — I48.0 PAF (PAROXYSMAL ATRIAL FIBRILLATION) (H): ICD-10-CM

## 2024-12-11 DIAGNOSIS — G70.00 MYASTHENIA GRAVIS (H): Primary | ICD-10-CM

## 2024-12-11 DIAGNOSIS — E03.9 HYPOTHYROIDISM, UNSPECIFIED TYPE: ICD-10-CM

## 2024-12-11 LAB
ALBUMIN SERPL BCG-MCNC: 2.5 G/DL (ref 3.5–5.2)
ALP SERPL-CCNC: 75 U/L (ref 40–150)
ALT SERPL W P-5'-P-CCNC: 66 U/L (ref 0–70)
ANION GAP SERPL CALCULATED.3IONS-SCNC: 8 MMOL/L (ref 7–15)
AST SERPL W P-5'-P-CCNC: 50 U/L (ref 0–45)
BILIRUB SERPL-MCNC: 0.3 MG/DL
BUN SERPL-MCNC: 39.2 MG/DL (ref 8–23)
CALCIUM SERPL-MCNC: 8.7 MG/DL (ref 8.8–10.4)
CHLORIDE SERPL-SCNC: 117 MMOL/L (ref 98–107)
CREAT SERPL-MCNC: 0.57 MG/DL (ref 0.67–1.17)
EGFRCR SERPLBLD CKD-EPI 2021: >90 ML/MIN/1.73M2
EJ AB SER QL: NEGATIVE
ENA JO1 AB SER IA-ACNC: <20 UNITS
ENA PM/SCL AB SER-ACNC: <20 UNITS
ENA SS-A 52KD IGG SER IA-ACNC: <20 UNITS
FIBRILLARIN AB SER QL: NEGATIVE
GLUCOSE BLDC GLUCOMTR-MCNC: 104 MG/DL (ref 70–99)
GLUCOSE BLDC GLUCOMTR-MCNC: 75 MG/DL (ref 70–99)
GLUCOSE SERPL-MCNC: 85 MG/DL (ref 70–99)
HCO3 SERPL-SCNC: 24 MMOL/L (ref 22–29)
HGB BLD-MCNC: 7.8 G/DL (ref 13.3–17.7)
KU AB SER QL: NEGATIVE
MAGNESIUM SERPL-MCNC: 2 MG/DL (ref 1.7–2.3)
MDA5 AB SER LINE BLOT-ACNC: <20 UNITS
MI2 AB SER QL: NEGATIVE
MJ AB SER LINE BLOT-ACNC: <20 UNITS
OJ AB SER QL: NEGATIVE
PHOSPHATE SERPL-MCNC: 2.5 MG/DL (ref 2.5–4.5)
PL12 AB SER QL: NEGATIVE
PL7 AB SER QL: NEGATIVE
POTASSIUM SERPL-SCNC: 4.4 MMOL/L (ref 3.4–5.3)
PROT SERPL-MCNC: 5.5 G/DL (ref 6.4–8.3)
SAE1 IGG SER QL LINE BLOT: <20 UNITS
SODIUM SERPL-SCNC: 149 MMOL/L (ref 135–145)
SRP AB SERPL QL: NEGATIVE
TIF1-GAMMA AB SER LINE BLOT-ACNC: <20 UNITS
U1 SNRNP AB SER IA-ACNC: <20 UNITS
U2 SNRNP AB SER QL: NEGATIVE

## 2024-12-11 PROCEDURE — 250N000013 HC RX MED GY IP 250 OP 250 PS 637

## 2024-12-11 PROCEDURE — 99223 1ST HOSP IP/OBS HIGH 75: CPT | Mod: AI | Performed by: PHYSICAL MEDICINE & REHABILITATION

## 2024-12-11 PROCEDURE — 128N000003 HC R&B REHAB

## 2024-12-11 PROCEDURE — 250N000011 HC RX IP 250 OP 636

## 2024-12-11 PROCEDURE — 250N000013 HC RX MED GY IP 250 OP 250 PS 637: Performed by: PHYSICIAN ASSISTANT

## 2024-12-11 PROCEDURE — 84100 ASSAY OF PHOSPHORUS: CPT | Performed by: PHYSICIAN ASSISTANT

## 2024-12-11 PROCEDURE — 36415 COLL VENOUS BLD VENIPUNCTURE: CPT | Performed by: HOSPITALIST

## 2024-12-11 PROCEDURE — 85018 HEMOGLOBIN: CPT | Performed by: HOSPITALIST

## 2024-12-11 PROCEDURE — 99239 HOSP IP/OBS DSCHRG MGMT >30: CPT | Performed by: HOSPITALIST

## 2024-12-11 PROCEDURE — 250N000011 HC RX IP 250 OP 636: Performed by: PHYSICIAN ASSISTANT

## 2024-12-11 PROCEDURE — 80051 ELECTROLYTE PANEL: CPT | Performed by: HOSPITALIST

## 2024-12-11 PROCEDURE — 84520 ASSAY OF UREA NITROGEN: CPT | Performed by: HOSPITALIST

## 2024-12-11 PROCEDURE — 83735 ASSAY OF MAGNESIUM: CPT | Performed by: PHYSICIAN ASSISTANT

## 2024-12-11 PROCEDURE — 5A09357 ASSISTANCE WITH RESPIRATORY VENTILATION, LESS THAN 24 CONSECUTIVE HOURS, CONTINUOUS POSITIVE AIRWAY PRESSURE: ICD-10-PCS | Performed by: PHYSICAL MEDICINE & REHABILITATION

## 2024-12-11 RX ORDER — ACETAMINOPHEN 325 MG/1
325-650 TABLET ORAL EVERY 6 HOURS PRN
Status: DISCONTINUED | OUTPATIENT
Start: 2024-12-11 | End: 2024-12-12

## 2024-12-11 RX ORDER — PYRIDOSTIGMINE BROMIDE 60 MG/1
60 TABLET ORAL EVERY 8 HOURS
Status: DISCONTINUED | OUTPATIENT
Start: 2024-12-11 | End: 2024-12-12

## 2024-12-11 RX ORDER — HEPARIN SODIUM 5000 [USP'U]/.5ML
5000 INJECTION, SOLUTION INTRAVENOUS; SUBCUTANEOUS EVERY 8 HOURS
Status: DISPENSED | OUTPATIENT
Start: 2024-12-11

## 2024-12-11 RX ORDER — GUAIFENESIN 200 MG/10ML
200 LIQUID ORAL EVERY 4 HOURS PRN
Status: DISCONTINUED | OUTPATIENT
Start: 2024-12-11 | End: 2024-12-13

## 2024-12-11 RX ORDER — LEVOTHYROXINE SODIUM 75 UG/1
150 TABLET ORAL DAILY
Status: DISCONTINUED | OUTPATIENT
Start: 2024-12-12 | End: 2024-12-12

## 2024-12-11 RX ORDER — DEXTROSE MONOHYDRATE 100 MG/ML
INJECTION, SOLUTION INTRAVENOUS CONTINUOUS PRN
Status: ACTIVE | OUTPATIENT
Start: 2024-12-11

## 2024-12-11 RX ORDER — LANOLIN ALCOHOL/MO/W.PET/CERES
1000 CREAM (GRAM) TOPICAL DAILY
Status: DISCONTINUED | OUTPATIENT
Start: 2024-12-11 | End: 2024-12-12

## 2024-12-11 RX ORDER — SALIVA STIMULANT COMB. NO.3
2 SPRAY, NON-AEROSOL (ML) MUCOUS MEMBRANE
Status: ACTIVE | OUTPATIENT
Start: 2024-12-11

## 2024-12-11 RX ORDER — SIMVASTATIN 10 MG
10 TABLET ORAL DAILY
Status: ON HOLD | DISCHARGE
Start: 2024-12-11 | End: 2024-12-31

## 2024-12-11 RX ORDER — OLOPATADINE HYDROCHLORIDE 1 MG/ML
1 SOLUTION/ DROPS OPHTHALMIC 2 TIMES DAILY PRN
Status: ACTIVE | OUTPATIENT
Start: 2024-12-11

## 2024-12-11 RX ORDER — VITAMIN B COMPLEX
25 TABLET ORAL DAILY
Status: DISCONTINUED | OUTPATIENT
Start: 2024-12-11 | End: 2024-12-12

## 2024-12-11 RX ORDER — SIMVASTATIN 10 MG
10 TABLET ORAL DAILY
Status: ACTIVE | OUTPATIENT
Start: 2024-12-11

## 2024-12-11 RX ORDER — ASPIRIN 81 MG/1
81 TABLET, CHEWABLE ORAL EVERY EVENING
Status: DISCONTINUED | OUTPATIENT
Start: 2024-12-12 | End: 2024-12-12

## 2024-12-11 RX ADMIN — HEPARIN SODIUM 5000 UNITS: 5000 INJECTION, SOLUTION INTRAVENOUS; SUBCUTANEOUS at 08:52

## 2024-12-11 RX ADMIN — Medication 3 MG: at 21:13

## 2024-12-11 RX ADMIN — PYRIDOSTIGMINE BROMIDE 60 MG: 60 TABLET ORAL at 18:36

## 2024-12-11 RX ADMIN — PYRIDOSTIGMINE BROMIDE 60 MG: 60 TABLET ORAL at 06:12

## 2024-12-11 RX ADMIN — CYANOCOBALAMIN TAB 1000 MCG 1000 MCG: 1000 TAB at 18:36

## 2024-12-11 RX ADMIN — ASPIRIN 81 MG CHEWABLE TABLET 81 MG: 81 TABLET CHEWABLE at 08:52

## 2024-12-11 RX ADMIN — LEVOTHYROXINE SODIUM 150 MCG: 150 TABLET ORAL at 08:52

## 2024-12-11 RX ADMIN — OLOPATADINE HYDROCHLORIDE 1 DROP: 1 SOLUTION/ DROPS OPHTHALMIC at 08:52

## 2024-12-11 RX ADMIN — Medication 25 MCG: at 18:36

## 2024-12-11 RX ADMIN — HEPARIN SODIUM 5000 UNITS: 5000 INJECTION, SOLUTION INTRAVENOUS; SUBCUTANEOUS at 18:38

## 2024-12-11 ASSESSMENT — ACTIVITIES OF DAILY LIVING (ADL)
ADLS_ACUITY_SCORE: 87
ADLS_ACUITY_SCORE: 87
ADLS_ACUITY_SCORE: 83
ADLS_ACUITY_SCORE: 61
ADLS_ACUITY_SCORE: 87
ADLS_ACUITY_SCORE: 87
ADLS_ACUITY_SCORE: 61
ADLS_ACUITY_SCORE: 71
ADLS_ACUITY_SCORE: 87
ADLS_ACUITY_SCORE: 83
ADLS_ACUITY_SCORE: 87
ADLS_ACUITY_SCORE: 87
ADLS_ACUITY_SCORE: 65
ADLS_ACUITY_SCORE: 83
ADLS_ACUITY_SCORE: 65
ADLS_ACUITY_SCORE: 83
ADLS_ACUITY_SCORE: 87
ADLS_ACUITY_SCORE: 71
ADLS_ACUITY_SCORE: 87
ADLS_ACUITY_SCORE: 71
ADLS_ACUITY_SCORE: 71
ADLS_ACUITY_SCORE: 83
ADLS_ACUITY_SCORE: 87

## 2024-12-11 NOTE — PLAN OF CARE
Goal Outcome Evaluation:       Plan of Care Reviewed With: patient, spouse       Reason for Admission: acute respiratory distress; myasthenia gravis crisis.     Cognitive/Mentation: A/Ox 4  Neuros/CMS: Exam stable: intact ex weakness in extremities: LUE 4/5, RUE 3/5, BLEs 4/5.  Weak  R hand, moderate  L hand. L hand  improving.  VS: WDL RA.   Tele: NA.  /GI: Incontinent of urnine. Adequate o/p in external catheter.  Pulmonary: LS diminished.  Pain: denies.      Drains/Lines: PIV discontinued.  Skin: Scattered bruising.  Activity: Assist x 2 with GB and walker.  Diet: NPO. Tube feed running at goal rate.      Therapies recs: ARU  Discharge: Discharged to ARU via medical transport. Report provided to receiving RN.     Aggression Stoplight Tool: green     End of shift summary: Na+ 149. Neuro exam stable. Good urine o/p after snowden was removed 12/10.

## 2024-12-11 NOTE — PLAN OF CARE
Occupational Therapy Discharge Summary    Reason for therapy discharge:    Discharged to acute rehabilitation facility.    Progress towards therapy goal(s). See goals on Care Plan in [facility] electronic health record for goal details.  Goals partially met.  Barriers to achieving goals:   discharge from facility.    Therapy recommendation(s):    Continued therapy is recommended.  Rationale/Recommendations:  Pt well below baseline, limited by significant weakness, pain, poor activity tolerance. Pt would benefit from intensive rehab at ARU to progress I/ADL independence prior to returning home. Pt was previously independent and living at home w/ spouse.

## 2024-12-11 NOTE — PROGRESS NOTES
NIF -26    VC 9440    Patient effort was fair.    RT will continue to follow.    12/10/2024  Dillon Gagnon, RRT

## 2024-12-11 NOTE — PROGRESS NOTES
Care Management Discharge Note    Discharge Date: 12/11/2024       Discharge Disposition:  [facility]   Discharge Services:  therapies, transport   Discharge Transportation:     Private pay costs discussed: transportation costs    Does the patient's insurance plan have a 3 day qualifying hospital stay waiver?  No    Education Provided on the Discharge Plan:  yes  Persons Notified of Discharge Plans: patient, wife, bedside RN, facility  Patient/Family in Agreement with the Plan: yes    Handoff Referral Completed: Yes, [facility] PCP: Internal handoff referral completed    Additional Information:  Patient medically ready to discharge today and has a bed at [facility]. [facility] stretcher will pick patient up between [id number] to go to [facility]. Met with patient and wife to discuss the plan and confirm transportation and details.     NORAH Sampson, LGSW   Social Work   [facility]

## 2024-12-11 NOTE — H&P
[facility]   Acute Rehabilitation Unit  Admission History and Physical    CHIEF COMPLAINT   Myasthenia gravis with myasthenia crisis     HISTORY OF PRESENT ILLNESS  Mendez Greene is a 79 year old male with past medical history of paroxysmal atrial fibrillation, CAD, ascending aortic aneurysm, HLD, HTN, hypothyroidism, CHA admitted to ICU service on 11/30/2024 for acute respiratory failure and AMS. Patient was recently hospitalized at [facility] from 11/8/24-11/15/24 for progressive weakness and fatigue, difficulty swallowing. He was seen by neurology who were concerned for motor neuron disease with high index of suspicion for ALS. EMG consistent with MND. He was started on Riluzole and Edaravone and referred to [facility] neuromuscular division for second opinion. Discharged home with palliative care referral. After discharge, his ACh receptor binding Ab came back positive, strongly suggestive of myasthenia gravis.     Presented back to [facility] ED 11/30 with hypotension and acute respiratory failure. Family rescinded patient's DNR/DNI for patient to be full code and patient reportedly nodded in  agreement. Patient was intubated in the ED and admitted to ICU on pressors. Started on broad spectrum antibiotics for possible septic shock due to UTI. Neurology consulted, started patient on IVIGx 5 doses for myasthenic crisis and patient was initiated on Mestinon. Extubated with pressors stopped 12/3. Transferred to hospitalist service on 12/4. Hospital course has been notable for urosepsis with chronic Cortez catheter (completed abx course), MOHAMUD, electrolyte derangements, bradycardia, hypertension.      During acute hospitalization, patient was seen and evaluated by PT , OT, and SLP who collectively recommended that patient would benefit from ongoing therapies in the acute inpatient rehabilitation setting.      In review of the therapy notes  Level of Function: GG Scale (Section GG  "Functional Ability and Goals; CMS's CHRISTENSEN Version 3.0 Manual effective 10.1.2019):  PT Current Function Goals for Rehab   Bed Rolling 3 Partial/moderate assistance 6 Independent   Supine to Sit 3 Partial/moderate assistance 6 Independent   Sit to Stand 1 Dependent (Mod A of 2) 6 Independent   Transfer 1 Dependent (Mod A of 2) 4 Supervision or touching assistance   Ambulation 1 Dependent (Min A +WW and chair follow) 4 Supervision or touching assistance   Stairs 88 Not attempted due to safety 3 Partial/moderate assistance      OT Current Function Goals for Rehab   Feeding 2 Substantial/maximal assistance 5 Setup or clean-up assistance   Grooming 2 Substantial/maximal assistance 5 Setup or clean-up assistance   Bathing Not completed 3 Partial/moderate assistance   Upper Body Dressing 2 Substantial/maximal assistance 6 Independent   Lower Body Dressing 1 Dependent  4 Supervision or touching assistance   Toileting 1 Dependent 4 Supervision or touching assistance   Toilet Transfer 1 Dependent (A of 2 and [place]) 4 Supervision or touching assistance   Tub/Shower Transfer 88 Not attempted due to safety 3 Partial/moderate assistance   Cognition Not Assessed Independent      SLP Current Function Goals for Rehab   Swallow Impaired Tolerate least restrictive diet without signs & symptoms of aspiration and adhere to safe swallow strategies   Communication Not Impaired Not applicable       Upon arrival to the rehab unit, he was accompanied by his wife and one of his sons. Questions about the unit were answered. He appeared to be in good spirits and was interested in the therapy schedule for the next day. He has no new acute concerns. Reiterated that he was independent just a couple months go, working in the garden. Did have a fall at home recently after he stood up from the toilet and was standing, \"for too long.\" He did have two people there to help him when he fell. Does feel lightheaded when he's vertical for too long. Feels " he's been sleeping well the last few days. Would like melatonin. Denies pain. Feels his weakness has gotten better in recent days, particularly his shoulder abduction. Wife has splints for hand contractures she'd like to bring in. Patient has been feeling thirsty. Denied any other questions at this time.     PAST MEDICAL HISTORY   Reviewed and updated in Epic.  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  Past Surgical History:   Procedure Laterality Date    ARTHROPLASTY KNEE Left 08/23/2022    Procedure: LEFT TOTAL KNEE ARTHROPLASTY;  Surgeon: Melba Dial MD;  Location:  OR    ARTHROPLASTY REVISION KNEE Left 09/05/2023    Procedure: REVISION TOTAL KNEE ARTHROPLASTY PATELLA;  Surgeon: Melba Dial MD;  Location:  OR    BUNIONECTOMY Left     BUNIONECTOMY DIANA  11/28/2011    RT-Procedure:BUNIONECTOMY DIANA; Right Foot [place] Bunionectomy with Metatarsal and Phalanx Osteotomy with 1st Metatarsal Phalangeal Joint Repair Right Foot; Surgeon:BAKARI ZAIDI; Location: [facility]    CATARACT EXTRACTION, BILATERAL      CV CORONARY ANGIOGRAM N/A 11/11/2024    Procedure: Coronary Angiogram;  Surgeon: Krystian Machuca MD;  Location:  HEART CARDIAC CATH LAB    CV HEART CATHETERIZATION WITH POSSIBLE INTERVENTION N/A 11/07/2020    Procedure: Heart Catheterization with Possible Intervention;  Surgeon: Rishi Llanes MD;   Location:  HEART CARDIAC CATH LAB    CV LEFT HEART CATH N/A 11/11/2024    Procedure: Left Heart Catheterization;  Surgeon: Krystian Machuca MD;  Location:  HEART CARDIAC CATH LAB    CYSTECTOMY BLADDER, ILEAL DIVERSION NEOBLADDER, COMBINED  2006    HERNIORRHAPHY INCISIONAL (LOCATION) N/A 08/01/2016    Procedure: HERNIORRHAPHY INCISIONAL (LOCATION);  Surgeon: Onofre Lua MD;  Location:  [facility]    IR GASTROSTOMY TUBE PERCUTANEOUS PLCMNT  11/13/2024    OPEN REDUCTION INTERNAL FIXATION PATELLA Left 09/05/2023    Procedure: LEFT KNEE OPEN REDUCTION INTERNAL  FIXATION PATELLA FRACTURE WITH;  Surgeon: Melba Dial MD;  Location:  OR    OPEN REDUCTION INTERNAL FIXATION PATELLA Left 10/10/2023    Procedure: LEFT KNEE REVISION OPEN REDUCTION INTERNAL FIXATION PATELLA WITH POLY REMOVAL, PATELLAR;  Surgeon: Melba Dial MD;  Location:  OR    REMOVE HARDWARE KNEE Left 10/10/2023    Procedure: WITH POLY REMOVAL, PATELLAR;  Surgeon: Melba Dial MD;  Location:  OR       SOCIAL HISTORY    Social History     Socioeconomic History    Marital status:      Spouse name: Alana    Number of children: 2    Years of education: 19    Highest education level: Not on file   Occupational History    Occupation: Semi-retired    Tobacco Use    Smoking status: Former     Types: Cigarettes     Passive exposure: Never (per pt)    Smokeless tobacco: Never    Tobacco comments:     Quit in 1987; smoked for 20 years; 1.5 ppd at his most.    Vaping Use    Vaping status: Never Used   Substance and Sexual Activity    Alcohol use: Yes     Comment: 2-3 glasses of wine/week    Drug use: No    Sexual activity: Not Currently   Other Topics Concern    Parent/sibling w/ CABG, MI or angioplasty before 65F 55M? No   Social History Narrative    .    2 adult children.    2 grandchildren.    Left knee surgeries limit mobility.     Social Drivers of Health     Financial Resource Strain: Low Risk   (12/11/2024)    Financial Resource Strain     Within the past 12 months, have you or your family members you live with been unable to get utilities (heat, electricity) when it was really needed?: No   Food Insecurity: Low Risk  (12/11/2024)    Food Insecurity     Within the past 12 months, did you worry that your food would run out before you got money to buy more?: No     Within the past 12 months, did the food you bought just not last and you didn t have money to get more?: No   Transportation Needs: Low Risk  (12/11/2024)    Transportation Needs     Within the past 12 months, has lack of transportation kept you from medical appointments, getting your medicines, non-medical meetings or appointments, work, or from getting things that you need?: No   Physical Activity: Sufficiently Active (7/8/2024)    Exercise Vital Sign     Days of Exercise per Week: 3 days     Minutes of Exercise per Session: 60 min   Stress: No Stress Concern Present (7/8/2024)    [place] [employer] - Occupational Stress Questionnaire     Feeling of Stress : Only a little   Social Connections: Unknown (7/8/2024)    Social Connection and Isolation Panel [NHANES]     Frequency of Communication with Friends and Family: Not on file     Frequency of Social Gatherings with Friends and Family: Twice a week     Attends Christian Services: Not on file     Active Member of Clubs or Organizations: Not on file     Attends Club or Organization Meetings: Not on file     Marital Status: Not on file   Interpersonal Safety: Low Risk  (12/11/2024)    Interpersonal Safety     Do you feel physically and emotionally safe where you currently live?: Yes     Within the past 12 months, have you been hit, slapped, kicked or otherwise physically hurt by someone?: No     Within the past 12 months, have you been humiliated or emotionally abused in other ways by your partner or ex-partner?: No   Housing Stability: Low Risk  (12/11/2024)    Housing  Stability     Do you have housing? : Yes     Are you worried about losing your housing?: No       FAMILY HISTORY  Reviewed and updated in Epic.  Family History   Problem Relation Age of Onset    Osteoporosis Mother     Neurologic Disorder Mother         PD    Cerebrovascular Disease Mother     Hypertension Mother     Hyperlipidemia Mother     Cerebrovascular Disease Father     Diabetes Type 1 Sister     Cancer Maternal Grandmother         unknown type    Diabetes Type 2  No family hx of     Myocardial Infarction No family hx of     Prostate Cancer No family hx of     Colon Cancer No family hx of          PRIOR FUNCTIONAL HISTORY   Pt was independent with all ADLs/IADLs, transfers, mobility and gait. Lived in house with his wife.     MEDICATIONS  Scheduled meds  Medications Prior to Admission   Medication Sig Dispense Refill Last Dose/Taking    aspirin 81 MG EC tablet Take 81 mg by mouth every evening.   12/11/2024 at  8:52 AM    levothyroxine (SYNTHROID/LEVOTHROID) 150 MCG tablet Take 1 tablet (150 mcg) by mouth daily 90 tablet 3 12/11/2024 at  8:52 AM    melatonin 5 MG tablet Take 1 tablet (5 mg) by mouth or NG Tube at bedtime.   12/10/2024 at  8:37 PM    olopatadine (PATANOL) 0.1 % ophthalmic solution Place 1 drop into both eyes 2 times daily as needed for allergies.   12/11/2024 at  8:52 AM    pyRIDostigmine (MESTINON) 60 MG tablet Take 1 tablet (60 mg) by mouth every 8 hours.   12/11/2024 at  6:12 AM    acetaminophen (TYLENOL) 325 MG tablet Take 1-2 tablets (325-650 mg) by mouth every 6 hours as needed for mild pain.   Unknown    artificial saliva (BIOTENE MT) SOLN solution Swish and spit 2 sprays in mouth every hour as needed for dry mouth. 44.3 mL 1 Unknown    Cholecalciferol (D3 PO) Take 1 tablet by mouth daily. OTC: Unsure of strength.   Unknown    guaiFENesin (ROBITUSSIN) 20 mg/mL liquid Take 10 mLs (200 mg) by mouth or Feeding Tube every 4 hours as needed for cough.   Unknown    Jevity 1.5 Mandeep Place 1,320  "mLs into G tube daily. Infuse via pump   110ml/hr X 12 hrs/day (5.5 cartons/day)  Water flush: 150ml every 4 hours or 6x/day 39591 mL 11 Unknown    order for DME Equipment being ordered: CPAP and supplies. LIFETIME need. 1 each 0 Unknown    simvastatin (ZOCOR) 10 MG tablet Take 1 tablet (10 mg) by mouth daily. (Hold until follow-up with neurology)   12/2/2024    vitamin B-12 (CYANOCOBALAMIN) 1000 MCG tablet Take 1,000 mcg by mouth daily   Unknown       ALLERGIES   No Known Allergies      REVIEW OF SYSTEMS  A 10 point ROS was performed and negative unless otherwise noted in HPI.       PHYSICAL EXAM  VITAL SIGNS:  BP (!) 148/68 (BP Location: Right arm, Patient Position: Semi-Mahoney's, Cuff Size: Adult Regular)   Pulse 52   Temp 98  F (36.7  C) (Oral)   Resp 20   Ht 1.778 m (5' 10\")   Wt 82 kg (180 lb 11.2 oz)   SpO2 97%   BMI 25.93 kg/m    BMI:  Estimated body mass index is 25.93 kg/m  as calculated from the following:    Height as of this encounter: 1.778 m (5' 10\").    Weight as of this encounter: 82 kg (180 lb 11.2 oz).     General: No acute distress  HEENT: Pupils equal, round, and reactive to light   Pulmonary: Breathing comfortably on room air, clear to auscultation bilaterally  Cardiovascular: Regular rate and rhythm  Abdominal: Non-tender, non-distended, bowel sounds present in all four quadrants   Extremities: warm, well perfused, no edema in bilateral lower extremities, no tenderness in calves. Evidence of prior surgery in left knee.   MSK/neuro:   Mental Status:  alert and oriented x3    Cranial Nerves: grossly normal   2nd CN: Pupils equal, round, reactive to light and accomodation. and visual fields intact to confrontation.   3rd,4th,6th CN:  EOMI, appropriate pupillary responses  5th CN: facial sensation intact   7th CN: face symmetrical   8th CN: functional hearing bilaterally  9th, 10th CN: palate elevates symmetrically   11th CN: sternocleidomastoids and trapezii strong   12th CN: tongue " midline and without fasciculations     Sensory: Normal to light touch in bilateral upper and lower extremities    Strength:    SF  EF  EE  WE  G  I  HF  KE  DF  PF   R  4- 3 3+ 1 4 2 3 4- 4 4  L  4- 4- 4- 0 4 1 4- 4- 4 4   Reflexes: 1+ b/l patellar, biceps, brachioradialis, 0 b/l achilles   Best's test: negative bilaterally    Tone per modified Gabriella Scale: developing contractions in R >L finger flexors   Abnormal movements: None    Coordination: Slowed, No dysmetria, but exam limited by weakness.   Speech: Clear, fluent, no aphasia   Cognition: intact   Gait: Not observed  Skin: no bruising or bleeding and normal skin color, texture, turgor    IMPRESSION/PLAN:  Mendez Greene is a 79 year old male with past medical history of paroxysmal atrial fibrillation, CAD, ascending aortic aneurysm, HLD, HTN, hypothyroidism, CHA admitted to ICU service on 11/30/2024 for acute respiratory failure and AMS. Patient was recently hospitalized at [facility] from 11/8/24-11/15/24 for progressive weakness and fatigue, difficulty swallowing. After discharge, his ACh receptor binding Ab came back 2.11, strongly suggestive of myasthenia gravis. Presented back to [facility] ED 11/30 with hypotension and acute respiratory failure. Patient was intubated in the ED and admitted to ICU on pressors. During admission patient received 5 doses of IVIG for myasthenic crisis and was initiated on Mestinon. Hospital course also included broad spectrum antibiotics for possible septic shock due to UTI, MOHAMUD, electrolyte derangements, bradycardia, hypertension.  Now admitted to acute rehab.    Admission to acute inpatient rehab 12/11/2024.    Impairment group code: 03.8 Neuromuscular Disorders; new diagnosis of myasthenia gravis with myasthenia crisis       PT and OT 75 minutes of each as well as SLP 30 minutes on a daily basis, in addition to rehab nursing and close management of physiatrist.      Impairment of ADL's: Impairment in strength, endurance, and ROM  resulting in functional limitations in patient's ability to perform ADLs    Impairment of mobility:  Impairment in strength, endurance, and ROM resulting in functional limitations in patient's ability to perform functional mobility     Impairment of cognition/language/swallow:  Needs therapy for swallowing.    Medical Conditions    #Myasthenic crisis  Questionable ALS diagnosis which is being deferred to his primary neurologist.  - clinically much improved with improvement in upper extremity strength; still with some upper extremity flexion weakness and LE weakness  - completed 5 doses of IVIG on 12/5/24   - Caution use of IV magnesium, beta-blocker, statin, macrolides, aminoglycosides, and fluoroquinolone, and as much as possible avoid them due to to potential exacerbation of myasthenia crisis. ; Holding PTA statin  - Monitor respiratory status closely, currently stable on room air  - CPAP at HS  - Pyridostigmine 60 mg PO TID  - Consider CT chest with contrast for r/o thymoma while on rehab     #H/o neobladder  - was on snowden catheter in neobladder, discontinued 12/10 and voiding well, chronically incontinent     #Hypernatremia  #Hyperchloremia  Na 149 day of admission to rehab (12/11), Cl 117, creatinine 0.57 (at baseline). Had issues with hypernatremia on acute service, up to 152. Acute service increased free water flushes and gave 1L D5.   - continue free water flushes 150 ml q 4hrs   - BMP M/Th     #Chronic macrocytic anemia  Hgb has mostly been around 9-10 on acute service; noted slight downtrend ---8.5--7.8 (12/11), likely dilutional with IV fluids as platelets and hematocrit also decreased slightly; has no clinical concern for active bleed and has been hemodynamically stable. B12 was 727, folic acid wnl on 11/12. Was seen by hematology 4/2024, workup unremarkable.  - CBC M/Th  - Transfuse hgb <7 (has not required transfusion this admission)  - PTA B12 supplementation     #Sinus bradycardia  HR 40s to upper  50s during admission, dips the most when sleeping. Previous EKG sinus bradycardia with no high grade blocks. Chart review shows similar HR 40-50s last hospitalization, and he has a few OP visits with documented sinus bradycardia (7/10/24, 3/4/24).  - OK w HR 40's-50's  - CTM, further evaluation if ever symptomatic     #Moderate to severe oropharyngeal dysphagia s/p G-tube  - VFSS on 12/9 while on acute service, note that he continues to aspirate all textures but improved ability to cough; SLP recommend continued NPO  - Receive all nutrition via tube feeds  - BMP, Mg & Phos M/Th     #CAD  #HLD  #Paroxysmal atrial fibrillation  #Recent NSTEMI (11/8/24)  Troponin 997 on acute service, cardiac cath 11/11/24 showed only mild CAD. Echo 12/1/24 difficult study but grossly normal left and right ventricular function, no severe valvular regurgitation, probably mild aortic regurgitation, no pericardial effusion.  Not in afib on admission to rehab.  - Continue PTA ASA  - HOLD PTA Simvastatin until follow-up with neurology  - No rate control medications at baseline     #Hypothyroidism  - PTA levothyroxine 150 mcg daily    #CHA  - PTA CPAP HS      Adjustment to disability:  Clinical psychology to eval and treat  FEN: NPO except for ice Chips. Receiving Tube Feeding Adult Formula Drip Feeding: Continuous Jevity 1.5; Gastrostomy at 55 mL/h with free water flushes 150 ml q 4 hrs. PTA Vit D supplementation.  Bowel: Will order if necessary.  Bladder: No snowden in place, PVRs on admission  DVT Prophylaxis: Subcutaneous heparin   GI Prophylaxis: Na  Code: Full  Disposition: Home with family support.  ELOS:  21 Days.  Rehab prognosis:  Good  Follow up Appointments on Discharge:   PCP  Neurology, Dr. Fabian or Dr. Ashley   PM&R  Cardiology    Patient seen and reviewed with resident physician, Dr. Villagran, and attending physician, Dr. Schultz.    Gerry Brewer, MS3    Note written in conjunction with medical student and edited where/if  necessary.    Patient seen and reviewed with attending physician, Dr. Schultz, who agrees with the assessment and plan.    Azul Villagran MD  PGY-2  Physical Medicine & Rehabilitation  12/11/2024  Pager #: [phone]

## 2024-12-11 NOTE — PROGRESS NOTES
[id number]  Status: Patient admitted on 11/30 with myasthenic crisis, acute hypoxic respiratory failure requiring mechanical ventilation, secondary to urosepsis  Vitals: VSS  Neuros: Intact ex generalized weakness, LUE 4/5, RUE 3/5, BLE 4/5  IV: PIV infusing D5 at 100mL/hr  Labs/Electrolytes: Na 152, D5 infusion started  Resp/trach: Lung sounds with fine crackles in lower lobes  Diet: NPO, TF infusing at goal of 55mL/hr, FWF 150mL Q4hrs  Bowel status: Last BM this AM  : Due to void  Skin: Intact  Pain: Denies  Activity: Up with A1-2, walker, gaitbelt  Social: Cooperative with cares, wife and son at bedside this evening, supportive  Plan: Discharge to ARU possibly tomorrow, continue to monitor and follow POC

## 2024-12-11 NOTE — PROVIDER NOTIFICATION
"MD Notification    Notified Person: MD    Notified Person Name: Roberto    Notification Date/Time: 0310 12-11-24    Notification Interaction: jacquelyn    Purpose of Notification: \"Patients MRI shows a meningioma. I'm unsure if its known or not so I'm just letting you know\"    Orders Received: \"Recommend to continue to monitor closely\"    "

## 2024-12-11 NOTE — PLAN OF CARE
María Elena Matias RN on 12/11/2024 at 6:02 AM    Reason for Admission: acute respiratory distress; myasthenia gravis crisis.  Cognitive/Mentation: A/Ox 4  Neuros/CMS: Intact ex weakness in extremities. LUE 4/5, RUE 3/5, BLEs 4/5.  Weak  R hand, moderate  L hand.  VS: VSS on RA. Baseline bradycardic   Tele: NA.  /GI: incontinent, external cath in place   Pulmonary: LS diminished. Home CPAP on overnight  Pain: denies.   Drains/Lines: PIV saline locked  Skin: Scattered bruising and blanchable redness on sacrum.  Activity: Assist x 2 with GB and walker. Not OOB  Diet: NPO. Tube feed running at goal rate of 55mL/hr.   Therapies recs: ARU  Discharge: Likely today.  Aggression Stoplight Tool: green

## 2024-12-11 NOTE — DISCHARGE SUMMARY
[facility]  Discharge Summary        Mendez Greene MRN# [id number]   YOB: 1945 Age: 79 year old     Date of Admission:  11/30/2024  Date of Discharge:  12/11/2024  Admitting Physician:  Hadley De La O MD  Discharge Physician: Ramiro Rome MD  Discharging Service: Hospitalist     Primary Provider: Gladys Vazquez  Primary Care Physician Phone Number: [phone]         Discharge Diagnoses/Problem Oriented Hospital Course (Providers):    Mendez Greene was admitted on 11/30/2024 by Hadley De La O MD and I would refer you to their history and physical.  The following problems were addressed during his hospitalization:    Mendez Greene is a 79 year old male with PMH of paroxysmal atrial fibrillation (not on anticoagulation PTA), CAD, ascending aortic aneurysm, HLD, HTN, hypothyroidism, ALS, myasthenia gravis, CHA admitted to ICU service on 11/30/2024 for acute respiratory failure and AMS.     He was recently hospitalized at [facility] from 11/8/24-11/15/24 for progressive weakness and fatigue, difficulty swallowing. He was seen by neurology who were concerned for motor neuron disease with high index of suspicion for ALS. He was started on Riluzole and Edaravone and referred to [facility] neuromuscular division for second opinion. Discharged home with palliative care referral. After discharge, his ACh receptor binding Ab came back positive, strongly suggestive of myasthenia gravis.     He presented back to [facility] ED 11/30 with hypotension and acute respiratory failure. Family rescinded patient's DNR/DNI for patient to be full code and patient reportedly nodded in  agreement. Patient was intubated in the ED and admitted to ICU on pressors. Started on broad spectrum antibiotics for possible septic shock due to UTI. Neurology consulted, started patient on IVIG for likely myasthenic crisis and patient was initiated on Mestinon. Extubated with pressors stopped 12/3. Transferred  to hospitalist service on 12/4.     Myasthenic crisis  Acute hypoxic respiratory failure requiring mechanical ventilation, resolved  *Secondary to urosepsis (see below). Questionable ALS diagnosis which is being deferred to his primary neurologist.  - Neurocritical care followed while in ICU  - clinically much improved with improvement in upper extremity strength ; still with some upper extremity flexion weakness  - Neurology signed off 12/8; completed 5 doses of IVIG on 12/5/24; to continue Mestinon 60 mg PO TID; neurology suggest outpatient follow-up  - completed 7 days of antibiotics course on 12/7/24  - care coordinator/ following for disposition to acute rehab     - Caution use of IV magnesium, beta-blocker, statin, macrolides, aminoglycosides, and fluoroquinolone, and as much as possible avoid them due to to potential exacerbation of myasthenia crisis. ; Holding PTA statin  - respiratory status currently stable on room air  - Continue CPAP at HS  - Continue PT/OT/SLP     Urosepsis  Septic shock, resolved  H/o neobladder  *WBC 13.3 on admission, afebrile, lactic 2.0. UA with infectious markers. Hypotensive requiring pressors initially, now weaned. Urine culture 12/1 NGTD.  - S/p IV Zosyn (12/1 - 12/3) then narrowed to IV Rocephin (12/3 - 12/7)  - Blood culture 11/30 NGTD  - was on snowden catheter in neobladder, with urine leaking that has now resolved  - discontinued Snowden 12/10 and voiding well , chronically incontinent     MOHAMUD, improved  Hypernatremia  Hyperchloremia  *Creatinine 3.20 on admission now improved back to baseline with hydration; Cr 0.6 (12/9)   - Na had improved to 140s, then slowly trended up again to 152  - increase free water flushes to 150 ml q 4hrs (12/10); also given D5 at 100 ml/hr X 10 hrs  - Na improving from 152---149; will continue with increased free water flushes of 150 ml q 4 hrs   - monitor BMP at ARU    Chronic anemia  - Hb has mostly been around 9-10; noted slight  downtrend ---8.5--7.8 (12/11), likely dilutional with IV fluids; has no clinical concern for active bleed and has been hemodynamically stable ; monitor Hb at ARU      Sinus bradycardia  *HR 40s to upper 50s, dips the most when sleeping. Asymptomatic. Mostly unchanged after discontinuing Levophed 12/3. EKG sinus bradycardia with no high grade blocks.  *Chart review shows similar HR 40-50s last hospitalization, and he has a few OP visits with documented sinus bradycardia (7/10/24, 3/4/24).  - Monitor while on Mestinon, defer any dose adjustments to neuro     Moderate to severe oropharyngeal dysphagia s/p G-tube  - RD following for TF  - VFSS on 12/9  - evaluated by SLP (12/9), note that he continues to aspirate all textures but improved ability to cough; SLP recommend continued NPO; nutrition following for tube feeds     CAD  HLD  Paroxysmal atrial fibrillation  - Continue PTA Aspirin  - PTA Simvastatin currently on hold in the setting of myasthenia crisis-- will continue to hold on discharge until follow-up with neurology  - No rate control medications at baseline     Elevated troponin, improving  Recent NSTEMI (11/8/24)  *Troponin 997 on admission (down from 1,346 last admission), trended down. EKG w/o acute ischemic changes. No complaints of chest pain. Cardiac cath 11/11/24 showed only mild CAD. ED provider spoke with cardiology, no concern for ACS.  *Echo 12/1/24 difficult study but grossly normal left and right ventricular function, no severe valvular regurgitation, probably mild aortic regurgitation, no pericardial effusion.      Hypokalemia-- resolved     CHA  - Continue CPAP at HS     Hypothyroidism  - Continue PTA Synthroid     Ascending aortic aneurysm  Aortic root dilatation  *Noted to be stable on echo 11/9/24  - Outpatient follow up for surveillance     Diet:   NPO for Medical/Clinical Reasons Except for: Ice Chips, NPO but receiving Tube Feeding  Adult Formula Drip Feeding: Continuous Jevity 1.5;  "Gastrostomy at 55 mL/h with free water flushes 150 ml q 4 hrs     Code status: full code    Clinically Significant Risk Factors         # Hypernatremia: Highest Na = 152 mmol/L in last 2 days, will monitor as appropriate  # Hyperchloremia: Highest Cl = 122 mmol/L in last 2 days, will monitor as appropriate         # Hypercalcemia: corrected calcium is >10.1, will monitor as appropriate    # Hypoalbuminemia: Lowest albumin = 2.2 g/dL at 12/5/2024  7:21 AM, will monitor as appropriate       # Hypertension: Noted on problem list              # Overweight: Estimated body mass index is 26 kg/m  as calculated from the following:    Height as of 11/15/24: 1.778 m (5' 10\").    Weight as of this encounter: 82.2 kg (181 lb 3.5 oz).   # Moderate Malnutrition: based on nutrition assessment      # Financial/Environmental Concerns: none                           Pending Results:        Unresulted Labs Ordered in the Past 30 Days of this Admission       Date and Time Order Name Status Description    11/12/2024  1:32 PM MyoMarker Panel 3 Plus In process                  Discharge Instructions and Follow-Up:      Follow-up Appointments     Follow Up and recommended labs and tests      Follow up with neurology after discharge from ARU.    Repeat CBC and BMP in 2 days.                 Discharge Disposition:      Discharged to rehabilitation facility         Discharge Medications:        Current Discharge Medication List        START taking these medications    Details   guaiFENesin (ROBITUSSIN) 20 mg/mL liquid Take 10 mLs (200 mg) by mouth or Feeding Tube every 4 hours as needed for cough.    Associated Diagnoses: Acute hypoxic respiratory failure (H)      melatonin 5 MG tablet Take 1 tablet (5 mg) by mouth or NG Tube at bedtime.    Associated Diagnoses: Myasthenic crisis (H)      pyRIDostigmine (MESTINON) 60 MG tablet Take 1 tablet (60 mg) by mouth every 8 hours.    Associated Diagnoses: Myasthenic crisis (H)           CONTINUE these " medications which have CHANGED    Details   acetaminophen (TYLENOL) 325 MG tablet Take 1-2 tablets (325-650 mg) by mouth every 6 hours as needed for mild pain.    Associated Diagnoses: Status post open reduction with internal fixation of fracture      simvastatin (ZOCOR) 10 MG tablet Take 1 tablet (10 mg) by mouth daily. (Hold until follow-up with neurology)    Associated Diagnoses: Hyperlipidemia, unspecified hyperlipidemia type           CONTINUE these medications which have NOT CHANGED    Details   artificial saliva (BIOTENE MT) SOLN solution Swish and spit 2 sprays in mouth every hour as needed for dry mouth.  Qty: 44.3 mL, Refills: 1    Associated Diagnoses: MND (motor neurone disease) (H)      aspirin 81 MG EC tablet Take 81 mg by mouth every evening.      Cholecalciferol (D3 PO) Take 1 tablet by mouth daily. OTC: Unsure of strength.      Jevity 1.5 Mandeep Place 1,320 mLs into G tube daily. Infuse via pump   110ml/hr X 12 hrs/day (5.5 cartons/day)  Water flush: 150ml every 4 hours or 6x/day  Qty: 21729 mL, Refills: 11    Associated Diagnoses: Dysphagia      levothyroxine (SYNTHROID/LEVOTHROID) 150 MCG tablet Take 1 tablet (150 mcg) by mouth daily  Qty: 90 tablet, Refills: 3    Associated Diagnoses: Hypothyroidism, unspecified type      olopatadine (PATANOL) 0.1 % ophthalmic solution Place 1 drop into both eyes 2 times daily as needed for allergies.      vitamin B-12 (CYANOCOBALAMIN) 1000 MCG tablet Take 1,000 mcg by mouth daily      order for DME Equipment being ordered: CPAP and supplies. LIFETIME need.  Qty: 1 each, Refills: 0    Associated Diagnoses: CHA (obstructive sleep apnea)           STOP taking these medications       riluzole (RILUTEK) 50 MG tablet Comments:   Reason for Stopping:         sulfamethoxazole-trimethoprim (BACTRIM DS) 800-160 MG tablet Comments:   Reason for Stopping:                    Allergies:       No Known Allergies         Consultations This Hospital Stay:      Consultation during  this admission received from neurology.         Condition and Physical Exam on Discharge:        Discharge condition: Stable   Discharge vitals: Blood pressure 122/52, pulse (!) 48, temperature 98.6  F (37  C), temperature source Oral, resp. rate 20, weight 82.2 kg (181 lb 3.5 oz), SpO2 98%.     Constitutional: Alert, awake and oriented X 3; resting comfortably in no apparent distress         Oral cavity: Moist mucosa   Cardiovascular: Normal s1 s2, regular rate and rhythm, no murmur   Lungs: B/l clear to auscultation, no wheezes or crepitations   Abdomen: Soft, nt, nd, no guarding, rigidity or rebound; BS +; G tube in place   LE : No edema   Musculoskeletal/Neuro improvement in upper extremity strength ; motor strength 5/5; still with some upper extremity flexion weakness   Psychiatry: normal mood and affect               Discharge Orders for Skilled Facility (from Discharge Orders):        After Care Instructions       Activity - Up with nursing assistance      Diet      Follow this diet upon discharge: Current Diet:Orders Placed This Encounter      Adult Formula Drip Feeding: Continuous Jevity 1.5; Gastrostomy; Goal Rate: 55; mL/hr; Start new formula at 25 mL/hr.  Increase by 10 mL every 12 hrs to goal rate of 55 mL/hr.  Hold TF 1 hour before and 1 hour after Synthroid administration.; Do no...      NPO for Medical/Clinical Reasons Except for: Ice Chips, NPO but receiving Tube Feeding    Free water order:  Free        Fall precautions      Mantoux instructions      Give two-step Mantoux (PPD) Per Facility Policy Yes                     Rehab orders for Skilled Facility (from Discharge Orders):      Referrals     Future Labs/Procedures    Primary Care - Care Coordination Referral     Process Instructions:    Services are provided by a Care Coordinator for people with complex needs   such as: medical, social, or financial troubles.  The Care Coordinator   works with the patient and their Primary Care Provider to  determine health   goals, obtain resources, achieve outcomes, and develop care plans that   help coordinate the patient's care.     Comments:         Occupational Therapy Adult Consult     Comments:    Evaluate and treat as clinically indicated.    Reason:  MG    Physical Therapy Adult Consult     Comments:    Evaluate and treat as clinically indicated.    Reason:  MG    Speech Language Path Adult Consult     Comments:    Evaluate and treat as clinically indicated.    Reason:  MG             Discharge Time:      Greater than 30 minutes.        Image Results From This Hospital Stay (For Non-EPIC Providers):        Results for orders placed or performed during the hospital encounter of 11/30/24   XR Chest Port 1 View    Narrative    EXAM: XR CHEST PORT 1 VIEW  LOCATION: [facility]  DATE: 11/30/2024    INDICATION: Hypoxia, hypotension, please eval any pulmonary infiltrate  COMPARISON: Chest x-ray 11/8/2024      Impression    IMPRESSION: The left apex was not included on the radiograph. Cardiac silhouette size is within normal limits. Calcified atherosclerotic changes of the aortic arch. Subsegmental atelectasis, right perihilar region. Visualized lungs are otherwise clear.   XR Chest Port 1 View    Narrative    EXAM: XR CHEST PORT 1 VIEW  LOCATION: [facility]  DATE: 11/30/2024    INDICATION: POst intubation  COMPARISON: Earlier today.      Impression    IMPRESSION: Endotracheal tube in satisfactory position terminating 4 cm above the linda. Slight atelectasis/scarring of the right midlung. Lungs otherwise appear clear. Top normal heart size. Normal pulmonary vascularity. No pleural effusion or   pneumothorax.   CT Abdomen Pelvis w/o Contrast    Narrative    EXAM: CT ABDOMEN PELVIS W/O CONTRAST  LOCATION: [facility]  DATE: 12/1/2024    INDICATION: Renal failure, shock, possible infection.  COMPARISON: CT abdomen and pelvis without/with IV  contrast 2/21/2006.  TECHNIQUE: CT scan of the abdomen and pelvis was performed without IV contrast. Multiplanar reformats were obtained. Dose reduction techniques were used.  CONTRAST: None.    FINDINGS:   LOWER CHEST: A few strands of linear atelectasis have developed in the lower lungs. No pleural effusion on either side. Normal cardiac size. Coronary artery and aortic valvular calcifications. No pericardial effusion.    HEPATOBILIARY: Distended gallbladder without calcified stones, biliary dilatation or adjacent inflammation. No discrete hepatic lesion.    PANCREAS: Normal.    SPLEEN: Normal.    ADRENAL GLANDS: Normal.    KIDNEYS/BLADDER: On the right, tiny sand-like calyceal tip stones have developed in the collecting system (images 69, 83, 92 and 103, series 4). On the left, tiny sand-like stones have developed in the upper aspect in midportion (images 58 and 90, series   4). No hydronephrosis or hydroureter on either side. Interval postoperative changes of cystoprostatectomy, ureteroileal diversion and neobladder formation. Cortez catheter within the neobladder.    BOWEL: Formed stool material and scattered gas within normal caliber colon. No mechanical obstruction or free gas. Scattered colonic diverticulosis, more apparent at the rectosigmoid, without acute inflammation or secondary complications. Normal   appendix. Gastrostomy tube has been placed, satisfactory positioning.    LYMPH NODES: No suspicious abdominopelvic adenopathy. Bilateral pelvic lymphadenectomy.    VASCULATURE: Atherosclerotic and mildly ectatic distal abdominal aorta measuring 2.5 x 2.4 cm (image 102, series 4), previously 2.3 x 2.3 cm. Normal caliber IVC.    PELVIC ORGANS: Interval cystoprostatectomy, ureteroileal diversion and neobladder formation. Bilateral pelvic lymphadenectomy. Normal appendix. Sigmoid diverticulosis. No free fluid.    MUSCULOSKELETAL: Degenerative changes involving the spine and joints of the pelvis. Resolved small  fat-containing ventral umbilical hernia.      Impression    IMPRESSION:   1.  Tiny sand-like calyceal stones have developed in the collecting system of both kidneys. No hydronephrosis or hydroureter on either side. Interval postoperative changes of cystoprostatectomy, ureteroileal diversion and neobladder formation. Cortez   catheter within the neobladder.    2.  Scattered colonic diverticulosis, more apparent distally at the rectosigmoid, without acute inflammation or secondary complications. Normal appendix. Gastrostomy has been placed, satisfactory positioning.    3.  Normal cardiac size. Coronary artery and aortic valvular calcifications. No pericardial effusion. Atherosclerotic and mildly ectatic distal abdominal aorta, slightly more apparent. No aneurysm.    4.  Degenerative changes involving the spine and joints of the pelvis. Resolved small fat-containing ventral umbilical hernia.     XR Chest Port 1 View    Narrative    EXAM: XR CHEST PORT 1 VIEW  LOCATION: [facility]  DATE: 12/1/2024    INDICATION: CVC placement.  COMPARISON: Portable chest single view 11/30/2024 at 2338 hours.      Impression    IMPRESSION: Interval placement of right IJ catheter, tip in satisfactory position in the SVC. Endotracheal tube tip 6 cm above the linda. Both lungs are inflated and clear. No adenopathy or effusion. Normal cardiac size and pulmonary vascularity. Mildly   atherosclerotic thoracic aorta. Degenerative changes both shoulders and the spine. Mild right convex thoracic curve. Monitoring leads overlying the chest.   XR Video Swallow with SLP or OT    Narrative    VIDEO SWALLOWING EVALUATION   12/9/2024 10:35 AM     HISTORY: acute dysphagia    COMPARISON: None.    FLUOROSCOPY TIME: 1.1 minutes.      Impression    IMPRESSION:    Thin: Aspiration.    Slightly thick: Aspiration.    Mildly thick: Aspiration.    This study only includes the cervical esophagus. Please see speech  pathology note for  further details.    MUNIRA ALONSO DO         SYSTEM ID:  [id number]   Echocardiogram Limited    Narrative    [id number]  [id number]  [id number]  [id number]^EDMOND^BASILIO     [facility]  Echocardiography Laboratory  [address] [place] [address]     Name: JUAN MANUEL GRIMES  MRN: [id number]  : 1945  Study Date: 2024 12:00 PM  Age: 79 yrs  Gender: Male  Patient Location: [facility]  Reason For Study: Shock  Ordering Physician: BASILIO PRUITT  Referring Physician: Gladys Vazquez  Performed By: Karen Tucker     BSA: 1.9 m2  Height: 70 in  Weight: 166 lb  HR: 77  BP: 112/74 mmHg  ______________________________________________________________________________  Procedure  Limited Portable Echo Adult. Definity (NDC #98216-980) given intravenously.  ______________________________________________________________________________  Interpretation Summary     Limited echo. Patient intubated and sedated.  Echo is quite technically difficult due to poor acoustic windows and irregular  rhythm.     There is grossly normal left and right ventricular function.  There are no severe valvular regurgitation appreciated. Aortic regurgitation  is probably mild.  There is no pericardial effusion.  ______________________________________________________________________________  Left Ventricle  Regional wall motion abnormalities cannot be excluded due to limited  visualization.     Aortic Valve  eccentric aortic regurgitation which appears grossly mild.     Vessels  The aortic root is not well visualized. Dilated IVC, patient undergoing  mechanical ventilation.     ______________________________________________________________________________  Doppler Measurements & Calculations  MV E max jenna: 61.0 cm/sec  MV A max jenna: 43.0 cm/sec  MV E/A: 1.4  MV dec slope: 154.7 cm/sec2  MV dec time: 0.39 sec  PA acc time: 0.12 sec     ______________________________________________________________________________  Report  approved by: MD Norma Broussard 12/01/2024 01:41 PM                 Most Recent Lab Results In EPIC (For Non-EPIC Providers):    Most Recent 3 CBC's:  Recent Labs   Lab Test 12/11/24  0941 12/10/24  1006 12/09/24  0815 12/08/24  1223   WBC  --  8.9 8.1 8.4   HGB 7.8* 8.5* 10.2* 9.9*   MCV  --  108* 106* 106*   PLT  --  146* 161 146*      Most Recent 3 BMP's:  Recent Labs   Lab Test 12/11/24  0941 12/10/24  1006 12/09/24  0815   * 152* 149*   POTASSIUM 4.4 4.3 4.5   CHLORIDE 117* 122* 118*   CO2 24 24 24   BUN 39.2* 43.0* 37.3*   CR 0.57* 0.60* 0.61*   ANIONGAP 8 6* 7   SIMONA 8.7* 9.4 9.3   GLC 85 98 109*     Most Recent 3 Troponin's:  Recent Labs   Lab Test 11/07/20  1358 11/07/20  1107 11/07/20  0629   TROPI 3.590* 4.056* 3.247*     Most Recent 3 INR's:  Recent Labs   Lab Test 12/01/24  0223 11/13/24  0909   INR 1.32* 1.23*     Most Recent 2 LFT's:  Recent Labs   Lab Test 12/11/24  0941 12/10/24  1006   AST 50* 73*   ALT 66 82*   ALKPHOS 75 82   BILITOTAL 0.3 0.3     Most Recent Cholesterol Panel:  Recent Labs   Lab Test 07/10/24  1226   CHOL 178   LDL 93   HDL 73   TRIG 60     Most Recent 6 Bacteria Isolates From Any Culture (See EPIC Reports for Culture Details):No lab results found.  Most Recent TSH, T4 and HgbA1c:  Recent Labs   Lab Test 12/06/24  1502 06/14/23  1118 08/10/22  1150   TSH 8.56*   < > 0.53   T4 0.86*  --   --    A1C  --   --  5.0    < > = values in this interval not displayed.

## 2024-12-11 NOTE — PLAN OF CARE
"Speech Language Therapy Discharge Summary    Reason for therapy discharge:    Discharged to acute rehabilitation facility.    Progress towards therapy goal(s). See goals on Care Plan in [facility] electronic health record for goal details.  Goals partially met.  Barriers to achieving goals:   discharge from facility.    Therapy recommendation(s):    Continued therapy is recommended.  Rationale/Recommendations:  Recommend continued SLP for dysphaiga..    Per SLP note 12/10/2024: \"Video swallow study completed 12/8. Patient continued to aspirate all liquids given and significant residue with puree but no aspiration. High risk for aspiration continue NPO with TF and ice chips for comfort and to practice swallows.\"                             "

## 2024-12-11 NOTE — PROGRESS NOTES
Patient seen and examined    - Cortez catheter removed 12/10, voiding well  - Na improving from 152---149; will continue with increased free water flushes of 150 ml q 4 hrs  - Hb has mostly been around 9-10; noted slight downtrend ---8.5--7.8, likely dilutional with IV fluids; has no clinical concern for active bleed and has been hemodynamically stable ; monitor Hb at ARU     Discharge to ARU today    Care plan discussed with patient and his wife present in room along with nursing and     Please see discharge summary for details

## 2024-12-11 NOTE — PLAN OF CARE
Physical Therapy Discharge Summary    Reason for therapy discharge:    Discharged to acute rehabilitation facility.    Progress towards therapy goal(s). See goals on Care Plan in [facility] electronic health record for goal details.  Goals partially met.  Barriers to achieving goals:   discharge from facility.    Therapy recommendation(s):    Pt significantly below baseline but demonstrated good progress this session, currently Ax2 for transfers w/ FWW and able to ambulate a few steps this session. Pt w/ good motivation and family support. Recommend ARU to increase strength and functional mobility prior to returning home.       *Discharging physical therapist did not work directly with patient. Therapy recommendation(s) taken from previous treating therapist's treatment note from last treatment session.

## 2024-12-12 ENCOUNTER — PATIENT OUTREACH (OUTPATIENT)
Dept: CARE COORDINATION | Facility: CLINIC | Age: 79
End: 2024-12-12

## 2024-12-12 ENCOUNTER — APPOINTMENT (OUTPATIENT)
Dept: PHYSICAL THERAPY | Facility: CLINIC | Age: 79
DRG: 056 | End: 2024-12-12
Attending: PHYSICAL MEDICINE & REHABILITATION
Payer: MEDICARE

## 2024-12-12 ENCOUNTER — APPOINTMENT (OUTPATIENT)
Dept: OCCUPATIONAL THERAPY | Facility: CLINIC | Age: 79
DRG: 056 | End: 2024-12-12
Attending: PHYSICAL MEDICINE & REHABILITATION
Payer: MEDICARE

## 2024-12-12 ENCOUNTER — APPOINTMENT (OUTPATIENT)
Dept: SPEECH THERAPY | Facility: CLINIC | Age: 79
DRG: 056 | End: 2024-12-12
Attending: PHYSICAL MEDICINE & REHABILITATION
Payer: MEDICARE

## 2024-12-12 VITALS
WEIGHT: 176.37 LBS | HEIGHT: 70 IN | OXYGEN SATURATION: 96 % | BODY MASS INDEX: 25.25 KG/M2 | HEART RATE: 64 BPM | DIASTOLIC BLOOD PRESSURE: 41 MMHG | SYSTOLIC BLOOD PRESSURE: 117 MMHG | RESPIRATION RATE: 16 BRPM | TEMPERATURE: 98 F

## 2024-12-12 LAB
ALBUMIN SERPL BCG-MCNC: 2.5 G/DL (ref 3.5–5.2)
ALP SERPL-CCNC: 77 U/L (ref 40–150)
ALT SERPL W P-5'-P-CCNC: 57 U/L (ref 0–70)
ANION GAP SERPL CALCULATED.3IONS-SCNC: 6 MMOL/L (ref 7–15)
AST SERPL W P-5'-P-CCNC: 42 U/L (ref 0–45)
BILIRUB SERPL-MCNC: 0.2 MG/DL
BUN SERPL-MCNC: 37.8 MG/DL (ref 8–23)
CALCIUM SERPL-MCNC: 9.1 MG/DL (ref 8.8–10.4)
CHLORIDE SERPL-SCNC: 116 MMOL/L (ref 98–107)
CREAT SERPL-MCNC: 0.62 MG/DL (ref 0.67–1.17)
EGFRCR SERPLBLD CKD-EPI 2021: >90 ML/MIN/1.73M2
ERYTHROCYTE [DISTWIDTH] IN BLOOD BY AUTOMATED COUNT: 15.8 % (ref 10–15)
GLUCOSE BLDC GLUCOMTR-MCNC: 97 MG/DL (ref 70–99)
GLUCOSE SERPL-MCNC: 115 MG/DL (ref 70–99)
HCO3 SERPL-SCNC: 26 MMOL/L (ref 22–29)
HCT VFR BLD AUTO: 26.6 % (ref 40–53)
HGB BLD-MCNC: 8.3 G/DL (ref 13.3–17.7)
MAGNESIUM SERPL-MCNC: 2.2 MG/DL (ref 1.7–2.3)
MCH RBC QN AUTO: 34 PG (ref 26.5–33)
MCHC RBC AUTO-ENTMCNC: 31.2 G/DL (ref 31.5–36.5)
MCV RBC AUTO: 109 FL (ref 78–100)
PHOSPHATE SERPL-MCNC: 2.7 MG/DL (ref 2.5–4.5)
PLATELET # BLD AUTO: 139 10E3/UL (ref 150–450)
POTASSIUM SERPL-SCNC: 4.4 MMOL/L (ref 3.4–5.3)
PROT SERPL-MCNC: 5.7 G/DL (ref 6.4–8.3)
RBC # BLD AUTO: 2.44 10E6/UL (ref 4.4–5.9)
SODIUM SERPL-SCNC: 148 MMOL/L (ref 135–145)
WBC # BLD AUTO: 6 10E3/UL (ref 4–11)

## 2024-12-12 PROCEDURE — 84100 ASSAY OF PHOSPHORUS: CPT

## 2024-12-12 PROCEDURE — 99232 SBSQ HOSP IP/OBS MODERATE 35: CPT | Performed by: PHYSICAL MEDICINE & REHABILITATION

## 2024-12-12 PROCEDURE — 97530 THERAPEUTIC ACTIVITIES: CPT | Mod: GP | Performed by: PHYSICAL THERAPIST

## 2024-12-12 PROCEDURE — 128N000003 HC R&B REHAB

## 2024-12-12 PROCEDURE — 82435 ASSAY OF BLOOD CHLORIDE: CPT

## 2024-12-12 PROCEDURE — 250N000013 HC RX MED GY IP 250 OP 250 PS 637: Performed by: PHYSICAL MEDICINE & REHABILITATION

## 2024-12-12 PROCEDURE — 97167 OT EVAL HIGH COMPLEX 60 MIN: CPT | Mod: GO | Performed by: OCCUPATIONAL THERAPIST

## 2024-12-12 PROCEDURE — 92610 EVALUATE SWALLOWING FUNCTION: CPT | Mod: GN

## 2024-12-12 PROCEDURE — 250N000011 HC RX IP 250 OP 636

## 2024-12-12 PROCEDURE — 83735 ASSAY OF MAGNESIUM: CPT

## 2024-12-12 PROCEDURE — 97535 SELF CARE MNGMENT TRAINING: CPT | Mod: GO | Performed by: OCCUPATIONAL THERAPIST

## 2024-12-12 PROCEDURE — 97162 PT EVAL MOD COMPLEX 30 MIN: CPT | Mod: GP | Performed by: PHYSICAL THERAPIST

## 2024-12-12 PROCEDURE — 85018 HEMOGLOBIN: CPT

## 2024-12-12 PROCEDURE — 36415 COLL VENOUS BLD VENIPUNCTURE: CPT

## 2024-12-12 PROCEDURE — 250N000013 HC RX MED GY IP 250 OP 250 PS 637

## 2024-12-12 RX ORDER — ACETAMINOPHEN 325 MG/1
650 TABLET ORAL EVERY 4 HOURS PRN
Status: DISCONTINUED | OUTPATIENT
Start: 2024-12-12 | End: 2024-12-12

## 2024-12-12 RX ORDER — ACETAMINOPHEN 325 MG/1
650 TABLET ORAL EVERY 4 HOURS PRN
Status: ACTIVE | OUTPATIENT
Start: 2024-12-12

## 2024-12-12 RX ORDER — PYRIDOSTIGMINE BROMIDE 60 MG/1
60 TABLET ORAL EVERY 8 HOURS
Status: DISPENSED | OUTPATIENT
Start: 2024-12-12

## 2024-12-12 RX ORDER — LEVOTHYROXINE SODIUM 75 UG/1
150 TABLET ORAL DAILY
Status: DISCONTINUED | OUTPATIENT
Start: 2024-12-13 | End: 2024-12-19

## 2024-12-12 RX ORDER — VITAMIN B COMPLEX
25 TABLET ORAL DAILY
Status: DISPENSED | OUTPATIENT
Start: 2024-12-13

## 2024-12-12 RX ORDER — LANOLIN ALCOHOL/MO/W.PET/CERES
1000 CREAM (GRAM) TOPICAL DAILY
Status: DISPENSED | OUTPATIENT
Start: 2024-12-13

## 2024-12-12 RX ORDER — ASPIRIN 81 MG/1
81 TABLET, CHEWABLE ORAL EVERY EVENING
Status: DISPENSED | OUTPATIENT
Start: 2024-12-12

## 2024-12-12 RX ADMIN — HEPARIN SODIUM 5000 UNITS: 5000 INJECTION, SOLUTION INTRAVENOUS; SUBCUTANEOUS at 08:23

## 2024-12-12 RX ADMIN — PYRIDOSTIGMINE BROMIDE 60 MG: 60 TABLET ORAL at 08:24

## 2024-12-12 RX ADMIN — Medication 3 MG: at 21:07

## 2024-12-12 RX ADMIN — PYRIDOSTIGMINE BROMIDE 60 MG: 60 TABLET ORAL at 16:43

## 2024-12-12 RX ADMIN — CYANOCOBALAMIN TAB 1000 MCG 1000 MCG: 1000 TAB at 08:23

## 2024-12-12 RX ADMIN — PYRIDOSTIGMINE BROMIDE 60 MG: 60 TABLET ORAL at 00:59

## 2024-12-12 RX ADMIN — LEVOTHYROXINE SODIUM 150 MCG: 75 TABLET ORAL at 08:23

## 2024-12-12 RX ADMIN — ASPIRIN 81 MG CHEWABLE TABLET 81 MG: 81 TABLET CHEWABLE at 21:07

## 2024-12-12 RX ADMIN — Medication 25 MCG: at 08:23

## 2024-12-12 RX ADMIN — HEPARIN SODIUM 5000 UNITS: 5000 INJECTION, SOLUTION INTRAVENOUS; SUBCUTANEOUS at 00:59

## 2024-12-12 RX ADMIN — HEPARIN SODIUM 5000 UNITS: 5000 INJECTION, SOLUTION INTRAVENOUS; SUBCUTANEOUS at 16:43

## 2024-12-12 ASSESSMENT — ACTIVITIES OF DAILY LIVING (ADL)
ADLS_ACUITY_SCORE: 81
ADLS_ACUITY_SCORE: 81
ADLS_ACUITY_SCORE: 80
ADLS_ACUITY_SCORE: 81
ADLS_ACUITY_SCORE: 81
IADLS,_PREVIOUS_FUNCTIONAL_LEVEL: INDEPENDENT
ADLS_ACUITY_SCORE: 81
ADLS_ACUITY_SCORE: 81
ADLS_ACUITY_SCORE: 80
ADLS_ACUITY_SCORE: 80
IADLS,_PREVIOUS_FUNCTIONAL_LEVEL: INDEPENDENT
ADLS_ACUITY_SCORE: 81
ADLS_ACUITY_SCORE: 80
ADLS_ACUITY_SCORE: 81
ADLS_ACUITY_SCORE: 80
ADLS_ACUITY_SCORE: 81
ADLS_ACUITY_SCORE: 80
ADLS_ACUITY_SCORE: 80
ADLS_ACUITY_SCORE: 81
ADLS_ACUITY_SCORE: 81
ADLS_ACUITY_SCORE: 80
ADLS_ACUITY_SCORE: 81
ADLS_ACUITY_SCORE: 81
ADLS_ACUITY_SCORE: 80
ADLS_ACUITY_SCORE: 80
BADLS,_PREVIOUS_FUNCTIONAL_LEVEL: INDEPENDENT
BADLS,_PREVIOUS_FUNCTIONAL_LEVEL: INDEPENDENT

## 2024-12-12 NOTE — PLAN OF CARE
Goal Outcome Evaluation:       Plan of Care Reviewed With: patient     Overall Patient Progress: improvingOverall Patient Progress: improving     Alert and orientedx4, A1-2 walker. Makes needs known.  Pt denied pain at night.  Continent of bowel on 12/11. Primofit cath on at night.  NPO, Gtube running continuous feeding.  CPAP at night.  No acute changes at night, continue with POC.

## 2024-12-12 NOTE — PLAN OF CARE
Discharge Planner Post-Acute Rehab PT:     Discharge Plan: Home with wife, 3 NEELAM w/out rail. Home care PT.    Precautions: Falls, NPO/PEG    Current Status:  Bed Mobility: Min A  Transfer: Max A for STS. Ax2 stand pivot with FWW with nursing.  Gait: up to 40 ft, CGA with FWW and WC follow  Stairs: Not yet safe  Balance: Poor static, dynamic standing balance w/out UE support    Outcome Measures:   Garcia   12/12: 12/56  5TSTS   12/12: 0x (requires physical assist even with UE assist)  6MWT   12/12: 40 ft, FWW and CGA (stopped early due to fatigue)    Assessment:  Pt presents to PT with proximal>distal and UE>LE weakness, impaired sitting/standing balance, and reduced CV endurance in the setting of new myasthenia gravis. As a result, he requires signifcantly greater assist for mobility and ADLs compared to baseline and at high risk for future falls. Despite, he should benefit well from skilled PT with goals for mod I walker vs mixed mobility and ADLs at discharge. ELOS 3 weeks.     Other Barriers to Discharge (DME, Family Training, etc):   Equipment: Anticipate FWW, gait belt, possible K3 WC rental.  Family training to be scheduled.

## 2024-12-12 NOTE — PROGRESS NOTES
12/12/24 0815   Appointment Info   Signing Clinician's Name / Credentials (PT) Franklin Mcqueen DPT       Present no   Language English   Living Environment   People in Home spouse   Current Living Arrangements house   Home Accessibility stairs to enter home   Number of Stairs, Main Entrance 3   Stair Railings, Main Entrance none   Transportation Anticipated family or friend will provide   Living Environment Comments Pt lives with spouse in a house in [place], 3 NEELAM without rails, then able to reside on the main floor. PTA, pt was IND with all functional mobility, ADLs, and IADLs including driving. Happily retired, but enjoys working in the garden. Recent L patellar fracture requiring 2 surgeries ~1 year ago. 2 falls in the last 6 months: one in the garden, and one in the bathroom where he had had a syncopal episode.   Self-Care   Usual Activity Tolerance good   Current Activity Tolerance fair   Fall history within last six months yes   Number of times patient has fallen within last six months 2   Post-Acute Assessment Only   Post-Acute Functional Assessment See below   Previous Level of Function/Home Environm   Bathing, Previous Functional Level independent   Grooming, Previous Functional Level independent   Dressing, Previous Functional Level independent   Eating/Feeding, Previous Functional Level independent   Toileting, Previous Functional Level independent   BADLs, Previous Functional Level independent   IADLs, Previous Functional Level independent   Bed Mobility, Previous Functional Level independent   Transfers, Previous Functional Level independent   Household Ambulation, Previous Functional Level independent   Stairs, Previous Functional Level independent   Community Ambulation, Previous Functional Level independent   Functional Cognition, Previous Functional Level WNL   General Information   Onset of Illness/Injury or Date of Surgery 11/08/24   Referring Physician Sachin  "Franchesca, DO   Patient/Family Therapy Goals Statement (PT) \"Be able to walk around my house.\"   Pertinent History of Current Problem (include personal factors and/or comorbidities that impact the POC) PER EMR: \"Mendez Greene is a 79 year old male with past medical history of paroxysmal atrial fibrillation, CAD, ascending aortic aneurysm, HLD, HTN, hypothyroidism, CHA admitted to ICU service on 11/30/2024 for acute respiratory failure and AMS. Patient was recently hospitalized at [facility] from 11/8/24-11/15/24 for progressive weakness and fatigue, difficulty swallowing. After discharge, his ACh receptor binding Ab came back 2.11, strongly suggestive of myasthenia gravis. Presented back to [facility] ED 11/30 with hypotension and acute respiratory failure. Patient was intubated in the ED and admitted to ICU on pressors. During admission patient received 5 doses of IVIG for myasthenic crisis and was initiated on Mestinon. Hospital course also included broad spectrum antibiotics for possible septic shock due to UTI, MOHAMUD, electrolyte derangements, bradycardia, hypertension.  Now admitted to acute rehab.\"   Existing Precautions/Restrictions fall;NPO   Weight-Bearing Status - LUE full weight-bearing   Weight-Bearing Status - RUE full weight-bearing   Weight-Bearing Status - LLE full weight-bearing   Weight-Bearing Status - RLE full weight-bearing   Heart Disease Risk Factors High blood pressure;Dislipidemia;Overweight;Medical history;Age   Cognition   Affect/Mental Status (Cognition) WFL   Orientation Status (Cognition) oriented x 4   Follows Commands (Cognition) follows multi-step commands;over 90% accuracy   Behavioral Issues   (None noted)   Safety Deficit (Cognition)   (None immediately noted, but will continue to monitor.)   Cognitive Status Comments Pleasant and cooperative. Notes globalized weakness to be most limiting factor.   Pain Assessment   Patient Currently in Pain No   Integumentary/Edema   Integumentary/Edema   (Hard " edema noted t/o R UE. Mild edema noted bilat LE, specifically L ankle.)   Posture    Posture Forward head position;Protracted shoulders;Kyphosis   Range of Motion (ROM)   ROM Comment Impaired in bilat hands, with flexion contractures noted- able to partially extend passively.   Strength (Manual Muscle Testing)   Strength Comments R>L UE functional weakness noted, with inability to lift above shoulder height.   Left Hip (Manual Muscle Testing)   Hip Flexion - Left Side (2+/5) poor plus, left   Hip Extension - Left Side (2/5) poor, left   Hip ABduction - Left Side (3-/5) fair minus, left   Right Hip (Manual Muscle Testing)   Hip Flexion - Right Side (2+/5) poor plus, right   Hip Extension - Right Side (2/5) poor, right   Hip ABduction - Right Side (3-/5) fair minus, right   Left Knee (Manual Muscle Testing)   Knee Flexion - Left Side (4-/5) good minus, left   Knee Extension - Left Side (3+/5) fair plus, left   Right Knee (Manual Muscle Testing)   Knee Flexion - Right Side (4-/5) good minus, right   Knee Extension - Right Side (3+/5) fair plus, right   Left Ankle/Foot (Manual Muscle Testing)   Ankle Dorsiflexion - Left Side (4-/5) good minus, left   Ankle Plantarflexion - Left Side (4-/5) good minus, left   Right Ankle/Foot (Manual Muscle Testing)   Ankle Dorsiflexion - Right Side (4-/5) good minus, right   Ankle Plantarflexion - Right Side (4-/5) good minus, right   Balance   Balance Comments Good static, fair dynamic seated balance EOB. Multi-directional scooting limited by shoulder and hip proximal muscle weakness. Static, dynamic standing balance requires UE support through FWW.   Sensory Examination   Sensory Perception Comments WNL. Intact light touch bilat LE. Protective sensation/monofilament 10/10 bilat. Vibration intact bilat. Proprioception 5/5 at bilat great toe. Denies oculomotor changes, and pt able to demonstrate at least functional eye mvmts t/o exam.   Clinical Impression   Criteria for Skilled  Therapeutic Intervention Yes, treatment indicated   PT Diagnosis (PT) force production deficits   Influenced by the following impairments proximal>distal weakness, impaired sitting/standing balance, reduced CV endurance   Functional limitations due to impairments bed mobility, transfers, ambulation, stairs   Clinical Presentation (PT Evaluation Complexity) evolving   Clinical Presentation Rationale Extent of current impairment/limitations, NEELAM home, requires close monitoring to avoid overexertion   Clinical Decision Making (Complexity) moderate complexity   Planned Therapy Interventions (PT) balance training;bed mobility training;E-stim;gait training;groups;home exercise program;neuromuscular re-education;orthotic fitting/training;patient/family education;ROM (range of motion);stair training;strengthening;stretching;transfer training;wheelchair management/propulsion training;progressive activity/exercise;home program guidelines   Risk & Benefits of therapy have been explained evaluation/treatment results reviewed;care plan/treatment goals reviewed;risks/benefits reviewed;current/potential barriers reviewed;participants voiced agreement with care plan;participants included;patient   Clinical Impression Comments Pt presents to PT with proximal>distal and UE>LE weakness, impaired sitting/standing balance, and reduced CV endurance in the setting of new myasthenia gravis. As a result, he requires signifcantly greater assist for mobility and ADLs compared to baseline and at high risk for future falls. Despite, he should benefit well from skilled PT with goals for mod I walker vs mixed mobility and ADLs at discharge. ANDREAOS 3 weeks.   PT Total Evaluation Time   PT Eval, Moderate Complexity Minutes (82395) 15   Physical Therapy Goals   PT Frequency 6x/week   PT Predicted Duration/Target Date for Goal Attainment 01/01/25   PT: Bed Mobility Independent;Supine to/from sit;Rolling;Bridging   PT: Transfers Modified independent;Sit  to/from stand;Bed to/from chair;Assistive device   PT: Gait Modified independent;Rolling walker;150 feet   PT: Stairs Supervision/stand-by assist;3 stairs   PT: Goal 1 Pt will be able to complete car transfer with FWW and SBA in order to access home and medical appts.   Interventions   Interventions Quick Adds Therapeutic Activity   Therapeutic Activity   Therapeutic Activities: dynamic activities to improve functional performance Minutes (40200) 15   Treatment Detail/Skilled Intervention See below GG completion. Majority spent helping to don new brief x2 reps after bladder incontinence, first standing from EOB, then in supine through bridging.   PT Discharge Planning   PT Plan GG amb (FWW and WC follow), stairs, and car transfer.   Physical Therapy Time and Intention   Timed Code Treatment Minutes 15   Total Session Time (sum of timed and untimed services) 30   Post Acute Settings Only   What unit is patient on? Acute Rehab   PT - Acute Rehab Center Time   Individual Time (minutes) - PT 30   Group Time (minutes) - PT 0   Concurrent Time (minutes) - PT 0   Co-Treatment Time (minutes) - PT 0   ARC Total Session Time (minutes) - PT 30   ARC Daily Total Session Time   PT ARC Daily Total Session Time 30   ARC Daily Rehab Total Minutes 30   Roll Left and Right: flat no rail   Assistance Needed Adaptive equipment;Supervision   Roll Left to Right CARE Score 4   Sit to Lying: flat no rail   Assistance Needed Supervision;Adaptive equipment   Comment SBA with bedrail   Sit to Lying CARE Score 4   Lying to Sitting on Side of Bed: flat no rail   Physical Assistance Level Less than 25%   Comment Min A with bedrail and cues for logroll sequencing   Lying to Sitting CARE Score 3   Sit to Stand: standard height (18 inches)   Physical Assistance Level 50%-74%   Comment Max A from standard chair height with cues for hand placement   Sitting to Standing CARE Score 2   Picking Up Object: Ability to bend/stoop from standing (consider  using a reacher)   Reason if not Attempted Safety concerns    CARE Score 88

## 2024-12-12 NOTE — PROGRESS NOTES
"   12/12/24 1539   Appointment Info   Signing Clinician's Name / Credentials (SLP) Winter Forbes MS CCC-SLP   General Information   Onset of Illness/Injury or Date of Surgery 11/30/24   Referring Physician Azul Livingston MD   Pertinent History of Current Problem per H&P:\"79 year old male with past medical history of paroxysmal atrial fibrillation, CAD, ascending aortic aneurysm, HLD, HTN, hypothyroidism, CHA admitted to ICU service on 11/30/2024 for acute respiratory failure and AMS. Patient was recently hospitalized at [facility] from 11/8/24-11/15/24 for progressive weakness and fatigue, difficulty swallowing. He was seen by neurology who were concerned for motor neuron disease with high index of suspicion for ALS. EMG consistent with MND. He was started on Riluzole and Edaravone and referred to [facility] neuromuscular division for second opinion. Discharged home with palliative care referral. After discharge, his ACh receptor binding Ab came back positive, strongly suggestive of myasthenia gravis.      Presented back to [facility] ED 11/30 with hypotension and acute respiratory failure. Family rescinded patient's DNR/DNI for patient to be full code and patient reportedly nodded in  agreement. Patient was intubated in the ED and admitted to ICU on pressors. Started on broad spectrum antibiotics for possible septic shock due to UTI. Neurology consulted, started patient on IVIGx 5 doses for myasthenic crisis and patient was initiated on Mestinon. Extubated with pressors stopped 12/3. Transferred to hospitalist service on 12/4. Hospital course has been notable for urosepsis with chronic Cortez catheter (completed abx course), MOHAMUD, electrolyte derangements, bradycardia, hypertension.       During acute hospitalization, patient was seen and evaluated by PT , OT, and SLP who collectively recommended that patient would benefit from ongoing therapies in the acute inpatient rehabilitation setting.  \"   General Observations Pt " "seen by SLP for dysphagia during acute hospitalization. Most recent VFSS completed 12/9, see below for details. Pt remains NPO   Pain Assessment   Patient Currently in Pain No   Type of Evaluation   Type of Evaluation Swallow Evaluation   Oral Motor   Oral Musculature generally intact   Mucosal Quality sticky   Dentition (Oral Motor)   Dentition (Oral Motor) adequate dentition   Facial Symmetry (Oral Motor)   Facial Symmetry (Oral Motor) WNL   Lip Function (Oral Motor)   Lip Range of Motion (Oral Motor) WNL   Lip Strength (Oral Motor) WNL   Tongue Function (Oral Motor)   Tongue Strength (Oral Motor) strength decreased;bilateral   Tongue Coordination/Speed (Oral Motor) reduced rate   Tongue ROM (Oral Motor) WNL   Cough/Swallow/Gag Reflex (Oral Motor)   Soft Palate/Velum (Oral Motor) WNL   Volitional Throat Clear/Cough (Oral Motor) impaired;reduced strength   Volitional Swallow (Oral Motor) significantly delayed   Vocal Quality/Secretion Management (Oral Motor)   Vocal Quality (Oral Motor) hypophonic;breathy   General Swallowing Observations   Past History of Dysphagia Pt and spouse endorse dysphagia just ~1 month prior to initial hospital admission 11/3. Noting increased concerns with solids and breads per pt. per most recent VFSS:\"Patient presents with moderate/severe dysphagia on today's study. Similiar to his initial video. He contnues to have decreased AP movement of the bolus with premature spillage to the pyriform sinuses with all textures. Deep penetration on thin liquids to the level of the cords and aspiration via the cup with reflexive cough. He had edilberto aspiration of slightly thick liquids before the swallow. Aspiration of mildly thick liquids. Puree was piecemeal with spillage to the pryiform sinuses with moderate residue in valleculae and pryiform sinuses. Additional swallow reduced vallecular residue but moderate amount remained in the pyrifrom sinuse. Of note he was able to cough out all the barium " "residue between each swallow. So strength of coughin is improving. He continues to be a high risk for aspiration with all textures at this time. Recommend: 1. Continue NPO with TF. Freuquent oral cares and ice chips for comfort and practice swallows. Swallow cough swallow with ice chips.\"   Respiratory Support room air   Current Diet/Method of Nutritional Intake (General Swallowing Observations, NIS) NPO;gastrostomy tube (PEG)   Swallowing Evaluation Clinical swallow evaluation   Clinical Swallow Evaluation   Clinical Swallow Evaluation Textures Trialed thin liquids   Clinical Swallow Eval: Thin Liquid Texture Trial   Mode of Presentation, Thin Liquids fed by clinician;spoon   Volume of Liquid or Food Presented x5 icechips   Oral Phase of Swallow WFL   Pharyngeal Phase of Swallow reduction in laryngeal movement;wet vocal quality after swallow   Diagnostic Statement semi wet vocal quality and suspected very significantly delayed swallow initiation   Esophageal Phase of Swallow   Patient reports or presents with symptoms of esophageal dysphagia No   Swallowing Recommendations   Diet Consistency Recommendations NPO;ice chips only   Supervision Level for Intake 1:1 supervision needed   Mode of Delivery Recommendations bolus size, small   Swallowing Maneuver Recommendations extra swallow   Monitoring/Assistance Required (Eating/Swallowing) monitor for cough or change in vocal quality with intake   Recommended Feeding/Eating Techniques (Swallow Eval) maintain upright posture during/after eating for 30 minutes;set-up and prepare tray   Medication Administration Recommendations, Swallowing (SLP) non oral   Instrumental Assessment Recommendations VFSS (videofluoroscopic swallowing study)   Clinical Impression   Criteria for Skilled Therapeutic Interventions Met (SLP Eval) Yes, treatment indicated   SLP Diagnosis mod-severe oropharyngeal dysphagia   Activity Limitations Related to Problem List (SLP) intake, aspiration   Risks " & Benefits of therapy have been explained evaluation/treatment results reviewed;care plan/treatment goals reviewed;risks/benefits reviewed;current/potential barriers reviewed;participants voiced agreement with care plan;participants included;patient;spouse/significant other   Clinical Impression Comments SLP: Pt seen on ARU for dysphagia assessment following recent dx of myasthenia gravis. Oral motor functions grossly intact with exception to reduced lingual strength. Pt with overt secretions and expectorating large yellow thick mucus. SLP completed oral cares and excavting few collectable sizes of mass secretion. Pt accepted x5 ice chips, noting impaired initiation of swallow and mild wet vocal quality. Pt cued to produce cough and swallow sequence to clear. Pt with several ?s related to MG and swallow function. SLP provided education on deficits and plan for therapy. skilled SLP inidicated to tx dysphagia and modify diet as clinically and instrumentally indicated. recommend pt continue NPO. ok for icechips with supervision. oral cares x3/day. plan to repeat VFSS week of ~12/16   SLP Total Evaluation Time   Eval: oral/pharyngeal swallow function, clinical swallow Minutes (10980) 60   SLP Goals   Therapy Frequency (SLP Eval) 6 times/week   SLP Predicted Duration/Target Date for Goal Attainment 12/26/24   SLP Goals Swallow   SLP Discharge Planning   SLP Plan SLP: oral cares, icechips - swallow cough swallow. may repeat VFSS week of 12/16. oropharyngeal exercises contraindicated per MG. likely reduce time ?   SLP Time and Intention   Total Session Time (sum of timed and untimed services) 60   SLP - Acute Rehab Center Time   Individual Time (minutes) - SLP 60   Group Time (minutes) - SLP 0   Concurrent Time (minutes) - SLP 0   Co-Treatment Time (minutes) - SLP 0   ARC Total Session Time (minutes) - SLP 60   ARC Daily Total Session Time   SLP ARC Daily Total Session Time 60   ARC Daily Rehab Total Minutes 200

## 2024-12-12 NOTE — CARE CONFERENCE
"Acute Rehab Care Conference/Team Rounds      Type: Team Rounds    Present: Dr. Antonio, Dr. Guerrero Neuropsychologist, Sharon Mckinney PT, Patti Ruiz OT, Neno Frazier  SLP, Lina Lopez RN CC, Charlette Shields , Janett De La Rosa RD, Anum Clark RN, and Mendez Greene Patient.      Discharge Barriers/Treatment/Education    Rehab Diagnosis: ***    Active Medical Co-morbidities/Prognosis: ***    Safety:    Pain:    Medications, Skin, Tubes/Lines:    Swallowing/Nutrition: NPO, all hydration/nutrition/medications via PEG. SLP evaluation pending.    Bowel/Bladder:    Psychosocial: SW assessment pending.     ADLs/IADLs: OT eval in progress    Mobility: pending pt PT eval today.    Cognition/Language: Pending evaluation.    Community Re-Entry: Pending therapy evaluation today    Transportation: wc based transport currently, however pending therapy evaluation today for assessment of car transfer    Decision maker: {ACR DECISION MAKER:[id number]}    Plan of Care and goals reviewed and updated.    Discharge Plan/Recommendations    Fall Precautions: {ACR FALL PRECAUTIONS:4388637}    Patient/Family input to goals: ***    Anticipated rehab needs following discharge: ***    Anticipated care giver support after discharge: ***    Estimated length of stay: ***    Overall plan for the patient: ***      Utilization Review and Continued Stay Justification    Medical Necessity Criteria:    For any criteria that is not met, please document reason and plan for discharge, transfer, or modification of plan of care to address.    Requires intensive rehabilitation program to treat functional deficits?: {YES/NO :045708::\"Yes\"}    Requires 3x per week or greater involvement of rehabilitation physician to oversee rehabilitation program?: {YES/NO :132034::\"Yes\"}    Requires rehabilitation nursing interventions?: {YES/NO :653818::\"Yes\"}    Patient is making functional progress?: {YES/NO :018462::\"Yes\"}    There is a potential for " "additional functional progress? {YES/NO :870913::\"Yes\"}    Patient is participating in therapy 3 hours per day a minimum of 5 days per week or 15 hours per week in 7 day period?:{YES/NO :849423::\"Yes\"}    Has discharge needs that require coordinated discharge planning approach?:{YES/NO :184989::\"Yes\"}      Barriers/Concerns related to meeting medical necessity criteria:  ***    Team Plan to Address Concern:  ***      Final Physician Sign off    Statement of Approval: ***    Patient Goals  Social Work Goals: Confirm discharge recommendations with therapy, coordinate safe discharge plan and remain available to support and assist as needed.    OT eval in progress    PT goals pending PT evaluation     SLP Goals Pending Evaluations Today.     Goal: Mobility: Patient will demonstrate improved mobility by the end of his ARU stay.  Goal: Skin Integrity: Patient will maintain intact skin integrity during his ARU stay.  Goal: Safety Management: Patient will adhere to unit recommendations with transfers and mobility to ensure safety and prevent falls and/or injury.     "

## 2024-12-12 NOTE — PHARMACY-CONSULT NOTE
Pharmacy Tube Feeding Consult    Medication reviewed for administration by feeding tube and for potential food/drug interactions.    Recommendation: No changes are needed at this time. Consider holding tube feeding 1 hour before and after administration of levothyroxine.     Pharmacy will continue to follow as new medications are ordered.

## 2024-12-12 NOTE — PROGRESS NOTES
[facility]   Acute Rehabilitation Unit  Daily progress note    INTERVAL HISTORY  Mendez Greene was seen and examined at bedside. No acute events overnight. He reports sleeping well overnight and feels ready for therapies today. He reports no pain or other medical concerns. He shared that he had been waiting a while for help getting dressed this morning. We discussed it maybe due to shift changes and that he can work with nursing to figure out the best timing for help getting dressed in the morning.     During team rounds it was noted that Steven has had recent swelling in his right arm. He attributes it to recent IV.     Functionally:  OT:  ADLs:  Mobility: Ax2 stand pivot with walker  Grooming: TBA  Dressing: UBD: Total A. LBD: Total A with ellis steady  Bathing: TBA  Toileting: Ellis steady Ax1 to/from toilet with commode overlay, total A with toilet hygiene.   IADLs: IND prior, supportive spouse  Vision/Cognition: Ox3. Wears corrective lenses        MEDICATIONS  Scheduled meds  Current Facility-Administered Medications   Medication Dose Route Frequency Provider Last Rate Last Admin    aspirin (ASA) chewable tablet 81 mg  81 mg Oral QPM Azul Livingston MD        cyanocobalamin (VITAMIN B-12) tablet 1,000 mcg  1,000 mcg Oral Daily Azul Livingston MD   1,000 mcg at 12/12/24 0823    heparin ANTICOAGULANT injection 5,000 Units  5,000 Units Subcutaneous Q8H Azul Livingston MD   5,000 Units at 12/12/24 0823    levothyroxine (SYNTHROID/LEVOTHROID) tablet 150 mcg  150 mcg Oral Daily Azul Livingston MD   150 mcg at 12/12/24 0823    melatonin tablet 3 mg  3 mg Oral At Bedtime Azul Livingston MD   3 mg at 12/11/24 2113    pyRIDostigmine (MESTINON) tablet 60 mg  60 mg Oral Q8H Azul Livingston MD   60 mg at 12/12/24 0824    [Held by provider] simvastatin (ZOCOR) tablet 10 mg  10 mg Oral Daily Azul Livingston MD        Vitamin D3 (CHOLECALCIFEROL) tablet 25  "mcg  25 mcg Oral Daily Azul Livingston MD   25 mcg at 12/12/24 0823       PRN meds:  Current Facility-Administered Medications   Medication Dose Route Frequency Provider Last Rate Last Admin    acetaminophen (TYLENOL) tablet 650 mg  650 mg Oral Q4H PRN Kelly Antonio MD        artificial saliva (BIOTENE MT) solution 2 spray  2 spray Swish & Spit Q1H PRN Azul Livingston MD        dextrose 10% infusion   Intravenous Continuous PRN Azul Livingston MD        guaiFENesin (ROBITUSSIN) 20 mg/mL solution 200 mg  200 mg Oral or Feeding Tube Q4H PRN Azul Livingston MD        olopatadine (PATANOL) 0.1 % ophthalmic solution 1 drop  1 drop Both Eyes BID PRN Azul Livingston MD             PHYSICAL EXAM  /68 (BP Location: Right arm, Patient Position: Fowlers, Cuff Size: Adult Regular)   Pulse (!) 49   Temp 98.2  F (36.8  C) (Oral)   Resp 18   Ht 1.778 m (5' 10\")   Wt 80 kg (176 lb 5.9 oz)   SpO2 94%   BMI 25.31 kg/m    General: NAD, resting in bed  HEENT: NCAT, EOMI  Cardio: RRR, no murmurs  Pulm: Breathing comfortably on room air.   Abd: Non distended, non tender to palpation.  Ext: Right arm swelling, mild bilateral LE edema  Neuro/MSK: Limited movement of R arm, consistent with exam on admission    LABS  Recent Results (from the past 24 hours)   Magnesium    Collection Time: 12/11/24  9:41 AM   Result Value Ref Range    Magnesium 2.0 1.7 - 2.3 mg/dL   Phosphorus    Collection Time: 12/11/24  9:41 AM   Result Value Ref Range    Phosphorus 2.5 2.5 - 4.5 mg/dL   Hemoglobin    Collection Time: 12/11/24  9:41 AM   Result Value Ref Range    Hemoglobin 7.8 (L) 13.3 - 17.7 g/dL   Comprehensive metabolic panel    Collection Time: 12/11/24  9:41 AM   Result Value Ref Range    Sodium 149 (H) 135 - 145 mmol/L    Potassium 4.4 3.4 - 5.3 mmol/L    Carbon Dioxide (CO2) 24 22 - 29 mmol/L    Anion Gap 8 7 - 15 mmol/L    Urea Nitrogen 39.2 (H) 8.0 - 23.0 mg/dL    Creatinine 0.57 (L) 0.67 - 1.17 mg/dL    GFR " Estimate >90 >60 mL/min/1.73m2    Calcium 8.7 (L) 8.8 - 10.4 mg/dL    Chloride 117 (H) 98 - 107 mmol/L    Glucose 85 70 - 99 mg/dL    Alkaline Phosphatase 75 40 - 150 U/L    AST 50 (H) 0 - 45 U/L    ALT 66 0 - 70 U/L    Protein Total 5.5 (L) 6.4 - 8.3 g/dL    Albumin 2.5 (L) 3.5 - 5.2 g/dL    Bilirubin Total 0.3 <=1.2 mg/dL   Glucose by meter    Collection Time: 12/11/24  6:35 PM   Result Value Ref Range    GLUCOSE BY METER POCT 75 70 - 99 mg/dL   Glucose by meter    Collection Time: 12/11/24  9:14 PM   Result Value Ref Range    GLUCOSE BY METER POCT 104 (H) 70 - 99 mg/dL   Comprehensive metabolic panel    Collection Time: 12/12/24  7:24 AM   Result Value Ref Range    Sodium 148 (H) 135 - 145 mmol/L    Potassium 4.4 3.4 - 5.3 mmol/L    Carbon Dioxide (CO2) 26 22 - 29 mmol/L    Anion Gap 6 (L) 7 - 15 mmol/L    Urea Nitrogen 37.8 (H) 8.0 - 23.0 mg/dL    Creatinine 0.62 (L) 0.67 - 1.17 mg/dL    GFR Estimate >90 >60 mL/min/1.73m2    Calcium 9.1 8.8 - 10.4 mg/dL    Chloride 116 (H) 98 - 107 mmol/L    Glucose 115 (H) 70 - 99 mg/dL    Alkaline Phosphatase 77 40 - 150 U/L    AST 42 0 - 45 U/L    ALT 57 0 - 70 U/L    Protein Total 5.7 (L) 6.4 - 8.3 g/dL    Albumin 2.5 (L) 3.5 - 5.2 g/dL    Bilirubin Total 0.2 <=1.2 mg/dL   CBC with platelets    Collection Time: 12/12/24  7:24 AM   Result Value Ref Range    WBC Count 6.0 4.0 - 11.0 10e3/uL    RBC Count 2.44 (L) 4.40 - 5.90 10e6/uL    Hemoglobin 8.3 (L) 13.3 - 17.7 g/dL    Hematocrit 26.6 (L) 40.0 - 53.0 %     (H) 78 - 100 fL    MCH 34.0 (H) 26.5 - 33.0 pg    MCHC 31.2 (L) 31.5 - 36.5 g/dL    RDW 15.8 (H) 10.0 - 15.0 %    Platelet Count 139 (L) 150 - 450 10e3/uL   Magnesium    Collection Time: 12/12/24  7:24 AM   Result Value Ref Range    Magnesium 2.2 1.7 - 2.3 mg/dL   Phosphorus    Collection Time: 12/12/24  7:24 AM   Result Value Ref Range    Phosphorus 2.7 2.5 - 4.5 mg/dL   Glucose by meter    Collection Time: 12/12/24  8:19 AM   Result Value Ref Range    GLUCOSE BY  METER POCT 97 70 - 99 mg/dL       ASSESSMENT AND PLAN  Mendez Greene is a 79 year old male with past medical history of paroxysmal atrial fibrillation, CAD, ascending aortic aneurysm, HLD, HTN, hypothyroidism, CHA admitted to ICU service on 11/30/2024 for acute respiratory failure and AMS. Patient was recently hospitalized at [facility] from 11/8/24-11/15/24 for progressive weakness and fatigue, difficulty swallowing. After discharge, his ACh receptor binding Ab came back 2.11, strongly suggestive of myasthenia gravis. Presented back to [facility] ED 11/30 with hypotension and acute respiratory failure. Patient was intubated in the ED and admitted to ICU on pressors. During admission patient received 5 doses of IVIG for myasthenic crisis and was initiated on Mestinon. Hospital course also included broad spectrum antibiotics for possible septic shock due to UTI, MOHAMUD, electrolyte derangements, bradycardia, hypertension.  Now admitted to acute rehab.     Admission to acute inpatient rehab 12/11/2024.    Impairment group code: 03.8 Neuromuscular Disorders; new diagnosis of myasthenia gravis with myasthenia crisis         PT and OT 75 minutes of each as well as SLP 30 minutes on a daily basis, in addition to rehab nursing and close management of physiatrist.       Impairment of ADL's: Impairment in strength, endurance, and ROM resulting in functional limitations in patient's ability to perform ADLs     Impairment of mobility:  Impairment in strength, endurance, and ROM resulting in functional limitations in patient's ability to perform functional mobility      Impairment of cognition/language/swallow:  Needs therapy for swallowing.     Medical Conditions     #Myasthenic crisis  Questionable ALS diagnosis which is being deferred to his primary neurologist.  - clinically much improved with improvement in upper extremity strength; still with some upper extremity flexion weakness and LE weakness  - completed 5 doses of IVIG on 12/5/24    - Caution use of IV magnesium, beta-blocker, statin, macrolides, aminoglycosides, and fluoroquinolone, and as much as possible avoid them due to to potential exacerbation of myasthenia crisis. ; Holding PTA statin  - Monitor respiratory status closely, currently stable on room air  - CPAP at HS  - Pyridostigmine 60 mg PO TID  - Consider CT chest with contrast for r/o thymoma while on rehab     #H/o neobladder  - was on snowden catheter in neobladder, discontinued 12/10 and voiding well, chronically incontinent     #Hypernatremia  #Hyperchloremia  Na 149 day of admission to rehab (12/11), Cl 117, creatinine 0.57 (at baseline). Had issues with hypernatremia on acute service, up to 152. Acute service increased free water flushes and gave 1L D5.   - continue free water flushes 150 ml q 4hrs   - BMP M/Th     #Chronic macrocytic anemia  Hgb has mostly been around 9-10 on acute service; noted slight downtrend ---8.5--7.8 (12/11), likely dilutional with IV fluids as platelets and hematocrit also decreased slightly; has no clinical concern for active bleed and has been hemodynamically stable. B12 was 727, folic acid wnl on 11/12. Was seen by hematology 4/2024, workup unremarkable.  - Hgb 12/12 8.3 increased from 7.8  - CBC M/Th  - Transfuse hgb <7 (has not required transfusion this admission)  - PTA B12 supplementation     #Sinus bradycardia  HR 40s to upper 50s during admission, dips the most when sleeping. Previous EKG sinus bradycardia with no high grade blocks. Chart review shows similar HR 40-50s last hospitalization, and he has a few OP visits with documented sinus bradycardia (7/10/24, 3/4/24).  - OK w HR 40's-50's  - CTM, further evaluation if ever symptomatic     #Moderate to severe oropharyngeal dysphagia s/p G-tube  - VFSS on 12/9 while on acute service, note that he continues to aspirate all textures but improved ability to cough; SLP recommend continued NPO  - Receive all nutrition via tube feeds  - BMP, Mg & Phos  M/Th     #CAD  #HLD  #Paroxysmal atrial fibrillation  #Recent NSTEMI (11/8/24)  Troponin 997 on acute service, cardiac cath 11/11/24 showed only mild CAD. Echo 12/1/24 difficult study but grossly normal left and right ventricular function, no severe valvular regurgitation, probably mild aortic regurgitation, no pericardial effusion.  Not in afib on admission to rehab.  - Continue PTA ASA  - HOLD PTA Simvastatin until follow-up with neurology  - No rate control medications at baseline     #Hypothyroidism  - PTA levothyroxine 150 mcg daily     #CHA  - PTA CPAP HS    #Extremity Swelling  Significant swelling in the right arm as well as bilateral LE was noted on 12/12. Patient denies associated pain.  - Wrapped arm and lower extremities in compression bandage  - Right Arm Duplex US        Adjustment to disability:  Clinical psychology to eval and treat  FEN: NPO except for ice Chips. Receiving Tube Feeding Adult Formula Drip Feeding: Continuous Jevity 1.5; Gastrostomy at 55 mL/h with free water flushes 150 ml q 4 hrs. PTA Vit D supplementation.  Bowel: Will order if necessary.  Bladder: No snowden in place, PVRs on admission  DVT Prophylaxis: Subcutaneous heparin   GI Prophylaxis: Na  Code: Full  Disposition: Home with family support.  ELOS:  21 Days.  Rehab prognosis:  Good  Follow up Appointments on Discharge:   PCP  Neurology, Dr. Fabian or Dr. Ashley   PM&R  Cardiology      Patient reviewed with attending physician, Dr. Antonio, who agrees with the assessment and plan.    Gerry Brewer, MS3     history and personally performed the physical exam and medical decision making.  I agree with the assessment and plan of care as documented in the note.      Key findings:   Please see A&P for additional details of medical decision making.    Team rounds held today; please see separate document in Plan of Care tab for full details. Briefly,  admitted yesterday and started his rehab course today; full eval is pending but anticipate 3 weeks of LOS.     Kelly Antonio MD  Date of Service (when I saw the patient): 12/12/24

## 2024-12-12 NOTE — PLAN OF CARE
Discharge Planner Post-Acute Rehab SLP:     Discharge Plan: home with spouse,  SLP    Precautions: NPO - PEG, fall    Current Status:  Hearing: WFL  Vision: glasses  Communication: WFL  Cognition: noting forgetful with some new information. may be investigated in future pending plan and goals   Swallow: NPO. Ok ice chips with supervision. Oral cares x3/day    Assessment:  Pt seen on ARU for dysphagia assessment following recent dx of myasthenia gravis. Oral motor functions grossly intact with exception to reduced lingual strength. Pt with overt secretions and expectorating large yellow thick mucus. SLP completed oral cares and excavting few collectable sizes of mass secretion. Pt accepted x5 ice chips, noting impaired initiation of swallow and mild wet vocal quality. Pt cued to produce cough and swallow sequence to clear. Pt with several ?s related to MG and swallow function. SLP provided education on deficits and plan for therapy. skilled SLP inidicated to tx dysphagia and modify diet as clinically and instrumentally indicated. recommend pt continue NPO. ok for icechips with supervision. oral cares x3/day. plan to repeat VFSS week of ~12/16     Other Barriers to Discharge (Family Training, etc): TBD - diet pending progress.

## 2024-12-12 NOTE — CONSULTS
"Social Work: Initial Assessment with Discharge Plan    Patient Name: Mendez Greene  : 1945  Age: 79 year old  MRN: [id number]  Completed assessment with: Chart review and interview with patient   Admitted to ARU: 2024    Presenting Information   Date of SW assessment: 2024  Health Care Directive: Copy in Chart  Primary Health Care Agent: Patient/self   Secondary Health Care Agent: WifeAlana  Living Situation: Lives at home with wife, 3 NEELAM but all needs can be met on one level.   Previous Functional Status: IND with ADLs and IADLs, reiterated that he was independent just a couple months go, working in the garden. Did have a fall at home recently after he stood up from the toilet and was standing, \"for too long.\"   DME available: See therapy evaluation for more information   Patient and family understanding of hospitalization: ***  Cultural/Language/Spiritual Considerations: 79 year old  male, English speaking, .     Physical Health  Reason for admission: Mendez Greene is a 79 year old male with PMH of paroxysmal atrial fibrillation, CAD, ascending aortic aneurysm, HLD, HTN, hypothyroidism, CHA admitted to ICU service on 2024 for acute respiratory failure and AMS. Patient was recently hospitalized at [facility] from 24-11/15/24 for progressive weakness and fatigue, difficulty swallowing. He was seen by neurology who were concerned for motor neuron disease with high index of suspicion for ALS. EMG consistent with MND. He was started on Riluzole and Edaravone and referred to [facility] neuromuscular division for second opinion. Discharged home with palliative care referral. After discharge, his ACh receptor binding Ab came back positive, strongly suggestive of myasthenia gravis. Presented back to [facility] ED  with hypotension and acute respiratory failure. Family rescinded patient's DNR/DNI for patient to be full code and patient reportedly nodded in  agreement. " "Patient was intubated in the ED and admitted to ICU on pressors. Started on broad spectrum antibiotics for possible septic shock due to UTI. Neurology consulted, started patient on IVIGx 5 doses for myasthenic crisis and patient was initiated on Mestinon. Extubated with pressors stopped 12/3. Transferred to hospitalist service on 12/4. Hospital course has been notable for urosepsis with chronic Cortez catheter (completed abx course), MOHAMUD, electrolyte derangements, bradycardia, hypertension.     Provider Information   Primary Care Physician:Gladys Vazquez   [facility] will schedule PCP apt at discharge.   : Arleth Rajput, RN Clinic Care Coordinator   Phone: [phone]     Mental Health/Chemical Dependency:   Diagnosis: ***  Alcohol/Tobacco/Narcotis: ***  Support/Services in Place: ***  Services Needed/Recommended: [place] and Health Psychology support while on ARU available.   Sexuality/Intimacy: Not discussed     Support System  Marital Status: , wife Alana  Family support: Sons, Goldy and Jason, live nearby  Other support available: ***    Community Resources  Current in home services: Previously on [facility], was disenrolled when he returned to the hospital. Was set up with [facility]  Previous services: ***    Financial/Employment/Education  Employment Status: \"Semi-Retired\", former   Income Source: ***  Education: Law school. Was in the  for 4 years  Financial Concerns:  ***  Insurance: Medicare / [place] Medicare Supplement    Discharge Plan   Patient and family discharge goal: TBD, pending progress  Provided Education on discharge plan: Evaluations and discharge recommendations pending.   Patient agreeable to discharge plan:  Pending further discussion. Evaluations and discharge recommendations pending.   Provided education and attained signature for Medicare IM and IRF Patient Rights and Privacy Information provided to patient : YES  Provided patient " "with [place] [facility] Resources: N/A  Barriers to discharge: 3 NEELAM    Discharge Recommendations   Disposition: See above   Transportation Needs: Patient, family/friends, paid transport, insurance transport (if applicable)     Additional comments   Discharge EVELINE PRATT 21 days    MONTY will remain available and continue to follow as needs arise.       -------------------------------------------------------------------------------------------------------------  PAYTON Pain Assessment    Pain Effect on Sleep  Over the past 5 days, how much of the time has pain made it hard for you to sleep at night?\"    {Post Acute Pain:835814}    Pain Interference with Therapy Activities  \"Over the past 5 days, how often have you limited your participation in rehabilitation therapy sessions due to pain?\"  {Post Acute Pain:965446}    Pain Interference with Day-to-Day Activities  \"Over the past 5 days, how often have you limited your day-to-day activities (excluding rehabilitation therapy sessions) because of pain?\"  {Post Acute Pain:222513}    -------------------------------------------------------------------------------------------------------------    Charlette Shields [facility], Acute Inpatient Rehab Unit   [address] [place] [address], [address] [place] [id number]  Phone: [phone], Fax: [phone]  "

## 2024-12-12 NOTE — PHARMACY-MEDICATION REGIMEN REVIEW
Pharmacy Medication Regimen Review  Mendez Greene is a 79 year old male who is currently in the Acute Rehab Unit.    Assessment: All medications have an appropriate indications, durations and no unnecessary use was found.  Simvastatin is on hold.    Plan:   Continue current treatment.   Please consider checking thyroid function if pt will not be able to take it orally for a long time. Pt is currently taking Levothyroxine through G-tube.  Attending provider will be sent this note for review.  If there are any emergent issues noted above, pharmacist will contact provider directly by phone.      Pharmacy will periodically review the resident's medication regimen for any PRN medications not administered in > 72 hours and discontinue them. The pharmacist will discuss gradual dose reductions of psychopharmacologic medications with interdisciplinary team on a regular basis.    Please contact pharmacy if the above does not answer specific medication questions/concerns.    Background:  A pharmacist has reviewed all medications and pertinent medical history today.  Medications were reviewed for appropriate use and any irregularities found are listed with recommendations.      Current Facility-Administered Medications:     acetaminophen (TYLENOL) tablet 650 mg, 650 mg, Oral, Q4H PRN, Kelly Antonio MD    artificial saliva (BIOTENE MT) solution 2 spray, 2 spray, Swish & Spit, Q1H PRN, Azul Livingston MD    aspirin (ASA) chewable tablet 81 mg, 81 mg, Oral, QPM, Azul Livingston MD    cyanocobalamin (VITAMIN B-12) tablet 1,000 mcg, 1,000 mcg, Oral, Daily, Azul Livingston MD, 1,000 mcg at 12/12/24 0823    dextrose 10% infusion, , Intravenous, Continuous PRN, Azul Livingston MD    guaiFENesin (ROBITUSSIN) 20 mg/mL solution 200 mg, 200 mg, Oral or Feeding Tube, Q4H PRN, Azul Livingston MD    heparin ANTICOAGULANT injection 5,000 Units, 5,000 Units, Subcutaneous, Q8H, Azul Livingston MD, 5,000 Units at  12/12/24 0823    levothyroxine (SYNTHROID/LEVOTHROID) tablet 150 mcg, 150 mcg, Oral, Daily, Azul Livingston MD, 150 mcg at 12/12/24 0823    melatonin tablet 3 mg, 3 mg, Oral, At Bedtime, Azul Livingston MD, 3 mg at 12/11/24 2113    olopatadine (PATANOL) 0.1 % ophthalmic solution 1 drop, 1 drop, Both Eyes, BID PRN, Azul Livingston MD    pyRIDostigmine (MESTINON) tablet 60 mg, 60 mg, Oral, Q8H, Azul Livingston MD, 60 mg at 12/12/24 0824    [Held by provider] simvastatin (ZOCOR) tablet 10 mg, 10 mg, Oral, Daily, Azul Livingston MD    Vitamin D3 (CHOLECALCIFEROL) tablet 25 mcg, 25 mcg, Oral, Daily, Azul Livingston MD, 25 mcg at 12/12/24 0823  No current outpatient prescriptions on file.   PMH: Myasthenia gravis with myasthenia crisis, Moderate to severe oropharyngeal dysphagia s/p G-tube, CAD, HLD, Paroxysmal atrial fibrillation, Recent NSTEMI (11/8/24), hypothyroidism, and CHA

## 2024-12-12 NOTE — PLAN OF CARE
Goal Outcome Evaluation:    Pt admitted today, arrived in the unit around 1600H. AxO, VSS, denied pain. Admission orientation and assessments done. Seen and evaluated by providers. Endorsed accordingly to incoming NOD.

## 2024-12-12 NOTE — PHARMACY-ADMISSION MEDICATION HISTORY
Admission medication history completed at [facility]. Please see Pharmacist Admission Medication History note from 11/30/2024.

## 2024-12-12 NOTE — PLAN OF CARE
Goal Outcome Evaluation:    [id number]      Plan of Care Reviewed With: patient    Overall Patient Progress: improvingOverall Patient Progress: improving    FOCUS/GOAL     Bowel management, Bladder management, Medication management, Wound care management, Medical management, Mobility, Skin integrity, and Safety management     ASSESSMENT, INTERVENTIONS AND CONTINUING PLAN FOR GOAL:     Pt is A&OX4, calm, & cooperative with care. Denied CP, SOB, & n/v. VSS & on RA. Pt transfers A of 1-2 with walker & GB; was not OOB this shift. No BM this shift, incontinent for bladder, & Primofit on. Pt is NPO; ice chips, & on continuous TF @ 55 mL/hr. Takes meds via G-tube. G-tube dressing CDI. Nasal CPAP at HS. Pt went to bed at 2345H comfortable in bed & no other acute concern. Able to make needs known & soft touch call light within reach. Continue with plan of care.    Patient's most recent vital signs are:     Vital signs:  BP: 124/56  Temp: 98  HR: 50  RR: 20  SpO2: 97 %     Patient does not have new respiratory symptoms.  Patient does not have new sore throat.  Patient does not have a fever greater than 99.5.

## 2024-12-12 NOTE — PLAN OF CARE
Goal Outcome Evaluation:    Patient AxO, able to make needs known. VSS, denied pain. NPO except ice chips, but on continuous TF. Meds given through the PEG tube. Meds, feeding, and flushes well tolerated. A2 SPT to the chair, A1 ellis christianson when toileting. Incontinent of bladder, continent of BM. No BM reported this AM shift. Participated in therapies. Had team rounds this AM. US to RUE done to r/o DVT, with negative result. Provider aware. Care plan in place.

## 2024-12-12 NOTE — PROGRESS NOTES
Clinic Care Coordination Contact  Care Coordination Transition Communication    Clinical Data: Patient was hospitalized at [facility] from 11/30/24 to 12/11/24 with diagnosis of    Myasthenic crisis  Acute hypoxic respiratory failure requiring mechanical ventilation, resolved    Assessment: Patient has transitioned to TCU/ARU for short term rehabilitation:    Facility Name: [facility] ARU  Transition Communication:  Notified facility of Primary Care- Care Coordination support via Epic In-Basket message.    Plan: Care Coordinator will await notification from facility staff informing of patient's discharge plans/needs. Care Coordinator will review chart and outreach to facility staff every 4 weeks and as needed.     Arleth Rajput, RN Clinic Care Coordinator  [facility] Clinics: [place] [place] (on-site Wednesdays), [facility] (on-site Thursdays) & [facility].  [email or web address]  Phone: [phone]

## 2024-12-12 NOTE — PROGRESS NOTES
CLINICAL NUTRITION SERVICES - ASSESSMENT NOTE     Nutrition Prescription    RECOMMENDATIONS FOR MDs/PROVIDERS TO ORDER:  None at this time    Malnutrition Status:    Severe malnutrition in the context of acute illness or injury    Recommendations already ordered by Registered Dietitian (RD):  Continue Jevity 1.5 @ 55 mL/hr x 24    Future/Additional Recommendations:  Monitor weight trend, GI symptoms, and TF tolerance      REASON FOR ASSESSMENT  Mendez Greene is a/an 79 year old male assessed by the dietitian for Provider Order - Registered Dietitian to Assess and Order TF per Medical Nutrition Therapy Protocol    PMH  Past medical history of paroxysmal atrial fibrillation, CAD, ascending aortic aneurysm, HLD, HTN, hypothyroidism, CHA admitted to ICU service on 11/30/2024 for acute respiratory failure and AMS.Hospital course has been notable for urosepsis with chronic Cortez catheter (completed abx course), MOHAMUD, electrolyte derangements, bradycardia, hypertension.     NUTRITION HISTORY  Met w/ pt at bedside w/ his wife present. Pt reported tolerating current Jevity regimen (continuous @ 55 mL/hr). He denied any GI symptoms. His wife recalled a past home regimen of 110 mL/hr x 12 that was not tolerated. Writer offered to try a gradual cycle and pt would prefer to stay on continuous at this time.     CURRENT NUTRITION ORDERS  Diet: NPO  Nutrition support: Continuous Jevity 1.5 Mandeep (or equivalent) @ goal of  55ml/hr (1320ml/day) provides: 1980 kcals, 84 g PRO, 1003 ml free H20, 284 g CHO, and 27 g fiber daily.    Meets 99% of estimated kcal needs and 100% of protein needs    Intake/Tolerance: No reports of intolerance    LABS  Labs reviewed   Latest Reference Range & Units 12/12/24 07:24   Sodium 135 - 145 mmol/L 148 (H)   Potassium 3.4 - 5.3 mmol/L 4.4   Chloride 98 - 107 mmol/L 116 (H)   Carbon Dioxide (CO2) 22 - 29 mmol/L 26   Urea Nitrogen 8.0 - 23.0 mg/dL 37.8 (H)   Creatinine 0.67 - 1.17 mg/dL 0.62 (L)   GFR  "Estimate >60 mL/min/1.73m2 >90   Calcium 8.8 - 10.4 mg/dL 9.1   Anion Gap 7 - 15 mmol/L 6 (L)   Magnesium 1.7 - 2.3 mg/dL 2.2   Phosphorus 2.5 - 4.5 mg/dL 2.7   Albumin 3.5 - 5.2 g/dL 2.5 (L)   Protein Total 6.4 - 8.3 g/dL 5.7 (L)   (H): Data is abnormally high  (L): Data is abnormally low    MEDICATIONS  Medications reviewed  B12  Levothyroxine   Vitamin D3  PRN- Pepcid, Miralax, Senokot     ANTHROPOMETRICS  Height: 177.8 cm (5' 10\")  Most Recent Weight: 80 kg (176 lb 5.9 oz)    IBW: 75.5 kg  BMI: Overweight BMI 25-29.9   Weight History:   Wt Readings from Last Encounters:   12/12/24 80 kg (176 lb 5.9 oz)   12/06/24 82.2 kg (181 lb 3.5 oz)   11/14/24 78.4 kg (172 lb 13.5 oz)   11/15/24 78.4 kg (172 lb 13.5 oz)   11/06/24 81.6 kg (180 lb)   10/23/24 88.9 kg (195 lb 14.4 oz)   07/10/24 88.7 kg (195 lb 8 oz)   04/23/24 83.5 kg (184 lb)   03/04/24 85.6 kg (188 lb 12.8 oz)   10/10/23 88 kg (194 lb)   09/05/23 88.2 kg (194 lb 6.4 oz)   08/29/23 90.7 kg (200 lb)   06/14/23 87.9 kg (193 lb 12.8 oz)   >10% loss in 3 months (severe wt loss)    Dosing Weight: 80 kg actual BW    ASSESSED NUTRITION NEEDS  Estimated Energy Needs: 9700-1926 kcals/day (25 - 30 kcals/kg)  Justification: Maintenance  Estimated Protein Needs: 80-96 grams protein/day (1 - 1.2 grams of pro/kg)  Justification: CKD and Maintenance  Estimated Fluid Needs: 25 - 30 mL/kg  Justification: Maintenance or Per provider pending fluid status    PHYSICAL FINDINGS  See malnutrition section below.  Derrick score 18  Nutrition score 3     MALNUTRITION  % Intake: Decreased intake does not meet criteria - NPO/TF  % Weight Loss: > 7.5% in 3 months (severe)  Subcutaneous Fat Loss: Facial region:  mild  Muscle Loss: Temporal:  mild and Upper arm (bicep, tricep):  moderate  Fluid Accumulation/Edema: None noted  Malnutrition Diagnosis: Severe malnutrition in the context of acute illness or injury     NUTRITION DIAGNOSIS  Inadequate oral intake related to inability to meet " needs orally as evidenced by NPO order and pt requires EN to meet 100% estimated nutrition needs.     INTERVENTIONS  Implementation   Enteral Nutrition - Initiate     Goals  Total avg nutritional intake to meet a minimum of 25 kcal/kg and 1 g PRO/kg daily (per dosing wt 80 kg).     Monitoring/Evaluation  Progress toward goals will be monitored and evaluated per protocol.  Anum Salcedo PhD, RD, LD  Clinical Dietitian  Available by name via [employer]

## 2024-12-12 NOTE — PROGRESS NOTES
12/12/24 0908   Appointment Info   Signing Clinician's Name / Credentials (OT) Patti Ruiz, OTR/L   Living Environment   People in Home spouse   Current Living Arrangements house   Home Accessibility stairs to enter home;stairs within home   Number of Stairs, Main Entrance 2   Stair Railings, Main Entrance none   Number of Stairs, Within Home, Primary   (1 flight upstairs, 1 flight downstairs to basement does not need to access)   Transportation Anticipated family or friend will provide   Living Environment Comments OT: Pt lives in a 3 story home in [place] with spouse. Pt's bedroom/bathroom is on main floor. Pt does not need to access upstairs or basement. Pt has a walk in shower with grab bar, and standard height toilet with grab bar bilaterally. Pt has a karyna sized bed and gets out on the right side.   Self-Care   Usual Activity Tolerance good   Current Activity Tolerance fair   Regular Exercise Yes   Activity/Exercise Type walking   Exercise Amount/Frequency 3-5 times/wk   Equipment Currently Used at Home grab bar, toilet;grab bar, tub/shower;walker, standard;wheelchair, manual   Fall history within last six months yes   Number of times patient has fallen within last six months 1   Activity/Exercise/Self-Care Comment OT: Pt reports decline in endurance over the last year but continued frequent walking a few times a week.   Instrumental Activities of Daily Living (IADL)   Previous Responsibilities meal prep;medication management;laundry;shopping;yardwork;finances;driving;work   IADL Comments OT: Works as a  on various commitees for the office (was retiring this month)   Post-Acute Assessment Only   Post-Acute Functional Assessment See below   Previous Level of Function/Home Environm   Bathing, Previous Functional Level independent   Grooming, Previous Functional Level independent   Dressing, Previous Functional Level independent   Eating/Feeding, Previous Functional Level independent  "  Toileting, Previous Functional Level independent   BADLs, Previous Functional Level independent   IADLs, Previous Functional Level independent   Bed Mobility, Previous Functional Level independent   Transfers, Previous Functional Level independent   Household Ambulation, Previous Functional Level independent   Stairs, Previous Functional Level independent   Community Ambulation, Previous Functional Level independent   General Information   Onset of Illness/Injury or Date of Surgery 11/08/24   Referring Physician Dr. Sorensen   Patient/Family Therapy Goal Statement (OT) OT: Enough strength to walk around my house\"   Additional Occupational Profile Info/Pertinent History of Current Problem OT: Per chart review, pt is a \"78 y/o M initially presenting to the hospital on 11/8/24 with weakness, fatigue, and difficulty swallowing.  Was initially thought to have ALS and discharged home, however after leaving the hospital labs returned positive for ACh receptor binding antibody, suggesting Myasthenia Gravis.  He returned to the hospital on 11/30 with respiratory failure and AMS, and treated with antibiotics for septic shock from urinary sources.  Was also treated with 5 doses of IVIG for Myasthenia crisis and started on Mestinon.\"   Existing Precautions/Restrictions fall;NPO   Heart Disease Risk Factors High blood pressure;Dislipidemia;Stress;Gender;Age;Medical history   General Observations and Info Edema: RUE: 1+ through hand and forearm. Bilateral feet: 2+   Cognitive Status Examination   Orientation Status orientation to person, place and time   Visual Perception   Impact of Vision Impairment on Function (Vision) OT: hx of cataract surgery. Wears bifocals   Sensory   Sensory Comments OT: Light touch, vibration, and sharp/dull intact with BUEs   Pain Assessment   Patient Currently in Pain No   Posture   Posture forward head position;protracted shoulders;kyphosis   Range of Motion Comprehensive   Comment, General Range " of Motion OT: RUE sh. flex and abd ~45*, unable to complete elb flexion, wrist flex/ext or finger extension. Sup/pron: ~30*. LUE: Sh. flex and abd: ~80*,~100* of elb flex and ext, ~50* of sup/pron,~40* wrist flex/ext.   Strength Comprehensive (MMT)   Comment, General Manual Muscle Testing (MMT) Assessment OT: 2/5 sh. flex, sh. abd, elb. flex/ext, wrist flex/ext and  bilaterally   Muscle Tone Assessment   Muscle Tone Comments OT: lumbrical R greater than L. Left hand affected through MCP, DIP/PIP joints, L hand affected through CMC and MCP joints   Coordination   Functional Limitations Fine motor ADL performance impaired;Decreased speed;Object transport impaired;Impaired ability to perform bilateral tasks;Reach to targets impaired   Clinical Impression   Criteria for Skilled Therapeutic Interventions Met (OT) Yes, treatment indicated   OT Diagnosis OT: Decreased safety and IND with ADLs/IADLs   Influenced by the following impairments OT: Impaired BUE ROM, impaired bilateral hand coordination, impaired strength, and impaired balance.   OT Problem List-Impairments impacting ADL problems related to;activity tolerance impaired;balance;coordination;eating/swallowing;inability to direct their own care;mobility;motor control;muscle tone;range of motion (ROM);strength;postural control;vision;other (see comments);flexibility   Assessment of Occupational Performance 5 or more Performance Deficits   Identified Performance Deficits OT: Performance deficits include: dressing, toileting, bathing, G/H tasks, feeding, and all IADLs   Planned Therapy Interventions (OT) ADL retraining;IADL retraining;balance training;bed mobility training;fine motor coordination training;groups;other (see comments);risk factor education;progressive activity/exercise;home program guidelines;transfer training;stretching;strengthening;ROM;orthotic fitting/training;neuromuscular re-education;motor coordination training   Clinical Decision Making  Complexity (OT) comprehensive assessment/high complexity   Risk & Benefits of therapy have been explained evaluation/treatment results reviewed;care plan/treatment goals reviewed;risks/benefits reviewed;current/potential barriers reviewed;participants voiced agreement with care plan;participants included;patient;spouse/significant other   Clinical Impression Comments OT: Pt would benefit from skilled OT services d/t recent decline in safety and IND with ADLs/IADLs and functional mobility following hospitalization for myasthenia gravis resulting in impaired BUE ROM, impaired bilateral hand coordination, impaired strength, and impaired balance. Anticipate a 3 week LOS with  OT services.   OT Total Evaluation Time   OT Eval, High Complexity Minutes (25344) 78   OT Goals   Therapy Frequency (OT) 6 times/week   OT Predicted Duration/Target Date for Goal Attainment 01/02/25   OT Goals Hygiene/Grooming;Upper Body Dressing;Lower Body Dressing;Upper Body Bathing;Lower Body Bathing;Toilet Transfer/Toileting;OT Goal 1;OT Goal 2;OT Goal 3   OT: Hygiene/Grooming minimal assist;using adaptive equipment;within precautions;while standing   OT: Upper Body Dressing Minimal assist;using adaptive equipment;including orthotic   OT: Lower Body Dressing Moderate assist;using adaptive equipment   OT: Upper Body Bathing Minimal assist;using adaptive equipment   OT: Lower Body Bathing Minimal assist;using adaptive equipment   OT: Toilet Transfer/Toileting Minimal assist;toilet transfer;cleaning and garment management;using adaptive equipment   OT: Goal 1 OT: Pt will safely complete shower transfers with min A utilizing appropriate AE/DME.   OT: Goal 2 OT: Pt will complete BUE HEP with min A to increase UE ROM, coordination, and strengthening needed for ADLs/IADLs   OT: Goal 3 OT: Pt will demo mod IND with feeding utilizing appropriate AE/DME   Self-Care/Home Management   Self-Care/Home Mgmt/ADL, Compensatory, Meal Prep Minutes (81277) 94    Treatment Detail/Skilled Intervention OT: Educated pt and spouse on role of OT and goals for OT POC. Completed toileting and FB Dressing tasks; refer to sections below. Provided pt with size M compression glove and size E spandi  for RUE edema, and size F spandi  for BLE's for edema mgmt. Placed orders in chart for wear schedule for nsg to follow.   OT Discharge Planning   OT Plan OT: Ellis steady to/from baer reclining back commode/shower chair. GG bathing and G/H tasks. Plan for resting hand splints bilaterally on Sun with Amanda (on the calendar).   Total Session Time   Timed Code Treatment Minutes 35   Total Session Time (sum of timed and untimed services) 60   Post Acute Settings Only   What unit is patient on? Acute Rehab   OT - Acute Rehab Center Time   Individual Time (minutes) - OT 60   Group Time (minutes) - OT 0   Concurrent Time (minutes) - OT 0   Co-Treatment Time (minutes) - OT 0   ARC Total Session Time (minutes) - OT 60   ARC Daily Total Session Time   OT ARC Daily Total Session Time 60   ARC Daily Rehab Total Minutes 60   Upper Body Dressing   Describe performance OT: Total A seated at EOB   Lower Body Dressing (Pants/Undergarments)   Describe performance OT: Total A with LBD tasks with use of ellis steady   Lower Body Dressing putting on/taking off footwear   Describe performance OT: dependent with footwear   Toileting Hygiene: Ability to maintain perineal hygiene and adjust clothes   Describe performance OT: Total A with ellis steady   Toilet Transfer: standard height (18 inches)   Describe performance OT: Max A with ellis steady.   Chair/bed-to-chair Transfer: standard height (18 inches)   Describe performance OT: Mod A with ellis steady   Sit to Lying: flat no rail   Physical Assistance Level 50%-74%   Comment OT: Mod A with EOB to supine   Sit to Lying CARE Score 2   Lying to Sitting on Side of Bed: flat no rail   Physical Assistance Level 50%-74%   Comment OT: Mod A with supine to EOB    Lying to Sitting CARE Score 2   Sit to Stand: standard height (18 inches)   Physical Assistance Level 50%-74%   Comment OT: Mod-max A with ellis steady pending surface height   Sitting to Standing CARE Score 2

## 2024-12-12 NOTE — PROGRESS NOTES
Discharge Planner Post-Acute Rehab OT:     Discharge Plan: Home with assistance. HC OT services    Precautions: falls, continuous tube feeding and NPO, BUE weakness with R greater than L  *elevate BUE/BLE's when supine in bed with pillows*    Current Status:  ADLs:  Mobility: Ax2 stand pivot with walker  Grooming: TBA  Dressing: UBD: Total A. LBD: Total A with ellis steady  Bathing: TBA  Toileting: Ellis steady Ax1 to/from toilet with commode overlay, total A with toilet hygiene.   IADLs: IND prior, supportive spouse  Vision/Cognition: Ox3. Wears corrective lenses    Assessment: Pt would benefit from skilled OT services d/t recent decline in safety and IND with ADLs/IADLs and functional mobility following hospitalization for myasthenia gravis resulting in impaired BUE ROM, impaired bilateral hand coordination, impaired strength, and impaired balance. Anticipate a 3 week LOS with HC OT services     Other Barriers to Discharge (DME, Family Training, etc):   Family training prior to discharge  AE/DME: pending progress

## 2024-12-13 ENCOUNTER — APPOINTMENT (OUTPATIENT)
Dept: SPEECH THERAPY | Facility: CLINIC | Age: 79
DRG: 056 | End: 2024-12-13
Attending: PHYSICAL MEDICINE & REHABILITATION
Payer: MEDICARE

## 2024-12-13 ENCOUNTER — APPOINTMENT (OUTPATIENT)
Dept: PHYSICAL THERAPY | Facility: CLINIC | Age: 79
DRG: 057 | End: 2024-12-13
Attending: PHYSICAL MEDICINE & REHABILITATION
Payer: MEDICARE

## 2024-12-13 ENCOUNTER — APPOINTMENT (OUTPATIENT)
Dept: OCCUPATIONAL THERAPY | Facility: CLINIC | Age: 79
DRG: 056 | End: 2024-12-13
Attending: PHYSICAL MEDICINE & REHABILITATION
Payer: MEDICARE

## 2024-12-13 PROCEDURE — 99232 SBSQ HOSP IP/OBS MODERATE 35: CPT | Performed by: PHYSICAL MEDICINE & REHABILITATION

## 2024-12-13 PROCEDURE — 92526 ORAL FUNCTION THERAPY: CPT | Mod: GN | Performed by: SPEECH-LANGUAGE PATHOLOGIST

## 2024-12-13 PROCEDURE — 97110 THERAPEUTIC EXERCISES: CPT | Mod: GP

## 2024-12-13 PROCEDURE — 97530 THERAPEUTIC ACTIVITIES: CPT | Mod: GP

## 2024-12-13 PROCEDURE — 250N000011 HC RX IP 250 OP 636

## 2024-12-13 PROCEDURE — 97535 SELF CARE MNGMENT TRAINING: CPT | Mod: GO

## 2024-12-13 PROCEDURE — 128N000003 HC R&B REHAB

## 2024-12-13 PROCEDURE — 97112 NEUROMUSCULAR REEDUCATION: CPT | Mod: GO

## 2024-12-13 PROCEDURE — 250N000013 HC RX MED GY IP 250 OP 250 PS 637: Performed by: PHYSICAL MEDICINE & REHABILITATION

## 2024-12-13 RX ORDER — GUAIFENESIN 200 MG/10ML
200 LIQUID ORAL EVERY 4 HOURS PRN
Status: DISCONTINUED | OUTPATIENT
Start: 2024-12-13 | End: 2024-12-19

## 2024-12-13 RX ADMIN — HEPARIN SODIUM 5000 UNITS: 5000 INJECTION, SOLUTION INTRAVENOUS; SUBCUTANEOUS at 00:11

## 2024-12-13 RX ADMIN — PYRIDOSTIGMINE BROMIDE 60 MG: 60 TABLET ORAL at 08:46

## 2024-12-13 RX ADMIN — LEVOTHYROXINE SODIUM 150 MCG: 75 TABLET ORAL at 08:46

## 2024-12-13 RX ADMIN — HEPARIN SODIUM 5000 UNITS: 5000 INJECTION, SOLUTION INTRAVENOUS; SUBCUTANEOUS at 08:46

## 2024-12-13 RX ADMIN — Medication 25 MCG: at 08:46

## 2024-12-13 RX ADMIN — PYRIDOSTIGMINE BROMIDE 60 MG: 60 TABLET ORAL at 16:59

## 2024-12-13 RX ADMIN — CYANOCOBALAMIN TAB 1000 MCG 1000 MCG: 1000 TAB at 08:46

## 2024-12-13 RX ADMIN — PYRIDOSTIGMINE BROMIDE 60 MG: 60 TABLET ORAL at 00:11

## 2024-12-13 RX ADMIN — ASPIRIN 81 MG CHEWABLE TABLET 81 MG: 81 TABLET CHEWABLE at 20:53

## 2024-12-13 RX ADMIN — HEPARIN SODIUM 5000 UNITS: 5000 INJECTION, SOLUTION INTRAVENOUS; SUBCUTANEOUS at 17:00

## 2024-12-13 RX ADMIN — Medication 3 MG: at 20:53

## 2024-12-13 RX ADMIN — PYRIDOSTIGMINE BROMIDE 60 MG: 60 TABLET ORAL at 23:59

## 2024-12-13 ASSESSMENT — ACTIVITIES OF DAILY LIVING (ADL)
ADLS_ACUITY_SCORE: 80

## 2024-12-13 NOTE — PLAN OF CARE
"Discharge Planner Post-Acute Rehab SLP:     Discharge Plan: home with spouse,  SLP    Precautions: NPO - PEG, fall    Current Status:  Hearing: WFL  Vision: glasses  Communication: WFL  Cognition: noting forgetful with some new information. may be investigated in future pending plan and goals   Swallow: NPO. Ok ice chips with supervision. Oral cares x3/day- instructions for completing adequate oral care in patient care order for nurse review    Assessment: Pt seen with wife present for session. Pt reported nursing not using toothbrush/toothpaste to perform oral care per patient care order written by evaluating SLP, only using wet foam swab. Oral care note on white updated to include instruction for nursing to look at oral care order for how to complete oral cares adequately as foam swabs are not enough. SLP completed oral cares. Administered 4x very small ice chips. Pt and wife edu in plan for p.m. session for training in limited swallow exercises, reinforced with myasthenia gravis diagnosis \"more is not better.\"     P.M.: Pt seen with wife present for session. Pt and wife edu in basic swallow anatomy and physiology with line drawing of view from VFSS, edu in areas of deficits found on prior video. Pt edu in targeted pharyngeal swallow exercises to promote hyolarygneal elevation/excursion with Mendohlson, extrinsic swallow muscles with Effortful swallow. Pt return demo'd both 2x. Pt and wife edu pt to complete 10 reps of each ONLY each day. Pt endorsed comprehension but reported disappointment as doing Mendohlson makes it feel as though he is actually doing something. Pt re-edu in impact of myasthenia gravis on function, more is not better.     Other Barriers to Discharge (Family Training, etc): TBD - diet pending progress.     "

## 2024-12-13 NOTE — PLAN OF CARE
Individualized Overall Plan Of Care (IOPOC)      Rehab diagnosis/Impairment Group Code: Neurologic conditions 03.8 neuromuscular disorders; new diagnosis of myasthenia gravis with myasthenia crisis  Myasthenia gravis (h)     Expected functional outcome: he will be most likely WC based given the severity of his deficits and will need physical help for ADLs and mobility.     Clinical Impression Comments:     Mobility: Pt presents to PT with proximal>distal and UE>LE weakness, impaired sitting/standing balance, and reduced CV endurance in the setting of new myasthenia gravis. As a result, he requires signifcantly greater assist for mobility and ADLs compared to baseline and at high risk for future falls. Despite, he should benefit well from skilled PT with goals for mod I walker vs mixed mobility and ADLs at discharge. ELOS 3 weeks.    ADL: OT: Pt would benefit from skilled OT services d/t recent decline in safety and IND with ADLs/IADLs and functional mobility following hospitalization for myasthenia gravis resulting in impaired BUE ROM, impaired bilateral hand coordination, impaired strength, and impaired balance. Anticipate a 3 week LOS with HC OT services.    Communication/Cognition/Swallow: SLP: Pt seen on ARU for dysphagia assessment following recent dx of myasthenia gravis. Oral motor functions grossly intact with exception to reduced lingual strength. Pt with overt secretions and expectorating large yellow thick mucus. SLP completed oral cares and excavting few collectable sizes of mass secretion. Pt accepted x5 ice chips, noting impaired initiation of swallow and mild wet vocal quality. Pt cued to produce cough and swallow sequence to clear. Pt with several ?s related to MG and swallow function. SLP provided education on deficits and plan for therapy. skilled SLP inidicated to tx dysphagia and modify diet as clinically and instrumentally indicated. recommend pt continue NPO. ok for icechips with supervision.  oral cares x3/day. plan to repeat VFSS week of ~12/16     Intensity of therapy:   PT 60 minutes, 6 times/week, for 3 weeks  OT 60 minutes, 6 times/week, for 3 weeks  SLP 60 minutes, 6 times/week, for 3 weeks     Orthotics AFO, wrist splint, and TBD  Education  MG and precautions   Neuropsychology Testing: No      Medical Prognosis: medical prognosis is fair given his age, the severity of the disease and other medical co-morbidities.     Physician summary statement: early in his rehab course. Will need a long course of rehab and multiple equipments at home.     Discharge destination: prior home, family, hire in support, and vs TCU  Discharge rehabilitation needs: home care, RN, PT, OT, SLP, and HHA      Estimated length of stay: 3 weeks      Rehabilitation Physician Kelly Antonio MD

## 2024-12-13 NOTE — PLAN OF CARE
Discharge Planner Post-Acute Rehab PT:     Discharge Plan: Home with wife, 3 NEELAM w/out rail. Home care PT.    Precautions: Falls, NPO/PEG    Current Status:  Bed Mobility: Min A  Transfer: Max A for STS. Ax2 stand pivot with FWW with nursing.  Gait: up to 40 ft, CGA with FWW and WC follow  Stairs: Not yet safe  Balance: Poor static, dynamic standing balance w/out UE support    Outcome Measures:   Garcia   12/12: 12/56  5TSTS   12/12: 0x (requires physical assist even with UE assist)  6MWT   12/12: 40 ft, FWW and CGA (stopped early due to fatigue)    Assessment: repeat STS training from progressively lower EOB. Pt CGA-Shai from tall EOB, but from standard wheelchair height requires max A and serasteady for lift off.     Other Barriers to Discharge (DME, Family Training, etc):   Equipment: Anticipate FWW, gait belt, possible K3 WC rental.  Family training to be scheduled.

## 2024-12-13 NOTE — PROGRESS NOTES
[facility]   Acute Rehabilitation Unit  Daily progress note    INTERVAL HISTORY  Mendez Greene was seen and examined at bedside. No acute events overnight. He reports sleeping well overnight except for when his tube feed stopped and was beeping. This morning he notes an improvement in his right arm strength and mobility. With assistance from his left arm he is able to reach across his body and feed himself ice chips. On afternoon rounds Gurjit mentioned that he would like a recliner chair that would be more comfortable for sitting for an extended period of time. The wheelchair in his room hurts his shoulders.    We also discussed after discharge following up with urology for assessment of neobladder and options to reduce leaking.    Ultrasound of Right arm yesterday was negative for thrombus. Today there is still some swelling just distal to his elbow, but it has decreased.     Functionally:  OT:  ADLs:  Mobility: Ax2 stand pivot with walker  Grooming: TBA  Dressing: UBD: Total A. LBD: Total A with ellis steady  Bathing: TBA  Toileting: Ellis steady Ax1 to/from toilet with commode overlay, total A with toilet hygiene.   IADLs: IND prior, supportive spouse  Vision/Cognition: Ox3. Wears corrective lenses    SLP:  Hearing: L  Vision: glasses  Communication: WFL  Cognition: noting forgetful with some new information. may be investigated in future pending plan and goals   Swallow: NPO. Ok ice chips with supervision. Oral cares x3/day    PT:  Bed Mobility: Min A  Transfer: Max A for STS. Ax2 stand pivot with FWW with nursing.  Gait: up to 40 ft, CGA with FWW and WC follow  Stairs: Not yet safe  Balance: Poor static, dynamic standing balance w/out UE support        MEDICATIONS  Scheduled meds  Current Facility-Administered Medications   Medication Dose Route Frequency Provider Last Rate Last Admin    aspirin (ASA) chewable tablet 81 mg  81 mg Oral or Feeding Tube QPM Sachin Sorensen  "ALON DO   81 mg at 12/12/24 2107    cyanocobalamin (VITAMIN B-12) tablet 1,000 mcg  1,000 mcg Oral or Feeding Tube Daily Sachin Sorensen DO   1,000 mcg at 12/13/24 0846    heparin ANTICOAGULANT injection 5,000 Units  5,000 Units Subcutaneous Q8H Azul Livingston MD   5,000 Units at 12/13/24 0846    levothyroxine (SYNTHROID/LEVOTHROID) tablet 150 mcg  150 mcg Oral or Feeding Tube Daily Sachin Sorensen DO   150 mcg at 12/13/24 0846    melatonin tablet 3 mg  3 mg Oral or Feeding Tube At Bedtime Sachin Sorensen DO   3 mg at 12/12/24 2107    pyRIDostigmine (MESTINON) tablet 60 mg  60 mg Oral or Feeding Tube Q8H Sachin Sorensen DO   60 mg at 12/13/24 0846    [Held by provider] simvastatin (ZOCOR) tablet 10 mg  10 mg Oral Daily zAul Livingston MD        Vitamin D3 (CHOLECALCIFEROL) tablet 25 mcg  25 mcg Oral or Feeding Tube Daily Sachin Sorensen DO   25 mcg at 12/13/24 0846       PRN meds:  Current Facility-Administered Medications   Medication Dose Route Frequency Provider Last Rate Last Admin    acetaminophen (TYLENOL) tablet 650 mg  650 mg Oral or Feeding Tube Q4H PRN Sachin Sorensen DO        artificial saliva (BIOTENE MT) solution 2 spray  2 spray Swish & Spit Q1H PRN Azul Livingston MD        dextrose 10% infusion   Intravenous Continuous PRN Azul Livingston MD        guaiFENesin (ROBITUSSIN) 20 mg/mL solution 200 mg  200 mg Oral or Feeding Tube Q4H PRN Azul Livingston MD        olopatadine (PATANOL) 0.1 % ophthalmic solution 1 drop  1 drop Both Eyes BID PRN Azul Livingston MD             PHYSICAL EXAM  /50 (BP Location: Left arm)   Pulse 51   Temp 98.8  F (37.1  C) (Axillary)   Resp 16   Ht 1.778 m (5' 10\")   Wt 80 kg (176 lb 5.9 oz)   SpO2 96%   BMI 25.31 kg/m    General: NAD, resting in bed  HEENT: NCAT, EOMI. On afternoon rounds eyelids were more shut than in am.  Cardio: Well perfused  Pulm: Breathing comfortably on room air.   Abd: Non " distended, non tender to palpation.  Ext: Right arm swelling just distal to elbow. No wrist swelling. Right LE 2+ pitting edema, Left LE 1+ pitting edema.   Neuro/MSK: Improved abduction and internal rotation of right shoulder since admission.    LABS  No results found for this or any previous visit (from the past 24 hours).      ASSESSMENT AND PLAN  Mendez Greene is a 79 year old male with past medical history of paroxysmal atrial fibrillation, CAD, ascending aortic aneurysm, HLD, HTN, hypothyroidism, CHA admitted to ICU service on 11/30/2024 for acute respiratory failure and AMS. Patient was recently hospitalized at [facility] from 11/8/24-11/15/24 for progressive weakness and fatigue, difficulty swallowing. After discharge, his ACh receptor binding Ab came back 2.11, strongly suggestive of myasthenia gravis. Presented back to [facility] ED 11/30 with hypotension and acute respiratory failure. Patient was intubated in the ED and admitted to ICU on pressors. During admission patient received 5 doses of IVIG for myasthenic crisis and was initiated on Mestinon. Hospital course also included broad spectrum antibiotics for possible septic shock due to UTI, MOHAMUD, electrolyte derangements, bradycardia, hypertension.  Now admitted to acute rehab.     Admission to acute inpatient rehab 12/11/2024.    Impairment group code: 03.8 Neuromuscular Disorders; new diagnosis of myasthenia gravis with myasthenia crisis         PT and OT 75 minutes of each as well as SLP 30 minutes on a daily basis, in addition to rehab nursing and close management of physiatrist.       Impairment of ADL's: Impairment in strength, endurance, and ROM resulting in functional limitations in patient's ability to perform ADLs     Impairment of mobility:  Impairment in strength, endurance, and ROM resulting in functional limitations in patient's ability to perform functional mobility      Impairment of cognition/language/swallow:  Needs therapy for swallowing.      Medical Conditions     #Myasthenic crisis  Questionable ALS diagnosis which is being deferred to his primary neurologist.  - clinically much improved with improvement in upper extremity strength; still with some upper extremity flexion weakness and LE weakness  - completed 5 doses of IVIG on 12/5/24   - Caution use of IV magnesium, beta-blocker, statin, macrolides, aminoglycosides, and fluoroquinolone, and as much as possible avoid them due to to potential exacerbation of myasthenia crisis. ; Holding PTA statin  - Monitor respiratory status closely, currently stable on room air  - CPAP at HS  - Pyridostigmine 60 mg PO TID  - Consider CT chest with contrast for r/o thymoma while on rehab     #H/o neobladder  - was on snowden catheter in neobladder, discontinued 12/10 and voiding well, chronically incontinent     #Hypernatremia  #Hyperchloremia  Na 149 day of admission to rehab (12/11), Cl 117, creatinine 0.57 (at baseline). Had issues with hypernatremia on acute service, up to 152. Acute service increased free water flushes and gave 1L D5.   - continue free water flushes 150 ml q 4hrs   - BMP M/Th     #Chronic macrocytic anemia  #Thrombocytopenia  Hgb has mostly been around 9-10 on acute service; noted slight downtrend ---8.5--7.8 (12/11), likely dilutional with IV fluids as platelets and hematocrit also decreased slightly; has no clinical concern for active bleed and has been hemodynamically stable. B12 was 727, folic acid wnl on 11/12. Was seen by hematology 4/2024, workup unremarkable. Platelets remained decreased 139 (12/12).  - Hgb 12/12 8.3 increased from 7.8  - CBC M/Th  - Transfuse hgb <7 (has not required transfusion this admission)  - PTA B12 supplementation     #Sinus bradycardia  HR 40s to upper 50s during admission, dips the most when sleeping. Previous EKG sinus bradycardia with no high grade blocks. Chart review shows similar HR 40-50s last hospitalization, and he has a few OP visits with documented  sinus bradycardia (7/10/24, 3/4/24).  - OK w HR 40's-50's  - CTM, further evaluation if ever symptomatic     #Moderate to severe oropharyngeal dysphagia s/p G-tube  - VFSS on 12/9 while on acute service, note that he continues to aspirate all textures but improved ability to cough; SLP recommend continued NPO  - 12/13 200 mg guaifenesin to help loosen mucus secretions. Emphasize the importance of eating ice chips so mucous does not become hard  - Receive all nutrition via tube feeds  - BMP, Mg & Phos M/Th     #CAD  #HLD  #Paroxysmal atrial fibrillation  #Recent NSTEMI (11/8/24)  Troponin 997 on acute service, cardiac cath 11/11/24 showed only mild CAD. Echo 12/1/24 difficult study but grossly normal left and right ventricular function, no severe valvular regurgitation, probably mild aortic regurgitation, no pericardial effusion.  Not in afib on admission to rehab.  - Continue PTA ASA  - HOLD PTA Simvastatin until follow-up with neurology  - No rate control medications at baseline     #Hypothyroidism  - PTA levothyroxine 150 mcg daily  Per pharmacy team - Please consider checking thyroid function if pt will not be able to take it orally for a long time. Pt is currently taking Levothyroxine through G-tube.      #CHA  - PTA CPAP HS    #Extremity Swelling  Significant swelling in the right arm as well as bilateral LE was noted on 12/12. Patient denies associated pain. 12/13 swelling in right and left leg improved, still significant swelling in left LE.  - Wrapped arm and lower extremities in compression bandage  - Right Arm Duplex US negative 12/12          Adjustment to disability:  Clinical psychology to eval and treat  FEN: NPO except for ice Chips. Receiving Tube Feeding Adult Formula Drip Feeding: Continuous Jevity 1.5; Gastrostomy at 55 mL/h with free water flushes 150 ml q 4 hrs. PTA Vit D supplementation.  Bowel: Will order if necessary.  Bladder: No snowden in place, PVRs on admission. 12/12 2 elevated PVRs in  100s and 300s range. Patient has neobladder and is incontinent at baseline. Will continue to assess output and any symptoms of fullness. No need for repeat PVRs at this time.  DVT Prophylaxis: Subcutaneous heparin   GI Prophylaxis: Na  Code: Full  Disposition: Home with family support.  ELOS:  21 Days.  Rehab prognosis:  Good  Follow up Appointments on Discharge:   PCP  Neurology, Dr. Fabian or Dr. Ashley   PM&R  Cardiology  Urology for assessment of neobladder and options to reduce nighttime leaking      Patient reviewed with attending physician, Dr. Antonio, who agrees with the assessment and plan.    Gerry Brewer, MS3          Physician Attestation   I, Kelly Antonio MD, was present with the medical/PARTH student who participated in the service and in the documentation of the note.  I have verified the history and personally performed the physical exam and medical decision making.  I agree with the assessment and plan of care as documented in the note.      Key findings:   Please see A&P for additional details of medical decision making.    Reviewed the plan to establish care with urology team again after discharge. Reviewed US results; continue compression socks and gloves as well as elevation.     Kelly Antonio MD  Date of Service (when I saw the patient): 12/13/24

## 2024-12-13 NOTE — PROGRESS NOTES
Discharge Planner Post-Acute Rehab OT:     Discharge Plan: Home with assistance. HC OT services    Precautions: falls, continuous tube feeding and NPO, BUE weakness with R greater than L  *elevate BUE/BLE's when supine in bed with pillows*    Current Status:  ADLs:  Mobility: Ax2 stand pivot with walker  Grooming: sba  Dressing: UBD: pt sitting in w/c max a LBD: sitting in w/c crossing leg up in font foot [id number] inch off floor max a threading legs into pants dep while standng to adjust over hips   Feet dep   Bathing: pt able to wash face, arms underarms lower stomach and legs, assist with feet pericares stocmach for thourghness and top portion transfer ellis meghan assist of to to rolling shower chair  Toileting: Ellis steady Ax1 to/from toilet with commode overlay, total A with toilet hygiene.   IADLs: IND prior, supportive spouse  Vision/Cognition: Ox3. Wears corrective lenses    Assessment increase u/b and l/b dressing pt is demonstrating full finger flexion and 0 finger ext  assess furthers for wrist drop splint with finger ext assist    Other Barriers to Discharge (DME, Family Training, etc):   Family training prior to discharge  AE/DME: pending progress

## 2024-12-14 ENCOUNTER — APPOINTMENT (OUTPATIENT)
Dept: PHYSICAL THERAPY | Facility: CLINIC | Age: 79
DRG: 057 | End: 2024-12-14
Attending: PHYSICAL MEDICINE & REHABILITATION
Payer: MEDICARE

## 2024-12-14 ENCOUNTER — APPOINTMENT (OUTPATIENT)
Dept: SPEECH THERAPY | Facility: CLINIC | Age: 79
DRG: 056 | End: 2024-12-14
Attending: PHYSICAL MEDICINE & REHABILITATION
Payer: MEDICARE

## 2024-12-14 ENCOUNTER — APPOINTMENT (OUTPATIENT)
Dept: OCCUPATIONAL THERAPY | Facility: CLINIC | Age: 79
DRG: 056 | End: 2024-12-14
Attending: PHYSICAL MEDICINE & REHABILITATION
Payer: MEDICARE

## 2024-12-14 LAB — PLATELET # BLD AUTO: 147 10E3/UL (ref 150–450)

## 2024-12-14 PROCEDURE — 97116 GAIT TRAINING THERAPY: CPT | Mod: GP

## 2024-12-14 PROCEDURE — 85049 AUTOMATED PLATELET COUNT: CPT | Performed by: PHYSICAL MEDICINE & REHABILITATION

## 2024-12-14 PROCEDURE — 97530 THERAPEUTIC ACTIVITIES: CPT | Mod: GP

## 2024-12-14 PROCEDURE — 36415 COLL VENOUS BLD VENIPUNCTURE: CPT | Performed by: PHYSICAL MEDICINE & REHABILITATION

## 2024-12-14 PROCEDURE — 97535 SELF CARE MNGMENT TRAINING: CPT | Mod: GO

## 2024-12-14 PROCEDURE — 250N000013 HC RX MED GY IP 250 OP 250 PS 637: Performed by: PHYSICAL MEDICINE & REHABILITATION

## 2024-12-14 PROCEDURE — 92526 ORAL FUNCTION THERAPY: CPT | Mod: GN | Performed by: SPEECH-LANGUAGE PATHOLOGIST

## 2024-12-14 PROCEDURE — 97110 THERAPEUTIC EXERCISES: CPT | Mod: GO

## 2024-12-14 PROCEDURE — 128N000003 HC R&B REHAB

## 2024-12-14 PROCEDURE — 250N000013 HC RX MED GY IP 250 OP 250 PS 637

## 2024-12-14 PROCEDURE — 250N000011 HC RX IP 250 OP 636

## 2024-12-14 RX ORDER — SENNOSIDES 8.6 MG
8.6 TABLET ORAL ONCE
Status: COMPLETED | OUTPATIENT
Start: 2024-12-14 | End: 2024-12-14

## 2024-12-14 RX ADMIN — SENNOSIDES 8.6 MG: 8.6 TABLET, FILM COATED ORAL at 12:23

## 2024-12-14 RX ADMIN — CYANOCOBALAMIN TAB 1000 MCG 1000 MCG: 1000 TAB at 08:03

## 2024-12-14 RX ADMIN — Medication 3 MG: at 20:44

## 2024-12-14 RX ADMIN — ASPIRIN 81 MG CHEWABLE TABLET 81 MG: 81 TABLET CHEWABLE at 20:44

## 2024-12-14 RX ADMIN — PYRIDOSTIGMINE BROMIDE 60 MG: 60 TABLET ORAL at 16:21

## 2024-12-14 RX ADMIN — LEVOTHYROXINE SODIUM 150 MCG: 75 TABLET ORAL at 08:03

## 2024-12-14 RX ADMIN — PYRIDOSTIGMINE BROMIDE 60 MG: 60 TABLET ORAL at 08:03

## 2024-12-14 RX ADMIN — Medication 25 MCG: at 08:03

## 2024-12-14 RX ADMIN — HEPARIN SODIUM 5000 UNITS: 5000 INJECTION, SOLUTION INTRAVENOUS; SUBCUTANEOUS at 08:03

## 2024-12-14 RX ADMIN — HEPARIN SODIUM 5000 UNITS: 5000 INJECTION, SOLUTION INTRAVENOUS; SUBCUTANEOUS at 00:02

## 2024-12-14 RX ADMIN — HEPARIN SODIUM 5000 UNITS: 5000 INJECTION, SOLUTION INTRAVENOUS; SUBCUTANEOUS at 16:21

## 2024-12-14 RX ADMIN — PYRIDOSTIGMINE BROMIDE 60 MG: 60 TABLET ORAL at 23:57

## 2024-12-14 ASSESSMENT — ACTIVITIES OF DAILY LIVING (ADL)
ADLS_ACUITY_SCORE: 80

## 2024-12-14 NOTE — PLAN OF CARE
Goal Outcome Evaluation:      Plan of Care Reviewed With: patient    Overall Patient Progress: improvingOverall Patient Progress: improving      Pt is alert and oriented x 4 and and able to make his needs known, remains on NPO and ice chips only, PEG tube feeding continuously with water flushes, medications crushed through the tube, denied pain when asked, A1-2 SPT to chair, continent with bowel and bladder, will continue plan of care

## 2024-12-14 NOTE — PLAN OF CARE
"Discharge Planner Post-Acute Rehab PT:     Discharge Plan: Home with wife, 3 NEELAM w/out rail. Home care PT.    Precautions: Falls, NPO/PEG    Current Status:  Bed Mobility: Min A  Transfer: Ax2 stand pivot with FWW with nursing, Serasteady to toilet.   Gait: up to 40 ft, CGA with FWW and WC follow. 150' with lift pants and FWW  Stairs: Not yet safe  Balance: Poor static, dynamic standing balance w/out UE support    Outcome Measures:   Garcia   12/12: 12/56  5TSTS   12/12: 0x (requires physical assist even with UE assist)  6MWT   12/12: 40 ft, FWW and CGA (stopped early due to fatigue)    Assessment: Progressed gait distance, incorporated turns and backwards stepping with FWW, utilized lift pants d/t high risk of knee buckling- pt without overt buckling today but endorses legs feeling \"wobbly\" throughout. Goal for discontinuing lift pants in coming days.     Other Barriers to Discharge (DME, Family Training, etc):   Equipment: Anticipate FWW, gait belt, possible K3 WC rental.  Family training to be scheduled.    "

## 2024-12-14 NOTE — PLAN OF CARE
"Discharge Planner Post-Acute Rehab SLP:     Discharge Plan: home with spouse,  SLP    Precautions: NPO - PEG, fall    Current Status:  Hearing: WFL  Vision: glasses  Communication: WFL  Cognition: noting forgetful with some new information. may be investigated in future pending plan and goals   Swallow: NPO. Ok ice chips with supervision. Oral cares x3/day- instructions for completing adequate oral care in patient care order for nurse review    Assessment:   A.M.: Pt reported compelted 5x Mendohlson/\"swallow holds\" already this a.m., SLP introduced swallow exercise tracking log and recorded 5 completed as tallies. Instructed pt to complete Effortful swallow, pt reported not feeling like he understands how to do it. SLP utilized different explanation than \"imagining swallow a golf ball\" as pt reported he would not try to push it down but cough it up instead. SLP utilized arm bending as different visual representation of muscle use during swallow exercise, \"normal\" bending at elbow and \"effortful\" bending at elbow with muscles tensed and \"bulked up.\" Pt endorsed comprehension, demod completing 5x reps Effortful swallow accurately per SLP judgment. Recorded on tracking sheet.     P.M.: Followed up on swallow exercise completion, pt reported having 1x \"swallow hold\" left, wanted to do with SLP to make sure he is doing correctly. SLP completed midday oral care, ice chips given to moisten oral cavity. Pt demo'd Mendohlson accurately. Instructed pt in Effortful swallow to complete all 10 reps for the day, pt benefitted from having small ice chip to accurately complete exercise. Per coordination with OT earlier, OT could benefit from more time with pt, SLP likely to decrease to 1x session a day in coming days.     Other Barriers to Discharge (Family Training, etc): TBD - diet pending progress.     "

## 2024-12-14 NOTE — PROGRESS NOTES
[facility]   Acute Rehabilitation Unit  Weekend Progress Note    INTERVAL HISTORY  Mendez Greene was seen and examined at bedside.  No acute events reported overnight. Patient is doing well overall. Therapies going well. Slept well, no pain, has not had a bowel movement in several days. Open to trying one tab of senna but does not want anything else for now. Feels his therapies are going well, very happy with the progress he's made, particularly with his upper extremity function. Patient denied any further questions/concerns at this time.    Functionally, Patient continues to be a full participant with therapies.      ROS: 10 point ROS negative other than the symptoms noted above in the HPI.    MEDICATIONS  Current Facility-Administered Medications   Medication Dose Route Frequency Provider Last Rate Last Admin    aspirin (ASA) chewable tablet 81 mg  81 mg Oral or Feeding Tube QPM Sachin Sorensen DO   81 mg at 12/13/24 2053    cyanocobalamin (VITAMIN B-12) tablet 1,000 mcg  1,000 mcg Oral or Feeding Tube Daily Sachin Sorensen DO   1,000 mcg at 12/14/24 0803    heparin ANTICOAGULANT injection 5,000 Units  5,000 Units Subcutaneous Q8H Azul Livingston MD   5,000 Units at 12/14/24 0803    levothyroxine (SYNTHROID/LEVOTHROID) tablet 150 mcg  150 mcg Oral or Feeding Tube Daily Sachin Sorensen DO   150 mcg at 12/14/24 0803    melatonin tablet 3 mg  3 mg Oral or Feeding Tube At Bedtime Sachin Sorensen DO   3 mg at 12/13/24 2053    pyRIDostigmine (MESTINON) tablet 60 mg  60 mg Oral or Feeding Tube Q8H Sachin Sorensen DO   60 mg at 12/14/24 0803    [Held by provider] simvastatin (ZOCOR) tablet 10 mg  10 mg Oral Daily Azul Livingston MD        Vitamin D3 (CHOLECALCIFEROL) tablet 25 mcg  25 mcg Oral or Feeding Tube Daily Sachin Sorensen DO   25 mcg at 12/14/24 0803          Current Facility-Administered Medications   Medication Dose Route Frequency  "Provider Last Rate Last Admin    acetaminophen (TYLENOL) tablet 650 mg  650 mg Oral or Feeding Tube Q4H PRN Sachin Sorensen DO        artificial saliva (BIOTENE MT) solution 2 spray  2 spray Swish & Spit Q1H PRN Azul Livingston MD        dextrose 10% infusion   Intravenous Continuous PRN Azul Livingston MD        guaiFENesin (ROBITUSSIN) 20 mg/mL solution 200 mg  200 mg Oral or Feeding Tube Q4H PRN Azul Livingston MD        olopatadine (PATANOL) 0.1 % ophthalmic solution 1 drop  1 drop Both Eyes BID PRN Azul Livingston MD            PHYSICAL EXAM  BP (!) 140/48 (BP Location: Left arm, Patient Position: Semi-Mhaoney's, Cuff Size: Adult Regular)   Pulse 55   Temp 98.1  F (36.7  C) (Oral)   Resp 17   Ht 1.778 m (5' 10\")   Wt 80 kg (176 lb 5.9 oz)   SpO2 93%   BMI 25.31 kg/m      General: No acute distress. Lying upright in bed.  Cardiovascular: Regular rate and rhythm  Pulmonary: Breathing comfortably on room air, clear to auscultation bilaterally  Abdominal: Non-tender, non-distended, bowel sounds present in all four quadrants, PEG site C/D/I  Extremities: Warm, well perfused, 1+ edema at ankles, no tenderness in calves   Neuro/MSK:  Alert. No gross changes to neuro exam compared to prior.     LABS  Recent Results (from the past 24 hours)   Platelet count    Collection Time: 12/14/24  6:07 AM   Result Value Ref Range    Platelet Count 147 (L) 150 - 450 10e3/uL         ASSESSMENT AND PLAN  Mendez Greene is a 79 year old male admitted to [facility] for acute inpatient rehabilitation.    Rehabilitation - Continue comprehensive acute inpatient rehabilitation program with multidisciplinary approach including therapies, rehab nursing, and physiatry following. See interval history for updates.      - Vitals stable. Labs reviewed. No concerns from charge nurse.   - Continue ongoing medical management.  - Continue therapies and plan of care.  - Refer to last progress note from primary " team for full problems list.  - 1 tab senna once today    Patient reviewed with attending physician, Dr. Schultz, who agrees with the assessment and plan.    Azul Villagran MD  [facility] PM&R PGY-2  12/14/2024  Pager #: [phone]

## 2024-12-14 NOTE — PLAN OF CARE
Goal Outcome Evaluation:      Plan of Care Reviewed With: patient    Overall Patient Progress: no changeOverall Patient Progress: no change     Orientation: alert and oriented x4; able to make needs known  V/S/ O2 sat:denies of SOB. CPAP at night   Pain:denies of pain  Diet: NPO except for  ice chips. Oral cares done   Bladder: incontinent . Utilized male external cath at  night  Bowel:continent. Last BM 12/11/24  Ambulation/Transfer:A2 stand pivot transfer  Tubes/Lines/Drains: PEG tube in place; dressing CDI; dressing change not done as patient dozing off when writer went to patient  Dressing changed early AM  Skin: abdominal bruising  Safety:fall; aspiration precaution  Others:  Asleep most of the night

## 2024-12-14 NOTE — PROGRESS NOTES
Discharge Planner Post-Acute Rehab OT:     Discharge Plan: Home with assistance. HC OT services    Precautions: falls, continuous tube feeding and NPO, BUE weakness with R greater than L  *elevate BUE/BLE's when supine in bed with pillows*    Current Status:  ADLs:  Mobility: Ax2 stand pivot with walker  Grooming: TBA  Dressing: UBD: Total A. LBD: Total A with ellis steady  Bathing: TBA  Toileting: Ellis steady Ax1 to/from toilet with commode overlay, total A with toilet hygiene.   IADLs: IND prior, supportive spouse  Vision/Cognition: Ox3. Wears corrective lenses    Assessment: Patient participated in morning adl routine, improvement noted with transfers and use of fww, good motivation throughout.    Other Barriers to Discharge (DME, Family Training, etc):   Family training prior to discharge  AE/DME: pending progress

## 2024-12-15 ENCOUNTER — APPOINTMENT (OUTPATIENT)
Dept: SPEECH THERAPY | Facility: CLINIC | Age: 79
DRG: 056 | End: 2024-12-15
Attending: PHYSICAL MEDICINE & REHABILITATION
Payer: MEDICARE

## 2024-12-15 ENCOUNTER — APPOINTMENT (OUTPATIENT)
Dept: PHYSICAL THERAPY | Facility: CLINIC | Age: 79
DRG: 056 | End: 2024-12-15
Attending: PHYSICAL MEDICINE & REHABILITATION
Payer: MEDICARE

## 2024-12-15 ENCOUNTER — APPOINTMENT (OUTPATIENT)
Dept: OCCUPATIONAL THERAPY | Facility: CLINIC | Age: 79
DRG: 056 | End: 2024-12-15
Attending: PHYSICAL MEDICINE & REHABILITATION
Payer: MEDICARE

## 2024-12-15 PROCEDURE — 250N000013 HC RX MED GY IP 250 OP 250 PS 637: Performed by: PHYSICAL MEDICINE & REHABILITATION

## 2024-12-15 PROCEDURE — 97530 THERAPEUTIC ACTIVITIES: CPT | Mod: GP

## 2024-12-15 PROCEDURE — 97110 THERAPEUTIC EXERCISES: CPT | Mod: GO | Performed by: OCCUPATIONAL THERAPIST

## 2024-12-15 PROCEDURE — 97760 ORTHOTIC MGMT&TRAING 1ST ENC: CPT | Mod: GO | Performed by: OCCUPATIONAL THERAPIST

## 2024-12-15 PROCEDURE — 92526 ORAL FUNCTION THERAPY: CPT | Mod: GN | Performed by: SPEECH-LANGUAGE PATHOLOGIST

## 2024-12-15 PROCEDURE — 99231 SBSQ HOSP IP/OBS SF/LOW 25: CPT | Performed by: PHYSICAL MEDICINE & REHABILITATION

## 2024-12-15 PROCEDURE — 97535 SELF CARE MNGMENT TRAINING: CPT | Mod: GO | Performed by: OCCUPATIONAL THERAPIST

## 2024-12-15 PROCEDURE — 250N000011 HC RX IP 250 OP 636

## 2024-12-15 PROCEDURE — 128N000003 HC R&B REHAB

## 2024-12-15 RX ORDER — BISACODYL 10 MG
10 SUPPOSITORY, RECTAL RECTAL DAILY PRN
Status: DISPENSED | OUTPATIENT
Start: 2024-12-15

## 2024-12-15 RX ORDER — AMOXICILLIN 250 MG
1 CAPSULE ORAL 2 TIMES DAILY
Status: DISPENSED | OUTPATIENT
Start: 2024-12-15

## 2024-12-15 RX ORDER — POLYETHYLENE GLYCOL 3350 17 G/17G
17 POWDER, FOR SOLUTION ORAL DAILY PRN
Status: DISPENSED | OUTPATIENT
Start: 2024-12-15

## 2024-12-15 RX ADMIN — HEPARIN SODIUM 5000 UNITS: 5000 INJECTION, SOLUTION INTRAVENOUS; SUBCUTANEOUS at 08:02

## 2024-12-15 RX ADMIN — Medication 3 MG: at 21:24

## 2024-12-15 RX ADMIN — CYANOCOBALAMIN TAB 1000 MCG 1000 MCG: 1000 TAB at 08:02

## 2024-12-15 RX ADMIN — PYRIDOSTIGMINE BROMIDE 60 MG: 60 TABLET ORAL at 17:10

## 2024-12-15 RX ADMIN — LEVOTHYROXINE SODIUM 150 MCG: 75 TABLET ORAL at 08:02

## 2024-12-15 RX ADMIN — SENNOSIDES AND DOCUSATE SODIUM 1 TABLET: 50; 8.6 TABLET ORAL at 21:24

## 2024-12-15 RX ADMIN — HEPARIN SODIUM 5000 UNITS: 5000 INJECTION, SOLUTION INTRAVENOUS; SUBCUTANEOUS at 00:02

## 2024-12-15 RX ADMIN — Medication 25 MCG: at 08:02

## 2024-12-15 RX ADMIN — ASPIRIN 81 MG CHEWABLE TABLET 81 MG: 81 TABLET CHEWABLE at 21:24

## 2024-12-15 RX ADMIN — HEPARIN SODIUM 5000 UNITS: 5000 INJECTION, SOLUTION INTRAVENOUS; SUBCUTANEOUS at 17:11

## 2024-12-15 RX ADMIN — SENNOSIDES AND DOCUSATE SODIUM 1 TABLET: 50; 8.6 TABLET ORAL at 11:33

## 2024-12-15 RX ADMIN — PYRIDOSTIGMINE BROMIDE 60 MG: 60 TABLET ORAL at 08:02

## 2024-12-15 ASSESSMENT — ACTIVITIES OF DAILY LIVING (ADL)
ADLS_ACUITY_SCORE: 80
ADLS_ACUITY_SCORE: 82
ADLS_ACUITY_SCORE: 82
ADLS_ACUITY_SCORE: 80
ADLS_ACUITY_SCORE: 82
ADLS_ACUITY_SCORE: 80
ADLS_ACUITY_SCORE: 80
ADLS_ACUITY_SCORE: 82
ADLS_ACUITY_SCORE: 80
ADLS_ACUITY_SCORE: 82
ADLS_ACUITY_SCORE: 80
ADLS_ACUITY_SCORE: 82
ADLS_ACUITY_SCORE: 82

## 2024-12-15 NOTE — PROGRESS NOTES
Discharge Planner Post-Acute Rehab OT:     Discharge Plan: Home with assistance. HC OT services    Precautions: Falls, continuous tube feeding and NPO, BUE weakness with R greater than L  *elevate BUE/BLE's when supine in bed with pillows*  R resting hand orthosis - on overnight/off during day    Current Status:  ADLs:  Mobility: A x 1 pivot with walker. A x 2 nursing. Wheelchair based.  Grooming: Min A due to impaired UE function.  Dressing: UB Max A. LB Max A FWW.  Bathing: TBA  Toileting: A x 1 pivot to commode overlay. Pretty stedy nursing. A x 1 clothing management/hygiene.   IADLs: IND prior, supportive spouse  Vision/Cognition: Ox3. Wears corrective lenses    Assessment: Progressing with pivot transfers during OT session, however had more difficulties during PT session. Not ready to begin ambulatory ADLs due to high risk knee buckling.   Fabricated resting hand splint for RU. Purpose of splint to counteract flexor tone/contracture and provide functional hand/instrinsic plus position. Wear schedule: pt to wear splint only at night, should take off during the day. Educated pt on donning/doffing splint, visual placed on pt communication board, and placed in individualization plan.    Spouse planning to bring in wrist extensor orthoses with digit extension loops. Will assess in therapies upon arrival.    Other Barriers to Discharge (DME, Family Training, etc):   Family training prior to discharge  AE/DME: pending progress

## 2024-12-15 NOTE — PROGRESS NOTES
[facility]   Acute Rehabilitation Unit  Weekend Progress Note    INTERVAL HISTORY  Mendez Greene was seen and examined at bedside.  No acute events reported overnight.  Lab reports overall doing well and is making progress with therapies.  He tells me he is able to do some walking with a walker, and has progressed to requiring only 1 person for assistance with transfers.  He has not had a bowel movement since admission to rehab.  1 tablet of senna was ordered yesterday without results.  He is passing flatus and denies any abdominal pain, nausea, or vomiting.  Also denies any chest pain or shortness of breath, but is complaining of runny nose and congestion and is asking for a spray to help with this.    Functionally, Patient continues to be a full participant with therapies.      ROS: 10 point ROS negative other than the symptoms noted above in the HPI.    MEDICATIONS  Current Facility-Administered Medications   Medication Dose Route Frequency Provider Last Rate Last Admin    aspirin (ASA) chewable tablet 81 mg  81 mg Oral or Feeding Tube QPM Sachin Sorensen DO   81 mg at 12/14/24 2044    cyanocobalamin (VITAMIN B-12) tablet 1,000 mcg  1,000 mcg Oral or Feeding Tube Daily Sachin Sorensen DO   1,000 mcg at 12/15/24 0802    heparin ANTICOAGULANT injection 5,000 Units  5,000 Units Subcutaneous Q8H Azul Livingston MD   5,000 Units at 12/15/24 0802    levothyroxine (SYNTHROID/LEVOTHROID) tablet 150 mcg  150 mcg Oral or Feeding Tube Daily Sachin Sorensen DO   150 mcg at 12/15/24 0802    melatonin tablet 3 mg  3 mg Oral or Feeding Tube At Bedtime Sachin Sorensen DO   3 mg at 12/14/24 2044    pyRIDostigmine (MESTINON) tablet 60 mg  60 mg Oral or Feeding Tube Q8H Sachin Sorensen DO   60 mg at 12/15/24 0802    senna-docusate (SENOKOT-S/PERICOLACE) 8.6-50 MG per tablet 1 tablet  1 tablet Oral BID Sveta Schultz MD        [Held by provider]  "simvastatin (ZOCOR) tablet 10 mg  10 mg Oral Daily Azul Livingston MD        Vitamin D3 (CHOLECALCIFEROL) tablet 25 mcg  25 mcg Oral or Feeding Tube Daily Sachin Sorensen DO   25 mcg at 12/15/24 0802          Current Facility-Administered Medications   Medication Dose Route Frequency Provider Last Rate Last Admin    acetaminophen (TYLENOL) tablet 650 mg  650 mg Oral or Feeding Tube Q4H PRN Sachin Sorensen DO        artificial saliva (BIOTENE MT) solution 2 spray  2 spray Swish & Spit Q1H PRN Azul Livingston MD        bisacodyl (DULCOLAX) suppository 10 mg  10 mg Rectal Daily PRN Sveta Schultz MD        dextrose 10% infusion   Intravenous Continuous PRN Azul Livingston MD        guaiFENesin (ROBITUSSIN) 20 mg/mL solution 200 mg  200 mg Oral or Feeding Tube Q4H PRN Azul Livingston MD        olopatadine (PATANOL) 0.1 % ophthalmic solution 1 drop  1 drop Both Eyes BID PRN Azul Livingston MD        polyethylene glycol (MIRALAX) Packet 17 g  17 g Oral Daily PRN Sveta Schultz MD        sodium chloride (OCEAN) 0.65 % nasal spray 1 spray  1 spray Both Nostrils Q1H PRN Sveta Schultz MD            PHYSICAL EXAM  BP (!) 140/46 (BP Location: Left arm, Patient Position: Supine, Cuff Size: Adult Small)   Pulse 54   Temp 98.3  F (36.8  C) (Oral)   Resp 18   Ht 1.778 m (5' 10\")   Wt 84.6 kg (186 lb 8.2 oz)   SpO2 98%   BMI 26.76 kg/m      General: No acute distress. Lying in bed.  Cardiovascular: Regular rate and rhythm  Pulmonary: Breathing comfortably on room air, clear to auscultation bilaterally  Abdominal: Non-tender, non-distended, bowel sounds present, PEG site C/D/I  Extremities: Warm, well perfused, 1+ edema at ankles, no tenderness in calves   Neuro/MSK:  Alert, answers appropriately, follows commands    LABS  No results found for this or any previous visit (from the past 24 hours).        ASSESSMENT AND PLAN  Mendez Greene is a 79 year old male admitted " to [facility] for acute inpatient rehabilitation.    Rehabilitation - Continue comprehensive acute inpatient rehabilitation program with multidisciplinary approach including therapies, rehab nursing, and physiatry following. See interval history for updates.      - Vitals stable. Labs reviewed.  - Schedule Senokot-S1 tab twice daily.  Add MiraLAX daily as needed and Dulcolax suppository as needed as well.  - Add nasal saline spray for congestion  - Continue ongoing medical management.  - Continue therapies and plan of care.  - Refer to last progress note from primary team for full problems list.    Forrest Schultz MD  Department of Rehabilitation Medicine    Time Spent on this Encounter   I, Forrest Schultz, spent a total of 30 minutes face-to-face or managing the care of Mendez Greene. Over 50% of my time on the unit was spent counseling the patient and coordinating care. See note for details.

## 2024-12-15 NOTE — PLAN OF CARE
Goal Outcome Evaluation:      Plan of Care Reviewed With: patient    Overall Patient Progress: no changeOverall Patient Progress: no change  Orientation: alert and oriented x4; able to make needs known  V/S/ O2 sat:denies of SOB. CPAP at night   Pain:denies of pain  Diet: NPO except for  ice chips.   Bladder: incontinent . Utilized male external cath at  night  Bowel:continent. Last BM 12/11/24- no result from senna- sticky  note left for MD  Ambulation/Transfer:A2 stand pivot transfer  Tubes/Lines/Drains: PEG tube in place; dressing CDI;  Skin: abdominal bruising  Safety:fall; aspiration precaution  Others:  Asleep most of the night

## 2024-12-15 NOTE — PLAN OF CARE
Goal Outcome Evaluation:      Plan of Care Reviewed With: patient    Overall Patient Progress: no change         Pt. alert orientedx4. Calls apprpriately. Cooperates with cares and therapy. NPO except ice chips, on continuous feeding via PEG, new dressing applied. A1 Pretty christianson for transfer. Mixcontinennce of bladder, uses urinal and primofit at [facility]. Continent of BM per report LBM 12/11/24, new order for senna given. CPAP on at [facility]. Safety measures in place. Call light within reach. Will continue with current POC.

## 2024-12-15 NOTE — PLAN OF CARE
Discharge Planner Post-Acute Rehab PT:     Discharge Plan: Home with wife, 3 NEELAM w/out rail. Home care PT.    Precautions: Falls, NPO/PEG    Current Status:  Bed Mobility: Min A  Transfer: Ax2 stand pivot with FWW with nursing, Serasteady to toilet.   Gait: up to 40 ft, CGA with FWW and WC follow. 150' with lift pants and FWW  Stairs: Not yet safe  Balance: Poor static, dynamic standing balance w/out UE support    Outcome Measures:   Garcia   12/12: 12/56  5TSTS   12/12: 0x (requires physical assist even with UE assist)  6MWT   12/12: 40 ft, FWW and CGA (stopped early due to fatigue)    Assessment: -15 min increased assist w/functional transfers today, possibly d/t level of fatigue after gait in //. Pt with urinary incontinence towards end of session.    Other Barriers to Discharge (DME, Family Training, etc):   Equipment: Anticipate FWW, gait belt, possible K3 WC rental.  Family training to be scheduled.

## 2024-12-15 NOTE — PLAN OF CARE
Goal Outcome Evaluation:                    Discharge Planner Post-Acute Rehab PT:      Discharge Plan: Home with wife, 3 NEELAM w/out rail. Home care PT.     Precautions: Falls, NPO/PEG     Current Status:  Bed Mobility: Min A  Transfer: Ax2 stand pivot with FWW with nursing, Serasteady to toilet.   Gait: up to 40 ft, CGA with FWW and WC follow. 150' with lift pants and FWW  Stairs: Not yet safe  Balance: Poor static, dynamic standing balance w/out UE support     Outcome Measures:   Garcia              12/12: 12/56  5TSTS              12/12: 0x (requires physical assist even with UE assist)  6MWT              12/12: 40 ft, FWW and CGA (stopped early due to fatigue)     Assessment: -pt's wife and son present.  Focus on bed mobility and sit<>stands from edge of bed with rolling walker  in pt room for transfers.   Other Barriers to Discharge (DME, Family Training, etc):   Equipment: Anticipate FWW, gait belt, possible K3 WC rental.  Family training to be scheduled.

## 2024-12-15 NOTE — PLAN OF CARE
Discharge Planner Post-Acute Rehab SLP:     Discharge Plan: home with spouse,  SLP    Precautions: NPO - PEG, fall    Current Status:  Hearing: WFL  Vision: glasses  Communication: WFL  Cognition: noting forgetful with some new information. may be investigated in future pending plan and goals   Swallow: NPO. Ok ice chips with supervision. Oral cares x3/day- instructions for completing adequate oral care in patient care order for nurse review    Assessment:   Oral care completed by SLP. Pt reported completed 10x Mendohlsons earlier, SLP recorded on therapy log. Facilitated ice chip intake and instructed pt to complete effortful swallow, with encouragement to complete with ice chip rather than saliva only pt completed 10x reps. Pt edu in plan for repeat VFSS 12/18. Pt edu decrease in SLP to 1x a day, extra time to go to PT and OT.     Other Barriers to Discharge (Family Training, etc): TBD - diet pending progress.

## 2024-12-15 NOTE — PLAN OF CARE
"Goal Outcome Evaluation:      Plan of Care Reviewed With: patient    Overall Patient Progress: no changeOverall Patient Progress: no change      VS: BP (!) 140/46 (BP Location: Left arm, Patient Position: Supine, Cuff Size: Adult Small)   Pulse 54   Temp 98.3  F (36.8  C) (Oral)   Resp 18   Ht 1.778 m (5' 10\")   Wt 84.6 kg (186 lb 8.2 oz)   SpO2 98%   BMI 26.76 kg/m       O2: 98% on R/A   Output: Continent of bowel and bladder   Last BM: 12/11/24   Activity: Assist  of two sera steady   Up for meals? NPO ice chips, PEG tube   Skin: Bruising to abdomen   Pain: denies   CMS: Alert and oriented   Dressing: Peg dressing    Diet: Tube feeding    LDA:    Equipment: Sera steady   Plan: Cont. Poc    Additional Info: Patient remains alert and oriented, call light appropriate. Denies chest pain, SOB, N/V. Bowel sounds active x 4, PEG tube patent, FT running at 55ml/hr continuously. Edema to BLE, elevation encouraged. Worked with PT, OT,           "

## 2024-12-16 ENCOUNTER — APPOINTMENT (OUTPATIENT)
Dept: SPEECH THERAPY | Facility: CLINIC | Age: 79
DRG: 056 | End: 2024-12-16
Attending: PHYSICAL MEDICINE & REHABILITATION
Payer: MEDICARE

## 2024-12-16 ENCOUNTER — APPOINTMENT (OUTPATIENT)
Dept: PHYSICAL THERAPY | Facility: CLINIC | Age: 79
DRG: 056 | End: 2024-12-16
Attending: PHYSICAL MEDICINE & REHABILITATION
Payer: MEDICARE

## 2024-12-16 ENCOUNTER — APPOINTMENT (OUTPATIENT)
Dept: OCCUPATIONAL THERAPY | Facility: CLINIC | Age: 79
DRG: 056 | End: 2024-12-16
Attending: PHYSICAL MEDICINE & REHABILITATION
Payer: MEDICARE

## 2024-12-16 LAB
ALBUMIN SERPL BCG-MCNC: 2.9 G/DL (ref 3.5–5.2)
ALP SERPL-CCNC: 79 U/L (ref 40–150)
ALT SERPL W P-5'-P-CCNC: 47 U/L (ref 0–70)
ANION GAP SERPL CALCULATED.3IONS-SCNC: 7 MMOL/L (ref 7–15)
AST SERPL W P-5'-P-CCNC: 43 U/L (ref 0–45)
BILIRUB SERPL-MCNC: 0.3 MG/DL
BUN SERPL-MCNC: 35.6 MG/DL (ref 8–23)
CALCIUM SERPL-MCNC: 9 MG/DL (ref 8.8–10.4)
CHLORIDE SERPL-SCNC: 114 MMOL/L (ref 98–107)
CREAT SERPL-MCNC: 0.54 MG/DL (ref 0.67–1.17)
EGFRCR SERPLBLD CKD-EPI 2021: >90 ML/MIN/1.73M2
ERYTHROCYTE [DISTWIDTH] IN BLOOD BY AUTOMATED COUNT: 18.1 % (ref 10–15)
GLUCOSE SERPL-MCNC: 114 MG/DL (ref 70–99)
HCO3 SERPL-SCNC: 27 MMOL/L (ref 22–29)
HCT VFR BLD AUTO: 26.3 % (ref 40–53)
HGB BLD-MCNC: 8.4 G/DL (ref 13.3–17.7)
MAGNESIUM SERPL-MCNC: 2.3 MG/DL (ref 1.7–2.3)
MCH RBC QN AUTO: 36.1 PG (ref 26.5–33)
MCHC RBC AUTO-ENTMCNC: 31.9 G/DL (ref 31.5–36.5)
MCV RBC AUTO: 113 FL (ref 78–100)
PHOSPHATE SERPL-MCNC: 2.8 MG/DL (ref 2.5–4.5)
PLATELET # BLD AUTO: 172 10E3/UL (ref 150–450)
POTASSIUM SERPL-SCNC: 4.8 MMOL/L (ref 3.4–5.3)
PROT SERPL-MCNC: 6 G/DL (ref 6.4–8.3)
RBC # BLD AUTO: 2.33 10E6/UL (ref 4.4–5.9)
SODIUM SERPL-SCNC: 148 MMOL/L (ref 135–145)
WBC # BLD AUTO: 5.2 10E3/UL (ref 4–11)

## 2024-12-16 PROCEDURE — 85018 HEMOGLOBIN: CPT

## 2024-12-16 PROCEDURE — 83735 ASSAY OF MAGNESIUM: CPT

## 2024-12-16 PROCEDURE — 250N000013 HC RX MED GY IP 250 OP 250 PS 637: Performed by: PHYSICAL MEDICINE & REHABILITATION

## 2024-12-16 PROCEDURE — 84439 ASSAY OF FREE THYROXINE: CPT

## 2024-12-16 PROCEDURE — 97530 THERAPEUTIC ACTIVITIES: CPT | Mod: GO | Performed by: OCCUPATIONAL THERAPIST

## 2024-12-16 PROCEDURE — 99231 SBSQ HOSP IP/OBS SF/LOW 25: CPT | Mod: GC | Performed by: PHYSICAL MEDICINE & REHABILITATION

## 2024-12-16 PROCEDURE — 80053 COMPREHEN METABOLIC PANEL: CPT

## 2024-12-16 PROCEDURE — 250N000011 HC RX IP 250 OP 636

## 2024-12-16 PROCEDURE — 36415 COLL VENOUS BLD VENIPUNCTURE: CPT

## 2024-12-16 PROCEDURE — 97535 SELF CARE MNGMENT TRAINING: CPT | Mod: GO | Performed by: OCCUPATIONAL THERAPIST

## 2024-12-16 PROCEDURE — 84100 ASSAY OF PHOSPHORUS: CPT

## 2024-12-16 PROCEDURE — 92526 ORAL FUNCTION THERAPY: CPT | Mod: GN

## 2024-12-16 PROCEDURE — 97530 THERAPEUTIC ACTIVITIES: CPT | Mod: GP

## 2024-12-16 PROCEDURE — 84443 ASSAY THYROID STIM HORMONE: CPT

## 2024-12-16 PROCEDURE — 128N000003 HC R&B REHAB

## 2024-12-16 PROCEDURE — 97116 GAIT TRAINING THERAPY: CPT | Mod: GP

## 2024-12-16 PROCEDURE — 97110 THERAPEUTIC EXERCISES: CPT | Mod: GP

## 2024-12-16 RX ADMIN — PYRIDOSTIGMINE BROMIDE 60 MG: 60 TABLET ORAL at 23:08

## 2024-12-16 RX ADMIN — LEVOTHYROXINE SODIUM 150 MCG: 75 TABLET ORAL at 08:59

## 2024-12-16 RX ADMIN — Medication 25 MCG: at 08:04

## 2024-12-16 RX ADMIN — SALINE NASAL SPRAY 1 SPRAY: 1.5 SOLUTION NASAL at 17:04

## 2024-12-16 RX ADMIN — POLYETHYLENE GLYCOL 3350 17 G: 17 POWDER, FOR SOLUTION ORAL at 08:04

## 2024-12-16 RX ADMIN — SENNOSIDES AND DOCUSATE SODIUM 1 TABLET: 50; 8.6 TABLET ORAL at 08:04

## 2024-12-16 RX ADMIN — SENNOSIDES AND DOCUSATE SODIUM 1 TABLET: 50; 8.6 TABLET ORAL at 20:53

## 2024-12-16 RX ADMIN — SALINE NASAL SPRAY 1 SPRAY: 1.5 SOLUTION NASAL at 09:03

## 2024-12-16 RX ADMIN — ASPIRIN 81 MG CHEWABLE TABLET 81 MG: 81 TABLET CHEWABLE at 20:53

## 2024-12-16 RX ADMIN — SALINE NASAL SPRAY 1 SPRAY: 1.5 SOLUTION NASAL at 08:12

## 2024-12-16 RX ADMIN — HEPARIN SODIUM 5000 UNITS: 5000 INJECTION, SOLUTION INTRAVENOUS; SUBCUTANEOUS at 08:59

## 2024-12-16 RX ADMIN — HEPARIN SODIUM 5000 UNITS: 5000 INJECTION, SOLUTION INTRAVENOUS; SUBCUTANEOUS at 17:01

## 2024-12-16 RX ADMIN — CYANOCOBALAMIN TAB 1000 MCG 1000 MCG: 1000 TAB at 08:04

## 2024-12-16 RX ADMIN — PYRIDOSTIGMINE BROMIDE 60 MG: 60 TABLET ORAL at 15:57

## 2024-12-16 RX ADMIN — HEPARIN SODIUM 5000 UNITS: 5000 INJECTION, SOLUTION INTRAVENOUS; SUBCUTANEOUS at 00:17

## 2024-12-16 RX ADMIN — PYRIDOSTIGMINE BROMIDE 60 MG: 60 TABLET ORAL at 08:04

## 2024-12-16 RX ADMIN — SALINE NASAL SPRAY 1 SPRAY: 1.5 SOLUTION NASAL at 10:21

## 2024-12-16 RX ADMIN — PYRIDOSTIGMINE BROMIDE 60 MG: 60 TABLET ORAL at 00:11

## 2024-12-16 RX ADMIN — Medication 3 MG: at 20:53

## 2024-12-16 RX ADMIN — SALINE NASAL SPRAY 1 SPRAY: 1.5 SOLUTION NASAL at 13:00

## 2024-12-16 ASSESSMENT — ACTIVITIES OF DAILY LIVING (ADL)
ADLS_ACUITY_SCORE: 80

## 2024-12-16 NOTE — PROGRESS NOTES
[facility]   Acute Rehabilitation Unit  Daily progress note    INTERVAL HISTORY  Mendez Greene was seen and examined at bedside. No acute events overnight, he continues to sleep well while on the unit. He is still having some congestion and runny nose. He thinks the nasal spray helps somewhat, but is not always able to get it when he would like. He also reported the CPAP is helpful for having congestion not interrupt his sleep. The team adjusted his orders so he can use it during naps as well, but emphasized that he should not use it while awake. He still has not had a BM since on the unit. He received Senna and Miralax this morning. He denies any abdominal pain or discomfort.      Functionally:  OT:  ADLs:  Mobility: A x 1 pivot with walker. A x 2 nursing. Wheelchair based.  Grooming: Min A due to impaired UE function.  Dressing: UB Max A. LB Max A FWW.  Bathing: TBA  Toileting: A x 1 pivot to commode overlay. Pretty christianson nursing. A x 1 clothing management/hygiene.   IADLs: IND prior, supportive spouse  Vision/Cognition: Ox3. Wears corrective lenses    SLP:  Hearing: [facility]  Vision: glasses  Communication: [facility]  Cognition: noting forgetful with some new information. may be investigated in future pending plan and goals   Swallow: NPO. Ok ice chips with supervision. Oral cares x3/day- instructions for completing adequate oral care in patient care order for nurse review    PT:  Bed Mobility: Min A  Transfer: Ax2 stand pivot with FWW with nursing, Serasteady to toilet.   Gait: up to 40 ft, CGA with FWW and WC follow. 150' with lift pants and FWW  Stairs: Not yet safe  Balance: Poor static, dynamic standing balance w/out UE support        MEDICATIONS  Scheduled meds  Current Facility-Administered Medications   Medication Dose Route Frequency Provider Last Rate Last Admin    aspirin (ASA) chewable tablet 81 mg  81 mg Oral or Feeding Tube QPM Sachin Sorensen, DO   81 mg at 12/15/24  2124    cyanocobalamin (VITAMIN B-12) tablet 1,000 mcg  1,000 mcg Oral or Feeding Tube Daily Sachin Sorensen DO   1,000 mcg at 12/16/24 0804    heparin ANTICOAGULANT injection 5,000 Units  5,000 Units Subcutaneous Q8H Azul Livingston MD   5,000 Units at 12/16/24 0017    levothyroxine (SYNTHROID/LEVOTHROID) tablet 150 mcg  150 mcg Oral or Feeding Tube Daily Sachin Sorensen DO   150 mcg at 12/15/24 0802    melatonin tablet 3 mg  3 mg Oral or Feeding Tube At Bedtime Sachin Sorensen DO   3 mg at 12/15/24 2124    pyRIDostigmine (MESTINON) tablet 60 mg  60 mg Oral or Feeding Tube Q8H Sachin Sorensen DO   60 mg at 12/16/24 0804    senna-docusate (SENOKOT-S/PERICOLACE) 8.6-50 MG per tablet 1 tablet  1 tablet Oral BID Sveta Schultz MD   1 tablet at 12/16/24 0804    [Held by provider] simvastatin (ZOCOR) tablet 10 mg  10 mg Oral Daily Azul Livingston MD        Vitamin D3 (CHOLECALCIFEROL) tablet 25 mcg  25 mcg Oral or Feeding Tube Daily Sachin Sorensen DO   25 mcg at 12/16/24 0804       PRN meds:  Current Facility-Administered Medications   Medication Dose Route Frequency Provider Last Rate Last Admin    acetaminophen (TYLENOL) tablet 650 mg  650 mg Oral or Feeding Tube Q4H PRN Sachin Sorensen DO        artificial saliva (BIOTENE MT) solution 2 spray  2 spray Swish & Spit Q1H PRN Azul Livingston MD        bisacodyl (DULCOLAX) suppository 10 mg  10 mg Rectal Daily PRN Sveta Schultz MD        dextrose 10% infusion   Intravenous Continuous PRN Azul Livingston MD        guaiFENesin (ROBITUSSIN) 20 mg/mL solution 200 mg  200 mg Oral or Feeding Tube Q4H PRN Azul Livingston MD        olopatadine (PATANOL) 0.1 % ophthalmic solution 1 drop  1 drop Both Eyes BID PRN Azul Livingston MD        polyethylene glycol (MIRALAX) Packet 17 g  17 g Oral Daily PRN Sveta Schultz MD   17 g at 12/16/24 0804    sodium chloride (OCEAN) 0.65 % nasal spray 1 spray  1  "spray Both Nostrils Q1H Sveta Bain MD   1 spray at 12/16/24 0812         PHYSICAL EXAM  BP (!) 149/61 (BP Location: Left arm)   Pulse 50   Temp 97.6  F (36.4  C) (Oral)   Resp 16   Ht 1.778 m (5' 10\")   Wt 84.6 kg (186 lb 8.2 oz)   SpO2 96%   BMI 26.76 kg/m    General: NAD, resting in bed  HEENT: NCAT, EOMI.  Cardio: Well perfused  Pulm: Breathing comfortably on room air.   Abd: Non distended  Ext: Ecchymoses and mild swelling on dorsal right upper arm. Decreased swelling of right forearm.  Neuro/MSK: Improved abduction and internal rotation of right shoulder since admission.    LABS  Recent Results (from the past 24 hours)   CBC with platelets    Collection Time: 12/16/24  8:00 AM   Result Value Ref Range    WBC Count 5.2 4.0 - 11.0 10e3/uL    RBC Count 2.33 (L) 4.40 - 5.90 10e6/uL    Hemoglobin 8.4 (L) 13.3 - 17.7 g/dL    Hematocrit 26.3 (L) 40.0 - 53.0 %     (H) 78 - 100 fL    MCH 36.1 (H) 26.5 - 33.0 pg    MCHC 31.9 31.5 - 36.5 g/dL    RDW 18.1 (H) 10.0 - 15.0 %    Platelet Count 172 150 - 450 10e3/uL         ASSESSMENT AND PLAN  Mendez Greene is a 79 year old male with past medical history of paroxysmal atrial fibrillation, CAD, ascending aortic aneurysm, HLD, HTN, hypothyroidism, CHA admitted to ICU service on 11/30/2024 for acute respiratory failure and AMS. Patient was recently hospitalized at [facility] from 11/8/24-11/15/24 for progressive weakness and fatigue, difficulty swallowing. After discharge, his ACh receptor binding Ab came back 2.11, strongly suggestive of myasthenia gravis. Presented back to [facility] ED 11/30 with hypotension and acute respiratory failure. Patient was intubated in the ED and admitted to ICU on pressors. During admission patient received 5 doses of IVIG for myasthenic crisis and was initiated on Mestinon. Hospital course also included broad spectrum antibiotics for possible septic shock due to UTI, MOHAMUD, electrolyte derangements, bradycardia, hypertension.  Now " admitted to acute rehab.     Admission to acute inpatient rehab 12/11/2024.    Impairment group code: 03.8 Neuromuscular Disorders; new diagnosis of myasthenia gravis with myasthenia crisis         PT and OT 75 minutes of each as well as SLP 30 minutes on a daily basis, in addition to rehab nursing and close management of physiatrist.       Impairment of ADL's: Impairment in strength, endurance, and ROM resulting in functional limitations in patient's ability to perform ADLs     Impairment of mobility:  Impairment in strength, endurance, and ROM resulting in functional limitations in patient's ability to perform functional mobility      Impairment of cognition/language/swallow:  Needs therapy for swallowing.     Medical Conditions     #Myasthenic crisis  Questionable ALS diagnosis which is being deferred to his primary neurologist.  - clinically much improved with improvement in upper extremity strength; still with some upper extremity flexion weakness and LE weakness  - completed 5 doses of IVIG on 12/5/24   - Caution use of IV magnesium, beta-blocker, statin, macrolides, aminoglycosides, and fluoroquinolone, and as much as possible avoid them due to to potential exacerbation of myasthenia crisis. ; Holding PTA statin  - Monitor respiratory status closely, currently stable on room air  - CPAP at HS  - Pyridostigmine 60 mg PO TID  - Consider CT chest with contrast for r/o thymoma while on rehab     #H/o neobladder  - was on snowden catheter in neobladder, discontinued 12/10 and voiding well, chronically incontinent     #Hypernatremia  #Hyperchloremia  Na 149 day of admission to rehab (12/11), Cl 117, creatinine 0.57 (at baseline). Had issues with hypernatremia on acute service, up to 152. Acute service increased free water flushes and gave 1L D5.   - continue free water flushes 150 ml q 4hrs   - BMP M/Th     #Chronic macrocytic anemia  #Thrombocytopenia  Hgb has mostly been around 9-10 on acute service; noted slight  downtrend ---8.5--7.8 (12/11), likely dilutional with IV fluids as platelets and hematocrit also decreased slightly; has no clinical concern for active bleed and has been hemodynamically stable. B12 was 727, folic acid wnl on 11/12. Was seen by hematology 4/2024, workup unremarkable. Platelets remained decreased 139 (12/12).  - Hgb 12/12 8.3 increased from 7.8  - CBC M/Th  - Transfuse hgb <7 (has not required transfusion this admission)  - PTA B12 supplementation     #Sinus bradycardia  HR 40s to upper 50s during admission, dips the most when sleeping. Previous EKG sinus bradycardia with no high grade blocks. Chart review shows similar HR 40-50s last hospitalization, and he has a few OP visits with documented sinus bradycardia (7/10/24, 3/4/24).  - OK w HR 40's-50's  - CTM, further evaluation if ever symptomatic     #Moderate to severe oropharyngeal dysphagia s/p G-tube  - VFSS on 12/9 while on acute service, note that he continues to aspirate all textures but improved ability to cough; SLP recommend continued NPO  - 12/13 200 mg guaifenesin to help loosen mucus secretions. Emphasize the importance of eating ice chips so mucous does not become hard  - Receive all nutrition via tube feeds  - BMP, Mg & Phos M/Th     #CAD  #HLD  #Paroxysmal atrial fibrillation  #Recent NSTEMI (11/8/24)  Troponin 997 on acute service, cardiac cath 11/11/24 showed only mild CAD. Echo 12/1/24 difficult study but grossly normal left and right ventricular function, no severe valvular regurgitation, probably mild aortic regurgitation, no pericardial effusion.  Not in afib on admission to rehab.  - Continue PTA ASA  - HOLD PTA Simvastatin until follow-up with neurology  - No rate control medications at baseline     #Hypothyroidism  - PTA levothyroxine 150 mcg daily  Per pharmacy team - Please consider checking thyroid function if pt will not be able to take it orally for a long time. Pt is currently taking Levothyroxine through G-tube.       #CHA  - PTA CPAP HS, 12/16 order changed to allow for CPAP during naps    #Extremity Swelling  Significant swelling in the right arm as well as bilateral LE was noted on 12/12. Patient denies associated pain. 12/13 swelling in right and left leg improved, still significant swelling in left LE.  - Wrapped arm and lower extremities in compression bandage  - Right Arm Duplex US negative 12/12    #Constipation  Has not had a reported bowel movement since 12/11, but is passing flatulence. No reported abdominal pain. Consider encouraging suppository if he still has not had BM by 12/17.   - Senna Q12H  - Miralax daily PRN  - Dulcolax daily PRN      Adjustment to disability:  Clinical psychology to eval and treat  FEN: NPO except for ice Chips. Receiving Tube Feeding Adult Formula Drip Feeding: Continuous Jevity 1.5; Gastrostomy at 55 mL/h with free water flushes 150 ml q 4 hrs. PTA Vit D supplementation.  Bowel: See above  Bladder: No snowden in place, PVRs on admission. 12/12 2 elevated PVRs in 100s and 300s range. Patient has neobladder and is incontinent at baseline. Will continue to assess output and any symptoms of fullness. No need for repeat PVRs at this time.  DVT Prophylaxis: Subcutaneous heparin   GI Prophylaxis: Na  Code: Full  Disposition: Home with family support.  ELOS:  21 Days.  Rehab prognosis:  Good  Follow up Appointments on Discharge:   PCP  Neurology, Dr. Fabian or Dr. Ashley   PM&R  Cardiology  Urology for assessment of neobladder and options to reduce nighttime leaking      Patient reviewed with resident physician, Dr. Villagran, who agrees with the assessment and plan.    Gerry Brewer, MS3    Patient reviewed with attending physician, Dr. Sorensen, who agrees with the assessment and plan.    Azul Villagran MD  [facility] PM&R PGY-2  12/16/2024  Pager #: [phone]

## 2024-12-16 NOTE — PLAN OF CARE
Goal Outcome Evaluation:      Plan of Care Reviewed With: patient    Overall Patient Progress: no change    Patient is alert and oriented, makes needs known. Slept mostly this shift. Seemed tolerating TF, HOB maintained elevated, PEG cares done. Wore CPAP overnight, SpO2 satisfactory. Bradycardic at baseline per patient report, remains asymptomatic. No acute issues overnight. Received with Primofit in place. No pain or discomfort reported. No PRN given/requested. Comfortable in bed. Fall precautions maintained, call light in reach, safety checks completed.    Continue with POC.

## 2024-12-16 NOTE — PROGRESS NOTES
Discharge Planner Post-Acute Rehab OT:      Discharge Plan: Home with assistance. HC OT services     Precautions: Falls, continuous tube feeding and NPO, BUE weakness with R greater than L  *elevate BUE/BLE's when supine in bed with pillows*  R resting hand orthosis - on overnight/off during day     Current Status:  ADLs:  Mobility: A x 1 pivot with walker. A x 2 nursing. Wheelchair based.  Grooming: Min A due to impaired UE function.  Dressing: UB Max A. LB Max A FWW.  Bathing: TBA  Toileting: A x 1 pivot to commode overlay. Pretty christianson nursing. A x 1 clothing management/hygiene.   IADLs: IND prior, supportive spouse  Vision/Cognition: Ox3. Wears corrective lenses     Assessment: Pt continues to be limited by proximal weakness and impaired ROM distally (wrist extensions and finger flex/ext) with ADLs. Pt has ongoing LUE edema, educated pt on using pillows to elevate LUE when seated at supine in bed.     -5 d/t nsg cares     Other Barriers to Discharge (DME, Family Training, etc):   Family training prior to discharge  AE/DME: pending progress

## 2024-12-16 NOTE — PLAN OF CARE
Discharge Planner Post-Acute Rehab PT:      Discharge Plan: Home with wife, 3 NEELAM w/out rail. Home care PT.     Precautions: Falls, NPO/PEG     Current Status:  Bed Mobility: Min A  Transfer: Ax2 stand pivot with FWW with nursing, Serasteady to toilet.   Gait: up to 40 ft, CGA with FWW and WC follow. 150' with lift pants and FWW  Stairs: Not yet safe  Balance: Poor static, dynamic standing balance w/out UE support     Outcome Measures:   Garcia              12/12: 12/56  5TSTS              12/12: 0x (requires physical assist even with UE assist)  6MWT              12/12: 40 ft, FWW and CGA (stopped early due to fatigue)     Assessment: Pt progresses to stand pivot with FWW Ax1, and demonstrate 200' amb in lift pants without overt buckling, plans to discontinue lift pants in therapy.     Other Barriers to Discharge (DME, Family Training, etc):   Equipment: Anticipate FWW, gait belt, possible K3 WC rental.  Family training to be scheduled.

## 2024-12-16 NOTE — PLAN OF CARE
Pt is A/O x 4, NPO, on continuous tube feed @ 55 ml/hr. Pt gets all his meds through his PEG tube, incontinent of bladder and continent of bowel. A 2 stand provit to bed and A 2 with ellis steady to bathroom. Edema bilateral to lower extremities, CPAP @ night.

## 2024-12-16 NOTE — PLAN OF CARE
"Goal Outcome Evaluation:       Plan of Care Reviewed With: patient     Overall Patient Progress: improvingOverall Patient Progress: improving     FOCUS/GOAL     Bowel management, Bladder management, Nutrition/Feeding/Swallowing precautions, Mobility, Skin integrity, and Safety management          VS: BP (!) 149/61 (BP Location: Left arm)   Pulse 50   Temp 97.6  F (36.4  C) (Oral)   Resp 16   Ht 1.778 m (5' 10\")   Wt 84.6 kg (186 lb 8.2 oz)   SpO2 96%   BMI 26.76 kg/m       O2: 96% RA   Output: adequate   Last BM: 12/11/2024   Activity: X2 pivot from bed to chair; ax1 ellis steady   Skin: BLE edema, dry mouth and tongue, scattered abdominal and BUE bruises, R posterior forearm bruise, PEG tube   Pain: denies   CMS: Denies SOB, chest pain, headache, n/v   Dressing: none   Diet: TF continuous 55 ml/hr, NPO ice chips   LDA: PEG tube, prima fit   Equipment: walker   Plan: PT/OT, continue POC   Additional Info:                    "

## 2024-12-16 NOTE — PLAN OF CARE
"Discharge Planner Post-Acute Rehab SLP:     Discharge Plan: home with spouse,  SLP    Precautions: NPO - PEG, fall    Current Status:  Hearing: WFL  Vision: glasses  Communication: WFL  Cognition: noting forgetful with some new information. may be investigated in future pending plan and goals   Swallow: NPO. Ok ice chips with supervision. Oral cares x3/day- instructions for completing adequate oral care in patient care order for nurse review    Assessment:  SLp completed oral cares.Pt accepted x5 ice chips. noting delayed swallow initiation suspected. pt with good recall of exercises via effortful swallow and Mendelhson. Pt reported difficulty knowing if he's doing exercises \"right\". reviewed Mendelhson and provided additional tactile feedback for pt to utilize to aid repeititons. Pt completing limited repeititons d/t MG and contraindication. pt educated on plan to repeat VFSS 12/18     Other Barriers to Discharge (Family Training, etc): TBD - diet pending progress.     "

## 2024-12-17 ENCOUNTER — APPOINTMENT (OUTPATIENT)
Dept: PHYSICAL THERAPY | Facility: CLINIC | Age: 79
DRG: 056 | End: 2024-12-17
Attending: PHYSICAL MEDICINE & REHABILITATION
Payer: MEDICARE

## 2024-12-17 ENCOUNTER — APPOINTMENT (OUTPATIENT)
Dept: OCCUPATIONAL THERAPY | Facility: CLINIC | Age: 79
DRG: 056 | End: 2024-12-17
Attending: PHYSICAL MEDICINE & REHABILITATION
Payer: MEDICARE

## 2024-12-17 LAB
GLUCOSE BLDC GLUCOMTR-MCNC: 103 MG/DL (ref 70–99)
T4 FREE SERPL-MCNC: 0.83 NG/DL (ref 0.9–1.7)
TSH SERPL DL<=0.005 MIU/L-ACNC: 7.53 UIU/ML (ref 0.3–4.2)

## 2024-12-17 PROCEDURE — 250N000013 HC RX MED GY IP 250 OP 250 PS 637: Performed by: PHYSICAL MEDICINE & REHABILITATION

## 2024-12-17 PROCEDURE — 97116 GAIT TRAINING THERAPY: CPT | Mod: GP

## 2024-12-17 PROCEDURE — 250N000011 HC RX IP 250 OP 636

## 2024-12-17 PROCEDURE — 97110 THERAPEUTIC EXERCISES: CPT | Mod: GP

## 2024-12-17 PROCEDURE — 97530 THERAPEUTIC ACTIVITIES: CPT | Mod: GP

## 2024-12-17 PROCEDURE — 99232 SBSQ HOSP IP/OBS MODERATE 35: CPT | Mod: GC | Performed by: PHYSICAL MEDICINE & REHABILITATION

## 2024-12-17 PROCEDURE — 97535 SELF CARE MNGMENT TRAINING: CPT | Mod: GO | Performed by: OCCUPATIONAL THERAPIST

## 2024-12-17 PROCEDURE — 97760 ORTHOTIC MGMT&TRAING 1ST ENC: CPT | Mod: GO | Performed by: OCCUPATIONAL THERAPIST

## 2024-12-17 PROCEDURE — 128N000003 HC R&B REHAB

## 2024-12-17 PROCEDURE — 97530 THERAPEUTIC ACTIVITIES: CPT | Mod: GO | Performed by: OCCUPATIONAL THERAPIST

## 2024-12-17 RX ADMIN — CYANOCOBALAMIN TAB 1000 MCG 1000 MCG: 1000 TAB at 08:16

## 2024-12-17 RX ADMIN — Medication 25 MCG: at 08:16

## 2024-12-17 RX ADMIN — Medication 3 MG: at 21:54

## 2024-12-17 RX ADMIN — POLYETHYLENE GLYCOL 3350 17 G: 17 POWDER, FOR SOLUTION ORAL at 15:46

## 2024-12-17 RX ADMIN — PYRIDOSTIGMINE BROMIDE 60 MG: 60 TABLET ORAL at 16:51

## 2024-12-17 RX ADMIN — HEPARIN SODIUM 5000 UNITS: 5000 INJECTION, SOLUTION INTRAVENOUS; SUBCUTANEOUS at 08:32

## 2024-12-17 RX ADMIN — SALINE NASAL SPRAY 1 SPRAY: 1.5 SOLUTION NASAL at 17:00

## 2024-12-17 RX ADMIN — HEPARIN SODIUM 5000 UNITS: 5000 INJECTION, SOLUTION INTRAVENOUS; SUBCUTANEOUS at 00:09

## 2024-12-17 RX ADMIN — ASPIRIN 81 MG CHEWABLE TABLET 81 MG: 81 TABLET CHEWABLE at 21:54

## 2024-12-17 RX ADMIN — PYRIDOSTIGMINE BROMIDE 60 MG: 60 TABLET ORAL at 08:17

## 2024-12-17 RX ADMIN — SALINE NASAL SPRAY 1 SPRAY: 1.5 SOLUTION NASAL at 13:13

## 2024-12-17 RX ADMIN — SENNOSIDES AND DOCUSATE SODIUM 1 TABLET: 50; 8.6 TABLET ORAL at 08:17

## 2024-12-17 RX ADMIN — SENNOSIDES AND DOCUSATE SODIUM 1 TABLET: 50; 8.6 TABLET ORAL at 21:54

## 2024-12-17 RX ADMIN — HEPARIN SODIUM 5000 UNITS: 5000 INJECTION, SOLUTION INTRAVENOUS; SUBCUTANEOUS at 16:52

## 2024-12-17 ASSESSMENT — ACTIVITIES OF DAILY LIVING (ADL)
ADLS_ACUITY_SCORE: 79
ADLS_ACUITY_SCORE: 80
ADLS_ACUITY_SCORE: 79
ADLS_ACUITY_SCORE: 80
ADLS_ACUITY_SCORE: 79
ADLS_ACUITY_SCORE: 80
ADLS_ACUITY_SCORE: 79
ADLS_ACUITY_SCORE: 79
ADLS_ACUITY_SCORE: 80
ADLS_ACUITY_SCORE: 79
ADLS_ACUITY_SCORE: 80
ADLS_ACUITY_SCORE: 80
ADLS_ACUITY_SCORE: 79
ADLS_ACUITY_SCORE: 79
ADLS_ACUITY_SCORE: 80

## 2024-12-17 NOTE — PLAN OF CARE
"Goal Outcome Evaluation:      Plan of Care Reviewed With: patient    Overall Patient Progress: improvingOverall Patient Progress: improving    VS: /56 (BP Location: Left arm)   Pulse 55   Temp 98.1  F (36.7  C) (Oral)   Resp 16   Ht 1.778 m (5' 10\")   Wt 78.9 kg (173 lb 15.1 oz)   SpO2 97%   BMI 24.96 kg/m       O2: SpO2 97% on RA. LS clear and equal bilaterally. Denies chest pain and SOB.    Output: Voids spontaneously without difficulty to bathroom using a primo fit   Last BM: 12/11   Activity: Up with A x 2 SPV w/c based   Skin: WDL except, abdominal and BUE bruises    Pain: Denies pain    CMS: Intact, Alert and oriented. Denies numbness and tingling.   Dressing: None    Diet: NPO except ice chips. Denies nausea/vomiting.    LDA: Peg tube for feeding    Equipment: Feeding pump   Plan: Precautions maintained / Continue with plan of care. Call light within reach, pt able to make needs known.    Additional Info: Uses a CPAP at night  Slept well through the night        "

## 2024-12-17 NOTE — PLAN OF CARE
Discharge Planner Post-Acute Rehab PT:      Discharge Plan: Home with wife, 3 NEELAM w/out rail. Home care PT.     Precautions: Falls, NPO/PEG     Current Status:  Bed Mobility: Min A  Transfer: Ax2 stand pivot with FWW with nursing, Serasteady to toilet.   Gait: Up to 175ft using FWW Ax1 with w/c follow.   Stairs: Not yet safe  Balance: Poor static, dynamic standing balance w/out UE support     Outcome Measures:   Garcia              12/12: 12/56  5TSTS              12/12: 0x (requires physical assist even with UE assist)  6MWT              12/12: 40 ft, FWW and CGA (stopped early due to fatigue)     Assessment: Gait progression of distance using FWW with w/c follow w/o need for walking pants this session. Remains limited by weakness during sit>stand activities.     Other Barriers to Discharge (DME, Family Training, etc):   Equipment: Anticipate FWW, gait belt, possible K3 WC rental.  Family training to be scheduled.

## 2024-12-17 NOTE — PROGRESS NOTES
"  [facility]   Acute Rehabilitation Unit  Daily progress note    INTERVAL HISTORY  Steven was seen in his room today. No acute events overnight. He reports sleeping well last night and denies any pain. He is still having congestion and only feels that he is able to get an hour of relief from his nasal spray. Otherwise he has no acute complaints. He still has not had a bowel movement yet. He brought up that he had not had is PRN Miralax yet today, and so will try to get that this afternoon. We discussed if that and the Senna are not enough we can try a suppository. Steven was interested in knowing what benchmarks he has to meet in order to be discharged home. He feels that his is walking a little better, but still does not have arm strength back. We discussed that the therapy teams have the most insight to his current functional status and modifications that can be made for a safe discharge home.       Functionally:  OT:  ADLs:  Mobility: A x 1 pivot with walker. A x 2 nursing. Wheelchair based.  Grooming: Min A due to impaired UE function.  Dressing: UB Max A. LB Max A FWW.  Bathing: TBA  Toileting: A x 1 pivot to commode overlay. Pretty stedy nursing. A x 1 clothing management/hygiene.   IADLs: IND prior, supportive spouse  Vision/Cognition: Ox3. Wears corrective lenses    SLP:  Hearing: WFL  Vision: glasses  Communication: WFL  Cognition: noting forgetful with some new information. may be investigated in future pending plan and goals   Swallow: NPO. Ok ice chips with supervision. Oral cares x3/day- instructions for completing adequate oral care in patient care order for nurse review     Assessment:  SLp completed oral cares.Pt accepted x5 ice chips. noting delayed swallow initiation suspected. pt with good recall of exercises via effortful swallow and Mendelhson. Pt reported difficulty knowing if he's doing exercises \"right\". reviewed Mendelhson and provided additional tactile feedback " for pt to utilize to aid repeititons. Pt completing limited repeititons d/t MG and contraindication. pt educated on plan to repeat VFSS 12/18    PT:  Bed Mobility: Min A  Transfer: Ax2 stand pivot with FWW with nursing, Serasteady to toilet.   Gait: up to 40 ft, CGA with FWW and WC follow. 150' with lift pants and FWW  Stairs: Not yet safe  Balance: Poor static, dynamic standing balance w/out UE support     Outcome Measures:   Garcia              12/12: 12/56  5TSTS              12/12: 0x (requires physical assist even with UE assist)  6MWT              12/12: 40 ft, FWW and CGA (stopped early due to fatigue)        MEDICATIONS  Scheduled meds  Current Facility-Administered Medications   Medication Dose Route Frequency Provider Last Rate Last Admin    aspirin (ASA) chewable tablet 81 mg  81 mg Oral or Feeding Tube QPM Sachin Sorensen DO   81 mg at 12/16/24 2053    cyanocobalamin (VITAMIN B-12) tablet 1,000 mcg  1,000 mcg Oral or Feeding Tube Daily Sachin Sorensen DO   1,000 mcg at 12/16/24 0804    heparin ANTICOAGULANT injection 5,000 Units  5,000 Units Subcutaneous Q8H Azul Livingston MD   5,000 Units at 12/17/24 0009    levothyroxine (SYNTHROID/LEVOTHROID) tablet 150 mcg  150 mcg Oral or Feeding Tube Daily Sachin Sorensen DO   150 mcg at 12/16/24 0859    melatonin tablet 3 mg  3 mg Oral or Feeding Tube At Bedtime Sachin Sorensen DO   3 mg at 12/16/24 2053    pyRIDostigmine (MESTINON) tablet 60 mg  60 mg Oral or Feeding Tube Q8H Sachin Sorensen DO   60 mg at 12/16/24 2308    senna-docusate (SENOKOT-S/PERICOLACE) 8.6-50 MG per tablet 1 tablet  1 tablet Oral BID Sveta Schultz MD   1 tablet at 12/16/24 2053    [Held by provider] simvastatin (ZOCOR) tablet 10 mg  10 mg Oral Daily Azul Livingston MD        Vitamin D3 (CHOLECALCIFEROL) tablet 25 mcg  25 mcg Oral or Feeding Tube Daily Sachin Sorensen, DO   25 mcg at 12/16/24 0804       PRN meds:  Current Facility-Administered  "Medications   Medication Dose Route Frequency Provider Last Rate Last Admin    acetaminophen (TYLENOL) tablet 650 mg  650 mg Oral or Feeding Tube Q4H PRN Sachin Sorensen DO        artificial saliva (BIOTENE MT) solution 2 spray  2 spray Swish & Spit Q1H PRN Azul Livingston MD        bisacodyl (DULCOLAX) suppository 10 mg  10 mg Rectal Daily PRN Sveta Schultz MD        dextrose 10% infusion   Intravenous Continuous PRN Azul Livingston MD        guaiFENesin (ROBITUSSIN) 20 mg/mL solution 200 mg  200 mg Oral or Feeding Tube Q4H PRN Azul Livingston MD        olopatadine (PATANOL) 0.1 % ophthalmic solution 1 drop  1 drop Both Eyes BID PRN Azul Livingston MD        polyethylene glycol (MIRALAX) Packet 17 g  17 g Oral Daily PRN Sveta Schultz MD   17 g at 12/16/24 0804    sodium chloride (OCEAN) 0.65 % nasal spray 1 spray  1 spray Both Nostrils Q1H PRN Sveta Schultz MD   1 spray at 12/16/24 1704         PHYSICAL EXAM  /56 (BP Location: Left arm)   Pulse 55   Temp 98.1  F (36.7  C) (Oral)   Resp 16   Ht 1.778 m (5' 10\")   Wt 78.9 kg (173 lb 15.1 oz)   SpO2 97%   BMI 24.96 kg/m    General: NAD, resting in chair  HEENT: NCAT, EOMI.  Cardio: Well perfused  Pulm: Breathing comfortably on room air.   Abd: Non distended, non tender  Neuro/MSK: Able to move all 4 extremities.    LABS  Recent Results (from the past 24 hours)   Comprehensive metabolic panel    Collection Time: 12/16/24  8:00 AM   Result Value Ref Range    Sodium 148 (H) 135 - 145 mmol/L    Potassium 4.8 3.4 - 5.3 mmol/L    Carbon Dioxide (CO2) 27 22 - 29 mmol/L    Anion Gap 7 7 - 15 mmol/L    Urea Nitrogen 35.6 (H) 8.0 - 23.0 mg/dL    Creatinine 0.54 (L) 0.67 - 1.17 mg/dL    GFR Estimate >90 >60 mL/min/1.73m2    Calcium 9.0 8.8 - 10.4 mg/dL    Chloride 114 (H) 98 - 107 mmol/L    Glucose 114 (H) 70 - 99 mg/dL    Alkaline Phosphatase 79 40 - 150 U/L    AST 43 0 - 45 U/L    ALT 47 0 - 70 U/L    Protein Total " 6.0 (L) 6.4 - 8.3 g/dL    Albumin 2.9 (L) 3.5 - 5.2 g/dL    Bilirubin Total 0.3 <=1.2 mg/dL   CBC with platelets    Collection Time: 12/16/24  8:00 AM   Result Value Ref Range    WBC Count 5.2 4.0 - 11.0 10e3/uL    RBC Count 2.33 (L) 4.40 - 5.90 10e6/uL    Hemoglobin 8.4 (L) 13.3 - 17.7 g/dL    Hematocrit 26.3 (L) 40.0 - 53.0 %     (H) 78 - 100 fL    MCH 36.1 (H) 26.5 - 33.0 pg    MCHC 31.9 31.5 - 36.5 g/dL    RDW 18.1 (H) 10.0 - 15.0 %    Platelet Count 172 150 - 450 10e3/uL   Magnesium    Collection Time: 12/16/24  8:00 AM   Result Value Ref Range    Magnesium 2.3 1.7 - 2.3 mg/dL   Phosphorus    Collection Time: 12/16/24  8:00 AM   Result Value Ref Range    Phosphorus 2.8 2.5 - 4.5 mg/dL         ASSESSMENT AND PLAN  Mendez Greene is a 79 year old male with past medical history of paroxysmal atrial fibrillation, CAD, ascending aortic aneurysm, HLD, HTN, hypothyroidism, CHA admitted to ICU service on 11/30/2024 for acute respiratory failure and AMS. Patient was recently hospitalized at [facility] from 11/8/24-11/15/24 for progressive weakness and fatigue, difficulty swallowing. After discharge, his ACh receptor binding Ab came back 2.11, strongly suggestive of myasthenia gravis. Presented back to [facility] ED 11/30 with hypotension and acute respiratory failure. Patient was intubated in the ED and admitted to ICU on pressors. During admission patient received 5 doses of IVIG for myasthenic crisis and was initiated on Mestinon. Hospital course also included broad spectrum antibiotics for possible septic shock due to UTI, MOHAMUD, electrolyte derangements, bradycardia, hypertension.  Now admitted to acute rehab.     Admission to acute inpatient rehab 12/11/2024.    Impairment group code: 03.8 Neuromuscular Disorders; new diagnosis of myasthenia gravis with myasthenia crisis         PT and OT 75 minutes of each as well as SLP 30 minutes on a daily basis, in addition to rehab nursing and close management of physiatrist.        Impairment of ADL's: Impairment in strength, endurance, and ROM resulting in functional limitations in patient's ability to perform ADLs     Impairment of mobility:  Impairment in strength, endurance, and ROM resulting in functional limitations in patient's ability to perform functional mobility      Impairment of cognition/language/swallow:  Needs therapy for swallowing.     Medical Conditions     #Myasthenic crisis  Questionable ALS diagnosis which is being deferred to his primary neurologist.  - clinically much improved with improvement in upper extremity strength; still with some upper extremity flexion weakness and LE weakness  - completed 5 doses of IVIG on 12/5/24   - Caution use of IV magnesium, beta-blocker, statin, macrolides, aminoglycosides, and fluoroquinolone, and as much as possible avoid them due to to potential exacerbation of myasthenia crisis. ; Holding PTA statin  - Monitor respiratory status closely, currently stable on room air  - CPAP at   - Pyridostigmine 60 mg PO TID  - Consider CT chest with contrast for r/o thymoma while on rehab     #H/o neobladder  - was on snowden catheter in neobladder, discontinued 12/10 and voiding well, chronically incontinent     #Hypernatremia  #Hyperchloremia  Na 149 day of admission to rehab (12/11), Cl 117, creatinine 0.57 (at baseline). Had issues with hypernatremia on acute service, up to 152. Acute service increased free water flushes and gave 1L D5.   - continue free water flushes 150 ml q 4hrs   - BMP M/Th     #Chronic macrocytic anemia  #Thrombocytopenia  Hgb has mostly been around 9-10 on acute service; noted slight downtrend ---8.5--7.8 (12/11), likely dilutional with IV fluids as platelets and hematocrit also decreased slightly; has no clinical concern for active bleed and has been hemodynamically stable. B12 was 727, folic acid wnl on 11/12. Was seen by hematology 4/2024, workup unremarkable. Platelets remained decreased 139 (12/12).  - Hgb 12/12  8.3 increased from 7.8  - CBC M/Th  - Transfuse hgb <7 (has not required transfusion this admission)  - PTA B12 supplementation     #Sinus bradycardia  HR 40s to upper 50s during admission, dips the most when sleeping. Previous EKG sinus bradycardia with no high grade blocks. Chart review shows similar HR 40-50s last hospitalization, and he has a few OP visits with documented sinus bradycardia (7/10/24, 3/4/24).  - OK w HR 40's-50's  - CTM, further evaluation if ever symptomatic     #Moderate to severe oropharyngeal dysphagia s/p G-tube  - VFSS on 12/9 while on acute service, note that he continues to aspirate all textures but improved ability to cough; SLP recommend continued NPO  - 12/13 200 mg guaifenesin to help loosen mucus secretions. Emphasize the importance of eating ice chips so mucous does not become hard  - Receive all nutrition via tube feeds  - BMP, Mg & Phos M/Th     #CAD  #HLD  #Paroxysmal atrial fibrillation  #Recent NSTEMI (11/8/24)  Troponin 997 on acute service, cardiac cath 11/11/24 showed only mild CAD. Echo 12/1/24 difficult study but grossly normal left and right ventricular function, no severe valvular regurgitation, probably mild aortic regurgitation, no pericardial effusion.  Not in afib on admission to rehab.  - Continue PTA ASA  - HOLD PTA Simvastatin until follow-up with neurology  - No rate control medications at baseline     #Hypothyroidism  - PTA levothyroxine 150 mcg daily  - TSH with free T4 reflex added to 12/16 sample     #CHA  - PTA CPAP HS, 12/16 order changed to allow for CPAP during naps    #Extremity Swelling  Significant swelling in the right arm as well as bilateral LE was noted on 12/12. Patient denies associated pain. 12/13 swelling in right and left leg improved, still significant swelling in left LE.  - Wrapped arm and lower extremities in compression bandage  - Right Arm Duplex US negative 12/12    #Constipation  Has not had a reported bowel movement since 12/11, but  is passing flatulence. No reported abdominal pain. Consider encouraging suppository if he still has not had BM by 12/17.   - Senna Q12H  - Miralax daily PRN  - Dulcolax daily PRN     #Severe malnutrition in the context of acute illness or injury  - Eval and treat per RD recommendation. Diet as below.     Adjustment to disability:  Clinical psychology to eval and treat  FEN: NPO except for ice Chips. Receiving Tube Feeding Adult Formula Drip Feeding: Continuous Jevity 1.5; Gastrostomy at 55 mL/h with free water flushes 150 ml q 4 hrs. PTA Vit D supplementation.  Bowel: See above  Bladder: No snowden in place, PVRs on admission. 12/12 2 elevated PVRs in 100s and 300s range. Patient has neobladder and is incontinent at baseline. Will continue to assess output and any symptoms of fullness. No need for repeat PVRs at this time.  DVT Prophylaxis: Subcutaneous heparin   GI Prophylaxis: Na  Code: Full  Disposition: Home with family support.  ELOS:  21 Days.  Rehab prognosis:  Good  Follow up Appointments on Discharge:   PCP  Neurology, Dr. Fabian or Dr. Ashley   PM&R  Cardiology  Urology for assessment of neobladder and options to reduce nighttime leaking      Patient reviewed with resident physician, Dr. Villagran, who agrees with the assessment and plan.    Gerry Brewer MS3      Note written in conjunction with medical student and edited where/if necessary.    Patient seen and reviewed with attending physician, Dr. Sorensen, who agrees with my assessment and plan.    Azul Villagran MD  PGY-2  Physical Medicine & Rehabilitation  12/17/2024  Pager #: [phone]

## 2024-12-17 NOTE — PROGRESS NOTES
Discharge Planner Post-Acute Rehab OT:      Discharge Plan: Home with assistance. HC OT services     Precautions: Falls, continuous tube feeding and NPO, BUE weakness with R greater than L  *elevate BUE/BLE's when supine in bed with pillows*  R resting hand orthosis - on overnight/off during day     Current Status:  ADLs:  Mobility: A x 1 pivot with walker. A x 2 nursing. Wheelchair based.  Grooming: Min A due to impaired UE function.  Dressing: UB Max A. LB Max A FWW.  Bathing: TBA  Toileting: A x 1 pivot to commode overlay. Pretty christianson nursing. A x 1 clothing management/hygiene.   IADLs: IND prior, supportive spouse  Vision/Cognition: Ox3. Wears corrective lenses     Assessment: Fabricated bilateral dorsal based wrist extension supports to promote functional grasp/pinch. Will potentially add digit extension loops if needed.   Plan to fabricate L resting hand orthosis due to flexor tightness and to promote a functional hand position.     Other Barriers to Discharge (DME, Family Training, etc):   Family training prior to discharge  AE/DME: pending progress

## 2024-12-17 NOTE — PLAN OF CARE
Goal Outcome Evaluation:      Plan of Care Reviewed With: patient    Overall Patient Progress: improvingOverall Patient Progress: improving    Alert and oriented x 4, denies headache or dizziness, denies cough or sob. On continuous TF infusing at 55mi/hr, tolerating it well. Has a soft touch call light due to dexterity. No BM going on 4 day, assisted to the BR x 1, no results though. Offered supp but he declined and stated that he will give it one more day. Wife at bedside and hands on, will continue poc

## 2024-12-18 ENCOUNTER — APPOINTMENT (OUTPATIENT)
Dept: PHYSICAL THERAPY | Facility: CLINIC | Age: 79
DRG: 056 | End: 2024-12-18
Attending: PHYSICAL MEDICINE & REHABILITATION
Payer: MEDICARE

## 2024-12-18 ENCOUNTER — APPOINTMENT (OUTPATIENT)
Dept: SPEECH THERAPY | Facility: CLINIC | Age: 79
DRG: 056 | End: 2024-12-18
Attending: PHYSICAL MEDICINE & REHABILITATION
Payer: MEDICARE

## 2024-12-18 ENCOUNTER — APPOINTMENT (OUTPATIENT)
Dept: OCCUPATIONAL THERAPY | Facility: CLINIC | Age: 79
DRG: 056 | End: 2024-12-18
Attending: PHYSICAL MEDICINE & REHABILITATION
Payer: MEDICARE

## 2024-12-18 PROCEDURE — 97110 THERAPEUTIC EXERCISES: CPT | Mod: GO | Performed by: OCCUPATIONAL THERAPIST

## 2024-12-18 PROCEDURE — 128N000003 HC R&B REHAB

## 2024-12-18 PROCEDURE — 92611 MOTION FLUOROSCOPY/SWALLOW: CPT | Mod: GN | Performed by: SPEECH-LANGUAGE PATHOLOGIST

## 2024-12-18 PROCEDURE — 97535 SELF CARE MNGMENT TRAINING: CPT | Mod: GO | Performed by: OCCUPATIONAL THERAPIST

## 2024-12-18 PROCEDURE — 99232 SBSQ HOSP IP/OBS MODERATE 35: CPT | Performed by: PHYSICAL MEDICINE & REHABILITATION

## 2024-12-18 PROCEDURE — 97110 THERAPEUTIC EXERCISES: CPT | Mod: GP

## 2024-12-18 PROCEDURE — 97530 THERAPEUTIC ACTIVITIES: CPT | Mod: GO

## 2024-12-18 PROCEDURE — 97530 THERAPEUTIC ACTIVITIES: CPT | Mod: GO | Performed by: OCCUPATIONAL THERAPIST

## 2024-12-18 PROCEDURE — 250N000011 HC RX IP 250 OP 636

## 2024-12-18 PROCEDURE — 97530 THERAPEUTIC ACTIVITIES: CPT | Mod: GP

## 2024-12-18 PROCEDURE — 250N000013 HC RX MED GY IP 250 OP 250 PS 637: Performed by: PHYSICAL MEDICINE & REHABILITATION

## 2024-12-18 PROCEDURE — 97535 SELF CARE MNGMENT TRAINING: CPT | Mod: GO

## 2024-12-18 RX ORDER — BARIUM SULFATE 400 MG/ML
SUSPENSION ORAL ONCE
Status: COMPLETED | OUTPATIENT
Start: 2024-12-18 | End: 2024-12-18

## 2024-12-18 RX ADMIN — ASPIRIN 81 MG CHEWABLE TABLET 81 MG: 81 TABLET CHEWABLE at 21:51

## 2024-12-18 RX ADMIN — CYANOCOBALAMIN TAB 1000 MCG 1000 MCG: 1000 TAB at 08:12

## 2024-12-18 RX ADMIN — LEVOTHYROXINE SODIUM 150 MCG: 75 TABLET ORAL at 09:15

## 2024-12-18 RX ADMIN — BISACODYL 10 MG: 10 SUPPOSITORY RECTAL at 15:58

## 2024-12-18 RX ADMIN — HEPARIN SODIUM 5000 UNITS: 5000 INJECTION, SOLUTION INTRAVENOUS; SUBCUTANEOUS at 17:22

## 2024-12-18 RX ADMIN — HEPARIN SODIUM 5000 UNITS: 5000 INJECTION, SOLUTION INTRAVENOUS; SUBCUTANEOUS at 00:14

## 2024-12-18 RX ADMIN — SENNOSIDES AND DOCUSATE SODIUM 1 TABLET: 50; 8.6 TABLET ORAL at 21:51

## 2024-12-18 RX ADMIN — Medication 25 MCG: at 08:12

## 2024-12-18 RX ADMIN — HEPARIN SODIUM 5000 UNITS: 5000 INJECTION, SOLUTION INTRAVENOUS; SUBCUTANEOUS at 08:12

## 2024-12-18 RX ADMIN — PYRIDOSTIGMINE BROMIDE 60 MG: 60 TABLET ORAL at 00:07

## 2024-12-18 RX ADMIN — BARIUM SULFATE 5 ML: 400 SUSPENSION ORAL at 15:23

## 2024-12-18 RX ADMIN — PYRIDOSTIGMINE BROMIDE 60 MG: 60 TABLET ORAL at 08:12

## 2024-12-18 RX ADMIN — SENNOSIDES AND DOCUSATE SODIUM 1 TABLET: 50; 8.6 TABLET ORAL at 08:12

## 2024-12-18 RX ADMIN — POLYETHYLENE GLYCOL 3350 17 G: 17 POWDER, FOR SOLUTION ORAL at 08:13

## 2024-12-18 RX ADMIN — Medication 3 MG: at 21:51

## 2024-12-18 RX ADMIN — SALINE NASAL SPRAY 1 SPRAY: 1.5 SOLUTION NASAL at 08:24

## 2024-12-18 RX ADMIN — PYRIDOSTIGMINE BROMIDE 60 MG: 60 TABLET ORAL at 17:22

## 2024-12-18 RX ADMIN — SALINE NASAL SPRAY 1 SPRAY: 1.5 SOLUTION NASAL at 13:13

## 2024-12-18 ASSESSMENT — ACTIVITIES OF DAILY LIVING (ADL)
ADLS_ACUITY_SCORE: 79
ADLS_ACUITY_SCORE: 80
ADLS_ACUITY_SCORE: 81
ADLS_ACUITY_SCORE: 79
ADLS_ACUITY_SCORE: 81
ADLS_ACUITY_SCORE: 81
ADLS_ACUITY_SCORE: 79
ADLS_ACUITY_SCORE: 81
ADLS_ACUITY_SCORE: 79
ADLS_ACUITY_SCORE: 81
ADLS_ACUITY_SCORE: 80
ADLS_ACUITY_SCORE: 79
ADLS_ACUITY_SCORE: 81
ADLS_ACUITY_SCORE: 80
ADLS_ACUITY_SCORE: 79
ADLS_ACUITY_SCORE: 80
ADLS_ACUITY_SCORE: 80
ADLS_ACUITY_SCORE: 79
ADLS_ACUITY_SCORE: 81
ADLS_ACUITY_SCORE: 79
ADLS_ACUITY_SCORE: 79

## 2024-12-18 NOTE — PLAN OF CARE
Goal Outcome Evaluation:      Plan of Care Reviewed With: patient    Overall Patient Progress: improvingOverall Patient Progress: improving. Alert and oriented x4, denies headache or dizziness, denies cough or sob,. Jamal LE persist, getting better, tubi  intact. Inc of bladder x 1, also continent on the urinal. . On continuous TF, tolerating it well. Participated in therapy, use his call light appropriately. Will continue poc

## 2024-12-18 NOTE — PLAN OF CARE
Discharge Planner Post-Acute Rehab SLP:     Discharge Plan: home with spouse,  SLP    Precautions: NPO - PEG, fall    Current Status:  Hearing: WFL  Vision: glasses  Communication: WFL  Cognition: SLP monitoring need for cognitive evaluation  Swallow: NPO. Ok ice chips with supervision. Oral cares x3/day- instructions for completing adequate oral care in patient care order for nurse review    Assessment:  VFSS completed per MD order this date. Evaluated pt with mildly thick liquids (2), thin liquids (0), and pureed solid (4). Pt's current swallow function similar to prior VFSS 12/09, rated at moderate-severe oropharyngeal, pharyngeal dysphagia characterized by decreased hyolaryngeal elevation/excursion, impaired epiglottic inversion, impaired base of tongue retraction, impaired pharyngeal wall contraction. With thin barium by teaspoon pt with moderate-severe vallecular residue (multiple swallows minimally improved) and shallow penetration. Chin tuck with teaspoon thin resulted in deeper penetration to cords, possible aspiration though difficult to visualize due to pt position. Straw sip thin resulted in penetration with eventual aspiration. Pt with severe vallecula residue both mildly thick and pureed trials, pureed solid did not pass lower than vallecula. After trial #3, pt instructed to cough/expel material after each trial to clear vallecula and pyriform spaces. Recommend continuing NPO status with frequent oral cares, okay for ice chips with frequent cough/re-swallow. Plan to edu pt in quality of life options related to limited thin intake via spoon. Anticipate repeat VFSS no earlier than 01/01/2025.     Other Barriers to Discharge (Family Training, etc): TBD - diet pending progress.

## 2024-12-18 NOTE — PROGRESS NOTES
[facility]   Acute Rehabilitation Unit  Daily progress note    INTERVAL HISTORY  Steven was seen during break from OT session.  No acute events reported overnight.  Denies chest pain, shortness of breath, no fever or chills.  Feels making steady gains.  Labs reviewed, discussed with Nutrition team, will increase water flushes.  Goal today is for BM.       Functionally:  OT:  ADLs:  Mobility: Mod A stand pivot with walker. A x 2 nursing. Wheelchair based.  Grooming: Min A due to impaired UE function.  Dressing: UB Max A. LB Max A FWW.  Bathing: TBA  Toileting: Mod A stand pivot to commode overlay. Pretty stedy nursing. A x 1 clothing management/hygiene.   IADLs: IND prior, supportive spouse  Vision/Cognition: Ox3. Wears corrective lenses      MEDICATIONS  Scheduled meds  Current Facility-Administered Medications   Medication Dose Route Frequency Provider Last Rate Last Admin    aspirin (ASA) chewable tablet 81 mg  81 mg Oral or Feeding Tube QPM Sachin Sorensen DO   81 mg at 12/17/24 2154    cyanocobalamin (VITAMIN B-12) tablet 1,000 mcg  1,000 mcg Oral or Feeding Tube Daily Sachin Sorensen DO   1,000 mcg at 12/18/24 0812    heparin ANTICOAGULANT injection 5,000 Units  5,000 Units Subcutaneous Q8H Azul Livingston MD   5,000 Units at 12/18/24 0812    levothyroxine (SYNTHROID/LEVOTHROID) tablet 150 mcg  150 mcg Oral or Feeding Tube Daily Sachin Sorensen DO   150 mcg at 12/18/24 0915    melatonin tablet 3 mg  3 mg Oral or Feeding Tube At Bedtime Sachin Sorensen DO   3 mg at 12/17/24 2154    pyRIDostigmine (MESTINON) tablet 60 mg  60 mg Oral or Feeding Tube Q8H Sachin Sorensen DO   60 mg at 12/18/24 0812    senna-docusate (SENOKOT-S/PERICOLACE) 8.6-50 MG per tablet 1 tablet  1 tablet Oral BID Sveta Schultz MD   1 tablet at 12/18/24 0812    [Held by provider] simvastatin (ZOCOR) tablet 10 mg  10 mg Oral Daily zAul Livingston MD        Vitamin D3  "(CHOLECALCIFEROL) tablet 25 mcg  25 mcg Oral or Feeding Tube Daily Sachin Sorensen DO   25 mcg at 12/18/24 0812       PRN meds:  Current Facility-Administered Medications   Medication Dose Route Frequency Provider Last Rate Last Admin    acetaminophen (TYLENOL) tablet 650 mg  650 mg Oral or Feeding Tube Q4H PRN Sachin Sorensen DO        artificial saliva (BIOTENE MT) solution 2 spray  2 spray Swish & Spit Q1H PRN Azul Livingston MD        bisacodyl (DULCOLAX) suppository 10 mg  10 mg Rectal Daily PRN Sveta Schultz MD        dextrose 10% infusion   Intravenous Continuous PRN Azul Livingston MD        guaiFENesin (ROBITUSSIN) 20 mg/mL solution 200 mg  200 mg Oral or Feeding Tube Q4H PRN Azul Livingston MD        olopatadine (PATANOL) 0.1 % ophthalmic solution 1 drop  1 drop Both Eyes BID PRN Azul Livingston MD        polyethylene glycol (MIRALAX) Packet 17 g  17 g Oral Daily PRN Sveta Schultz MD   17 g at 12/18/24 0813    sodium chloride (OCEAN) 0.65 % nasal spray 1 spray  1 spray Both Nostrils Q1H PRN Sveta Schultz MD   1 spray at 12/18/24 1313         PHYSICAL EXAM  /47 (BP Location: Right arm)   Pulse 58   Temp 97.2  F (36.2  C) (Oral)   Resp 17   Ht 1.778 m (5' 10\")   Wt 66.2 kg (145 lb 15.1 oz)   SpO2 98%   BMI 20.94 kg/m    General: NAD, resting during OT session   HEENT: NCAT, EOMI.  Cardio: Well perfused  Pulm: Breathing comfortably on room air.   Abd: Non distended, non tender  Neuro/MSK: Able to move all 4 extremities.    LABS  Recent Results (from the past 24 hours)   Glucose by meter    Collection Time: 12/17/24  9:36 PM   Result Value Ref Range    GLUCOSE BY METER POCT 103 (H) 70 - 99 mg/dL         ASSESSMENT AND PLAN  Mendez Greene is a 79 year old male with past medical history of paroxysmal atrial fibrillation, CAD, ascending aortic aneurysm, HLD, HTN, hypothyroidism, CHA admitted to ICU service on 11/30/2024 for acute respiratory " failure and AMS. Patient was recently hospitalized at [facility] from 11/8/24-11/15/24 for progressive weakness and fatigue, difficulty swallowing. After discharge, his ACh receptor binding Ab came back 2.11, strongly suggestive of myasthenia gravis. Presented back to [facility] ED 11/30 with hypotension and acute respiratory failure. Patient was intubated in the ED and admitted to ICU on pressors. During admission patient received 5 doses of IVIG for myasthenic crisis and was initiated on Mestinon. Hospital course also included broad spectrum antibiotics for possible septic shock due to UTI, MOHAMUD, electrolyte derangements, bradycardia, hypertension.  Now admitted to acute rehab.     Admission to acute inpatient rehab 12/11/2024.    Impairment group code: 03.8 Neuromuscular Disorders; new diagnosis of myasthenia gravis with myasthenia crisis         PT and OT 75 minutes of each as well as SLP 30 minutes on a daily basis, in addition to rehab nursing and close management of physiatrist.       Impairment of ADL's: Impairment in strength, endurance, and ROM resulting in functional limitations in patient's ability to perform ADLs     Impairment of mobility:  Impairment in strength, endurance, and ROM resulting in functional limitations in patient's ability to perform functional mobility      Impairment of cognition/language/swallow:  Needs therapy for swallowing.     Medical Conditions     #Myasthenic crisis  Questionable ALS diagnosis which is being deferred to his primary neurologist.  - clinically much improved with improvement in upper extremity strength; still with some upper extremity flexion weakness and LE weakness  - completed 5 doses of IVIG on 12/5/24   - Caution use of IV magnesium, beta-blocker, statin, macrolides, aminoglycosides, and fluoroquinolone, and as much as possible avoid them due to to potential exacerbation of myasthenia crisis. ; Holding PTA statin  - Monitor respiratory status closely, currently stable on  room air  - CPAP at HS  - Pyridostigmine 60 mg PO TID  - Consider CT chest with contrast for r/o thymoma while on rehab     #H/o neobladder  - was on snowden catheter in neobladder, discontinued 12/10 and voiding well, chronically incontinent     #Hypernatremia  #Hyperchloremia  Na 149 day of admission to rehab (12/11), Cl 117, creatinine 0.57 (at baseline). Had issues with hypernatremia on acute service, up to 152. Acute service increased free water flushes and gave 1L D5.   - continue free water flushes 150 ml q 4hrs - Nutrition team to increase today -12/18  - BMP M/Th     #Chronic macrocytic anemia  #Thrombocytopenia  Hgb has mostly been around 9-10 on acute service; noted slight downtrend ---8.5--7.8 (12/11), likely dilutional with IV fluids as platelets and hematocrit also decreased slightly; has no clinical concern for active bleed and has been hemodynamically stable. B12 was 727, folic acid wnl on 11/12. Was seen by hematology 4/2024, workup unremarkable. Platelets remained decreased 139 (12/12).  - Hgb 12/12 8.3 increased from 7.8  - CBC M/Th  - Transfuse hgb <7 (has not required transfusion this admission)  - PTA B12 supplementation     #Sinus bradycardia  HR 40s to upper 50s during admission, dips the most when sleeping. Previous EKG sinus bradycardia with no high grade blocks. Chart review shows similar HR 40-50s last hospitalization, and he has a few OP visits with documented sinus bradycardia (7/10/24, 3/4/24).  - OK w HR 40's-50's  - CTM, further evaluation if ever symptomatic     #Moderate to severe oropharyngeal dysphagia s/p G-tube  - VFSS on 12/9 while on acute service, note that he continues to aspirate all textures but improved ability to cough; SLP recommend continued NPO  - 12/13 200 mg guaifenesin to help loosen mucus secretions. Emphasize the importance of eating ice chips so mucous does not become hard  - Receive all nutrition via tube feeds  - BMP, Mg & Phos M/Th     #CAD  #HLD  #Paroxysmal  atrial fibrillation  #Recent NSTEMI (11/8/24)  Troponin 997 on acute service, cardiac cath 11/11/24 showed only mild CAD. Echo 12/1/24 difficult study but grossly normal left and right ventricular function, no severe valvular regurgitation, probably mild aortic regurgitation, no pericardial effusion.  Not in afib on admission to rehab.  - Continue PTA ASA  - HOLD PTA Simvastatin until follow-up with neurology  - No rate control medications at baseline     #Hypothyroidism  - PTA levothyroxine 150 mcg daily  - TSH with free T4 reflex added to 12/16 sample  TSH   Date Value Ref Range Status   12/16/2024 7.53 (H) 0.30 - 4.20 uIU/mL Final   12/06/2024 8.56 (H) 0.30 - 4.20 uIU/mL Final   07/10/2024 0.50 0.30 - 4.20 uIU/mL Final   06/14/2023 0.41 0.30 - 4.20 uIU/mL Final   08/10/2022 0.53 0.40 - 4.00 mU/L Final   08/10/2021 1.24 0.40 - 4.00 mU/L Final   08/04/2020 0.74 0.40 - 4.00 mU/L Final   05/06/2019 0.76 0.40 - 4.00 mU/L Final   05/16/2018 0.66 0.40 - 4.00 mU/L Final   04/04/2017 3.88 0.40 - 4.00 mU/L Final   02/01/2017 8.09 (H) 0.40 - 4.00 mU/L Final          #CHA  - PTA CPAP HS, 12/16 order changed to allow for CPAP during naps    #Extremity Swelling  Significant swelling in the right arm as well as bilateral LE was noted on 12/12. Patient denies associated pain. 12/13 swelling in right and left leg improved, still significant swelling in left LE.  - Wrapped arm and lower extremities in compression bandage  - Right Arm Duplex US negative 12/12    #Constipation  Has not had a reported bowel movement since 12/11, but is passing flatulence. No reported abdominal pain. Consider encouraging suppository if he still has not had BM by 12/17.   - Senna Q12H  - Miralax daily PRN  - Dulcolax daily PRN     #Severe malnutrition in the context of acute illness or injury  - Eval and treat per RD recommendation. Diet as below.     Adjustment to disability:  Clinical psychology to eval and treat  FEN: NPO except for ice Chips.  Receiving Tube Feeding Adult Formula Drip Feeding: Continuous Jevity 1.5; Gastrostomy at 55 mL/h with free water flushes 150 ml q 4 hrs. PTA Vit D supplementation. Discussed with Nutrition will increase free water flushes.   Bowel: See above  Bladder: No snowden in place, PVRs on admission. 12/12 2 elevated PVRs in 100s and 300s range. Patient has neobladder and is incontinent at baseline. Will continue to assess output and any symptoms of fullness. No need for repeat PVRs at this time.  DVT Prophylaxis: Subcutaneous heparin   GI Prophylaxis: Na  Code: Full  Disposition: Home with family support.  ELOS:  21 Days.  Rehab prognosis:  Good  Follow up Appointments on Discharge:   PCP  Neurology, Dr. Fabian or Dr. Ashley   PM&R  Cardiology  Urology for assessment of neobladder and options to reduce nighttime leaking          Sachin Sorensen,       I spent a total of 25 minutes face to face and coordinating care of Mendez Greene. Over 50% of my time on the unit was spent counseling the patient and /or coordinating care regarding post myasthenia gravis crisis.

## 2024-12-18 NOTE — PLAN OF CARE
Discharge Planner Post-Acute Rehab PT:      Discharge Plan: Home with wife, 3 NEELAM w/out rail. Home care PT.     Precautions: Falls, NPO/PEG, *elevate BUE/BLE's when supine in bed with pillows*  R resting hand orthosis - on overnight/off during day  B T-bar splints only with functional tasks     Current Status:  Bed Mobility: Min A  Transfer: Ax2 stand pivot with FWW with nursing, Serasteady to toilet.   Gait: Up to 175ft using FWW Ax1 with w/c follow.   Stairs: Not yet safe  Balance: Poor static, dynamic standing balance w/out UE support     Outcome Measures:   Garcia              12/12: 12/56  5TSTS              12/12: 0x (requires physical assist even with UE assist)  6MWT              12/12: 40 ft, FWW and CGA (stopped early due to fatigue)     Assessment: Pt continues to make steady progression in functional mobility with decreased assist to rise from chairs, and improving gait stability no longer requiring lift pants or wheelchair follow at times. Given factor of fatigue, continuing to use serasteady with [facility] [place]ting, however anticipate pt will progress away from this in coming day or two. Goals continue to be set for home with CGA from spouse for mobility, anticipate pt may benefit from assist from sons on stairs, FT to be arranged next week once pt progresses to this.     Other Barriers to Discharge (DME, Family Training, etc):   Equipment: Anticipate FWW, gait belt, possible K3 WC rental.  Family training to be scheduled with sons for stairs. Spouse provides CGA only.

## 2024-12-18 NOTE — PLAN OF CARE
Goal Outcome Evaluation:           Overall Patient Progress: improvingOverall Patient Progress: improving       Denies pain throughout pm shift. With continuous feeding ongoing well. On NPO except ice chips. Had shower and PEG dressing changed after shower. Able to make his needs known, used call light appropriately and waited for assistance. Bed alarms on. CPAP on at bedtime.

## 2024-12-18 NOTE — PROGRESS NOTES
"   12/18/24 1500   Appointment Info   Signing Clinician's Name / Credentials (SLP) Mayoramonalyly McgarryKarin MS, CCC-SLP   General Information   Onset of Illness/Injury or Date of Surgery 11/30/24   Referring Physician Azul Livingston MD   Pertinent History of Current Problem Per H&P: Patient is \"79 year old male with past medical history of paroxysmal atrial fibrillation, CAD, ascending aortic aneurysm, HLD, HTN, hypothyroidism, CHA admitted to ICU service on 11/30/2024 for acute respiratory failure and AMS. Patient was recently hospitalized at [facility] from 11/8/24-11/15/24 for progressive weakness and fatigue, difficulty swallowing. He was seen by neurology who were concerned for motor neuron disease with high index of suspicion for ALS. EMG consistent with MND. He was started on Riluzole and Edaravone and referred to [facility] neuromuscular division for second opinion. Discharged home with palliative care referral. After discharge, his ACh receptor binding Ab came back positive, strongly suggestive of myasthenia gravis.      Presented back to [facility] ED 11/30 with hypotension and acute respiratory failure. Family rescinded patient's DNR/DNI for patient to be full code and patient reportedly nodded in  agreement. Patient was intubated in the ED and admitted to ICU on pressors. Started on broad spectrum antibiotics for possible septic shock due to UTI. Neurology consulted, started patient on IVIGx 5 doses for myasthenic crisis and patient was initiated on Mestinon. Extubated with pressors stopped 12/3. Transferred to hospitalist service on 12/4. Hospital course has been notable for urosepsis with chronic Cortez catheter (completed abx course), MOHAMUD, electrolyte derangements, bradycardia, hypertension.\"   General Observations Pt seen for repeat VFSS this day. Pt is currently NPO, okay for ice chips only. Prior VFSS 12/09, pt with deep penetration of thin by teaspoon, aspiration with thin by cup; slightly thick aspiration, mildly " "thick deep penetration with eventual aspiration.   Type of Evaluation   Type of Evaluation Swallow Evaluation   General Swallowing Observations   Past History of Dysphagia Pt and spouse endorse dysphagia just ~1 month prior to initial hospital admission 11/3. Noting increased concerns with solids and breads per pt. per most recent VFSS:\"Patient presents with moderate/severe dysphagia on today's study. Similiar to his initial video. He contnues to have decreased AP movement of the bolus with premature spillage to the pyriform sinuses with all textures. Deep penetration on thin liquids to the level of the cords and aspiration via the cup with reflexive cough. He had edilberto aspiration of slightly thick liquids before the swallow. Aspiration of mildly thick liquids. Puree was piecemeal with spillage to the pryiform sinuses with moderate residue in valleculae and pryiform sinuses. Additional swallow reduced vallecular residue but moderate amount remained in the pyrifrom sinuse. Of note he was able to cough out all the barium residue between each swallow. So strength of coughin is improving. He continues to be a high risk for aspiration with all textures at this time. Recommend: 1. Continue NPO with TF. Freuquent oral cares and ice chips for comfort and practice swallows. Swallow cough swallow with ice chips.\"   Respiratory Support room air   Current Diet/Method of Nutritional Intake (General Swallowing Observations, NIS) NPO;ice chips;gastrostomy tube (PEG)   Swallowing Evaluation Videofluoroscopic swallow study (VFSS)   VFSS Evaluation   Radiologist Dr. Denton   Views Taken left lateral   Physical Location of Procedure [facility]   VFSS Textures Trialed thin liquids;mildly thick liquids;pureed   VFSS Eval: Thin Liquid Texture Trial   Mode of Presentation, Thin Liquid spoon;straw;fed by clinician   Order of Presentation 2-5, 7-9   Preparatory Phase WFL   Oral Phase, Thin Liquid premature pharyngeal entry "   Bolus Location When Swallow Triggered valleculae   Pharyngeal Phase, Thin Liquid impaired hyolaryngeal excursion;impaired epiglottic movement;impaired tongue base retraction;pharyngeal wall coating;residue in vallecula;residue in pyriform sinus   Rosenbek's Penetration Aspiration Scale: Thin Liquid Trial Results 8 - contrast passes glottis, visible subglottic residue remains, absent patient response (aspiration)   Response to Aspiration productive cued cough   Strategies and Compensations chin tuck   Diagnostic Statement Moderate-severe vallecular residue, multiple swallows  minimally improved. Penetration with teaspoon sips thin. Chin tuck resulted in deeper penetration to cords, possible aspiration though difficult to visualize due to pt position. Straw sip thin resulted in penetration with eventual aspiration.   VFSS Eval: Mildly Thick Liquids   Mode of Presentation spoon;fed by clinician   Order of Presentation 2   Preparatory Phase WFL   Diagnostic Statement severe vallecular residue, multiple swallows minimally improved.   VFSS Evaluation: Puree Solid Texture Trial   Mode of Presentation, Puree spoon;self-fed   Order of Presentation 6   Preparatory Phase WFL   Oral Phase, Puree WFL   Bolus Location When Swallow Triggered valleculae   Pharyngeal Phase, Puree impaired hyolaryngel excursion;impaired epiglottic movement;impaired tongue base retraction;residue in vallecula   Rosenbek's Penetration Aspiration Scale: Puree Food Trial Results 1 - no aspiration, contrast does not enter airway   Diagnostic Statement Severe vallecular residue, entirety of pudding bolus coughed up/expelled off fluoro. No penetration or aspiration, however this was due to bolus remaining in vallecula.   Esophageal Phase of Swallow   Patient reports or presents with symptoms of esophageal dysphagia No   Swallowing Recommendations   Diet Consistency Recommendations NPO;ice chips only   Supervision Level for Intake 1:1 supervision needed    Mode of Delivery Recommendations bolus size, small   Swallowing Maneuver Recommendations throat clear-swallow   Medication Administration Recommendations, Swallowing (SLP) Via PEG.   Instrumental Assessment Recommendations VFSS (videofluoroscopic swallowing study)   Comment, Swallowing Recommendations Repeat VFSS no sooner than 2 weeks (on or after 01/01/2025).   General Therapy Interventions   Planned Therapy Interventions Dysphagia Treatment   Dysphagia treatment Instruction of safe swallow strategies   Clinical Impression   Criteria for Skilled Therapeutic Interventions Met (SLP Eval) Yes, treatment indicated   SLP Diagnosis Moderate-severe oropharyngeal, pharyngeal dysphagia.   Problem List (SLP) Aspiration risk   Activity Limitations Related to Problem List (SLP) Alternative route for all hydration, nutrition, medications.   Risks & Benefits of therapy have been explained evaluation/treatment results reviewed;care plan/treatment goals reviewed;participants voiced agreement with care plan;participants included;patient   Clinical Impression Comments SLP: VFSS completed per MD order this date. Evaluated pt with mildly thick liquids (2), thin liquids (0), and pureed solid (4). Pt's current swallow function similar to prior VFSS 12/09, rated at moderate-severe oropharyngeal, pharyngeal dysphagia characterized by decreased hyolaryngeal elevation/excursion, impaired epiglottic inversion, impaired base of tongue retraction, impaired pharyngeal wall contraction. With thin barium by teaspoon pt with moderate-severe vallecular residue (multiple swallows minimally improved) and shallow penetration. Chin tuck with teaspoon thin resulted in deeper penetration to cords, possible aspiration though difficult to visualize due to pt position. Straw sip thin resulted in penetration with eventual aspiration. Pt with severe vallecula residue both mildly thick and pureed trials, pureed solid did not pass lower than vallecula. After  trial #3, pt instructed to cough/expel material after each trial to clear vallecula and pyriform spaces. Recommend continuing NPO status with frequent oral cares, okay for ice chips with frequent cough/re-swallow. Plan to edu pt in quality of life options related to limited thin intake via spoon. Anticipate repeat VFSS no earlier than 01/01/2025.   SLP Total Evaluation Time   Evaluation, videofluoroscopic eval of swallow function Minutes (39694) 20   SLP Goals   Therapy Frequency (SLP Eval) 6 times/week   SLP Predicted Duration/Target Date for Goal Attainment 01/01/25   SLP Goals Swallow   SLP Discharge Planning   SLP Plan SLP: edu in VFSS findings, edu in risks and benefits of limited 0 by tsp intake. Oral cares, ice chips - swallow cough swallow. limited Mendohlson and Effortful swallow, 10 each total a day- f/u up exercise completion on tracking log.   SLP Time and Intention   Total Session Time (sum of timed and untimed services) 20   SLP - Acute Rehab Center Time   Individual Time (minutes) - SLP 20   ARC Total Session Time (minutes) - SLP 20   ARC Daily Total Session Time   SLP ARC Daily Total Session Time 20   ARC Daily Rehab Total Minutes 135

## 2024-12-18 NOTE — PLAN OF CARE
Goal Outcome Evaluation:      Plan of Care Reviewed With: patient    Overall Patient Progress: no change    Patient is alert and oriented, makes needs known. Slept mostly this shift and awoken for meds. On NPO except ice chips per orders but also on cont TF and tolerating, HOB maintained elevated. Wore CPAP overnight. RUE splint in place. Received with Primofit in place. Not OOB this shift. No pain or discomfort reported. No PRN given/requested. Comfortable in bed. Fall precautions maintained, call light in reach, safety checks completed.     Continue with POC.

## 2024-12-18 NOTE — PROGRESS NOTES
Discharge Planner Post-Acute Rehab OT:      Discharge Plan: Home with assistance. HC OT services     Precautions: Falls, continuous tube feeding and NPO, BUE weakness with R greater than L  *elevate BUE/BLE's when supine in bed with pillows*  R resting hand orthosis - on overnight/off during day  B T-bar splints only with functional tasks     Current Status:  ADLs:  Mobility: Mod A stand pivot with walker. A x 2 nursing. Wheelchair based.  Grooming: Min A due to impaired UE function.  Dressing: UB Max A. LB Max A FWW.  Bathing: TBA  Toileting: Mod A stand pivot to commode overlay. Pretty christianson nursing. A x 1 clothing management/hygiene.   IADLs: IND prior, supportive spouse  Vision/Cognition: Ox3. Wears corrective lenses     Assessment: Utilized T-bar splint to promote wrist extension while completing bilateral hand coordination. Focused on proximal shoulder strengthening/ROM supine for gravity eliminated position with pt tolerating well.      Other Barriers to Discharge (DME, Family Training, etc):   Family training prior to discharge  AE/DME: pending progress. Spouse reports owning bidet attachment, bed rail

## 2024-12-19 ENCOUNTER — APPOINTMENT (OUTPATIENT)
Dept: SPEECH THERAPY | Facility: CLINIC | Age: 79
DRG: 056 | End: 2024-12-19
Attending: PHYSICAL MEDICINE & REHABILITATION
Payer: MEDICARE

## 2024-12-19 ENCOUNTER — APPOINTMENT (OUTPATIENT)
Dept: PHYSICAL THERAPY | Facility: CLINIC | Age: 79
DRG: 056 | End: 2024-12-19
Attending: PHYSICAL MEDICINE & REHABILITATION
Payer: MEDICARE

## 2024-12-19 ENCOUNTER — APPOINTMENT (OUTPATIENT)
Dept: OCCUPATIONAL THERAPY | Facility: CLINIC | Age: 79
DRG: 056 | End: 2024-12-19
Attending: PHYSICAL MEDICINE & REHABILITATION
Payer: MEDICARE

## 2024-12-19 VITALS
BODY MASS INDEX: 20.89 KG/M2 | SYSTOLIC BLOOD PRESSURE: 124 MMHG | TEMPERATURE: 97.9 F | RESPIRATION RATE: 16 BRPM | WEIGHT: 145.94 LBS | OXYGEN SATURATION: 96 % | HEIGHT: 70 IN | DIASTOLIC BLOOD PRESSURE: 52 MMHG | HEART RATE: 51 BPM

## 2024-12-19 LAB
ALBUMIN SERPL BCG-MCNC: 2.7 G/DL (ref 3.5–5.2)
ALP SERPL-CCNC: 70 U/L (ref 40–150)
ALT SERPL W P-5'-P-CCNC: 38 U/L (ref 0–70)
ANION GAP SERPL CALCULATED.3IONS-SCNC: 4 MMOL/L (ref 7–15)
ANION GAP SERPL CALCULATED.3IONS-SCNC: 8 MMOL/L (ref 7–15)
AST SERPL W P-5'-P-CCNC: 32 U/L (ref 0–45)
BILIRUB SERPL-MCNC: 0.3 MG/DL
BUN SERPL-MCNC: 33.1 MG/DL (ref 8–23)
BUN SERPL-MCNC: 33.2 MG/DL (ref 8–23)
CALCIUM SERPL-MCNC: 9 MG/DL (ref 8.8–10.4)
CALCIUM SERPL-MCNC: 9 MG/DL (ref 8.8–10.4)
CHLORIDE SERPL-SCNC: 112 MMOL/L (ref 98–107)
CHLORIDE SERPL-SCNC: 114 MMOL/L (ref 98–107)
CREAT SERPL-MCNC: 0.59 MG/DL (ref 0.67–1.17)
CREAT SERPL-MCNC: 0.6 MG/DL (ref 0.67–1.17)
EGFRCR SERPLBLD CKD-EPI 2021: >90 ML/MIN/1.73M2
EGFRCR SERPLBLD CKD-EPI 2021: >90 ML/MIN/1.73M2
ERYTHROCYTE [DISTWIDTH] IN BLOOD BY AUTOMATED COUNT: 18.7 % (ref 10–15)
GLUCOSE SERPL-MCNC: 109 MG/DL (ref 70–99)
GLUCOSE SERPL-MCNC: 111 MG/DL (ref 70–99)
HCO3 SERPL-SCNC: 26 MMOL/L (ref 22–29)
HCO3 SERPL-SCNC: 28 MMOL/L (ref 22–29)
HCT VFR BLD AUTO: 23.2 % (ref 40–53)
HGB BLD-MCNC: 7.3 G/DL (ref 13.3–17.7)
HOLD SPECIMEN: NORMAL
MAGNESIUM SERPL-MCNC: 2.2 MG/DL (ref 1.7–2.3)
MCH RBC QN AUTO: 35.3 PG (ref 26.5–33)
MCHC RBC AUTO-ENTMCNC: 31.5 G/DL (ref 31.5–36.5)
MCV RBC AUTO: 112 FL (ref 78–100)
PHOSPHATE SERPL-MCNC: 3.3 MG/DL (ref 2.5–4.5)
PLATELET # BLD AUTO: 193 10E3/UL (ref 150–450)
POTASSIUM SERPL-SCNC: 4.2 MMOL/L (ref 3.4–5.3)
POTASSIUM SERPL-SCNC: 5.4 MMOL/L (ref 3.4–5.3)
PROT SERPL-MCNC: 5.5 G/DL (ref 6.4–8.3)
RBC # BLD AUTO: 2.07 10E6/UL (ref 4.4–5.9)
SODIUM SERPL-SCNC: 146 MMOL/L (ref 135–145)
SODIUM SERPL-SCNC: 146 MMOL/L (ref 135–145)
WBC # BLD AUTO: 3.9 10E3/UL (ref 4–11)

## 2024-12-19 PROCEDURE — 97110 THERAPEUTIC EXERCISES: CPT | Mod: GO | Performed by: OCCUPATIONAL THERAPIST

## 2024-12-19 PROCEDURE — 250N000013 HC RX MED GY IP 250 OP 250 PS 637

## 2024-12-19 PROCEDURE — 97110 THERAPEUTIC EXERCISES: CPT | Mod: GP

## 2024-12-19 PROCEDURE — 128N000003 HC R&B REHAB

## 2024-12-19 PROCEDURE — 36415 COLL VENOUS BLD VENIPUNCTURE: CPT

## 2024-12-19 PROCEDURE — 82947 ASSAY GLUCOSE BLOOD QUANT: CPT

## 2024-12-19 PROCEDURE — 99232 SBSQ HOSP IP/OBS MODERATE 35: CPT | Mod: GC | Performed by: PHYSICAL MEDICINE & REHABILITATION

## 2024-12-19 PROCEDURE — 82435 ASSAY OF BLOOD CHLORIDE: CPT

## 2024-12-19 PROCEDURE — 250N000013 HC RX MED GY IP 250 OP 250 PS 637: Performed by: PHYSICAL MEDICINE & REHABILITATION

## 2024-12-19 PROCEDURE — 84132 ASSAY OF SERUM POTASSIUM: CPT

## 2024-12-19 PROCEDURE — 97530 THERAPEUTIC ACTIVITIES: CPT | Mod: GO | Performed by: OCCUPATIONAL THERAPIST

## 2024-12-19 PROCEDURE — 92526 ORAL FUNCTION THERAPY: CPT | Mod: GN | Performed by: SPEECH-LANGUAGE PATHOLOGIST

## 2024-12-19 PROCEDURE — 84100 ASSAY OF PHOSPHORUS: CPT

## 2024-12-19 PROCEDURE — 97112 NEUROMUSCULAR REEDUCATION: CPT | Mod: GO

## 2024-12-19 PROCEDURE — 250N000011 HC RX IP 250 OP 636

## 2024-12-19 PROCEDURE — 83735 ASSAY OF MAGNESIUM: CPT

## 2024-12-19 PROCEDURE — 85018 HEMOGLOBIN: CPT

## 2024-12-19 RX ORDER — GUAIFENESIN 200 MG/10ML
200 LIQUID ORAL DAILY
Status: DISPENSED | OUTPATIENT
Start: 2024-12-19

## 2024-12-19 RX ORDER — FLUTICASONE PROPIONATE 50 MCG
1 SPRAY, SUSPENSION (ML) NASAL DAILY
Status: DISPENSED | OUTPATIENT
Start: 2024-12-19

## 2024-12-19 RX ADMIN — PYRIDOSTIGMINE BROMIDE 60 MG: 60 TABLET ORAL at 17:15

## 2024-12-19 RX ADMIN — HEPARIN SODIUM 5000 UNITS: 5000 INJECTION, SOLUTION INTRAVENOUS; SUBCUTANEOUS at 00:09

## 2024-12-19 RX ADMIN — Medication 3 MG: at 21:16

## 2024-12-19 RX ADMIN — PYRIDOSTIGMINE BROMIDE 60 MG: 60 TABLET ORAL at 00:04

## 2024-12-19 RX ADMIN — ASPIRIN 81 MG CHEWABLE TABLET 81 MG: 81 TABLET CHEWABLE at 21:16

## 2024-12-19 RX ADMIN — CYANOCOBALAMIN TAB 1000 MCG 1000 MCG: 1000 TAB at 09:20

## 2024-12-19 RX ADMIN — GUAIFENESIN 200 MG: 200 SOLUTION ORAL at 11:55

## 2024-12-19 RX ADMIN — HEPARIN SODIUM 5000 UNITS: 5000 INJECTION, SOLUTION INTRAVENOUS; SUBCUTANEOUS at 17:15

## 2024-12-19 RX ADMIN — FLUTICASONE PROPIONATE 1 SPRAY: 50 SPRAY, METERED NASAL at 11:59

## 2024-12-19 RX ADMIN — Medication 25 MCG: at 09:20

## 2024-12-19 RX ADMIN — PYRIDOSTIGMINE BROMIDE 60 MG: 60 TABLET ORAL at 09:20

## 2024-12-19 RX ADMIN — LEVOTHYROXINE SODIUM 150 MCG: 75 TABLET ORAL at 09:20

## 2024-12-19 RX ADMIN — HEPARIN SODIUM 5000 UNITS: 5000 INJECTION, SOLUTION INTRAVENOUS; SUBCUTANEOUS at 09:20

## 2024-12-19 RX ADMIN — SENNOSIDES AND DOCUSATE SODIUM 1 TABLET: 50; 8.6 TABLET ORAL at 21:16

## 2024-12-19 RX ADMIN — SENNOSIDES AND DOCUSATE SODIUM 1 TABLET: 50; 8.6 TABLET ORAL at 09:20

## 2024-12-19 ASSESSMENT — ACTIVITIES OF DAILY LIVING (ADL)
ADLS_ACUITY_SCORE: 77
ADLS_ACUITY_SCORE: 80
ADLS_ACUITY_SCORE: 77
ADLS_ACUITY_SCORE: 80
ADLS_ACUITY_SCORE: 77
ADLS_ACUITY_SCORE: 77
ADLS_ACUITY_SCORE: 80
ADLS_ACUITY_SCORE: 77
ADLS_ACUITY_SCORE: 80

## 2024-12-19 NOTE — PLAN OF CARE
Goal Outcome Evaluation:           Overall Patient Progress: improvingOverall Patient Progress: improving     Denies pain the whole shift. Had video swallow this afternoon, without no new orders, still on NPO and continuous feeding. Water flushes increased to 200ml/hr. Incontinent of bladder, used the toilet with assist of 2 and walker/gait belt. Dulcolax suppository given and had 2 bm's this shift. Able to make his needs known, used his call light appropriately and waited for assistance. Resting hand splint applied to R hand. CPAP on at bedtime. Bed alarms on.

## 2024-12-19 NOTE — PLAN OF CARE
Goal Outcome Evaluation:      Plan of Care Reviewed With: patient    Overall Patient Progress: no change  Acute Rehab Care Conference/Team Rounds      Type: Team Rounds    Present: Dr. Sorensen, Resident Dr. Villagran, Dr. Guerrero Neuropsychologist, Jessica Garcia PT, Patti Ruiz OT, Neno Frazier SLP, Lina Lopez RN CC, Charlette Shields , Janett De La Rosa RD, Shakira West RN, and Mendez Greene Patient.      Discharge Barriers/Treatment/Education    Rehab Diagnosis: functional decline post myasthenia gravis crisis     Active Medical Co-morbidities/Prognosis:     Patient Active Problem List   Diagnosis    CHA on CPAP    CAD (coronary artery disease)    PAF (paroxysmal atrial fibrillation) (H)    Ascending aortic aneurysm (H)    Aortic insufficiency    Pure hypercholesterolemia    History of basal cell carcinoma    Personal history of malignant neoplasm of bladder    Benign essential hypertension    Hypothyroidism    Complication of feeding tube (H)    Acute renal failure (ARF) (H)    Acute hypoxic respiratory failure (H)    Myasthenic crisis (H)    Myasthenia gravis (H)         Safety: Patient is alert and oriented, makes needs known.    Pain: No pain or discomfort reported overnight. No PRN given/requested.    Medications, Skin, Tubes/Lines: Crushed meds through PEG tube. Abdominal bruising and BLE edema. Has PEG tube in place.    Swallowing/Nutrition: NPO. Ok ice chips with supervision due to need for assist consuming them. Oral cares to be completed by caregivers 3x a day, instructions for completing adequate oral care in patient care. Repeat VFSS completed 12/18, no significant improvement in oropharyngeal, pharyngeal swallow function from prior VFSS; Recommend continuing NPO status with frequent oral cares, okay for ice chips with frequent cough/re-swallow. Plan to edu 12/19 pt in quality of life options related to limited thin intake via spoon.      Bowel/Bladder: Incontinent of bladder and  "using Primofit at night. Continent of BM, last BM on 12/18.    Psychosocial: Lives at home with wife. Sons, Goldy and Jason, live nearby. Previously on [facility], was disenrolled when he returned to the hospital. Would prefer not to have [facility] again. Was set up with [facility]. \"Semi-retired\" .    ADLs/IADLs: Pt making slow but steady progress with ADLs. Pt is limited by BUE weakness, and flexor tone in bilateral hands. Pt requires max A with UBD tasks, and max A with LBD tasks with walker. Pt requires mod A with SPT with walker. Pt requires mod A to/from toilet with grab bars, and total A with toilet hygiene. Recommend ns to use ellis steady for toileting with Ax1. Pt requires min A with G/h tasks d/t impaired UE function. Utilizing resting hand splints for at night, and T-bar splints for functional tasks during the day to maximize UE function. Anticipate spouse will have to assist with ADLs/IADLs upon discharge. HC OT services upon discharge.     Mobility:   Current Status:  Bed Mobility: Min A  Transfer: Ax2 stand pivot with FWW with nursing, Serasteady to toilet.   Gait: Up to 175ft using FWW Ax1 with w/c follow.   Stairs: Not yet safe  Balance: Poor static, dynamic standing balance w/out UE support     Outcome Measures:   Garcia              12/12: 12/56  5TSTS              12/12: 0x (requires physical assist even with UE assist)  6MWT              12/12: 40 ft, FWW and CGA (stopped early due to fatigue)     Assessment: Pt continues to make steady progression in functional mobility with decreased assist to rise from chairs, and improving gait stability no longer requiring lift pants or wheelchair follow at times. Given factor of fatigue, continuing to use serasteady with [facility] staff for toileting, however anticipate pt will progress away from this in coming day or two. Goals continue to be set for home with CGA from spouse for mobility, anticipate pt may benefit from assist from sons on " stairs, FT to be arranged next week once pt progresses to this.     Cognition/Language: Motor speech, language intact. Cognition appears functional, SLP monitoring for need for evaluation.    Community Re-Entry: reach a level of mod I     Transportation: will need assistance     Decision maker: self    Plan of Care and goals reviewed and updated.    Discharge Plan/Recommendations    Fall Precautions: continue    Patient/Family input to goals: yes     Estimated length of stay: 18 days     Overall plan for the patient: reach a level of mod I       Utilization Review and Continued Stay Justification    Medical Necessity Criteria:    For any criteria that is not met, please document reason and plan for discharge, transfer, or modification of plan of care to address.    Requires intensive rehabilitation program to treat functional deficits?: Yes    Requires 3x per week or greater involvement of rehabilitation physician to oversee rehabilitation program?: Yes    Requires rehabilitation nursing interventions?: Yes    Patient is making functional progress?: Yes    There is a potential for additional functional progress? Yes    Patient is participating in therapy 3 hours per day a minimum of 5 days per week or 15 hours per week in 7 day period?:Yes    Has discharge needs that require coordinated discharge planning approach?:Yes      Barriers/Concerns related to meeting medical necessity criteria:  None     Team Plan to Address Concern:  As needed      Final Physician Sign off    Statement of Approval:  I agree with all the recommendations detailed in this document.    Sachin Sorensen, DO      Patient Goals  Social Work Goals: Confirm discharge recommendations with therapy, coordinate safe discharge plan and remain available to support and assist as needed.    OT Predicted Duration/Target Date for Goal Attainment: 01/02/25  Therapy Frequency (OT): 6 times/week  OT: Hygiene/Grooming: minimal assist, using adaptive  equipment, within precautions, while standing  OT: Upper Body Dressing: Minimal assist, using adaptive equipment, including orthotic  OT: Lower Body Dressing: Moderate assist, using adaptive equipment  OT: Upper Body Bathing: Minimal assist, using adaptive equipment  OT: Lower Body Bathing: Minimal assist, using adaptive equipment  OT: Toilet Transfer/Toileting: Minimal assist, toilet transfer, cleaning and garment management, using adaptive equipment  OT: Goal 1: OT: Pt will safely complete shower transfers with min A utilizing appropriate AE/DME.  OT: Goal 2: OT: Pt will complete BUE HEP with min A to increase UE ROM, coordination, and strengthening needed for ADLs/IADLs  OT: Goal 3: OT: Pt will demo mod IND with feeding utilizing appropriate AE/DME     PT Predicted Duration/Target Date for Goal Attainment: 01/01/25  PT Frequency: 6x/week  PT: Bed Mobility: Independent, Supine to/from sit, Rolling, Bridging  PT: Transfers: Modified independent, Sit to/from stand, Bed to/from chair, Assistive device  PT: Gait: Modified independent, Rolling walker, 150 feet  PT: Stairs: Supervision/stand-by assist, 3 stairs  PT: Goal 1: Pt will be able to complete car transfer with FWW and SBA in order to access home and medical appts.      SLP Predicted Duration/Target Date for Goal Attainment: 01/01/25  Therapy Frequency (SLP Eval): 6 times/week  SLP: Safely tolerate diet without signs/symptoms of aspiration: Thin liquids, With use of compensatory swallow strategies (for quality of life.)     Nursing Goals  Bowel and Bladder care  Fall prevention   Medication Education  Skin Care protection

## 2024-12-19 NOTE — PROGRESS NOTES
[facility]   Acute Rehabilitation Unit  Daily progress note    INTERVAL HISTORY  Steven was seen this morning and reports doing well. He reports feeling well. He slept good overnight and does not have any new acute concerns. Yesterday he also had a bowel movement after the dulcolax suppository. He reports therapies are going well, he is has regained upper body coordination and is able to walk. He reports he is more limited by SOB than lower body strength. Yesterday afternoon Steven had a video swallow study, which still showed moderate-severe oropharyngeal pharyngeal dysphagia similar to study from 12/9. He is dissapointed it did not go well, but hopes that he will be able to have soda by teaspoon.     Endocrine curb sided 12/19 for advice on levothyroxine dosing given high TSH and low T4. Recommended increasing dose to 175 and rechecking labs in 4 weeks.      Functionally:   OT:  ADLs:  Mobility: Mod A stand pivot with walker. A x 2 nursing. Wheelchair based.  Grooming: Min A due to impaired UE function.  Dressing: UB Max A. LB Max A FWW.  Bathing: TBA  Toileting: Mod A stand pivot to commode overlay. Pretty christianson nursing. A x 1 clothing management/hygiene.   IADLs: IND prior, supportive spouse  Vision/Cognition: Ox3. Wears corrective lenses    PT:  Bed Mobility: Min A  Transfer: Ax2 stand pivot with FWW with nursing, Serasteady to toilet.   Gait: Up to 175ft using FWW Ax1 with w/c follow.   Stairs: Not yet safe  Balance: Poor static, dynamic standing balance w/out UE support    SLP:  Hearing: WFL  Vision: glasses  Communication: WFL  Cognition: SLP monitoring need for cognitive evaluation  Swallow: NPO. Ok ice chips with supervision. Oral cares x3/day- instructions for completing adequate oral care in patient care order for nurse review     Assessment:  VFSS completed per MD order this date. Evaluated pt with mildly thick liquids (2), thin liquids (0), and pureed solid (4). Pt's  current swallow function similar to prior VFSS 12/09, rated at moderate-severe oropharyngeal, pharyngeal dysphagia characterized by decreased hyolaryngeal elevation/excursion, impaired epiglottic inversion, impaired base of tongue retraction, impaired pharyngeal wall contraction. With thin barium by teaspoon pt with moderate-severe vallecular residue (multiple swallows minimally improved) and shallow penetration. Chin tuck with teaspoon thin resulted in deeper penetration to cords, possible aspiration though difficult to visualize due to pt position. Straw sip thin resulted in penetration with eventual aspiration. Pt with severe vallecula residue both mildly thick and pureed trials, pureed solid did not pass lower than vallecula. After trial #3, pt instructed to cough/expel material after each trial to clear vallecula and pyriform spaces. Recommend continuing NPO status with frequent oral cares, okay for ice chips with frequent cough/re-swallow. Plan to edu pt in quality of life options related to limited thin intake via spoon. Anticipate repeat VFSS no earlier than 01/01/2025.       MEDICATIONS  Scheduled meds  Current Facility-Administered Medications   Medication Dose Route Frequency Provider Last Rate Last Admin    aspirin (ASA) chewable tablet 81 mg  81 mg Oral or Feeding Tube QPM Sachin Sorensen DO   81 mg at 12/18/24 2151    cyanocobalamin (VITAMIN B-12) tablet 1,000 mcg  1,000 mcg Oral or Feeding Tube Daily Sachin Sorensen DO   1,000 mcg at 12/18/24 0812    heparin ANTICOAGULANT injection 5,000 Units  5,000 Units Subcutaneous Q8H Azul Livingston MD   5,000 Units at 12/19/24 0009    levothyroxine (SYNTHROID/LEVOTHROID) tablet 150 mcg  150 mcg Oral or Feeding Tube Daily Sachin Sorensen DO   150 mcg at 12/18/24 0915    melatonin tablet 3 mg  3 mg Oral or Feeding Tube At Bedtime Sachin Sorensen DO   3 mg at 12/18/24 2151    pyRIDostigmine (MESTINON) tablet 60 mg  60 mg Oral or Feeding  "Tube Q8H Sachin Sorensen DO   60 mg at 12/19/24 0004    senna-docusate (SENOKOT-S/PERICOLACE) 8.6-50 MG per tablet 1 tablet  1 tablet Oral BID Sveta Schultz MD   1 tablet at 12/18/24 2151    [Held by provider] simvastatin (ZOCOR) tablet 10 mg  10 mg Oral Daily Azul Livingston MD        Vitamin D3 (CHOLECALCIFEROL) tablet 25 mcg  25 mcg Oral or Feeding Tube Daily Sachin Sorensen DO   25 mcg at 12/18/24 0812       PRN meds:  Current Facility-Administered Medications   Medication Dose Route Frequency Provider Last Rate Last Admin    acetaminophen (TYLENOL) tablet 650 mg  650 mg Oral or Feeding Tube Q4H PRN Sachin Sorensen DO        artificial saliva (BIOTENE MT) solution 2 spray  2 spray Swish & Spit Q1H PRN Azul Livingston MD        bisacodyl (DULCOLAX) suppository 10 mg  10 mg Rectal Daily PRN Sveta Schultz MD   10 mg at 12/18/24 1558    dextrose 10% infusion   Intravenous Continuous PRN Azul Livingston MD        guaiFENesin (ROBITUSSIN) 20 mg/mL solution 200 mg  200 mg Oral or Feeding Tube Q4H PRN Azul Livingston MD        olopatadine (PATANOL) 0.1 % ophthalmic solution 1 drop  1 drop Both Eyes BID PRN Azul Livingston MD        polyethylene glycol (MIRALAX) Packet 17 g  17 g Oral Daily PRN Sveta Schultz MD   17 g at 12/18/24 0813    sodium chloride (OCEAN) 0.65 % nasal spray 1 spray  1 spray Both Nostrils Q1H PRN Sveta Schultz MD   1 spray at 12/18/24 1313         PHYSICAL EXAM  /53 (BP Location: Left arm)   Pulse 53   Temp 98  F (36.7  C) (Oral)   Resp 16   Ht 1.778 m (5' 10\")   Wt 66.2 kg (145 lb 15.1 oz)   SpO2 99%   BMI 20.94 kg/m    General: NAD, resting in chair.  HEENT: NCAT, EOMI.  Cardio: Well perfused. RRR  Pulm: Breathing comfortably on room air. Clear lung fields, no crackles wheezes.  Abd: Non distended  Neuro/MSK: Able to move all 4 extremities.  EXT: No LE edema bilaterally.     LABS  Recent Results (from the past 24 " hours)   Comprehensive metabolic panel    Collection Time: 12/19/24  5:48 AM   Result Value Ref Range    Sodium 146 (H) 135 - 145 mmol/L    Potassium 5.4 (H) 3.4 - 5.3 mmol/L    Carbon Dioxide (CO2) 28 22 - 29 mmol/L    Anion Gap 4 (L) 7 - 15 mmol/L    Urea Nitrogen 33.1 (H) 8.0 - 23.0 mg/dL    Creatinine 0.60 (L) 0.67 - 1.17 mg/dL    GFR Estimate >90 >60 mL/min/1.73m2    Calcium 9.0 8.8 - 10.4 mg/dL    Chloride 114 (H) 98 - 107 mmol/L    Glucose 111 (H) 70 - 99 mg/dL    Alkaline Phosphatase 70 40 - 150 U/L    AST 32 0 - 45 U/L    ALT 38 0 - 70 U/L    Protein Total 5.5 (L) 6.4 - 8.3 g/dL    Albumin 2.7 (L) 3.5 - 5.2 g/dL    Bilirubin Total 0.3 <=1.2 mg/dL   CBC with platelets    Collection Time: 12/19/24  5:48 AM   Result Value Ref Range    WBC Count 3.9 (L) 4.0 - 11.0 10e3/uL    RBC Count 2.07 (L) 4.40 - 5.90 10e6/uL    Hemoglobin 7.3 (L) 13.3 - 17.7 g/dL    Hematocrit 23.2 (L) 40.0 - 53.0 %     (H) 78 - 100 fL    MCH 35.3 (H) 26.5 - 33.0 pg    MCHC 31.5 31.5 - 36.5 g/dL    RDW 18.7 (H) 10.0 - 15.0 %    Platelet Count 193 150 - 450 10e3/uL   Magnesium    Collection Time: 12/19/24  5:48 AM   Result Value Ref Range    Magnesium 2.2 1.7 - 2.3 mg/dL   Phosphorus    Collection Time: 12/19/24  5:48 AM   Result Value Ref Range    Phosphorus 3.3 2.5 - 4.5 mg/dL         ASSESSMENT AND PLAN  Mendez Greene is a 79 year old male with past medical history of paroxysmal atrial fibrillation, CAD, ascending aortic aneurysm, HLD, HTN, hypothyroidism, CHA admitted to ICU service on 11/30/2024 for acute respiratory failure and AMS. Patient was recently hospitalized at [facility] from 11/8/24-11/15/24 for progressive weakness and fatigue, difficulty swallowing. After discharge, his ACh receptor binding Ab came back 2.11, strongly suggestive of myasthenia gravis. Presented back to [facility] ED 11/30 with hypotension and acute respiratory failure. Patient was intubated in the ED and admitted to ICU on pressors. During admission patient  received 5 doses of IVIG for myasthenic crisis and was initiated on Mestinon. Hospital course also included broad spectrum antibiotics for possible septic shock due to UTI, MOHAMUD, electrolyte derangements, bradycardia, hypertension.  Now admitted to acute rehab.     Admission to acute inpatient rehab 12/11/2024.    Impairment group code: 03.8 Neuromuscular Disorders; new diagnosis of myasthenia gravis with myasthenia crisis         PT and OT 75 minutes of each as well as SLP 30 minutes on a daily basis, in addition to rehab nursing and close management of physiatrist.       Impairment of ADL's: Impairment in strength, endurance, and ROM resulting in functional limitations in patient's ability to perform ADLs     Impairment of mobility:  Impairment in strength, endurance, and ROM resulting in functional limitations in patient's ability to perform functional mobility      Impairment of cognition/language/swallow:  Needs therapy for swallowing.     Medical Conditions     #Myasthenic crisis  Questionable ALS diagnosis which is being deferred to his primary neurologist.  - clinically much improved with improvement in upper extremity strength; still with some upper extremity flexion weakness and LE weakness  - completed 5 doses of IVIG on 12/5/24   - Caution use of IV magnesium, beta-blocker, statin, macrolides, aminoglycosides, and fluoroquinolone, and as much as possible avoid them due to to potential exacerbation of myasthenia crisis. ; Holding PTA statin  - Monitor respiratory status closely, currently stable on room air  - CPAP at HS  - Pyridostigmine 60 mg PO TID  - Consider CT chest with contrast for r/o thymoma while on rehab     #H/o neobladder  - was on snowden catheter in neobladder, discontinued 12/10 and voiding well, chronically incontinent     #Hypernatremia  #Hyperchloremia  Na 149 day of admission to rehab (12/11), Cl 117, creatinine 0.57 (at baseline). Had issues with hypernatremia on acute service, up to  152. Acute service increased free water flushes and gave 1L D5.   - Increased free water flushes to 200 ml q 4hrs - 12/18  - BMP M/Th     #Chronic macrocytic anemia  #Thrombocytopenia  Hgb has mostly been around 9-10 on acute service; noted slight downtrend ---8.5--7.8 (12/11), likely dilutional with IV fluids as platelets and hematocrit also decreased slightly; has no clinical concern for active bleed and has been hemodynamically stable. B12 was 727, folic acid wnl on 11/12. Was seen by hematology 4/2024, workup unremarkable. Platelets remained decreased 139 (12/12).  - Hgb 12/12 8.3 increased from 7.8  - Hgb 12/19 dropped to 7.3  - CBC M/Th  - Transfuse hgb <7 (has not required transfusion this admission)  - PTA B12 supplementation     #Sinus bradycardia  HR 40s to upper 50s during admission, dips the most when sleeping. Previous EKG sinus bradycardia with no high grade blocks. Chart review shows similar HR 40-50s last hospitalization, and he has a few OP visits with documented sinus bradycardia (7/10/24, 3/4/24).  - OK w HR 40's-50's  - CTM, further evaluation if ever symptomatic     #Moderate to severe oropharyngeal dysphagia s/p G-tube  VFSS on 12/9 while on acute service, note that he continues to aspirate all textures but improved ability to cough; SLP recommend continued NPO. VFSS on 12/18 continued to have moderate-severe oropharyngeal and pharyngeal dysphagia with possible aspiration. Will continue to NPO with next VFSS after 1/1/2025.   - 12/13 200 mg guaifenesin to help loosen mucus secretions. Emphasize the importance of eating ice chips so mucous does not become hard  - Receive all nutrition via tube feeds  - BMP, Mg & Phos M/Th     #CAD  #HLD  #Paroxysmal atrial fibrillation  #Recent NSTEMI (11/8/24)  Troponin 997 on acute service, cardiac cath 11/11/24 showed only mild CAD. Echo 12/1/24 difficult study but grossly normal left and right ventricular function, no severe valvular regurgitation, probably  mild aortic regurgitation, no pericardial effusion.  Not in afib on admission to rehab.  - Continue PTA ASA  - HOLD PTA Simvastatin until follow-up with neurology  - No rate control medications at baseline     #Hypothyroidism  - Increased 12/19 levothyroxine 175 mcg daily  - 12/16 TSH elevated (7.53) and T4 low (0.83).  - Recheck TSH and T4 around 1/16 (4 weeks from dose increase)    #CHA  - PTA CPAP HS, 12/16 order changed to allow for CPAP during naps    #Extremity Swelling  Significant swelling in the right arm as well as bilateral LE was noted on 12/12. Patient denies associated pain. 12/13 swelling in right and left leg improved, still significant swelling in left LE.  - Wrapped arm and lower extremities in compression bandage  - Right Arm Duplex US negative 12/12    #Constipation  Has not had a reported bowel movement since 12/11, but is passing flatulence. No reported abdominal pain. Consider encouraging suppository if he still has not had BM by 12/17.   - Senna Q12H  - Miralax daily PRN  - Dulcolax daily PRN     #Severe malnutrition in the context of acute illness or injury  - Eval and treat per RD recommendation. Diet as below.     Adjustment to disability:  Clinical psychology to eval and treat  FEN: NPO except for ice Chips. Receiving Tube Feeding Adult Formula Drip Feeding: Continuous Jevity 1.5; Gastrostomy at 55 mL/h with free water flushes 200 ml q 4 hrs. PTA Vit D supplementation.   Bowel: See above  Bladder: No snowden in place, PVRs on admission. 12/12 2 elevated PVRs in 100s and 300s range. Patient has neobladder and is incontinent at baseline. Will continue to assess output and any symptoms of fullness. No need for repeat PVRs at this time.  DVT Prophylaxis: Subcutaneous heparin   GI Prophylaxis: Na  Code: Full  Disposition: Home with family support.  ELOS:  21 Days.  Rehab prognosis:  Good  Follow up Appointments on Discharge:   PCP  Neurology, Dr. Fabian or Dr. Ahsley   PM&R  Cardiology  Urology  for assessment of neobladder and options to reduce nighttime leaking    Patient reviewed with resident physician, Dr. Villagran, who agrees with the assessment and plan.     Gerry Brewer, MS3      Note written in conjunction with medical student and edited where/if necessary.    Patient seen and reviewed with attending physician, Dr. Sorensen, who agrees with my assessment and plan.    Azul Villagran MD  PGY-2  Physical Medicine & Rehabilitation  12/19/2024  Pager #: [phone]

## 2024-12-19 NOTE — PROGRESS NOTES
Discharge Planner Post-Acute Rehab OT:      Discharge Plan: Home with assistance. HC OT services     Precautions: Falls, continuous tube feeding and NPO, BUE weakness with R greater than L  *elevate BUE/BLE's when supine in bed with pillows*  R resting hand orthosis - on overnight/off during day  B T-bar splints only with functional tasks-monitor skin breakdown and edema     Current Status:  ADLs:  Mobility: Mod A stand pivot with walker. A x 2 nursing. Wheelchair based.  Grooming: Min A due to impaired UE function.  Dressing: UB Max A. LB Max A FWW.  Bathing: TBA  Toileting: Mod A stand pivot to commode overlay. Pretty christianson nursing. A x 1 clothing management/hygiene.   IADLs: IND prior, supportive spouse  Vision/Cognition: Ox3. Wears corrective lenses     Assessment: Focused on proximal shoulder strengthening/ROM supine for gravity eliminated position with pt tolerating well. T-Bar splint on RUE causing indentation d/t edema, removed for skin integrity and donned size E spandi  to R forearm     Other Barriers to Discharge (DME, Family Training, etc):   Family training prior to discharge  AE/DME: pending progress. Spouse reports owning bidet attachment, bed rail

## 2024-12-19 NOTE — PLAN OF CARE
Discharge Planner Post-Acute Rehab PT:      Discharge Plan: Home with wife, 3 NEELAM w/out rail. Home care PT.     Precautions: Falls, NPO/PEG, *elevate BUE/BLE's when supine in bed with pillows*  R resting hand orthosis - on overnight/off during day  B T-bar splints only with functional tasks     Current Status:  Bed Mobility: Min A  Transfer: Ax2 stand pivot with FWW with nursing, Serasteady to toilet.   Gait: Up to 175ft using FWW Ax1 with w/c follow.   Stairs: Not yet safe  Balance: Poor static, dynamic standing balance w/out UE support     Outcome Measures:   Garcia              12/12: 12/56  5TSTS              12/12: 0x (requires physical assist even with UE assist)  6MWT              12/12: 40 ft, FWW and CGA (stopped early due to fatigue)     Assessment: Pt continues to make steady progression, however with increasing fatigue by end of session requires up to modA for SPT by end of session. Given factor of fatigue, continuing to use serasteady with [facility] staff for toileting, however anticipate pt will progress away from this in coming day or two. Goals continue to be set for home with CGA from spouse for mobility, anticipate pt may benefit from assist from sons on stairs, FT to be arranged next week once pt progresses to this.     Other Barriers to Discharge (DME, Family Training, etc):   Equipment: Anticipate FWW, gait belt, possible K3 WC rental.  Family training to be scheduled with sons for stairs. Spouse provides CGA only.

## 2024-12-19 NOTE — PROGRESS NOTES
CLINICAL NUTRITION SERVICES - REASSESSMENT NOTE     Nutrition Prescription    RECOMMENDATIONS FOR MDs/PROVIDERS TO ORDER:  Free water adjustments per team pending fluid status    Malnutrition Status:    Moderate malnutrition in the context of acute illness.     Recommendations already ordered by Registered Dietitian (RD):  Cycle TF for 18 hrs to allow for time off pump for therapy:  Jevity 1.5 Mandeep (or equivalent) @ goal of 80 ml/hr x 18 hrs from 2 pm - 8 am (1440 ml/day) provides: 2160 kcals (27 kcals/kg), 92 g PRO (1.15 g/kg), 1094 ml free H20, 311 g CHO, and 30 g fiber daily.     FWF: 200 mL Q 4 Hours  Total Free Water (TF + FWF): 2294 mL daily    Future/Additional Recommendations:  - Monitor TF tolerance, labs, and weight trends  - Monitor need to adjust FWF pending fluid status     EVALUATION OF THE PROGRESS TOWARD GOALS   Diet: NPO    Nutrition Support:   Jevity 1.5 Mandeep (or equivalent) @ goal of 55 ml/hr x 22 hrs (1210 ml/day) provides: 1815 kcals, 77 g PRO, 920 ml free H20, 261 g CHO, and 25 g fiber daily.     FWF: 200 mL mL Q 4 Hours  Total Free Water (TF + FWF): 2120 mL daily     NEW FINDINGS   - FWF increased from 150 mL Q 4 Hours on 12/18    Met with at bedside. Pt reports tolerating TF well. Discussed current TF schedule and rationale for trying cycled feeds. Pt very agreeable to this. Per chart review, pt's wife reported pt did not tolerate TF when if was cycled over 12 hours at 110 mL/hr. Discussed this with pt. Pt reports he tolerated that fine. He is agreeable to trying 18 hr cycle. He understands TF will need to be at a higher rate than it is now. Discussed if pt does not tolerate TF at higher rate, we can adjust schedule. Pt understanding.     Weight:   12/16/24 1156 78.9 kg (173 lb 15.1 oz) Bed scale   12/15/24 0700 84.6 kg (186 lb 8.2 oz) Bed scale   12/12/24 0607 80 kg (176 lb 5.9 oz) Bed scale   12/11/24 1605 82 kg (180 lb 11.2 oz) Standing scale     Wt Readings from Last 10 Encounters:    12/06/24 82.2 kg (181 lb 3.5 oz)   11/14/24 78.4 kg (172 lb 13.5 oz)   11/15/24 78.4 kg (172 lb 13.5 oz)   11/06/24 81.6 kg (180 lb)   10/23/24 88.9 kg (195 lb 14.4 oz)   07/10/24 88.7 kg (195 lb 8 oz)   04/23/24 83.5 kg (184 lb)   03/04/24 85.6 kg (188 lb 12.8 oz)   10/10/23 88 kg (194 lb)   7.8% wt loss in less than 2 months    Labs:   Na: 146 (H)  BUN: 33.2 (H)  Cr: 0.59 (L)    Meds:  Vitamin B-12  Levothyroxine  Senna-docusate  Vitamin D3  Dulcolax, PRN - given 12/18  Miralax, PRN - given 12/18    GI: last BM 12/18 x2    Skin: Derrick 16    MALNUTRITION  % Intake: Decreased intake does not meet criteria  % Weight Loss: > 7.5% in 3 months (severe)  Subcutaneous Fat Loss: Facial region: mild  Muscle Loss: Temporal: mild and Thoracic region (clavicle, acromium bone, deltoid, trapezius, pectoral): mild  Fluid Accumulation/Edema: Mild  Malnutrition Diagnosis: Moderate malnutrition in the context of acute illness.     Previous Goals   Total avg nutritional intake to meet a minimum of 25 kcal/kg and 1 g PRO/kg daily (per dosing wt 80 kg).   Evaluation: Not met with TF being held for 2 hrs for levothyroxine    Previous Nutrition Diagnosis  Inadequate oral intake related to inability to meet needs orally as evidenced by NPO order and pt requires EN to meet 100% estimated nutrition needs.   Evaluation: No change    CURRENT NUTRITION DIAGNOSIS  Inadequate oral intake related to inability to meet needs orally as evidenced by NPO order and pt requires EN to meet 100% estimated nutrition needs.     INTERVENTIONS  Implementation  Collaboration with other providers - discussed in team rounds, discussed with Pharm  Enteral Nutrition - Modify rate and schedule  Feeding tube flush    Goals  Total avg nutritional intake to meet a minimum of 25 kcal/kg and 1 g PRO/kg daily (per dosing wt 80 kg).     Monitoring/Evaluation  Progress toward goals will be monitored and evaluated per protocol.     Janett De La Rosa RD, LD  Available via  phone and Vocera  Phone: [phone]  Vocera: 5R Acute Rehab Clinical Dietitian  Weekend/Holiday Vocera: Weekend Holiday Clinical Dietitian [Multi Site Groups]

## 2024-12-19 NOTE — PLAN OF CARE
"Discharge Planner Post-Acute Rehab SLP:     Discharge Plan: home with spouse,  SLP    Precautions: NPO - PEG, fall    Current Status:  Hearing: WFL  Vision: glasses  Communication: WFL  Cognition: SLP monitoring need for cognitive evaluation  Swallow: NPO. Ok ice chips with supervision. Oral cares x3/day- instructions for completing adequate oral care in patient care order for nurse review  Okay for pt to consume three (3) sips of thin liquid by spoon a day for quality of life with family, no bigger than half-teaspoon in size, must cough multiple times after each teaspoon sip, wait two to three minutes before taking next half-teaspoon sip    Assessment: Pt edu in VFSS findings, strengths (cough, airway clearance) and weaknesses (swallow function, risk of aspiration). Pt edu in ongoing high risk of aspiration with thin liquids but risk is decreased if very small amount taken via teaspoon. Pt interested in having limited \"tastes\" of thin, pt edu in safety precautions- consume three (3) sips of thin liquid by spoon a day for quality of life with family, no bigger than half-teaspoon in size, must cough multiple times after each teaspoon sip, wait two to three minutes before taking next half-teaspoon sip. Patient care order entered and copy of text in room for pt, family, and staff review.     Other Barriers to Discharge (Family Training, etc): TBD - diet pending progress.     "

## 2024-12-20 ENCOUNTER — APPOINTMENT (OUTPATIENT)
Dept: PHYSICAL THERAPY | Facility: CLINIC | Age: 79
DRG: 056 | End: 2024-12-20
Attending: PHYSICAL MEDICINE & REHABILITATION
Payer: MEDICARE

## 2024-12-20 ENCOUNTER — APPOINTMENT (OUTPATIENT)
Dept: OCCUPATIONAL THERAPY | Facility: CLINIC | Age: 79
DRG: 056 | End: 2024-12-20
Attending: PHYSICAL MEDICINE & REHABILITATION
Payer: MEDICARE

## 2024-12-20 ENCOUNTER — APPOINTMENT (OUTPATIENT)
Dept: SPEECH THERAPY | Facility: CLINIC | Age: 79
DRG: 056 | End: 2024-12-20
Attending: PHYSICAL MEDICINE & REHABILITATION
Payer: MEDICARE

## 2024-12-20 LAB
ANION GAP SERPL CALCULATED.3IONS-SCNC: 6 MMOL/L (ref 7–15)
BUN SERPL-MCNC: 32.9 MG/DL (ref 8–23)
CALCIUM SERPL-MCNC: 8.5 MG/DL (ref 8.8–10.4)
CHLORIDE SERPL-SCNC: 112 MMOL/L (ref 98–107)
CREAT SERPL-MCNC: 0.57 MG/DL (ref 0.67–1.17)
EGFRCR SERPLBLD CKD-EPI 2021: >90 ML/MIN/1.73M2
ERYTHROCYTE [DISTWIDTH] IN BLOOD BY AUTOMATED COUNT: 18.7 % (ref 10–15)
GLUCOSE BLDC GLUCOMTR-MCNC: 115 MG/DL (ref 70–99)
GLUCOSE SERPL-MCNC: 124 MG/DL (ref 70–99)
HCO3 SERPL-SCNC: 27 MMOL/L (ref 22–29)
HCT VFR BLD AUTO: 23.2 % (ref 40–53)
HGB BLD-MCNC: 7.3 G/DL (ref 13.3–17.7)
MCH RBC QN AUTO: 35.4 PG (ref 26.5–33)
MCHC RBC AUTO-ENTMCNC: 31.5 G/DL (ref 31.5–36.5)
MCV RBC AUTO: 113 FL (ref 78–100)
PLATELET # BLD AUTO: 192 10E3/UL (ref 150–450)
POTASSIUM SERPL-SCNC: 4.8 MMOL/L (ref 3.4–5.3)
RBC # BLD AUTO: 2.06 10E6/UL (ref 4.4–5.9)
SODIUM SERPL-SCNC: 145 MMOL/L (ref 135–145)
WBC # BLD AUTO: 3.7 10E3/UL (ref 4–11)

## 2024-12-20 PROCEDURE — 36415 COLL VENOUS BLD VENIPUNCTURE: CPT

## 2024-12-20 PROCEDURE — 97116 GAIT TRAINING THERAPY: CPT | Mod: GP

## 2024-12-20 PROCEDURE — 97112 NEUROMUSCULAR REEDUCATION: CPT | Mod: GO

## 2024-12-20 PROCEDURE — 84132 ASSAY OF SERUM POTASSIUM: CPT

## 2024-12-20 PROCEDURE — 97110 THERAPEUTIC EXERCISES: CPT | Mod: GP

## 2024-12-20 PROCEDURE — 85014 HEMATOCRIT: CPT

## 2024-12-20 PROCEDURE — 250N000013 HC RX MED GY IP 250 OP 250 PS 637: Performed by: PHYSICAL MEDICINE & REHABILITATION

## 2024-12-20 PROCEDURE — 92526 ORAL FUNCTION THERAPY: CPT | Mod: GN

## 2024-12-20 PROCEDURE — 128N000003 HC R&B REHAB

## 2024-12-20 PROCEDURE — 80048 BASIC METABOLIC PNL TOTAL CA: CPT

## 2024-12-20 PROCEDURE — 97535 SELF CARE MNGMENT TRAINING: CPT | Mod: GO

## 2024-12-20 PROCEDURE — 250N000011 HC RX IP 250 OP 636

## 2024-12-20 PROCEDURE — 97530 THERAPEUTIC ACTIVITIES: CPT | Mod: GP | Performed by: PHYSICAL THERAPIST

## 2024-12-20 PROCEDURE — 99232 SBSQ HOSP IP/OBS MODERATE 35: CPT | Mod: GC | Performed by: PHYSICAL MEDICINE & REHABILITATION

## 2024-12-20 PROCEDURE — 250N000013 HC RX MED GY IP 250 OP 250 PS 637

## 2024-12-20 RX ADMIN — PYRIDOSTIGMINE BROMIDE 60 MG: 60 TABLET ORAL at 17:22

## 2024-12-20 RX ADMIN — HEPARIN SODIUM 5000 UNITS: 5000 INJECTION, SOLUTION INTRAVENOUS; SUBCUTANEOUS at 01:30

## 2024-12-20 RX ADMIN — GUAIFENESIN 200 MG: 200 SOLUTION ORAL at 09:10

## 2024-12-20 RX ADMIN — SENNOSIDES AND DOCUSATE SODIUM 1 TABLET: 50; 8.6 TABLET ORAL at 21:35

## 2024-12-20 RX ADMIN — PYRIDOSTIGMINE BROMIDE 60 MG: 60 TABLET ORAL at 09:10

## 2024-12-20 RX ADMIN — Medication 3 MG: at 21:35

## 2024-12-20 RX ADMIN — FLUTICASONE PROPIONATE 1 SPRAY: 50 SPRAY, METERED NASAL at 09:22

## 2024-12-20 RX ADMIN — ASPIRIN 81 MG CHEWABLE TABLET 81 MG: 81 TABLET CHEWABLE at 21:35

## 2024-12-20 RX ADMIN — Medication 25 MCG: at 09:10

## 2024-12-20 RX ADMIN — HEPARIN SODIUM 5000 UNITS: 5000 INJECTION, SOLUTION INTRAVENOUS; SUBCUTANEOUS at 09:10

## 2024-12-20 RX ADMIN — LEVOTHYROXINE SODIUM 175 MCG: 0.05 TABLET ORAL at 09:11

## 2024-12-20 RX ADMIN — PYRIDOSTIGMINE BROMIDE 60 MG: 60 TABLET ORAL at 01:30

## 2024-12-20 RX ADMIN — CYANOCOBALAMIN TAB 1000 MCG 1000 MCG: 1000 TAB at 09:11

## 2024-12-20 RX ADMIN — SENNOSIDES AND DOCUSATE SODIUM 1 TABLET: 50; 8.6 TABLET ORAL at 09:11

## 2024-12-20 RX ADMIN — HEPARIN SODIUM 5000 UNITS: 5000 INJECTION, SOLUTION INTRAVENOUS; SUBCUTANEOUS at 17:22

## 2024-12-20 ASSESSMENT — ACTIVITIES OF DAILY LIVING (ADL)
ADLS_ACUITY_SCORE: 75
ADLS_ACUITY_SCORE: 76
ADLS_ACUITY_SCORE: 75
ADLS_ACUITY_SCORE: 76
ADLS_ACUITY_SCORE: 77
ADLS_ACUITY_SCORE: 77
ADLS_ACUITY_SCORE: 76
ADLS_ACUITY_SCORE: 75

## 2024-12-20 NOTE — PROGRESS NOTES
TCU/ ARU care coordination assessment:    Reason for consult: Tube Feedings    Assessment completed with:  Patient and Spouse     Date Admitted to unit: 12/11/24    BACKGROUND    Admitting Dx: Myasthenia gravis with myasthenia crisis    PCP: Gladys Vazquez     Additional Care Team: N/a     Insurance: [employer] medicare     Living arrangements:      Lives in [place]    Caregiver after discharge (yes or no): yes     Name/ Relationship:  Spouse- Alana    Are they trainable for cares? Yes.       Previous Community Services:  [facility].     Used Home Care through [facility]- they liked them.     [facility]- They did not ever want to use again. They did not like the care they received.     Previous DME/Supplies:  Issued a pump and supplies for TF through [facility].      CURRENT STATUS:    Functional Status/ Transfers: A1 with GB and walker.     Tubes/Lines/Drains: PEG placed 11/13.     Skin/ Wounds: N/a.     Medications that require education or coordinations:  N/a     Additional Medical Information:  Patient requires TF d/t NPO status- Jevity 1.5 80 mL/hr x 18 hrs       Follow ups: PCP, Neurology, Dr. Fabian or Dr. Ashley , PM&R, Cardiology    Barriers to discharge: None Identified     DISCHARGE PLANNING:    Anticipated discharge date: 1/2/25     Discharge Location: Home with Spouse     Services: Continue [facility] services.     Home care PT/OT/RN.     DME/ Supplies:    N/a     Education needs:     Tube Feeding     Other notes:   Writer spoke with patient and spouse. They were set up with [facility] during last hospitalization. They have had some difficulty managing the pump despite switching it out, and trouble shooting often. Writer suggested that we could retrain for pump education or also discuss with dietary if Bolus feeds were appropriate. Writer explained that dietary would have to review and determine appropriateness, and feasibility. They understood.     Discussed Home care- [facility] and were happy with them in the past. Would be okay with referral there again.     Writer will continue to Follow-up w/ patient and spouse while at aru with changes to POC and discharge planning.     Lina Lopez   Patient Care Management Coordinator  Acute Rehabilitation Unit/ Transitional Care Unit.   Ph: [phone]

## 2024-12-20 NOTE — PLAN OF CARE
Discharge Planner Post-Acute Rehab SLP:     Discharge Plan: home with spouse,  SLP    Precautions: NPO - PEG, fall    Current Status:  Hearing: WFL  Vision: glasses  Communication: WFL  Cognition: SLP monitoring need for cognitive evaluation  Swallow: NPO. Ok ice chips with supervision. Oral cares x3/day- instructions for completing adequate oral care in patient care order for nurse review  Okay for pt to consume three (3) sips of thin liquid by spoon a day for quality of life with family, no bigger than half-teaspoon in size, must cough multiple times after each teaspoon sip, wait two to three minutes before taking next half-teaspoon sip    Assessment: Completed excellent oral cares, pt utilizing adapted toothbrush but benefited from SLP assist for full cleaning. Pt able to recall findings from VFSS and recommendations from SLP. reviewed recommendation for ONLY x3 half tsp/day of thin (0) liquid for QOL purposes. Pt to complete several coughs following each half tsp. Pt accepted x3 icechip with instruction to implement coughing as he would with small quanity of liquid. Pt able to implement IND. Pt oriented to visual depicting instructions for very minimal PO recommended and need to complete several coughs following.     Other Barriers to Discharge (Family Training, etc): TBD - diet pending progress.

## 2024-12-20 NOTE — PLAN OF CARE
"Goal Outcome Evaluation:      Plan of Care Reviewed With: patient    Overall Patient Progress: no change     Pt is calm, cooperative, able to make his needs known, has call light within reach, and uses it appropriately.     VS: /52 (BP Location: Right arm, Patient Position: Semi-Mahoney's, Cuff Size: Adult Regular)   Pulse 51   Temp 97.9  F (36.6  C) (Oral)   Resp 16   Ht 1.778 m (5' 10\")   Wt 66.2 kg (145 lb 15.1 oz)   SpO2 96%   BMI 20.94 kg/m     O2: Room air; uses CPAP at night   Output: Mixed continence bladder; Continent bowel   Last BM: 12/18/24   Activity: Assist x 1 w/ GB & walker; ellis steady into bathroom    Up for meals? NPO   Skin: Coccyx redness; Abd bruising; BLE edema; RUE edema & splint HS; BUE splints during day, PEG tube site   Pain: Denies   CMS: A&O x 4; Stable w/ BG & walker   Dressing: Mepilex   Diet: NPO except for Ice Chips; Tube feeding Jevity 1.5 80 mL/H 2 pm - 8 am w/ 30 mL flush Q1H   LDA: None   Plan: Continue POC   Additional Info:               "

## 2024-12-20 NOTE — PLAN OF CARE
Goal Outcome Evaluation:      Plan of Care Reviewed With: patient    Overall Patient Progress: no changeOverall Patient Progress: no change     Pt is NPO an can only get ice chips. Is on a tube feeding that started from 2 PM to 8 AM, meds crushed through feeding tube, pt is incontinent of B/B, and uses premo fit overnight. Pt is assist of 1 with walker and pivot with ellis steady to the toilet. Pt called appropriately, call light was within reach, denies cheat pain, SOB N/V, no acute situation noted this shift. Continue pt's monitoring.

## 2024-12-20 NOTE — PLAN OF CARE
Discharge Planner Post-Acute Rehab PT:      Discharge Plan: Home with wife, 3 NEELAM w/out rail. Home care PT.     Precautions: Falls, NPO/PEG, *elevate BUE/BLE's when supine in bed with pillows*  R resting hand orthosis - on overnight/off during day  B T-bar splints only with functional tasks     Current Status:  Bed Mobility: Min A  Transfer: Ax2 stand pivot with FWW with nursing, Serasteady to toilet.   Gait: Up to 280ft using FWW Ax1 with w/c follow.   Stairs: Not yet safe  Balance: Poor static, dynamic standing balance w/out UE support     Outcome Measures:   Garcia              12/12: 12/56  5TSTS              12/12: 0x (requires physical assist even with UE assist)  6MWT              12/12: 40 ft, FWW and CGA (stopped early due to fatigue)     Assessment: Focus on gait endurance and overall activity tolerance, rest breaks required due to fatigue but able to progress to 280' with 1 standing rest break.     Other Barriers to Discharge (DME, Family Training, etc):   Equipment: Anticipate FWW, gait belt, possible K3 WC rental.  Family training to be scheduled with sons for stairs. Spouse provides CGA only.

## 2024-12-20 NOTE — PLAN OF CARE
Goal Outcome Evaluation:      Plan of Care Reviewed With: patient    Overall Patient Progress: improvingOverall Patient Progress: improving    Alert and oriented x 4, denies headache or dizziness, denies cough or sob . Currently NPO except ice chips. TF irrigated, wit 400 ml every 4 hrs. Use soft touch call light appropriately. Wife at bedside and hands on, will continue poc

## 2024-12-20 NOTE — PLAN OF CARE
Goal Outcome Evaluation:      Plan of Care Reviewed With: patient    Overall Patient Progress: no changeOverall Patient Progress: no change     End of Shift Summary [id number]:    Pt is A/Ox4, VSS on RA. Denies CP or shortness of breath.   Assist x1 with walker, GB; Pretty Stedy to bathroom.   Incontinent of urine, uses primafit overnight. Continent bowel, LBM 12/18 per report  NPO except ice chips, TF [id number] pt tolerated well.  Scattered bruising on abdomen, BLE edema    No acute events overnight, safety checks done, pt observed resting in bed with regular respirations. Call light within reach, pt able to make needs known. Continue POC.    Karime Bassett RN

## 2024-12-20 NOTE — PROGRESS NOTES
[facility]   Acute Rehabilitation Unit  Daily progress note    INTERVAL HISTORY  Steven slept well overnight and has no new acute concerns. He has noticed a little abdominal tenderness where he has been receiving heparin  injections. His wife also noted that the back of his entire right arm is bruised. It is non tender, not swollen, and does not appear to be a pressure sore. He does not report any abdominal pain, discomfort, or change in bowel movements after speeding up the tube feeds last night. His wife was nervous about eventually speeding tube feeds to 110 because last time that did not go well. We discussed how we can gradually increase it this time which may help his bowels get used to the new rate. Both Steven and his wife were also concerned about getting adequate training in the pump for his tube feeds before he goes home.    Functionally:   OT:  ADLs:  Mobility: Mod A stand pivot with walker. A x 2 nursing. Wheelchair based.  Grooming: Min A due to impaired UE function.  Dressing: UB Max A. LB Max A FWW.  Bathing: TBA  Toileting: Mod A stand pivot to commode overlay. Pretty haydendy nursing. A x 1 clothing management/hygiene.   IADLs: IND prior, supportive spouse  Vision/Cognition: Ox3. Wears corrective lenses    PT:  Bed Mobility: Min A  Transfer: Ax2 stand pivot with FWW with nursing, Serasteady to toilet.   Gait: Up to 175ft using FWW Ax1 with w/c follow.   Stairs: Not yet safe  Balance: Poor static, dynamic standing balance w/out UE support    SLP:  Hearing: WFL  Vision: glasses  Communication: WFL  Cognition: SLP monitoring need for cognitive evaluation  Swallow: NPO. Ok ice chips with supervision. Oral cares x3/day- instructions for completing adequate oral care in patient care order for nurse review  Okay for pt to consume three (3) sips of thin liquid by spoon a day for quality of life with family, no bigger than half-teaspoon in size, must cough multiple times after  each teaspoon sip, wait two to three minutes before taking next half-teaspoon sip     Assessment: Completed excellent oral cares, pt utilizing adapted toothbrush but benefited from SLP assist for full cleaning. Pt able to recall findings from VFSS and recommendations from SLP. reviewed recommendation for ONLY x3 half tsp/day of thin (0) liquid for QOL purposes. Pt to complete several coughs following each half tsp. Pt accepted x3 icechip with instruction to implement coughing as he would with small quanity of liquid. Pt able to implement IND. Pt oriented to visual depicting instructions for very minimal PO recommended and need to complete several coughs following.       MEDICATIONS  Scheduled meds  Current Facility-Administered Medications   Medication Dose Route Frequency Provider Last Rate Last Admin    aspirin (ASA) chewable tablet 81 mg  81 mg Oral or Feeding Tube QPM Sachin Sorensen DO   81 mg at 12/19/24 2116    cyanocobalamin (VITAMIN B-12) tablet 1,000 mcg  1,000 mcg Oral or Feeding Tube Daily Sachin Sorensen DO   1,000 mcg at 12/19/24 0920    fluticasone (FLONASE) 50 MCG/ACT spray 1 spray  1 spray Both Nostrils Daily Azul Livingston MD   1 spray at 12/19/24 1159    guaiFENesin (ROBITUSSIN) 20 mg/mL solution 200 mg  200 mg Oral or Feeding Tube Daily Auzl Livingston MD   200 mg at 12/19/24 1155    heparin ANTICOAGULANT injection 5,000 Units  5,000 Units Subcutaneous Q8H Azul Livingston MD   5,000 Units at 12/20/24 0130    levothyroxine (SYNTHROID/LEVOTHROID) tablet 175 mcg  175 mcg Oral or Feeding Tube Daily Azul Livingston MD        melatonin tablet 3 mg  3 mg Oral or Feeding Tube At Bedtime Sachin Sorensen DO   3 mg at 12/19/24 2116    pyRIDostigmine (MESTINON) tablet 60 mg  60 mg Oral or Feeding Tube Q8H Sachin Sorensen DO   60 mg at 12/20/24 0130    senna-docusate (SENOKOT-S/PERICOLACE) 8.6-50 MG per tablet 1 tablet  1 tablet Oral BID Sveta Schultz MD   1  "tablet at 12/19/24 2116    [Held by provider] simvastatin (ZOCOR) tablet 10 mg  10 mg Oral Daily Azul Livingston MD        Vitamin D3 (CHOLECALCIFEROL) tablet 25 mcg  25 mcg Oral or Feeding Tube Daily Sachin Sorensen DO   25 mcg at 12/19/24 0920       PRN meds:  Current Facility-Administered Medications   Medication Dose Route Frequency Provider Last Rate Last Admin    acetaminophen (TYLENOL) tablet 650 mg  650 mg Oral or Feeding Tube Q4H PRN Sachin Sorensen DO        artificial saliva (BIOTENE MT) solution 2 spray  2 spray Swish & Spit Q1H PRN Azul Livingston MD        bisacodyl (DULCOLAX) suppository 10 mg  10 mg Rectal Daily PRN Sveta Schultz MD   10 mg at 12/18/24 1558    dextrose 10% infusion   Intravenous Continuous PRN Azul Livingston MD        olopatadine (PATANOL) 0.1 % ophthalmic solution 1 drop  1 drop Both Eyes BID PRN Azul Livingston MD        polyethylene glycol (MIRALAX) Packet 17 g  17 g Oral Daily PRN Sveta Schultz MD   17 g at 12/18/24 0813    sodium chloride (OCEAN) 0.65 % nasal spray 1 spray  1 spray Both Nostrils Q1H PRN Sveta Schultz MD   1 spray at 12/18/24 1313         PHYSICAL EXAM  /43 (BP Location: Right arm)   Pulse (!) 48   Temp 97.8  F (36.6  C) (Oral)   Resp 17   Ht 1.778 m (5' 10\")   Wt 66.2 kg (145 lb 15.1 oz)   SpO2 98%   BMI 20.94 kg/m    General: NAD, resting in chair.  HEENT: NCAT, EOMI.  Cardio: Well perfused. Sinus bradycardia  Pulm: Breathing comfortably on room air. Clear lung fields, no crackles wheezes.  Abd: Non distended. Scattered bruises.  Neuro/MSK: Able to move all 4 extremities.  EXT: No LE edema bilaterally. Ecchymosis along the dorsal upper and lower right arm.      LABS  Recent Results (from the past 24 hours)   Basic metabolic panel    Collection Time: 12/19/24  9:52 AM   Result Value Ref Range    Sodium 146 (H) 135 - 145 mmol/L    Potassium 4.2 3.4 - 5.3 mmol/L    Chloride 112 (H) 98 - 107 mmol/L "    Carbon Dioxide (CO2) 26 22 - 29 mmol/L    Anion Gap 8 7 - 15 mmol/L    Urea Nitrogen 33.2 (H) 8.0 - 23.0 mg/dL    Creatinine 0.59 (L) 0.67 - 1.17 mg/dL    GFR Estimate >90 >60 mL/min/1.73m2    Calcium 9.0 8.8 - 10.4 mg/dL    Glucose 109 (H) 70 - 99 mg/dL   Extra Purple Top Tube    Collection Time: 12/19/24  9:52 AM   Result Value Ref Range    Hold Specimen JI    CBC with platelets    Collection Time: 12/20/24  5:46 AM   Result Value Ref Range    WBC Count 3.7 (L) 4.0 - 11.0 10e3/uL    RBC Count 2.06 (L) 4.40 - 5.90 10e6/uL    Hemoglobin 7.3 (L) 13.3 - 17.7 g/dL    Hematocrit 23.2 (L) 40.0 - 53.0 %     (H) 78 - 100 fL    MCH 35.4 (H) 26.5 - 33.0 pg    MCHC 31.5 31.5 - 36.5 g/dL    RDW 18.7 (H) 10.0 - 15.0 %    Platelet Count 192 150 - 450 10e3/uL   Basic metabolic panel    Collection Time: 12/20/24  5:46 AM   Result Value Ref Range    Sodium 145 135 - 145 mmol/L    Potassium 4.8 3.4 - 5.3 mmol/L    Chloride 112 (H) 98 - 107 mmol/L    Carbon Dioxide (CO2) 27 22 - 29 mmol/L    Anion Gap 6 (L) 7 - 15 mmol/L    Urea Nitrogen 32.9 (H) 8.0 - 23.0 mg/dL    Creatinine 0.57 (L) 0.67 - 1.17 mg/dL    GFR Estimate >90 >60 mL/min/1.73m2    Calcium 8.5 (L) 8.8 - 10.4 mg/dL    Glucose 124 (H) 70 - 99 mg/dL   Glucose by meter    Collection Time: 12/20/24  8:00 AM   Result Value Ref Range    GLUCOSE BY METER POCT 115 (H) 70 - 99 mg/dL         ASSESSMENT AND PLAN  Mendez Greene is a 79 year old male with past medical history of paroxysmal atrial fibrillation, CAD, ascending aortic aneurysm, HLD, HTN, hypothyroidism, CHA admitted to ICU service on 11/30/2024 for acute respiratory failure and AMS. Patient was recently hospitalized at [facility] from 11/8/24-11/15/24 for progressive weakness and fatigue, difficulty swallowing. After discharge, his ACh receptor binding Ab came back 2.11, strongly suggestive of myasthenia gravis. Presented back to [facility] ED 11/30 with hypotension and acute respiratory failure. Patient was intubated in  the ED and admitted to ICU on pressors. During admission patient received 5 doses of IVIG for myasthenic crisis and was initiated on Mestinon. Hospital course also included broad spectrum antibiotics for possible septic shock due to UTI, MOHAMUD, electrolyte derangements, bradycardia, hypertension.  Now admitted to acute rehab.     Admission to acute inpatient rehab 12/11/2024.    Impairment group code: 03.8 Neuromuscular Disorders; new diagnosis of myasthenia gravis with myasthenia crisis         PT and OT 75 minutes of each as well as SLP 30 minutes on a daily basis, in addition to rehab nursing and close management of physiatrist.       Impairment of ADL's: Impairment in strength, endurance, and ROM resulting in functional limitations in patient's ability to perform ADLs     Impairment of mobility:  Impairment in strength, endurance, and ROM resulting in functional limitations in patient's ability to perform functional mobility      Impairment of cognition/language/swallow:  Needs therapy for swallowing.     Medical Conditions     #Myasthenic crisis  Questionable ALS diagnosis which is being deferred to his primary neurologist.  - clinically much improved with improvement in upper extremity strength; still with some upper extremity flexion weakness and LE weakness  - completed 5 doses of IVIG on 12/5/24   - Caution use of IV magnesium, beta-blocker, statin, macrolides, aminoglycosides, and fluoroquinolone, and as much as possible avoid them due to to potential exacerbation of myasthenia crisis. ; Holding PTA statin  - Monitor respiratory status closely, currently stable on room air  - CPAP at HS  - Pyridostigmine 60 mg PO TID  - Consider CT chest with contrast for r/o thymoma while on rehab     #H/o neobladder  - was on snowden catheter in neobladder, discontinued 12/10 and voiding well, chronically incontinent     #Hypernatremia  #Hyperchloremia  Na 149 day of admission to rehab (12/11), Cl 117, creatinine 0.57 (at  baseline). Had issues with hypernatremia on acute service, up to 152. Acute service increased free water flushes and gave 1L D5.   - Increased free water flushes to 200 ml q 4hrs - 12/18  - BMP M/Th     #Chronic macrocytic anemia  #Thrombocytopenia  #Leukopenia   Hgb has mostly been around 9-10 on acute service; noted slight downtrend ---8.5--7.8 (12/11), likely dilutional with IV fluids as platelets and hematocrit also decreased slightly; has no clinical concern for active bleed and has been hemodynamically stable. B12 was 727, folic acid wnl on 11/12. Was seen by hematology 4/2024, workup unremarkable. On ARU Hgb dropped to 7.3 on 12/19. Platelets were decreased at the time of admission to ARU but WNL 12/16. Leukopenia first noted 12/19 WBC 3.9.  - CBC M/Th  - Transfuse hgb <7 (has not required transfusion this admission)  - PTA B12 supplementation     #Sinus bradycardia  HR 40s to upper 50s during admission, dips the most when sleeping. Previous EKG sinus bradycardia with no high grade blocks. Chart review shows similar HR 40-50s last hospitalization, and he has a few OP visits with documented sinus bradycardia (7/10/24, 3/4/24).  - OK w HR 40's-50's  - CTM, further evaluation if ever symptomatic     #Moderate to severe oropharyngeal dysphagia s/p G-tube  VFSS on 12/9 while on acute service, note that he continues to aspirate all textures but improved ability to cough; SLP recommend continued NPO. VFSS on 12/18 continued to have moderate-severe oropharyngeal and pharyngeal dysphagia with possible aspiration. SLP gave permission for 3 teaspoons of thin liquid a day for quality of life. Will continue to NPO with next VFSS after 1/1/2025.   - 12/13 200 mg guaifenesin to help loosen mucus secretions. Emphasize the importance of eating ice chips so mucous does not become hard  - Receive all nutrition via tube feeds  - TF cycled to 18hrs/day on 12/19  - BMP, Mg & Phos M/Th     #CAD  #HLD  #Paroxysmal atrial  fibrillation  #Recent NSTEMI (11/8/24)  Troponin 997 on acute service, cardiac cath 11/11/24 showed only mild CAD. Echo 12/1/24 difficult study but grossly normal left and right ventricular function, no severe valvular regurgitation, probably mild aortic regurgitation, no pericardial effusion.  Not in afib on admission to rehab.  - Continue PTA ASA  - HOLD PTA Simvastatin until follow-up with neurology  - No rate control medications at baseline     #Hypothyroidism  - Increased 12/19 levothyroxine 175 mcg daily  - 12/16 TSH elevated (7.53) and T4 low (0.83).  - Recheck TSH and T4 around 1/16 (4 weeks from dose increase)    #CHA  - PTA CPAP HS, 12/16 order changed to allow for CPAP during naps    #Extremity Swelling  Significant swelling in the right arm as well as bilateral LE was noted on 12/12. Patient denies associated pain. 12/13 swelling in right and left leg improved, still significant swelling in left LE.  - Wrapped arm and lower extremities in compression bandage  - Right Arm Duplex US negative 12/12    #Constipation  Has not had a reported bowel movement since 12/11, but is passing flatulence. No reported abdominal pain. Consider encouraging suppository if he still has not had BM by 12/17.   - Senna Q12H  - Miralax daily PRN  - Dulcolax daily PRN     #Severe malnutrition in the context of acute illness or injury  - Eval and treat per RD recommendation. Diet as below.     Adjustment to disability:  Clinical psychology to eval and treat  FEN: NPO except for ice Chips. Receiving Tube Feeding Adult Formula Drip Feeding: Continuous Jevity 1.5; Gastrostomy at 55 mL/h with free water flushes 200 ml q 4 hrs. PTA Vit D supplementation.   Bowel: See above  Bladder: No snowden in place, PVRs on admission. 12/12 2 elevated PVRs in 100s and 300s range. Patient has neobladder and is incontinent at baseline. Will continue to assess output and any symptoms of fullness. No need for repeat PVRs at this time.  DVT Prophylaxis:  Subcutaneous heparin   GI Prophylaxis: Na  Code: Full  Disposition: Home with family support.  ELOS:  21 Days.  Rehab prognosis:  Good  Follow up Appointments on Discharge:   PCP  Neurology, Dr. Fabian or Dr. Ashley   PM&R  Cardiology  Urology for assessment of neobladder and options to reduce nighttime leaking    Patient reviewed with attending physician, Dr. Sorensen, who agrees with the assessment and plan.     Gerry Brewer, MS3          Physician Attestation   I, Sachin Sorensen DO, was present with the medical/PARTH student who participated in the service and in the documentation of the note.  I have verified the history and personally performed the physical exam and medical decision making.  I agree with the assessment and plan of care as documented in the note.        Please see A&P for additional details of medical decision making.    I have personally reviewed the following data over the past 24 hrs:    3.7 (L)  \   7.3 (L)   / 192     145 112 (H) 32.9 (H) /  115 (H)   4.8 27 0.57 (L) \         Sachin Sorensen DO  Date of Service (when I saw the patient): 12/20/24

## 2024-12-21 ENCOUNTER — APPOINTMENT (OUTPATIENT)
Dept: OCCUPATIONAL THERAPY | Facility: CLINIC | Age: 79
DRG: 056 | End: 2024-12-21
Attending: PHYSICAL MEDICINE & REHABILITATION
Payer: MEDICARE

## 2024-12-21 ENCOUNTER — APPOINTMENT (OUTPATIENT)
Dept: PHYSICAL THERAPY | Facility: CLINIC | Age: 79
DRG: 056 | End: 2024-12-21
Attending: PHYSICAL MEDICINE & REHABILITATION
Payer: MEDICARE

## 2024-12-21 ENCOUNTER — APPOINTMENT (OUTPATIENT)
Dept: SPEECH THERAPY | Facility: CLINIC | Age: 79
DRG: 056 | End: 2024-12-21
Attending: PHYSICAL MEDICINE & REHABILITATION
Payer: MEDICARE

## 2024-12-21 LAB
ERYTHROCYTE [DISTWIDTH] IN BLOOD BY AUTOMATED COUNT: 18.5 % (ref 10–15)
HCT VFR BLD AUTO: 23.3 % (ref 40–53)
HGB BLD-MCNC: 7.3 G/DL (ref 13.3–17.7)
HOLD SPECIMEN: NORMAL
MCH RBC QN AUTO: 35.3 PG (ref 26.5–33)
MCHC RBC AUTO-ENTMCNC: 31.3 G/DL (ref 31.5–36.5)
MCV RBC AUTO: 113 FL (ref 78–100)
PLATELET # BLD AUTO: 206 10E3/UL (ref 150–450)
RBC # BLD AUTO: 2.07 10E6/UL (ref 4.4–5.9)
WBC # BLD AUTO: 4.1 10E3/UL (ref 4–11)

## 2024-12-21 PROCEDURE — 97110 THERAPEUTIC EXERCISES: CPT | Mod: GP

## 2024-12-21 PROCEDURE — 97112 NEUROMUSCULAR REEDUCATION: CPT | Mod: GO

## 2024-12-21 PROCEDURE — 250N000013 HC RX MED GY IP 250 OP 250 PS 637: Performed by: PHYSICAL MEDICINE & REHABILITATION

## 2024-12-21 PROCEDURE — 85018 HEMOGLOBIN: CPT

## 2024-12-21 PROCEDURE — 92526 ORAL FUNCTION THERAPY: CPT | Mod: GN | Performed by: SPEECH-LANGUAGE PATHOLOGIST

## 2024-12-21 PROCEDURE — 250N000011 HC RX IP 250 OP 636

## 2024-12-21 PROCEDURE — 128N000003 HC R&B REHAB

## 2024-12-21 PROCEDURE — 36415 COLL VENOUS BLD VENIPUNCTURE: CPT

## 2024-12-21 PROCEDURE — 97530 THERAPEUTIC ACTIVITIES: CPT | Mod: GP

## 2024-12-21 PROCEDURE — 250N000013 HC RX MED GY IP 250 OP 250 PS 637

## 2024-12-21 RX ADMIN — CYANOCOBALAMIN TAB 1000 MCG 1000 MCG: 1000 TAB at 08:37

## 2024-12-21 RX ADMIN — SENNOSIDES AND DOCUSATE SODIUM 1 TABLET: 50; 8.6 TABLET ORAL at 08:37

## 2024-12-21 RX ADMIN — HEPARIN SODIUM 5000 UNITS: 5000 INJECTION, SOLUTION INTRAVENOUS; SUBCUTANEOUS at 16:01

## 2024-12-21 RX ADMIN — FLUTICASONE PROPIONATE 1 SPRAY: 50 SPRAY, METERED NASAL at 08:37

## 2024-12-21 RX ADMIN — Medication 25 MCG: at 08:37

## 2024-12-21 RX ADMIN — GUAIFENESIN 200 MG: 200 SOLUTION ORAL at 08:37

## 2024-12-21 RX ADMIN — HEPARIN SODIUM 5000 UNITS: 5000 INJECTION, SOLUTION INTRAVENOUS; SUBCUTANEOUS at 00:09

## 2024-12-21 RX ADMIN — LEVOTHYROXINE SODIUM 175 MCG: 0.05 TABLET ORAL at 10:24

## 2024-12-21 RX ADMIN — SENNOSIDES AND DOCUSATE SODIUM 1 TABLET: 50; 8.6 TABLET ORAL at 20:17

## 2024-12-21 RX ADMIN — ASPIRIN 81 MG CHEWABLE TABLET 81 MG: 81 TABLET CHEWABLE at 20:17

## 2024-12-21 RX ADMIN — PYRIDOSTIGMINE BROMIDE 60 MG: 60 TABLET ORAL at 16:02

## 2024-12-21 RX ADMIN — PYRIDOSTIGMINE BROMIDE 60 MG: 60 TABLET ORAL at 00:09

## 2024-12-21 RX ADMIN — PYRIDOSTIGMINE BROMIDE 60 MG: 60 TABLET ORAL at 08:37

## 2024-12-21 RX ADMIN — OLOPATADINE HYDROCHLORIDE 1 DROP: 1 SOLUTION OPHTHALMIC at 11:44

## 2024-12-21 RX ADMIN — HEPARIN SODIUM 5000 UNITS: 5000 INJECTION, SOLUTION INTRAVENOUS; SUBCUTANEOUS at 08:37

## 2024-12-21 RX ADMIN — Medication 3 MG: at 20:17

## 2024-12-21 ASSESSMENT — ACTIVITIES OF DAILY LIVING (ADL)
ADLS_ACUITY_SCORE: 76
ADLS_ACUITY_SCORE: 71
ADLS_ACUITY_SCORE: 76
ADLS_ACUITY_SCORE: 71
ADLS_ACUITY_SCORE: 76
ADLS_ACUITY_SCORE: 71
ADLS_ACUITY_SCORE: 76

## 2024-12-21 NOTE — PLAN OF CARE
Discharge Planner Post-Acute Rehab SLP:     Discharge Plan: home with spouse,  SLP    Precautions: NPO - PEG, fall    Current Status:  Hearing: WFL  Vision: glasses  Communication: WFL  Cognition: SLP monitoring need for cognitive evaluation  Swallow: NPO. Ok ice chips with supervision. Oral cares x3/day- instructions for completing adequate oral care in patient care order for nurse review  Okay for pt to consume three (3) sips of thin liquid by spoon a day for quality of life with family, no bigger than half-teaspoon in size, must cough multiple times after each teaspoon sip, wait two to three minutes before taking next half-teaspoon sip    Assessment: Probed pt recall of safety precautions with limited thin intake for quality of life, pt verbalized with 100% accuracy. Pt asked why he supposed to eat ice chips, pt edu also for quality of life/pleasure especially due to the severity of xerostomia.     Other Barriers to Discharge (Family Training, etc): TBD - diet pending progress.

## 2024-12-21 NOTE — PROGRESS NOTES
[facility], [place]   Physical Medicine and Rehabilitation Daily Note           Assessment and Plan of Care:   Mendez Greene is a 79 year old male with past medical history of paroxysmal atrial fibrillation, CAD, ascending aortic aneurysm, HLD, HTN, hypothyroidism, CHA admitted to ICU service on 11/30/2024 for acute respiratory failure and AMS. Patient was recently hospitalized at [facility] from 11/8/24-11/15/24 for progressive weakness and fatigue, difficulty swallowing. After discharge, his ACh receptor binding Ab came back 2.11, strongly suggestive of myasthenia gravis. Presented back to [facility] ED 11/30 with hypotension and acute respiratory failure. Patient was intubated in the ED and admitted to ICU on pressors. During admission patient received 5 doses of IVIG for myasthenic crisis and was initiated on Mestinon. Hospital course also included broad spectrum antibiotics for possible septic shock due to UTI, MOHAMUD, electrolyte derangements, bradycardia, hypertension. Now admitted to acute rehab.     --Vitals stable. HGB stable at 7.3, no other signs of bleed. Continue monitoring CBC tomorrow or immediately if any clinical change.   --Continue ongoing medical management.  --Continue therapies and plan of care.           Interval history:   Patient seen and examined at bedside. Per nursing report, no acute events overnight. Today, patient states that he is doing well and has no acute concerns.  Discussed stable hemoglobin and patient had questions regarding what would happen if it would drop below 7, so discussed this.. Denies fever, chills, CP, SOB, N/V, abdominal pain, new pain or weakness/numbness/tingling.     The patient is fully participating in therapies and is making progress.           Physical Exam:   VS:   Vitals:    12/19/24 0946 12/19/24 1545 12/20/24 0757 12/20/24 1659   BP: 95/48 124/52 128/43 136/42   BP Location: Left arm Right arm Right arm Left arm   Patient Position:   Semi-Mahoney's     Cuff Size:  Adult Regular     Pulse: 60 51 (!) 48 (!) 48   Resp: 17 16 17 16   Temp: 97.9  F (36.6  C)  97.8  F (36.6  C) 97.7  F (36.5  C)   TempSrc: Oral  Oral Oral   SpO2: 96% 96% 98% 98%   Weight:       Height:         Gen: NAD, resting comfortably in bed  Heart: RRR, no murmurs  Lungs: breathing unlabored on room air, lungs CTA B  Abd: soft and non-tender  Ext: no edema in BLE, no calf tenderness  MSK/neuro: Alert, speech fluent, stable right hemiparesis         Data:   Scheduled meds  Current Facility-Administered Medications   Medication Dose Route Frequency Provider Last Rate Last Admin    aspirin (ASA) chewable tablet 81 mg  81 mg Oral or Feeding Tube QPM Sachin Sorensen DO   81 mg at 12/20/24 2135    cyanocobalamin (VITAMIN B-12) tablet 1,000 mcg  1,000 mcg Oral or Feeding Tube Daily Sachin Sorensen DO   1,000 mcg at 12/20/24 0911    fluticasone (FLONASE) 50 MCG/ACT spray 1 spray  1 spray Both Nostrils Daily Azul Livingston MD   1 spray at 12/20/24 0922    guaiFENesin (ROBITUSSIN) 20 mg/mL solution 200 mg  200 mg Oral or Feeding Tube Daily Azul Livingston MD   200 mg at 12/20/24 0910    heparin ANTICOAGULANT injection 5,000 Units  5,000 Units Subcutaneous Q8H Azul Livingston MD   5,000 Units at 12/21/24 0009    levothyroxine (SYNTHROID/LEVOTHROID) tablet 175 mcg  175 mcg Oral or Feeding Tube Daily Azul Livingston MD   175 mcg at 12/20/24 0911    melatonin tablet 3 mg  3 mg Oral or Feeding Tube At Bedtime Sachin Sorensen DO   3 mg at 12/20/24 2135    pyRIDostigmine (MESTINON) tablet 60 mg  60 mg Oral or Feeding Tube Q8H Sachin Sorensen DO   60 mg at 12/21/24 0009    senna-docusate (SENOKOT-S/PERICOLACE) 8.6-50 MG per tablet 1 tablet  1 tablet Oral BID Sveta Schultz MD   1 tablet at 12/20/24 2135    [Held by provider] simvastatin (ZOCOR) tablet 10 mg  10 mg Oral Daily Azul Livingston MD        Vitamin D3 (CHOLECALCIFEROL) tablet 25 mcg   25 mcg Oral or Feeding Tube Daily Sachin Sorensen DO   25 mcg at 12/20/24 0910       PRN meds:  Current Facility-Administered Medications   Medication Dose Route Frequency Provider Last Rate Last Admin    acetaminophen (TYLENOL) tablet 650 mg  650 mg Oral or Feeding Tube Q4H PRN Sachin Sorensen DO        artificial saliva (BIOTENE MT) solution 2 spray  2 spray Swish & Spit Q1H PRN Azul Livingston MD        bisacodyl (DULCOLAX) suppository 10 mg  10 mg Rectal Daily PRN Sveta Schultz MD   10 mg at 12/18/24 1558    dextrose 10% infusion   Intravenous Continuous PRN Azul Livingston MD        olopatadine (PATANOL) 0.1 % ophthalmic solution 1 drop  1 drop Both Eyes BID PRN Azul Livingston MD        polyethylene glycol (MIRALAX) Packet 17 g  17 g Oral Daily PRN Sveta Schultz MD   17 g at 12/18/24 0813    sodium chloride (OCEAN) 0.65 % nasal spray 1 spray  1 spray Both Nostrils Q1H PRN Sveta Schultz MD   1 spray at 12/18/24 1313         Patient seen and evaluated with attending physician, Dr. Franchesca Camara MD  Physical Medicine and Rehabilitation

## 2024-12-21 NOTE — PROGRESS NOTES
Discharge Planner Post-Acute Rehab OT:      Discharge Plan: Home with assistance. HC OT services     Precautions: Falls, continuous tube feeding and NPO, BUE weakness with R greater than L  *elevate BUE/BLE's when supine in bed with pillows*  R resting hand orthosis - on overnight/off during day  B T-bar splints only with functional tasks-monitor skin breakdown and edema     Current Status:  ADLs:  Mobility: Mod A stand pivot with walker. A x 2 nursing. Wheelchair based.  Grooming: Min A due to impaired UE function.  Dressing: UB Max A. LB Max A FWW.  Bathing: TBA  Toileting: Mod A stand pivot to commode overlay. Pretty christianson nursing. A x 1 clothing management/hygiene.   IADLs: IND prior, supportive spouse  Vision/Cognition: Ox3. Wears corrective lenses     Assessment:ot focus on modification to r resting hand splint with clearance at the base of the thumb and incresae finger and wrist ext, fabricated ahdn fitting of outrigger finger ext assit to incresae intrinsic + movement to increse hand function with adls      Other Barriers to Discharge (DME, Family Training, etc):   Family training prior to discharge  AE/DME: pending progress. Spouse reports owning bidet attachment, bed rail

## 2024-12-21 NOTE — PLAN OF CARE
Goal Outcome Evaluation:      Plan of Care Reviewed With: patient    Overall Patient Progress: no changeOverall Patient Progress: no change     Pt alert and oriented x4. Denies chest pain, shortness of breath, and numbness or tingling. NPO except for ice chips, CTF started at 2pm, 80 mL/hr, 200 mL free water Q4H. Continent of both, LBM 12/20. Call light within reach, bed in low position, family at bedside.

## 2024-12-21 NOTE — PLAN OF CARE
FOCUS/GOAL  Psychosocial needs, Safety management, and Prevention of secondary complications    ASSESSMENT, INTERVENTIONS AND CONTINUING PLAN FOR GOAL:    Goal Outcome Evaluation:      Plan of Care Reviewed With: patient    Overall Patient Progress: no changeOverall Patient Progress: no change     Pt Aox4  Calls appropriately. No acute events during shift.  Needs in reach and safety measures in place.   NPO, except ice chips. PEG tube patent, TF running.  Continent of both, LBM yesterday  Cont POC

## 2024-12-21 NOTE — PLAN OF CARE
Goal Outcome Evaluation:      Plan of Care Reviewed With: patient    Overall Patient Progress: no change    Pt AOx4. On NPO , except icechips. Tolerated TF 2pm to 8am, meds crushed through the PEG Tube. Uses CPAP at night w/o issues, no alarms. Continent w/ bowel and bladder, uses primofit at night. Call light w/in reach. Continue POC.        Patient's most recent vital signs are:     Vital signs:  BP: 136/42  Temp: 97.7  HR: 48  RR: 16  SpO2: 98 %     Patient does not have new respiratory symptoms.  Patient does not have new sore throat.  Patient does not have a fever greater than 99.5.

## 2024-12-22 ENCOUNTER — APPOINTMENT (OUTPATIENT)
Dept: OCCUPATIONAL THERAPY | Facility: CLINIC | Age: 79
DRG: 056 | End: 2024-12-22
Attending: PHYSICAL MEDICINE & REHABILITATION
Payer: MEDICARE

## 2024-12-22 PROCEDURE — 97530 THERAPEUTIC ACTIVITIES: CPT | Mod: GO

## 2024-12-22 PROCEDURE — 97535 SELF CARE MNGMENT TRAINING: CPT | Mod: GO

## 2024-12-22 PROCEDURE — 128N000003 HC R&B REHAB

## 2024-12-22 PROCEDURE — 250N000011 HC RX IP 250 OP 636

## 2024-12-22 PROCEDURE — 250N000013 HC RX MED GY IP 250 OP 250 PS 637

## 2024-12-22 PROCEDURE — 250N000013 HC RX MED GY IP 250 OP 250 PS 637: Performed by: PHYSICAL MEDICINE & REHABILITATION

## 2024-12-22 PROCEDURE — 99231 SBSQ HOSP IP/OBS SF/LOW 25: CPT | Performed by: PHYSICAL MEDICINE & REHABILITATION

## 2024-12-22 RX ORDER — LIDOCAINE 4 G/G
1 PATCH TOPICAL
Status: DISCONTINUED | OUTPATIENT
Start: 2024-12-22 | End: 2025-01-01 | Stop reason: HOSPADM

## 2024-12-22 RX ADMIN — ASPIRIN 81 MG CHEWABLE TABLET 81 MG: 81 TABLET CHEWABLE at 20:27

## 2024-12-22 RX ADMIN — GUAIFENESIN 200 MG: 200 SOLUTION ORAL at 08:49

## 2024-12-22 RX ADMIN — Medication 3 MG: at 20:27

## 2024-12-22 RX ADMIN — HEPARIN SODIUM 5000 UNITS: 5000 INJECTION, SOLUTION INTRAVENOUS; SUBCUTANEOUS at 08:50

## 2024-12-22 RX ADMIN — FLUTICASONE PROPIONATE 1 SPRAY: 50 SPRAY, METERED NASAL at 08:57

## 2024-12-22 RX ADMIN — PYRIDOSTIGMINE BROMIDE 60 MG: 60 TABLET ORAL at 08:49

## 2024-12-22 RX ADMIN — PYRIDOSTIGMINE BROMIDE 60 MG: 60 TABLET ORAL at 00:38

## 2024-12-22 RX ADMIN — SENNOSIDES AND DOCUSATE SODIUM 1 TABLET: 50; 8.6 TABLET ORAL at 20:27

## 2024-12-22 RX ADMIN — Medication 25 MCG: at 08:57

## 2024-12-22 RX ADMIN — SALINE NASAL SPRAY 1 SPRAY: 1.5 SOLUTION NASAL at 11:06

## 2024-12-22 RX ADMIN — PYRIDOSTIGMINE BROMIDE 60 MG: 60 TABLET ORAL at 15:42

## 2024-12-22 RX ADMIN — CYANOCOBALAMIN TAB 1000 MCG 1000 MCG: 1000 TAB at 08:50

## 2024-12-22 RX ADMIN — LEVOTHYROXINE SODIUM 175 MCG: 0.05 TABLET ORAL at 08:49

## 2024-12-22 RX ADMIN — HEPARIN SODIUM 5000 UNITS: 5000 INJECTION, SOLUTION INTRAVENOUS; SUBCUTANEOUS at 16:48

## 2024-12-22 RX ADMIN — HEPARIN SODIUM 5000 UNITS: 5000 INJECTION, SOLUTION INTRAVENOUS; SUBCUTANEOUS at 00:38

## 2024-12-22 RX ADMIN — OLOPATADINE HYDROCHLORIDE 1 DROP: 1 SOLUTION OPHTHALMIC at 11:04

## 2024-12-22 RX ADMIN — SENNOSIDES AND DOCUSATE SODIUM 1 TABLET: 50; 8.6 TABLET ORAL at 08:49

## 2024-12-22 RX ADMIN — LIDOCAINE 4% 1 PATCH: 40 PATCH TOPICAL at 20:26

## 2024-12-22 ASSESSMENT — ACTIVITIES OF DAILY LIVING (ADL)
ADLS_ACUITY_SCORE: 79
ADLS_ACUITY_SCORE: 81
ADLS_ACUITY_SCORE: 71
ADLS_ACUITY_SCORE: 79
ADLS_ACUITY_SCORE: 81
ADLS_ACUITY_SCORE: 79
ADLS_ACUITY_SCORE: 81
ADLS_ACUITY_SCORE: 79
ADLS_ACUITY_SCORE: 79
ADLS_ACUITY_SCORE: 75
ADLS_ACUITY_SCORE: 79
ADLS_ACUITY_SCORE: 71
ADLS_ACUITY_SCORE: 79
ADLS_ACUITY_SCORE: 81
ADLS_ACUITY_SCORE: 79
ADLS_ACUITY_SCORE: 81
ADLS_ACUITY_SCORE: 79

## 2024-12-22 NOTE — PLAN OF CARE
Shift: [id number]    Goal Outcome Evaluation:    Plan of Care Reviewed With: patient    Overall Patient Progress: improving    A/O x4    CPAP @ night    Assist x1-2 stand to pivot    NPO [except ice chips]    Medications crushed through PEG tube    TF 2pm-8am; 80 mL/hr    Continent B/B. Condom catheter at night. LBM 12/20    No acute events overnight    Call light within reach, will continue to monitor and follow POC

## 2024-12-22 NOTE — PLAN OF CARE
"Goal Outcome Evaluation:      Plan of Care Reviewed With: patient    Overall Patient Progress: improvingOverall Patient Progress: improving    VS: /43 (BP Location: Left arm)   Pulse (!) 44   Temp 97.7  F (36.5  C) (Oral)   Resp 16   Ht 1.778 m (5' 10\")   Wt 79.8 kg (175 lb 14.8 oz)   SpO2 98%   BMI 25.24 kg/m       O2: SpO2 > 98 and stable on RA. Denies chest pain and SOB.    Output: Voids spontaneously without difficulty to bathroom / using beside urinal / BSC.   Last BM: 12/20 denies abdominal discomfort. BS active / passing flatus.    Activity: Up with A2 sera stedy/ A2 squat pivot     Skin: WDL except, PEG site    Pain: Denied pain    CMS: Intact, AOx4. Denies numbness and tingling.   Dressing:    Diet: NPO except ice chips    LDA: No PIV access.    Equipment: IV pole, personal belongings,    Plan: Safety precautions maintained / Continue with plan of care. Call light within reach, pt able to make needs known.    Additional Info: Patient is on cycled feeding.              "

## 2024-12-22 NOTE — PLAN OF CARE
Goal Outcome Evaluation:      Plan of Care Reviewed With: patient    Overall Patient Progress: improving    Orientation: AOX4  Bowel: Continent LBM: 12/20  Bladder: primofit at HS  Pain: denied  Ambulation/Transfers: AX1-2 stand pivot with FWW   Diet/Liquids: NPO except ice chips; on TF running fro, 2p-8a, tolerated well; meds crushed given thru PEG tube  Tubes/Lines/Drains: PEG tube  Skin: edema on BLE; PEG site; bruises on abdomen    Uses call light appropriately. Able to make needs known. CPAP on at HS. No acute issues overnight. Continue POC

## 2024-12-22 NOTE — PROGRESS NOTES
Discharge Planner Post-Acute Rehab OT:      Discharge Plan: Home with assistance. HC OT services     Precautions: Falls, continuous tube feeding and NPO, BUE weakness with R greater than L  *elevate BUE/BLE's when supine in bed with pillows*  R resting hand orthosis - on overnight/off during day  B T-bar splints only with functional tasks-monitor skin breakdown and edema     Current Status:  ADLs:  Mobility: Mod A stand pivot with walker. A x 2 nursing. Wheelchair based.  Grooming: sba with adapted r angle toothbrush and spoon for ice chips  Dressing: UB mod a pull over shirt LB Max A FWW.  Bathing: pt able to wash face, arms underarms lower stomach and legs, assist with feet pericares stocmach for thourghness and top portion   Toileting: Mod A stand pivot to commode overlay. Pretty christianson nursing. A x 1 clothing management/hygiene.   IADLs: IND prior, supportive spouse  Vision/Cognition: Ox3. Wears corrective lenses     Assessment:increase L hand function and adls with use of l  finger ext assist outrigger wrist splint  Other Barriers to Discharge (DME, Family Training, etc):   Family training prior to discharge  AE/DME: pending progress. Spouse reports owning bidet attachment, bed rail

## 2024-12-22 NOTE — PROGRESS NOTES
[facility]   Physical Medicine and Rehabilitation Daily Note           Assessment and Plan of Care:   Mendez Greene is a 79 year old male with past medical history of paroxysmal atrial fibrillation, CAD, ascending aortic aneurysm, HLD, HTN, hypothyroidism, CHA admitted to ICU service on 11/30/2024 for acute respiratory failure and AMS. Patient was recently hospitalized at [facility] from 11/8/24-11/15/24 for progressive weakness and fatigue, difficulty swallowing. After discharge, his ACh receptor binding Ab came back 2.11, strongly suggestive of myasthenia gravis. Presented back to [facility] ED 11/30 with hypotension and acute respiratory failure. Patient was intubated in the ED and admitted to ICU on pressors. During admission patient received 5 doses of IVIG for myasthenic crisis and was initiated on Mestinon. Hospital course also included broad spectrum antibiotics for possible septic shock due to UTI, MOHAMUD, electrolyte derangements, bradycardia, hypertension. Now admitted to acute rehab.     --Vitals stable. HGB stable at 7.3, no other signs of bleed. Continue monitoring CBC tomorrow or immediately if any clinical change.   --Added lidocaine patch to LLQ muscle strain  --Continue ongoing medical management.  --Continue therapies and plan of care.           Interval history:   Patient seen and examined at bedside. Per nursing report, no acute events overnight. Some fam cardia today, but asymptomatic.  Does have LLQ abdomen pain with certain movements, likely muscles strain will add lidocaine patch.  Discussed goals with patient and wife.    The patient is fully participating in therapies and is making progress.           Physical Exam:   VS:   Vitals:    12/20/24 1659 12/21/24 0900 12/21/24 1553 12/22/24 0604   BP: 136/42 106/53 100/40 122/43   BP Location: Left arm Left arm Left arm Left arm   Patient Position:  Semi-Mahoney's Semi-Mahoney's    Cuff Size:  Adult Regular Adult Regular     Pulse: (!) 48 60 58 (!) 44   Resp: 16 16 16 16   Temp: 97.7  F (36.5  C) 98.5  F (36.9  C) 97.9  F (36.6  C) 97.7  F (36.5  C)   TempSrc: Oral Oral Oral Oral   SpO2: 98% 95% 95% 98%   Weight:    79.8 kg (175 lb 14.8 oz)   Height:         Gen: NAD, resting comfortably in bed  Heart: RRR, no murmurs  Lungs: breathing unlabored on room air, lungs CTA B  Abd: soft and non-tender  Ext: no edema in BLE, no calf tenderness  MSK/neuro: Alert, speech fluent, stable right hemiparesis         Data:   Scheduled meds  Current Facility-Administered Medications   Medication Dose Route Frequency Provider Last Rate Last Admin    aspirin (ASA) chewable tablet 81 mg  81 mg Oral or Feeding Tube QPM Sachin Sorensen DO   81 mg at 12/21/24 2017    cyanocobalamin (VITAMIN B-12) tablet 1,000 mcg  1,000 mcg Oral or Feeding Tube Daily Sachin Sorensen DO   1,000 mcg at 12/22/24 0850    fluticasone (FLONASE) 50 MCG/ACT spray 1 spray  1 spray Both Nostrils Daily Azul Livingston MD   1 spray at 12/22/24 0857    guaiFENesin (ROBITUSSIN) 20 mg/mL solution 200 mg  200 mg Oral or Feeding Tube Daily Azul Livingston MD   200 mg at 12/22/24 0849    heparin ANTICOAGULANT injection 5,000 Units  5,000 Units Subcutaneous Q8H Azul Livingston MD   5,000 Units at 12/22/24 0850    levothyroxine (SYNTHROID/LEVOTHROID) tablet 175 mcg  175 mcg Oral or Feeding Tube Daily Azul Livingston MD   175 mcg at 12/22/24 0849    Lidocaine (LIDOCARE) 4 % Patch 1 patch  1 patch Transdermal Q24h Sachin Sorensen DO        melatonin tablet 3 mg  3 mg Oral or Feeding Tube At Bedtime Sachin Sorensen DO   3 mg at 12/21/24 2017    pyRIDostigmine (MESTINON) tablet 60 mg  60 mg Oral or Feeding Tube Q8H Sachin Sorensen DO   60 mg at 12/22/24 0849    senna-docusate (SENOKOT-S/PERICOLACE) 8.6-50 MG per tablet 1 tablet  1 tablet Oral BID Sveta Schultz MD   1 tablet at 12/22/24 0849    [Held by provider] simvastatin (ZOCOR) tablet 10  mg  10 mg Oral Daily Azul Livingston MD        Vitamin D3 (CHOLECALCIFEROL) tablet 25 mcg  25 mcg Oral or Feeding Tube Daily Sachin Sorensen DO   25 mcg at 12/22/24 0857       PRN meds:  Current Facility-Administered Medications   Medication Dose Route Frequency Provider Last Rate Last Admin    acetaminophen (TYLENOL) tablet 650 mg  650 mg Oral or Feeding Tube Q4H PRN Sachin Sorensen DO        artificial saliva (BIOTENE MT) solution 2 spray  2 spray Swish & Spit Q1H PRN Azul Livingston MD        bisacodyl (DULCOLAX) suppository 10 mg  10 mg Rectal Daily PRN Sveta Schultz MD   10 mg at 12/18/24 1558    dextrose 10% infusion   Intravenous Continuous PRN Azul Livingston MD        olopatadine (PATANOL) 0.1 % ophthalmic solution 1 drop  1 drop Both Eyes BID PRN Azul Livingston MD   1 drop at 12/22/24 1104    polyethylene glycol (MIRALAX) Packet 17 g  17 g Oral Daily PRN Sveta Schultz MD   17 g at 12/18/24 0813    sodium chloride (OCEAN) 0.65 % nasal spray 1 spray  1 spray Both Nostrils Q1H PRN Sveta Schultz MD   1 spray at 12/22/24 1106       Sachin Sorensen DO  Physical Medicine and Rehabilitation

## 2024-12-23 ENCOUNTER — APPOINTMENT (OUTPATIENT)
Dept: OCCUPATIONAL THERAPY | Facility: CLINIC | Age: 79
DRG: 056 | End: 2024-12-23
Attending: PHYSICAL MEDICINE & REHABILITATION
Payer: MEDICARE

## 2024-12-23 ENCOUNTER — APPOINTMENT (OUTPATIENT)
Dept: PHYSICAL THERAPY | Facility: CLINIC | Age: 79
DRG: 056 | End: 2024-12-23
Attending: PHYSICAL MEDICINE & REHABILITATION
Payer: MEDICARE

## 2024-12-23 ENCOUNTER — APPOINTMENT (OUTPATIENT)
Dept: SPEECH THERAPY | Facility: CLINIC | Age: 79
DRG: 056 | End: 2024-12-23
Attending: PHYSICAL MEDICINE & REHABILITATION
Payer: MEDICARE

## 2024-12-23 LAB
ALBUMIN SERPL BCG-MCNC: 2.7 G/DL (ref 3.5–5.2)
ALP SERPL-CCNC: 71 U/L (ref 40–150)
ALT SERPL W P-5'-P-CCNC: 34 U/L (ref 0–70)
ANION GAP SERPL CALCULATED.3IONS-SCNC: 7 MMOL/L (ref 7–15)
AST SERPL W P-5'-P-CCNC: 30 U/L (ref 0–45)
BILIRUB SERPL-MCNC: 0.3 MG/DL
BUN SERPL-MCNC: 31.4 MG/DL (ref 8–23)
CALCIUM SERPL-MCNC: 8.2 MG/DL (ref 8.8–10.4)
CHLORIDE SERPL-SCNC: 108 MMOL/L (ref 98–107)
CREAT SERPL-MCNC: 0.53 MG/DL (ref 0.67–1.17)
EGFRCR SERPLBLD CKD-EPI 2021: >90 ML/MIN/1.73M2
ERYTHROCYTE [DISTWIDTH] IN BLOOD BY AUTOMATED COUNT: 18.4 % (ref 10–15)
GLUCOSE SERPL-MCNC: 119 MG/DL (ref 70–99)
HCO3 SERPL-SCNC: 27 MMOL/L (ref 22–29)
HCT VFR BLD AUTO: 24 % (ref 40–53)
HGB BLD-MCNC: 7.5 G/DL (ref 13.3–17.7)
MAGNESIUM SERPL-MCNC: 2.3 MG/DL (ref 1.7–2.3)
MCH RBC QN AUTO: 35 PG (ref 26.5–33)
MCHC RBC AUTO-ENTMCNC: 31.3 G/DL (ref 31.5–36.5)
MCV RBC AUTO: 112 FL (ref 78–100)
PHOSPHATE SERPL-MCNC: 3 MG/DL (ref 2.5–4.5)
PLATELET # BLD AUTO: 215 10E3/UL (ref 150–450)
POTASSIUM SERPL-SCNC: 4.4 MMOL/L (ref 3.4–5.3)
PROT SERPL-MCNC: 5.3 G/DL (ref 6.4–8.3)
RBC # BLD AUTO: 2.14 10E6/UL (ref 4.4–5.9)
SODIUM SERPL-SCNC: 142 MMOL/L (ref 135–145)
WBC # BLD AUTO: 3.8 10E3/UL (ref 4–11)

## 2024-12-23 PROCEDURE — 250N000011 HC RX IP 250 OP 636

## 2024-12-23 PROCEDURE — 99231 SBSQ HOSP IP/OBS SF/LOW 25: CPT | Performed by: PHYSICAL MEDICINE & REHABILITATION

## 2024-12-23 PROCEDURE — 83735 ASSAY OF MAGNESIUM: CPT

## 2024-12-23 PROCEDURE — 250N000013 HC RX MED GY IP 250 OP 250 PS 637

## 2024-12-23 PROCEDURE — 85027 COMPLETE CBC AUTOMATED: CPT

## 2024-12-23 PROCEDURE — 82310 ASSAY OF CALCIUM: CPT

## 2024-12-23 PROCEDURE — 92526 ORAL FUNCTION THERAPY: CPT | Mod: GN

## 2024-12-23 PROCEDURE — 128N000003 HC R&B REHAB

## 2024-12-23 PROCEDURE — 97110 THERAPEUTIC EXERCISES: CPT | Mod: GO

## 2024-12-23 PROCEDURE — 97535 SELF CARE MNGMENT TRAINING: CPT | Mod: GO

## 2024-12-23 PROCEDURE — 97110 THERAPEUTIC EXERCISES: CPT | Mod: GP

## 2024-12-23 PROCEDURE — 97530 THERAPEUTIC ACTIVITIES: CPT | Mod: GP

## 2024-12-23 PROCEDURE — 250N000013 HC RX MED GY IP 250 OP 250 PS 637: Performed by: PHYSICAL MEDICINE & REHABILITATION

## 2024-12-23 PROCEDURE — 82947 ASSAY GLUCOSE BLOOD QUANT: CPT

## 2024-12-23 PROCEDURE — 84100 ASSAY OF PHOSPHORUS: CPT

## 2024-12-23 PROCEDURE — 36415 COLL VENOUS BLD VENIPUNCTURE: CPT

## 2024-12-23 RX ADMIN — CYANOCOBALAMIN TAB 1000 MCG 1000 MCG: 1000 TAB at 08:32

## 2024-12-23 RX ADMIN — PYRIDOSTIGMINE BROMIDE 60 MG: 60 TABLET ORAL at 08:32

## 2024-12-23 RX ADMIN — Medication 3 MG: at 20:01

## 2024-12-23 RX ADMIN — HEPARIN SODIUM 5000 UNITS: 5000 INJECTION, SOLUTION INTRAVENOUS; SUBCUTANEOUS at 08:32

## 2024-12-23 RX ADMIN — PYRIDOSTIGMINE BROMIDE 60 MG: 60 TABLET ORAL at 15:58

## 2024-12-23 RX ADMIN — ACETAMINOPHEN 650 MG: 325 TABLET, FILM COATED ORAL at 19:58

## 2024-12-23 RX ADMIN — ASPIRIN 81 MG CHEWABLE TABLET 81 MG: 81 TABLET CHEWABLE at 20:01

## 2024-12-23 RX ADMIN — GUAIFENESIN 200 MG: 200 SOLUTION ORAL at 08:32

## 2024-12-23 RX ADMIN — HEPARIN SODIUM 5000 UNITS: 5000 INJECTION, SOLUTION INTRAVENOUS; SUBCUTANEOUS at 16:01

## 2024-12-23 RX ADMIN — Medication 25 MCG: at 08:32

## 2024-12-23 RX ADMIN — SENNOSIDES AND DOCUSATE SODIUM 1 TABLET: 50; 8.6 TABLET ORAL at 08:32

## 2024-12-23 RX ADMIN — PYRIDOSTIGMINE BROMIDE 60 MG: 60 TABLET ORAL at 00:41

## 2024-12-23 RX ADMIN — SENNOSIDES AND DOCUSATE SODIUM 1 TABLET: 50; 8.6 TABLET ORAL at 20:01

## 2024-12-23 RX ADMIN — FLUTICASONE PROPIONATE 1 SPRAY: 50 SPRAY, METERED NASAL at 08:32

## 2024-12-23 RX ADMIN — HEPARIN SODIUM 5000 UNITS: 5000 INJECTION, SOLUTION INTRAVENOUS; SUBCUTANEOUS at 00:41

## 2024-12-23 RX ADMIN — LIDOCAINE 4% 1 PATCH: 40 PATCH TOPICAL at 19:58

## 2024-12-23 RX ADMIN — LEVOTHYROXINE SODIUM 175 MCG: 0.05 TABLET ORAL at 08:32

## 2024-12-23 ASSESSMENT — ACTIVITIES OF DAILY LIVING (ADL)
ADLS_ACUITY_SCORE: 81
ADLS_ACUITY_SCORE: 81
ADLS_ACUITY_SCORE: 71
ADLS_ACUITY_SCORE: 81
ADLS_ACUITY_SCORE: 81
ADLS_ACUITY_SCORE: 71
ADLS_ACUITY_SCORE: 81
ADLS_ACUITY_SCORE: 71
ADLS_ACUITY_SCORE: 81
ADLS_ACUITY_SCORE: 71
ADLS_ACUITY_SCORE: 85
ADLS_ACUITY_SCORE: 85
ADLS_ACUITY_SCORE: 81
ADLS_ACUITY_SCORE: 71
ADLS_ACUITY_SCORE: 81
ADLS_ACUITY_SCORE: 71
ADLS_ACUITY_SCORE: 71

## 2024-12-23 NOTE — PLAN OF CARE
Discharge Planner Post-Acute Rehab PT:      Discharge Plan: Home with wife, 3 NEELAM w/out rail. Home care PT.     Precautions: Falls, NPO/PEG, *elevate BUE/BLE's when supine in bed with pillows*  R resting hand orthosis - on overnight/off during day  B T-bar splints only with functional tasks     Current Status:  Bed Mobility: Min A  Transfer: Ax2 stand pivot with FWW with nursing, Serasteady to toilet.   Gait: Up to 280ft using FWW Ax1 with w/c follow.   Stairs: Not yet safe  Balance: Poor static, dynamic standing balance w/out UE support     Outcome Measures:   Garcia              12/12: 12/56  5TSTS              12/12: 0x (requires physical assist even with UE assist)  6MWT              12/12: 40 ft, FWW and CGA (stopped early due to fatigue)     Assessment: Pt upgraded to amb within room with nsg staff CGA. Pt biggest mobility barrier continues to be rising from chairs, requiring modA from standard height chairs. Awaiting home measurement return from pt family to assess need for furniture risers/ DME etc.     Other Barriers to Discharge (DME, Family Training, etc):   Equipment: Pt owns wheelchair, FWW  3 NEELAM without railing- pt endorses his two sons plan to bump-up in wheelchair and have performed this before, without need for training. Therapist requested if plan changes for pt to amb up stairs would require FT

## 2024-12-23 NOTE — PLAN OF CARE
Discharge Planner Post-Acute Rehab OT:      Discharge Plan: Home with assistance. HC OT services     Precautions: Falls, continuous tube feeding and NPO, BUE weakness with R greater than L  *elevate BUE/BLE's when supine in bed with pillows*  R resting hand orthosis - on overnight/off during day  B T-bar splints only with functional tasks-monitor skin breakdown and edema     Current Status:  ADLs:  Mobility:  A stand pivot with walker. A x 2 nursing. Wheelchair based.  Grooming: sba with adapted r angle toothbrush and spoon for ice chips  Dressing: UB mod a pull over shirt LB Max A FWW.  Bathing: pt able to wash face, arms underarms lower stomach and legs, assist with feet pericares stocmach for thourghness and top portion   Toileting: Mod A with FWW and commode overlay ellis steady if fatigued  IADLs: IND prior, supportive spouse  Vision/Cognition: Ox3. Wears corrective lenses     Assessment: AM session: Pt continuing to progress with ADL IND using L finger ext assist outrigger wrist splint. Pt also improving with SPT with FWW.     2nd AM session: Progressed functional mobility in room. Pt progressing to Mod A with STS and FWW and CGA with functional mobility for toilet tx.  Other Barriers to Discharge (DME, Family Training, etc):   Family training prior to discharge  AE/DME: pending progress. Spouse reports owning bidet attachment, bed rail

## 2024-12-23 NOTE — PLAN OF CARE
FOCUS/GOAL  Medical management    ASSESSMENT, INTERVENTIONS AND CONTINUING PLAN FOR GOAL:  Pt is alert and oriented. No complaints of pain. Assist of 2 pivot. Primofit in place overnight. CPAP worn while asleep. TF running as ordered. Appeared to be sleeping between cares.

## 2024-12-23 NOTE — PLAN OF CARE
Goal Outcome Evaluation:      Plan of Care Reviewed With: patient    Overall Patient Progress: no change    Orientation: AOX4  Bowel: Continent LBM: 12/22  Bladder: primofit at HS  Pain: denied  Ambulation/Transfers: AX2 stand pivot with FWW; A1 with Pretty Stedy to the toilet  Diet/Liquids: NPO except ice chips; on TF running fro, 2p-8a, tolerated well; meds crushed given thru PEG tube  Tubes/Lines/Drains: PEG tube  Skin: edema on BLE; PEG site; bruises on abdomen. Lidocaine patch applied to LLQ abdomen     Uses call light appropriately. Able to make needs known. CPAP on at HS. No acute issues overnight. Continue POC

## 2024-12-23 NOTE — PLAN OF CARE
"Goal Outcome Evaluation:      Plan of Care Reviewed With: patient    Overall Patient Progress: no changeOverall Patient Progress: no change        VS: /56 (BP Location: Left arm)   Pulse 60   Temp 97.9  F (36.6  C) (Oral)   Resp 16   Ht 1.778 m (5' 10\")   Wt 75.5 kg (166 lb 7.2 oz)   SpO2 96%   BMI 23.88 kg/m       O2: SpO2 > 96 and stable on RA. Denies chest pain and SOB.    Output: Voids spontaneously without difficulty to bathroom / using beside urinal    Last BM: 12/22 denies abdominal discomfort. BS active / passing flatus.    Activity: Up with A1 walker and gait belt    Skin: WDL except, PEG site  and abdominal bruising    Pain: Denied pain    CMS: Intact, AOx4. Denies numbness and tingling.   Dressing:     Diet: NPO except ice chips    LDA: No PIV access.    Equipment: IV pole, personal belongings,    Plan: Safety precautions maintained / Continue with plan of care. Call light within reach, pt able to make needs known.    Additional Info: Patient is on cycled feeding.         "

## 2024-12-23 NOTE — PLAN OF CARE
Discharge Planner Post-Acute Rehab SLP:     Discharge Plan: home with spouse,  SLP    Precautions: NPO - PEG, fall    Current Status:  Hearing: WFL  Vision: glasses  Communication: WFL  Cognition: SLP monitoring need for cognitive evaluation  Swallow: NPO. Ok ice chips with supervision. Oral cares x3/day- instructions for completing adequate oral care in patient care order for nurse review  Okay for pt to consume three (3) sips of thin liquid by spoon a day for quality of life with family, no bigger than half-teaspoon in size, must cough multiple times after each teaspoon sip, wait two to three minutes before taking next half-teaspoon sip    Assessment: Patient education regarding rationale for limited quantities of p.o. intake. Discussed with patient because of aspiration pneumonia. Patient reporting consistent completion of oral cares. Patient asking appropriate questions regarding dental cleaning and any increased risks associated with this. Will defer to primary speech therapist regarding oral phase of swallow and ability to manage secretions for purposes of a dental cleaning.      Other Barriers to Discharge (Family Training, etc): TBD - diet pending progress.

## 2024-12-24 ENCOUNTER — APPOINTMENT (OUTPATIENT)
Dept: PHYSICAL THERAPY | Facility: CLINIC | Age: 79
DRG: 056 | End: 2024-12-24
Attending: PHYSICAL MEDICINE & REHABILITATION
Payer: MEDICARE

## 2024-12-24 ENCOUNTER — APPOINTMENT (OUTPATIENT)
Dept: OCCUPATIONAL THERAPY | Facility: CLINIC | Age: 79
DRG: 056 | End: 2024-12-24
Attending: PHYSICAL MEDICINE & REHABILITATION
Payer: MEDICARE

## 2024-12-24 ENCOUNTER — APPOINTMENT (OUTPATIENT)
Dept: SPEECH THERAPY | Facility: CLINIC | Age: 79
DRG: 056 | End: 2024-12-24
Attending: PHYSICAL MEDICINE & REHABILITATION
Payer: MEDICARE

## 2024-12-24 PROCEDURE — 250N000011 HC RX IP 250 OP 636

## 2024-12-24 PROCEDURE — 250N000013 HC RX MED GY IP 250 OP 250 PS 637

## 2024-12-24 PROCEDURE — 250N000013 HC RX MED GY IP 250 OP 250 PS 637: Performed by: PHYSICAL MEDICINE & REHABILITATION

## 2024-12-24 PROCEDURE — 97110 THERAPEUTIC EXERCISES: CPT | Mod: GP | Performed by: PHYSICAL THERAPIST

## 2024-12-24 PROCEDURE — 97530 THERAPEUTIC ACTIVITIES: CPT | Mod: GP | Performed by: PHYSICAL THERAPIST

## 2024-12-24 PROCEDURE — 97535 SELF CARE MNGMENT TRAINING: CPT | Mod: GO

## 2024-12-24 PROCEDURE — 128N000003 HC R&B REHAB

## 2024-12-24 PROCEDURE — 99231 SBSQ HOSP IP/OBS SF/LOW 25: CPT | Mod: GC | Performed by: PHYSICAL MEDICINE & REHABILITATION

## 2024-12-24 PROCEDURE — 92526 ORAL FUNCTION THERAPY: CPT | Mod: GN

## 2024-12-24 RX ORDER — POLYETHYLENE GLYCOL 3350 17 G/17G
17 POWDER, FOR SOLUTION ORAL DAILY
Status: DISCONTINUED | OUTPATIENT
Start: 2024-12-24 | End: 2025-01-01 | Stop reason: HOSPADM

## 2024-12-24 RX ADMIN — PYRIDOSTIGMINE BROMIDE 60 MG: 60 TABLET ORAL at 09:09

## 2024-12-24 RX ADMIN — GUAIFENESIN 200 MG: 200 SOLUTION ORAL at 09:09

## 2024-12-24 RX ADMIN — OLOPATADINE HYDROCHLORIDE 1 DROP: 1 SOLUTION OPHTHALMIC at 10:07

## 2024-12-24 RX ADMIN — Medication 25 MCG: at 09:10

## 2024-12-24 RX ADMIN — PYRIDOSTIGMINE BROMIDE 60 MG: 60 TABLET ORAL at 00:14

## 2024-12-24 RX ADMIN — HEPARIN SODIUM 5000 UNITS: 5000 INJECTION, SOLUTION INTRAVENOUS; SUBCUTANEOUS at 09:09

## 2024-12-24 RX ADMIN — POLYETHYLENE GLYCOL 3350 17 G: 17 POWDER, FOR SOLUTION ORAL at 09:23

## 2024-12-24 RX ADMIN — ASPIRIN 81 MG CHEWABLE TABLET 81 MG: 81 TABLET CHEWABLE at 21:25

## 2024-12-24 RX ADMIN — SENNOSIDES AND DOCUSATE SODIUM 1 TABLET: 50; 8.6 TABLET ORAL at 09:09

## 2024-12-24 RX ADMIN — HEPARIN SODIUM 5000 UNITS: 5000 INJECTION, SOLUTION INTRAVENOUS; SUBCUTANEOUS at 16:33

## 2024-12-24 RX ADMIN — LEVOTHYROXINE SODIUM 175 MCG: 0.05 TABLET ORAL at 09:09

## 2024-12-24 RX ADMIN — PYRIDOSTIGMINE BROMIDE 60 MG: 60 TABLET ORAL at 16:33

## 2024-12-24 RX ADMIN — FLUTICASONE PROPIONATE 1 SPRAY: 50 SPRAY, METERED NASAL at 09:17

## 2024-12-24 RX ADMIN — SENNOSIDES AND DOCUSATE SODIUM 1 TABLET: 50; 8.6 TABLET ORAL at 21:25

## 2024-12-24 RX ADMIN — CYANOCOBALAMIN TAB 1000 MCG 1000 MCG: 1000 TAB at 09:09

## 2024-12-24 RX ADMIN — Medication 3 MG: at 21:25

## 2024-12-24 RX ADMIN — HEPARIN SODIUM 5000 UNITS: 5000 INJECTION, SOLUTION INTRAVENOUS; SUBCUTANEOUS at 00:14

## 2024-12-24 ASSESSMENT — ACTIVITIES OF DAILY LIVING (ADL)
ADLS_ACUITY_SCORE: 71

## 2024-12-24 NOTE — PLAN OF CARE
Discharge Planner Post-Acute Rehab PT:      Discharge Plan: Home with wife, 3 NEELAM w/out rail. Home care PT.     Precautions: Falls, NPO/PEG, *elevate BUE/BLE's when supine in bed with pillows*  R resting hand orthosis - on overnight/off during day  B T-bar splints only with functional tasks     Current Status:  Bed Mobility: Min A  Transfer: min/mod A stand pivot with FWW, with nursing Serasteady to toilet.   Gait: Up to 250ft using FWW Ax1 with w/c follow.   Stairs: Not yet safe  Balance: Poor static, dynamic standing balance w/out UE support     Outcome Measures:   Garcia              12/12: 12/56  5TSTS              12/12: 0x (requires physical assist even with UE assist)  6MWT              12/12: 40 ft, FWW and CGA (stopped early due to fatigue)     Assessment: Focused session on ambulatory endurance and strengthening in standing, monitored vitals response to exertion; continues to have difficulty with rising from chairs, requiring modA from standard height chairs. Still waiting home measurement return from pt family to assess need for furniture risers/ DME etc.     Other Barriers to Discharge (DME, Family Training, etc):   Equipment: Pt owns wheelchair, FWW  3 NEELAM without railing- pt endorses his two sons plan to bump-up in wheelchair and have performed this before, without need for training. Therapist requested if plan changes for pt to amb up stairs would require FT

## 2024-12-24 NOTE — DISCHARGE INSTRUCTIONS
Follow up Appointments on Discharge:     PCP   You are scheduled to see Dr. Vazquez on January 14 2025 at 3:45 pm.     Address  [address] [place] [address]  Phone   [phone]    Neurology  You are scheduled to see Dr. Arreola on April 7 2025 at 3:15 pm.     Address  [address] [facility] [address]  Phone   [phone]    PM & R  You are scheduled to see Dr. Antonio on March 4 2025 at 3:25 pm.      Address  [address] [place] [address]  Phone   [phone]     Cardiology  You are scheduled to see *** on *** at ***. You will be called to schedule this appointment.    Phone   [phone]     Urology for assessment of neobladder and options to reduce nighttime leaking  You are scheduled to see Osiris Gomes NP on January 3 2025 at 2:45 pm.      Address  [address] [place] [address]  Phone   [phone]       Home Health Care:   [facility]  PH: [phone]   Nurse, physical therapy, occupational therapy, speech therapy, home health aide and Social work .   -You will get a call after you have discharged from [facility] to schedule the first home care visit. The home health nurse should come out within the first 24-48 hours. If you don't get a call from them within the first 48 hours, please call to follow up.    [facility]   Ph: [phone]  This company provides you with your tube feeding supplies and formula. Please call them with any questions related to your tube feeding.

## 2024-12-24 NOTE — PLAN OF CARE
Discharge Planner Post-Acute Rehab SLP:     Discharge Plan: home with spouse,  SLP    Precautions: NPO - PEG, fall    Current Status:  Hearing: WFL  Vision: glasses  Communication: WFL  Cognition: SLP monitoring need for cognitive evaluation  Swallow: NPO. Ok ice chips with supervision. Oral cares x3/day- instructions for completing adequate oral care in patient care order for nurse review  Okay for pt to consume three (3) sips of thin liquid by spoon a day for quality of life with family, no bigger than half-teaspoon in size, must cough multiple times after each teaspoon sip, wait two to three minutes before taking next half-teaspoon sip    Assessment: Pt seen for dysphagia. Oral cares completed. Pt did not have questions r/t pleasure feeds and declined this this morning. Pt participated in effortful swallow x10 and mendehlson x8 until he was fatigued. Provided very clear information that exercises are not indicated in Myasthenia crisis (given neurologic nature of dx) and should be used more as a maintenance strategy to continue use of swallow mechanism vs as strengthening for deficits. Discussed completing small amount of reps/day (10/each) and mainly focusing on secretion management and ice chips for pleasure. Provided training on listening to overall endurance/energy level and prioritizing rest if fatigued vs swallowing as this can aggrevate symptoms.     Other Barriers to Discharge (Family Training, etc): TBD - diet pending progress.

## 2024-12-24 NOTE — PROGRESS NOTES
"  [facility]   Acute Rehabilitation Unit  Daily progress note    INTERVAL HISTORY  No acute events overnight. Patient notes that the lidocaine patch did not really help his abdominal tenderness. No pain at rest, only pain with movement or palpation. The patient notes that he has only had small volume BM\"s, last a couple days ago. Does not want additional pain medications, feels like his pain will resolve on its own. The pain has not worsened since yesterday. The patient denies new chest pain, abdominal pain, shortness of breath, nausea/vomiting, weakness, numbness.       Functionally:   PT:  Current Status:  Bed Mobility: Min A  Transfer: Ax2 stand pivot with FWW with nursing, Serasteady to toilet.   Gait: Up to 280ft using FWW Ax1 with w/c follow.   Stairs: Not yet safe  Balance: Poor static, dynamic standing balance w/out UE support     Outcome Measures:   Garcia              12/12: 12/56  5TSTS              12/12: 0x (requires physical assist even with UE assist)  6MWT              12/12: 40 ft, FWW and CGA (stopped early due to fatigue)     Assessment: Pt upgraded to amb within room with nsg staff CGA     ROS: 10 point ROS neg other than the symptoms noted above in the HPI.        MEDICATIONS  Scheduled meds  Current Facility-Administered Medications   Medication Dose Route Frequency Provider Last Rate Last Admin    aspirin (ASA) chewable tablet 81 mg  81 mg Oral or Feeding Tube QPM Sachin Sorensen DO   81 mg at 12/23/24 2001    cyanocobalamin (VITAMIN B-12) tablet 1,000 mcg  1,000 mcg Oral or Feeding Tube Daily Sachin Sorensen DO   1,000 mcg at 12/23/24 0832    fluticasone (FLONASE) 50 MCG/ACT spray 1 spray  1 spray Both Nostrils Daily Azul Livingston MD   1 spray at 12/23/24 0832    guaiFENesin (ROBITUSSIN) 20 mg/mL solution 200 mg  200 mg Oral or Feeding Tube Daily Azul Livingston MD   200 mg at 12/23/24 0832    heparin ANTICOAGULANT injection 5,000 Units "  5,000 Units Subcutaneous Q8H Azul Livingston MD   5,000 Units at 12/24/24 0014    levothyroxine (SYNTHROID/LEVOTHROID) tablet 175 mcg  175 mcg Oral or Feeding Tube Daily Azul Livingston MD   175 mcg at 12/23/24 0832    Lidocaine (LIDOCARE) 4 % Patch 1 patch  1 patch Transdermal Q24h Sachin Sorensen DO   1 patch at 12/23/24 1958    melatonin tablet 3 mg  3 mg Oral or Feeding Tube At Bedtime Sachin Sorensen DO   3 mg at 12/23/24 2001    pyRIDostigmine (MESTINON) tablet 60 mg  60 mg Oral or Feeding Tube Q8H Sachin Sorensen DO   60 mg at 12/24/24 0014    senna-docusate (SENOKOT-S/PERICOLACE) 8.6-50 MG per tablet 1 tablet  1 tablet Oral BID Sveta Schultz MD   1 tablet at 12/23/24 2001    [Held by provider] simvastatin (ZOCOR) tablet 10 mg  10 mg Oral Daily Azul Livingston MD        Vitamin D3 (CHOLECALCIFEROL) tablet 25 mcg  25 mcg Oral or Feeding Tube Daily Sachin Sorensen DO   25 mcg at 12/23/24 0832       PRN meds:  Current Facility-Administered Medications   Medication Dose Route Frequency Provider Last Rate Last Admin    acetaminophen (TYLENOL) tablet 650 mg  650 mg Oral or Feeding Tube Q4H PRN Sachin Sorensen DO   650 mg at 12/23/24 1958    artificial saliva (BIOTENE MT) solution 2 spray  2 spray Swish & Spit Q1H PRN Azul Livingston MD        bisacodyl (DULCOLAX) suppository 10 mg  10 mg Rectal Daily PRN Sveta Schultz MD   10 mg at 12/18/24 1558    dextrose 10% infusion   Intravenous Continuous PRN Azul Livingston MD        olopatadine (PATANOL) 0.1 % ophthalmic solution 1 drop  1 drop Both Eyes BID PRN Azul Livingston MD   1 drop at 12/22/24 1104    polyethylene glycol (MIRALAX) Packet 17 g  17 g Oral Daily PRN Sveta Schultz MD   17 g at 12/18/24 0813    sodium chloride (OCEAN) 0.65 % nasal spray 1 spray  1 spray Both Nostrils Q1H PRN Sveta Schultz MD   1 spray at 12/22/24 1106         PHYSICAL EXAM  /44 (BP Location:  "Left arm)   Pulse (!) 49   Temp 97.9  F (36.6  C) (Oral)   Resp 16   Ht 1.778 m (5' 10\")   Wt 80.2 kg (176 lb 12.9 oz)   SpO2 99%   BMI 25.37 kg/m    General: NAD, resting in chair.  HEENT: NCAT, EOMI.  Cardio: Well perfused. Sinus bradycardia  Pulm: Breathing comfortably on room air. Clear lung fields, no crackles wheezes.  Abd: Non distended. Scattered bruises. Mild tenderness on LLQ  Neuro/MSK: Able to move all 4 extremities.  EXT: No LE edema bilaterally. Ecchymosis along the dorsal upper and lower right arm stable.     LABS  No results found for this or any previous visit (from the past 24 hours).        ASSESSMENT AND PLAN  Mendez Greene is a 79 year old male with past medical history of paroxysmal atrial fibrillation, CAD, ascending aortic aneurysm, HLD, HTN, hypothyroidism, CHA admitted to ICU service on 11/30/2024 for acute respiratory failure and AMS. Patient was recently hospitalized at [facility] from 11/8/24-11/15/24 for progressive weakness and fatigue, difficulty swallowing. After discharge, his ACh receptor binding Ab came back 2.11, strongly suggestive of myasthenia gravis. Presented back to [facility] ED 11/30 with hypotension and acute respiratory failure. Patient was intubated in the ED and admitted to ICU on pressors. During admission patient received 5 doses of IVIG for myasthenic crisis and was initiated on Mestinon. Hospital course also included broad spectrum antibiotics for possible septic shock due to UTI, MOHAMUD, electrolyte derangements, bradycardia, hypertension.  Now admitted to acute rehab.     Admission to acute inpatient rehab 12/11/2024.    Impairment group code: 03.8 Neuromuscular Disorders; new diagnosis of myasthenia gravis with myasthenia crisis         PT and OT 75 minutes of each as well as SLP 30 minutes on a daily basis, in addition to rehab nursing and close management of physiatrist.       Impairment of ADL's: Impairment in strength, endurance, and ROM resulting in functional " limitations in patient's ability to perform ADLs     Impairment of mobility:  Impairment in strength, endurance, and ROM resulting in functional limitations in patient's ability to perform functional mobility      Impairment of cognition/language/swallow:  Needs therapy for swallowing.     Medical Conditions     #Myasthenic crisis  Questionable ALS diagnosis which is being deferred to his primary neurologist.  - clinically much improved with improvement in upper extremity strength; still with some upper extremity flexion weakness and LE weakness  - completed 5 doses of IVIG on 12/5/24   - Caution use of IV magnesium, beta-blocker, statin, macrolides, aminoglycosides, and fluoroquinolone, and as much as possible avoid them due to to potential exacerbation of myasthenia crisis. ; Holding PTA statin  - Monitor respiratory status closely, currently stable on room air  - CPAP at HS  - Pyridostigmine 60 mg PO TID  - Consider CT chest with contrast for r/o thymoma while on rehab     #H/o neobladder  - was on snowden catheter in neobladder, discontinued 12/10 and voiding well, chronically incontinent     #Hypernatremia  #Hyperchloremia  Na 149 day of admission to rehab (12/11), Cl 117, creatinine 0.57 (at baseline). Had issues with hypernatremia on acute service, up to 152. Acute service increased free water flushes and gave 1L D5.   - Increased free water flushes to 200 ml q 4hrs - 12/18  - BMP M/Th     #Chronic macrocytic anemia  #Thrombocytopenia  #Leukopenia   Hgb has mostly been around 9-10 on acute service; noted slight downtrend ---8.5--7.8 (12/11), likely dilutional with IV fluids as platelets and hematocrit also decreased slightly; has no clinical concern for active bleed and has been hemodynamically stable. B12 was 727, folic acid wnl on 11/12. Was seen by hematology 4/2024, workup unremarkable. On ARU Hgb dropped to 7.3 on 12/19. Platelets were decreased at the time of admission to ARU but WNL 12/16. Leukopenia  first noted 12/19 WBC 3.9.  - CBC M/Th  - Transfuse hgb <7 (has not required transfusion this admission)  - PTA B12 supplementation     #Sinus bradycardia  HR 40s to upper 50s during admission, dips the most when sleeping. Previous EKG sinus bradycardia with no high grade blocks. Chart review shows similar HR 40-50s last hospitalization, and he has a few OP visits with documented sinus bradycardia (7/10/24, 3/4/24).  - OK w HR 40's-50's  - CTM, further evaluation if ever symptomatic     #Moderate to severe oropharyngeal dysphagia s/p G-tube  VFSS on 12/9 while on acute service, note that he continues to aspirate all textures but improved ability to cough; SLP recommend continued NPO. VFSS on 12/18 continued to have moderate-severe oropharyngeal and pharyngeal dysphagia with possible aspiration. SLP gave permission for 3 teaspoons of thin liquid a day for quality of life. Will continue to NPO with next VFSS after 1/1/2025.   - 12/13 200 mg guaifenesin to help loosen mucus secretions. Emphasize the importance of eating ice chips so mucous does not become hard  - Receive all nutrition via tube feeds  - TF cycled to 18hrs/day on 12/19  - BMP, Mg & Phos M/Th     #CAD  #HLD  #Paroxysmal atrial fibrillation  #Recent NSTEMI (11/8/24)  Troponin 997 on acute service, cardiac cath 11/11/24 showed only mild CAD. Echo 12/1/24 difficult study but grossly normal left and right ventricular function, no severe valvular regurgitation, probably mild aortic regurgitation, no pericardial effusion.  Not in afib on admission to rehab.  - Continue PTA ASA  - HOLD PTA Simvastatin until follow-up with neurology  - No rate control medications at baseline     #Hypothyroidism  - Increased 12/19 levothyroxine 175 mcg daily  - 12/16 TSH elevated (7.53) and T4 low (0.83).  - Recheck TSH and T4 around 1/16 (4 weeks from dose increase)    #CHA  - PTA CPAP HS, 12/16 order changed to allow for CPAP during naps    #Extremity Swelling  Significant  swelling in the right arm as well as bilateral LE was noted on 12/12. Patient denies associated pain. 12/13 swelling in right and left leg improved, still significant swelling in left LE.  - Wrapped arm and lower extremities in compression bandage  - Right Arm Duplex US negative 12/12    #Constipation  Has not had a reported bowel movement since 12/11, but is passing flatulence. No reported abdominal pain. Consider encouraging suppository if he still has not had BM by 12/17.   - Senna Q12H  - Miralax daily, scheduled on 12/24  - Dulcolax daily PRN     #Severe malnutrition in the context of acute illness or injury  - Eval and treat per RD recommendation. Diet as below.    #Abdominal strain  Occurred 12/23, appears to be related to soft tissue strain, pain with shallow palpation and movement. Lidocaine patch ineffective. Unlikely that this represents GI pathology, but will also try to optimize patient's constipation as noted above unless this is contributing. Monitor for stability/worsening.      Adjustment to disability:  Clinical psychology to eval and treat  FEN: NPO except for ice Chips. Receiving Tube Feeding Adult Formula Drip Feeding: Continuous Jevity 1.5; Gastrostomy at 55 mL/h with free water flushes 200 ml q 4 hrs. PTA Vit D supplementation.   Bowel: See above  Bladder: No snowden in place, PVRs on admission. 12/12 2 elevated PVRs in 100s and 300s range. Patient has neobladder and is incontinent at baseline. Will continue to assess output and any symptoms of fullness. No need for repeat PVRs at this time.  DVT Prophylaxis: Subcutaneous heparin   GI Prophylaxis: Na  Code: Full  Disposition: Home with family support.  ELOS:  21 Days.  Rehab prognosis:  Good  Follow up Appointments on Discharge:   PCP  Neurology, Dr. Fabian or Dr. Ashley   PM&R  Cardiology  Urology for assessment of neobladder and options to reduce nighttime leaking      Samuel Camara MD, staffed with Dr. Sorensen    Date of Service (when I saw  the patient): 12/24/24

## 2024-12-24 NOTE — PROGRESS NOTES
[facility]   Acute Rehabilitation Unit  Daily progress note    INTERVAL HISTORY  Steven seen this afternoon getting rest.  Had good day of therapy. Denies chest pain, shortness of breath, no fever or chills.  Still with some LLQ pain with movement and pulling.  Will continue lidocaine patch for likely oblique strain.  Motivated to get home with support.     Functionally:   PT:  Current Status:  Bed Mobility: Min A  Transfer: Ax2 stand pivot with FWW with nursing, Serasteady to toilet.   Gait: Up to 280ft using FWW Ax1 with w/c follow.   Stairs: Not yet safe  Balance: Poor static, dynamic standing balance w/out UE support     Outcome Measures:   Garcia              12/12: 12/56  5TSTS              12/12: 0x (requires physical assist even with UE assist)  6MWT              12/12: 40 ft, FWW and CGA (stopped early due to fatigue)     Assessment: Pt upgraded to amb within room with nsg staff CGA     ROS: 10 point ROS neg other than the symptoms noted above in the HPI.        MEDICATIONS  Scheduled meds  Current Facility-Administered Medications   Medication Dose Route Frequency Provider Last Rate Last Admin    aspirin (ASA) chewable tablet 81 mg  81 mg Oral or Feeding Tube QPM Sachin Sorensen DO   81 mg at 12/22/24 2027    cyanocobalamin (VITAMIN B-12) tablet 1,000 mcg  1,000 mcg Oral or Feeding Tube Daily Sachin Sorensen DO   1,000 mcg at 12/23/24 0832    fluticasone (FLONASE) 50 MCG/ACT spray 1 spray  1 spray Both Nostrils Daily Azul Livingston MD   1 spray at 12/23/24 0832    guaiFENesin (ROBITUSSIN) 20 mg/mL solution 200 mg  200 mg Oral or Feeding Tube Daily Azul Livingston MD   200 mg at 12/23/24 0832    heparin ANTICOAGULANT injection 5,000 Units  5,000 Units Subcutaneous Q8H Azul Livingston MD   5,000 Units at 12/23/24 1601    levothyroxine (SYNTHROID/LEVOTHROID) tablet 175 mcg  175 mcg Oral or Feeding Tube Daily Azul Livingston MD   175 mcg at  "12/23/24 0832    Lidocaine (LIDOCARE) 4 % Patch 1 patch  1 patch Transdermal Q24h Sachin Sorensen DO   1 patch at 12/22/24 2026    melatonin tablet 3 mg  3 mg Oral or Feeding Tube At Bedtime Sachin Sorensen DO   3 mg at 12/22/24 2027    pyRIDostigmine (MESTINON) tablet 60 mg  60 mg Oral or Feeding Tube Q8H Sachin Sorensen DO   60 mg at 12/23/24 1558    senna-docusate (SENOKOT-S/PERICOLACE) 8.6-50 MG per tablet 1 tablet  1 tablet Oral BID Sveta Schultz MD   1 tablet at 12/23/24 0832    [Held by provider] simvastatin (ZOCOR) tablet 10 mg  10 mg Oral Daily Azul Livingston MD        Vitamin D3 (CHOLECALCIFEROL) tablet 25 mcg  25 mcg Oral or Feeding Tube Daily Sachin Sorensen DO   25 mcg at 12/23/24 0832       PRN meds:  Current Facility-Administered Medications   Medication Dose Route Frequency Provider Last Rate Last Admin    acetaminophen (TYLENOL) tablet 650 mg  650 mg Oral or Feeding Tube Q4H PRN Sachin Sorensen DO        artificial saliva (BIOTENE MT) solution 2 spray  2 spray Swish & Spit Q1H PRN Azul Livingston MD        bisacodyl (DULCOLAX) suppository 10 mg  10 mg Rectal Daily PRN Sveta Schultz MD   10 mg at 12/18/24 1558    dextrose 10% infusion   Intravenous Continuous PRN Azul Livingston MD        olopatadine (PATANOL) 0.1 % ophthalmic solution 1 drop  1 drop Both Eyes BID PRN Azul Livingston MD   1 drop at 12/22/24 1104    polyethylene glycol (MIRALAX) Packet 17 g  17 g Oral Daily PRN Sveta Schultz MD   17 g at 12/18/24 0813    sodium chloride (OCEAN) 0.65 % nasal spray 1 spray  1 spray Both Nostrils Q1H PRN Sveta Schultz MD   1 spray at 12/22/24 1106         PHYSICAL EXAM  /56 (BP Location: Left arm)   Pulse 60   Temp 97.9  F (36.6  C) (Oral)   Resp 16   Ht 1.778 m (5' 10\")   Wt 75.5 kg (166 lb 7.2 oz)   SpO2 96%   BMI 23.88 kg/m    General: NAD, resting in chair.  HEENT: NCAT, EOMI.  Cardio: Well perfused. Sinus " bradycardia  Pulm: Breathing comfortably on room air. Clear lung fields, no crackles wheezes.  Abd: Non distended. Scattered bruises. Mild tenderness on LLQ  Neuro/MSK: Able to move all 4 extremities.  EXT: No LE edema bilaterally. Ecchymosis along the dorsal upper and lower right arm stable.     LABS  Recent Results (from the past 24 hours)   Comprehensive metabolic panel    Collection Time: 12/23/24  5:44 AM   Result Value Ref Range    Sodium 142 135 - 145 mmol/L    Potassium 4.4 3.4 - 5.3 mmol/L    Carbon Dioxide (CO2) 27 22 - 29 mmol/L    Anion Gap 7 7 - 15 mmol/L    Urea Nitrogen 31.4 (H) 8.0 - 23.0 mg/dL    Creatinine 0.53 (L) 0.67 - 1.17 mg/dL    GFR Estimate >90 >60 mL/min/1.73m2    Calcium 8.2 (L) 8.8 - 10.4 mg/dL    Chloride 108 (H) 98 - 107 mmol/L    Glucose 119 (H) 70 - 99 mg/dL    Alkaline Phosphatase 71 40 - 150 U/L    AST 30 0 - 45 U/L    ALT 34 0 - 70 U/L    Protein Total 5.3 (L) 6.4 - 8.3 g/dL    Albumin 2.7 (L) 3.5 - 5.2 g/dL    Bilirubin Total 0.3 <=1.2 mg/dL   CBC with platelets    Collection Time: 12/23/24  5:44 AM   Result Value Ref Range    WBC Count 3.8 (L) 4.0 - 11.0 10e3/uL    RBC Count 2.14 (L) 4.40 - 5.90 10e6/uL    Hemoglobin 7.5 (L) 13.3 - 17.7 g/dL    Hematocrit 24.0 (L) 40.0 - 53.0 %     (H) 78 - 100 fL    MCH 35.0 (H) 26.5 - 33.0 pg    MCHC 31.3 (L) 31.5 - 36.5 g/dL    RDW 18.4 (H) 10.0 - 15.0 %    Platelet Count 215 150 - 450 10e3/uL   Magnesium    Collection Time: 12/23/24  5:44 AM   Result Value Ref Range    Magnesium 2.3 1.7 - 2.3 mg/dL   Phosphorus    Collection Time: 12/23/24  5:44 AM   Result Value Ref Range    Phosphorus 3.0 2.5 - 4.5 mg/dL         ASSESSMENT AND PLAN  Mendez Greene is a 79 year old male with past medical history of paroxysmal atrial fibrillation, CAD, ascending aortic aneurysm, HLD, HTN, hypothyroidism, CHA admitted to ICU service on 11/30/2024 for acute respiratory failure and AMS. Patient was recently hospitalized at [facility] from 11/8/24-11/15/24 for  progressive weakness and fatigue, difficulty swallowing. After discharge, his ACh receptor binding Ab came back 2.11, strongly suggestive of myasthenia gravis. Presented back to [facility] ED 11/30 with hypotension and acute respiratory failure. Patient was intubated in the ED and admitted to ICU on pressors. During admission patient received 5 doses of IVIG for myasthenic crisis and was initiated on Mestinon. Hospital course also included broad spectrum antibiotics for possible septic shock due to UTI, MOHAMUD, electrolyte derangements, bradycardia, hypertension.  Now admitted to acute rehab.     Admission to acute inpatient rehab 12/11/2024.    Impairment group code: 03.8 Neuromuscular Disorders; new diagnosis of myasthenia gravis with myasthenia crisis         PT and OT 75 minutes of each as well as SLP 30 minutes on a daily basis, in addition to rehab nursing and close management of physiatrist.       Impairment of ADL's: Impairment in strength, endurance, and ROM resulting in functional limitations in patient's ability to perform ADLs     Impairment of mobility:  Impairment in strength, endurance, and ROM resulting in functional limitations in patient's ability to perform functional mobility      Impairment of cognition/language/swallow:  Needs therapy for swallowing.     Medical Conditions     #Myasthenic crisis  Questionable ALS diagnosis which is being deferred to his primary neurologist.  - clinically much improved with improvement in upper extremity strength; still with some upper extremity flexion weakness and LE weakness  - completed 5 doses of IVIG on 12/5/24   - Caution use of IV magnesium, beta-blocker, statin, macrolides, aminoglycosides, and fluoroquinolone, and as much as possible avoid them due to to potential exacerbation of myasthenia crisis. ; Holding PTA statin  - Monitor respiratory status closely, currently stable on room air  - CPAP at HS  - Pyridostigmine 60 mg PO TID  - Consider CT chest with  contrast for r/o thymoma while on rehab     #H/o neobladder  - was on snowden catheter in neobladder, discontinued 12/10 and voiding well, chronically incontinent     #Hypernatremia  #Hyperchloremia  Na 149 day of admission to rehab (12/11), Cl 117, creatinine 0.57 (at baseline). Had issues with hypernatremia on acute service, up to 152. Acute service increased free water flushes and gave 1L D5.   - Increased free water flushes to 200 ml q 4hrs - 12/18  - BMP M/Th     #Chronic macrocytic anemia  #Thrombocytopenia  #Leukopenia   Hgb has mostly been around 9-10 on acute service; noted slight downtrend ---8.5--7.8 (12/11), likely dilutional with IV fluids as platelets and hematocrit also decreased slightly; has no clinical concern for active bleed and has been hemodynamically stable. B12 was 727, folic acid wnl on 11/12. Was seen by hematology 4/2024, workup unremarkable. On ARU Hgb dropped to 7.3 on 12/19. Platelets were decreased at the time of admission to ARU but WNL 12/16. Leukopenia first noted 12/19 WBC 3.9.  - CBC M/Th  - Transfuse hgb <7 (has not required transfusion this admission)  - PTA B12 supplementation     #Sinus bradycardia  HR 40s to upper 50s during admission, dips the most when sleeping. Previous EKG sinus bradycardia with no high grade blocks. Chart review shows similar HR 40-50s last hospitalization, and he has a few OP visits with documented sinus bradycardia (7/10/24, 3/4/24).  - OK w HR 40's-50's  - CTM, further evaluation if ever symptomatic     #Moderate to severe oropharyngeal dysphagia s/p G-tube  VFSS on 12/9 while on acute service, note that he continues to aspirate all textures but improved ability to cough; SLP recommend continued NPO. VFSS on 12/18 continued to have moderate-severe oropharyngeal and pharyngeal dysphagia with possible aspiration. SLP gave permission for 3 teaspoons of thin liquid a day for quality of life. Will continue to NPO with next VFSS after 1/1/2025.   - 12/13 200  mg guaifenesin to help loosen mucus secretions. Emphasize the importance of eating ice chips so mucous does not become hard  - Receive all nutrition via tube feeds  - TF cycled to 18hrs/day on 12/19  - BMP, Mg & Phos M/Th     #CAD  #HLD  #Paroxysmal atrial fibrillation  #Recent NSTEMI (11/8/24)  Troponin 997 on acute service, cardiac cath 11/11/24 showed only mild CAD. Echo 12/1/24 difficult study but grossly normal left and right ventricular function, no severe valvular regurgitation, probably mild aortic regurgitation, no pericardial effusion.  Not in afib on admission to rehab.  - Continue PTA ASA  - HOLD PTA Simvastatin until follow-up with neurology  - No rate control medications at baseline     #Hypothyroidism  - Increased 12/19 levothyroxine 175 mcg daily  - 12/16 TSH elevated (7.53) and T4 low (0.83).  - Recheck TSH and T4 around 1/16 (4 weeks from dose increase)    #CHA  - PTA CPAP HS, 12/16 order changed to allow for CPAP during naps    #Extremity Swelling  Significant swelling in the right arm as well as bilateral LE was noted on 12/12. Patient denies associated pain. 12/13 swelling in right and left leg improved, still significant swelling in left LE.  - Wrapped arm and lower extremities in compression bandage  - Right Arm Duplex US negative 12/12    #Constipation  Has not had a reported bowel movement since 12/11, but is passing flatulence. No reported abdominal pain. Consider encouraging suppository if he still has not had BM by 12/17.   - Senna Q12H  - Miralax daily PRN  - Dulcolax daily PRN     #Severe malnutrition in the context of acute illness or injury  - Eval and treat per RD recommendation. Diet as below.     Adjustment to disability:  Clinical psychology to eval and treat  FEN: NPO except for ice Chips. Receiving Tube Feeding Adult Formula Drip Feeding: Continuous Jevity 1.5; Gastrostomy at 55 mL/h with free water flushes 200 ml q 4 hrs. PTA Vit D supplementation.   Bowel: See above  Bladder:  No snowden in place, PVRs on admission. 12/12 2 elevated PVRs in 100s and 300s range. Patient has neobladder and is incontinent at baseline. Will continue to assess output and any symptoms of fullness. No need for repeat PVRs at this time.  DVT Prophylaxis: Subcutaneous heparin   GI Prophylaxis: Na  Code: Full  Disposition: Home with family support.  ELOS:  21 Days.  Rehab prognosis:  Good  Follow up Appointments on Discharge:   PCP  Neurology, Dr. Fabian or Dr. Ashley   PM&R  Cardiology  Urology for assessment of neobladder and options to reduce nighttime leaking      Sachin Sorensen DO  Date of Service (when I saw the patient): 12/23/24

## 2024-12-24 NOTE — PLAN OF CARE
"Goal Outcome Evaluation:      Plan of Care Reviewed With: patient    Overall Patient Progress: improvingOverall Patient Progress: improving      VS: /44 (BP Location: Left arm)   Pulse (!) 49   Temp 97.9  F (36.6  C) (Oral)   Resp 16   Ht 1.778 m (5' 10\")   Wt 80.2 kg (176 lb 12.9 oz)   SpO2 99%   BMI 25.37 kg/m       O2: SpO2 > 99 and stable on RA. Denies chest pain and SOB.    Output: Voids spontaneously without difficulty to bathroom / using beside urinal    Last BM: 12/22 denies abdominal discomfort. BS active / passing flatus.    Activity: Up with A1 walker and gait belt    Skin: WDL except, PEG site  and abdominal bruising    Pain: Denied pain    CMS: Intact, AOx4. Denies numbness and tingling.   Dressing:     Diet: NPO except ice chips    LDA: No PIV access.    Equipment: IV pole, personal belongings,    Plan: Safety precautions maintained / Continue with plan of care. Call light within reach, pt able to make needs known.    Additional Info: Patient is on cycled feeding.           "

## 2024-12-24 NOTE — PLAN OF CARE
Goal Outcome Evaluation:      Plan of Care Reviewed With: patient    Overall Patient Progress: no changeOverall Patient Progress: no change       Orientation: alert and oriented x4; able to make needs known  V/S/ O2 sat: O2 sat stable. CPAP nasal utilized at night  Pain: complained of pain on left lower abdomen; he said he thinks he pulled a muscle. Lidocaine patch applied and prn tylenol given   Diet: NPO except ice chips. On going TF (jevity 80cc/hr from 2pm-8am with  every 4 hrs) TF stopped at 7am for synthroid administration at 8am and to be resumed after an hour   Oral cares done at bedtime  Bladder: incontinent. Primo fit on at night  Bowel: continent. Patient reported  having BM 12/23/24  Ambulation/Transfer: A2 stand pivot FWW to ; A2 ellis steady to the toilet  Tubes/Lines/Drains: PEG tube in place. Dressing CDI  Resting hand brace on right hand  Elevated upper and lower extremities  Skin:abdominal bruising  Safety:fall and aspiration precaution maintained

## 2024-12-24 NOTE — PLAN OF CARE
Discharge Planner Post-Acute Rehab OT:      Discharge Plan: Home with spouse assistance. HC OT      Precautions: Falls, NPO - TF, BUE weakness with R greater than L    *elevate BUE/BLE's when supine in bed with pillows*  R resting hand orthosis - on overnight/off during day  B T-bar splints only with functional tasks-monitor skin breakdown and edema     Current Status:  ADLs:  Mobility:  FWW amb CGA with nsg. Wc in hallway. Increased A for STS.  Grooming: SBA seated  Dressing: UB- Min-Mod A. LB - Mod- Max A FWW. Feet - Total A.   Bathing: Mod A.   Toileting: Transfer CGA- Min A FWW <> commode overlay. Min A c/m and total A cares.  Pretty steady if fatigued  IADLs: IND prior, supportive spouse  Vision/Cognition: Ox3. Wears corrective lenses     Assessment:       Other Barriers to Discharge (DME, Family Training, etc):   Family training prior to discharge  AE/DME: pending progress. Spouse reports owning bidet attachment, bed rail

## 2024-12-25 PROCEDURE — 250N000011 HC RX IP 250 OP 636

## 2024-12-25 PROCEDURE — 250N000013 HC RX MED GY IP 250 OP 250 PS 637

## 2024-12-25 PROCEDURE — 128N000003 HC R&B REHAB

## 2024-12-25 PROCEDURE — 250N000013 HC RX MED GY IP 250 OP 250 PS 637: Performed by: PHYSICAL MEDICINE & REHABILITATION

## 2024-12-25 RX ADMIN — HEPARIN SODIUM 5000 UNITS: 5000 INJECTION, SOLUTION INTRAVENOUS; SUBCUTANEOUS at 17:25

## 2024-12-25 RX ADMIN — ASPIRIN 81 MG CHEWABLE TABLET 81 MG: 81 TABLET CHEWABLE at 21:00

## 2024-12-25 RX ADMIN — SENNOSIDES AND DOCUSATE SODIUM 1 TABLET: 50; 8.6 TABLET ORAL at 21:00

## 2024-12-25 RX ADMIN — GUAIFENESIN 200 MG: 200 SOLUTION ORAL at 08:48

## 2024-12-25 RX ADMIN — PYRIDOSTIGMINE BROMIDE 60 MG: 60 TABLET ORAL at 17:25

## 2024-12-25 RX ADMIN — Medication 25 MCG: at 08:49

## 2024-12-25 RX ADMIN — SENNOSIDES AND DOCUSATE SODIUM 1 TABLET: 50; 8.6 TABLET ORAL at 08:49

## 2024-12-25 RX ADMIN — FLUTICASONE PROPIONATE 1 SPRAY: 50 SPRAY, METERED NASAL at 08:48

## 2024-12-25 RX ADMIN — OLOPATADINE HYDROCHLORIDE 1 DROP: 1 SOLUTION OPHTHALMIC at 09:04

## 2024-12-25 RX ADMIN — CYANOCOBALAMIN TAB 1000 MCG 1000 MCG: 1000 TAB at 08:49

## 2024-12-25 RX ADMIN — PYRIDOSTIGMINE BROMIDE 60 MG: 60 TABLET ORAL at 08:49

## 2024-12-25 RX ADMIN — POLYETHYLENE GLYCOL 3350 17 G: 17 POWDER, FOR SOLUTION ORAL at 09:02

## 2024-12-25 RX ADMIN — Medication 3 MG: at 21:03

## 2024-12-25 RX ADMIN — PYRIDOSTIGMINE BROMIDE 60 MG: 60 TABLET ORAL at 00:05

## 2024-12-25 RX ADMIN — LEVOTHYROXINE SODIUM 175 MCG: 0.05 TABLET ORAL at 08:49

## 2024-12-25 RX ADMIN — HEPARIN SODIUM 5000 UNITS: 5000 INJECTION, SOLUTION INTRAVENOUS; SUBCUTANEOUS at 00:05

## 2024-12-25 RX ADMIN — HEPARIN SODIUM 5000 UNITS: 5000 INJECTION, SOLUTION INTRAVENOUS; SUBCUTANEOUS at 08:49

## 2024-12-25 ASSESSMENT — ACTIVITIES OF DAILY LIVING (ADL)
ADLS_ACUITY_SCORE: 71
ADLS_ACUITY_SCORE: 77
ADLS_ACUITY_SCORE: 71
ADLS_ACUITY_SCORE: 77
ADLS_ACUITY_SCORE: 71
ADLS_ACUITY_SCORE: 77
ADLS_ACUITY_SCORE: 71

## 2024-12-25 NOTE — PLAN OF CARE
"Goal Outcome Evaluation:      Plan of Care Reviewed With: patient    Overall Patient Progress: no changeOverall Patient Progress: no change      VS: /48 (BP Location: Left arm)   Pulse 50   Temp 98.2  F (36.8  C) (Oral)   Resp 17   Ht 1.778 m (5' 10\")   Wt 80.2 kg (176 lb 12.9 oz)   SpO2 97%   BMI 25.37 kg/m       O2: SpO2 > 97 and stable on RA. Denies chest pain and SOB.    Output: Voids spontaneously without difficulty to bathroom / using beside urinal    Last BM: 12/24 denies abdominal discomfort. BS active / passing flatus.    Activity: Up with A1 walker and gait belt    Skin: WDL except, PEG site  and abdominal bruising    Pain: Denied pain    CMS: Intact, AOx4. Denies numbness and tingling.   Dressing:     Diet: NPO except ice chips    LDA: No PIV access.    Equipment: IV pole, personal belongings,    Plan: Safety precautions maintained / Continue with plan of care. Call light within reach, pt able to make needs known.    Additional Info: Patient is on cycled feeding.            "

## 2024-12-25 NOTE — PLAN OF CARE
Goal Outcome Evaluation:      Plan of Care Reviewed With: patient    Overall Patient Progress: no change    Orientation: AOX4  Bowel: Continent LBM: 12/24  Bladder: primofit at HS  Pain: denied  Ambulation/Transfers: Up with A1 walker and gait belt   Diet/Liquids: NPO except ice chips; on TF running fro, 2p-8a, tolerated well; meds crushed given thru PEG tube  Tubes/Lines/Drains: PEG tube  Skin: edema on BLE; PEG site; bruises on abdomen.      Had shower tonight. Uses call light appropriately. Able to make needs known. CPAP on at HS. No acute issues overnight. Continue POC

## 2024-12-25 NOTE — PROGRESS NOTES
Patient  is a/o x 4 . Remains on TF , NPO except ice chips .Assist of 1 with GB  and walker . Had Cpap and primofit at night .

## 2024-12-26 ENCOUNTER — APPOINTMENT (OUTPATIENT)
Dept: PHYSICAL THERAPY | Facility: CLINIC | Age: 79
DRG: 056 | End: 2024-12-26
Attending: PHYSICAL MEDICINE & REHABILITATION
Payer: MEDICARE

## 2024-12-26 ENCOUNTER — APPOINTMENT (OUTPATIENT)
Dept: OCCUPATIONAL THERAPY | Facility: CLINIC | Age: 79
DRG: 056 | End: 2024-12-26
Attending: PHYSICAL MEDICINE & REHABILITATION
Payer: MEDICARE

## 2024-12-26 ENCOUNTER — APPOINTMENT (OUTPATIENT)
Dept: SPEECH THERAPY | Facility: CLINIC | Age: 79
DRG: 056 | End: 2024-12-26
Attending: PHYSICAL MEDICINE & REHABILITATION
Payer: MEDICARE

## 2024-12-26 LAB
ALBUMIN SERPL BCG-MCNC: 2.7 G/DL (ref 3.5–5.2)
ALP SERPL-CCNC: 70 U/L (ref 40–150)
ALT SERPL W P-5'-P-CCNC: 22 U/L (ref 0–70)
ANION GAP SERPL CALCULATED.3IONS-SCNC: 5 MMOL/L (ref 7–15)
AST SERPL W P-5'-P-CCNC: 31 U/L (ref 0–45)
BILIRUB SERPL-MCNC: 0.3 MG/DL
BUN SERPL-MCNC: 30.5 MG/DL (ref 8–23)
CALCIUM SERPL-MCNC: 8.5 MG/DL (ref 8.8–10.4)
CHLORIDE SERPL-SCNC: 106 MMOL/L (ref 98–107)
CREAT SERPL-MCNC: 0.56 MG/DL (ref 0.67–1.17)
EGFRCR SERPLBLD CKD-EPI 2021: >90 ML/MIN/1.73M2
ERYTHROCYTE [DISTWIDTH] IN BLOOD BY AUTOMATED COUNT: 17.7 % (ref 10–15)
GLUCOSE SERPL-MCNC: 108 MG/DL (ref 70–99)
HCO3 SERPL-SCNC: 28 MMOL/L (ref 22–29)
HCT VFR BLD AUTO: 23.4 % (ref 40–53)
HGB BLD-MCNC: 7.4 G/DL (ref 13.3–17.7)
MAGNESIUM SERPL-MCNC: 2.1 MG/DL (ref 1.7–2.3)
MCH RBC QN AUTO: 35.4 PG (ref 26.5–33)
MCHC RBC AUTO-ENTMCNC: 31.6 G/DL (ref 31.5–36.5)
MCV RBC AUTO: 112 FL (ref 78–100)
PHOSPHATE SERPL-MCNC: 3.3 MG/DL (ref 2.5–4.5)
PLATELET # BLD AUTO: 230 10E3/UL (ref 150–450)
POTASSIUM SERPL-SCNC: 5 MMOL/L (ref 3.4–5.3)
PROT SERPL-MCNC: 5.4 G/DL (ref 6.4–8.3)
RBC # BLD AUTO: 2.09 10E6/UL (ref 4.4–5.9)
SODIUM SERPL-SCNC: 139 MMOL/L (ref 135–145)
WBC # BLD AUTO: 3.7 10E3/UL (ref 4–11)

## 2024-12-26 PROCEDURE — 250N000013 HC RX MED GY IP 250 OP 250 PS 637

## 2024-12-26 PROCEDURE — 92526 ORAL FUNCTION THERAPY: CPT | Mod: GN

## 2024-12-26 PROCEDURE — 84100 ASSAY OF PHOSPHORUS: CPT

## 2024-12-26 PROCEDURE — 250N000013 HC RX MED GY IP 250 OP 250 PS 637: Performed by: PHYSICAL MEDICINE & REHABILITATION

## 2024-12-26 PROCEDURE — 250N000011 HC RX IP 250 OP 636

## 2024-12-26 PROCEDURE — 83735 ASSAY OF MAGNESIUM: CPT

## 2024-12-26 PROCEDURE — 85014 HEMATOCRIT: CPT

## 2024-12-26 PROCEDURE — 82040 ASSAY OF SERUM ALBUMIN: CPT

## 2024-12-26 PROCEDURE — 97535 SELF CARE MNGMENT TRAINING: CPT | Mod: GO

## 2024-12-26 PROCEDURE — 128N000003 HC R&B REHAB

## 2024-12-26 PROCEDURE — 97530 THERAPEUTIC ACTIVITIES: CPT | Mod: GP

## 2024-12-26 PROCEDURE — 36415 COLL VENOUS BLD VENIPUNCTURE: CPT

## 2024-12-26 RX ADMIN — PYRIDOSTIGMINE BROMIDE 60 MG: 60 TABLET ORAL at 09:08

## 2024-12-26 RX ADMIN — HEPARIN SODIUM 5000 UNITS: 5000 INJECTION, SOLUTION INTRAVENOUS; SUBCUTANEOUS at 01:26

## 2024-12-26 RX ADMIN — PYRIDOSTIGMINE BROMIDE 60 MG: 60 TABLET ORAL at 17:27

## 2024-12-26 RX ADMIN — CYANOCOBALAMIN TAB 1000 MCG 1000 MCG: 1000 TAB at 09:09

## 2024-12-26 RX ADMIN — SENNOSIDES AND DOCUSATE SODIUM 1 TABLET: 50; 8.6 TABLET ORAL at 09:09

## 2024-12-26 RX ADMIN — OLOPATADINE HYDROCHLORIDE 1 DROP: 1 SOLUTION OPHTHALMIC at 11:26

## 2024-12-26 RX ADMIN — HEPARIN SODIUM 5000 UNITS: 5000 INJECTION, SOLUTION INTRAVENOUS; SUBCUTANEOUS at 17:26

## 2024-12-26 RX ADMIN — ASPIRIN 81 MG CHEWABLE TABLET 81 MG: 81 TABLET CHEWABLE at 20:44

## 2024-12-26 RX ADMIN — GUAIFENESIN 200 MG: 200 SOLUTION ORAL at 09:08

## 2024-12-26 RX ADMIN — Medication 25 MCG: at 09:09

## 2024-12-26 RX ADMIN — LEVOTHYROXINE SODIUM 175 MCG: 0.05 TABLET ORAL at 09:08

## 2024-12-26 RX ADMIN — SALINE NASAL SPRAY 1 SPRAY: 1.5 SOLUTION NASAL at 09:10

## 2024-12-26 RX ADMIN — PYRIDOSTIGMINE BROMIDE 60 MG: 60 TABLET ORAL at 01:26

## 2024-12-26 RX ADMIN — SENNOSIDES AND DOCUSATE SODIUM 1 TABLET: 50; 8.6 TABLET ORAL at 20:43

## 2024-12-26 RX ADMIN — POLYETHYLENE GLYCOL 3350 17 G: 17 POWDER, FOR SOLUTION ORAL at 09:09

## 2024-12-26 RX ADMIN — Medication 3 MG: at 22:27

## 2024-12-26 RX ADMIN — HEPARIN SODIUM 5000 UNITS: 5000 INJECTION, SOLUTION INTRAVENOUS; SUBCUTANEOUS at 09:09

## 2024-12-26 RX ADMIN — FLUTICASONE PROPIONATE 1 SPRAY: 50 SPRAY, METERED NASAL at 09:10

## 2024-12-26 ASSESSMENT — ACTIVITIES OF DAILY LIVING (ADL)
ADLS_ACUITY_SCORE: 79
ADLS_ACUITY_SCORE: 75
ADLS_ACUITY_SCORE: 79
ADLS_ACUITY_SCORE: 75
ADLS_ACUITY_SCORE: 79
ADLS_ACUITY_SCORE: 79
ADLS_ACUITY_SCORE: 83
ADLS_ACUITY_SCORE: 79
ADLS_ACUITY_SCORE: 79
ADLS_ACUITY_SCORE: 75
ADLS_ACUITY_SCORE: 79

## 2024-12-26 NOTE — PLAN OF CARE
Goal Outcome Evaluation:           Overall Patient Progress: improvingOverall Patient Progress: improving     Denies pain. Continent of bladder and incontinent of bm this pm shift. Still on NPO except ice chips. Feeding ongoing and flowing well. Water flushes 200ml every 4hrs. Able to make his needs known, used his call light appropriately and waited for assistance.

## 2024-12-26 NOTE — PLAN OF CARE
Discharge Planner Post-Acute Rehab SLP:     Discharge Plan: home with spouse,  SLP    Precautions: NPO - PEG, fall    Current Status:  Hearing: WFL  Vision: glasses  Communication: WFL  Cognition: SLP monitoring need for cognitive evaluation  Swallow: NPO. Ok ice chips with supervision. Oral cares x3/day- instructions for completing adequate oral care in patient care order for nurse review  Okay for pt to consume three (3) sips of thin liquid by spoon a day for quality of life with family, no bigger than half-teaspoon in size, must cough multiple times after each teaspoon sip, wait two to three minutes before taking next half-teaspoon sip    Assessment: Pt reported already completed oral cares, total # restricted oropharyngeal exercises, and PO for pleasure daily limit. Pt able to report need for coughing with small 1/2 tsp volumes of liquid for pleasure and oriented to visual decpiting reminder. Pt educated re: recommendation to reduce frequency. Pt in agreement with plan.     Other Barriers to Discharge (Family Training, etc): TBD - diet pending progress.

## 2024-12-26 NOTE — PLAN OF CARE
Discharge Planner Post-Acute Rehab PT:      Discharge Plan: Home with wife. Home care PT.     Precautions: Falls, NPO/PEG,   R resting hand orthosis - on overnight/off during day  B T-bar splints only with functional tasks     Current Status:  Bed Mobility: SBA-Shai for LE managment  Transfer: SBA from >23'' heights w/ FWW, up to modA from standard chairs ~18''.   Gait: Up to 250ft using FWW Ax1   Stairs: yuliana plan to bump-up in wc  Balance: Poor static, dynamic standing balance w/out UE support     Outcome Measures:   Garcia              12/12: 12/56  5TSTS              12/12: 0x (requires physical assist even with UE assist)   12.26: 0x. (Unable to stand from standard chair, able to stand from 22'' with UE push)  6MWT              12/12: 40 ft, FWW and CGA (stopped early due to fatigue)     Assessment: Family training scheduled from 2-4 PM 12/30. Pt able to stand from 22 inch height surfaces today CGA w/FWW, however from slightly taller surfaces at 23'' pt performs at close SBA. Pt and spouse engaged in DME discussion to ensure surfaces at home are elevated for pt to perform transfers SBA.     Other Barriers to Discharge (DME, Family Training, etc):   Equipment: Pt owns wheelchair, FWW  Family training- set for 12/30 2-4 PM. Car transfer, bed mobility, boost from chair.   2 NEELAM without rail- pt yuliana plan to bump-up in wheelchair , have performed this before, decline FT on this.

## 2024-12-26 NOTE — PLAN OF CARE
[id number]    Goal Outcome Evaluation:      Plan of Care Reviewed With: patient  Overall Patient Progress: no change  Overall Patient Progress: no change    VS: Stable on RA. CPAP on at night   Pain: Denies   Neuro/MSK: A&O x4. Denies numbness & tingling   Activity: Ax1 with walker/GB. Resting hand splint on R hand   Diet: NPO ex ice chips. TF running from 2pm-8am at 80 mL/hr with Q4hr 200 mL FWF. Pills crushed through PEG    GI/: Continent of urine, primofit on at night. Mixed continence of bowel, LBM 12/25  Skin: Abdominal bruising. Bruising on R arm   Lines/Drains: PEG tube with enteral feedings.     Shift Notes: No acute changes this shift

## 2024-12-26 NOTE — CARE CONFERENCE
Acute Rehab Care Conference/Team Rounds      Type: Team Rounds    Present: Dr Manjit Shen under the direct supervision of Joanna Simpson MD, Faye Herrera PT, Tran Liu OT, Karin Lazcano SLP, Lina Lopez RN Care Coordinator, uY Robison Social Work,Janett De La Rosa RD, Ondina COLÓN RN, Jc ROSARIO Patient.        Discharge Barriers/Treatment/Education    Rehab Diagnosis: gait instability and functional decline post myasthenia gravis crisis     Active Medical Co-morbidities/Prognosis:     Patient Active Problem List   Diagnosis    CHA on CPAP    CAD (coronary artery disease)    PAF (paroxysmal atrial fibrillation) (H)    Ascending aortic aneurysm (H)    Aortic insufficiency    Pure hypercholesterolemia    History of basal cell carcinoma    Personal history of malignant neoplasm of bladder    Benign essential hypertension    Hypothyroidism    Complication of feeding tube (H)    Acute renal failure (ARF) (H)    Acute hypoxic respiratory failure (H)    Myasthenic crisis (H)    Myasthenia gravis (H)         Safety: A&O x4, uses call light appropriately.     Pain: Denies     Medications, Skin, Tubes/Lines: TF running at 80 mL/hr with Q4 hr 200 mL flushes from 2pm-8am through PEG tube. Abdominal bruising present. No IV access    Swallowing/Nutrition: NPO, all hydration/nutrition/medications via PEG. Oral cares to be completed at least 3x a day. Okay for pt to consume ice chips, 3x a day total half-teaspoon sips of a thin liquid with family supervision; pt to cough/clear throat after each thin sip, wait 2-3 minutes between thin sips. Pt has demonstrated excellent compliance with trained swallow strategies, very limited pharyngeal swallow exercises to maintain current level of function. Progress towards swallow goals dependent on recovery from MG crisis; anticipate repeat VFSS to assess for improvement no sooner than first week of January. Ongoing SLP for dysphagia.    Bowel/Bladder: Continent of bladder, mixed  continence of bowel. LBM 12/25    Psychosocial: Lives at home with wife, 3 NEELAM but all needs can be met on one level. Previously on [facility], was disenrolled when he returned to the hospital. Would prefer not to have [facility] again. Was set up with [facility]     ADLs/IADLs: Pt continuing to progress with ADL independence. Using L finger ext assist outrigger wrist splint. Pt improving with SPT with FWW.     Mobility: Pt demonstrating steady progress in independence in functional mobility and LE strength, continues to be limited at times by proximal weakness posing difficulty rising from chairs. Currently pt requires Shai for bed mobility, min-modA w/ FWW for transfers pending surface height. Stairs navigation pt plans to have two sons provide wc bump-up 3 NEELAM which they have performed before and pt declines additional training on this. FT for car transfer tentatively 12/30. Given the extent of pt hand/UE strength/ROM deficits anticipate ongoing need for assist with ADLs however goals remain for household mobility (bed mobility, transfers, short distance gait) at CGA level. Pt has all recommended mobility DME (FWW, manual wheelchair), has been educated on furniture risers/extra cushions. Recommend HH PT at discharge.    Cognition/Language: Motor speech, language intact. Cognition appears intact, not tested by SLP.    Community Re-Entry: Recommend HH Therapies prior to community re-entry    Transportation: Car transfer with assist, FT tentatively scheduled 12/30    Decision maker: self    Plan of Care and goals reviewed and updated.    Discharge Plan/Recommendations    Fall Precautions: continue    Patient/Family input to goals: yes     Estimated length of stay: 18 days     Overall plan for the patient: reach a level of mod I       Utilization Review and Continued Stay Justification    Medical Necessity Criteria:    For any criteria that is not met, please document reason and plan for discharge,  transfer, or modification of plan of care to address.    Requires intensive rehabilitation program to treat functional deficits?: Yes    Requires 3x per week or greater involvement of rehabilitation physician to oversee rehabilitation program?: Yes    Requires rehabilitation nursing interventions?: Yes    Patient is making functional progress?: Yes    There is a potential for additional functional progress? Yes    Patient is participating in therapy 3 hours per day a minimum of 5 days per week or 15 hours per week in 7 day period?:Yes    Has discharge needs that require coordinated discharge planning approach?:Yes      Barriers/Concerns related to meeting medical necessity criteria:  none    Team Plan to Address Concern:  As needed      Final Physician Sign off    Statement of Approval:   I agree with all the recommendations detailed in this document.      Joanna Simpson MD     Patient Goals    Social Work Goals: Confirm discharge recommendations with therapy, coordinate safe discharge plan and remain available to support and assist as needed.     OT Predicted Duration/Target Date for Goal Attainment: 01/02/25  Therapy Frequency (OT): 6 times/week  OT: Hygiene/Grooming: minimal assist, using adaptive equipment, within precautions, while standing  OT: Upper Body Dressing: Minimal assist, using adaptive equipment, including orthotic  OT: Lower Body Dressing: Moderate assist, using adaptive equipment  OT: Upper Body Bathing: Minimal assist, using adaptive equipment  OT: Lower Body Bathing: Minimal assist, using adaptive equipment        OT: Toilet Transfer/Toileting: Minimal assist, toilet transfer, cleaning and garment management, using adaptive equipment              OT: Goal 1: OT: Pt will safely complete shower transfers with min A utilizing appropriate AE/DME.  OT: Goal 2: OT: Pt will complete BUE HEP with min A to increase UE ROM, coordination, and strengthening needed for ADLs/IADLs  OT: Goal 3: OT: Pt  will demo mod IND with feeding utilizing appropriate AE/DME                   PT Predicted Duration/Target Date for Goal Attainment: 01/01/25  PT Frequency: 6x/week  PT: Bed Mobility: Supervision/stand-by assist  PT: Transfers: Supervision/stand-by assist, Bed to/from chair, Assistive device, Sit to/from stand  PT: Gait: Supervision/stand-by assist, 50 feet, Rolling walker  PT: Stairs:  (Ax2 bump up from sons)  PT: Goal 1: Pt will be able to complete car transfer with FWW and SBA in order to access home and medical appts.    SLP Predicted Duration/Target Date for Goal Attainment: 01/01/25  Therapy Frequency (SLP Eval): 6 times/week  SLP: Safely tolerate diet without signs/symptoms of aspiration: Thin liquids, With use of compensatory swallow strategies (for quality of life.)     Nursing Goals  Bowel and Bladder care  Fall prevention   Medication Education  Skin Care protection

## 2024-12-26 NOTE — PLAN OF CARE
Discharge Planner Post-Acute Rehab OT:      Discharge Plan: Home with assistance. HC OT services     Precautions: Falls, continuous tube feeding and NPO, BUE weakness with R greater than L  *elevate BUE/BLE's when supine in bed with pillows*  R resting hand orthosis - on overnight/off during day  B T-bar splints only with functional tasks-monitor skin breakdown and edema     Current Status:  ADLs:  Mobility:  Min A stand pivot with walker. A x 2 nursing. Wheelchair based.  Grooming: sba with adapted r angle toothbrush and spoon for ice chips  Dressing: UB mod a pull over shirt LB Max A FWW.  Bathing: pt able to wash face, arms underarms lower stomach and legs, assist with feet pericares stocmach for thourghness and top portion   Toileting: Min A with FWW and commode overlay, CGA standing clothing management, Max A hugo cares ellis steady if fatigued  IADLs: IND prior, supportive spouse  Vision/Cognition: Ox3. Wears corrective lenses     Assessment: Session focused on progressing performance with ADL routines including grooming and toileting. Pt demonstrated increased IND with toileting routine and transfer set up, but continues to require Ax1 at this time.     Other Barriers to Discharge (DME, Family Training, etc):   Family training prior to discharge  AE/DME: pending progress. Spouse reports owning bidet attachment, bed rail

## 2024-12-26 NOTE — PROGRESS NOTES
Targeting a discharge of 01/01 with HC. RN/PT/OT/SLP/HHA.Does not want [facility]. Patient's spouse is the main contact. [facility] already set up.RN CC following (TF)- Going home on tube feeding.      Yu Robison [facility], Acute Inpatient Rehab Unit   [address] [place] [address], [address] [place] [address]  Phone: [phone]  Fax: [phone]

## 2024-12-26 NOTE — PROGRESS NOTES
CLINICAL NUTRITION SERVICES - REASSESSMENT NOTE     Nutrition Prescription    RECOMMENDATIONS FOR MDs/PROVIDERS TO ORDER:  Free water adjustments per team pending fluid status (may need additional ~200 mL flush daily)    Malnutrition Status:    Moderate malnutrition in the context of acute illness.     Recommendations already ordered by Registered Dietitian (RD):  Transition to bolus feeds:  Stop cycled TF for 1-2 hrs to let stomach empty prior to starting gravity feeding. Begin 1st gravity feeding @ 0.5 cartons x 2 feedings (separate 3-4 hrs apart). If tolerated, increase vol to 1 carton x 2 feedings and then increase to goal vol of 1.5 cartons x 4 feedings per day.  Flush with 120 mL of H20 before and after each feeding if TF to provide full hydration. Do not give feedings at night while pt asleep (during the day only).     6 cartons Jevity 1.5 Mandeep (or equivalent) daily (1.5 cartons at 0800, 1200, 1600, 2000) provides: 2133 kcals (27 kcals/kg), 91 g PRO (1.1 g/kg), 1081 ml free H20, 307 g CHO, and 30 g fiber daily.     Future/Additional Recommendations:  - Monitor TF tolerance, labs, and weight trends  - Monitor need to adjust FWF pending fluid status     EVALUATION OF THE PROGRESS TOWARD GOALS   Diet: NPO    Nutrition Support:   Jevity 1.5 Mandeep (or equivalent) @ goal of 80 ml/hr x 18 hrs from 2 pm - 8 am (1440 ml/day) provides: 2160 kcals (27 kcals/kg), 92 g PRO (1.15 g/kg), 1094 ml free H20, 311 g CHO, and 30 g fiber daily.      FWF: 200 mL Q 4 Hours  Total Free Water (TF + FWF): 2294 mL daily     NEW FINDINGS   Met with pt and spouse in room. Pt interested in bolus feeds. Discussed bolus feeds vs cycled feeds and answered questions. Pt and spouse agreeable to transitioning to bolus feeds today. Discussed advancement schedule. Updated RN on TF orders.     Weight:   12/26/24 0642 80 kg (176 lb 5.9 oz) --   12/24/24 0644 80.2 kg (176 lb 12.9 oz) Bed scale   12/23/24 0655 75.5 kg (166 lb 7.2 oz) Bed scale   12/22/24  0604 79.8 kg (175 lb 14.8 oz) Bed scale   12/16/24 1156 78.9 kg (173 lb 15.1 oz) Bed scale   12/15/24 0700 84.6 kg (186 lb 8.2 oz) Bed scale   12/12/24 0607 80 kg (176 lb 5.9 oz) Bed scale   12/11/24 1605 82 kg (180 lb 11.2 oz) Standing scale     Wt Readings from Last 10 Encounters:   12/26/24 80 kg (176 lb 5.9 oz)   12/06/24 82.2 kg (181 lb 3.5 oz)   11/14/24 78.4 kg (172 lb 13.5 oz)   11/15/24 78.4 kg (172 lb 13.5 oz)   11/06/24 81.6 kg (180 lb)   10/23/24 88.9 kg (195 lb 14.4 oz)   07/10/24 88.7 kg (195 lb 8 oz)   04/23/24 83.5 kg (184 lb)   03/04/24 85.6 kg (188 lb 12.8 oz)   10/10/23 88 kg (194 lb)   10% wt loss in 2 months    Labs:   BUN: 30.5 (H)  Cr: 0.56 (L)    Meds:  Vitamin B-12  Levothyroxine  Miralax  Senna-docusate  Vitamin D3    GI: last BM 12/26 per I/Os    Skin: Derrick 16    MALNUTRITION  % Intake: No decreased intake noted - met with TF  % Weight Loss: > 7.5% in 3 months (severe)  Subcutaneous Fat Loss: Facial region: mild  Muscle Loss: Temporal: mild and Thoracic region (clavicle, acromium bone, deltoid, trapezius, pectoral): mild  Fluid Accumulation/Edema: Trace - Mild  Malnutrition Diagnosis: Moderate malnutrition in the context of acute illness.     Previous Goals   Total avg nutritional intake to meet a minimum of 25 kcal/kg and 1 g PRO/kg daily (per dosing wt 80 kg).   Evaluation: Met    Previous Nutrition Diagnosis  Inadequate oral intake related to inability to meet needs orally as evidenced by NPO order and pt requires EN to meet 100% estimated nutrition needs.   Evaluation: No change    CURRENT NUTRITION DIAGNOSIS  Inadequate oral intake related to inability to meet needs orally as evidenced by NPO order and pt requires EN to meet 100% estimated nutrition needs.     INTERVENTIONS  Implementation  Collaboration with other providers - discussed in team rounds  Enteral Nutrition - bolus feeds  Feeding tube flush    Goals  Total avg nutritional intake to meet a minimum of 25 kcal/kg and 1 g  PRO/kg daily (per dosing wt 80 kg).     Monitoring/Evaluation  Progress toward goals will be monitored and evaluated per protocol.     Janett De La Rosa RD, LD  Available via phone and [employer]  Phone: [phone]  Vocera: 5R Acute Rehab Clinical Dietitian  Weekend/Holiday Vocera: Weekend Holiday Clinical Dietitian [Multi Site Groups]

## 2024-12-27 ENCOUNTER — APPOINTMENT (OUTPATIENT)
Dept: PHYSICAL THERAPY | Facility: CLINIC | Age: 79
DRG: 056 | End: 2024-12-27
Attending: PHYSICAL MEDICINE & REHABILITATION
Payer: MEDICARE

## 2024-12-27 ENCOUNTER — APPOINTMENT (OUTPATIENT)
Dept: OCCUPATIONAL THERAPY | Facility: CLINIC | Age: 79
DRG: 056 | End: 2024-12-27
Attending: PHYSICAL MEDICINE & REHABILITATION
Payer: MEDICARE

## 2024-12-27 PROCEDURE — 99231 SBSQ HOSP IP/OBS SF/LOW 25: CPT | Performed by: PHYSICAL MEDICINE & REHABILITATION

## 2024-12-27 PROCEDURE — 97535 SELF CARE MNGMENT TRAINING: CPT | Mod: GO

## 2024-12-27 PROCEDURE — 97535 SELF CARE MNGMENT TRAINING: CPT | Mod: GO | Performed by: OCCUPATIONAL THERAPIST

## 2024-12-27 PROCEDURE — 97530 THERAPEUTIC ACTIVITIES: CPT | Mod: GP

## 2024-12-27 PROCEDURE — 128N000003 HC R&B REHAB

## 2024-12-27 PROCEDURE — 250N000013 HC RX MED GY IP 250 OP 250 PS 637

## 2024-12-27 PROCEDURE — 250N000011 HC RX IP 250 OP 636

## 2024-12-27 PROCEDURE — 250N000013 HC RX MED GY IP 250 OP 250 PS 637: Performed by: PHYSICAL MEDICINE & REHABILITATION

## 2024-12-27 PROCEDURE — 97530 THERAPEUTIC ACTIVITIES: CPT | Mod: GO

## 2024-12-27 PROCEDURE — 250N000013 HC RX MED GY IP 250 OP 250 PS 637: Performed by: STUDENT IN AN ORGANIZED HEALTH CARE EDUCATION/TRAINING PROGRAM

## 2024-12-27 PROCEDURE — 97110 THERAPEUTIC EXERCISES: CPT | Mod: GP

## 2024-12-27 RX ORDER — OSELTAMIVIR PHOSPHATE 75 MG/1
75 CAPSULE ORAL DAILY
Status: DISCONTINUED | OUTPATIENT
Start: 2024-12-27 | End: 2025-01-01 | Stop reason: HOSPADM

## 2024-12-27 RX ADMIN — ASPIRIN 81 MG CHEWABLE TABLET 81 MG: 81 TABLET CHEWABLE at 21:08

## 2024-12-27 RX ADMIN — PYRIDOSTIGMINE BROMIDE 60 MG: 60 TABLET ORAL at 08:01

## 2024-12-27 RX ADMIN — POLYETHYLENE GLYCOL 3350 17 G: 17 POWDER, FOR SOLUTION ORAL at 08:01

## 2024-12-27 RX ADMIN — Medication 3 MG: at 21:08

## 2024-12-27 RX ADMIN — HEPARIN SODIUM 5000 UNITS: 5000 INJECTION, SOLUTION INTRAVENOUS; SUBCUTANEOUS at 00:28

## 2024-12-27 RX ADMIN — HEPARIN SODIUM 5000 UNITS: 5000 INJECTION, SOLUTION INTRAVENOUS; SUBCUTANEOUS at 16:26

## 2024-12-27 RX ADMIN — FLUTICASONE PROPIONATE 1 SPRAY: 50 SPRAY, METERED NASAL at 08:01

## 2024-12-27 RX ADMIN — PYRIDOSTIGMINE BROMIDE 60 MG: 60 TABLET ORAL at 16:19

## 2024-12-27 RX ADMIN — PYRIDOSTIGMINE BROMIDE 60 MG: 60 TABLET ORAL at 00:28

## 2024-12-27 RX ADMIN — HEPARIN SODIUM 5000 UNITS: 5000 INJECTION, SOLUTION INTRAVENOUS; SUBCUTANEOUS at 08:01

## 2024-12-27 RX ADMIN — CYANOCOBALAMIN TAB 1000 MCG 1000 MCG: 1000 TAB at 08:01

## 2024-12-27 RX ADMIN — LEVOTHYROXINE SODIUM 175 MCG: 0.05 TABLET ORAL at 08:01

## 2024-12-27 RX ADMIN — SENNOSIDES AND DOCUSATE SODIUM 1 TABLET: 50; 8.6 TABLET ORAL at 08:01

## 2024-12-27 RX ADMIN — Medication 25 MCG: at 08:01

## 2024-12-27 RX ADMIN — SENNOSIDES AND DOCUSATE SODIUM 1 TABLET: 50; 8.6 TABLET ORAL at 21:08

## 2024-12-27 RX ADMIN — OSELTAMAVIR PHOSPHATE 75 MG: 75 CAPSULE ORAL at 16:19

## 2024-12-27 RX ADMIN — GUAIFENESIN 200 MG: 200 SOLUTION ORAL at 08:00

## 2024-12-27 ASSESSMENT — ACTIVITIES OF DAILY LIVING (ADL)
ADLS_ACUITY_SCORE: 83
ADLS_ACUITY_SCORE: 83
ADLS_ACUITY_SCORE: 75
ADLS_ACUITY_SCORE: 83
ADLS_ACUITY_SCORE: 75
ADLS_ACUITY_SCORE: 83
ADLS_ACUITY_SCORE: 75
ADLS_ACUITY_SCORE: 75
ADLS_ACUITY_SCORE: 83

## 2024-12-27 NOTE — PLAN OF CARE
Goal Outcome Evaluation:      Plan of Care Reviewed With: patient    Overall Patient Progress: improvingOverall Patient Progress: improving  Pt is alert and oriented x4, he denies pain or discomfort. He is assist of one person using a walker for transfer. He was incontinent of urine and he needed a total assist for cleaning and pad change. We he is going to have primo fit for over night. His cycled tube feeding was changed to bolus/ QID. See order for details. He started at 1600 with half a can, then another half can at 2000. If he tolerated that, he will be advanced to 1.5 cans at a time QID. Dressing was changed to the tube site and no concern. We will continue to monitor and assist.

## 2024-12-27 NOTE — PLAN OF CARE
Discharge Planner Post-Acute Rehab OT:      Discharge Plan: Home with assistance. HC OT services     Precautions: Falls, continuous tube feeding and NPO, BUE weakness with R greater than L  *elevate BUE/BLE's when supine in bed with pillows*  R resting hand orthosis - on overnight/off during day  B T-bar splints only with functional tasks-monitor skin breakdown and edema     Current Status:  ADLs:  Mobility:  Min A stand pivot with walker. A x 2 nursing. Wheelchair based.  Grooming: sba with adapted r angle toothbrush and spoon for ice chips  Dressing: UB mod a pull over shirt LB Max A FWW.  Bathing: pt able to wash face, arms underarms lower stomach and legs, assist with feet pericares stocmach for thourghness and top portion   Toileting: Min A with FWW and commode overlay, CGA standing clothing management, Max A hugo cares ellis steady if fatigued  IADLs: IND prior, supportive spouse  Vision/Cognition: Ox3. Wears corrective lenses     Assessment: Pt continues to fluctuate between mod A and CGA for STS transfers using FWW. Measured width of standard commode to ensure fit in 30 inch wide space next to toilet. BP stable throughout session with SBP > 100.     Other Barriers to Discharge (DME, Family Training, etc):   Family training prior to discharge  AE/DME: pending progress; spouse wishes to purchase: commode overlay, bed rails, hip kit/dressing kit, and shower chair w arms from [employer]. Says she will reach out to contractor about installing gb inside shower.

## 2024-12-27 NOTE — PROGRESS NOTES
[facility]   Acute Rehabilitation Unit  Daily progress note    INTERVAL HISTORY  No acute events overnight. Patient notes that left lower abdomen pain is improving.  Denies chest pain, shortness of breath, no fever or chills.  Reviewed therapy goals.   BP labile, reports of orthostatics at end of therapy this AM. Continue to encourage PO intake with ice chips.       Functionally:   OT:  Current Status:  ADLs:  Mobility:  Min A stand pivot with walker. A x 2 nursing. Wheelchair based.  Grooming: sba with adapted r angle toothbrush and spoon for ice chips  Dressing: UB mod a pull over shirt LB Max A FWW.  Bathing: pt able to wash face, arms underarms lower stomach and legs, assist with feet pericares stocmach for thourghness and top portion   Toileting: Min A with FWW and commode overlay, CGA standing clothing management, Max A hugo cares ellis steady if fatigued  IADLs: IND prior, supportive spouse  Vision/Cognition: Ox3. Wears corrective lenses      ROS: 10 point ROS neg other than the symptoms noted above in the HPI.        MEDICATIONS  Scheduled meds  Current Facility-Administered Medications   Medication Dose Route Frequency Provider Last Rate Last Admin    aspirin (ASA) chewable tablet 81 mg  81 mg Oral or Feeding Tube QPM Sachin Sorensen DO   81 mg at 12/26/24 2044    cyanocobalamin (VITAMIN B-12) tablet 1,000 mcg  1,000 mcg Oral or Feeding Tube Daily Sachin Sorensen DO   1,000 mcg at 12/27/24 0801    fluticasone (FLONASE) 50 MCG/ACT spray 1 spray  1 spray Both Nostrils Daily Azul Livingston MD   1 spray at 12/27/24 0801    guaiFENesin (ROBITUSSIN) 20 mg/mL solution 200 mg  200 mg Oral or Feeding Tube Daily Azul Livingston MD   200 mg at 12/27/24 0800    heparin ANTICOAGULANT injection 5,000 Units  5,000 Units Subcutaneous Q8H Azul Livingston MD   5,000 Units at 12/27/24 0801    levothyroxine (SYNTHROID/LEVOTHROID) tablet 175 mcg  175 mcg Oral or  Feeding Tube Daily Azul Livingston MD   175 mcg at 12/27/24 0801    Lidocaine (LIDOCARE) 4 % Patch 1 patch  1 patch Transdermal Q24h Sachin Sorensen DO   1 patch at 12/23/24 1958    melatonin tablet 3 mg  3 mg Oral or Feeding Tube At Bedtime Sachin Sorensen DO   3 mg at 12/26/24 2227    polyethylene glycol (MIRALAX) Packet 17 g  17 g Oral Daily Samuel Camara MD   17 g at 12/27/24 0801    pyRIDostigmine (MESTINON) tablet 60 mg  60 mg Oral or Feeding Tube Q8H Sachin Sorensen DO   60 mg at 12/27/24 0801    senna-docusate (SENOKOT-S/PERICOLACE) 8.6-50 MG per tablet 1 tablet  1 tablet Oral BID Sveta Schultz MD   1 tablet at 12/27/24 0801    [Held by provider] simvastatin (ZOCOR) tablet 10 mg  10 mg Oral Daily Azul Livingston MD        Vitamin D3 (CHOLECALCIFEROL) tablet 25 mcg  25 mcg Oral or Feeding Tube Daily Sachin Sorensen DO   25 mcg at 12/27/24 0801       PRN meds:  Current Facility-Administered Medications   Medication Dose Route Frequency Provider Last Rate Last Admin    acetaminophen (TYLENOL) tablet 650 mg  650 mg Oral or Feeding Tube Q4H PRN Sachin Sorensen DO   650 mg at 12/23/24 1958    artificial saliva (BIOTENE MT) solution 2 spray  2 spray Swish & Spit Q1H PRN Azul Livingston MD        bisacodyl (DULCOLAX) suppository 10 mg  10 mg Rectal Daily PRN Sveta Schultz MD   10 mg at 12/18/24 1558    dextrose 10% infusion   Intravenous Continuous PRN Azul Livingston MD        olopatadine (PATANOL) 0.1 % ophthalmic solution 1 drop  1 drop Both Eyes BID PRN Azul Livingston MD   1 drop at 12/26/24 1126    polyethylene glycol (MIRALAX) Packet 17 g  17 g Oral Daily PRN Sveta Schultz MD   17 g at 12/18/24 0813    sodium chloride (OCEAN) 0.65 % nasal spray 1 spray  1 spray Both Nostrils Q1H Sveta Bain MD   1 spray at 12/26/24 0910         PHYSICAL EXAM  BP (!) 147/61 (Patient Position: Supine)   Pulse 51   Temp 97.4  F (36.3  C)  "(Oral)   Resp 18   Ht 1.778 m (5' 10\")   Wt 80.2 kg (176 lb 12.9 oz)   SpO2 97%   BMI 25.37 kg/m    General: NAD, resting in bed  HEENT: NCAT, EOMI.  Cardio: Well perfused. Sinus bradycardia  Pulm: Breathing comfortably on room air. Symmetrical chest rise.   Abd: Non distended. Scattered bruises. Mild tenderness on LLQ - improved   Neuro/MSK: Able to move all 4 extremities.  EXT: No LE edema bilaterally. Ecchymosis along the dorsal upper and lower right arm improving     LABS    Lab Results   Component Value Date    WBC 3.7 12/26/2024    WBC 10.2 11/06/2020     Lab Results   Component Value Date    RBC 2.09 12/26/2024    RBC 4.30 11/06/2020     Lab Results   Component Value Date    HGB 7.4 12/26/2024    HGB 14.7 11/06/2020     Lab Results   Component Value Date    HCT 23.4 12/26/2024    HCT 43.0 11/06/2020     Lab Results   Component Value Date     12/26/2024     11/06/2020     Lab Results   Component Value Date    MCH 35.4 12/26/2024    MCH 34.2 11/06/2020     Lab Results   Component Value Date    MCHC 31.6 12/26/2024    MCHC 34.2 11/06/2020     Lab Results   Component Value Date    RDW 17.7 12/26/2024    RDW 13.5 11/06/2020     Lab Results   Component Value Date     12/26/2024     11/06/2020     Last Comprehensive Metabolic Panel:  Lab Results   Component Value Date     12/26/2024    POTASSIUM 5.0 12/26/2024    CHLORIDE 106 12/26/2024    CO2 28 12/26/2024    ANIONGAP 5 (L) 12/26/2024     (H) 12/26/2024    BUN 30.5 (H) 12/26/2024    CR 0.56 (L) 12/26/2024    GFRESTIMATED >90 12/26/2024    SIMONA 8.5 (L) 12/26/2024               ASSESSMENT AND PLAN  Mendez Greene is a 79 year old male with past medical history of paroxysmal atrial fibrillation, CAD, ascending aortic aneurysm, HLD, HTN, hypothyroidism, CHA admitted to ICU service on 11/30/2024 for acute respiratory failure and AMS. Patient was recently hospitalized at [facility] from 11/8/24-11/15/24 for progressive weakness and " fatigue, difficulty swallowing. After discharge, his ACh receptor binding Ab came back 2.11, strongly suggestive of myasthenia gravis. Presented back to [facility] ED 11/30 with hypotension and acute respiratory failure. Patient was intubated in the ED and admitted to ICU on pressors. During admission patient received 5 doses of IVIG for myasthenic crisis and was initiated on Mestinon. Hospital course also included broad spectrum antibiotics for possible septic shock due to UTI, MOHAMUD, electrolyte derangements, bradycardia, hypertension.  Now admitted to acute rehab.     Admission to acute inpatient rehab 12/11/2024.    Impairment group code: 03.8 Neuromuscular Disorders; new diagnosis of myasthenia gravis with myasthenia crisis         PT and OT 75 minutes of each as well as SLP 30 minutes on a daily basis, in addition to rehab nursing and close management of physiatrist.       Impairment of ADL's: Impairment in strength, endurance, and ROM resulting in functional limitations in patient's ability to perform ADLs     Impairment of mobility:  Impairment in strength, endurance, and ROM resulting in functional limitations in patient's ability to perform functional mobility      Impairment of cognition/language/swallow:  Needs therapy for swallowing.     Medical Conditions     #Myasthenic crisis  Questionable ALS diagnosis which is being deferred to his primary neurologist.  - clinically much improved with improvement in upper extremity strength; still with some upper extremity flexion weakness and LE weakness  - completed 5 doses of IVIG on 12/5/24   - Caution use of IV magnesium, beta-blocker, statin, macrolides, aminoglycosides, and fluoroquinolone, and as much as possible avoid them due to to potential exacerbation of myasthenia crisis. ; Holding PTA statin  - Monitor respiratory status closely, currently stable on room air  - CPAP at   - Pyridostigmine 60 mg PO TID  - Consider CT chest with contrast for r/o thymoma while  on rehab     #H/o neobladder  - was on snowden catheter in neobladder, discontinued 12/10 and voiding well, chronically incontinent     #Hypernatremia  #Hyperchloremia  Na 149 day of admission to rehab (12/11), Cl 117, creatinine 0.57 (at baseline). Had issues with hypernatremia on acute service, up to 152. Acute service increased free water flushes and gave 1L D5.   - Increased free water flushes to 200 ml q 4hrs - 12/18  - BMP M/Th     #Chronic macrocytic anemia  #Thrombocytopenia  #Leukopenia   Hgb has mostly been around 9-10 on acute service; noted slight downtrend ---8.5--7.8 (12/11), likely dilutional with IV fluids as platelets and hematocrit also decreased slightly; has no clinical concern for active bleed and has been hemodynamically stable. B12 was 727, folic acid wnl on 11/12. Was seen by hematology 4/2024, workup unremarkable. On ARU Hgb dropped to 7.3 on 12/19. Platelets were decreased at the time of admission to ARU but WNL 12/16. Leukopenia first noted 12/19 WBC 3.9.  - CBC M/Th  - Transfuse hgb <7 (has not required transfusion this admission)  - PTA B12 supplementation     #Sinus bradycardia  HR 40s to upper 50s during admission, dips the most when sleeping. Previous EKG sinus bradycardia with no high grade blocks. Chart review shows similar HR 40-50s last hospitalization, and he has a few OP visits with documented sinus bradycardia (7/10/24, 3/4/24).  - OK w HR 40's-50's  - CTM, further evaluation if ever symptomatic     #Moderate to severe oropharyngeal dysphagia s/p G-tube  VFSS on 12/9 while on acute service, note that he continues to aspirate all textures but improved ability to cough; SLP recommend continued NPO. VFSS on 12/18 continued to have moderate-severe oropharyngeal and pharyngeal dysphagia with possible aspiration. SLP gave permission for 3 teaspoons of thin liquid a day for quality of life. Will continue to NPO with next VFSS after 1/1/2025.   - 12/13 200 mg guaifenesin to help loosen  mucus secretions. Emphasize the importance of eating ice chips so mucous does not become hard  - Receive all nutrition via tube feeds  - TF cycled to 18hrs/day on 12/19  - BMP, Mg & Phos M/Th     #CAD  #HLD  #Paroxysmal atrial fibrillation  #Recent NSTEMI (11/8/24)  Troponin 997 on acute service, cardiac cath 11/11/24 showed only mild CAD. Echo 12/1/24 difficult study but grossly normal left and right ventricular function, no severe valvular regurgitation, probably mild aortic regurgitation, no pericardial effusion.  Not in afib on admission to rehab.  - Continue PTA ASA  - HOLD PTA Simvastatin until follow-up with neurology  - No rate control medications at baseline     #Hypothyroidism  - Increased 12/19 levothyroxine 175 mcg daily  - 12/16 TSH elevated (7.53) and T4 low (0.83).  - Recheck TSH and T4 around 1/16 (4 weeks from dose increase)    #CHA  - PTA CPAP HS, 12/16 order changed to allow for CPAP during naps    #Extremity Swelling  Significant swelling in the right arm as well as bilateral LE was noted on 12/12. Patient denies associated pain. 12/13 swelling in right and left leg improved, still significant swelling in left LE.  - Wrapped arm and lower extremities in compression bandage  - Right Arm Duplex US negative 12/12    #Constipation  Has not had a reported bowel movement since 12/11, but is passing flatulence. No reported abdominal pain. Consider encouraging suppository if he still has not had BM by 12/17.   - Senna Q12H  - Miralax daily, scheduled on 12/24  - Dulcolax daily PRN     #Severe malnutrition in the context of acute illness or injury  - Eval and treat per RD recommendation. Diet as below.    #Abdominal strain - improving   Occurred 12/23, appears to be related to soft tissue strain, pain with shallow palpation and movement. Lidocaine patch ineffective. Unlikely that this represents GI pathology, but will also try to optimize patient's constipation as noted above unless this is contributing.  Monitor for stability/worsening.      Adjustment to disability:  Clinical psychology to eval and treat  FEN: NPO except for ice Chips. Receiving Tube Feeding Adult Formula Drip Feeding: Continuous Jevity 1.5; Gastrostomy at 55 mL/h with free water flushes 200 ml q 4 hrs. PTA Vit D supplementation.   Bowel: See above  Bladder: No snowden in place, PVRs on admission. 12/12 2 elevated PVRs in 100s and 300s range. Patient has neobladder and is incontinent at baseline. Will continue to assess output and any symptoms of fullness. No need for repeat PVRs at this time.  DVT Prophylaxis: Subcutaneous heparin   GI Prophylaxis: Na  Code: Full  Disposition: Home with family support.  ELOS:  21 Days.  Rehab prognosis:  Good  Follow up Appointments on Discharge:   PCP  Neurology, Dr. Fabian or Dr. Ashley   PM&R  Cardiology  Urology for assessment of neobladder and options to reduce nighttime leaking      Sachin Sorensen,     Date of Service (when I saw the patient): 12/27/24

## 2024-12-27 NOTE — PROGRESS NOTES
Team huddle today; targeting a discharge of 01/01 with HC. RN/PT/OT/SLP/HHA.Does not want [facility]. Patient's spouse is the main contact. [facility] already set up.RN CC following (TF)- Going home on tube feeding.      RONAK Ignacio  [facility], Acute Inpatient Rehab Unit   [address] [place] [address], [address] [place] [address]  Phone: [phone]  Fax: [phone]

## 2024-12-27 NOTE — PLAN OF CARE
Discharge Planner Post-Acute Rehab PT:      Discharge Plan: Home with wife. Home care PT.     Precautions: Falls, NPO/PEG,   R resting hand orthosis - on overnight/off during day  B T-bar splints only with functional tasks     Current Status:  Bed Mobility: SBA-Shai for LE managment  Transfer: SBA from >23'' heights w/ FWW, up to modA from standard chairs ~18''.   Gait: Up to 250ft using FWW CGA-SBAx1   Stairs: sons plan to bump-up in wc  Balance: Poor static, dynamic standing balance w/out UE support     Outcome Measures:   Garcia              12/12: 12/56  5TSTS              12/12: 0x (requires physical assist even with UE assist)   12.26: 0x. (Unable to stand from standard chair, able to stand from 22'' with UE push)  6MWT              12/12: 40 ft, FWW and CGA (stopped early due to fatigue)     Assessment: Both and PM sessions limited by symptomatic orthostatic BP down to 77/37, returns to WFL upon return to supine. Compression donned throughout session, with gradual transitions from supine>sit>stand.     Other Barriers to Discharge (DME, Family Training, etc):   Equipment: Pt owns wheelchair, FWW  Family training- set for 12/30 2-4 PM. Car transfer, bed mobility, boost from chair.   2 NEELAM without rail- pt yuliana plan to bump-up in wheelchair , have performed this before, decline FT on this.

## 2024-12-27 NOTE — PLAN OF CARE
FOCUS/GOAL  Bladder management, Nutrition/Feeding/Swallowing precautions, and Medication management    ASSESSMENT, INTERVENTIONS AND CONTINUING PLAN FOR GOAL:  Pt was able to tolerate 2nd tube feeding of half carton without any difficulties.  Advance to 1 carton for next feed.  Wears primo fit on at night.  Denies pain; alert and oriented x4, uses call light appropriately.  No BM this shift; LBM 12/26.  NPO all meds crushed through PEG tube.  Goal Outcome Evaluation:

## 2024-12-27 NOTE — PLAN OF CARE
Goal Outcome Evaluation:      Plan of Care Reviewed With: patient    Overall Patient Progress: no change    Orientation: AOX4  Bowel: Continent LBM: 12/27  Bladder: primofit at HS  Pain: denied  Ambulation/Transfers: Up with A1 walker and gait belt   Diet/Liquids: NPO except ice chips; on Bolus TF 4x daily; meds crushed given thru PEG tube  Tubes/Lines/Drains: PEG tube  Skin: edema on BLE; PEG site; bruises on abdomen.      Uses call light appropriately. Able to make needs known. Continue POC

## 2024-12-27 NOTE — PROGRESS NOTES
BRIEF NOTE  Started tamiflu with patient consent on 12/27/24. Continue for 5 days, pharmacy will adjust if patient develops influenza.

## 2024-12-28 ENCOUNTER — APPOINTMENT (OUTPATIENT)
Dept: PHYSICAL THERAPY | Facility: CLINIC | Age: 79
DRG: 056 | End: 2024-12-28
Attending: PHYSICAL MEDICINE & REHABILITATION
Payer: MEDICARE

## 2024-12-28 ENCOUNTER — APPOINTMENT (OUTPATIENT)
Dept: OCCUPATIONAL THERAPY | Facility: CLINIC | Age: 79
DRG: 056 | End: 2024-12-28
Attending: PHYSICAL MEDICINE & REHABILITATION
Payer: MEDICARE

## 2024-12-28 PROCEDURE — 250N000013 HC RX MED GY IP 250 OP 250 PS 637: Performed by: PHYSICAL MEDICINE & REHABILITATION

## 2024-12-28 PROCEDURE — 97750 PHYSICAL PERFORMANCE TEST: CPT | Mod: GP

## 2024-12-28 PROCEDURE — 97530 THERAPEUTIC ACTIVITIES: CPT | Mod: GP

## 2024-12-28 PROCEDURE — 128N000003 HC R&B REHAB

## 2024-12-28 PROCEDURE — 250N000013 HC RX MED GY IP 250 OP 250 PS 637: Performed by: STUDENT IN AN ORGANIZED HEALTH CARE EDUCATION/TRAINING PROGRAM

## 2024-12-28 PROCEDURE — 97110 THERAPEUTIC EXERCISES: CPT | Mod: GO | Performed by: STUDENT IN AN ORGANIZED HEALTH CARE EDUCATION/TRAINING PROGRAM

## 2024-12-28 PROCEDURE — 250N000011 HC RX IP 250 OP 636

## 2024-12-28 PROCEDURE — 250N000013 HC RX MED GY IP 250 OP 250 PS 637

## 2024-12-28 PROCEDURE — 97530 THERAPEUTIC ACTIVITIES: CPT | Mod: GO | Performed by: STUDENT IN AN ORGANIZED HEALTH CARE EDUCATION/TRAINING PROGRAM

## 2024-12-28 PROCEDURE — 99231 SBSQ HOSP IP/OBS SF/LOW 25: CPT | Performed by: PHYSICAL MEDICINE & REHABILITATION

## 2024-12-28 RX ADMIN — OSELTAMAVIR PHOSPHATE 75 MG: 75 CAPSULE ORAL at 09:42

## 2024-12-28 RX ADMIN — OLOPATADINE HYDROCHLORIDE 1 DROP: 1 SOLUTION OPHTHALMIC at 09:38

## 2024-12-28 RX ADMIN — PYRIDOSTIGMINE BROMIDE 60 MG: 60 TABLET ORAL at 16:11

## 2024-12-28 RX ADMIN — PYRIDOSTIGMINE BROMIDE 60 MG: 60 TABLET ORAL at 01:11

## 2024-12-28 RX ADMIN — CYANOCOBALAMIN TAB 1000 MCG 1000 MCG: 1000 TAB at 09:38

## 2024-12-28 RX ADMIN — HEPARIN SODIUM 5000 UNITS: 5000 INJECTION, SOLUTION INTRAVENOUS; SUBCUTANEOUS at 16:26

## 2024-12-28 RX ADMIN — GUAIFENESIN 200 MG: 200 SOLUTION ORAL at 09:38

## 2024-12-28 RX ADMIN — ASPIRIN 81 MG CHEWABLE TABLET 81 MG: 81 TABLET CHEWABLE at 21:14

## 2024-12-28 RX ADMIN — FLUTICASONE PROPIONATE 1 SPRAY: 50 SPRAY, METERED NASAL at 09:42

## 2024-12-28 RX ADMIN — PYRIDOSTIGMINE BROMIDE 60 MG: 60 TABLET ORAL at 09:37

## 2024-12-28 RX ADMIN — HEPARIN SODIUM 5000 UNITS: 5000 INJECTION, SOLUTION INTRAVENOUS; SUBCUTANEOUS at 01:11

## 2024-12-28 RX ADMIN — HEPARIN SODIUM 5000 UNITS: 5000 INJECTION, SOLUTION INTRAVENOUS; SUBCUTANEOUS at 09:37

## 2024-12-28 RX ADMIN — LEVOTHYROXINE SODIUM 175 MCG: 0.05 TABLET ORAL at 10:42

## 2024-12-28 RX ADMIN — Medication 3 MG: at 21:14

## 2024-12-28 RX ADMIN — SENNOSIDES AND DOCUSATE SODIUM 1 TABLET: 50; 8.6 TABLET ORAL at 09:37

## 2024-12-28 RX ADMIN — SENNOSIDES AND DOCUSATE SODIUM 1 TABLET: 50; 8.6 TABLET ORAL at 21:14

## 2024-12-28 RX ADMIN — Medication 25 MCG: at 09:37

## 2024-12-28 ASSESSMENT — ACTIVITIES OF DAILY LIVING (ADL)
ADLS_ACUITY_SCORE: 75

## 2024-12-28 NOTE — PROGRESS NOTES
[facility], [place]   Physical Medicine and Rehabilitation Daily Note           Assessment and Plan of Care:   Mendez Greene is a 79 year old male with past medical history of paroxysmal atrial fibrillation, CAD, ascending aortic aneurysm, HLD, HTN, hypothyroidism, CHA admitted to ICU service on 11/30/2024 for acute respiratory failure and AMS. Patient was recently hospitalized at [facility] from 11/8/24-11/15/24 for progressive weakness and fatigue, difficulty swallowing. After discharge, his ACh receptor binding Ab came back 2.11, strongly suggestive of myasthenia gravis. Presented back to [facility] ED 11/30 with hypotension and acute respiratory failure. Patient was intubated in the ED and admitted to ICU on pressors. During admission patient received 5 doses of IVIG for myasthenic crisis and was initiated on Mestinon. Hospital course also included broad spectrum antibiotics for possible septic shock due to UTI, MOHAMUD, electrolyte derangements, bradycardia, hypertension. Admission to acute inpatient rehab 12/11/2024.      --Vitals stable (occasional soft BP). No labs today.  --Continue ongoing medical management.  --Continue therapies and plan of care.           Interval history:   Patient seen and examined at bedside. Per nursing report, no acute events overnight. Tolerated CPAP overnight. Wife is present today.      Today, patient states that he is doing well. He has been getting fluid boluses in his Gtube every 4 hours or so, and is tolerating that ok. Also tolerating bolus feeds. Denies fever, chills, CP, SOB, N/V, abdominal pain, new pain or weakness/numbness/tingling.     The patient is fully participating in therapies and is making progress. He is SBA-Shai for bed mobility, SBA with FWW up to mod A for transfers, ambulating up to 250 feet with FWW CGA SBAx1. Balance is impaired.           Physical Exam:   VS:   Vitals:    12/27/24 0900 12/27/24 0915 12/27/24 1833 12/28/24 0821   BP: (!)  77/37 (!) 147/61 105/49 122/43   BP Location:   Left arm Left arm   Patient Position: Standing Supine     Pulse:   54 56   Resp:   16    Temp:   97.5  F (36.4  C) 97.4  F (36.3  C)   TempSrc:   Oral Oral   SpO2:   97% 96%   Weight:       Height:         Gen: NAD, resting comfortably in manual wheelchair  Heart: RRR, no murmurs  Lungs: breathing unlabored on room air, lungs CTA B  Abd: soft and non-tender, Gtube site c/d/i  Ext: no edema in BLE, no calf tenderness  MSK/neuro: Alert, speech fluent, moves all four extremities volitionally.          Data:   Scheduled meds  Current Facility-Administered Medications   Medication Dose Route Frequency Provider Last Rate Last Admin    aspirin (ASA) chewable tablet 81 mg  81 mg Oral or Feeding Tube QPM Sachin Sorensen DO   81 mg at 12/27/24 2108    cyanocobalamin (VITAMIN B-12) tablet 1,000 mcg  1,000 mcg Oral or Feeding Tube Daily Sachin Sorensen DO   1,000 mcg at 12/28/24 0938    fluticasone (FLONASE) 50 MCG/ACT spray 1 spray  1 spray Both Nostrils Daily Azul Livingston MD   1 spray at 12/28/24 0942    guaiFENesin (ROBITUSSIN) 20 mg/mL solution 200 mg  200 mg Oral or Feeding Tube Daily Azul Livingston MD   200 mg at 12/28/24 0938    heparin ANTICOAGULANT injection 5,000 Units  5,000 Units Subcutaneous Q8H Azul Livingston MD   5,000 Units at 12/28/24 0937    levothyroxine (SYNTHROID/LEVOTHROID) tablet 175 mcg  175 mcg Oral or Feeding Tube Daily Azul Livingston MD   175 mcg at 12/28/24 1042    Lidocaine (LIDOCARE) 4 % Patch 1 patch  1 patch Transdermal Q24h Sachin Sorensen DO   1 patch at 12/23/24 1958    melatonin tablet 3 mg  3 mg Oral or Feeding Tube At Bedtime Sachin Sorensen DO   3 mg at 12/27/24 2108    oseltamivir (TAMIFLU) capsule 75 mg  75 mg Oral Daily Sam Llanos MD   75 mg at 12/28/24 0942    polyethylene glycol (MIRALAX) Packet 17 g  17 g Oral Daily Samuel Camara MD   17 g at 12/27/24 0801    pyRIDostigmine  (MESTINON) tablet 60 mg  60 mg Oral or Feeding Tube Q8H Sachin Sorensen DO   60 mg at 12/28/24 0937    senna-docusate (SENOKOT-S/PERICOLACE) 8.6-50 MG per tablet 1 tablet  1 tablet Oral BID Sveta Schultz MD   1 tablet at 12/28/24 0937    [Held by provider] simvastatin (ZOCOR) tablet 10 mg  10 mg Oral Daily Azul Livingston MD        Vitamin D3 (CHOLECALCIFEROL) tablet 25 mcg  25 mcg Oral or Feeding Tube Daily Sachin Sorensen DO   25 mcg at 12/28/24 0937       PRN meds:  Current Facility-Administered Medications   Medication Dose Route Frequency Provider Last Rate Last Admin    acetaminophen (TYLENOL) tablet 650 mg  650 mg Oral or Feeding Tube Q4H PRN Sachin Sorensen DO   650 mg at 12/23/24 1958    artificial saliva (BIOTENE MT) solution 2 spray  2 spray Swish & Spit Q1H PRN Azul Livingston MD        bisacodyl (DULCOLAX) suppository 10 mg  10 mg Rectal Daily PRN Sveta Schultz MD   10 mg at 12/18/24 1558    dextrose 10% infusion   Intravenous Continuous PRN Azul Livingston MD        olopatadine (PATANOL) 0.1 % ophthalmic solution 1 drop  1 drop Both Eyes BID PRN Azul Livingston MD   1 drop at 12/28/24 0938    polyethylene glycol (MIRALAX) Packet 17 g  17 g Oral Daily PRN Sveta Schultz MD   17 g at 12/18/24 0813    sodium chloride (OCEAN) 0.65 % nasal spray 1 spray  1 spray Both Nostrils Q1H PRN Sveta Schultz MD   1 spray at 12/26/24 0910       Joanna Simpson MD  Physical Medicine and Rehabilitation     I spent a total of 25 minutes face-to-face and managing the care of Mendez Greene. Over 50% of my time on the unit was spent counseling the patient and coordinating care. Please see note for details.

## 2024-12-28 NOTE — PROGRESS NOTES
12/28/24 0800   Signing Clinician's Name / Credentials   Signing clinician's name / credentials Angel Garcia DPT   Garcia Balance Scale (SARINA ROSADO, ARSH S, ALLIE UNDERWOOD, FOREIGN SOLORZANO: MEASURING BALANCE IN THE ELDERLY: VALIDATION OF AN INSTRUMENT. CAN. J. PUB. HEALTH, JULY/AUGUST SUPPLEMENT 2:S7-11, 1992.)   Sit To Stand 1   Standing Unsupported 3   Sitting Unsupported 4   Stand to Sit 1   Transfers 1   Standing with Eyes Closed 3   Standing Unsupported, Feet Together 1   Reach Forward With Outstretched Arm 2   Retrieve Object From Floor 1   Turning to Look Behind 2   Turn 360 Degrees 1   Placing Alternate Foot on Stool (4-6 inches) 0   Unsupported Tandem Stand (Demonstrate to Subject) 2   One Leg Stand 1   Total Score (A score of 45 or less has been correlated with an increased risk of falls)   Total Score (out of 56) 23     Garcia Balance Scale (BBS) Cutoff Scores: A score of < 45 /56 indicates an increased risk for falls.     The BBS is a measure of static and dynamic standing balance that has been validated in community dwelling elderly individuals and individuals who have Parkinson's Disease, MS, and those who are s/p CVA and TBI. The test is administered without an assistive device. Scores from the Garcia are used to determine the probability of falling based on the patient's previous history of falls and their test performance.     Minimal Detectable Change = 6.5   & Minimal Detectable Change (Parkinson's Disease) = 5 according to Gerald & Adamey 2008    Assessment (rationale for performing, application to patient s function & care plan): Garcia Balance Scale performed to assess pt's current balance function and risk for falling. Pt currently remains at increased risk for falls evidenced by scoring above. Pt has demonstrated progress from previous score of 12/56 on 12/12 which has shown a significant difference. Plan to continue balance and mobility training while IP to improve safety.  (Minutes billed as  physical performance test)

## 2024-12-28 NOTE — PLAN OF CARE
"Discharge Planner Post-Acute Rehab PT:      Discharge Plan: Home with wife. Home care PT.     Precautions: Falls, NPO/PEG,   R resting hand orthosis - on overnight/off during day  B T-bar splints only with functional tasks     Current Status:  Bed Mobility: SBA-Shai for LE managment  Transfer: SBA from >23'' heights w/ FWW, up to modA from standard chairs ~18''.   Gait: Up to 250ft using FWW CGA-SBAx1   Stairs: sons plan to bump-up in wc  Balance: Poor static, dynamic standing balance w/out UE support     Outcome Measures:   Garcia              12/12: 12/56 12/28: 23/56  5TSTS              12/12: 0x (requires physical assist even with UE assist)   12.26: 0x. (Unable to stand from standard chair, able to stand from 22'' with UE push)  6MWT              12/12: 40 ft, FWW and CGA (stopped early due to fatigue)     Assessment: Pt orthostatic again in AM session despite compression, improves over time. Improved Garcia score, remains falls risk. Able to perform 30\" height bed mobility with 4\" step stool and bed rail, Shai for LE management.    Other Barriers to Discharge (DME, Family Training, etc):   Equipment: Pt owns wheelchair, FWW  Family training- set for 12/30 2-4 PM. Car transfer, bed mobility, boost from chair.   2 NEELAM without rail- pt yuliana plan to bump-up in wheelchair , have performed this before, decline FT on this.   "

## 2024-12-28 NOTE — PLAN OF CARE
Goal Outcome Evaluation:      Plan of Care Reviewed With: patient    Overall Patient Progress: no changeOverall Patient Progress: no change     Patient alert and oriented, calls appropriately  Slept most of the night, wears CPAP   No complained of pain, no respiratory distress, appeared to be comfortable on bed during rounds  Primo fit at night, no BM   Safety checks done, fall prevention maintained, bed alarm ON  No raised concern overnight  Plan of care ongoing

## 2024-12-28 NOTE — PLAN OF CARE
Discharge Planner Post-Acute Rehab OT:      Discharge Plan: Home with assistance. HC OT services     Precautions: Falls, continuous tube feeding and NPO, BUE weakness with R greater than L  *elevate BUE/BLE's when supine in bed with pillows*  R resting hand orthosis - on overnight/off during day  B T-bar splints only with functional tasks-monitor skin breakdown and edema     Current Status:  ADLs:  Mobility:  Min A stand pivot with walker. A x 2 nursing. Wheelchair based.  Grooming: sba with adapted r angle toothbrush and spoon for ice chips  Dressing: UB mod a pull over shirt LB Max A FWW.  Bathing: pt able to wash face, arms underarms lower stomach and legs, assist with feet pericares stocmach for thourghness and top portion   Toileting: Min A with FWW and commode overlay, CGA standing clothing management, Max A hugo cares ellis steady if fatigued  IADLs: IND prior, supportive spouse  Vision/Cognition: Ox3. Wears corrective lenses     Assessment: Focus on proximal shoulder strengthening, hand function and standing tolerance.   Pt progressing.      Other Barriers to Discharge (DME, Family Training, etc):   Family training prior to discharge  AE/DME: pending progress; spouse wishes to purchase: commode overlay, bed rails, hip kit/dressing kit, and shower chair w arms from [employer]. Says she will reach out to contractor about installing gb inside shower.

## 2024-12-28 NOTE — PLAN OF CARE
"Goal Outcome Evaluation:    Patient is alert and oriented. Able to make needs known. Denied CP, SOB, and N/V. Continent of bowel and has on primofit. Bolus feeding given at 2000 via PEG tube. Medications given via PEG tube. Call light within reach. No acute issue this time. Will continue to follow POC.    /49 (BP Location: Left arm)   Pulse 54   Temp 97.5  F (36.4  C) (Oral)   Resp 16   Ht 1.778 m (5' 10\")   Wt 80.2 kg (176 lb 12.9 oz)   SpO2 97%   BMI 25.37 kg/m         "

## 2024-12-28 NOTE — PROGRESS NOTES
"VS: /43 (BP Location: Left arm)   Pulse 56   Temp 97.4  F (36.3  C) (Oral)   Resp 16   Ht 1.778 m (5' 10\")   Wt 80.2 kg (176 lb 12.9 oz)   SpO2 96%   BMI 25.37 kg/m     O2: SpO2 stable on RA. LS clear and equal bilaterally. Denies chest pain and SOB.    Output: Voids spontaneously without difficulty to bedside urinal.    Last BM: 12/27, denies abdominal discomfort. BS active / passing flatus.    Activity: Up with assist of one stand pivot and assist of one with gait belt and walker.    Skin: WDL scattered bruising on abdomen.    Pain: Denies pain.    CMS: Intact, AOx4. Denies numbness and tingling.   Dressing: PEG dressing CDI.    Diet: NPO. Ice chips okay. TF bolus: 0800, 1200, 1600 and 2000.    LDA: No IV access.    Equipment: Personal belongings at bedside.   Plan: Continue with plan of care. Call light within reach, pt able to make needs known.    Additional Info: Wife at bedside.        "

## 2024-12-29 ENCOUNTER — APPOINTMENT (OUTPATIENT)
Dept: PHYSICAL THERAPY | Facility: CLINIC | Age: 79
DRG: 056 | End: 2024-12-29
Attending: PHYSICAL MEDICINE & REHABILITATION
Payer: MEDICARE

## 2024-12-29 ENCOUNTER — APPOINTMENT (OUTPATIENT)
Dept: OCCUPATIONAL THERAPY | Facility: CLINIC | Age: 79
DRG: 056 | End: 2024-12-29
Attending: PHYSICAL MEDICINE & REHABILITATION
Payer: MEDICARE

## 2024-12-29 PROCEDURE — 250N000013 HC RX MED GY IP 250 OP 250 PS 637: Performed by: PHYSICAL MEDICINE & REHABILITATION

## 2024-12-29 PROCEDURE — 250N000013 HC RX MED GY IP 250 OP 250 PS 637

## 2024-12-29 PROCEDURE — 97530 THERAPEUTIC ACTIVITIES: CPT | Mod: GP

## 2024-12-29 PROCEDURE — 250N000013 HC RX MED GY IP 250 OP 250 PS 637: Performed by: STUDENT IN AN ORGANIZED HEALTH CARE EDUCATION/TRAINING PROGRAM

## 2024-12-29 PROCEDURE — 128N000003 HC R&B REHAB

## 2024-12-29 PROCEDURE — 97116 GAIT TRAINING THERAPY: CPT | Mod: GP

## 2024-12-29 PROCEDURE — 250N000011 HC RX IP 250 OP 636

## 2024-12-29 PROCEDURE — 97110 THERAPEUTIC EXERCISES: CPT | Mod: GO

## 2024-12-29 PROCEDURE — 97535 SELF CARE MNGMENT TRAINING: CPT | Mod: GO

## 2024-12-29 RX ADMIN — LEVOTHYROXINE SODIUM 175 MCG: 0.05 TABLET ORAL at 08:14

## 2024-12-29 RX ADMIN — HEPARIN SODIUM 5000 UNITS: 5000 INJECTION, SOLUTION INTRAVENOUS; SUBCUTANEOUS at 09:09

## 2024-12-29 RX ADMIN — SENNOSIDES AND DOCUSATE SODIUM 1 TABLET: 50; 8.6 TABLET ORAL at 08:14

## 2024-12-29 RX ADMIN — HEPARIN SODIUM 5000 UNITS: 5000 INJECTION, SOLUTION INTRAVENOUS; SUBCUTANEOUS at 16:17

## 2024-12-29 RX ADMIN — OSELTAMAVIR PHOSPHATE 75 MG: 75 CAPSULE ORAL at 08:15

## 2024-12-29 RX ADMIN — CYANOCOBALAMIN TAB 1000 MCG 1000 MCG: 1000 TAB at 08:14

## 2024-12-29 RX ADMIN — Medication 25 MCG: at 08:14

## 2024-12-29 RX ADMIN — SENNOSIDES AND DOCUSATE SODIUM 1 TABLET: 50; 8.6 TABLET ORAL at 21:47

## 2024-12-29 RX ADMIN — GUAIFENESIN 200 MG: 200 SOLUTION ORAL at 08:15

## 2024-12-29 RX ADMIN — Medication 3 MG: at 21:47

## 2024-12-29 RX ADMIN — FLUTICASONE PROPIONATE 1 SPRAY: 50 SPRAY, METERED NASAL at 08:15

## 2024-12-29 RX ADMIN — ASPIRIN 81 MG CHEWABLE TABLET 81 MG: 81 TABLET CHEWABLE at 21:47

## 2024-12-29 RX ADMIN — PYRIDOSTIGMINE BROMIDE 60 MG: 60 TABLET ORAL at 08:14

## 2024-12-29 RX ADMIN — POLYETHYLENE GLYCOL 3350 17 G: 17 POWDER, FOR SOLUTION ORAL at 08:14

## 2024-12-29 RX ADMIN — HEPARIN SODIUM 5000 UNITS: 5000 INJECTION, SOLUTION INTRAVENOUS; SUBCUTANEOUS at 00:40

## 2024-12-29 RX ADMIN — PYRIDOSTIGMINE BROMIDE 60 MG: 60 TABLET ORAL at 00:40

## 2024-12-29 RX ADMIN — PYRIDOSTIGMINE BROMIDE 60 MG: 60 TABLET ORAL at 16:17

## 2024-12-29 ASSESSMENT — ACTIVITIES OF DAILY LIVING (ADL)
ADLS_ACUITY_SCORE: 75
ADLS_ACUITY_SCORE: 71
ADLS_ACUITY_SCORE: 75
ADLS_ACUITY_SCORE: 71
ADLS_ACUITY_SCORE: 75
ADLS_ACUITY_SCORE: 75
ADLS_ACUITY_SCORE: 71
ADLS_ACUITY_SCORE: 71
ADLS_ACUITY_SCORE: 75
ADLS_ACUITY_SCORE: 71
ADLS_ACUITY_SCORE: 75
ADLS_ACUITY_SCORE: 71
ADLS_ACUITY_SCORE: 75

## 2024-12-29 NOTE — PLAN OF CARE
Goal Outcome Evaluation:      Plan of Care Reviewed With: patient    Overall Patient Progress: improvingOverall Patient Progress: improving       Patient alert and oriented, able to make needs known  Slept most of the night  Scheduled medications given as ordered  No complained of pain, no respiratory distress, CPAP on at night  Primo fit in placed, voiding well, No BM this shift  Safety checks done, fall prevention maintained, bed alarm ON  No raised concern overnight  Plan of care ongoing.

## 2024-12-29 NOTE — PLAN OF CARE
"[id number]    Goal Outcome Evaluation:      Plan of Care Reviewed With: patient    Overall Patient Progress: improvingOverall Patient Progress: improving    /48 (BP Location: Left arm)   Pulse 86   Temp 98  F (36.7  C) (Oral)   Resp 16   Ht 1.778 m (5' 10\")   Wt 80.2 kg (176 lb 12.9 oz)   SpO2 96%   BMI 25.37 kg/m      Orientation:Alert & oriented x4 , able to make needs known  Bowel:Last BM 12/28  Bladder: Voiding adequately to the bathroom and urinal, primofit in place for HS   Pain:Denies  Ambulation/Transfers:Assist of 1/ walker and GB  Diet/ Liquids: NPO but Ice chip,on Q4 TF bolus   Tubes/ Lines/ Drains: none  Oxygen:On room air, denies chest pain or shortness of breath  Skin:bruising to the abdomen    Bed alarm on for safety, call light within reach. No new concerns this shift. Continue with POC.     Deja Patel RN    "

## 2024-12-29 NOTE — PLAN OF CARE
"Goal Outcome Evaluation:      Plan of Care Reviewed With: patient    Overall Patient Progress: no changeOverall Patient Progress: no change    Shift: 9501-8854  VS:/65 (BP Location: Left arm)   Pulse 67   Temp 97.7  F (36.5  C) (Oral)   Resp 16   Ht 1.778 m (5' 10\")   Wt 82.1 kg (181 lb)   SpO2 98%   BMI 25.97 kg/m      Pain: Denied pain  Neuro: A&Ox4.Makes needs known  Cardiac: WDL. Denied chest pain  Respiratory: WDL. Denied SOB, on RA  Diet/Appetite: NPO except ice chips  /GI: Cont. Of BM LBM 12/28. Voiding spontaneously using urinal  LDA's: Peg tube  Skin: Abdominal bruising  Activity: Up with A X 1 with a walker  Plan: Precautions maintained / Continue with plan of care. Call light within reach.Able to make needs known.       "

## 2024-12-29 NOTE — PLAN OF CARE
"Discharge Planner Post-Acute Rehab PT:      Discharge Plan: Home with wife. Home care PT.     Precautions: Falls, NPO/PEG,   R resting hand orthosis - on overnight/off during day  B T-bar splints only with functional tasks     Current Status:  Bed Mobility: SBA-Shai for LE managment  Transfer: SBA from >23'' heights w/ FWW, up to Shai from standard chairs ~18''.   Gait: Up to 250ft using FWW CGA-SBAx1   Stairs: x4 6\" platform steps with FWW and CGA  Balance: Fair static, poor dynamic standing balance w/out UE support     Outcome Measures:   Garcia              12/12: 12/56 12/28: 23/56  5TSTS              12/12: 0x (requires physical assist even with UE assist)   12.26: 0x. (Unable to stand from standard chair, able to stand from 22'' with UE push)  6MWT              12/12: 40 ft, FWW and CGA (stopped early due to fatigue)     Assessment: Pt with improved orthostatics this date, still positive drop, but MAP > 65 and minimal dizziness throughout, improves with activity. Progressing with step ups in // bars and performing 6\" platform steps per home set up. Pt likely to be safe performing stairs to enter home with FWW with family assist.    Other Barriers to Discharge (DME, Family Training, etc):   Equipment: Pt owns wheelchair, FWW  Family training- set for 12/30 2-4 PM. Car transfer, bed mobility, STS from chair, platform steps with FWW.   2 NEELAM without rail- pt sons plan to bump-up in wheelchair , have performed this before, decline FT on this. Pt now able to perform 6\" platform steps with FWW and CGA  "

## 2024-12-29 NOTE — PLAN OF CARE
Discharge Planner Post-Acute Rehab OT:      Discharge Plan: Home with assistance. HC OT services     Precautions: Falls, continuous tube feeding and NPO, BUE weakness with R greater than L  *elevate BUE/BLE's when supine in bed with pillows*  R resting hand orthosis - on overnight/off during day  B T-bar splints only with functional tasks-monitor skin breakdown and edema     Current Status:  ADLs:  Mobility:  CGA stand pivot with walker. A x 2 nursing. Wheelchair based.  Grooming: sba with adapted r angle toothbrush and spoon for ice chips  Dressing: UB mod a pull over shirt LB Max A FWW.  Bathing: pt able to wash face, arms underarms lower stomach and legs, assist with feet pericares stocmach for thourghness and top portion   Toileting: Min A with FWW and commode overlay, CGA-mod A standing clothing management, Max A hugo cares ellis steady if fatigued  IADLs: IND prior, supportive spouse  Vision/Cognition: Ox3. Wears corrective lenses     Assessment: Focused on toileting tasks this date as well as increase UE strengthening and conversation on energy conservation techniques.     Other Barriers to Discharge (DME, Family Training, etc):   Family training prior to discharge  AE/DME: pending progress; spouse wishes to purchase: commode overlay, bed rails, hip kit/dressing kit, and shower chair w arms from [employer]. Says she will reach out to contractor about installing gb inside shower. Pt reporting looking into obtaining external catheter for overnight use at home.

## 2024-12-30 ENCOUNTER — APPOINTMENT (OUTPATIENT)
Dept: PHYSICAL THERAPY | Facility: CLINIC | Age: 79
DRG: 056 | End: 2024-12-30
Attending: PHYSICAL MEDICINE & REHABILITATION
Payer: MEDICARE

## 2024-12-30 ENCOUNTER — APPOINTMENT (OUTPATIENT)
Dept: OCCUPATIONAL THERAPY | Facility: CLINIC | Age: 79
DRG: 056 | End: 2024-12-30
Attending: PHYSICAL MEDICINE & REHABILITATION
Payer: MEDICARE

## 2024-12-30 ENCOUNTER — APPOINTMENT (OUTPATIENT)
Dept: SPEECH THERAPY | Facility: CLINIC | Age: 79
DRG: 056 | End: 2024-12-30
Attending: PHYSICAL MEDICINE & REHABILITATION
Payer: MEDICARE

## 2024-12-30 LAB
ALBUMIN SERPL BCG-MCNC: 2.7 G/DL (ref 3.5–5.2)
ALP SERPL-CCNC: 73 U/L (ref 40–150)
ALT SERPL W P-5'-P-CCNC: 27 U/L (ref 0–70)
ANION GAP SERPL CALCULATED.3IONS-SCNC: 9 MMOL/L (ref 7–15)
AST SERPL W P-5'-P-CCNC: 30 U/L (ref 0–45)
BILIRUB SERPL-MCNC: 0.3 MG/DL
BUN SERPL-MCNC: 31.4 MG/DL (ref 8–23)
CALCIUM SERPL-MCNC: 8.5 MG/DL (ref 8.8–10.4)
CHLORIDE SERPL-SCNC: 105 MMOL/L (ref 98–107)
CREAT SERPL-MCNC: 0.56 MG/DL (ref 0.67–1.17)
EGFRCR SERPLBLD CKD-EPI 2021: >90 ML/MIN/1.73M2
ERYTHROCYTE [DISTWIDTH] IN BLOOD BY AUTOMATED COUNT: 17.3 % (ref 10–15)
GLUCOSE SERPL-MCNC: 90 MG/DL (ref 70–99)
HCO3 SERPL-SCNC: 25 MMOL/L (ref 22–29)
HCT VFR BLD AUTO: 25.9 % (ref 40–53)
HGB BLD-MCNC: 8.2 G/DL (ref 13.3–17.7)
MAGNESIUM SERPL-MCNC: 2.1 MG/DL (ref 1.7–2.3)
MCH RBC QN AUTO: 36 PG (ref 26.5–33)
MCHC RBC AUTO-ENTMCNC: 31.7 G/DL (ref 31.5–36.5)
MCV RBC AUTO: 114 FL (ref 78–100)
PHOSPHATE SERPL-MCNC: 2.4 MG/DL (ref 2.5–4.5)
PLATELET # BLD AUTO: 270 10E3/UL (ref 150–450)
POTASSIUM SERPL-SCNC: 4.6 MMOL/L (ref 3.4–5.3)
PROT SERPL-MCNC: 5.9 G/DL (ref 6.4–8.3)
RBC # BLD AUTO: 2.28 10E6/UL (ref 4.4–5.9)
SODIUM SERPL-SCNC: 139 MMOL/L (ref 135–145)
WBC # BLD AUTO: 3.7 10E3/UL (ref 4–11)

## 2024-12-30 PROCEDURE — 85014 HEMATOCRIT: CPT

## 2024-12-30 PROCEDURE — 84295 ASSAY OF SERUM SODIUM: CPT

## 2024-12-30 PROCEDURE — 85048 AUTOMATED LEUKOCYTE COUNT: CPT

## 2024-12-30 PROCEDURE — 250N000013 HC RX MED GY IP 250 OP 250 PS 637: Performed by: PHYSICAL MEDICINE & REHABILITATION

## 2024-12-30 PROCEDURE — 80051 ELECTROLYTE PANEL: CPT

## 2024-12-30 PROCEDURE — 84100 ASSAY OF PHOSPHORUS: CPT

## 2024-12-30 PROCEDURE — 99232 SBSQ HOSP IP/OBS MODERATE 35: CPT | Mod: GC | Performed by: PHYSICAL MEDICINE & REHABILITATION

## 2024-12-30 PROCEDURE — 250N000013 HC RX MED GY IP 250 OP 250 PS 637: Performed by: STUDENT IN AN ORGANIZED HEALTH CARE EDUCATION/TRAINING PROGRAM

## 2024-12-30 PROCEDURE — 250N000013 HC RX MED GY IP 250 OP 250 PS 637

## 2024-12-30 PROCEDURE — 250N000011 HC RX IP 250 OP 636

## 2024-12-30 PROCEDURE — 84155 ASSAY OF PROTEIN SERUM: CPT

## 2024-12-30 PROCEDURE — 128N000003 HC R&B REHAB

## 2024-12-30 PROCEDURE — 83735 ASSAY OF MAGNESIUM: CPT

## 2024-12-30 PROCEDURE — 97530 THERAPEUTIC ACTIVITIES: CPT | Mod: GP

## 2024-12-30 PROCEDURE — 97535 SELF CARE MNGMENT TRAINING: CPT | Mod: GO

## 2024-12-30 PROCEDURE — 36415 COLL VENOUS BLD VENIPUNCTURE: CPT

## 2024-12-30 PROCEDURE — 92526 ORAL FUNCTION THERAPY: CPT | Mod: GN

## 2024-12-30 RX ADMIN — PYRIDOSTIGMINE BROMIDE 60 MG: 60 TABLET ORAL at 08:08

## 2024-12-30 RX ADMIN — PYRIDOSTIGMINE BROMIDE 60 MG: 60 TABLET ORAL at 16:27

## 2024-12-30 RX ADMIN — CYANOCOBALAMIN TAB 1000 MCG 1000 MCG: 1000 TAB at 08:08

## 2024-12-30 RX ADMIN — ASPIRIN 81 MG CHEWABLE TABLET 81 MG: 81 TABLET CHEWABLE at 21:00

## 2024-12-30 RX ADMIN — HEPARIN SODIUM 5000 UNITS: 5000 INJECTION, SOLUTION INTRAVENOUS; SUBCUTANEOUS at 16:27

## 2024-12-30 RX ADMIN — LEVOTHYROXINE SODIUM 175 MCG: 0.05 TABLET ORAL at 08:08

## 2024-12-30 RX ADMIN — SENNOSIDES AND DOCUSATE SODIUM 1 TABLET: 50; 8.6 TABLET ORAL at 21:00

## 2024-12-30 RX ADMIN — HEPARIN SODIUM 5000 UNITS: 5000 INJECTION, SOLUTION INTRAVENOUS; SUBCUTANEOUS at 00:42

## 2024-12-30 RX ADMIN — FLUTICASONE PROPIONATE 1 SPRAY: 50 SPRAY, METERED NASAL at 08:30

## 2024-12-30 RX ADMIN — PYRIDOSTIGMINE BROMIDE 60 MG: 60 TABLET ORAL at 23:52

## 2024-12-30 RX ADMIN — Medication 3 MG: at 21:00

## 2024-12-30 RX ADMIN — OSELTAMAVIR PHOSPHATE 75 MG: 75 CAPSULE ORAL at 08:09

## 2024-12-30 RX ADMIN — Medication 25 MCG: at 08:09

## 2024-12-30 RX ADMIN — HEPARIN SODIUM 5000 UNITS: 5000 INJECTION, SOLUTION INTRAVENOUS; SUBCUTANEOUS at 08:08

## 2024-12-30 RX ADMIN — POLYETHYLENE GLYCOL 3350 17 G: 17 POWDER, FOR SOLUTION ORAL at 08:09

## 2024-12-30 RX ADMIN — PYRIDOSTIGMINE BROMIDE 60 MG: 60 TABLET ORAL at 00:42

## 2024-12-30 RX ADMIN — SENNOSIDES AND DOCUSATE SODIUM 1 TABLET: 50; 8.6 TABLET ORAL at 08:09

## 2024-12-30 RX ADMIN — GUAIFENESIN 200 MG: 200 SOLUTION ORAL at 08:08

## 2024-12-30 ASSESSMENT — ACTIVITIES OF DAILY LIVING (ADL)
ADLS_ACUITY_SCORE: 75

## 2024-12-30 NOTE — PLAN OF CARE
Goal Outcome Evaluation:      Plan of Care Reviewed With: patient    Overall Patient Progress: no changeOverall Patient Progress: no change     Patient alert and oriented, able to make needs known  Slept most of the night  No complained of pain, no respiratory distress, CPAP at night, appeared to be comfortable on bed during rounds  Incontinent of Bladder, primo fit at night, had BM this shift  Safety checks done, fall prevention maintained, bed alarm ON  No significant changed overnight  Plan of care ongoing.

## 2024-12-30 NOTE — PLAN OF CARE
"Discharge Planner Post-Acute Rehab PT:      Discharge Plan: Home with wife. Home care PT.     Precautions: Falls, NPO/PEG,   R resting hand orthosis - on overnight/off during day  B T-bar splints only with functional tasks     Current Status:  Bed Mobility: SBA with bed rail  Transfer: SBA from >21'' heights w/ FWW, up to Shai from standard chairs ~18''.   Gait: Up to 250ft using FWW SBA  Stairs: x4 6\" platform steps with FWW and CGA  Balance: Fair static, poor dynamic standing balance w/out UE support     Outcome Measures:   Garcia              12/12: 12/56 12/28: 23/56  5TSTS              12/12: 0x (requires physical assist even with UE assist)   12.26: 0x. (Unable to stand from standard chair, able to stand from 22'' with UE push)  6MWT              12/12: 40 ft, FWW and CGA (stopped early due to fatigue)     Assessment: Family training completed with pt spouse who plans to provide SBA for functional mobility, but able to provide Shai as needed from low surfaces. Pt demonstrates ability to perform STS SBA from 20'' seat today however recommendations continue to be for pt to raise surfaces to 23'' for increased ease from surfaces with fatigue. Pt on track for discharge, has aquired all necessary DME.     Other Barriers to Discharge (DME, Family Training, etc):   Equipment: Pt owns wheelchair, FWW, has purchased bed rail, commode overlay, shower chair  Family training- completed  "

## 2024-12-30 NOTE — PROGRESS NOTES
"Patient and wife participated in tube feeding education.  His wife administered water bolus and tube feeding by syringe with supervision.   Discussed the details of the document \"Caring for your tube at home\" with them. They state understanding.  They have had the g-tube at home for a few weeks prior to admission. The wife states she understands how to perform site care and change the dressing, and to watch for infection.  She also stated the steps of medication administration.   They have questions for the dietician. Left a voicemail for the dietician to touch base with them.  "

## 2024-12-30 NOTE — PROGRESS NOTES
"  [facility]   Acute Rehabilitation Unit  Daily progress note    INTERVAL HISTORY  No acute events overnight. Vitals stable. Nursing notes reviewed, no acute concerns. Slept poorly last night, unsure why. Still participating well in therapies. Feels he'll likely have a bowel movement today/tomorrow. Wife at bedside. Discussed plan to round on patient tomorrow, but current discharge plan in place. Labs unremarkable today. Pt looking forward to discharging soon. Patient denied any further questions/concerns at this time.      Functionally:   OT:  ADLs:  Mobility:  CGA stand pivot with walker. A x 2 nursing. Wheelchair based.  Grooming: sba with adapted r angle toothbrush and spoon for ice chips  Dressing: UB mod a pull over shirt LB Max A FWW.  Bathing: pt able to wash face, arms underarms lower stomach and legs, assist with feet pericares stocmach for thourghness and top portion   Toileting: Min A with FWW and commode overlay, CGA-mod A standing clothing management, Max A hugo cares ellis steady if fatigued  IADLs: IND prior, supportive spouse  Vision/Cognition: Ox3. Wears corrective lenses    PT  Bed Mobility: SBA-Shai for LE managment  Transfer: SBA from >23'' heights w/ FWW, up to Shai from standard chairs ~18''.   Gait: Up to 250ft using FWW CGA-SBAx1   Stairs: x4 6\" platform steps with FWW and CGA  Balance: Fair static, poor dynamic standing balance w/out UE support     Outcome Measures:   Garcia              12/12: 12/56 12/28: 23/56  5TSTS              12/12: 0x (requires physical assist even with UE assist)              12.26: 0x. (Unable to stand from standard chair, able to stand from 22'' with UE push)  6MWT              12/12: 40 ft, FWW and CGA (stopped early due to fatigue)    SLP  Hearing: WFL  Vision: glasses  Communication: WFL  Cognition: SLP monitoring need for cognitive evaluation  Swallow: NPO. Ok ice chips with supervision. Oral cares x3/day- " instructions for completing adequate oral care in patient care order for nurse review  Okay for pt to consume three (3) sips of thin liquid by spoon a day for quality of life with family, no bigger than half-teaspoon in size, must cough multiple times after each teaspoon sip, wait two to three minutes before taking next half-teaspoon sip    MEDICATIONS  Scheduled meds  Current Facility-Administered Medications   Medication Dose Route Frequency Provider Last Rate Last Admin    aspirin (ASA) chewable tablet 81 mg  81 mg Oral or Feeding Tube QPM Sachin Sorensen DO   81 mg at 12/29/24 2147    cyanocobalamin (VITAMIN B-12) tablet 1,000 mcg  1,000 mcg Oral or Feeding Tube Daily Sachin Sorensen DO   1,000 mcg at 12/30/24 0808    fluticasone (FLONASE) 50 MCG/ACT spray 1 spray  1 spray Both Nostrils Daily Azul Livingston MD   1 spray at 12/30/24 0830    guaiFENesin (ROBITUSSIN) 20 mg/mL solution 200 mg  200 mg Oral or Feeding Tube Daily Azul Livingston MD   200 mg at 12/30/24 0808    heparin ANTICOAGULANT injection 5,000 Units  5,000 Units Subcutaneous Q8H Azul Livingston MD   5,000 Units at 12/30/24 0808    levothyroxine (SYNTHROID/LEVOTHROID) tablet 175 mcg  175 mcg Oral or Feeding Tube Daily Azul Livingston MD   175 mcg at 12/30/24 0808    Lidocaine (LIDOCARE) 4 % Patch 1 patch  1 patch Transdermal Q24h Sachin Sorensen DO   1 patch at 12/23/24 1958    melatonin tablet 3 mg  3 mg Oral or Feeding Tube At Bedtime Sachin Sorensen DO   3 mg at 12/29/24 2147    oseltamivir (TAMIFLU) capsule 75 mg  75 mg Oral Daily Sam Llanos MD   75 mg at 12/30/24 0809    polyethylene glycol (MIRALAX) Packet 17 g  17 g Oral Daily Samuel Camara MD   17 g at 12/30/24 0809    pyRIDostigmine (MESTINON) tablet 60 mg  60 mg Oral or Feeding Tube Q8H Sachin Sorensen DO   60 mg at 12/30/24 0808    senna-docusate (SENOKOT-S/PERICOLACE) 8.6-50 MG per tablet 1 tablet  1 tablet Oral BID Sveta Schultz  "MD Forrest   1 tablet at 12/30/24 0809    [Held by provider] simvastatin (ZOCOR) tablet 10 mg  10 mg Oral Daily Azul Livingston MD        Vitamin D3 (CHOLECALCIFEROL) tablet 25 mcg  25 mcg Oral or Feeding Tube Daily Sachin Sorensen DO   25 mcg at 12/30/24 0809       PRN meds:  Current Facility-Administered Medications   Medication Dose Route Frequency Provider Last Rate Last Admin    acetaminophen (TYLENOL) tablet 650 mg  650 mg Oral or Feeding Tube Q4H PRN Sachin Sorensen DO   650 mg at 12/23/24 1958    artificial saliva (BIOTENE MT) solution 2 spray  2 spray Swish & Spit Q1H PRN Azul Livingston MD        bisacodyl (DULCOLAX) suppository 10 mg  10 mg Rectal Daily PRN Sveta Schultz MD   10 mg at 12/18/24 1558    dextrose 10% infusion   Intravenous Continuous PRN Azul Livingston MD        olopatadine (PATANOL) 0.1 % ophthalmic solution 1 drop  1 drop Both Eyes BID PRN Azul Livingston MD   1 drop at 12/28/24 0938    polyethylene glycol (MIRALAX) Packet 17 g  17 g Oral Daily PRN Sveta Schultz MD   17 g at 12/18/24 0813    sodium chloride (OCEAN) 0.65 % nasal spray 1 spray  1 spray Both Nostrils Q1H PRN Sveta Schultz MD   1 spray at 12/26/24 0910         PHYSICAL EXAM  /45 (BP Location: Left arm)   Pulse 51   Temp 97.7  F (36.5  C) (Oral)   Resp 16   Ht 1.778 m (5' 10\")   Wt 82.1 kg (181 lb)   SpO2 97%   BMI 25.97 kg/m    General: NAD, resting in bed  HEENT: NCAT, EOMI.  Cardio: Well perfused. Sinus bradycardia  Pulm: Breathing comfortably on room air. Symmetrical chest rise.   Abd: Non distended. BS+.  Neuro/MSK: Able to move all 4 extremities.  EXT: No LE edema bilaterally. Ecchymosis along the dorsal upper and lower right arm improving     LABS    Lab Results   Component Value Date    WBC 3.7 12/26/2024    WBC 10.2 11/06/2020     Lab Results   Component Value Date    RBC 2.09 12/26/2024    RBC 4.30 11/06/2020     Lab Results   Component Value Date    " HGB 7.4 12/26/2024    HGB 14.7 11/06/2020     Lab Results   Component Value Date    HCT 23.4 12/26/2024    HCT 43.0 11/06/2020     Lab Results   Component Value Date     12/26/2024     11/06/2020     Lab Results   Component Value Date    MCH 35.4 12/26/2024    MCH 34.2 11/06/2020     Lab Results   Component Value Date    MCHC 31.6 12/26/2024    MCHC 34.2 11/06/2020     Lab Results   Component Value Date    RDW 17.7 12/26/2024    RDW 13.5 11/06/2020     Lab Results   Component Value Date     12/26/2024     11/06/2020     Last Comprehensive Metabolic Panel:  Lab Results   Component Value Date     12/30/2024    POTASSIUM 4.6 12/30/2024    CHLORIDE 105 12/30/2024    CO2 25 12/30/2024    ANIONGAP 9 12/30/2024    GLC 90 12/30/2024    BUN 31.4 (H) 12/30/2024    CR 0.56 (L) 12/30/2024    GFRESTIMATED >90 12/30/2024    SIMONA 8.5 (L) 12/30/2024       ASSESSMENT AND PLAN  Mendez Greene is a 79 year old male with past medical history of paroxysmal atrial fibrillation, CAD, ascending aortic aneurysm, HLD, HTN, hypothyroidism, CHA admitted to ICU service on 11/30/2024 for acute respiratory failure and AMS. Patient was recently hospitalized at [facility] from 11/8/24-11/15/24 for progressive weakness and fatigue, difficulty swallowing. After discharge, his ACh receptor binding Ab came back 2.11, strongly suggestive of myasthenia gravis. Presented back to [facility] ED 11/30 with hypotension and acute respiratory failure. Patient was intubated in the ED and admitted to ICU on pressors. During admission patient received 5 doses of IVIG for myasthenic crisis and was initiated on Mestinon. Hospital course also included broad spectrum antibiotics for possible septic shock due to UTI, MOHAMUD, electrolyte derangements, bradycardia, hypertension.  Now admitted to acute rehab.     Admission to acute inpatient rehab 12/11/2024.    Impairment group code: 03.8 Neuromuscular Disorders; new diagnosis of myasthenia gravis with  myasthenia crisis         PT and OT 75 minutes of each as well as SLP 30 minutes on a daily basis, in addition to rehab nursing and close management of physiatrist.    Impairment of ADL's: Impairment in strength, endurance, and ROM resulting in functional limitations in patient's ability to perform ADLs  Impairment of mobility:  Impairment in strength, endurance, and ROM resulting in functional limitations in patient's ability to perform functional mobility   Impairment of cognition/language/swallow:  Needs therapy for swallowing.     Medical Conditions     #Myasthenic crisis  Questionable ALS diagnosis which is being deferred to his primary neurologist.  - clinically much improved with improvement in upper extremity strength; still with some upper extremity flexion weakness and LE weakness  - completed 5 doses of IVIG on 12/5/24   - Caution use of IV magnesium, beta-blocker, statin, macrolides, aminoglycosides, and fluoroquinolone, and as much as possible avoid them due to to potential exacerbation of myasthenia crisis. ; Holding PTA statin  - Monitor respiratory status closely, currently stable on room air  - CPAP at HS  - Pyridostigmine 60 mg PO TID  - Consider CT chest with contrast for r/o thymoma while on rehab     #H/o neobladder  - was on snowden catheter in neobladder, discontinued 12/10 and voiding well, chronically incontinent     #Hypernatremia  #Hyperchloremia  Na 149 day of admission to rehab (12/11), Cl 117, creatinine 0.57 (at baseline). Had issues with hypernatremia on acute service, up to 152. Acute service increased free water flushes and gave 1L D5.   - Increased free water flushes to 200 ml q 4hrs - 12/18  - BMP M/Th     #Chronic macrocytic anemia  #Thrombocytopenia  #Leukopenia   Hgb has mostly been around 9-10 on acute service; noted slight downtrend ---8.5--7.8 (12/11), likely dilutional with IV fluids as platelets and hematocrit also decreased slightly; has no clinical concern for active bleed  and has been hemodynamically stable. B12 was 727, folic acid wnl on 11/12. Was seen by hematology 4/2024, workup unremarkable. On ARU Hgb dropped to 7.3 on 12/19. Platelets were decreased at the time of admission to ARU but WNL 12/16. Leukopenia first noted 12/19 WBC 3.9.  - CBC M/Th  - Transfuse hgb <7 (has not required transfusion this admission)  - PTA B12 supplementation     #Sinus bradycardia  HR 40s to upper 50s during admission, dips the most when sleeping. Previous EKG sinus bradycardia with no high grade blocks. Chart review shows similar HR 40-50s last hospitalization, and he has a few OP visits with documented sinus bradycardia (7/10/24, 3/4/24).  - OK w HR 40's-50's  - CTM, further evaluation if ever symptomatic     #Moderate to severe oropharyngeal dysphagia s/p G-tube  VFSS on 12/9 while on acute service, note that he continues to aspirate all textures but improved ability to cough; SLP recommend continued NPO. VFSS on 12/18 continued to have moderate-severe oropharyngeal and pharyngeal dysphagia with possible aspiration. SLP gave permission for 3 teaspoons of thin liquid a day for quality of life. Will continue to NPO with next VFSS after 1/1/2025.   - 12/13 200 mg guaifenesin to help loosen mucus secretions. Emphasize the importance of eating ice chips so mucous does not become hard  - Receive all nutrition via tube feeds  - TF cycled to 18hrs/day on 12/19  - BMP, Mg & Phos M/Th     #CAD  #HLD  #Paroxysmal atrial fibrillation  #Recent NSTEMI (11/8/24)  Troponin 997 on acute service, cardiac cath 11/11/24 showed only mild CAD. Echo 12/1/24 difficult study but grossly normal left and right ventricular function, no severe valvular regurgitation, probably mild aortic regurgitation, no pericardial effusion.  Not in afib on admission to rehab.  - Continue PTA ASA  - HOLD PTA Simvastatin until follow-up with neurology  - No rate control medications at baseline     #Hypothyroidism  - Increased 12/19  levothyroxine 175 mcg daily  - 12/16 TSH elevated (7.53) and T4 low (0.83).  - Recheck TSH and T4 around 1/16 (4 weeks from dose increase)    #CHA  - PTA CPAP HS, 12/16 order changed to allow for CPAP during naps    #Extremity Swelling  Significant swelling in the right arm as well as bilateral LE was noted on 12/12. Patient denies associated pain. 12/13 swelling in right and left leg improved, still significant swelling in left LE.  - Wrapped arm and lower extremities in compression bandage  - Right Arm Duplex US negative 12/12    #Constipation  Has not had a reported bowel movement since 12/11, but is passing flatulence. No reported abdominal pain. Consider encouraging suppository if he still has not had BM by 12/17.   - Senna Q12H  - Miralax daily, scheduled on 12/24  - Dulcolax daily PRN     #Severe malnutrition in the context of acute illness or injury  - Eval and treat per RD recommendation. Diet as below.    #Abdominal strain - improving   Occurred 12/23, appears to be related to soft tissue strain, pain with shallow palpation and movement. Lidocaine patch ineffective. Unlikely that this represents GI pathology, but will also try to optimize patient's constipation as noted above unless this is contributing. Monitor for stability/worsening.      Adjustment to disability:  Clinical psychology to eval and treat  FEN: NPO except for ice Chips. Receiving Tube Feeding Adult Formula Drip Feeding: Continuous Jevity 1.5; Gastrostomy at 55 mL/h with free water flushes 200 ml q 4 hrs. PTA Vit D supplementation.   Bowel: See above  Bladder: No snowden in place, PVRs on admission. 12/12 2 elevated PVRs in 100s and 300s range. Patient has neobladder and is incontinent at baseline. Will continue to assess output and any symptoms of fullness. No need for repeat PVRs at this time.  DVT Prophylaxis: Subcutaneous heparin   GI Prophylaxis: Na  Code: Full  Disposition: Home with family support.  ELOS:  21 Days.  Rehab prognosis:   Good  Follow up Appointments on Discharge:   PCP  Neurology, Dr. Fabian or Dr. Ashley   PM&R  Cardiology  Urology for assessment of neobladder and options to reduce nighttime leaking    Patient reviewed with attending physician, Dr. Sorensen, who agrees with the assessment and plan.    Azul Villagran MD  [facility] PM&R PGY-2  12/30/2024  Pager #: [phone]

## 2024-12-30 NOTE — PLAN OF CARE
Care Coordination:     Writer met with patient and spouse. [facility]- Unit Educator reviewed bolus tube feeds with family. Spouse felt comfortable with this education. They have ample supplies at home per spouse. They do not need refills at time of discharge.     Writer also provided update to home care-     Patient was accepted by   Home Health Care:   [facility]  PH: [phone]   Nurse, physical therapy, occupational therapy, speech therapy, home health aide .     They understood HC and were agreeable to these services. They would like to have back the same people as prior HC encounter. Writer provided update to Liaison.     Lina Lopez   Patient Care Management Coordinator  Acute Rehabilitation Unit/ Transitional Care Unit.   Ph: [phone]

## 2024-12-30 NOTE — PLAN OF CARE
Discharge Planner Post-Acute Rehab SLP:     Discharge Plan: home with spouse,  SLP    Precautions: NPO - PEG, fall    Current Status:  Hearing: WFL  Vision: glasses  Communication: WFL  Cognition: SLP monitoring need for cognitive evaluation  Swallow: NPO. Ok ice chips with supervision. Oral cares x3/day- instructions for completing adequate oral care in patient care order for nurse review  Okay for pt to consume three (3) sips of thin liquid by spoon a day for quality of life with family, no bigger than half-teaspoon in size, must cough multiple times after each teaspoon sip, wait two to three minutes before taking next half-teaspoon sip    Assessment:  Family training completed with pt and spouse. SLp provided education re: current level of function, ongoing oropharyngeal deficits, recommendations, and rationale for recommendations. REviewed pt ok for limited PO for pleasure only and to cough several times. Pt educated on plan for ongoing SLP, goals, and time frame to repeat VFSS. SLP addressed all ?s/concerns.     Other Barriers to Discharge (Family Training, etc): family training completed 12/30.

## 2024-12-30 NOTE — PLAN OF CARE
"  Discharge Planner Post-Acute Rehab OT:      Discharge Plan: Home with assistance. HC OT services     Precautions: Falls, continuous tube feeding and NPO, BUE weakness with R greater than L  *elevate BUE/BLE's when supine in bed with pillows*  R resting hand orthosis - on overnight/off during day  B T-bar splints only with functional tasks-monitor skin breakdown and edema     Current Status:  ADLs:  Mobility:  CGA-SBA in rm w/ FWW and GB  Grooming: sba with adapted r angle toothbrush and spoon for ice chips  Dressing: UB mod a pull over shirt LB Max A FWW.  Bathing: pt able to wash face, arms underarms lower stomach and legs, assist with feet pericares stocmach for thourghness and top portion   Toileting: CGA from 19\" commode overlay, CGA-mod A standing clothing management, Max A hugo cares IADLs: IND prior, supportive spouse  Vision/Cognition: Ox3. Wears corrective lenses     Assessment: FT session w/ wife to focus on safe d/c home. Reviewed pt current status on dressing w/ wife okay w/ dressing pt at d/c d/t pt difficulty w/ B C. Wife has ordered a bedrail, shower chair w/ handles, will order a reacher, and has a toilet safety frame. Walk-in shower w/o a step in so therefore pt wife and pt unconcerned. Spent time discussing pt toilet setup including completion of hugo cares. Had pt transfer on/off commode overlay set to 19\" to mimic toilet height at home that has a bidet. Pt demo CGA for transfer on/off toilet height edu provided to wife on use of GB and body mechanics in case pt had difficulty standing. Edu/demo on toilet tongs and bottom rudy as options farzad if pt were to travel outside of home. Also discussed pros of continuing OP OT Hand Therapy for remediation of hand disfunction.     Other Barriers to Discharge (DME, Family Training, etc):   Family training prior to discharge  AE/DME: pending progress; spouse wishes to purchase: commode overlay, bed rails, reacher and shower chair w arms from [employer]. Says " she will reach out to contractor about installing gb inside shower. Pt reporting looking into obtaining external catheter for overnight use at home.

## 2024-12-31 ENCOUNTER — APPOINTMENT (OUTPATIENT)
Dept: SPEECH THERAPY | Facility: CLINIC | Age: 79
DRG: 056 | End: 2024-12-31
Attending: PHYSICAL MEDICINE & REHABILITATION
Payer: MEDICARE

## 2024-12-31 ENCOUNTER — APPOINTMENT (OUTPATIENT)
Dept: PHYSICAL THERAPY | Facility: CLINIC | Age: 79
DRG: 056 | End: 2024-12-31
Attending: PHYSICAL MEDICINE & REHABILITATION
Payer: MEDICARE

## 2024-12-31 ENCOUNTER — APPOINTMENT (OUTPATIENT)
Dept: OCCUPATIONAL THERAPY | Facility: CLINIC | Age: 79
DRG: 056 | End: 2024-12-31
Attending: PHYSICAL MEDICINE & REHABILITATION
Payer: MEDICARE

## 2024-12-31 ENCOUNTER — PRE VISIT (OUTPATIENT)
Dept: UROLOGY | Facility: CLINIC | Age: 79
End: 2024-12-31
Payer: MEDICARE

## 2024-12-31 PROCEDURE — 99232 SBSQ HOSP IP/OBS MODERATE 35: CPT | Performed by: PHYSICAL MEDICINE & REHABILITATION

## 2024-12-31 PROCEDURE — 250N000013 HC RX MED GY IP 250 OP 250 PS 637: Performed by: PHYSICAL MEDICINE & REHABILITATION

## 2024-12-31 PROCEDURE — 250N000013 HC RX MED GY IP 250 OP 250 PS 637: Performed by: STUDENT IN AN ORGANIZED HEALTH CARE EDUCATION/TRAINING PROGRAM

## 2024-12-31 PROCEDURE — 97530 THERAPEUTIC ACTIVITIES: CPT | Mod: GP | Performed by: REHABILITATION PRACTITIONER

## 2024-12-31 PROCEDURE — 97535 SELF CARE MNGMENT TRAINING: CPT | Mod: GO | Performed by: OCCUPATIONAL THERAPIST

## 2024-12-31 PROCEDURE — 128N000003 HC R&B REHAB

## 2024-12-31 PROCEDURE — 97530 THERAPEUTIC ACTIVITIES: CPT | Mod: GP

## 2024-12-31 PROCEDURE — 250N000013 HC RX MED GY IP 250 OP 250 PS 637

## 2024-12-31 PROCEDURE — 250N000011 HC RX IP 250 OP 636

## 2024-12-31 PROCEDURE — 92526 ORAL FUNCTION THERAPY: CPT | Mod: GN

## 2024-12-31 PROCEDURE — 97110 THERAPEUTIC EXERCISES: CPT | Mod: GP | Performed by: REHABILITATION PRACTITIONER

## 2024-12-31 RX ORDER — LEVOTHYROXINE SODIUM 175 UG/1
175 TABLET ORAL DAILY
Qty: 30 TABLET | Refills: 0 | Status: SHIPPED | OUTPATIENT
Start: 2024-12-31

## 2024-12-31 RX ORDER — FLUTICASONE PROPIONATE 50 MCG
1 SPRAY, SUSPENSION (ML) NASAL DAILY
Qty: 9.9 ML | Refills: 0 | Status: SHIPPED | OUTPATIENT
Start: 2025-01-01

## 2024-12-31 RX ORDER — PYRIDOSTIGMINE BROMIDE 60 MG/1
60 TABLET ORAL EVERY 8 HOURS
Qty: 30 TABLET | Refills: 0 | Status: SHIPPED | OUTPATIENT
Start: 2024-12-31

## 2024-12-31 RX ADMIN — SENNOSIDES AND DOCUSATE SODIUM 1 TABLET: 50; 8.6 TABLET ORAL at 20:10

## 2024-12-31 RX ADMIN — POLYETHYLENE GLYCOL 3350 17 G: 17 POWDER, FOR SOLUTION ORAL at 08:26

## 2024-12-31 RX ADMIN — LEVOTHYROXINE SODIUM 175 MCG: 0.05 TABLET ORAL at 08:27

## 2024-12-31 RX ADMIN — OSELTAMAVIR PHOSPHATE 75 MG: 75 CAPSULE ORAL at 09:52

## 2024-12-31 RX ADMIN — Medication 3 MG: at 20:11

## 2024-12-31 RX ADMIN — SENNOSIDES AND DOCUSATE SODIUM 1 TABLET: 50; 8.6 TABLET ORAL at 08:27

## 2024-12-31 RX ADMIN — PYRIDOSTIGMINE BROMIDE 60 MG: 60 TABLET ORAL at 08:26

## 2024-12-31 RX ADMIN — CYANOCOBALAMIN TAB 1000 MCG 1000 MCG: 1000 TAB at 08:26

## 2024-12-31 RX ADMIN — PYRIDOSTIGMINE BROMIDE 60 MG: 60 TABLET ORAL at 15:52

## 2024-12-31 RX ADMIN — GUAIFENESIN 200 MG: 200 SOLUTION ORAL at 08:27

## 2024-12-31 RX ADMIN — HEPARIN SODIUM 5000 UNITS: 5000 INJECTION, SOLUTION INTRAVENOUS; SUBCUTANEOUS at 16:00

## 2024-12-31 RX ADMIN — HEPARIN SODIUM 5000 UNITS: 5000 INJECTION, SOLUTION INTRAVENOUS; SUBCUTANEOUS at 00:00

## 2024-12-31 RX ADMIN — FLUTICASONE PROPIONATE 1 SPRAY: 50 SPRAY, METERED NASAL at 09:53

## 2024-12-31 RX ADMIN — ASPIRIN 81 MG CHEWABLE TABLET 81 MG: 81 TABLET CHEWABLE at 20:10

## 2024-12-31 RX ADMIN — Medication 25 MCG: at 08:27

## 2024-12-31 RX ADMIN — HEPARIN SODIUM 5000 UNITS: 5000 INJECTION, SOLUTION INTRAVENOUS; SUBCUTANEOUS at 08:26

## 2024-12-31 ASSESSMENT — ACTIVITIES OF DAILY LIVING (ADL)
ADLS_ACUITY_SCORE: 75
ADLS_ACUITY_SCORE: 77
ADLS_ACUITY_SCORE: 75
ADLS_ACUITY_SCORE: 77
ADLS_ACUITY_SCORE: 75
ADLS_ACUITY_SCORE: 77
ADLS_ACUITY_SCORE: 75
ADLS_ACUITY_SCORE: 75
ADLS_ACUITY_SCORE: 77
ADLS_ACUITY_SCORE: 75
ADLS_ACUITY_SCORE: 75

## 2024-12-31 NOTE — PROGRESS NOTES
CLINICAL NUTRITION SERVICES - BRIEF NOTE     Received voicemail from RN 12/30. Pt and spouse have questions regarding TF. Met with pt and spouse in room. Reviewed TF regimen and answered questions. Encouraged pt and spouse to reach out to [facility] if questions/concerns arise once discharged. Pt tolerating bolus feeds well at this time.     RD to follow per protocol.    Janett De La Rosa RD, LD  Available via phone and Vocera  Phone: [phone]  Vocera: 5R Acute Rehab Clinical Dietitian  Weekend/Holiday Vocera: Weekend Holiday Clinical Dietitian [Multi Site Groups]

## 2024-12-31 NOTE — DISCHARGE SUMMARY
[facility]   Acute Rehabilitation Unit  Discharge summary     Date of Admission: 12/11/2024  Date of Discharge: 1/1/25   Disposition: Home  Primary Care Physician: Gladys Vazquez  Attending physician: Sachin Sorensen,       DISCHARGE DIAGNOSIS    03.8 Neuromuscular Disorders; new diagnosis of myasthenia gravis with myasthenia crisis   Moderate to severe oropharyngeal dysphagia s/p g tube, weakness  Impaired functional mobility  Impaired ADLs  Impaired cognition     Hypernatremia  Hyperchloremia  Chronic macrocytic anemia  Sinus bradycardia  CAD  HLD  Paroxysmal atrial fibrillation  Hypothyroidism  CHA      BRIEF SUMMARY  Mendez Greene is a 79 year old male with past medical history of paroxysmal atrial fibrillation, CAD, ascending aortic aneurysm, HLD, HTN, hypothyroidism, CHA admitted to ICU service on 11/30/2024 for acute respiratory failure and AMS. Patient was recently hospitalized at [facility] from 11/8/24-11/15/24 for progressive weakness and fatigue, difficulty swallowing. He was seen by neurology who were concerned for motor neuron disease with high index of suspicion for ALS. EMG consistent with MND. He was started on Riluzole and Edaravone and referred to [facility] neuromuscular division for second opinion. Discharged home with palliative care referral. After discharge, his ACh receptor binding Ab came back positive, strongly suggestive of myasthenia gravis.      Presented back to [facility] ED 11/30 with hypotension and acute respiratory failure. Family rescinded patient's DNR/DNI for patient to be full code and patient reportedly nodded in  agreement. Patient was intubated in the ED and admitted to ICU on pressors. Started on broad spectrum antibiotics for possible septic shock due to UTI. Neurology consulted, started patient on IVIGx 5 doses for myasthenic crisis and patient was initiated on Mestinon. Extubated with pressors stopped 12/3. Transferred to  "hospitalist service on 12/4. Hospital course has been notable for urosepsis with chronic Cortez catheter (completed abx course), MOHAMUD, electrolyte derangements, bradycardia, hypertension.      REHABILITATION COURSE  SLP:  Recommend continue NPO with ice chips for comfort with supervision/assist. Oral cares x3/day at minimum. Okay for pt to consume three (3) sips of thin liquid by spoon a day for quality of life with family, no bigger than half-teaspoon in size, must cough multiple times after each teaspoon sip, wait two to three minutes before taking next half-teaspoon sip. Pt can complete effortful swallows for maintenance of swallow mechanism to prevent disuse atrophy, but no formal pharyngeal exercise plan indicated given diagnosis. Pt is aware of overall aspiration (including silent) risk and can verbalize rationale for NPO diet, as well as indicators of aspiration and/or respiratory distress. Recommend consider repeat VFSS after neurology/medical workup if/when pt is noting improvement in overall symptoms, or roughly in 2-3 months if no change in symptoms. Recommend swallow evaluation/treatment be completed in an outpatient setting.     OT:  Continued therapy is recommended.  Rationale/Recommendations:  Recommend HC OT services to complete home safety assessment, and increase IND and safety with ADLs/IADLs.  Precautions: Falls, continuous tube feeding and NPO, BUE weakness with R greater than L  *elevate BUE/BLE's when supine in bed with pillows*  R resting hand orthosis - on overnight/off during day  B T-bar splints only with functional tasks-monitor skin breakdown and edema  ADLs:  Mobility:  CGA-SBA in rm w/ FWW and GB  Grooming: sba with adapted r angle toothbrush and spoon for ice chips  Dressing: UB mod a pull over shirt LB Max A FWW.  Bathing: CGA with shower transfer and shower chair with grab bars. Assistance to wash/rinse/dry 6/10 body parts seated on ETB.   Toileting: CGA from 19\" commode overlay, " CGA-mod A standing clothing management, Max A hugo cares   IADLs: IND prior, supportive spouse who will provide total A    PT:  Continued therapy is recommended.  Rationale/Recommendations:  Pt discharging to home w/ spouse assist. Recommending SBA for all functional mobility, may need Shai from low surfaces. Family training completed w/ spouse, all DME obtained. Able to amb up to 250' w/ FWW, SBA. Pt will benefit from  physical therapy for optimizing pt safety and IND w/ mobility and decrease caregiver burden.  Outcome Measures:   Garcia              12/12: 12/56 12/28: 23/56  5TSTS              12/12: 0x (requires physical assist even with UE assist)              12.26: 0x. (Unable to stand from standard chair, able to stand from 22'' with UE push)  6MWT              12/12: 40 ft, FWW and CGA (stopped early due to fatigue)    MEDICAL COURSE  #Myasthenic crisis  Questionable ALS diagnosis which is being deferred to his primary neurologist.  - clinically much improved with improvement in upper extremity strength; still with some upper extremity flexion weakness and LE weakness  - completed 5 doses of IVIG on 12/5/24   - Caution use of IV magnesium, beta-blocker, statin, macrolides, aminoglycosides, and fluoroquinolone, and as much as possible avoid them due to to potential exacerbation of myasthenia crisis. ; Holding PTA statin  - Pyridostigmine 60 mg PO TID  - Consider CT chest with contrast for r/o thymoma at PCP f/up     #H/o neobladder  - was on snowden catheter in neobladder, discontinued 12/10 and voiding well, chronically incontinent. Urology follow up.      #Hypernatremia  #Hyperchloremia  Na 149 day of admission to rehab (12/11), Cl 117, creatinine 0.57 (at baseline). Had issues with hypernatremia on acute service, up to 152. Acute service increased free water flushes and gave 1L D5.   - Increased free water flushes to 200 ml q 4hrs - 12/18  - BMP M/Th  - on the day of discharge his last Na was  139 on 12/30/24     #Chronic macrocytic anemia  #Thrombocytopenia  #Leukopenia   Hgb has mostly been around 9-10 on acute service; noted slight downtrend ---8.5--7.8 (12/11), likely dilutional with IV fluids as platelets and hematocrit also decreased slightly; has no clinical concern for active bleed and has been hemodynamically stable. B12 was 727, folic acid wnl on 11/12. Was seen by hematology 4/2024, workup unremarkable. On ARU Hgb dropped to 7.3 on 12/19. Platelets were decreased at the time of admission to ARU but WNL 12/16. Leukopenia first noted 12/19 WBC 3.9.  - Did not required transfusion this admission  - PTA B12 supplementation     #Sinus bradycardia  HR 40s to upper 50s during admission, dips the most when sleeping. Previous EKG sinus bradycardia with no high grade blocks. Chart review shows similar HR 40-50s last hospitalization, and he has a few OP visits with documented sinus bradycardia (7/10/24, 3/4/24).  - OK w HR 40's-50's       #Moderate to severe oropharyngeal dysphagia s/p G-tube  VFSS on 12/9 while on acute service, note that he continues to aspirate all textures but improved ability to cough; SLP recommend continued NPO. VFSS on 12/18 continued to have moderate-severe oropharyngeal and pharyngeal dysphagia with possible aspiration. SLP gave permission for 3 teaspoons of thin liquid a day for quality of life.   He had a VFSS on 12/31/24 and was recommended to be NPO with follow up VFSS as outpatient.   Received all nutrition via tube feeds       #CAD  #HLD  #Paroxysmal atrial fibrillation  #Recent NSTEMI (11/8/24)  Troponin 997 on acute service, cardiac cath 11/11/24 showed only mild CAD. Echo 12/1/24 difficult study but grossly normal left and right ventricular function, no severe valvular regurgitation, probably mild aortic regurgitation, no pericardial effusion.  Not in afib on admission to rehab.  - Continue PTA ASA  - HOLD PTA Simvastatin until follow-up with neurology  - No rate  control medications at baseline     #Hypothyroidism  - Increased 12/19 levothyroxine 175 mcg daily  - 12/16 TSH elevated (7.53) and T4 low (0.83).  - Recheck TSH and T4 at PCP follow up around 1/16 (4 weeks from dose increase)     #CHA  - PTA CPAP HS     #Extremity Swelling  Significant swelling in the right arm as well as bilateral LE was noted on 12/12. Patient denied associated pain. 12/13 swelling in right and left leg improved, still significant swelling in left LE.  - Wrapped arm and lower extremities in compression bandage, improved by discharge  - Right Arm Duplex US negative 12/12     #Constipation  Constipated for 7 days on admission. No issues since then.   - Senna, Miralax, Dulcolax daily PRN      #Severe malnutrition in the context of acute illness or injury  - Eval and treat per RD recommendation. Diet as below.  -NPO except for ice Chips. Orders Placed This Encounter      NPO for Medical/Clinical Reasons Except for: Ice Chips, NPO but receiving Tube Feeding, Meds      Diet    Bladder: 12/12 2 elevated PVRs in 100s and 300s range. Patient has neobladder and is incontinent at baseline.   Follow up Appointments on Discharge:   PCP  Neurology, Dr. Fabian or Dr. Ashley   PM&R  Cardiology  Urology for assessment of neobladder and options to reduce nighttime leaking    DISCHARGE MEDICATIONS  Current Discharge Medication List        START taking these medications    Details   fluticasone (FLONASE) 50 MCG/ACT nasal spray Spray 1 spray into both nostrils daily.  Qty: 9.9 mL, Refills: 0    Associated Diagnoses: Myasthenia gravis (H)           CONTINUE these medications which have CHANGED    Details   levothyroxine (SYNTHROID/LEVOTHROID) 175 MCG tablet Take 1 tablet (175 mcg) by mouth daily.  Qty: 30 tablet, Refills: 0    Associated Diagnoses: Hypothyroidism, unspecified type      pyRIDostigmine (MESTINON) 60 MG tablet Take 1 tablet (60 mg) by mouth every 8 hours.  Qty: 30 tablet, Refills: 0    Associated  Diagnoses: Myasthenic crisis (H)           CONTINUE these medications which have NOT CHANGED    Details   aspirin 81 MG EC tablet Take 81 mg by mouth every evening.      melatonin 5 MG tablet Take 1 tablet (5 mg) by mouth or NG Tube at bedtime.    Associated Diagnoses: Myasthenic crisis (H)      olopatadine (PATANOL) 0.1 % ophthalmic solution Place 1 drop into both eyes 2 times daily as needed for allergies.      acetaminophen (TYLENOL) 325 MG tablet Take 1-2 tablets (325-650 mg) by mouth every 6 hours as needed for mild pain.    Associated Diagnoses: Status post open reduction with internal fixation of fracture      artificial saliva (BIOTENE MT) SOLN solution Swish and spit 2 sprays in mouth every hour as needed for dry mouth.  Qty: 44.3 mL, Refills: 1    Associated Diagnoses: MND (motor neurone disease) (H)      Cholecalciferol (D3 PO) Take 1 tablet by mouth daily. OTC: Unsure of strength.      guaiFENesin (ROBITUSSIN) 20 mg/mL liquid Take 10 mLs (200 mg) by mouth or Feeding Tube every 4 hours as needed for cough.    Associated Diagnoses: Acute hypoxic respiratory failure (H)      order for DME Equipment being ordered: CPAP and supplies. LIFETIME need.  Qty: 1 each, Refills: 0    Associated Diagnoses: CHA (obstructive sleep apnea)      vitamin B-12 (CYANOCOBALAMIN) 1000 MCG tablet Take 1,000 mcg by mouth daily           STOP taking these medications       Jevity 1.5 Mandeep Comments:   Reason for Stopping:         simvastatin (ZOCOR) 10 MG tablet Comments:   Reason for Stopping:                 DISCHARGE INSTRUCTIONS AND FOLLOW UP  Discharge Procedure Orders   Adult Neurology  Referral   Standing Status: Future   Referral Priority: Routine: Next available opening Referral Type: Consultation   Number of Visits Requested: 1     Adult Cardiology Eval  Referral   Standing Status: Future   Referral Priority: Routine: Next available opening Referral Type: CV Cardio consult   Requested Specialty:  Cardiovascular Disease   Number of Visits Requested: 1     Adult Urology  Referral   Standing Status: Future   Referral Priority: Routine: Next available opening Referral Type: Consultation   Requested Specialty: Urology   Number of Visits Requested: 1     Home Care Referral   Referral Priority: Routine: Next available opening Referral Type: Home Health Therapies & Aides   Number of Visits Requested: 1     Home Infusion Referral   Referral Priority: Routine: Next available opening Referral Type: Consultation   Number of Visits Requested: 1     Reason for your hospital stay   Order Comments: Myasthenia gravis     Activity   Order Comments: Your activity upon discharge: activity as tolerated     Order Specific Question Answer Comments   Is discharge order? Yes      Follow Up and recommended labs and tests   Order Comments: PCP  Neurology, Dr. Fabian or Dr. Ashley   PM&R  Cardiology  Urology for assessment of neobladder and options to reduce nighttime leaking     CPAP - Continue Home CPAP   Order Comments: Continue Home CPAP per home equipment settings.     Diet   Order Comments: Follow this diet upon discharge:      Adult Formula Bolus Feeding: Jevity 1.5; Route: Gastrostomy; 4 times daily (0800, 1200, 1600, 2000); Volume per Bolus: 1.5; Can(s)/ Carton(s)      NPO for Medical/Clinical Reasons Except for: Ice Chips, NPO but receiving Tube Feeding, Meds  - Before and after each [id number] mL free water     Order Specific Question Answer Comments   Is discharge order? Yes           PHYSICAL EXAMINATION    Most recent Vital Signs:   Vitals:    12/30/24 1648 12/31/24 0615 12/31/24 0754 12/31/24 1557   BP: 112/48 134/43 118/46 121/49   BP Location: Left arm Left arm Left arm Left arm   Patient Position:    Semi-Mahoney's   Pulse: 60 56 51 54   Resp: 16 16 12 16   Temp: 97.8  F (36.6  C) 97.6  F (36.4  C) 97.6  F (36.4  C) 97.9  F (36.6  C)   TempSrc: Oral Oral Oral Oral   SpO2: 94%  97% 97%   Weight:       Height:            General: Awake, alert, NAD, sitting comfortably in chair  Speech is clear   Comprehension is intact   HEENT: Normocephalic, atraumatic, MMM  Cardiac: RRR, no murmurs, rubs, gallops, no LE edema  Pulm: CTAB, breathing comfortably on RA  Abd: Soft, nontender, nondistended, BS+  MSK: Moving all extremities spontaneously   Neuro: No focal neurological deficits on gross examination   Skin: No lesions/rashes appreciated on visible skin      Discharge summary was forwarded to Gladys Vazquez (PCP) at the time of discharge, so as to bridge from hospital to outpatient care.     It was our pleasure to care for Mendez Greene during this hospitalization. Please do not hesitate to contact me should there be questions regarding the hospital course or discharge plan.      Patient was reviewed with attending physician, Dr. Reyna, who agrees with the assessment and plan.    Azul Villagran MD  [facility] PM&R PGY-2  12/31/2024    Attending's note   Patient has been seen, examined and evaluated by me independently. I reviewed today's vitals signs, lab values, imaging, medications, and current issues including medical, functional and social history significant to this admission.      I agree with the above note which reflects my direct input and interpretation of progress.   The above note edited following my exam and encounter.       Total of 35 min spent in this encounter, preparing to see the patient in review of the tests, obtaining history, and communicating with nursing, therapy team. Additional time spent on documenting of the clinical information.     Loreto Reyna MD, A

## 2024-12-31 NOTE — TELEPHONE ENCOUNTER
Reason for visit: New Incontinence / Neobladder    Relevant information: Hx. Bladder Cancer, Acute Renal Failure(11/30/24).    Pt called: No need for a call    At Rooming: Jaquelin Lopez, EMT-P  12/31/2024  3:32 PM

## 2024-12-31 NOTE — PROGRESS NOTES
"Sw met with patient and completed IRF and IMM. Patient set to discharge 01/01 with HC.He is really excited.         IRF-PAYTON Pain Assessment    Pain Effect on Sleep  \"Over the past 5 days, how much of the time has pain made it hard for you to sleep at night?\"    0. Does not apply - I have not had any pain or hurting in the past 5 days       RONAK Ignacio  [facility], Acute Inpatient Rehab Unit   [address] [place] [address], [address] [place] [address]  Phone: [phone]  Fax: [phone]   "

## 2024-12-31 NOTE — PLAN OF CARE
Speech Language Therapy Discharge Summary    Reason for therapy discharge:    Discharged to home with outpatient therapy.    Progress towards therapy goal(s). See goals on Care Plan in Epic electronic health record for goal details.  Goals not met.  Barriers to achieving goals:   medical progress.    Therapy recommendation(s):    Recommend continue NPO with ice chips for comfort with supervision/assist. Oral cares x3/day at minimum. Okay for pt to consume three (3) sips of thin liquid by spoon a day for quality of life with family, no bigger than half-teaspoon in size, must cough multiple times after each teaspoon sip, wait two to three minutes before taking next half-teaspoon sip. Pt can complete effortful swallows for maintenance of swallow mechanism to prevent disuse atrophy, but no formal pharyngeal exercise plan indicated given diagnosis. Pt is aware of overall aspiration (including silent) risk and can verbalize rationale for NPO diet, as well as indicators of aspiration and/or respiratory distress. Recommend consider repeat VFSS after neurology/medical workup if/when pt is noting improvement in overall symptoms, or roughly in 2-3 months if no change in symptoms. Recommend swallow evaluation/treatment be completed in an outpatient setting.

## 2024-12-31 NOTE — PROGRESS NOTES
"  [facility]   Acute Rehabilitation Unit  Daily progress note    INTERVAL HISTORY  No acute events overnight. Vitals stable. Patient excited about discharge home tomorrow.  Denies chest pain, shortness of breath, no fever or chills.  Will plan on discharge tomorrow.      Functional  PT:  Current Status:  Bed Mobility: SBA with bed rail  Transfer: SBA from >21'' heights w/ FWW, up to Shai from standard chairs ~18''.   Gait: Up to 250ft using FWW SBA  Stairs: x4 6\" platform steps with FWW and CGA  Balance: Fair static, poor dynamic standing balance w/out UE support     Outcome Measures:   Garcia              12/12: 12/56 12/28: 23/56  5TSTS              12/12: 0x (requires physical assist even with UE assist)              12.26: 0x. (Unable to stand from standard chair, able to stand from 22'' with UE push)  6MWT              12/12: 40 ft, FWW and CGA (stopped early due to fatigue)      ROS: 10 point ROS neg other than the symptoms noted above in the HPI.      MEDICATIONS  Scheduled meds  Current Facility-Administered Medications   Medication Dose Route Frequency Provider Last Rate Last Admin    aspirin (ASA) chewable tablet 81 mg  81 mg Oral or Feeding Tube QPM Sachin Sorensen DO   81 mg at 12/30/24 2100    cyanocobalamin (VITAMIN B-12) tablet 1,000 mcg  1,000 mcg Oral or Feeding Tube Daily Sachin Sorensen DO   1,000 mcg at 12/31/24 0826    fluticasone (FLONASE) 50 MCG/ACT spray 1 spray  1 spray Both Nostrils Daily Azul Livingston MD   1 spray at 12/31/24 0953    guaiFENesin (ROBITUSSIN) 20 mg/mL solution 200 mg  200 mg Oral or Feeding Tube Daily Azul Livingston MD   200 mg at 12/31/24 0827    heparin ANTICOAGULANT injection 5,000 Units  5,000 Units Subcutaneous Q8H Azul Livingston MD   5,000 Units at 12/31/24 0826    levothyroxine (SYNTHROID/LEVOTHROID) tablet 175 mcg  175 mcg Oral or Feeding Tube Daily Azul Livingston MD   175 mcg at " 12/31/24 0827    Lidocaine (LIDOCARE) 4 % Patch 1 patch  1 patch Transdermal Q24h Sachin Sorensen DO   1 patch at 12/23/24 1958    melatonin tablet 3 mg  3 mg Oral or Feeding Tube At Bedtime Sachin Sorensen DO   3 mg at 12/30/24 2100    oseltamivir (TAMIFLU) capsule 75 mg  75 mg Oral Daily Sam Llanos MD   75 mg at 12/31/24 0952    polyethylene glycol (MIRALAX) Packet 17 g  17 g Oral Daily Samuel Camara MD   17 g at 12/31/24 0826    pyRIDostigmine (MESTINON) tablet 60 mg  60 mg Oral or Feeding Tube Q8H Sachin Sorensen DO   60 mg at 12/31/24 0826    senna-docusate (SENOKOT-S/PERICOLACE) 8.6-50 MG per tablet 1 tablet  1 tablet Oral BID Sveta Schultz MD   1 tablet at 12/31/24 0827    [Held by provider] simvastatin (ZOCOR) tablet 10 mg  10 mg Oral Daily Azul Livingston MD        Vitamin D3 (CHOLECALCIFEROL) tablet 25 mcg  25 mcg Oral or Feeding Tube Daily Sachin Sorensen DO   25 mcg at 12/31/24 0827       PRN meds:  Current Facility-Administered Medications   Medication Dose Route Frequency Provider Last Rate Last Admin    acetaminophen (TYLENOL) tablet 650 mg  650 mg Oral or Feeding Tube Q4H PRN Sachin Sorensen DO   650 mg at 12/23/24 1958    artificial saliva (BIOTENE MT) solution 2 spray  2 spray Swish & Spit Q1H PRN Azul Livingston MD        bisacodyl (DULCOLAX) suppository 10 mg  10 mg Rectal Daily PRN Sveta Schultz MD   10 mg at 12/18/24 1558    dextrose 10% infusion   Intravenous Continuous PRN Azul Livingston MD        olopatadine (PATANOL) 0.1 % ophthalmic solution 1 drop  1 drop Both Eyes BID PRN Azul Livingston MD   1 drop at 12/28/24 0938    polyethylene glycol (MIRALAX) Packet 17 g  17 g Oral Daily PRN Sveta Schultz MD   17 g at 12/18/24 0813    sodium chloride (OCEAN) 0.65 % nasal spray 1 spray  1 spray Both Nostrils Q1H PRN Sveta Schultz MD   1 spray at 12/26/24 0910         PHYSICAL EXAM  /46 (BP Location: Left  "arm)   Pulse 51   Temp 97.6  F (36.4  C) (Oral)   Resp 12   Ht 1.778 m (5' 10\")   Wt 82.1 kg (181 lb)   SpO2 97%   BMI 25.97 kg/m    General: NAD, resting in chair  HEENT: NCAT, EOMI.  Cardio: Well perfused. Sinus bradycardia  Pulm: Breathing comfortably on room air. Symmetrical chest rise.   Abd: Non distended. PEG in place   Neuro/MSK: Able to move all 4 extremities.  EXT: No LE edema bilaterally. Ecchymosis along the dorsal upper and lower right arm improving     LABS      Lab Results   Component Value Date    WBC 3.7 12/30/2024    WBC 10.2 11/06/2020     Lab Results   Component Value Date    RBC 2.28 12/30/2024    RBC 4.30 11/06/2020     Lab Results   Component Value Date    HGB 8.2 12/30/2024    HGB 14.7 11/06/2020     Lab Results   Component Value Date    HCT 25.9 12/30/2024    HCT 43.0 11/06/2020     Lab Results   Component Value Date     12/30/2024     11/06/2020     Lab Results   Component Value Date    MCH 36.0 12/30/2024    MCH 34.2 11/06/2020     Lab Results   Component Value Date    MCHC 31.7 12/30/2024    MCHC 34.2 11/06/2020     Lab Results   Component Value Date    RDW 17.3 12/30/2024    RDW 13.5 11/06/2020     Lab Results   Component Value Date     12/30/2024     11/06/2020         Last Comprehensive Metabolic Panel:  Lab Results   Component Value Date     12/30/2024    POTASSIUM 4.6 12/30/2024    CHLORIDE 105 12/30/2024    CO2 25 12/30/2024    ANIONGAP 9 12/30/2024    GLC 90 12/30/2024    BUN 31.4 (H) 12/30/2024    CR 0.56 (L) 12/30/2024    GFRESTIMATED >90 12/30/2024    SIMONA 8.5 (L) 12/30/2024       ASSESSMENT AND PLAN  Mendez Greene is a 79 year old male with past medical history of paroxysmal atrial fibrillation, CAD, ascending aortic aneurysm, HLD, HTN, hypothyroidism, CHA admitted to ICU service on 11/30/2024 for acute respiratory failure and AMS. Patient was recently hospitalized at [facility] from 11/8/24-11/15/24 for progressive weakness and fatigue, " difficulty swallowing. After discharge, his ACh receptor binding Ab came back 2.11, strongly suggestive of myasthenia gravis. Presented back to [facility] ED 11/30 with hypotension and acute respiratory failure. Patient was intubated in the ED and admitted to ICU on pressors. During admission patient received 5 doses of IVIG for myasthenic crisis and was initiated on Mestinon. Hospital course also included broad spectrum antibiotics for possible septic shock due to UTI, MOHAMUD, electrolyte derangements, bradycardia, hypertension.  Now admitted to acute rehab.     Admission to acute inpatient rehab 12/11/2024.    Impairment group code: 03.8 Neuromuscular Disorders; new diagnosis of myasthenia gravis with myasthenia crisis         PT and OT 75 minutes of each as well as SLP 30 minutes on a daily basis, in addition to rehab nursing and close management of physiatrist.    Impairment of ADL's: Impairment in strength, endurance, and ROM resulting in functional limitations in patient's ability to perform ADLs  Impairment of mobility:  Impairment in strength, endurance, and ROM resulting in functional limitations in patient's ability to perform functional mobility   Impairment of cognition/language/swallow:  Needs therapy for swallowing.     Medical Conditions     #Myasthenic crisis  Questionable ALS diagnosis which is being deferred to his primary neurologist.  - clinically much improved with improvement in upper extremity strength; still with some upper extremity flexion weakness and LE weakness  - completed 5 doses of IVIG on 12/5/24   - Caution use of IV magnesium, beta-blocker, statin, macrolides, aminoglycosides, and fluoroquinolone, and as much as possible avoid them due to to potential exacerbation of myasthenia crisis. ; Holding PTA statin  - Monitor respiratory status closely, currently stable on room air  - CPAP at   - Pyridostigmine 60 mg PO TID  - Consider CT chest with contrast for r/o thymoma while on rehab     #H/o  neobladder  - was on snowden catheter in neobladder, discontinued 12/10 and voiding well, chronically incontinent     #Hypernatremia  #Hyperchloremia  Na 149 day of admission to rehab (12/11), Cl 117, creatinine 0.57 (at baseline). Had issues with hypernatremia on acute service, up to 152. Acute service increased free water flushes and gave 1L D5.   - Increased free water flushes to 200 ml q 4hrs - 12/18  - BMP M/Th     #Chronic macrocytic anemia  #Thrombocytopenia  #Leukopenia   Hgb has mostly been around 9-10 on acute service; noted slight downtrend ---8.5--7.8 (12/11), likely dilutional with IV fluids as platelets and hematocrit also decreased slightly; has no clinical concern for active bleed and has been hemodynamically stable. B12 was 727, folic acid wnl on 11/12. Was seen by hematology 4/2024, workup unremarkable. On ARU Hgb dropped to 7.3 on 12/19. Platelets were decreased at the time of admission to ARU but WNL 12/16. Leukopenia first noted 12/19 WBC 3.9.  - CBC M/Th  - Transfuse hgb <7 (has not required transfusion this admission)  - PTA B12 supplementation     #Sinus bradycardia  HR 40s to upper 50s during admission, dips the most when sleeping. Previous EKG sinus bradycardia with no high grade blocks. Chart review shows similar HR 40-50s last hospitalization, and he has a few OP visits with documented sinus bradycardia (7/10/24, 3/4/24).  - OK w HR 40's-50's  - CTM, further evaluation if ever symptomatic     #Moderate to severe oropharyngeal dysphagia s/p G-tube  VFSS on 12/9 while on acute service, note that he continues to aspirate all textures but improved ability to cough; SLP recommend continued NPO. VFSS on 12/18 continued to have moderate-severe oropharyngeal and pharyngeal dysphagia with possible aspiration. SLP gave permission for 3 teaspoons of thin liquid a day for quality of life. Will continue to NPO with next VFSS after 1/1/2025.   - 12/13 200 mg guaifenesin to help loosen mucus secretions.  Emphasize the importance of eating ice chips so mucous does not become hard  - Receive all nutrition via tube feeds  - TF cycled to 18hrs/day on 12/19  - BMP, Mg & Phos M/Th     #CAD  #HLD  #Paroxysmal atrial fibrillation  #Recent NSTEMI (11/8/24)  Troponin 997 on acute service, cardiac cath 11/11/24 showed only mild CAD. Echo 12/1/24 difficult study but grossly normal left and right ventricular function, no severe valvular regurgitation, probably mild aortic regurgitation, no pericardial effusion.  Not in afib on admission to rehab.  - Continue PTA ASA  - HOLD PTA Simvastatin until follow-up with neurology  - No rate control medications at baseline     #Hypothyroidism  - Increased 12/19 levothyroxine 175 mcg daily  - 12/16 TSH elevated (7.53) and T4 low (0.83).  - Recheck TSH and T4 around 1/16 (4 weeks from dose increase)    #CHA  - PTA CPAP HS, 12/16 order changed to allow for CPAP during naps    #Extremity Swelling  Significant swelling in the right arm as well as bilateral LE was noted on 12/12. Patient denies associated pain. 12/13 swelling in right and left leg improved, still significant swelling in left LE.  - Wrapped arm and lower extremities in compression bandage  - Right Arm Duplex US negative 12/12    #Constipation  Has not had a reported bowel movement since 12/11, but is passing flatulence. No reported abdominal pain. Consider encouraging suppository if he still has not had BM by 12/17.   - Senna Q12H  - Miralax daily, scheduled on 12/24  - Dulcolax daily PRN     #Severe malnutrition in the context of acute illness or injury  - Eval and treat per RD recommendation. Diet as below.    #Abdominal strain - improving   Occurred 12/23, appears to be related to soft tissue strain, pain with shallow palpation and movement. Lidocaine patch ineffective. Unlikely that this represents GI pathology, but will also try to optimize patient's constipation as noted above unless this is contributing. Monitor for  stability/worsening.      Adjustment to disability:  Clinical psychology to eval and treat  FEN: NPO except for ice Chips. Receiving Tube Feeding Adult Formula Drip Feeding: Continuous Jevity 1.5; Gastrostomy at 55 mL/h with free water flushes 200 ml q 4 hrs. PTA Vit D supplementation.   Bowel: See above  Bladder: No snowden in place, PVRs on admission. 12/12 2 elevated PVRs in 100s and 300s range. Patient has neobladder and is incontinent at baseline. Will continue to assess output and any symptoms of fullness. No need for repeat PVRs at this time.  DVT Prophylaxis: Subcutaneous heparin   GI Prophylaxis: Na  Code: Full  Disposition: Home with family support.  ELOS:  21 Days. Discharge date 1/1   Rehab prognosis:  Good  Follow up Appointments on Discharge:   PCP  Neurology, Dr. Fabian or Dr. Ashley   PM&R  Cardiology  Urology for assessment of neobladder and options to reduce nighttime leaking        Sachin Sorensen, DO        I spent a total of 35 minutes face to face and coordinating care of Mendez Greene. Over 50% of my time on the unit was spent counseling the patient and /or coordinating care regarding post myastehnia gravis crisis.

## 2024-12-31 NOTE — PLAN OF CARE
Goal Outcome Evaluation:      Plan of Care Reviewed With: patient  Overall Patient Progress: improving  Overall Patient Progress: improving        Orientation: AOX4  Bowel: Mixed Continence per report  LBM: 12-30  Bladder: Mixed Continence  Pain: Denies  Ambulation/Transfers: Ax1 w/ WW and GB  Diet/Liquids: NPO but Ice Chips Only  Tubes/Lines/Drains: PEG Tube Site  Skin: WDL      Some bladder incontinence but due to not setting up the urinal in time. Apart from that, no acute changes. Continue POC.

## 2024-12-31 NOTE — PLAN OF CARE
"[id number]    Goal Outcome Evaluation:      Plan of Care Reviewed With: patient    Overall Patient Progress: improvingOverall Patient Progress: improving    /48 (BP Location: Left arm)   Pulse 60   Temp 97.8  F (36.6  C) (Oral)   Resp 16   Ht 1.778 m (5' 10\")   Wt 82.1 kg (181 lb)   SpO2 94%   BMI 25.97 kg/m      Orientation:Alert & oriented x4 , able to make needs known  Bowel:Last BM 12/30  Bladder: Voiding adequately to the bathroom and urinal.  Pain: Denies  Ambulation/Transfers:Assist of 1/ walker and GB  Diet/ Liquids: NPO but Ice chip,on X4 TF bolus   Tubes/ Lines/ Drains: none  Oxygen:On room air, denies chest pain or shortness of breath  Skin:bruising to the abdomen      Standard precautions maintained. Pt and Spouse had TF education today.Call light within reach. No new concerns this shift. Continue with POC.    Deja Patel RN    "

## 2024-12-31 NOTE — PROGRESS NOTES
Discharge Plan:     Date:1/1/25      Location: Home with Spouse     Caregiver Support After discharge: SpouseAlana    Communicated discharge plans with: Patient and Spouse     Home Services or OP services (list services):   [facility]   [facility]  PH: [phone]   Nurse, physical therapy, occupational therapy, speech therapy, home health aide and .        [facility]   Ph: [phone]  This company provides you with your tube feeding supplies and formula. Please call them with any questions related to your tube feeding.     HC and HI listed in AVS. Requested orders for both from discharging provider.     Supplies and DME:   None needed.     Education:   Patient and spouse have completed tube feeding education for [employer].     Follow ups:  Made per [facility]      Other notes:    Writer met with patient and spouse to discuss discharge plan. They understood HC, HI. Did not require any additional Supplies or formula for tube feeds, they still have plenty of supplies from previous. All questions answered. On track for discharge tomorrow.

## 2024-12-31 NOTE — PROGRESS NOTES
"Occupational Therapy Discharge Summary    Reason for therapy discharge:    Discharged to home with home therapy.    Progress towards therapy goal(s). See goals on Care Plan in [facility] electronic health record for goal details.  Goals partially met.  Barriers to achieving goals:   discharge from facility and impaired BUE coordination/ROM/strengthening.    Therapy recommendation(s):    Continued therapy is recommended.  Rationale/Recommendations:  Recommend HC OT services to complete home safety assessment, and increase IND and safety with ADLs/IADLs.    Precautions: Falls, continuous tube feeding and NPO, BUE weakness with R greater than L  *elevate BUE/BLE's when supine in bed with pillows*  R resting hand orthosis - on overnight/off during day  B T-bar splints only with functional tasks-monitor skin breakdown and edema     Current Status:  ADLs:  Mobility:  CGA-SBA in rm w/ FWW and GB  Grooming: sba with adapted r angle toothbrush and spoon for ice chips  Dressing: UB mod a pull over shirt LB Max A FWW.  Bathing: CGA with shower transfer and shower chair with grab bars. Assistance to wash/rinse/dry 6/10 body parts seated on ETB.   Toileting: CGA from 19\" commode overlay, CGA-mod A standing clothing management, Max A hugo cares   IADLs: IND prior, supportive spouse who will provide total A     Other Barriers to Discharge (DME, Family Training, etc):   Family training completed 12/30/24: spouse to assist with dressing, toileting, bathing, G/H tasks, and all IADLs  AE/DME: spouse wishes to purchase: commode overlay, bed rails, reacher and shower chair w arms from [employer]. Says she will reach out to contractor about installing gb inside shower. Pt reporting looking into obtaining external catheter for overnight use at home. Spouse to order long handled sponge for bathing at home.   "

## 2024-12-31 NOTE — PLAN OF CARE
Physical Therapy Discharge Summary    Reason for therapy discharge:    Discharged to home with home therapy.    Progress towards therapy goal(s). See goals on Care Plan in [facility] electronic health record for goal details.  Goals met    Therapy recommendation(s):    Continued therapy is recommended.  Rationale/Recommendations:  Pt discharging to home w/ spouse assist. Recommending SBA for all functional mobility, may need Shai from low surfaces. Family training completed w/ spouse, all DME obtained. Able to amb up to 250' w/ FWW, SBA. Pt will benefit from  physical therapy for optimizing pt safety and IND w/ mobility and decrease caregiver burden.    Outcome Measures:   Garcia              12/12: 12/56 12/28: 23/56  5TSTS              12/12: 0x (requires physical assist even with UE assist)              12.26: 0x. (Unable to stand from standard chair, able to stand from 22'' with UE push)  6MWT              12/12: 40 ft, FWW and CGA (stopped early due to fatigue)

## 2025-01-01 VITALS
DIASTOLIC BLOOD PRESSURE: 49 MMHG | TEMPERATURE: 97.9 F | HEART RATE: 55 BPM | RESPIRATION RATE: 17 BRPM | WEIGHT: 171.74 LBS | OXYGEN SATURATION: 95 % | SYSTOLIC BLOOD PRESSURE: 121 MMHG | HEIGHT: 70 IN | BODY MASS INDEX: 24.59 KG/M2

## 2025-01-01 PROCEDURE — 250N000013 HC RX MED GY IP 250 OP 250 PS 637: Performed by: STUDENT IN AN ORGANIZED HEALTH CARE EDUCATION/TRAINING PROGRAM

## 2025-01-01 PROCEDURE — 250N000013 HC RX MED GY IP 250 OP 250 PS 637: Performed by: PHYSICAL MEDICINE & REHABILITATION

## 2025-01-01 PROCEDURE — 250N000011 HC RX IP 250 OP 636

## 2025-01-01 PROCEDURE — 99239 HOSP IP/OBS DSCHRG MGMT >30: CPT | Mod: GC | Performed by: PHYSICAL MEDICINE & REHABILITATION

## 2025-01-01 PROCEDURE — 250N000013 HC RX MED GY IP 250 OP 250 PS 637

## 2025-01-01 RX ADMIN — GUAIFENESIN 200 MG: 200 SOLUTION ORAL at 07:29

## 2025-01-01 RX ADMIN — PYRIDOSTIGMINE BROMIDE 60 MG: 60 TABLET ORAL at 07:29

## 2025-01-01 RX ADMIN — PYRIDOSTIGMINE BROMIDE 60 MG: 60 TABLET ORAL at 00:28

## 2025-01-01 RX ADMIN — Medication 25 MCG: at 07:29

## 2025-01-01 RX ADMIN — OLOPATADINE HYDROCHLORIDE 1 DROP: 1 SOLUTION OPHTHALMIC at 09:33

## 2025-01-01 RX ADMIN — FLUTICASONE PROPIONATE 1 SPRAY: 50 SPRAY, METERED NASAL at 09:33

## 2025-01-01 RX ADMIN — SENNOSIDES AND DOCUSATE SODIUM 1 TABLET: 50; 8.6 TABLET ORAL at 09:27

## 2025-01-01 RX ADMIN — POLYETHYLENE GLYCOL 3350 17 G: 17 POWDER, FOR SOLUTION ORAL at 07:29

## 2025-01-01 RX ADMIN — CYANOCOBALAMIN TAB 1000 MCG 1000 MCG: 1000 TAB at 07:29

## 2025-01-01 RX ADMIN — LEVOTHYROXINE SODIUM 175 MCG: 0.05 TABLET ORAL at 09:27

## 2025-01-01 RX ADMIN — OSELTAMAVIR PHOSPHATE 75 MG: 75 CAPSULE ORAL at 07:29

## 2025-01-01 RX ADMIN — HEPARIN SODIUM 5000 UNITS: 5000 INJECTION, SOLUTION INTRAVENOUS; SUBCUTANEOUS at 00:28

## 2025-01-01 RX ADMIN — HEPARIN SODIUM 5000 UNITS: 5000 INJECTION, SOLUTION INTRAVENOUS; SUBCUTANEOUS at 09:26

## 2025-01-01 ASSESSMENT — ACTIVITIES OF DAILY LIVING (ADL)
ADLS_ACUITY_SCORE: 76
ADLS_ACUITY_SCORE: 77
ADLS_ACUITY_SCORE: 76

## 2025-01-01 NOTE — PLAN OF CARE
Goal Outcome Evaluation:      Plan of Care Reviewed With: patient    Overall Patient Progress: no changeOverall Patient Progress: no change     Patient alert and oriented, able to make needs known  Slept most of the night  No complained of pain, no respiratory distress  Primo fit in placed at night, no BM this shift  Safety checks done, fall prevention maintained, bed alarm ON  No acute event overnight  Plan for Discharge today

## 2025-01-01 NOTE — PLAN OF CARE
Goal Outcome Evaluation:      Plan of Care Reviewed With: patient    Overall Patient Progress: no changeOverall Patient Progress: no change     Planning to discharge tomorrow 1/1/25    Orientation: A/O x4, VSS on RA.    Bowel:LBM 12/30, schedule senna given   Bladder:incont x1, primofit placed at HS.    Pain:no c/o pain.   Ambulation/Transfers:up with assist one [facility] gait belt and walker. s:  Diet/ Liquids:NPO, ice chips, tube feeds. All meds through PEG  Tubes/ Lines/ Drains: PEG  Tube Feeding: QID bolus feeds, tolerated well.  120ml flushes before and after feeds.    Skin: no skin issues.   Bed alarm on for safety, call light within reach. Continue with POC.

## 2025-01-01 NOTE — PLAN OF CARE
Goal Outcome Evaluation:      Plan of Care Reviewed With: patient, family  Overall Patient Progress: no change  Overall Patient Progress: no change        Orientation: AOX4  Bowel: Continence  LBM: 1-1-25  Bladder: Incontinent  Pain: Denies  Ambulation/Transfers: Ax1 w/ WW and GB  Diet/Liquids:  NPO ice chips only  Tubes/Lines/Drains: PEG Tube  Skin: WDL x. Integrity; PEG Tube site dressing; Dressing clean, dry, intact; changed 12-31-24      Patient discharged today. Given discharge instructions. Patient and family verbalized understanding discharge instructions. All patient belongings brought by patient upon leaving. Pt, accompanied by spouse and nursing aid down the bernal @ ~1100H. Escorted to the car family and helped by Nursing Assistant in the car @ ~1115H.

## 2025-01-02 ENCOUNTER — HOME INFUSION (OUTPATIENT)
Dept: HOME HEALTH SERVICES | Facility: HOME HEALTH | Age: 80
End: 2025-01-02
Payer: MEDICARE

## 2025-01-02 ENCOUNTER — HOME INFUSION BILLING (OUTPATIENT)
Dept: HOME HEALTH SERVICES | Facility: HOME HEALTH | Age: 80
End: 2025-01-02
Payer: MEDICARE

## 2025-01-02 ENCOUNTER — PATIENT OUTREACH (OUTPATIENT)
Dept: CARE COORDINATION | Facility: CLINIC | Age: 80
End: 2025-01-02
Payer: MEDICARE

## 2025-01-02 VITALS — WEIGHT: 171.74 LBS | BODY MASS INDEX: 24.64 KG/M2

## 2025-01-02 DIAGNOSIS — G70.00 MYASTHENIA GRAVIS (H): Primary | ICD-10-CM

## 2025-01-02 DIAGNOSIS — R13.10 DYSPHAGIA: Primary | ICD-10-CM

## 2025-01-02 NOTE — TELEPHONE ENCOUNTER
MEDICAL RECORDS REQUEST   [facility]  Urology Clinic  [address] [place] [address]  PHONE: [phone]  Fax: [phone]        FUTURE VISIT INFORMATION                                                   Mendez ABRAHAM Greene, : 1945 scheduled for future visit at [facility]    APPOINTMENT INFORMATION:  Date: 2025  Provider:  Osiris Gomes NP  Reason for Visit/Diagnosis: Incontinence/neobladder    REFERRAL INFORMATION:  Referring provider:  Azul Livingston MD      RECORDS REQUESTED FOR VISIT                                                     NOTES  STATUS/DETAILS   OFFICE NOTE from referring provider  yes   OFFICE NOTE from other specialist  yes, 2024 -- Gladys Vazquez MD @ [facility]    2019 -- Lou King PA-C     MORE   DISCHARGE SUMMARY from hospital  yes, 2024 -- [facility]    MEDICATION LIST  yes   LABS     URINALYSIS (UA)  yes   IMAGES  yes, 2024 --  CT ABD PELVIS  2024, 2024 -- XR CHEST        PRE-VISIT CHECKLIST      Joint diagnostic appointment coordinated correctly          (ensure right order & amount of time) Yes   RECORD COLLECTION COMPLETE Yes

## 2025-01-02 NOTE — PROGRESS NOTES
Clinic Care Coordination Contact  [facility]/Voicemail    Clinical Data: Care Coordinator Outreach    Outreach Documentation Number of Outreach Attempt   11/22/2024   3:55 PM 1   1/2/2025   1:23 PM 1       Left message on patient's voicemail with call back information and requested return call.      Plan: Care Coordinator will try to reach patient again in 1-2 business days.    Arleth Rajput, RN Clinic Care Coordinator  [facility] Clinics: [place] [place] (on-site Wednesdays), [facility] & [facility].  [email or web address]  Phone: [phone]

## 2025-01-02 NOTE — TELEPHONE ENCOUNTER
RECORDS RECEIVED FROM: Internal    REASON FOR VISIT: Myasthenia gravis   PROVIDER: Zbigniew Abdullahi MD   DATE OF APPT: 1/6/25 @ 2:45 pm    NOTES (FOR ALL VISITS) STATUS DETAILS   OFFICE NOTE from referring provider Internal Hosp Referral    OFFICE NOTE from other specialist Internal 11/22/24, 10/23/24 Gladys Vazquez MD @[facility]     DISCHARGE SUMMARY from hospital Internal 12/11/24-1/1/25 Sachin Sorensen DO @[facility]    11/30/24-12/11/24 Hadley De La O MD @[facility]    11/8/24-11/15/24 Tristian Mann MD @[facility]     EMG Internal [facility]  11/15/24 EMG     MEDICATION LIST Internal    IMAGING  (FOR ALL VISITS)     MRI (HEAD, NECK, SPINE) Internal [facility]  11/12/24 MR Lumbar Spine  11/12/24 MR Thoracic Spine  11/8/24 MR Cervical Spine  11/8/24 MR Brain

## 2025-01-03 ENCOUNTER — PRE VISIT (OUTPATIENT)
Dept: UROLOGY | Facility: CLINIC | Age: 80
End: 2025-01-03

## 2025-01-06 ENCOUNTER — TELEPHONE (OUTPATIENT)
Dept: INTERNAL MEDICINE | Facility: CLINIC | Age: 80
End: 2025-01-06

## 2025-01-06 ENCOUNTER — OFFICE VISIT (OUTPATIENT)
Dept: NEUROLOGY | Facility: CLINIC | Age: 80
End: 2025-01-06
Payer: MEDICARE

## 2025-01-06 ENCOUNTER — PRE VISIT (OUTPATIENT)
Dept: NEUROLOGY | Facility: CLINIC | Age: 80
End: 2025-01-06

## 2025-01-06 VITALS — RESPIRATION RATE: 16 BRPM | DIASTOLIC BLOOD PRESSURE: 74 MMHG | SYSTOLIC BLOOD PRESSURE: 116 MMHG

## 2025-01-06 DIAGNOSIS — G12.20 MOTOR NEURON DISORDER (H): ICD-10-CM

## 2025-01-06 DIAGNOSIS — G70.00 MYASTHENIA GRAVIS (H): Primary | ICD-10-CM

## 2025-01-06 DIAGNOSIS — R29.898 BILATERAL ARM WEAKNESS: ICD-10-CM

## 2025-01-06 PROCEDURE — 99205 OFFICE O/P NEW HI 60 MIN: CPT | Performed by: STUDENT IN AN ORGANIZED HEALTH CARE EDUCATION/TRAINING PROGRAM

## 2025-01-06 PROCEDURE — 99417 PROLNG OP E/M EACH 15 MIN: CPT | Performed by: STUDENT IN AN ORGANIZED HEALTH CARE EDUCATION/TRAINING PROGRAM

## 2025-01-06 ASSESSMENT — PAIN SCALES - GENERAL: PAINLEVEL_OUTOF10: NO PAIN (0)

## 2025-01-06 NOTE — TELEPHONE ENCOUNTER
Home Care is calling regarding an established patient with [facility].       Requesting orders from: Gladys Vazquez  Provider is following patient: No     Patient has been in and out of the hospital in November and December for myasthenia gravis. Patient has a PEG tube and gets 12 ounces of Jevity four times a day with 120 mL of free water before and after each tube feeding. Patient gets his crushed medications through the tube as well.   Orders Requested    Skilled Nursing  Request for initial certification (first set of orders)   Frequency:  1x/wk for 4 wks  Every other week for 5 weeks with 3  PRN visits.     Physical Therapy  Request for initial evaluation and treatment (one time) (first set of orders)   Frequency:       Occupational Therapy  Request for initial evaluation and treatment (one time) (first set of orders)   Frequency:       Speech Therapy  Request for initial evaluation and treatment (one time) (first set of orders)   Frequency:     Information was gathered and will be sent to provider for review.  RN will contact Home Care with information after provider review.  Confirmed ok to leave a detailed message with call back.  Contact information confirmed and updated as needed.    Ree Valdez RN

## 2025-01-06 NOTE — PROGRESS NOTES
----- Message from KANDIS Levine - CNP sent at 2/14/2022  8:17 AM EST -----  Please let pt know her lumbar xray is normal and is still having pain she needs to f/u with her PCP CHIEF COMPLAINT / REASON FOR VISIT  Progressive upper limb weakness and outside diagnosis of myasthenia gravis    Referred by Dr. Tyshawn Ceron    HISTORY OF PRESENT ILLNESS   is a 79 year old male presenting to neuromuscular clinic for evaluation of progressive upper limb weakness and outside diagnosis of myasthenia gravis. Patient is accompanied by his wife, who helped provide collateral history today. He was recently discharged from admission to [facility] where he was admitted with acute hypoxic respiratory failure requiring mechanical ventilation, thought to be due to myasthenic crisis.  This was in the setting of UTI with septic shock.     He was previously physically active and walked without gait aid. In October 2024, he underwent left knee surgery. Since then, his physical activity has slightly decreased from his baseline. However, he was still able to walk without using a cane or walker.     In October 2024 (3 months ago), he noticed weakness in bilateral hands, inability to extend fingers on both sides. At the same time wife reports noticing difficulty with getting up from couch, trouble with swallowing, unable to swallow certain foods like bun,  changes in the voice from sounding hoarse to almost whispering. He was instructed by a nurse to visit the hospital for evaluation and he was admitted to [facility] for 5 days. Exam at that time by Dr. Ashley: cognition intact, intact cranial nerves, mild tongue atrophy without tongue fasciculations, motor weakness/atrophy more prominent in upper > lower extremities and worse distally > proximally, isolated fasciculations in the R tibialis anterior, absent DTRs throughout, equivocal plantar reflexes. 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  MRI  brain and total spine (all without izzy) that showed no CNS pathology to explain his symptoms. He was then given a diagnosis of ALS and was discharged with PEG tube, riluzole and referral to [facility] for second opinion.     About a week after discharge he was notified about the positive AchR antibodies which were elevated at 2.1. Diagnosis was changed from ALS to MG and he was instructed to be admitted for IVIG. Since discharge from initial hospitalization, he continued to have weakness in the upper limbs. Wife also reports worsening generalized weakness to the point that he was not able to get out of bed by himself 2 weeks after discharge. This was in the setting of urinary retention as well as diarrhea but no fever. On 12/1/2024, wife found him unresponsive and he was readmitted to [facility] ICU. He was found to acute hypoxic respiratory failure requiring mechanical ventilation and hypotension/septic shock from UTI. On that admission he was intubated and treated as myasthenic crisis secondary to urosepsis with IVIG x 5 doses and antibiotics. He was successfully extubated after 3 days and discharged to rehabilitation with mestinon 60 mg TID (no immunosuppressive therapy). Since his discharge, he reports improvement of his voice but not back to baseline yet. Swallowing function did not change. Reports his swallow function was assessed two weeks ago at [facility] without improvement. He continues to be NPO and gets nutrition through tube feeds. He now reports overall improvement of strength in his lower extremities, able to walk at home with a walker. Uncertain how much improvement in arm strength but he continues to have significant weakness in both hands and inability to extend fingers. He denied Hx of ptosis, double vision, fatigable weakness, pain, paresthesias.     PMH significant for bladder cancer in 2006 s/p cystoprostatectomy and neobladder creation. No radiation to pelvis.     Prior investigations:  Laboratory  Findings:  AchR binding 2.11. AchR modulating positive.   Aldolase is elevated at 35.7 U/L (normal range: 1.2-7.6 U/L).  Other labs, including CK, Vitamin B12, Vitamin D, and folate, are within normal limits.  GM1, GD1b, and GQ1b antibodies are all within normal limits for both IgG and IgM.  AMY testing is borderline positive with a homogeneous pattern at a titer of 1:40.  Serum paraneoplastic antibody panel negative  MRI Results:  Cervical spine: Multilevel spondylosis, with variable degrees of foraminal narrowing, worst and severe at C3-C4 on the left, moderate-severe on the right.   Unlikely to be related to his weakness.  No abnormal cord signal noted.  Thoracolumbar spine: Mild spondylitic changes with a small syrinx from T5 to T11, but no abnormal cord signal.  Electromyography (EMG):  The EMG demonstrated generalized reduction in motor potential amplitudes with relatively normal sensory potential amplitudes. Needle electrode examination showed widespread acute and chronic denervation in bilateral L4, L5, L3, C8, C7, C6, and thoracic paraspinal musculature. 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     REVIEW OF SYSTEMS  All negative except for what indicated in the HPI. The following portions of the patient's history were reviewed and updated as appropriate: allergies, current medications, family history, medical history, surgical history, social history, and problem list.     PAST MEDICAL/SURGICAL HISTORY   As stated in HPI     FAMILY HISTORY  There is no family members with ALS.    MEDICATIONS    Current Outpatient Medications:     acetaminophen (TYLENOL) 325 MG tablet, Take 1-2 tablets (325-650 mg) by mouth every 6 hours as needed for mild pain., Disp: , Rfl:     artificial saliva (BIOTENE MT) SOLN solution, Swish and spit 2 sprays in mouth every hour as needed for dry  mouth., Disp: 44.3 mL, Rfl: 1    aspirin 81 MG EC tablet, Take 81 mg by mouth every evening., Disp: , Rfl:     Cholecalciferol (D3 PO), Take 1 tablet by mouth daily. OTC: Unsure of strength., Disp: , Rfl:     fluticasone (FLONASE) 50 MCG/ACT nasal spray, Spray 1 spray into both nostrils daily., Disp: 9.9 mL, Rfl: 0    guaiFENesin (ROBITUSSIN) 20 mg/mL liquid, Take 10 mLs (200 mg) by mouth or Feeding Tube every 4 hours as needed for cough., Disp: , Rfl:     Jevity 1.5 Mandeep, Place 1,422 mLs into G tube daily. Infuse via gravity bag     1.5 cartons 4 feedings per day ( 6 total cartons/day) Water flush: 120ml before and after each feeding, Disp: 04671 mL, Rfl: 11    levothyroxine (SYNTHROID/LEVOTHROID) 175 MCG tablet, Take 1 tablet (175 mcg) by mouth daily., Disp: 30 tablet, Rfl: 0    melatonin 5 MG tablet, Take 1 tablet (5 mg) by mouth or NG Tube at bedtime., Disp: , Rfl:     olopatadine (PATANOL) 0.1 % ophthalmic solution, Place 1 drop into both eyes 2 times daily as needed for allergies., Disp: , Rfl:     order for DME, Equipment being ordered: CPAP and supplies. LIFETIME need., Disp: 1 each, Rfl: 0    pyRIDostigmine (MESTINON) 60 MG tablet, Take 1 tablet (60 mg) by mouth every 8 hours., Disp: 30 tablet, Rfl: 0    vitamin B-12 (CYANOCOBALAMIN) 1000 MCG tablet, Take 1,000 mcg by mouth daily, Disp: , Rfl:     ALLERGIES:  No Known Allergies    PHYSICAL EXAM  /74   Resp 16     NEUROLOGICAL EXAMINATION  Exam 8 hours after mestinon  Mental status: normal.  Gait: cautious gait, slightly wide based. Able to stand on toes but not on heels.   Getting up from seated position without pushing on chair: no  Posture: normal.  Coordination: normal.  Romberg: negative.  Speech: no dysarthria on today's exam  Extrapyramidal: normal.    Cranial nerves   R Muscle strength L  R  L   5 Frontalis 5       5 Orbicularis oculi 5  3 mm Pupils 3 mm   5 Lower facial muscles 5  nl Light reflex nl   5 Temporal-Masseter 5  no Ptosis no   5  Palate-Pharynx 5  nl Extraocular muscles nl   5 Sternocleidomastoid 5       5 Trapezius 5  nl Hearing nl   5 Genioglossus 5       Muscle atrophy/ enlargement: no  Fasciculations: no tongue fasciculations or atrophy.   Normal jaw jerk    No fatigable ptosis after sustained upward gaze. Normal EOM in all directions.      R Muscle, strength L  R Muscle, strength L    Neck     Trunk    5 Neck flexors 5   Abdominal muscles    5 Neck extensors 5   Paraspinals     Upper Limbs    Lower Limbs     Supraspinatus   5 Iliopsoas 5    Infraspinatus   5 Adductors, thigh 5    Pectoralis   4 Gluteus medius 4   5* Deltoid 5  5 Gluteus max 5   3 Biceps brachii 4-  5 Quadriceps 5    Brachioradialis   4 Hamstrings 5    Supinator/pronator   4 Tibialis anterior 4+   4 Triceps 5  5 Peronei 5   3 Wrist extensor 3  5 EHL 5   3 Wrist flexors 3  5 Toe extensors 5   2 Digit extensors 1  5 Tibialis post. 5   5 Digit flexors 4  5 Toe flexors 5   1 Thenar 1  5 Calf muscles 5   1 Hypothenar 1       1 Interossei 1       *no fatigable weakness  Muscle atrophy / enlargement: atrophy of intrinsic hand muscles both thenar and hypothenar  Fasciculations: no fasciculations observed on exam today     R Reflexes L   0 Biceps brachii 1   1 Brachioradialis 1   2 Triceps 2   no Best no   2 Quadriceps 1   0 Gastroc-soleus 0   no Clonus (ankle) no   flexion Plantar response flexion        Sensation  Face: normal.  Upper limbs: normal for all modalities.  Lower limbs: normal for all modalities.    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 Greene's neurological exam today is most notable for moderate-severe bilateral distal>proximal upper limb weakness with slight asymmetry and mild weakness in the right>left tibialis anterior, gluteus  medius muscles. There is atrophy of intrinsic hand muscles but no fasciculations. Reflexes were generally reduced. Cranial-innervated muscles are normal, no dysarthria, ptosis, abnormal EOM, or fatigability in any muscles. These findings are suggestive of motor neuropathy or lower motor neuron disorder. Although he tested positive for AchR Ab, his exam did not show any evidence of neuromuscular junction defect today. Distal predominant limb weakness is not typical in myasthenia gravis. Prior needle EMG findings were also more supportive of neurogenic process (motor neuropathy or lower motor neuron disorder) rather than NMJ defect or myopathy. Overall clinical presentation raised a concern for ALS but exam today did not show clear upper motor neuron sign to confirm the diagnosis. Ddx include multifocal motor neuropathy with conduction block but symptoms progression is a little too rapid for this.     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 The improvement of dysarthria after IVIG could support immune-mediated etiology but he did not demonstrate significant improvement in dysphagia or upper limb muscle strength after IVIG. I suspect he could have myasthenia gravis exacerbation in the setting of infection resulting in dysarthria and respiratory distress but this would not explain his progressive upper limb weakness or dysphagia and a second etiology still needs to be determined. The overall improvement in energy that he is experiencing could be also from treatment of sepsis and infection. At this point, I recommend repeating EMG with repetitive neve stimulation to evaluate for NMJ defect. We will also reevaluate 1) NCS of the upper limbs to look for improvement after IVIG 2) conduction blocks as well as 3) evidence of diffuse lower motor neuron disorder.     In terms of treatment, I recommend starting prednisone for maintenance therapy to prevent further myasthenic  exacerbation. He can continue mestinon.     Recommendations:  - Start prednisone 10 mg/day for 3 days then increase to 20 mg/day for 3 days then increase to 30 mg/day for 3 days then increase to 40mg/day and stay on this dose for 1-2 months.   - Continue pyridostigmine 60 mg every 4 hours (3 times/day) while awake. He has been waking up in the middle of the night to take this medication (this is not needed).   - lansoprazole 30 mg daily  - EMG/NCS with RNS (details as above)  - Continue PT and OT  - F/U in 2 months     I spent a total of 90 minutes on the day of the visit for chart review, face-to-face visit, counseling/coordination of care, and documentation. Please see the note for further information on patient assessment and treatment.       PATIENT EDUCATION  Ready to learn, no apparent learning barriers were identified; learning preferences include listening.  Explained diagnosis and treatment plan; patient expressed understanding of the content.

## 2025-01-06 NOTE — LETTER
1/6/2025       RE: Mendez Greene  [address] [facility] [id number]     Dear Colleague,    Thank you for referring your patient, Mendez Greene, to the [facility] [place] at [facility]. Please see a copy of my visit note below.    CHIEF COMPLAINT / REASON FOR VISIT  Progressive upper limb weakness and outside diagnosis of myasthenia gravis    Referred by Dr. Tyshawn Ceron    HISTORY OF PRESENT ILLNESS   is a 79 year old male presenting to neuromuscular clinic for evaluation of progressive upper limb weakness and outside diagnosis of myasthenia gravis. Patient is accompanied by his wife, who helped provide collateral history today. He was recently discharged from admission to [facility] where he was admitted with acute hypoxic respiratory failure requiring mechanical ventilation, thought to be due to myasthenic crisis.  This was in the setting of UTI with septic shock.     He was previously physically active and walked without gait aid. In October 2024, he underwent left knee surgery. Since then, his physical activity has slightly decreased from his baseline. However, he was still able to walk without using a cane or walker.     In October 2024 (3 months ago), he noticed weakness in bilateral hands, inability to extend fingers on both sides. At the same time wife reports noticing difficulty with getting up from couch, trouble with swallowing, unable to swallow certain foods like bun,  changes in the voice from sounding hoarse to almost whispering. He was instructed by a nurse to visit the hospital for evaluation and he was admitted to [facility] for 5 days. Exam at that time by Dr. Ashley: cognition intact, intact cranial nerves, mild tongue atrophy without tongue fasciculations, motor weakness/atrophy more prominent in upper > lower extremities and worse distally > proximally, isolated fasciculations in the R  tibialis anterior, absent DTRs throughout, equivocal plantar reflexes. 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  MRI brain and total spine (all without izzy) that showed no CNS pathology to explain his symptoms. He was then given a diagnosis of ALS and was discharged with PEG tube, riluzole and referral to [facility] for second opinion.     About a week after discharge he was notified about the positive AchR antibodies which were elevated at 2.1. Diagnosis was changed from ALS to MG and he was instructed to be admitted for IVIG. Since discharge from initial hospitalization, he continued to have weakness in the upper limbs. Wife also reports worsening generalized weakness to the point that he was not able to get out of bed by himself 2 weeks after discharge. This was in the setting of urinary retention as well as diarrhea but no fever. On 12/1/2024, wife found him unresponsive and he was readmitted to [facility] ICU. He was found to acute hypoxic respiratory failure requiring mechanical ventilation and hypotension/septic shock from UTI. On that admission he was intubated and treated as myasthenic crisis secondary to urosepsis with IVIG x 5 doses and antibiotics. He was successfully extubated after 3 days and discharged to rehabilitation with mestinon 60 mg TID (no immunosuppressive therapy). Since his discharge, he reports improvement of his voice but not back to baseline yet. Swallowing function did not change. Reports his swallow function was assessed two weeks ago at [facility] without improvement. He continues to be NPO and gets nutrition through tube feeds. He now reports overall improvement of strength in his lower extremities, able to walk at home with a walker. Uncertain how much improvement in arm strength but he  continues to have significant weakness in both hands and inability to extend fingers. He denied Hx of ptosis, double vision, fatigable weakness, pain, paresthesias.     PMH significant for bladder cancer in 2006 s/p cystoprostatectomy and neobladder creation. No radiation to pelvis.     Prior investigations:  Laboratory Findings:  AchR binding 2.11. AchR modulating positive.   Aldolase is elevated at 35.7 U/L (normal range: 1.2-7.6 U/L).  Other labs, including CK, Vitamin B12, Vitamin D, and folate, are within normal limits.  GM1, GD1b, and GQ1b antibodies are all within normal limits for both IgG and IgM.  AMY testing is borderline positive with a homogeneous pattern at a titer of 1:40.  Serum paraneoplastic antibody panel negative  MRI Results:  Cervical spine: Multilevel spondylosis, with variable degrees of foraminal narrowing, worst and severe at C3-C4 on the left, moderate-severe on the right.   Unlikely to be related to his weakness.  No abnormal cord signal noted.  Thoracolumbar spine: Mild spondylitic changes with a small syrinx from T5 to T11, but no abnormal cord signal.  Electromyography (EMG):  The EMG demonstrated generalized reduction in motor potential amplitudes with relatively normal sensory potential amplitudes. Needle electrode examination showed widespread acute and chronic denervation in bilateral L4, L5, L3, C8, C7, C6, and thoracic paraspinal musculature. 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     REVIEW OF SYSTEMS  All negative except for what indicated in the HPI. The following portions of the patient's history were reviewed and updated as appropriate: allergies, current medications, family history, medical history, surgical history, social history, and problem list.     PAST MEDICAL/SURGICAL HISTORY   As stated in HPI     FAMILY HISTORY  There is no  family members with ALS.    MEDICATIONS    Current Outpatient Medications:      acetaminophen (TYLENOL) 325 MG tablet, Take 1-2 tablets (325-650 mg) by mouth every 6 hours as needed for mild pain., Disp: , Rfl:      artificial saliva (BIOTENE MT) SOLN solution, Swish and spit 2 sprays in mouth every hour as needed for dry mouth., Disp: 44.3 mL, Rfl: 1     aspirin 81 MG EC tablet, Take 81 mg by mouth every evening., Disp: , Rfl:      Cholecalciferol (D3 PO), Take 1 tablet by mouth daily. OTC: Unsure of strength., Disp: , Rfl:      fluticasone (FLONASE) 50 MCG/ACT nasal spray, Spray 1 spray into both nostrils daily., Disp: 9.9 mL, Rfl: 0     guaiFENesin (ROBITUSSIN) 20 mg/mL liquid, Take 10 mLs (200 mg) by mouth or Feeding Tube every 4 hours as needed for cough., Disp: , Rfl:      Jevity 1.5 Mandeep, Place 1,422 mLs into G tube daily. Infuse via gravity bag     1.5 cartons 4 feedings per day ( 6 total cartons/day) Water flush: 120ml before and after each feeding, Disp: 32171 mL, Rfl: 11     levothyroxine (SYNTHROID/LEVOTHROID) 175 MCG tablet, Take 1 tablet (175 mcg) by mouth daily., Disp: 30 tablet, Rfl: 0     melatonin 5 MG tablet, Take 1 tablet (5 mg) by mouth or NG Tube at bedtime., Disp: , Rfl:      olopatadine (PATANOL) 0.1 % ophthalmic solution, Place 1 drop into both eyes 2 times daily as needed for allergies., Disp: , Rfl:      order for DME, Equipment being ordered: CPAP and supplies. LIFETIME need., Disp: 1 each, Rfl: 0     pyRIDostigmine (MESTINON) 60 MG tablet, Take 1 tablet (60 mg) by mouth every 8 hours., Disp: 30 tablet, Rfl: 0     vitamin B-12 (CYANOCOBALAMIN) 1000 MCG tablet, Take 1,000 mcg by mouth daily, Disp: , Rfl:     ALLERGIES:  No Known Allergies    PHYSICAL EXAM  /74   Resp 16     NEUROLOGICAL EXAMINATION  Exam 8 hours after mestinon  Mental status: normal.  Gait: cautious gait, slightly wide based. Able to stand on toes but not on heels.   Getting up from seated position without pushing on  chair: no  Posture: normal.  Coordination: normal.  Romberg: negative.  Speech: no dysarthria on today's exam  Extrapyramidal: normal.    Cranial nerves   R Muscle strength L  R  L   5 Frontalis 5       5 Orbicularis oculi 5  3 mm Pupils 3 mm   5 Lower facial muscles 5  nl Light reflex nl   5 Temporal-Masseter 5  no Ptosis no   5 Palate-Pharynx 5  nl Extraocular muscles nl   5 Sternocleidomastoid 5       5 Trapezius 5  nl Hearing nl   5 Genioglossus 5       Muscle atrophy/ enlargement: no  Fasciculations: no tongue fasciculations or atrophy.   Normal jaw jerk    No fatigable ptosis after sustained upward gaze. Normal EOM in all directions.      R Muscle, strength L  R Muscle, strength L    Neck     Trunk    5 Neck flexors 5   Abdominal muscles    5 Neck extensors 5   Paraspinals     Upper Limbs    Lower Limbs     Supraspinatus   5 Iliopsoas 5    Infraspinatus   5 Adductors, thigh 5    Pectoralis   4 Gluteus medius 4   5* Deltoid 5  5 Gluteus max 5   3 Biceps brachii 4-  5 Quadriceps 5    Brachioradialis   4 Hamstrings 5    Supinator/pronator   4 Tibialis anterior 4+   4 Triceps 5  5 Peronei 5   3 Wrist extensor 3  5 EHL 5   3 Wrist flexors 3  5 Toe extensors 5   2 Digit extensors 1  5 Tibialis post. 5   5 Digit flexors 4  5 Toe flexors 5   1 Thenar 1  5 Calf muscles 5   1 Hypothenar 1       1 Interossei 1       *no fatigable weakness  Muscle atrophy / enlargement: atrophy of intrinsic hand muscles both thenar and hypothenar  Fasciculations: no fasciculations observed on exam today     R Reflexes L   0 Biceps brachii 1   1 Brachioradialis 1   2 Triceps 2   no Best no   2 Quadriceps 1   0 Gastroc-soleus 0   no Clonus (ankle) no   flexion Plantar response flexion        Sensation  Face: normal.  Upper limbs: normal for all modalities.  Lower limbs: normal for all modalities.    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 Greene's neurological exam today is most notable for moderate-severe bilateral distal>proximal upper limb weakness with slight asymmetry and mild weakness in the right>left tibialis anterior, gluteus medius muscles. There is atrophy of intrinsic hand muscles but no fasciculations. Reflexes were generally reduced. Cranial-innervated muscles are normal, no dysarthria, ptosis, abnormal EOM, or fatigability in any muscles. These findings are suggestive of motor neuropathy or lower motor neuron disorder. Although he tested positive for AchR Ab, his exam did not show any evidence of neuromuscular junction defect today. Distal predominant limb weakness is not typical in myasthenia gravis. Prior needle EMG findings were also more supportive of neurogenic process (motor neuropathy or lower motor neuron disorder) rather than NMJ defect or myopathy. Overall clinical presentation raised a concern for ALS but exam today did not show clear upper motor neuron sign to confirm the diagnosis. Ddx include multifocal motor neuropathy with conduction block but symptoms progression is a little too rapid for this.     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 The improvement of dysarthria after IVIG could support immune-mediated etiology but he did not demonstrate significant improvement in dysphagia or upper limb muscle strength after IVIG. I suspect he could have myasthenia gravis exacerbation in the setting of infection resulting in dysarthria and respiratory distress but this would not explain his progressive upper limb weakness or dysphagia and a second etiology still needs to be determined. The overall improvement in energy that he is experiencing could be also from treatment of sepsis and infection. At this point, I recommend repeating EMG with repetitive  neve stimulation to evaluate for NMJ defect. We will also reevaluate 1) NCS of the upper limbs to look for improvement after IVIG 2) conduction blocks as well as 3) evidence of diffuse lower motor neuron disorder.     In terms of treatment, I recommend starting prednisone for maintenance therapy to prevent further myasthenic exacerbation. He can continue mestinon.     Recommendations:  - Start prednisone 10 mg/day for 3 days then increase to 20 mg/day for 3 days then increase to 30 mg/day for 3 days then increase to 40mg/day and stay on this dose for 1-2 months.   - Continue pyridostigmine 60 mg every 4 hours (3 times/day) while awake. He has been waking up in the middle of the night to take this medication (this is not needed).   - lansoprazole 30 mg daily  - EMG/NCS with RNS (details as above)  - Continue PT and OT  - F/U in 2 months     I spent a total of 90 minutes on the day of the visit for chart review, face-to-face visit, counseling/coordination of care, and documentation. Please see the note for further information on patient assessment and treatment.       PATIENT EDUCATION  Ready to learn, no apparent learning barriers were identified; learning preferences include listening.  Explained diagnosis and treatment plan; patient expressed understanding of the content.      Again, thank you for allowing me to participate in the care of your patient.      Sincerely,    Zbigniew Abdullahi MD

## 2025-01-07 RX ORDER — LANSOPRAZOLE 30 MG/1
30 TABLET, ORALLY DISINTEGRATING, DELAYED RELEASE ORAL DAILY
Qty: 60 TABLET | Refills: 1 | Status: SHIPPED | OUTPATIENT
Start: 2025-01-07 | End: 2025-01-08

## 2025-01-07 RX ORDER — PREDNISONE 10 MG/1
TABLET ORAL
Qty: 120 TABLET | Refills: 2 | Status: SHIPPED | OUTPATIENT
Start: 2025-01-07

## 2025-01-09 NOTE — PROGRESS NOTES
Steven is a 75 year old who is being evaluated via a billable video visit.      How would you like to obtain your AVS? MyChart  If the video visit is dropped, the invitation should be resent by:   Will anyone else be joining your video visit? No    Video Start Time: 10:23 AM      Subjective     Steven is a 75 year old who presents to clinic today for the following health issues     HPI     Obstructive sleep apnea   Mendez Greene was diagnosed with obstructive sleep apnea 16 years ago and has two CPAP machines in that time period.  Pressure setting has been 7 over the years.  He also would like to new have machine as current machine broke last week.  He has specific needs of Replacement Machine, Filter, Non-heated hose, Nasal Mask, and  Nasal Cushions and would like the order to be sent to [facility].        Review of Systems   Constitutional, HEENT, cardiovascular, pulmonary, GI, , musculoskeletal, neuro, skin, endocrine and psych systems are negative, except as otherwise noted.      Objective           Vitals:  No vitals were obtained today due to virtual visit.    Physical Exam   GENERAL: Healthy, alert and no distress  EYES: Eyes grossly normal to inspection.  No discharge or erythema, or obvious scleral/conjunctival abnormalities.  RESP: No audible wheeze, cough, or visible cyanosis.  No visible retractions or increased work of breathing.    SKIN: Visible skin clear. No significant rash, abnormal pigmentation or lesions.  NEURO: Cranial nerves grossly intact.  Mentation and speech appropriate for age.  PSYCH: Mentation appears normal, affect normal/bright, judgement and insight intact, normal speech and appearance well-groomed.    (G47.33) CHA (obstructive sleep apnea)  (primary encounter diagnosis)  Comment: We will send in order for new CPAP machine today  Plan: CPAP Order for DME - ONLY FOR DME                   Video-Visit Details    Type of service:  Video Visit    Video End Time:10:33  DANIEL    Originating Location (pt. Location): Home    Distant Location (provider location):  [facility]     Platform used for Video Visit: Lamont   Birth Control Pills Counseling: Birth Control Pill Counseling: I discussed with the patient the potential side effects of OCPs including but not limited to increased risk of stroke, heart attack, thrombophlebitis, deep venous thrombosis, hepatic adenomas, breast changes, GI upset, headaches, and depression.  The patient verbalized understanding of the proper use and possible adverse effects of OCPs. All of the patient's questions and concerns were addressed. Arava Pregnancy And Lactation Text: This medication is Pregnancy Category X and is absolutely contraindicated during pregnancy. It is unknown if it is excreted in breast milk. Albendazole Counseling:  I discussed with the patient the risks of albendazole including but not limited to cytopenia, kidney damage, nausea/vomiting and severe allergy.  The patient understands that this medication is being used in an off-label manner. Minocycline Counseling: Patient advised regarding possible photosensitivity and discoloration of the teeth, skin, lips, tongue and gums.  Patient instructed to avoid sunlight, if possible.  When exposed to sunlight, patients should wear protective clothing, sunglasses, and sunscreen.  The patient was instructed to call the office immediately if the following severe adverse effects occur:  hearing changes, easy bruising/bleeding, severe headache, or vision changes.  The patient verbalized understanding of the proper use and possible adverse effects of minocycline.  All of the patient's questions and concerns were addressed. Cyclophosphamide Pregnancy And Lactation Text: This medication is Pregnancy Category D and it isn't considered safe during pregnancy. This medication is excreted in breast milk. Topical Steroids Counseling: I discussed with the patient that prolonged use of topical steroids can result in the increased appearance of superficial blood vessels (telangiectasias), lightening (hypopigmentation) and thinning of the skin (atrophy).  Patient understands to avoid using high potency steroids in skin folds, the groin or the face.  The patient verbalized understanding of the proper use and possible adverse effects of topical steroids.  All of the patient's questions and concerns were addressed. Mirvaso Pregnancy And Lactation Text: This medication has not been assigned a Pregnancy Risk Category. It is unknown if the medication is excreted in breast milk. Taltz Pregnancy And Lactation Text: The risk during pregnancy and breastfeeding is uncertain with this medication. Litfulo Counseling: I discussed with the patient the risks of Litfulo therapy including but not limited to upper respiratory tract infections, shingles, cold sores, and nausea. Live vaccines should be avoided.  This medication has been linked to serious infections; higher rate of mortality; malignancy and lymphoproliferative disorders; major adverse cardiovascular events; thrombosis; gastrointestinal perforations; neutropenia; lymphopenia; anemia; liver enzyme elevations; and lipid elevations. Olanzapine Counseling- I discussed with the patient the common side effects of olanzapine including but are not limited to: lack of energy, dry mouth, increased appetite, sleepiness, tremor, constipation, dizziness, changes in behavior, or restlessness.  Explained that teenagers are more likely to experience headaches, abdominal pain, pain in the arms or legs, tiredness, and sleepiness.  Serious side effects include but are not limited: increased risk of death in elderly patients who are confused, have memory loss, or dementia-related psychosis; hyperglycemia; increased cholesterol and triglycerides; and weight gain. Humira Counseling:  I discussed with the patient the risks of adalimumab including but not limited to myelosuppression, immunosuppression, autoimmune hepatitis, demyelinating diseases, lymphoma, and serious infections.  The patient understands that monitoring is required including a PPD at baseline and must alert us or the primary physician if symptoms of infection or other concerning signs are noted. Infliximab Pregnancy And Lactation Text: This medication is Pregnancy Category B and is considered safe during pregnancy. It is unknown if this medication is excreted in breast milk. Odomzo Counseling- I discussed with the patient the risks of Odomzo including but not limited to nausea, vomiting, diarrhea, constipation, weight loss, changes in the sense of taste, decreased appetite, muscle spasms, and hair loss.  The patient verbalized understanding of the proper use and possible adverse effects of Odomzo.  All of the patient's questions and concerns were addressed. Clofazimine Counseling:  I discussed with the patient the risks of clofazimine including but not limited to skin and eye pigmentation, liver damage, nausea/vomiting, gastrointestinal bleeding and allergy. Cantharidin Counseling:  I discussed with the patient the risks of Cantharidin including but not limited to pain, redness, burning, itching, and blistering. Albendazole Pregnancy And Lactation Text: This medication is Pregnancy Category C and it isn't known if it is safe during pregnancy. It is also excreted in breast milk. Birth Control Pills Pregnancy And Lactation Text: This medication should be avoided if pregnant and for the first 30 days post-partum. Minocycline Pregnancy And Lactation Text: This medication is Pregnancy Category D and not consider safe during pregnancy. It is also excreted in breast milk. Tremfya Counseling: I discussed with the patient the risks of guselkumab including but not limited to immunosuppression, serious infections, and drug reactions.  The patient understands that monitoring is required including a PPD at baseline and must alert us or the primary physician if symptoms of infection or other concerning signs are noted. Cyclosporine Counseling:  I discussed with the patient the risks of cyclosporine including but not limited to hypertension, gingival hyperplasia,myelosuppression, immunosuppression, liver damage, kidney damage, neurotoxicity, lymphoma, and serious infections. The patient understands that monitoring is required including baseline blood pressure, CBC, CMP, lipid panel and uric acid, and then 1-2 times monthly CMP and blood pressure. Topical Steroids Applications Pregnancy And Lactation Text: Most topical steroids are considered safe to use during pregnancy and lactation.  Any topical steroid applied to the breast or nipple should be washed off before breastfeeding. Opzelura Counseling:  I discussed with the patient the risks of Opzelura including but not limited to nasopharngitis, bronchitis, ear infection, eosinophila, hives, diarrhea, folliculitis, tonsillitis, and rhinorrhea.  Taken orally, this medication has been linked to serious infections; higher rate of mortality; malignancy and lymphoproliferative disorders; major adverse cardiovascular events; thrombosis; thrombocytopenia, anemia, and neutropenia; and lipid elevations. Fluconazole Counseling:  Patient counseled regarding adverse effects of fluconazole including but not limited to headache, diarrhea, nausea, upset stomach, liver function test abnormalities, taste disturbance, and stomach pain.  There is a rare possibility of liver failure that can occur when taking fluconazole.  The patient understands that monitoring of LFTs and kidney function test may be required, especially at baseline. The patient verbalized understanding of the proper use and possible adverse effects of fluconazole.  All of the patient's questions and concerns were addressed. Olanzapine Pregnancy And Lactation Text: This medication is pregnancy category C.   There are no adequate and well controlled trials with olanzapine in pregnant females.  Olanzapine should be used during pregnancy only if the potential benefit justifies the potential risk to the fetus.   In a study in lactating healthy women, olanzapine was excreted in breast milk.  It is recommended that women taking olanzapine should not breast feed. Litfulo Pregnancy And Lactation Text: Based on animal studies, Lifulo may cause embryo-fetal harm when administered to pregnant women.  The medication should not be used in pregnancy.  Breastfeeding is not recommended during treatment. Nemluvio Counseling: I discussed with the patient the risks of nemolizumab including but not limited to headache, gastrointestinal complaints, nasopharyngitis, musculoskeletal complaints, injection site reactions, and allergic reactions. The patient understands that monitoring is required and they must alert us or the primary physician if any side effects are noted. 5-Fu Counseling: 5-Fluorouracil Counseling:  I discussed with the patient the risks of 5-fluorouracil including but not limited to erythema, scaling, itching, weeping, crusting, and pain. Clofazimine Pregnancy And Lactation Text: This medication is Pregnancy Category C and isn't considered safe during pregnancy. It is excreted in breast milk. Ivermectin Counseling:  Patient instructed to take medication on an empty stomach with a full glass of water.  Patient informed of potential adverse effects including but not limited to nausea, diarrhea, dizziness, itching, and swelling of the extremities or lymph nodes.  The patient verbalized understanding of the proper use and possible adverse effects of ivermectin.  All of the patient's questions and concerns were addressed. Spironolactone Counseling: Patient advised regarding risks of diarrhea, abdominal pain, hyperkalemia, birth defects (for female patients), liver toxicity and renal toxicity. The patient may need blood work to monitor liver and kidney function and potassium levels while on therapy. The patient verbalized understanding of the proper use and possible adverse effects of spironolactone.  All of the patient's questions and concerns were addressed. Quinolones Counseling:  I discussed with the patient the risks of fluoroquinolones including but not limited to GI upset, allergic reaction, drug rash, diarrhea, dizziness, photosensitivity, yeast infections, liver function test abnormalities, tendonitis/tendon rupture. Topical Sulfur Applications Counseling: Topical Sulfur Counseling: Patient counseled that this medication may cause skin irritation or allergic reactions.  In the event of skin irritation, the patient was advised to reduce the amount of the drug applied or use it less frequently.   The patient verbalized understanding of the proper use and possible adverse effects of topical sulfur application.  All of the patient's questions and concerns were addressed. Fluconazole Pregnancy And Lactation Text: This medication is Pregnancy Category C and it isn't know if it is safe during pregnancy. It is also excreted in breast milk. Cyclosporine Pregnancy And Lactation Text: This medication is Pregnancy Category C and it isn't know if it is safe during pregnancy. This medication is excreted in breast milk. Opzelura Pregnancy And Lactation Text: There is insufficient data to evaluate drug-associated risk for major birth defects, miscarriage, or other adverse maternal or fetal outcomes.  There is a pregnancy registry that monitors pregnancy outcomes in pregnant persons exposed to the medication during pregnancy.  It is unknown if this medication is excreted in breast milk.  Do not breastfeed during treatment and for about 4 weeks after the last dose. Oral Minoxidil Counseling- I discussed with the patient the risks of oral minoxidil including but not limited to shortness of breath, swelling of the feet or ankles, dizziness, lightheadedness, unwanted hair growth and allergic reaction.  The patient verbalized understanding of the proper use and possible adverse effects of oral minoxidil.  All of the patient's questions and concerns were addressed. Cimetidine Counseling:  I discussed with the patient the risks of Cimetidine including but not limited to gynecomastia, headache, diarrhea, nausea, drowsiness, arrhythmias, pancreatitis, skin rashes, psychosis, bone marrow suppression and kidney toxicity. Olumiant Counseling: I discussed with the patient the risks of Olumiant therapy including but not limited to upper respiratory tract infections, shingles, cold sores, and nausea. Live vaccines should be avoided.  This medication has been linked to serious infections; higher rate of mortality; malignancy and lymphoproliferative disorders; major adverse cardiovascular events; thrombosis; gastrointestinal perforations; neutropenia; lymphopenia; anemia; liver enzyme elevations; and lipid elevations. Hyrimoz Counseling:  I discussed with the patient the risks of adalimumab including but not limited to myelosuppression, immunosuppression, autoimmune hepatitis, demyelinating diseases, lymphoma, and serious infections.  The patient understands that monitoring is required including a PPD at baseline and must alert us or the primary physician if symptoms of infection or other concerning signs are noted. Colchicine Counseling:  Patient counseled regarding adverse effects including but not limited to stomach upset (nausea, vomiting, stomach pain, or diarrhea).  Patient instructed to limit alcohol consumption while taking this medication.  Colchicine may reduce blood counts especially with prolonged use.  The patient understands that monitoring of kidney function and blood counts may be required, especially at baseline. The patient verbalized understanding of the proper use and possible adverse effects of colchicine.  All of the patient's questions and concerns were addressed. 5-Fu Pregnancy And Lactation Text: This medication is Pregnancy Category X and contraindicated in pregnancy and in women who may become pregnant. It is unknown if this medication is excreted in breast milk. Nemluvio Pregnancy And Lactation Text: It is not known if Nemluvio causes fetal harm or is present in breast milk. Please proceed with caution if patients who are pregnant or breastfeeding. Tranexamic Acid Counseling:  Patient advised of the small risk of bleeding problems with tranexamic acid. They were also instructed to call if they developed any nausea, vomiting or diarrhea. All of the patient's questions and concerns were addressed. Spironolactone Pregnancy And Lactation Text: This medication can cause feminization of the male fetus and should be avoided during pregnancy. The active metabolite is also found in breast milk. Methotrexate Counseling:  Patient counseled regarding adverse effects of methotrexate including but not limited to nausea, vomiting, abnormalities in liver function tests. Patients may develop mouth sores, rash, diarrhea, and abnormalities in blood counts. The patient understands that monitoring is required including LFT's and blood counts.  There is a rare possibility of scarring of the liver and lung problems that can occur when taking methotrexate. Persistent nausea, loss of appetite, pale stools, dark urine, cough, and shortness of breath should be reported immediately. Patient advised to discontinue methotrexate treatment at least three months before attempting to become pregnant.  I discussed the need for folate supplements while taking methotrexate.  These supplements can decrease side effects during methotrexate treatment. The patient verbalized understanding of the proper use and possible adverse effects of methotrexate.  All of the patient's questions and concerns were addressed. Topical Sulfur Applications Pregnancy And Lactation Text: This medication is considered safe during pregnancy and breast feeding secondary to limited systemic absorption. Griseofulvin Counseling:  I discussed with the patient the risks of griseofulvin including but not limited to photosensitivity, cytopenia, liver damage, nausea/vomiting and severe allergy.  The patient understands that this medication is best absorbed when taken with a fatty meal (e.g., ice cream or french fries). Picato Counseling:  I discussed with the patient the risks of Picato including but not limited to erythema, scaling, itching, weeping, crusting, and pain. Xolair Counseling:  Patient informed of potential adverse effects including but not limited to fever, muscle aches, rash and allergic reactions.  The patient verbalized understanding of the proper use and possible adverse effects of Xolair.  All of the patient's questions and concerns were addressed. Olumiant Pregnancy And Lactation Text: Based on animal studies, Olumiant may cause embryo-fetal harm when administered to pregnant women.  The medication should not be used in pregnancy.  Breastfeeding is not recommended during treatment. Oral Minoxidil Pregnancy And Lactation Text: This medication should only be used when clearly needed if you are pregnant, attempting to become pregnant or breast feeding. Cimetidine Pregnancy And Lactation Text: This medication is Pregnancy Category B and is considered safe during pregnancy. It is also excreted in breast milk and breast feeding isn't recommended. Tranexamic Acid Pregnancy And Lactation Text: It is unknown if this medication is safe during pregnancy or breast feeding. Drysol Counseling:  I discussed with the patient the risks of drysol/aluminum chloride including but not limited to skin rash, itching, irritation, burning. Rituxan Counseling:  I discussed with the patient the risks of Rituxan infusions. Side effects can include infusion reactions, severe drug rashes including mucocutaneous reactions, reactivation of latent hepatitis and other infections and rarely progressive multifocal leukoencephalopathy.  All of the patient's questions and concerns were addressed. Rifampin Counseling: I discussed with the patient the risks of rifampin including but not limited to liver damage, kidney damage, red-orange body fluids, nausea/vomiting and severe allergy. Methotrexate Pregnancy And Lactation Text: This medication is Pregnancy Category X and is known to cause fetal harm. This medication is excreted in breast milk. Wartpeel Counseling:  I discussed with the patient the risks of Wartpeel including but not limited to erythema, scaling, itching, weeping, crusting, and pain. Otezla Counseling: The side effects of Otezla were discussed with the patient, including but not limited to worsening or new depression, weight loss, diarrhea, nausea, upper respiratory tract infection, and headache. Patient instructed to call the office should any adverse effect occur.  The patient verbalized understanding of the proper use and possible adverse effects of Otezla.  All the patient's questions and concerns were addressed. Azithromycin Counseling:  I discussed with the patient the risks of azithromycin including but not limited to GI upset, allergic reaction, drug rash, diarrhea, and yeast infections. Picato Pregnancy And Lactation Text: This medication is Pregnancy Category C. It is unknown if this medication is excreted in breast milk. Rinvoq Counseling: I discussed with the patient the risks of Rinvoq therapy including but not limited to upper respiratory tract infections, shingles, cold sores, bronchitis, nausea, cough, fever, acne, and headache. Live vaccines should be avoided.  This medication has been linked to serious infections; higher rate of mortality; malignancy and lymphoproliferative disorders; major adverse cardiovascular events; thrombosis; thrombocytopenia, anemia, and neutropenia; lipid elevations; liver enzyme elevations; and gastrointestinal perforations. Griseofulvin Pregnancy And Lactation Text: This medication is Pregnancy Category X and is known to cause serious birth defects. It is unknown if this medication is excreted in breast milk but breast feeding should be avoided. Xolair Pregnancy And Lactation Text: This medication is Pregnancy Category B and is considered safe during pregnancy. This medication is excreted in breast milk. Spevigo Counseling: I discussed with the patient the risks of Spevigo including but not limited to fatigue, nasuea, vomiting, headache, pruritus, urinary tract infection, an infusion related reactions.  The patient understands that monitoring is required including screening for tuberculosis at baseline and yearly screening thereafter while continuing Spevigo therapy. They should contact us if symptoms of infection or other concerning signs are noted. Klisyri Counseling:  I discussed with the patient the risks of Klisyri including but not limited to erythema, scaling, itching, weeping, crusting, and pain. Isotretinoin Pregnancy And Lactation Text: This medication is Pregnancy Category X and is considered extremely dangerous during pregnancy. It is unknown if it is excreted in breast milk. Azathioprine Counseling:  I discussed with the patient the risks of azathioprine including but not limited to myelosuppression, immunosuppression, hepatotoxicity, lymphoma, and infections.  The patient understands that monitoring is required including baseline LFTs, Creatinine, possible TPMP genotyping and weekly CBCs for the first month and then every 2 weeks thereafter.  The patient verbalized understanding of the proper use and possible adverse effects of azathioprine.  All of the patient's questions and concerns were addressed. Wegovy Counseling: I reviewed the possible side effects including: thyroid tumors, kidney disease, gallbladder disease, abdominal pain, constipation, diarrhea, nausea, vomiting and pancreatitis. Do not take this medication if you have a history or family history of multiple endocrine neoplasia syndrome type 2. Side effects reviewed, pt to contact office should one occur. Dupixent Pregnancy And Lactation Text: This medication likely crosses the placenta but the risk for the fetus is uncertain. This medication is excreted in breast milk. Dutasteride Pregnancy And Lactation Text: This medication is absolutely contraindicated in women, especially during pregnancy and breast feeding. Feminization of male fetuses is possible if taking while pregnant. Benzoyl Peroxide Pregnancy And Lactation Text: This medication is Pregnancy Category C. It is unknown if benzoyl peroxide is excreted in breast milk. Sotyktu Pregnancy And Lactation Text: There is insufficient data to evaluate whether or not Sotyktu is safe to use during pregnancy.? ?It is not known if Sotyktu passes into breast milk and whether or not it is safe to use when breastfeeding.?? Erythromycin Counseling:  I discussed with the patient the risks of erythromycin including but not limited to GI upset, allergic reaction, drug rash, diarrhea, increase in liver enzymes, and yeast infections. Azathioprine Pregnancy And Lactation Text: This medication is Pregnancy Category D and isn't considered safe during pregnancy. It is unknown if this medication is excreted in breast milk. Topical Ketoconazole Counseling: Patient counseled that this medication may cause skin irritation or allergic reactions.  In the event of skin irritation, the patient was advised to reduce the amount of the drug applied or use it less frequently.   The patient verbalized understanding of the proper use and possible adverse effects of ketoconazole.  All of the patient's questions and concerns were addressed. Klisyri Pregnancy And Lactation Text: It is unknown if this medication can harm a developing fetus or if it is excreted in breast milk. High Dose Vitamin A Counseling: Side effects reviewed, pt to contact office should one occur. Spevigo Pregnancy And Lactation Text: The risk during pregnancy and breastfeeding is uncertain with this medication. This medication does cross the placenta. It is unknown if this medication is found in breast milk. Wegovy Pregnancy And Lactation Text: The fetal risk of this medication is unknown and taking while pregnant is not recommended. It is unknown if this medication is present in breast milk. Niacinamide Counseling: I recommended taking niacin or niacinamide, also know as vitamin B3, twice daily. Recent evidence suggests that taking vitamin B3 (500 mg twice daily) can reduce the risk of actinic keratoses and non-melanoma skin cancers. Side effects of vitamin B3 include flushing and headache. Ebglyss Counseling: I discussed with the patient the risks of lebrikizumab including but not limited to eye inflammation and irritation, cold sores, injection site reactions, allergic reactions and increased risk of parasitic infection. The patient understands that monitoring is required and they must alert us or the primary physician if symptoms of infection or other concerning signs are noted. Erivedge Counseling- I discussed with the patient the risks of Erivedge including but not limited to nausea, vomiting, diarrhea, constipation, weight loss, changes in the sense of taste, decreased appetite, muscle spasms, and hair loss.  The patient verbalized understanding of the proper use and possible adverse effects of Erivedge.  All of the patient's questions and concerns were addressed. Xeljanz Counseling: I discussed with the patient the risks of Xeljanz therapy including increased risk of infection, liver issues, headache, diarrhea, or cold symptoms. Live vaccines should be avoided. They were instructed to call if they have any problems. Carac Counseling:  I discussed with the patient the risks of Carac including but not limited to erythema, scaling, itching, weeping, crusting, and pain. Finasteride Male Counseling: Finasteride Counseling:  I discussed with the patient the risks of use of finasteride including but not limited to decreased libido, decreased ejaculate volume, gynecomastia, and depression. Women should not handle medication.  All of the patient's questions and concerns were addressed. Erythromycin Pregnancy And Lactation Text: This medication is Pregnancy Category B and is considered safe during pregnancy. It is also excreted in breast milk. Stelara Counseling:  I discussed with the patient the risks of ustekinumab including but not limited to immunosuppression, malignancy, posterior leukoencephalopathy syndrome, and serious infections.  The patient understands that monitoring is required including a PPD at baseline and must alert us or the primary physician if symptoms of infection or other concerning signs are noted. Cellcept Counseling:  I discussed with the patient the risks of mycophenolate mofetil including but not limited to infection/immunosuppression, GI upset, hypokalemia, hypercholesterolemia, bone marrow suppression, lymphoproliferative disorders, malignancy, GI ulceration/bleed/perforation, colitis, interstitial lung disease, kidney failure, progressive multifocal leukoencephalopathy, and birth defects.  The patient understands that monitoring is required including a baseline creatinine and regular CBC testing. In addition, patient must alert us immediately if symptoms of infection or other concerning signs are noted. High Dose Vitamin A Pregnancy And Lactation Text: High dose vitamin A therapy is contraindicated during pregnancy and breast feeding. Topical Ketoconazole Pregnancy And Lactation Text: This medication is Pregnancy Category B and is considered safe during pregnancy. It is unknown if it is excreted in breast milk. Niacinamide Pregnancy And Lactation Text: These medications are considered safe during pregnancy. Minoxidil Counseling: Minoxidil is a topical medication which can increase blood flow where it is applied. It is uncertain how this medication increases hair growth. Side effects are uncommon and include stinging and allergic reactions. Ebglyss Pregnancy And Lactation Text: This medication likely crosses the placenta but the risk for the fetus is uncertain. It is unknown if this medication is excreted in breast milk. Zepbound Counseling: I reviewed the possible side effects including: thyroid tumors, kidney disease, gallbladder disease, abdominal pain, constipation, diarrhea, nausea, vomiting and pancreatitis. Do not take this medication if you have a history or family history of multiple endocrine neoplasia syndrome type 2. Side effects reviewed, pt to contact office should one occur. Finasteride Female Counseling: Finasteride Counseling:  I discussed with the patient the risks of use of finasteride including but not limited to decreased libido and sexual dysfunction. Explained the teratogenic nature of the medication and stressed the importance of not getting pregnant during treatment. All of the patient's questions and concerns were addressed. Ilumya Counseling: I discussed with the patient the risks of tildrakizumab including but not limited to immunosuppression, malignancy, posterior leukoencephalopathy syndrome, and serious infections.  The patient understands that monitoring is required including a PPD at baseline and must alert us or the primary physician if symptoms of infection or other concerning signs are noted. Xellesleyz Pregnancy And Lactation Text: This medication is Pregnancy Category D and is not considered safe during pregnancy.  The risk during breast feeding is also uncertain. Metronidazole Counseling:  I discussed with the patient the risks of metronidazole including but not limited to seizures, nausea/vomiting, a metallic taste in the mouth, nausea/vomiting and severe allergy. Topical Metronidazole Counseling: Metronidazole is a topical antibiotic medication. You may experience burning, stinging, redness, or allergic reactions.  Please call our office if you develop any problems from using this medication. Minoxidil Pregnancy And Lactation Text: This medication has not been assigned a Pregnancy Risk Category but animal studies failed to show danger with the topical medication. It is unknown if the medication is excreted in breast milk. Nsaids Counseling: NSAID Counseling: I discussed with the patient that NSAIDs should be taken with food. Prolonged use of NSAIDs can result in the development of stomach ulcers.  Patient advised to stop taking NSAIDs if abdominal pain occurs.  The patient verbalized understanding of the proper use and possible adverse effects of NSAIDs.  All of the patient's questions and concerns were addressed. Cibinqo Counseling: I discussed with the patient the risks of Cibinqo therapy including but not limited to common cold, nausea, headache, cold sores, increased blood CPK levels, dizziness, UTIs, fatigue, acne, and vomitting. Live vaccines should be avoided.  This medication has been linked to serious infections; higher rate of mortality; malignancy and lymphoproliferative disorders; major adverse cardiovascular events; thrombosis; thrombocytopenia and lymphopenia; lipid elevations; and retinal detachment. Arava Counseling:  Patient counseled regarding adverse effects of Arava including but not limited to nausea, vomiting, abnormalities in liver function tests. Patients may develop mouth sores, rash, diarrhea, and abnormalities in blood counts. The patient understands that monitoring is required including LFTs and blood counts.  There is a rare possibility of scarring of the liver and lung problems that can occur when taking methotrexate. Persistent nausea, loss of appetite, pale stools, dark urine, cough, and shortness of breath should be reported immediately. Patient advised to discontinue Arava treatment and consult with a physician prior to attempting conception. The patient will have to undergo a treatment to eliminate Arava from the body prior to conception. Calcipotriene Counseling:  I discussed with the patient the risks of calcipotriene including but not limited to erythema, scaling, itching, and irritation. Enbrel Counseling:  I discussed with the patient the risks of etanercept including but not limited to myelosuppression, immunosuppression, autoimmune hepatitis, demyelinating diseases, lymphoma, and infections.  The patient understands that monitoring is required including a PPD at baseline and must alert us or the primary physician if symptoms of infection or other concerning signs are noted. Libtayo Counseling- I discussed with the patient the risks of Libtayo including but not limited to nausea, vomiting, diarrhea, and bone or muscle pain.  The patient verbalized understanding of the proper use and possible adverse effects of Libtayo.  All of the patient's questions and concerns were addressed. Metronidazole Pregnancy And Lactation Text: This medication is Pregnancy Category B and considered safe during pregnancy.  It is also excreted in breast milk. Finasteride Pregnancy And Lactation Text: This medication is absolutely contraindicated during pregnancy. It is unknown if it is excreted in breast milk. Topical Metronidazole Pregnancy And Lactation Text: This medication is Pregnancy Category B and considered safe during pregnancy.  It is also considered safe to use while breastfeeding. Taltz Counseling: I discussed with the patient the risks of ixekizumab including but not limited to immunosuppression, serious infections, worsening of inflammatory bowel disease and drug reactions.  The patient understands that monitoring is required including a PPD at baseline and must alert us or the primary physician if symptoms of infection or other concerning signs are noted. Mirvaso Counseling: Mirvaso is a topical medication which can decrease superficial blood flow where applied. Side effects are uncommon and include stinging, redness and allergic reactions. Cibinqo Pregnancy And Lactation Text: It is unknown if this medication will adversely affect pregnancy or breast feeding.  You should not take this medication if you are currently pregnant or planning a pregnancy or while breastfeeding. Nsaids Pregnancy And Lactation Text: These medications are considered safe up to 30 weeks gestation. It is excreted in breast milk. Cyclophosphamide Counseling:  I discussed with the patient the risks of cyclophosphamide including but not limited to hair loss, hormonal abnormalities, decreased fertility, abdominal pain, diarrhea, nausea and vomiting, bone marrow suppression and infection. The patient understands that monitoring is required while taking this medication. Libtayo Pregnancy And Lactation Text: This medication is contraindicated in pregnancy and when breast feeding. Calcipotriene Pregnancy And Lactation Text: The use of this medication during pregnancy or lactation is not recommended as there is insufficient data. Infliximab Counseling:  I discussed with the patient the risks of infliximab including but not limited to myelosuppression, immunosuppression, autoimmune hepatitis, demyelinating diseases, lymphoma, and serious infections.  The patient understands that monitoring is required including a PPD at baseline and must alert us or the primary physician if symptoms of infection or other concerning signs are noted. Rhofade Counseling: Rhofade is a topical medication which can decrease superficial blood flow where applied. Side effects are uncommon and include stinging, redness and allergic reactions. Propranolol Pregnancy And Lactation Text: This medication is Pregnancy Category C and it isn't known if it is safe during pregnancy. It is excreted in breast milk. Aklief counseling:  Patient advised to apply a pea-sized amount only at bedtime and wait 30 minutes after washing their face before applying.  If too drying, patient may add a non-comedogenic moisturizer.  The most commonly reported side effects including irritation, redness, scaling, dryness, stinging, burning, itching, and increased risk of sunburn.  The patient verbalized understanding of the proper use and possible adverse effects of retinoids.  All of the patient's questions and concerns were addressed. Vtama Pregnancy And Lactation Text: It is unknown if this medication can cause problems during pregnancy and breastfeeding. Terbinafine Counseling: Patient counseling regarding adverse effects of terbinafine including but not limited to headache, diarrhea, rash, upset stomach, liver function test abnormalities, itching, taste/smell disturbance, nausea, abdominal pain, and flatulence.  There is a rare possibility of liver failure that can occur when taking terbinafine.  The patient understands that a baseline LFT and kidney function test may be required. The patient verbalized understanding of the proper use and possible adverse effects of terbinafine.  All of the patient's questions and concerns were addressed. Cephalexin Pregnancy And Lactation Text: This medication is Pregnancy Category B and considered safe during pregnancy.  It is also excreted in breast milk but can be used safely for shorter doses. Simponi Counseling:  I discussed with the patient the risks of golimumab including but not limited to myelosuppression, immunosuppression, autoimmune hepatitis, demyelinating diseases, lymphoma, and serious infections.  The patient understands that monitoring is required including a PPD at baseline and must alert us or the primary physician if symptoms of infection or other concerning signs are noted. Acitretin Pregnancy And Lactation Text: This medication is Pregnancy Category X and should not be given to women who are pregnant or may become pregnant in the future. This medication is excreted in breast milk. Hydroquinone Counseling:  Patient advised that medication may result in skin irritation, lightening (hypopigmentation), dryness, and burning.  In the event of skin irritation, the patient was advised to reduce the amount of the drug applied or use it less frequently.  Rarely, spots that are treated with hydroquinone can become darker (pseudoochronosis).  Should this occur, patient instructed to stop medication and call the office. The patient verbalized understanding of the proper use and possible adverse effects of hydroquinone.  All of the patient's questions and concerns were addressed. Glycopyrrolate Pregnancy And Lactation Text: This medication is Pregnancy Category B and is considered safe during pregnancy. It is unknown if it is excreted breast milk. Saxenda Counseling: I reviewed the possible side effects including: thyroid tumors, kidney disease, gallbladder disease, abdominal pain, constipation, diarrhea, nausea, vomiting and pancreatitis. Do not take this medication if you have a history or family history of multiple endocrine neoplasia syndrome type 2. Side effects reviewed, pt to contact office should one occur. Cimzia Pregnancy And Lactation Text: This medication crosses the placenta but can be considered safe in certain situations. Cimzia may be excreted in breast milk. Zoryve Counseling:  I discussed with the patient that Zoryve is not for use in the eyes, mouth or vagina. The most commonly reported side effects include diarrhea, headache, insomnia, application site pain, upper respiratory tract infections, and urinary tract infections.  All of the patient's questions and concerns were addressed. Aklief Pregnancy And Lactation Text: It is unknown if this medication is safe to use during pregnancy.  It is unknown if this medication is excreted in breast milk.  Breastfeeding women should use the topical cream on the smallest area of the skin for the shortest time needed while breastfeeding.  Do not apply to nipple and areola. Clindamycin Counseling: I counseled the patient regarding use of clindamycin as an antibiotic for prophylactic and/or therapeutic purposes. Clindamycin is active against numerous classes of bacteria, including skin bacteria. Side effects may include nausea, diarrhea, gastrointestinal upset, rash, hives, yeast infections, and in rare cases, colitis. SSKI Counseling:  I discussed with the patient the risks of SSKI including but not limited to thyroid abnormalities, metallic taste, GI upset, fever, headache, acne, arthralgias, paraesthesias, lymphadenopathy, easy bleeding, arrhythmias, and allergic reaction. Cantharidin Pregnancy And Lactation Text: This medication has not been proven safe during pregnancy. It is unknown if this medication is excreted in breast milk. Tazorac Counseling:  Patient advised that medication is irritating and drying.  Patient may need to apply sparingly and wash off after an hour before eventually leaving it on overnight.  The patient verbalized understanding of the proper use and possible adverse effects of tazorac.  All of the patient's questions and concerns were addressed. Bexarotene Counseling:  I discussed with the patient the risks of bexarotene including but not limited to hair loss, dry lips/skin/eyes, liver abnormalities, hyperlipidemia, pancreatitis, depression/suicidal ideation, photosensitivity, drug rash/allergic reactions, hypothyroidism, anemia, leukopenia, infection, cataracts, and teratogenicity.  Patient understands that they will need regular blood tests to check lipid profile, liver function tests, white blood cell count, thyroid function tests and pregnancy test if applicable. Hydroxychloroquine Counseling:  I discussed with the patient that a baseline ophthalmologic exam is needed at the start of therapy and every year thereafter while on therapy. A CBC may also be warranted for monitoring.  The side effects of this medication were discussed with the patient, including but not limited to agranulocytosis, aplastic anemia, seizures, rashes, retinopathy, and liver toxicity. Patient instructed to call the office should any adverse effect occur.  The patient verbalized understanding of the proper use and possible adverse effects of Plaquenil.  All the patient's questions and concerns were addressed. Cosentyx Counseling:  I discussed with the patient the risks of Cosentyx including but not limited to worsening of Crohn's disease, immunosuppression, allergic reactions and infections.  The patient understands that monitoring is required including a PPD at baseline and must alert us or the primary physician if symptoms of infection or other concerning signs are noted. Azelaic Acid Counseling: Patient counseled that medicine may cause skin irritation and to avoid applying near the eyes.  In the event of skin irritation, the patient was advised to reduce the amount of the drug applied or use it less frequently.   The patient verbalized understanding of the proper use and possible adverse effects of azelaic acid.  All of the patient's questions and concerns were addressed. Solaraze Counseling:  I discussed with the patient the risks of Solaraze including but not limited to erythema, scaling, itching, weeping, crusting, and pain. Clindamycin Pregnancy And Lactation Text: This medication can be used in pregnancy if certain situations. Clindamycin is also present in breast milk. Sski Pregnancy And Lactation Text: This medication is Pregnancy Category D and isn't considered safe during pregnancy. It is excreted in breast milk. Bexarotene Pregnancy And Lactation Text: This medication is Pregnancy Category X and should not be given to women who are pregnant or may become pregnant. This medication should not be used if you are breast feeding. Skyrizi Counseling: I discussed with the patient the risks of risankizumab-rzaa including but not limited to immunosuppression, and serious infections.  The patient understands that monitoring is required including a PPD at baseline and must alert us or the primary physician if symptoms of infection or other concerning signs are noted. Tazorac Pregnancy And Lactation Text: This medication is not safe during pregnancy. It is unknown if this medication is excreted in breast milk. Detail Level: Simple Hydroxychloroquine Pregnancy And Lactation Text: This medication has been shown to cause fetal harm but it isn't assigned a Pregnancy Risk Category. There are small amounts excreted in breast milk. Imiquimod Counseling:  I discussed with the patient the risks of imiquimod including but not limited to erythema, scaling, itching, weeping, crusting, and pain.  Patient understands that the inflammatory response to imiquimod is variable from person to person and was educated regarded proper titration schedule.  If flu-like symptoms develop, patient knows to discontinue the medication and contact us. Semaglutide Counseling: I reviewed the possible side effects including: thyroid tumors, kidney disease, gallbladder disease, abdominal pain, constipation, diarrhea, nausea, vomiting and pancreatitis. Do not take this medication if you have a history or family history of multiple endocrine neoplasia syndrome type 2. Side effects reviewed, pt to contact office should one occur. Zyclara Counseling:  I discussed with the patient the risks of imiquimod including but not limited to erythema, scaling, itching, weeping, crusting, and pain.  Patient understands that the inflammatory response to imiquimod is variable from person to person and was educated regarded proper titration schedule.  If flu-like symptoms develop, patient knows to discontinue the medication and contact us. Azelaic Acid Pregnancy And Lactation Text: This medication is considered safe during pregnancy and breast feeding. Thalidomide Counseling: I discussed with the patient the risks of thalidomide including but not limited to birth defects, anxiety, weakness, chest pain, dizziness, cough and severe allergy. Doxycycline Counseling:  Patient counseled regarding possible photosensitivity and increased risk for sunburn.  Patient instructed to avoid sunlight, if possible.  When exposed to sunlight, patients should wear protective clothing, sunglasses, and sunscreen.  The patient was instructed to call the office immediately if the following severe adverse effects occur:  hearing changes, easy bruising/bleeding, severe headache, or vision changes.  The patient verbalized understanding of the proper use and possible adverse effects of doxycycline.  All of the patient's questions and concerns were addressed. Topical Clindamycin Counseling: Patient counseled that this medication may cause skin irritation or allergic reactions.  In the event of skin irritation, the patient was advised to reduce the amount of the drug applied or use it less frequently.   The patient verbalized understanding of the proper use and possible adverse effects of clindamycin.  All of the patient's questions and concerns were addressed. Dutasteride Male Counseling: Dustasteride Counseling:  I discussed with the patient the risks of use of dutasteride including but not limited to decreased libido, decreased ejaculate volume, and gynecomastia. Women who can become pregnant should not handle medication.  All of the patient's questions and concerns were addressed. Isotretinoin Counseling: Patient should get monthly blood tests, not donate blood, not drive at night if vision affected, not share medication, and not undergo elective surgery for 6 months after tx completed. Side effects reviewed, pt to contact office should one occur. Low Dose Naltrexone Counseling- I discussed with the patient the potential risks and side effects of low dose naltrexone including but not limited to: more vivid dreams, headaches, nausea, vomiting, abdominal pain, fatigue, dizziness, and anxiety. Include Pregnancy/Lactation Warning?: No Dupixent Counseling: I discussed with the patient the risks of dupilumab including but not limited to eye infection and irritation, cold sores, injection site reactions, worsening of asthma, allergic reactions and increased risk of parasitic infection.  Live vaccines should be avoided while taking dupilumab. Dupilumab will also interact with certain medications such as warfarin and cyclosporine. The patient understands that monitoring is required and they must alert us or the primary physician if symptoms of infection or other concerning signs are noted. Benzoyl Peroxide Counseling: Patient counseled that medicine may cause skin irritation and bleach clothing.  In the event of skin irritation, the patient was advised to reduce the amount of the drug applied or use it less frequently.   The patient verbalized understanding of the proper use and possible adverse effects of benzoyl peroxide.  All of the patient's questions and concerns were addressed. Doxycycline Pregnancy And Lactation Text: This medication is Pregnancy Category D and not consider safe during pregnancy. It is also excreted in breast milk but is considered safe for shorter treatment courses. Sotyktu Counseling:  I discussed the most common side effects of Sotyktu including: common cold, sore throat, sinus infections, cold sores, canker sores, folliculitis, and acne.? I also discussed more serious side effects of Sotyktu including but not limited to: serious allergic reactions; increased risk for infections such as TB; cancers such as lymphomas; rhabdomyolysis and elevated CPK; and elevated triglycerides and liver enzymes.? Dutasteride Female Counseling: Dutasteride Counseling:  I discussed with the patient the risks of use of dutasteride including but not limited to decreased libido and sexual dysfunction. Explained the teratogenic nature of the medication and stressed the importance of not getting pregnant during treatment. All of the patient's questions and concerns were addressed. Low Dose Naltrexone Pregnancy And Lactation Text: Naltrexone is pregnancy category C.  There have been no adequate and well-controlled studies in pregnant women.  It should be used in pregnancy only if the potential benefit justifies the potential risk to the fetus.   Limited data indicates that naltrexone is minimally excreted into breastmilk. Rituxan Pregnancy And Lactation Text: This medication is Pregnancy Category C and it isn't know if it is safe during pregnancy. It is unknown if this medication is excreted in breast milk but similar antibodies are known to be excreted. Doxepin Counseling:  Patient advised that the medication is sedating and not to drive a car after taking this medication. Patient informed of potential adverse effects including but not limited to dry mouth, urinary retention, and blurry vision.  The patient verbalized understanding of the proper use and possible adverse effects of doxepin.  All of the patient's questions and concerns were addressed. Dapsone Counseling: I discussed with the patient the risks of dapsone including but not limited to hemolytic anemia, agranulocytosis, rashes, methemoglobinemia, kidney failure, peripheral neuropathy, headaches, GI upset, and liver toxicity.  Patients who start dapsone require monitoring including baseline LFTs and weekly CBCs for the first month, then every month thereafter.  The patient verbalized understanding of the proper use and possible adverse effects of dapsone.  All of the patient's questions and concerns were addressed. Valtrex Counseling: I discussed with the patient the risks of valacyclovir including but not limited to kidney damage, nausea, vomiting and severe allergy.  The patient understands that if the infection seems to be worsening or is not improving, they are to call. Adbry Counseling: I discussed with the patient the risks of tralokinumab including but not limited to eye infection and irritation, cold sores, injection site reactions, worsening of asthma, allergic reactions and increased risk of parasitic infection.  Live vaccines should be avoided while taking tralokinumab. The patient understands that monitoring is required and they must alert us or the primary physician if symptoms of infection or other concerning signs are noted. Rifampin Pregnancy And Lactation Text: This medication is Pregnancy Category C and it isn't know if it is safe during pregnancy. It is also excreted in breast milk and should not be used if you are breast feeding. Prednisone Counseling:  I discussed with the patient the risks of prolonged use of prednisone including but not limited to weight gain, insomnia, osteoporosis, mood changes, diabetes, susceptibility to infection, glaucoma and high blood pressure.  In cases where prednisone use is prolonged, patients should be monitored with blood pressure checks, serum glucose levels and an eye exam.  Additionally, the patient may need to be placed on GI prophylaxis, PCP prophylaxis, and calcium and vitamin D supplementation and/or a bisphosphonate.  The patient verbalized understanding of the proper use and the possible adverse effects of prednisone.  All of the patient's questions and concerns were addressed. Otezla Pregnancy And Lactation Text: This medication is Pregnancy Category C and it isn't known if it is safe during pregnancy. It is unknown if it is excreted in breast milk. Protopic Counseling: Patient may experience a mild burning sensation during topical application. Protopic is not approved in children less than 2 years of age. There have been case reports of hematologic and skin malignancies in patients using topical calcineurin inhibitors although causality is questionable. Itraconazole Counseling:  I discussed with the patient the risks of itraconazole including but not limited to liver damage, nausea/vomiting, neuropathy, and severe allergy.  The patient understands that this medication is best absorbed when taken with acidic beverages such as non-diet cola or ginger ale.  The patient understands that monitoring is required including baseline LFTs and repeat LFTs at intervals.  The patient understands that they are to contact us or the primary physician if concerning signs are noted. Azithromycin Pregnancy And Lactation Text: This medication is considered safe during pregnancy and is also secreted in breast milk. Solaraze Pregnancy And Lactation Text: This medication is Pregnancy Category B and is considered safe. There is some data to suggest avoiding during the third trimester. It is unknown if this medication is excreted in breast milk. Rinvoq Pregnancy And Lactation Text: Based on animal studies, Rinvoq may cause embryo-fetal harm when administered to pregnant women.  The medication should not be used in pregnancy.  Breastfeeding is not recommended during treatment and for 6 days after the last dose. Opioid Counseling: I discussed with the patient the potential side effects of opioids including but not limited to addiction, altered mental status, and depression. I stressed avoiding alcohol, benzodiazepines, muscle relaxants and sleep aids unless specifically okayed by a physician. The patient verbalized understanding of the proper use and possible adverse effects of opioids. All of the patient's questions and concerns were addressed. They were instructed to flush the remaining pills down the toilet if they did not need them for pain. Doxepin Pregnancy And Lactation Text: This medication is Pregnancy Category C and it isn't known if it is safe during pregnancy. It is also excreted in breast milk and breast feeding isn't recommended. Siliq Counseling:  I discussed with the patient the risks of Siliq including but not limited to new or worsening depression, suicidal thoughts and behavior, immunosuppression, malignancy, posterior leukoencephalopathy syndrome, and serious infections.  The patient understands that monitoring is required including a PPD at baseline and must alert us or the primary physician if symptoms of infection or other concerning signs are noted. There is also a special program designed to monitor depression which is required with Siliq. Elidel Counseling: Patient may experience a mild burning sensation during topical application. Elidel is not approved in children less than 2 years of age. There have been case reports of hematologic and skin malignancies in patients using topical calcineurin inhibitors although causality is questionable. Dapsone Pregnancy And Lactation Text: This medication is Pregnancy Category C and is not considered safe during pregnancy or breast feeding. Valtrex Pregnancy And Lactation Text: this medication is Pregnancy Category B and is considered safe during pregnancy. This medication is not directly found in breast milk but it's metabolite acyclovir is present. Adbry Pregnancy And Lactation Text: It is unknown if this medication will adversely affect pregnancy or breast feeding. Sarecycline Counseling: Patient advised regarding possible photosensitivity and discoloration of the teeth, skin, lips, tongue and gums.  Patient instructed to avoid sunlight, if possible.  When exposed to sunlight, patients should wear protective clothing, sunglasses, and sunscreen.  The patient was instructed to call the office immediately if the following severe adverse effects occur:  hearing changes, easy bruising/bleeding, severe headache, or vision changes.  The patient verbalized understanding of the proper use and possible adverse effects of sarecycline.  All of the patient's questions and concerns were addressed. Winlevi Counseling:  I discussed with the patient the risks of topical clascoterone including but not limited to erythema, scaling, itching, and stinging. Patient voiced their understanding. Protopic Pregnancy And Lactation Text: This medication is Pregnancy Category C. It is unknown if this medication is excreted in breast milk when applied topically. Bactrim Counseling:  I discussed with the patient the risks of sulfa antibiotics including but not limited to GI upset, allergic reaction, drug rash, diarrhea, dizziness, photosensitivity, and yeast infections.  Rarely, more serious reactions can occur including but not limited to aplastic anemia, agranulocytosis, methemoglobinemia, blood dyscrasias, liver or kidney failure, lung infiltrates or desquamative/blistering drug rashes. Opioid Pregnancy And Lactation Text: These medications can lead to premature delivery and should be avoided during pregnancy. These medications are also present in breast milk in small amounts. Oxybutynin Counseling:  I discussed with the patient the risks of oxybutynin including but not limited to skin rash, drowsiness, dry mouth, difficulty urinating, and blurred vision. Soolantra Counseling: I discussed with the patients the risks of topial Soolantra. This is a medicine which decreases the number of mites and inflammation in the skin. You experience burning, stinging, eye irritation or allergic reactions.  Please call our office if you develop any problems from using this medication. Hydroxyzine Counseling: Patient advised that the medication is sedating and not to drive a car after taking this medication.  Patient informed of potential adverse effects including but not limited to dry mouth, urinary retention, and blurry vision.  The patient verbalized understanding of the proper use and possible adverse effects of hydroxyzine.  All of the patient's questions and concerns were addressed. Gabapentin Counseling: I discussed with the patient the risks of gabapentin including but not limited to dizziness, somnolence, fatigue and ataxia. Winlevi Pregnancy And Lactation Text: This medication is considered safe during pregnancy and breastfeeding. Bimzelx Counseling:  I discussed with the patient the risks of Bimzelx including but not limited to depression, immunosuppression, allergic reactions and infections.  The patient understands that monitoring is required including a PPD at baseline and must alert us or the primary physician if symptoms of infection or other concerning signs are noted. Ketoconazole Counseling:   Patient counseled regarding improving absorption with orange juice.  Adverse effects include but are not limited to breast enlargement, headache, diarrhea, nausea, upset stomach, liver function test abnormalities, taste disturbance, and stomach pain.  There is a rare possibility of liver failure that can occur when taking ketoconazole. The patient understands that monitoring of LFTs may be required, especially at baseline. The patient verbalized understanding of the proper use and possible adverse effects of ketoconazole.  All of the patient's questions and concerns were addressed. Bactrim Pregnancy And Lactation Text: This medication is Pregnancy Category D and is known to cause fetal risk.  It is also excreted in breast milk. Qbrexza Counseling:  I discussed with the patient the risks of Qbrexza including but not limited to headache, mydriasis, blurred vision, dry eyes, nasal dryness, dry mouth, dry throat, dry skin, urinary hesitation, and constipation.  Local skin reactions including erythema, burning, stinging, and itching can also occur. Soolantra Pregnancy And Lactation Text: This medication is Pregnancy Category C. This medication is considered safe during breast feeding. Hydroxyzine Pregnancy And Lactation Text: This medication is not safe during pregnancy and should not be taken. It is also excreted in breast milk and breast feeding isn't recommended. Eucrisa Counseling: Patient may experience a mild burning sensation during topical application. Eucrisa is not approved in children less than 2 years of age. Simlandi Counseling:  I discussed with the patient the risks of adalimumab including but not limited to myelosuppression, immunosuppression, autoimmune hepatitis, demyelinating diseases, lymphoma, and serious infections.  The patient understands that monitoring is required including a PPD at baseline and must alert us or the primary physician if symptoms of infection or other concerning signs are noted. Tetracycline Counseling: Patient counseled regarding possible photosensitivity and increased risk for sunburn.  Patient instructed to avoid sunlight, if possible.  When exposed to sunlight, patients should wear protective clothing, sunglasses, and sunscreen.  The patient was instructed to call the office immediately if the following severe adverse effects occur:  hearing changes, easy bruising/bleeding, severe headache, or vision changes.  The patient verbalized understanding of the proper use and possible adverse effects of tetracycline.  All of the patient's questions and concerns were addressed. Patient understands to avoid pregnancy while on therapy due to potential birth defects. Bimzelx Pregnancy And Lactation Text: This medication crosses the placenta and the safety is uncertain during pregnancy. It is unknown if this medication is present in breast milk. Ozempic Counseling: I reviewed the possible side effects including: thyroid tumors, kidney disease, gallbladder disease, abdominal pain, constipation, diarrhea, nausea, vomiting and pancreatitis. Do not take this medication if you have a history or family history of multiple endocrine neoplasia syndrome type 2. Side effects reviewed, pt to contact office should one occur. VTAMA Counseling: I discussed with the patient that VTAMA is not for use in the eyes, mouth or mouth. They should call the office if they develop any signs of allergic reactions to VTAMA. The patient verbalized understanding of the proper use and possible adverse effects of VTAMA.  All of the patient's questions and concerns were addressed. Propranolol Counseling:  I discussed with the patient the risks of propranolol including but not limited to low heart rate, low blood pressure, low blood sugar, restlessness and increased cold sensitivity. They should call the office if they experience any of these side effects. Ketoconazole Pregnancy And Lactation Text: This medication is Pregnancy Category C and it isn't know if it is safe during pregnancy. It is also excreted in breast milk and breast feeding isn't recommended. Cephalexin Counseling: I counseled the patient regarding use of cephalexin as an antibiotic for prophylactic and/or therapeutic purposes. Cephalexin (commonly prescribed under brand name Keflex) is a cephalosporin antibiotic which is active against numerous classes of bacteria, including most skin bacteria. Side effects may include nausea, diarrhea, gastrointestinal upset, rash, hives, yeast infections, and in rare cases, hepatitis, kidney disease, seizures, fever, confusion, neurologic symptoms, and others. Patients with severe allergies to penicillin medications are cautioned that there is about a 10% incidence of cross-reactivity with cephalosporins. When possible, patients with penicillin allergies should use alternatives to cephalosporins for antibiotic therapy. Topical Retinoid counseling:  Patient advised to apply a pea-sized amount only at bedtime and wait 30 minutes after washing their face before applying.  If too drying, patient may add a non-comedogenic moisturizer. The patient verbalized understanding of the proper use and possible adverse effects of retinoids.  All of the patient's questions and concerns were addressed. Qbrexza Pregnancy And Lactation Text: There is no available data on Qbrexza use in pregnant women.  There is no available data on Qbrexza use in lactation. Glycopyrrolate Counseling:  I discussed with the patient the risks of glycopyrrolate including but not limited to skin rash, drowsiness, dry mouth, difficulty urinating, and blurred vision. Acitretin Counseling:  I discussed with the patient the risks of acitretin including but not limited to hair loss, dry lips/skin/eyes, liver damage, hyperlipidemia, depression/suicidal ideation, photosensitivity.  Serious rare side effects can include but are not limited to pancreatitis, pseudotumor cerebri, bony changes, clot formation/stroke/heart attack.  Patient understands that alcohol is contraindicated since it can result in liver toxicity and significantly prolong the elimination of the drug by many years. Cimzia Counseling:  I discussed with the patient the risks of Cimzia including but not limited to immunosuppression, allergic reactions and infections.  The patient understands that monitoring is required including a PPD at baseline and must alert us or the primary physician if symptoms of infection or other concerning signs are noted.

## 2025-01-13 ENCOUNTER — TELEPHONE (OUTPATIENT)
Dept: INTERNAL MEDICINE | Facility: CLINIC | Age: 80
End: 2025-01-13
Payer: MEDICARE

## 2025-01-13 DIAGNOSIS — G12.20 MOTOR NEURON DISORDER (H): ICD-10-CM

## 2025-01-13 DIAGNOSIS — G70.00 MYASTHENIA GRAVIS (H): ICD-10-CM

## 2025-01-13 DIAGNOSIS — R29.898 BILATERAL ARM WEAKNESS: ICD-10-CM

## 2025-01-13 PROBLEM — J96.01 ACUTE HYPOXIC RESPIRATORY FAILURE (H): Status: RESOLVED | Noted: 2024-11-30 | Resolved: 2025-01-13

## 2025-01-13 PROBLEM — N17.9 ACUTE RENAL FAILURE (ARF): Status: RESOLVED | Noted: 2024-11-30 | Resolved: 2025-01-13

## 2025-01-13 PROBLEM — K94.20 COMPLICATION OF FEEDING TUBE (H): Status: RESOLVED | Noted: 2024-11-30 | Resolved: 2025-01-13

## 2025-01-13 PROBLEM — G70.01: Status: RESOLVED | Noted: 2024-12-04 | Resolved: 2025-01-13

## 2025-01-13 RX ORDER — PREDNISONE 10 MG/1
TABLET ORAL
Qty: 120 TABLET | Refills: 2 | Status: CANCELLED | OUTPATIENT
Start: 2025-01-13

## 2025-01-13 NOTE — TELEPHONE ENCOUNTER
Home Care is calling regarding an established patient with [facility].       Requesting orders from: Gladys Vazquez  Provider is following patient: Yes  Is this a 60-day recertification request?  No    Orders Requested    Occupational Therapy  Request for initial certification (first set of orders)   Frequency: 5x over 7wk    Information was gathered and will be sent to provider for review.  RN will contact Home Care with information after provider review.  Confirmed ok to leave a detailed message with call back.  Contact information confirmed and updated as needed.    Vannessa Gallagher RN    English

## 2025-01-14 ENCOUNTER — VIRTUAL VISIT (OUTPATIENT)
Dept: INTERNAL MEDICINE | Facility: CLINIC | Age: 80
End: 2025-01-14
Payer: MEDICARE

## 2025-01-14 DIAGNOSIS — G70.00 MYASTHENIA GRAVIS (H): ICD-10-CM

## 2025-01-14 DIAGNOSIS — G12.20 MOTOR NEURON DISEASE (H): ICD-10-CM

## 2025-01-14 DIAGNOSIS — G12.20 MOTOR NEURON DISORDER (H): ICD-10-CM

## 2025-01-14 DIAGNOSIS — Z09 HOSPITAL DISCHARGE FOLLOW-UP: Primary | ICD-10-CM

## 2025-01-14 PROCEDURE — 99495 TRANSJ CARE MGMT MOD F2F 14D: CPT | Mod: 95 | Performed by: INTERNAL MEDICINE

## 2025-01-14 NOTE — PROGRESS NOTES
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  Plan: continue present management and neurology follow-up as scheduled; if symptoms worsen or change or new symptoms develop, patient to contact MD.      Patient is scheduled for an annual wellness visit in August but can certainly be seen anytime before then as needed.    Total time of video call between patient and provider was 8 minutes (3:45-3:53pm). Provider location: office. Patient location: home. Platform: Teak.     Gladys Vazquez MD   [employer]  [address] [address]  T: [phone], F: [phone]    SUBJECTIVE                                                      Mendez Greene is a very pleasant 79 year old male who requested a video visit to discuss his recent hospitalizations:    Patient was made aware that this visit will be billed the same as an in-person visit and has given verbal consent to proceed with this video visit.     Patient was hospitalized 11/8/2024 - 11/15/2024 with generalized weakness and difficulty swallowing. Neurology consulted and patient was diagnosed with motor neuron disease/suspected ALS and referred to [facility] for second opinion. A G-tube was placed during that hospitalization for severe dysphagia and tube feeds were initiated. Patient was discharged home.    At the time of his hospital follow-up visit (video visit 11/22/24), significant clinical deterioration was noted and an emergent  "hospice referral was placed.     Several days later, however, patient's myasthenia gravis panel from his recent hospitalization came back with positive AchR antibodies - consistent with a diagnosis of myasthenia gravis (a potential treatable condition as opposed to ALS). Patient was contacted about this significant change in his prognosis and he was scheduled for IVIG.    Prior to receiving his IVIG, patient's condition deteriorated further. He presented to the ER 12/1/2024 with acute hypoxic respiratory failure and sepsis from a UTI.  Patient was admitted, intubated, started on IV antibiotics, and started on IVIG.  Patient clinically improved and was extubated. He was eventually discharged to inpatient rehabilitation 12/11/2024 on Mestinon 60 mg 3 times daily. He was discharged home with home care services 1/1/2025.    Patient has since followed up with neurology (1/6/2025).  Neurology suspects that he may have both myasthenia gravis and a motor neuron disorder, yet to be determined. Additional testing has been ordered for evaluation. Neurology also started him on prednisone for maintenance therapy (in addition to Mestinon) to prevent another myasthenic exacerbation.    Patient reports that he is doing well. He continues to receive nutrition via tube feeds as he has failed multiple speech evaluations \"miserably.\" He continues working with home physical and occupational therapies regularly and is regaining strength, stamina, stability, and dexterity slowly over time.    OBJECTIVE                                                       Vitals: No vitals were obtained today due to virtual visit.    General: appears healthy, alert and no distress - sitting upright in a chair, swiveling, reaching, typing, and grasping without much difficulty  Psychiatric: mentation normal, affect normal/bright, judgement and insight intact, normal speech and appearance well-groomed    ---    (Note was completed, in part, with Genevieve " voice-recognition software. Documentation reviewed, but some grammatical, spelling, and word errors may remain.)

## 2025-01-15 ENCOUNTER — TELEPHONE (OUTPATIENT)
Dept: INTERNAL MEDICINE | Facility: CLINIC | Age: 80
End: 2025-01-15
Payer: MEDICARE

## 2025-01-15 NOTE — TELEPHONE ENCOUNTER
Compression stockings are not covered by Medicare.    Compression stockings may be purchased by the family, out-of-pocket. Even better, patient can be fitted for compression stockings at a medical supply store (he will still need to pay out-of-pocket for these).    Okay to notify PCP if HR <50.     Okay to approve other orders.

## 2025-01-15 NOTE — TELEPHONE ENCOUNTER
"{Established patient defined by a patient who has been seen by a primary care provider or specialist in our system within the past two years . :362019}  Home Care is calling regarding an established patient with [facility].  { Link to document Home Care information  :328309}{Click here to see previous Home Care Details :[id number]}     Requesting orders from: Gladys Vazquez  Provider is following patient: Yes  Is this a 60-day recertification request?  No    Orders Requested    Skilled Nursing  {Order Request:[id number]}    Physical Therapy  Request for initial evaluation and treatment (one time) {Verbal order may be provided  :565949}    {RN DOCUMENTATION:028612::\"Confirmed ok to leave a detailed message with call back.\",\"Contact information confirmed and updated as needed.\"}    Mady ePacock RN    "

## 2025-01-15 NOTE — TELEPHONE ENCOUNTER
Called and spoke with Rebekah to relay provider note below.   She verbalized understanding.     Thank you,  Araseli Izquierdo RN

## 2025-01-15 NOTE — TELEPHONE ENCOUNTER
"Per Rebekah, RN, [facility]:    1) Requesting order for compression stockings   Patient has 2+ pitting edema in of right lower extremity and 1+ pitting edema in left lower extremity  -Negative Elizabeth's sign      2) Requesting that heart rate parameter be changed to \"notify PCP of HR <50\", as patient (historically) has low-resting HR  -resting heart rate was less than 60. Between 52-58          Home Care is calling regarding an established patient with [facility].       Requesting orders from: Gladys Vazquez  Provider is following patient: Yes  Is this a 60-day recertification request?  No    Orders Requested    Physical Therapy  Request for initial evaluation and treatment (one time)       Confirmed ok to leave a detailed message with call back.  Contact information confirmed and updated as needed.    Sam Barrientos RN   "

## 2025-01-16 ENCOUNTER — OFFICE VISIT (OUTPATIENT)
Dept: NEUROLOGY | Facility: CLINIC | Age: 80
End: 2025-01-16
Payer: MEDICARE

## 2025-01-16 ENCOUNTER — LAB (OUTPATIENT)
Dept: LAB | Facility: CLINIC | Age: 80
End: 2025-01-16
Payer: MEDICARE

## 2025-01-16 DIAGNOSIS — Z78.9 ON TUBE FEEDING DIET: ICD-10-CM

## 2025-01-16 DIAGNOSIS — Z09 HOSPITAL DISCHARGE FOLLOW-UP: ICD-10-CM

## 2025-01-16 DIAGNOSIS — R29.898 BILATERAL ARM WEAKNESS: ICD-10-CM

## 2025-01-16 DIAGNOSIS — M62.81 MUSCLE WEAKNESS: ICD-10-CM

## 2025-01-16 DIAGNOSIS — R13.10 DYSPHAGIA, UNSPECIFIED TYPE: ICD-10-CM

## 2025-01-16 DIAGNOSIS — R63.4 UNINTENTIONAL WEIGHT LOSS: ICD-10-CM

## 2025-01-16 DIAGNOSIS — G70.00 MYASTHENIA GRAVIS (H): ICD-10-CM

## 2025-01-16 DIAGNOSIS — E55.9 VITAMIN D DEFICIENCY: ICD-10-CM

## 2025-01-16 DIAGNOSIS — I21.4 NSTEMI (NON-ST ELEVATED MYOCARDIAL INFARCTION) (H): ICD-10-CM

## 2025-01-16 DIAGNOSIS — G12.20 MOTOR NEURON DISEASE (H): ICD-10-CM

## 2025-01-16 DIAGNOSIS — D50.9 IRON DEFICIENCY ANEMIA, UNSPECIFIED IRON DEFICIENCY ANEMIA TYPE: ICD-10-CM

## 2025-01-16 DIAGNOSIS — D64.9 ANEMIA, UNSPECIFIED TYPE: ICD-10-CM

## 2025-01-16 DIAGNOSIS — G70.00 MYASTHENIA GRAVIS (H): Primary | ICD-10-CM

## 2025-01-16 DIAGNOSIS — R39.9 LOWER URINARY TRACT SYMPTOMS (LUTS): ICD-10-CM

## 2025-01-16 DIAGNOSIS — R53.1 GENERALIZED WEAKNESS: ICD-10-CM

## 2025-01-16 LAB
ALBUMIN SERPL BCG-MCNC: 3.6 G/DL (ref 3.5–5.2)
ALBUMIN UR-MCNC: NEGATIVE MG/DL
ALP SERPL-CCNC: 58 U/L (ref 40–150)
ALT SERPL W P-5'-P-CCNC: 22 U/L (ref 0–70)
AMORPH CRY #/AREA URNS HPF: ABNORMAL /HPF
ANION GAP SERPL CALCULATED.3IONS-SCNC: 7 MMOL/L (ref 7–15)
APPEARANCE UR: CLEAR
AST SERPL W P-5'-P-CCNC: 26 U/L (ref 0–45)
BILIRUB SERPL-MCNC: 0.3 MG/DL
BILIRUB UR QL STRIP: NEGATIVE
BUN SERPL-MCNC: 29.1 MG/DL (ref 8–23)
CALCIUM SERPL-MCNC: 9.2 MG/DL (ref 8.8–10.4)
CHLORIDE SERPL-SCNC: 102 MMOL/L (ref 98–107)
CK SERPL-CCNC: 43 U/L (ref 39–308)
COLOR UR AUTO: ABNORMAL
CREAT SERPL-MCNC: 0.63 MG/DL (ref 0.67–1.17)
CRP SERPL-MCNC: <3 MG/L
EGFRCR SERPLBLD CKD-EPI 2021: >90 ML/MIN/1.73M2
ERYTHROCYTE [DISTWIDTH] IN BLOOD BY AUTOMATED COUNT: 14.6 % (ref 10–15)
ERYTHROCYTE [SEDIMENTATION RATE] IN BLOOD BY WESTERGREN METHOD: 38 MM/HR (ref 0–20)
FERRITIN SERPL-MCNC: 181 NG/ML (ref 31–409)
FOLATE SERPL-MCNC: 12.5 NG/ML (ref 4.6–34.8)
GLUCOSE SERPL-MCNC: 120 MG/DL (ref 70–99)
GLUCOSE UR STRIP-MCNC: NEGATIVE MG/DL
HCO3 SERPL-SCNC: 28 MMOL/L (ref 22–29)
HCT VFR BLD AUTO: 31.2 % (ref 40–53)
HGB BLD-MCNC: 10.2 G/DL (ref 13.3–17.7)
HGB UR QL STRIP: NEGATIVE
IRON BINDING CAPACITY (ROCHE): 323 UG/DL (ref 240–430)
IRON SATN MFR SERPL: 16 % (ref 15–46)
IRON SERPL-MCNC: 53 UG/DL (ref 61–157)
KETONES UR STRIP-MCNC: NEGATIVE MG/DL
LEUKOCYTE ESTERASE UR QL STRIP: ABNORMAL
MAGNESIUM SERPL-MCNC: 2.2 MG/DL (ref 1.7–2.3)
MCH RBC QN AUTO: 35.9 PG (ref 26.5–33)
MCHC RBC AUTO-ENTMCNC: 32.7 G/DL (ref 31.5–36.5)
MCV RBC AUTO: 110 FL (ref 78–100)
NITRATE UR QL: NEGATIVE
PH UR STRIP: 8 [PH] (ref 5–7)
PHOSPHATE SERPL-MCNC: 3.3 MG/DL (ref 2.5–4.5)
PLATELET # BLD AUTO: 333 10E3/UL (ref 150–450)
POTASSIUM SERPL-SCNC: 5 MMOL/L (ref 3.4–5.3)
PROT SERPL-MCNC: 6.6 G/DL (ref 6.4–8.3)
RBC # BLD AUTO: 2.84 10E6/UL (ref 4.4–5.9)
RBC URINE: 3 /HPF
SODIUM SERPL-SCNC: 137 MMOL/L (ref 135–145)
SP GR UR STRIP: 1.01 (ref 1–1.03)
SQUAMOUS EPITHELIAL: <1 /HPF
TSH SERPL DL<=0.005 MIU/L-ACNC: 2.82 UIU/ML (ref 0.3–4.2)
UROBILINOGEN UR STRIP-MCNC: NORMAL MG/DL
VIT B12 SERPL-MCNC: 982 PG/ML (ref 232–1245)
VIT D+METAB SERPL-MCNC: 36 NG/ML (ref 20–50)
WBC # BLD AUTO: 6.2 10E3/UL (ref 4–11)
WBC URINE: 25 /HPF

## 2025-01-16 PROCEDURE — 82746 ASSAY OF FOLIC ACID SERUM: CPT | Performed by: INTERNAL MEDICINE

## 2025-01-16 PROCEDURE — 85027 COMPLETE CBC AUTOMATED: CPT | Performed by: PATHOLOGY

## 2025-01-16 PROCEDURE — 87086 URINE CULTURE/COLONY COUNT: CPT | Performed by: INTERNAL MEDICINE

## 2025-01-16 PROCEDURE — 85652 RBC SED RATE AUTOMATED: CPT | Performed by: PATHOLOGY

## 2025-01-16 PROCEDURE — 83540 ASSAY OF IRON: CPT | Performed by: PATHOLOGY

## 2025-01-16 PROCEDURE — 86140 C-REACTIVE PROTEIN: CPT | Performed by: PATHOLOGY

## 2025-01-16 PROCEDURE — 86235 NUCLEAR ANTIGEN ANTIBODY: CPT | Mod: 90 | Performed by: PATHOLOGY

## 2025-01-16 PROCEDURE — 99000 SPECIMEN HANDLING OFFICE-LAB: CPT | Performed by: PATHOLOGY

## 2025-01-16 PROCEDURE — 84100 ASSAY OF PHOSPHORUS: CPT | Performed by: PATHOLOGY

## 2025-01-16 PROCEDURE — 82085 ASSAY OF ALDOLASE: CPT | Mod: 90 | Performed by: PATHOLOGY

## 2025-01-16 PROCEDURE — 82550 ASSAY OF CK (CPK): CPT | Performed by: PATHOLOGY

## 2025-01-16 PROCEDURE — 83735 ASSAY OF MAGNESIUM: CPT | Performed by: PATHOLOGY

## 2025-01-16 PROCEDURE — 82728 ASSAY OF FERRITIN: CPT | Performed by: PATHOLOGY

## 2025-01-16 PROCEDURE — 81001 URINALYSIS AUTO W/SCOPE: CPT | Performed by: PATHOLOGY

## 2025-01-16 PROCEDURE — 83550 IRON BINDING TEST: CPT | Performed by: PATHOLOGY

## 2025-01-16 PROCEDURE — 82607 VITAMIN B-12: CPT | Performed by: INTERNAL MEDICINE

## 2025-01-16 PROCEDURE — 82306 VITAMIN D 25 HYDROXY: CPT | Performed by: INTERNAL MEDICINE

## 2025-01-16 PROCEDURE — 80053 COMPREHEN METABOLIC PANEL: CPT | Performed by: PATHOLOGY

## 2025-01-16 PROCEDURE — 83516 IMMUNOASSAY NONANTIBODY: CPT | Mod: 90 | Performed by: PATHOLOGY

## 2025-01-16 PROCEDURE — 36415 COLL VENOUS BLD VENIPUNCTURE: CPT | Performed by: PATHOLOGY

## 2025-01-16 PROCEDURE — 84443 ASSAY THYROID STIM HORMONE: CPT | Performed by: PATHOLOGY

## 2025-01-16 NOTE — PROCEDURES
[facility]  Electrodiagnostic Laboratory                 Department of Neurology                                                                                                         Test Date:  2025    Patient: Steven Greene : 1945 Physician: Zbigniew Abdullahi MD   Sex: Male AGE: 79 year Ref Phys: Zbigniew Abdullahi MD   ID#: [id number]   Technician: Patsy Isbell     History and Examination:  79 year old male presented with subacute progressive painless bilateral upper limb weakness with dysphagia. Prior outside EMG reportedly showed widespread acute and chronic denervation in multiple upper and lower limb muscles. He was found to have positive AchR antibody. This study was performed to assess for diffuse motor neuron disorder versus neuromuscular junction defect.     Techniques:  Motor and sensory conduction studies were done with surface recording electrodes. Temperature was monitored and recorded throughout the study. Upper extremities were maintained at a temperature of 32 degrees Centigrade or higher.  EMG was done with a concentric needle electrode.     Results:  Nerve conduction studies showed mildly reduced left ulnar sensory nerve action potential amplitude with slowing of sensory conduction velocity. Right sural sensory nerve action potential amplitude was 4 uV, which could be considered normal for the patient's age group. Right ulnar, bilateral median, and bilateral radial sensory nerve action potentials were normal. Nerve conduction studies of all motor nerves of bilateral upper and right lower limbs showed reduced compound muscle action potential amplitudes with no compelling conduction block or temporal dispersion. A focal slowing of conduction velocity was noted across the elbow on the left ulnar motor response.      Repetitive nerve stimulation at 3 Hz of the right facial nerve recorded from nasalis did not show significant decrement at rest or  post exercise. Repetitive nerve stimulation of the right spinal accessory nerve was attempted but could not be interpreted due to technical issues. Of note, patient forgot to discontinue pyridostigmine prior to the test.     Needle EMG showed fibrillation potentials with early recruitment of polyphasic, short-duration, and low-amplitude motor unit potentials in several muscles of the right upper limb. There were also sparse fibrillation potentials with mildly reduced recruitment of polyphasic and slightly long-duration motor unit potentials in the right L5-innervated muscles. Fasciculations were noted in the right tibialis anterior and gastrocnemius muscles. Needle EMG of the thoracic paraspinal, right masseter, and genioglossus muscles were normal.    Interpretation:  This is an abnormal and complex study. The electrophysiologic findings are most consistent with 1) a chronic right L5 radiculopathy and 2) a diffuse myopathy. Although a severe disorder of neuromuscular junction could have small motor unit potentials on EMG, the presence of fibrillation potentials is atypical for this. Repetitive nerve stimulation was performed while the patient was on pyridostigmine and will be repeated in another study. There is no compelling electrophysiologic evidence of a diffuse motor neuron disorder in the current study. 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     Zbigniew Abdullahi MD  Department of Neurology        Nerve Conduction Studies  Motor Sites      Latency Neg. Amp Neg. Amp Diff Segment Distance Velocity Neg. Dur Neg Area Diff Temperature Comment   Site (ms) Norm (mV) Norm (%)  cm m/s Norm (ms) (%) ( C)    Right Dp Branch Fibular (TA) Motor   Fibular Head 5.0  < 6.0 1.16 -      9.7  29.9    Pop Fossa 6.4  < 5.7 0.90 - -22 Pop Fossa-Fibular Head 8.6 61 - 9.5 -21 29.9    Right Facial - Nasalis Motor   Mastoid 2.7 - 2.1 -      6.0  25.4 10.0 cm   Right Fibular (EDB) Motor   Ankle 5.3  < 6.0  0.26 -  Ankle-EDB 8   6.5  29.3 moved G1   Bel Fibular Head 12.8 - 0.08 - -69 Bel Fibular Head-Ankle 33.4 45  > 38 6.9 -71 29.3    Pop Fossa 14.2 - 0.08 - 0 Pop Fossa-Bel Fibular Head 10.6 76  > 38 7.5 30 29.3    Left Median (APB) Motor   Wrist 3.7  < 4.4 2.2  > 5.0  Wrist-APB 8   5.1  32.2    Elbow 8.0 - 1.76  > 5.0 -20 Elbow-Wrist 22.5 52  > 48 5.4 -11 32.2    Right Median (APB) Motor   Wrist 4.4  < 4.4 2.7  > 5.0  Wrist-APB 8   4.9  32.6 moved G1   Elbow 9.3 - 1.60  > 5.0 -41 Elbow-Wrist 22.6 46  > 48 5.3 -32 32.9    Right Spinal Accessory Motor   Neck 2.1 - 0.73 -      10.3  28.5 8.5 cm   Right Tibial (AHB) Motor   Ankle 4.6  < 6.5 1.01  > 5.0  Ankle-AH 7.5   5.8  29.5 moved G1   Knee 14.9 - 0.59 - -42 Knee-Ankle 39.5 38  > 38 6.9 -42 29.4    Left Ulnar (ADM) Motor   Wrist 3.0  < 3.5 0.38  > 5.0  Wrist-ADM 7.5   3.3  31.8 moved G1   Below Elbow 7.1 - 0.29 - -24 Below Elbow-Wrist 20.3 50  > 48 3.4 -2 31.7    Above Elbow 10.9 - 0.24 - -17 Above Elbow-Below Elbow 10.5 28  > 48 3.1 -38 31.8    Right Ulnar (ADM) Motor   Wrist 3.5  < 3.5 2.4  > 5.0  Wrist-ADM 8   5.2  31.2 moved G1   Below Elbow 7.8 - 2.0 - -17 Below Elbow-Wrist 20.9 49  > 48 4.8 -9 30.9    Above Elbow 10.1 - 1.53 - -24 Above Elbow-Below Elbow 11 48  > 48 5.6 -14 30.8      F-Wave Sites      Min F-Lat Max-Min F-Lat Mean F-Lat   Site (ms) (ms) (ms)   Right Median F-Wave   Wrist 33.1 1.00 33.6   Right Tibial F-Wave   Ankle 62.2 4.4 -   Right Ulnar F-Wave   Wrist 33.1 5.4 36.0     Sensory Sites      Onset Lat Peak Lat Amp (O-P) Amp (P-P) Segment Distance Velocity Temperature Comment   Site ms (ms)  V Norm ( V)  cm m/s Norm ( C)    Left Median Sensory   Wrist-Dig II 2.6 3.4 21  > 10 28 Wrist-Dig II 14 54  > 48 32.1    Right Median Sensory   Wrist-Dig II 2.9 3.9 20  > 10 23 Wrist-Dig II 14 48  > 48 31.6    Left Radial Sensory   Forearm-Wrist 1.70 2.3 21  > 15 23 Forearm-Wrist 10 59 - 32.3    Right Radial Sensory   Forearm-Wrist 1.93 2.6 18  > 15 19  Forearm-Wrist 10 52 - 31.8    Right Sural Sensory   Calf-Lat Malleolus 3.4 4.6 4  > 5 5 Calf-Lat Malleolus 14 41  > 38 29.6    Left Ulnar Sensory   Wrist-Dig V 3.4 4.2 4  > 8 5 Wrist-Dig V 12.5 37  > 48 32.5    Right Ulnar Sensory   Wrist-Dig V 2.4 3.4 8  > 8 10 Wrist-Dig V 12.5 52  > 48 32.1      RNS     Trial # Label Amp 1 (mV)  O-P Amp 4 (mV)  O-P Amp % Dif Area 1 (mV ms) Area 4 (mV ms) Area % Dif Rep Rate Train Length Pause Time (min:sec) Comments   Right Nasalis   Tr 1 Baseline 2.26 2.29 1.4 8.32 8.18 -1.7 3.00 6 00:30    Tr 2 Post Exercise 2.01 1.95 -2.7 7.61 7.12 -6.5 3.00 6 01:00    Tr 3 1 min Post 2.07 2.03 -1.7 8.13 7.56 -7.0 3.00 6 01:00    Tr 4 2 min Post 1.90 1.78 -6.6 8.68 7.84 -9.8 3.00 6 01:00    5 3 min Post       3.00 6 00:00        Electromyography     Side Muscle Ins Act Fibs/PSW Fasc HF Amp Dur Poly Recrt Int Pat   Right Tib ant Nml 1+ 2+ 0 1+ 1+ 1+ Aneesh Nml   Right Gastroc MH Nml None 1+ 0 Nml Nml 0 Nml Nml   Right Vastus med Nml None Nml 0 Nml Nml 0 Nml Nml   Right Tens fasc lat Nml 1+ Nml 0 Nml Nml 1+ Aneesh Nml   Right Gluteus med Nml None Nml 0 Nml 1+ 1+ Aneesh Nml   Right T10 PSP Nml None Nml 0 Nml Nml 0 Nml Nml   Right T7 PSP Nml None Nml 0 Nml Nml 0 Nml Nlm   Right FDI Nml 2+ Nml 0 1- 1- 3+ Early Nml   Right Triceps Nml 1+ Nml 0 1- 1- 2+ Nml Varying MUPs   Right Deltoid Nml None Nml 0 Nml Nml 0 Nml Nml   Right ECR Nml 2+ Nml 0 1- 1+ 3+ Early Nml   Right Biceps Nml 2+ Nml 0 2- 1- 3+ Early Nml   Right Masseter Nml None Nml 0 Nml Nml 0 Nml Nml   Right Genioglossus Nml None Nml 0 Nml Nml 0 Nml Nml         NCS Waveforms:    Motor                             Sensory                         F-Wave

## 2025-01-16 NOTE — LETTER
1/16/2025       RE: Mendez Greene  [address] [address] [facility] [facility] [address]     Dear Colleague,    Thank you for referring your patient, Mendez Greene, to the [facility]. Please see a copy of my visit note below.    Please see procedure note.       Again, thank you for allowing me to participate in the care of your patient.      Sincerely,    Zbigniew Abdullahi MD

## 2025-01-17 ENCOUNTER — MEDICAL CORRESPONDENCE (OUTPATIENT)
Dept: HEALTH INFORMATION MANAGEMENT | Facility: CLINIC | Age: 80
End: 2025-01-17
Payer: MEDICARE

## 2025-01-17 LAB — BACTERIA UR CULT: NORMAL

## 2025-01-18 LAB — ALDOLASE SERPL-CCNC: 2.8 U/L

## 2025-01-22 ENCOUNTER — TELEPHONE (OUTPATIENT)
Dept: INTERNAL MEDICINE | Facility: CLINIC | Age: 80
End: 2025-01-22
Payer: MEDICARE

## 2025-01-22 NOTE — TELEPHONE ENCOUNTER
Home Care is calling regarding an established patient with [facility].      FYI:      Pt had fall 1/18/25, witnessed by pt's wife. Pt fell in kitchen at home. He was able to stand up on own. Pt does not have any injuries, did not hit head, no loss of consciousness. Mild shoulder pain, right traps area.     Rosie Hilton RN

## 2025-01-22 NOTE — TELEPHONE ENCOUNTER
FYI-BP's yesterday during home visit 100/62 and 90's/50. Patient asymptomatic.  Pulse between 53-58 yesterday.     RN would like to change heart rate parameter to contact provider if less than 55. Please advise if this is OK to change.     Patient isn't sure if he should still be taking simvastatin. It is not on the hospital discharge list per HC RN and not on current clinic medication list. Can you please confirm?     Can we leave a detailed message on this number? YES  Phone number patient can be reached at: Other phone number:     [phone]     Mary Turpin, RN  [facility] Triage

## 2025-01-22 NOTE — TELEPHONE ENCOUNTER
Home Care is calling regarding an established patient with [facility].       Requesting orders from: Gladys Vazquez  Provider is following patient: Yes  Is this a 60-day recertification request?  No    Orders Requested    Physical Therapy  Request for initial certification (first set of orders)   Frequency:  [id number] then 1 visit every other weeks x 4 weeks    Information was gathered and will be sent to provider for review.  RN will contact Home Care with information after provider review.  Confirmed ok to leave a detailed message with call back.  Contact information confirmed and updated as needed.    Rosie Hilton RN

## 2025-01-23 ENCOUNTER — OFFICE VISIT (OUTPATIENT)
Dept: NEUROLOGY | Facility: CLINIC | Age: 80
End: 2025-01-23
Payer: MEDICARE

## 2025-01-23 ENCOUNTER — DOCUMENTATION ONLY (OUTPATIENT)
Dept: NEUROLOGY | Facility: CLINIC | Age: 80
End: 2025-01-23

## 2025-01-23 DIAGNOSIS — R76.8 SERUM ACETYLCHOLINE RECEPTOR ANTIBODY POSITIVE: ICD-10-CM

## 2025-01-23 DIAGNOSIS — G72.49 INFLAMMATORY MYOPATHY: Primary | ICD-10-CM

## 2025-01-23 DIAGNOSIS — G70.00 MYASTHENIA GRAVIS (H): Primary | ICD-10-CM

## 2025-01-23 DIAGNOSIS — R13.12 OROPHARYNGEAL DYSPHAGIA: ICD-10-CM

## 2025-01-23 NOTE — TELEPHONE ENCOUNTER
Notified Sarah with [facility] that the patient is NOT to take simvastatin per Dr. Vazquez. Ree Valdez RN

## 2025-01-23 NOTE — TELEPHONE ENCOUNTER
Patient Contact    Attempt # 1    Was call answered?  No.  Left detailed message with verbal orders from provider below on voicemail with information to call me back.

## 2025-01-23 NOTE — PROCEDURES
[facility]  Electrodiagnostic Laboratory                 Department of Neurology                                                                                                         Test Date:  2025    Patient: Steven Greene : 1945 Physician: Zbigniew Abdullahi MD   Sex: Male AGE: 79 year Ref Phys: Zbigniew Abdullahi MD   ID#: [id number]   Technician: Patsy Isbell     History and Examination:  79 year old male presented with subacute progressive painless bilateral upper limb weakness with dysphagia. Prior outside EMG reportedly showed widespread acute and chronic denervation in multiple upper and lower limb muscles. He was found to have positive AchR antibody. This study was performed to assess for neuromuscular junction defect.     Techniques:  Nerve conduction studies were done with surface recording electrodes. Temperature was monitored and recorded throughout the study. Upper extremities were maintained at a temperature of 32 degrees Centigrade or higher and Lower extremities were maintained at a temperature of 31 degrees Centigrade or higher.  EMG was done with a concentric needle electrode.     Results  Exam performed after discontinuation of pyridostigmine for more than 12 hours.     Repetitive nerve stimulation at 3 Hz of the right facial nerve recorded from nasalis and right ulnar nerve recorded from ADM did not show significant decrement at rest or post exercise.     Interpretation:  This is a normal repetitive nerve stimulation study. There is no electrophysiologic evidence of a postsynaptic neuromuscular junction defect in the current study.     Zbigniew Abdullahi MD  Department of Neurology        Nerve Conduction Studies  Motor Sites      Latency Neg. Amp Neg. Amp Diff Segment Distance Velocity Neg. Dur Neg Area Diff Temperature Comment   Site (ms) Norm (mV) Norm (%)  cm m/s Norm (ms) (%) ( C)    Right Facial - Nasalis Motor   Mastoid 2.9 - 1.32 -       6.3  23.7    Right Ulnar (ADM) Motor   Wrist 3.7  < 3.5 2.9  > 5.0  Wrist-ADM 8   4.6  30.4      RNS     Trial # Label Amp 1 (mV)  O-P Amp 4 (mV)  O-P Amp % Dif Area 1 (mV ms) Area 4 (mV ms) Area % Dif Rep Rate Train Length Pause Time (min:sec) Comments   Right Nasalis   Tr 1 Baseline 1.47 1.38 -6.2 6.04 5.95 -1.5 3.00 6 00:30    Tr 2 Post Exercise 1.57 1.60 2.0 6.44 6.24 -3.1 3.00 6 01:00    Tr 3 1 min Post 1.57 1.57 0.0 6.69 6.60 -1.3 3.00 6 01:00    Tr 4 2 min Post 1.93 1.94 0.9 7.89 7.76 -1.7 3.00 6 01:00    Right Abductor Digiti Minimi   Tr 1 Baseline 2.33 2.39 2.8 6.50 6.38 -1.8 3.00 6 00:30    Tr 2 Post Exercise 2.95 2.88 -2.3 8.30 7.58 -8.7 3.00 6 01:00    Tr 3 1 min Post 2.86 2.79 -2.3 8.57 8.05 -6.0 3.00 6 01:00    Tr 4 2 min Post 2.86 2.81 -1.7 8.54 7.82 -8.5 3.00 6 01:00            Waveforms / Images:    Motor

## 2025-01-23 NOTE — TELEPHONE ENCOUNTER
Re routing med question to provider;     Please clarify simvastatin order; per chart check: 12/11/24 Admission note - HOLD PTA Simvastatin until follow-up with neurology. Reviewed 1/6/25 [facility] neurology note: unable to locate requested information.    Please advise if pt to follow up with neurology regarding simvastatin order.

## 2025-01-23 NOTE — PROGRESS NOTES
PROGRESS NOTE    EMG/NCS: This is an abnormal and complex study. The electrophysiologic findings are most consistent with 1) a chronic right L5 radiculopathy and 2) a diffuse myopathy. Repetitive nerve stimulation of facial and ulnar nerve (>12 hours after discontinuation of pyridostigmine) did not show evidence of postsynaptic neuromuscular junction defects.     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 Surprisingly, the findings are very different from his prior EMG from outside. The motor unit potentials changes in all weak muscles of the upper limbs are more consistent with myopathic units (early recruitment, small amplitude, short duration, polyphasic) with fibrillation potentials. EMG findings were not suggestive of diffuse motor neuron disorder.     Although a severe disorder of neuromuscular junction could have small motor unit potentials on EMG, the presence of fibrillation potentials is atypical for this and raises a concern for inflammatory myositis. The predominant upper limb muscle weakness and bulbar weakness can be seen in BCIM (brachio-cervical inflammatory myopathy). Half of the patients with BCIM may also have anti-acetylcholine receptor antibodies without evidence of muscle fatigability.    At this point, I recommend obtaining muscle biopsy to further investigate for inflammatory myositis. This will help guide long-term management of immunosuppressive therapy.     - Right triceps muscle biopsy.  - continue prednisone 40mg/day until next visit. He has noticed significant improvement with this  - He may continue pyridostigmine as needed.  - PPI (omeprazole) daily  - Continue PT  and OT  - F/U in April after muscle biopsy result.

## 2025-01-23 NOTE — LETTER
1/23/2025       RE: Mendez Greene  [address] [facility] [address]     Dear Colleague,    Thank you for referring your patient, Mendez Greene, to the [facility] EMG [facility]IN[facility]. Please see a copy of my visit note below.    Please see procedure note.       Again, thank you for allowing me to participate in the care of your patient.      Sincerely,    Zbigniew Abdullahi MD

## 2025-01-24 ENCOUNTER — MEDICAL CORRESPONDENCE (OUTPATIENT)
Dept: HEALTH INFORMATION MANAGEMENT | Facility: CLINIC | Age: 80
End: 2025-01-24
Payer: MEDICARE

## 2025-01-27 ENCOUNTER — TELEPHONE (OUTPATIENT)
Dept: SURGERY | Facility: CLINIC | Age: 80
End: 2025-01-27
Payer: MEDICARE

## 2025-01-27 NOTE — TELEPHONE ENCOUNTER
Patient confirmed scheduled appointment:  Date: 02/10/25  Time: 300 pm   Visit type: New General Surgery  Provider:    Location: virtual   Testing/imaging: n/a  Additional notes: Pt being referred by  for muscle biopsy consult

## 2025-01-27 NOTE — TELEPHONE ENCOUNTER
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   R13.12 (ICD-10-CM) - Oropharyngeal dysphagia          FUTURE VISIT INFORMATION:  Appointment Date: 2/10/25  Appointment Time:      NOTES RECORD STATUS  DETAILS   OFFICE NOTE from Referring Provider Internal 1/23/25   OFFICE NOTE from Other Specialists Internal    HOSPITAL DISCHARGE SUMMARY/ ED VISITS  Internal Admission:  12/11/24-1/1/25-[facility]  1/8/24-11/15/24-[facility]   PERTINENT LABS Internal    IMAGING (CT, MRI, US, XR)  Internal 12/1/24-CT abd pel  12/1/24-XR chest-more in PACS  12/1/24, 12/9/24, 11/12/24-XR video swallow  12/6/22-CT chest     Records Requested    Facility    Fax:    Outcome

## 2025-01-27 NOTE — TELEPHONE ENCOUNTER
Left Voicemail (1st Attempt) and Sent Mychart (1st Attempt) for the patient to call back and schedule the following:    Appointment type: New General Surgery   Provider:Sunil Olivas McEachron, Anton, Alfonzo or Ev   Return date: Next Available//Virtual or In person  Specialty phone number: [phone]  Additional appointment(s) needed: n/a   Additonal Notes: Pt being referred by - consult for muscle biopsy

## 2025-01-28 ENCOUNTER — HOME INFUSION BILLING (OUTPATIENT)
Dept: HOME HEALTH SERVICES | Facility: HOME HEALTH | Age: 80
End: 2025-01-28
Payer: MEDICARE

## 2025-01-28 ENCOUNTER — HOME INFUSION (OUTPATIENT)
Dept: HOME HEALTH SERVICES | Facility: HOME HEALTH | Age: 80
End: 2025-01-28
Payer: MEDICARE

## 2025-01-29 ENCOUNTER — MEDICAL CORRESPONDENCE (OUTPATIENT)
Dept: HEALTH INFORMATION MANAGEMENT | Facility: CLINIC | Age: 80
End: 2025-01-29

## 2025-01-29 DIAGNOSIS — Z53.9 DIAGNOSIS NOT YET DEFINED: Primary | ICD-10-CM

## 2025-01-29 PROCEDURE — G0180 MD CERTIFICATION HHA PATIENT: HCPCS | Performed by: INTERNAL MEDICINE

## 2025-02-06 ENCOUNTER — TELEPHONE (OUTPATIENT)
Dept: SURGERY | Facility: CLINIC | Age: 80
End: 2025-02-06

## 2025-02-06 ENCOUNTER — OFFICE VISIT (OUTPATIENT)
Dept: CARDIOLOGY | Facility: CLINIC | Age: 80
End: 2025-02-06
Payer: MEDICARE

## 2025-02-06 ENCOUNTER — VIRTUAL VISIT (OUTPATIENT)
Dept: SURGERY | Facility: CLINIC | Age: 80
End: 2025-02-06
Payer: MEDICARE

## 2025-02-06 VITALS
WEIGHT: 175.3 LBS | OXYGEN SATURATION: 99 % | SYSTOLIC BLOOD PRESSURE: 142 MMHG | BODY MASS INDEX: 25.09 KG/M2 | HEIGHT: 70 IN | DIASTOLIC BLOOD PRESSURE: 70 MMHG | HEART RATE: 56 BPM

## 2025-02-06 VITALS — HEIGHT: 70 IN | BODY MASS INDEX: 24.45 KG/M2 | WEIGHT: 170.8 LBS

## 2025-02-06 DIAGNOSIS — G12.20 MOTOR NEURON DISORDER (H): Primary | ICD-10-CM

## 2025-02-06 DIAGNOSIS — I25.10 CORONARY ARTERY DISEASE INVOLVING NATIVE CORONARY ARTERY OF NATIVE HEART WITHOUT ANGINA PECTORIS: Primary | ICD-10-CM

## 2025-02-06 DIAGNOSIS — I35.9 AORTIC VALVE DISEASE: ICD-10-CM

## 2025-02-06 RX ORDER — CEFAZOLIN SODIUM 2 G/50ML
2 SOLUTION INTRAVENOUS
OUTPATIENT
Start: 2025-02-06

## 2025-02-06 RX ORDER — CEFAZOLIN SODIUM 2 G/50ML
2 SOLUTION INTRAVENOUS SEE ADMIN INSTRUCTIONS
OUTPATIENT
Start: 2025-02-06

## 2025-02-06 ASSESSMENT — PAIN SCALES - GENERAL: PAINLEVEL_OUTOF10: NO PAIN (0)

## 2025-02-06 NOTE — LETTER
2/6/2025       RE: Mendez Greene  [address] [facility] [address]     Dear Colleague,    Thank you for referring your patient, Mendez Greene, to the [facility] [place] at [facility]. Please see a copy of my visit note below.    Mendez Greene is a 79 year old who is being evaluated via a billable video visit.      How would you like to obtain your AVS? MyChart  If the video visit is dropped, the invitation should be resent by: Text to cell phone: [phone]  Will anyone else be joining your video visit? No  If patient encounters technical issues they should call [phone]    During this virtual visit the patient is located in [place], patient verifies this as the location during the entirety of this visit.     Video-Visit Details  Video Start Time: 7:37 AM    Type of service:  Video Visit    Video End Time:7:43 AM    Originating Location (pt. Location): Home    Distant Location (provider location):  On-site    Platform used for Video Visit: Lamont        I spoke today with Mendez Greene to discuss the requested muscle biopsy.  He has been followed by Neurology and for the past few months he has been worked up for bilateral upper extremity weakness.  Has undergone EMG.  A right triceps biopsy has been requested.    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       artificial saliva (BIOTENE MT) SOLN solution Swish and spit 2 sprays in mouth every hour as needed for dry mouth. 44.3 mL 1     aspirin 81 MG EC tablet Take 81 mg by mouth every evening.       Cholecalciferol (D3 PO) Take 1 tablet by mouth daily. OTC: Unsure of strength.       fluticasone (FLONASE) 50 MCG/ACT nasal spray Spray 1 spray into both nostrils daily. 9.9 mL 0     Jevity 1.5 Mandeep Place 1,422 mLs into G tube daily. Infuse via gravity bag     1.5 cartons 4 feedings per day ( 6 total cartons/day)  Water flush: 120ml before and after each feeding 14371 mL 11     levothyroxine (SYNTHROID/LEVOTHROID) 175 MCG tablet Take 1 tablet (175 mcg) by mouth daily. 30 tablet 0     olopatadine (PATANOL) 0.1 % ophthalmic solution Place 1 drop into both eyes 2 times daily as needed for allergies.       omeprazole (PRILOSEC) 2 mg/mL suspension Place 10 mLs (20 mg) into Feeding Tube every morning (before breakfast). 500 mL 2     order for DME Equipment being ordered: CPAP and supplies. LIFETIME need. 1 each 0     predniSONE (DELTASONE) 10 MG tablet 10 mg/day via feeding tube for 3 days then increase to 20 mg/day for 3 days then increase to 30 mg/day for 3 days then increase to 40mg/day and stay on this dose 120 tablet 2     pyRIDostigmine (MESTINON) 60 MG tablet Take 1 tablet 3 times a day while awake. 90 tablet 1     vitamin B-12 (CYANOCOBALAMIN) 1000 MCG tablet Take 1,000 mcg by mouth daily       melatonin 5 MG tablet Take 1 tablet (5 mg) by mouth or NG Tube at bedtime.       No current facility-administered medications for this visit.       He is right handed.  He denies prior surgery on that arm.  He takes a baby aspirin.    PE:  Alert, pleasant  No resp distress or wheezing  Fluent speech    No relevant imaging  to review  I reviewed notes from his Neuro team    A/P:  Right tricep muscle biopsy requested.  We spoke about the procedure and its risks (bleeding, infection, pain, lack of help with diagnosis).  All questions answered.  He would like to proceed.    -To OR for muscle biopsy  -Ok to continue aspirin  -Local MAC (though could go deeper if desired by anesthesia)      Again, thank you for allowing me to participate in the care of your patient.      Sincerely,    Sam Barber MD

## 2025-02-06 NOTE — NURSING NOTE
[employer]rax message sent to the patient with surgical logistics and post op teaching information for review.  The patient was asked to contact this office with any questions or if they would like to review this information over the telephone.

## 2025-02-06 NOTE — PATIENT INSTRUCTIONS
You met with Dr. Sam Barber.      Today's visit instructions:    Reminder:  Surgery Requirements  Your surgery will be at [address] [place] [address].  You will need to arrive 2 hours early.  You will need someone to drive you home (over 18 years old) and stay with you for 24 hours after the procedure.  You will need a preop physical with [facility] ([facility] within 30 days of surgery- closer is always better.  Stop any blood thinners, vitamins, minerals, or herbal supplements 5 days before surgery.  If you are taking a prescribed blood thinner or weight loss medication please let us know for specific instructions.  Fasting- a nurse from [facility] will call you 1-2 days before surgery to confirm your procedure and tell you when to stop eating and drinking. If you had you preoperative physical with the Anesthesia Team/[facility], you do not get this call as it is discussed at the time of your visit.  Wash with Hibiclens (can be purchased at any pharmacy) or or you can stop at any [facility] and they will give you a bottle to use  the night before surgery and morning of surgery. See instructions in the Surgery Packet.  If you would like a procedure estimate please call Cost of Care at [phone] during the hours of 8 AM - 3 PM (option #2 for on-line request and option #3 for a representative).   Billing Customer Service:  [phone]    If you have questions please contact Marti RN or Martha RN during regular clinic hours, Monday through Friday 7:30 AM - 4:00 PM, or you can contact us via [employer] at anytime.       If you have urgent needs after-hours, weekends, or holidays please call the hospital at [phone] and ask to speak with our on-call General Surgery Team.    Appointment [phone]  Nurse Advice (Marti or Martha): [phone]   Surgery Scheduler (Umm): [phone]  Billing Customer Service:  [phone]  Fax: [phone]    After Your Muscle Biopsy       Incision care   You may take a shower the day after surgery. Carefully wash your incision with soap and water. Do not submerge yourself in water (bath, whirlpool, hot tub, pool, lake) for 14 days after surgery.   Remove the gauze bandage 1-2 days after surgery, but leave the medical tape (Steri-Strips) or glue in place. These will loosen and fall off on their own 1-2 weeks after surgery.  If a compression wrap (Ace) was used reapply as directed from discharge instructions.  Elevate the leg/arm as directed from discharge instructions.  Always wash your hands before touching your incisions or removing bandages.   It is not unusual to form a collection of fluid or blood under your incision that may feel firm or squishy- it can take several weeks to months for your body to reabsorb it.  At times, it may even drain.  If that should happen keep the area clean with soap, water,  and cover with a clean gauze dressing. You can change this daily or as needed.     Other medicines   Wait to start aspirin or blood thinners until the day after surgery. You can continue your regular medicines at your normal time the day after surgery.   Your pain medicine may cause constipation (hard, dry stools). To help with this, take the stool softener your doctor gave you or an over-the-counter stool softener or laxative. You can stop taking this when you are no longer taking pain medicine and your bowel movements are back to normal.      For pain or discomfort   Take the narcotic pain medicine your doctor gave you as needed and as instructed on the bottle. If you prefer to use over-the-counter medication, use acetaminophen (Tylenol) or ibuprofen (Advil, Motrin) as instructed on the box. Do not take Tylenol if it is in your narcotic pain medication.    Use an ice pack on your surgical cut (incision) for 20 minutes at a time as needed for the first 24 hours. Be sure to protect your skin by putting a cloth between the ice pack and your skin.       Activities   No driving until you feel it s safe to do so. Don t drive while taking narcotic pain medicine.      Diet   You can eat your regular meals after surgery.      Results   You should know any test results about 7-14 business days after surgery from your referring provider.     When to call the doctor   Call your doctor if you have:   A fever above 101 F (38.3 C) (taken under the tongue), or a fever or chills lasting more than a day.   Redness at the incision site.   Any fluid or blood draining from the incision, especially if it smells bad.    Severe pain that doesn t improve with pain medicine.      We will call you 2 to 4 days after surgery to review this handout, answer questions and help arrange after-surgery care. If you have questions or concerns, please call [phone] during regular office hours. If you need to call after business hours, call [phone] and ask to page the surgeon on-call.

## 2025-02-06 NOTE — PROGRESS NOTES
Mendez Greene is a 79 year old who is being evaluated via a billable video visit.      How would you like to obtain your AVS? MyChart  If the video visit is dropped, the invitation should be resent by: Text to cell phone: [phone]  Will anyone else be joining your video visit? No  If patient encounters technical issues they should call [phone]    During this virtual visit the patient is located in [place], patient verifies this as the location during the entirety of this visit.     Video-Visit Details  Video Start Time: 7:37 AM    Type of service:  Video Visit    Video End Time:7:43 AM    Originating Location (pt. Location): Home    Distant Location (provider location):  On-site    Platform used for Video Visit: Lamont        I spoke today with Mendez Greene to discuss the requested muscle biopsy.  He has been followed by Neurology and for the past few months he has been worked up for bilateral upper extremity weakness.  Has undergone EMG.  A right triceps biopsy has been requested.    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      artificial saliva (BIOTENE MT) SOLN solution Swish and spit 2 sprays in mouth every hour as needed for dry mouth. 44.3 mL 1     aspirin 81 MG EC tablet Take 81 mg by mouth every evening.      Cholecalciferol (D3 PO) Take 1 tablet by mouth daily. OTC: Unsure of strength.      fluticasone (FLONASE) 50 MCG/ACT nasal spray Spray 1 spray into both nostrils daily. 9.9 mL 0    Jevity 1.5 Mandeep Place 1,422 mLs into G tube daily. Infuse via gravity bag     1.5 cartons 4 feedings per day ( 6 total cartons/day)  Water flush: 120ml before and after each feeding 25850 mL 11    levothyroxine (SYNTHROID/LEVOTHROID) 175 MCG tablet Take 1 tablet (175 mcg) by mouth daily. 30 tablet 0    olopatadine (PATANOL) 0.1 % ophthalmic solution Place 1 drop into both eyes 2 times daily as needed for allergies.      omeprazole (PRILOSEC) 2 mg/mL suspension Place 10 mLs (20 mg) into Feeding Tube every morning (before breakfast). 500 mL 2    order for DME Equipment being ordered: CPAP and supplies. LIFETIME need. 1 each 0    predniSONE (DELTASONE) 10 MG tablet 10 mg/day via feeding tube for 3 days then increase to 20 mg/day for 3 days then increase to 30 mg/day for 3 days then increase to 40mg/day and stay on this dose 120 tablet 2    pyRIDostigmine (MESTINON) 60 MG tablet Take 1 tablet 3 times a day while awake. 90 tablet 1    vitamin B-12 (CYANOCOBALAMIN) 1000 MCG tablet Take 1,000 mcg by mouth daily      melatonin 5 MG tablet Take 1 tablet (5 mg) by mouth or NG Tube at bedtime.       No current facility-administered medications for this visit.       He is right handed.  He denies prior surgery on that arm.  He takes a baby aspirin.    PE:  Alert, pleasant  No resp distress or wheezing  Fluent speech    No relevant imaging to review  I reviewed notes from his Neuro team    A/P:  Right tricep muscle biopsy requested.  We spoke about the procedure and its risks (bleeding, infection, pain, lack of help with diagnosis).  All questions answered.  He would like to proceed.    -To OR for muscle biopsy  -Ok to continue aspirin  -Local MAC (though could go deeper if desired by  anesthesia)

## 2025-02-06 NOTE — PROGRESS NOTES
HPI and Plan:   Mendez Greene is a 79 year old male who presents with history of hypertension, bradycardia remote paroxysmal atrial fibrillation and non-STEMI.  He was hospitalized in November and saw Dr. Saavedra and underwent coronary angiography due to elevated cardiac enzymes.  The angiogram showed mild nonobstructive coronary disease and his echocardiogram showed normal LV function with EF 60 to 65% without regional wall motion.  During that hospitalization he was diagnosed with myasthenia gravis crisis and started on IVIG and steroids.  He has significant bilateral upper extremity weakness and difficulty swallowing and subsequently had a G-tube placed.  He has significantly improved and his symptoms but still has a G-tube.  He is scheduled for a swallow study here in the near future hoping that he will pass this time.  Interestingly neurology has now decided that this is not myasthenia gravis even though he has improved and he has a upper extremity muscle biopsy planned tomorrow.  He was scheduled for post Hospital follow-up in cardiology because of the non-STEMI.  Obviously cannot tolerate beta-blockers or statin with a diagnosis of myasthenia gravis or neuromuscular disorder, and was taken off of these medications.  He tells me he had a remote brief episode of atrial fibrillation maybe 4 years ago but has not had any recurrence and in fact has had sinus bradycardia.  His heart rate would dip into the low 40s when he was hospitalized.  He denies any lightheadedness presyncope or syncopal events.  He denies any chest pain palpitations or difficulty breathing.  On exam cardiovascular tones are regular and slow suggesting a normal sinus rhythm I do not appreciate a murmur gallop or rub.  He does have some mild peripheral edema and we talked about light compression socks or traveler socks for treatment of this  On echocardiogram his RV function is normal and IVC was normal with borderline elevated pulmonary  pressures.  Lower extremity edema is likely dependent edema    Summary    1.  Nonobstructive coronary disease with non-STEMI-he will not tolerate traditional treatment because of his underlying neuromuscular disorder.    2.  Aortic valve disease-she was noted to have moderate aortic regurgitation on echocardiogram, this appears to be asymptomatic.  I would consider repeating an echocardiogram in 1 year for reassessment.    3.  Remote paroxysmal atrial fibrillation-he has not had any recurrence and recent hospitalizations, although his [id number] score is increased indicating anticoagulation I do not see clear evidence of any kind of reoccurrence therefore I would not recommend anticoagulation at this time.    I am happy to see him back at any time, please feel free to contact me with any questions you have in regards to his care       Today's clinic visit entailed:  Review of external notes as documented elsewhere in note  Review of the result(s) of each unique test - echo, angiogram    Provider  Link to MDM Help Grid     The level of medical decision making during this visit was of moderate complexity.    No orders of the defined types were placed in this encounter.    No orders of the defined types were placed in this encounter.    There are no discontinued medications.      No diagnosis found.    CURRENT MEDICATIONS:  Current Outpatient Medications   Medication Sig Dispense Refill    acetaminophen (TYLENOL) 325 MG tablet Take 1-2 tablets (325-650 mg) by mouth every 6 hours as needed for mild pain.      artificial saliva (BIOTENE MT) SOLN solution Swish and spit 2 sprays in mouth every hour as needed for dry mouth. 44.3 mL 1    aspirin 81 MG EC tablet Take 81 mg by mouth every evening.      Cholecalciferol (D3 PO) Take 1 tablet by mouth daily. OTC: Unsure of strength.      fluticasone (FLONASE) 50 MCG/ACT nasal spray Spray 1 spray into both nostrils daily. 9.9 mL 0    Jevity 1.5 Mandeep Place 1,422 mLs into G tube  daily. Infuse via gravity bag     1.5 cartons 4 feedings per day ( 6 total cartons/day)  Water flush: 120ml before and after each feeding 52799 mL 11    levothyroxine (SYNTHROID/LEVOTHROID) 175 MCG tablet Take 1 tablet (175 mcg) by mouth daily. 30 tablet 0    olopatadine (PATANOL) 0.1 % ophthalmic solution Place 1 drop into both eyes 2 times daily as needed for allergies.      omeprazole (PRILOSEC) 2 mg/mL suspension Place 10 mLs (20 mg) into Feeding Tube every morning (before breakfast). 500 mL 2    order for DME Equipment being ordered: CPAP and supplies. LIFETIME need. 1 each 0    predniSONE (DELTASONE) 10 MG tablet 10 mg/day via feeding tube for 3 days then increase to 20 mg/day for 3 days then increase to 30 mg/day for 3 days then increase to 40mg/day and stay on this dose 120 tablet 2    pyRIDostigmine (MESTINON) 60 MG tablet Take 1 tablet 3 times a day while awake. 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, stage pT1, carcinoma in situ, stage Tis, N0, M0 s/p cystoprostatectomy with ileal neobladder formation    Pure hypercholesterolemia        PAST SURGICAL HISTORY:  Past Surgical History:   Procedure Laterality Date    ARTHROPLASTY KNEE Left 08/23/2022    Procedure: LEFT TOTAL KNEE ARTHROPLASTY;  Surgeon: Melba Dial MD;  Location: [facility] OR    ARTHROPLASTY REVISION KNEE Left 09/05/2023    Procedure: REVISION  TOTAL KNEE ARTHROPLASTY PATELLA;  Surgeon: Melba Dial MD;  Location:  OR    BUNIONECTOMY Left     BUNIONECTOMY DIANA  11/28/2011    RT-Procedure:BUNIONECTOMY DIANA; Right Foot Dwaine Selfridge Bunionectomy with Metatarsal and Phalanx Osteotomy with 1st Metatarsal Phalangeal Joint Repair Right Foot; Surgeon:BAKARI ZAIDI; Location:[facility]    CATARACT EXTRACTION, BILATERAL      CV CORONARY ANGIOGRAM N/A 11/11/2024    Procedure: Coronary Angiogram;  Surgeon: Krystian Machuca MD;  Location:  [facility] CATH [facility]    CV HEART CATHETERIZATION WITH POSSIBLE INTERVENTION N/A 11/07/2020    Procedure: Heart Catheterization with Possible Intervention;  Surgeon: Rishi Llanes MD;  Location:  [facility]ATH [facility]    CV LEFT HEART CATH N/A 11/11/2024    Procedure: Left Heart Catheterization;  Surgeon: Krystian Machuca MD;  Location:  HE[facility] CATH LAB    CYSTECTOMY BLADDER, ILEAL DIVERSION NEOBLADDER, COMBINED  2006    HERNIORRHAPHY INCISIONAL (LOCATION) N/A 08/01/2016    Procedure: HERNIORRHAPHY INCISIONAL (LOCATION);  Surgeon: Onofre Lua MD;  Location: [facility]    IR GASTROSTOMY TUBE PERCUTANEOUS PLCMNT  11/13/2024    OPEN REDUCTION INTERNAL FIXATION PATELLA Left 09/05/2023    Procedure: LEFT KNEE OPEN REDUCTION INTERNAL  FIXATION PATELLA FRACTURE WITH;  Surgeon: Melba Dial MD;  Location:  OR    OPEN REDUCTION INTERNAL FIXATION PATELLA Left 10/10/2023    Procedure: LEFT KNEE REVISION OPEN REDUCTION INTERNAL FIXATION PATELLA WITH POLY REMOVAL, PATELLAR;  Surgeon: Melba Dial MD;  Location:  OR    REMOVE HARDWARE KNEE Left 10/10/2023    Procedure: WITH POLY REMOVAL, PATELLAR;  Surgeon: Melba Dial MD;  Location:  OR       FAMILY HISTORY:  Family History   Problem Relation Age of Onset    Osteoporosis Mother     Neurologic Disorder Mother         PD    Cerebrovascular Disease Mother     Hypertension Mother     Hyperlipidemia Mother     Cerebrovascular  Disease Father     Diabetes Type 1 Sister     Cancer Maternal Grandmother         unknown type    Diabetes Type 2  No family hx of     Myocardial Infarction No family hx of     Prostate Cancer No family hx of     Colon Cancer No family hx of        SOCIAL HISTORY:  Social History     Socioeconomic History    Marital status:      Spouse name: Alana    Number of children: 2    Years of education: 19    Highest education level: None   Occupational History    Occupation: Semi-retired    Tobacco Use    Smoking status: Former     Types: Cigarettes     Passive exposure: Never (per pt)    Smokeless tobacco: Never    Tobacco comments:     Quit in 1987; smoked for 20 years; 1.5 ppd at his most.    Vaping Use    Vaping status: Never Used   Substance and Sexual Activity    Alcohol use: Not Currently    Drug use: No    Sexual activity: Not Currently   Other Topics Concern    Parent/sibling w/ CABG, MI or angioplasty before 65F 55M? No   Social History Narrative    .    2 adult children.    2 grandchildren.    Left knee surgeries limit mobility.     Social Drivers of Health     Financial Resource Strain: Low Risk  (12/11/2024)    Financial Resource Strain     Within the past 12 months, have you or your family members you live with been unable to get utilities (heat, electricity) when it was really needed?: No   Food Insecurity: Low Risk  (12/11/2024)    Food Insecurity     Within the past 12 months, did you worry that your food would run out before you got money to buy more?: No     Within the past 12 months, did the food you bought just not last and you didn t have money to get more?: No   Transportation Needs: Low Risk  (12/11/2024)    Transportation Needs     Within the past 12 months, has lack of transportation kept you from medical appointments, getting your medicines, non-medical meetings or appointments, work, or from getting things that you need?: No   Physical Activity: Sufficiently Active  "(7/8/2024)    Exercise Vital Sign     Days of Exercise per Week: 3 days     Minutes of Exercise per Session: 60 min   Stress: No Stress Concern Present (7/8/2024)    [employer] - Occupational Stress Questionnaire     Feeling of Stress : Only a little   Social Connections: Unknown (7/8/2024)    Social Connection and Isolation Panel [NHANES]     Frequency of Social Gatherings with Friends and Family: Twice a week   Interpersonal Safety: Low Risk  (12/11/2024)    Interpersonal Safety     Do you feel physically and emotionally safe where you currently live?: Yes     Within the past 12 months, have you been hit, slapped, kicked or otherwise physically hurt by someone?: No     Within the past 12 months, have you been humiliated or emotionally abused in other ways by your partner or ex-partner?: No   Housing Stability: Low Risk  (12/11/2024)    Housing Stability     Do you have housing? : Yes     Are you worried about losing your housing?: No       Review of Systems:  Skin:        Eyes:       ENT:       Respiratory:  Positive for shortness of breath  Cardiovascular:    Positive for, fatigue, edema  Gastroenterology:      Genitourinary:       Musculoskeletal:       Neurologic:       Psychiatric:       Heme/Lymph/Imm:       Endocrine:         Physical Exam:  Vitals: BP (!) 142/70 (BP Location: Right arm, Patient Position: Sitting, Cuff Size: Adult Regular)   Pulse 56   Ht 1.778 m (5' 10\")   Wt 79.5 kg (175 lb 4.8 oz)   SpO2 99%   BMI 25.15 kg/m      Constitutional:           Skin:             Head:           Eyes:           Lymph:      ENT:           Neck:           Respiratory:            Cardiac:                                                           GI:           Extremities and Muscular Skeletal:                 Neurological:           Psych:           Recent Lab Results:  LIPID RESULTS:  Lab Results   Component Value Date    CHOL 178 07/10/2024    CHOL 145 11/07/2020    HDL 73 " 07/10/2024    HDL 73 11/07/2020    LDL 93 07/10/2024    LDL 60 11/07/2020    TRIG 60 07/10/2024    TRIG 59 11/07/2020    CHOLHDLRATIO 2.7 12/10/2014       LIVER ENZYME RESULTS:  Lab Results   Component Value Date    AST 26 01/16/2025    AST 18 08/04/2020    ALT 22 01/16/2025    ALT 20 08/04/2020       CBC RESULTS:  Lab Results   Component Value Date    WBC 6.2 01/16/2025    WBC 10.2 11/06/2020    RBC 2.84 (L) 01/16/2025    RBC 4.30 (L) 11/06/2020    HGB 10.2 (L) 01/16/2025    HGB 14.7 11/06/2020    HCT 31.2 (L) 01/16/2025    HCT 43.0 11/06/2020     (H) 01/16/2025     11/06/2020    MCH 35.9 (H) 01/16/2025    MCH 34.2 (H) 11/06/2020    MCHC 32.7 01/16/2025    MCHC 34.2 11/06/2020    RDW 14.6 01/16/2025    RDW 13.5 11/06/2020     01/16/2025     11/06/2020       BMP RESULTS:  Lab Results   Component Value Date     01/16/2025     11/07/2020    POTASSIUM 5.0 01/16/2025    POTASSIUM 4.4 08/10/2022    POTASSIUM 4.2 11/07/2020    CHLORIDE 102 01/16/2025    CHLORIDE 100 08/10/2022    CHLORIDE 109 11/07/2020    CO2 28 01/16/2025    CO2 25 08/10/2022    CO2 26 11/07/2020    ANIONGAP 7 01/16/2025    ANIONGAP 8 08/10/2022    ANIONGAP 2 (L) 11/07/2020     (H) 01/16/2025     (H) 12/20/2024    GLC 99 08/10/2022    GLC 94 11/07/2020    BUN 29.1 (H) 01/16/2025    BUN 13 08/10/2022    BUN 13 11/07/2020    CR 0.63 (L) 01/16/2025    CR 0.78 11/07/2020    GFRESTIMATED >90 01/16/2025    GFRESTIMATED >60 12/06/2022    GFRESTIMATED 88 11/07/2020    GFRESTBLACK >90 11/07/2020    SIMONA 9.2 01/16/2025    SIMONA 8.4 (L) 11/07/2020        A1C RESULTS:  Lab Results   Component Value Date    A1C 5.0 08/10/2022    A1C 5.1 11/06/2020       INR RESULTS:  Lab Results   Component Value Date    INR 1.32 (H) 12/01/2024    INR 1.23 (H) 11/13/2024           CC  Gladys Vazquez MD  [address] [place] [id number]

## 2025-02-06 NOTE — LETTER
2/6/2025    Gladys Vazquez MD  [address] [facility] [address]    RE: Mendez Greene       Dear Colleague,     I had the pleasure of seeing Mendez Greene in the [facility].  HPI and Plan:   Mendez Greene is a 79 year old male who presents with history of hypertension, bradycardia remote paroxysmal atrial fibrillation and non-STEMI.  He was hospitalized in November and saw Dr. Saavedra and underwent coronary angiography due to elevated cardiac enzymes.  The angiogram showed mild nonobstructive coronary disease and his echocardiogram showed normal LV function with EF 60 to 65% without regional wall motion.  During that hospitalization he was diagnosed with myasthenia gravis crisis and started on IVIG and steroids.  He has significant bilateral upper extremity weakness and difficulty swallowing and subsequently had a G-tube placed.  He has significantly improved and his symptoms but still has a G-tube.  He is scheduled for a swallow study here in the near future hoping that he will pass this time.  Interestingly neurology has now decided that this is not myasthenia gravis even though he has improved and he has a upper extremity muscle biopsy planned tomorrow.  He was scheduled for post Hospital follow-up in cardiology because of the non-STEMI.  Obviously cannot tolerate beta-blockers or statin with a diagnosis of myasthenia gravis or neuromuscular disorder, and was taken off of these medications.  He tells me he had a remote brief episode of atrial fibrillation maybe 4 years ago but has not had any recurrence and in fact has had sinus bradycardia.  His heart rate would dip into the low 40s when he was hospitalized.  He denies any lightheadedness presyncope or syncopal events.  He denies any chest pain palpitations or difficulty breathing.  On exam cardiovascular tones are regular and slow suggesting a normal sinus rhythm I do not appreciate a murmur gallop or rub.  He does have some mild  peripheral edema and we talked about light compression socks or traveler socks for treatment of this  On echocardiogram his RV function is normal and IVC was normal with borderline elevated pulmonary pressures.  Lower extremity edema is likely dependent edema    Summary    1.  Nonobstructive coronary disease with non-STEMI-he will not tolerate traditional treatment because of his underlying neuromuscular disorder.    2.  Aortic valve disease-she was noted to have moderate aortic regurgitation on echocardiogram, this appears to be asymptomatic.  I would consider repeating an echocardiogram in 1 year for reassessment.    3.  Remote paroxysmal atrial fibrillation-he has not had any recurrence and recent hospitalizations, although his [id number] score is increased indicating anticoagulation I do not see clear evidence of any kind of reoccurrence therefore I would not recommend anticoagulation at this time.    I am happy to see him back at any time, please feel free to contact me with any questions you have in regards to his care       Today's clinic visit entailed:  Review of external notes as documented elsewhere in note  Review of the result(s) of each unique test - echo, angiogram    Provider  Link to [facility] Help Grid     The level of medical decision making during this visit was of moderate complexity.    No orders of the defined types were placed in this encounter.    No orders of the defined types were placed in this encounter.    There are no discontinued medications.      No diagnosis found.    CURRENT MEDICATIONS:  Current Outpatient Medications   Medication Sig Dispense Refill     acetaminophen (TYLENOL) 325 MG tablet Take 1-2 tablets (325-650 mg) by mouth every 6 hours as needed for mild pain.       artificial saliva (BIOTENE MT) SOLN solution Swish and spit 2 sprays in mouth every hour as needed for dry mouth. 44.3 mL 1     aspirin 81 MG EC tablet Take 81 mg by mouth every evening.       Cholecalciferol (D3  PO) Take 1 tablet by mouth daily. OTC: Unsure of strength.       fluticasone (FLONASE) 50 MCG/ACT nasal spray Spray 1 spray into both nostrils daily. 9.9 mL 0     Jevity 1.5 Mandeep Place 1,422 mLs into G tube daily. Infuse via gravity bag     1.5 cartons 4 feedings per day ( 6 total cartons/day)  Water flush: 120ml before and after each feeding 99838 mL 11     levothyroxine (SYNTHROID/LEVOTHROID) 175 MCG tablet Take 1 tablet (175 mcg) by mouth daily. 30 tablet 0     olopatadine (PATANOL) 0.1 % ophthalmic solution Place 1 drop into both eyes 2 times daily as needed for allergies.       omeprazole (PRILOSEC) 2 mg/mL suspension Place 10 mLs (20 mg) into Feeding Tube every morning (before breakfast). 500 mL 2     order for DME Equipment being ordered: CPAP and supplies. LIFETIME need. 1 each 0     predniSONE (DELTASONE) 10 MG tablet 10 mg/day via feeding tube for 3 days then increase to 20 mg/day for 3 days then increase to 30 mg/day for 3 days then increase to 40mg/day and stay on this dose 120 tablet 2     pyRIDostigmine (MESTINON) 60 MG tablet Take 1 tablet 3 times a day while awake. 90 tablet 1     vitamin B-12 (CYANOCOBALAMIN) 1000 MCG tablet Take 1,000 mcg by mouth daily         ALLERGIES   No Known Allergies    PAST MEDICAL HISTORY:  Past Medical History:   Diagnosis Date     Aortic insufficiency      Ascending aortic aneurysm      Benign essential hypertension      CAD (coronary artery disease)     non-obstructive     History of basal cell carcinoma      Hypothyroidism      Motor neuron disorder (H)      Myasthenia gravis (H)      CHA on CPAP      PAF (paroxysmal atrial fibrillation) (H)     declines AC; on aspirin     Personal history of malignant neoplasm of bladder 2006    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, stage pT1, carcinoma in situ, stage Tis, N0, M0 s/p cystoprostatectomy with ileal neobladder formation     Pure hypercholesterolemia        PAST SURGICAL HISTORY:  Past Surgical History:    Procedure Laterality Date     ARTHROPLASTY KNEE Left 08/23/2022    Procedure: LEFT TOTAL KNEE ARTHROPLASTY;  Surgeon: Melba Dial MD;  Location:  OR     ARTHROPLASTY REVISION KNEE Left 09/05/2023    Procedure: REVISION TOTAL KNEE ARTHROPLASTY PATELLA;  Surgeon: Melba Dial MD;  Location:  OR     BUNIONECTOMY Left      BUNIONECTOMY DIANA  11/28/2011    RT-Procedure:BUNIONECTOMY DIANA; Right Foot Dwaine Iredell Bunionectomy with Metatarsal and Phalanx Osteotomy with 1st Metatarsal Phalangeal Joint Repair Right Foot; Surgeon:BAKARI ZAIDI; Location:[facility]     CATARACT EXTRACTION, BILATERAL       CV CORONARY ANGIOGRAM N/A 11/11/2024    Procedure: Coronary Angiogram;  Surgeon: Krystian Machuca MD;  Location:  HEART CARDIAC CATH LAB     CV HEART CATHETERIZATION WITH POSSIBLE INTERVENTION N/A 11/07/2020    Procedure: Heart Catheterization with Possible Intervention;  Surgeon: Rishi Llanes MD;  Location:  HEART CARDIAC CATH LAB     CV LEFT HEART CATH N/A 11/11/2024    Procedure: Left Heart Catheterization;  Surgeon: Krystian Machuca MD;  Location:  HEART CARDIAC CATH LAB     CYSTECTOMY BLADDER, ILEAL DIVERSION NEOBLADDER, COMBINED  2006     HERNIORRHAPHY INCISIONAL (LOCATION) N/A 08/01/2016    Procedure: HERNIORRHAPHY INCISIONAL (LOCATION);  Surgeon: Onofre Lua MD;  Location: [facility]     IR GASTROSTOMY TUBE PERCUTANEOUS PLCMNT  11/13/2024     OPEN REDUCTION INTERNAL FIXATION PATELLA Left 09/05/2023    Procedure: LEFT KNEE OPEN REDUCTION INTERNAL  FIXATION PATELLA FRACTURE WITH;  Surgeon: Melba Dial MD;  Location:  OR     OPEN REDUCTION INTERNAL FIXATION PATELLA Left 10/10/2023    Procedure: LEFT KNEE REVISION OPEN REDUCTION INTERNAL FIXATION PATELLA WITH POLY REMOVAL, PATELLAR;  Surgeon: Melba Dial MD;  Location:  OR     REMOVE HARDWARE KNEE Left 10/10/2023    Procedure: WITH POLY REMOVAL, PATELLAR;  Surgeon: Melba Dial MD;  Location:  OR        FAMILY HISTORY:  Family History   Problem Relation Age of Onset     Osteoporosis Mother      Neurologic Disorder Mother         PD     Cerebrovascular Disease Mother      Hypertension Mother      Hyperlipidemia Mother      Cerebrovascular Disease Father      Diabetes Type 1 Sister      Cancer Maternal Grandmother         unknown type     Diabetes Type 2  No family hx of      Myocardial Infarction No family hx of      Prostate Cancer No family hx of      Colon Cancer No family hx of        SOCIAL HISTORY:  Social History     Socioeconomic History     Marital status:      Spouse name: Alana     Number of children: 2     Years of education: 19     Highest education level: None   Occupational History     Occupation: Semi-retired    Tobacco Use     Smoking status: Former     Types: Cigarettes     Passive exposure: Never (per pt)     Smokeless tobacco: Never     Tobacco comments:     Quit in 1987; smoked for 20 years; 1.5 ppd at his most.    Vaping Use     Vaping status: Never Used   Substance and Sexual Activity     Alcohol use: Not Currently     Drug use: No     Sexual activity: Not Currently   Other Topics Concern     Parent/sibling w/ CABG, MI or angioplasty before 65F 55M? No   Social History Narrative    .    2 adult children.    2 grandchildren.    Left knee surgeries limit mobility.     Social Drivers of Health     Financial Resource Strain: Low Risk  (12/11/2024)    Financial Resource Strain      Within the past 12 months, have you or your family members you live with been unable to get utilities (heat, electricity) when it was really needed?: No   Food Insecurity: Low Risk  (12/11/2024)    Food Insecurity      Within the past 12 months, did you worry that your food would run out before you got money to buy more?: No      Within the past 12 months, did the food you bought just not last and you didn t have money to get more?: No   Transportation Needs: Low Risk  (12/11/2024)     "Transportation Needs      Within the past 12 months, has lack of transportation kept you from medical appointments, getting your medicines, non-medical meetings or appointments, work, or from getting things that you need?: No   Physical Activity: Sufficiently Active (7/8/2024)    Exercise Vital Sign      Days of Exercise per Week: 3 days      Minutes of Exercise per Session: 60 min   Stress: No Stress Concern Present (7/8/2024)    [place] [employer] - Occupational Stress Questionnaire      Feeling of Stress : Only a little   Social Connections: Unknown (7/8/2024)    Social Connection and Isolation Panel [NHANES]      Frequency of Social Gatherings with Friends and Family: Twice a week   Interpersonal Safety: Low Risk  (12/11/2024)    Interpersonal Safety      Do you feel physically and emotionally safe where you currently live?: Yes      Within the past 12 months, have you been hit, slapped, kicked or otherwise physically hurt by someone?: No      Within the past 12 months, have you been humiliated or emotionally abused in other ways by your partner or ex-partner?: No   Housing Stability: Low Risk  (12/11/2024)    Housing Stability      Do you have housing? : Yes      Are you worried about losing your housing?: No       Review of Systems:  Skin:        Eyes:       ENT:       Respiratory:  Positive for shortness of breath  Cardiovascular:    Positive for, fatigue, edema  Gastroenterology:      Genitourinary:       Musculoskeletal:       Neurologic:       Psychiatric:       Heme/Lymph/Imm:       Endocrine:         Physical Exam:  Vitals: BP (!) 142/70 (BP Location: Right arm, Patient Position: Sitting, Cuff Size: Adult Regular)   Pulse 56   Ht 1.778 m (5' 10\")   Wt 79.5 kg (175 lb 4.8 oz)   SpO2 99%   BMI 25.15 kg/m      Constitutional:           Skin:             Head:           Eyes:           Lymph:      ENT:           Neck:           Respiratory:            Cardiac:                       "                                     GI:           Extremities and Muscular Skeletal:                 Neurological:           Psych:           Recent Lab Results:  LIPID RESULTS:  Lab Results   Component Value Date    CHOL 178 07/10/2024    CHOL 145 11/07/2020    HDL 73 07/10/2024    HDL 73 11/07/2020    LDL 93 07/10/2024    LDL 60 11/07/2020    TRIG 60 07/10/2024    TRIG 59 11/07/2020    CHOLHDLRATIO 2.7 12/10/2014       LIVER ENZYME RESULTS:  Lab Results   Component Value Date    AST 26 01/16/2025    AST 18 08/04/2020    ALT 22 01/16/2025    ALT 20 08/04/2020       CBC RESULTS:  Lab Results   Component Value Date    WBC 6.2 01/16/2025    WBC 10.2 11/06/2020    RBC 2.84 (L) 01/16/2025    RBC 4.30 (L) 11/06/2020    HGB 10.2 (L) 01/16/2025    HGB 14.7 11/06/2020    HCT 31.2 (L) 01/16/2025    HCT 43.0 11/06/2020     (H) 01/16/2025     11/06/2020    MCH 35.9 (H) 01/16/2025    MCH 34.2 (H) 11/06/2020    MCHC 32.7 01/16/2025    MCHC 34.2 11/06/2020    RDW 14.6 01/16/2025    RDW 13.5 11/06/2020     01/16/2025     11/06/2020       BMP RESULTS:  Lab Results   Component Value Date     01/16/2025     11/07/2020    POTASSIUM 5.0 01/16/2025    POTASSIUM 4.4 08/10/2022    POTASSIUM 4.2 11/07/2020    CHLORIDE 102 01/16/2025    CHLORIDE 100 08/10/2022    CHLORIDE 109 11/07/2020    CO2 28 01/16/2025    CO2 25 08/10/2022    CO2 26 11/07/2020    ANIONGAP 7 01/16/2025    ANIONGAP 8 08/10/2022    ANIONGAP 2 (L) 11/07/2020     (H) 01/16/2025     (H) 12/20/2024    GLC 99 08/10/2022    GLC 94 11/07/2020    BUN 29.1 (H) 01/16/2025    BUN 13 08/10/2022    BUN 13 11/07/2020    CR 0.63 (L) 01/16/2025    CR 0.78 11/07/2020    GFRESTIMATED >90 01/16/2025    GFRESTIMATED >60 12/06/2022    GFRESTIMATED 88 11/07/2020    GFRESTBLACK >90 11/07/2020    SIMONA 9.2 01/16/2025    SIMONA 8.4 (L) 11/07/2020        A1C RESULTS:  Lab Results   Component Value Date    A1C 5.0 08/10/2022    A1C 5.1 11/06/2020        INR RESULTS:  Lab Results   Component Value Date    INR 1.32 (H) 12/01/2024    INR 1.23 (H) 11/13/2024           CC  Gladys Vazquez MD  [address] [place] [id number]                  Thank you for allowing me to participate in the care of your patient.      Sincerely,     Melani Sulilvan [facility]  cc:   Gladys Vazquez MD  [address] [place] [id number]

## 2025-02-06 NOTE — TELEPHONE ENCOUNTER
FUTURE VISIT INFORMATION      SURGERY INFORMATION:  Date: 25  Location: [facility] or  Surgeon:  Sam Barber MD   Anesthesia Type:  mac with local  Procedure: BIOPSY, MUSCLE, RIGHT TRICEPS   Consult: virtual visit 25    RECORDS REQUESTED FROM:       Primary Care Provider: eal    Pertinent Medical History: zoey, cad, paf, ascending aortic aneurysm, aortic insufficiency    Most recent EKG+ Tracin24    Most recent ECHO: 25    Most recent Cardiac Stress Test: 23    Most recent Coronary Angiogram: 24    Most recent PFT's: 23

## 2025-02-06 NOTE — NURSING NOTE
"Chief Complaint   Patient presents with    New Patient     Right triceps muscle biopsy consult       Vitals:    02/06/25 0711   Weight: 77.5 kg (170 lb 12.8 oz)   Height: 1.778 m (5' 10\")       Body mass index is 24.51 kg/m .                          Juanjose Shultz, EMT    "

## 2025-02-06 NOTE — TELEPHONE ENCOUNTER
Called patient to schedule surgery with Dr. Barber    Spoke with: Steven patient    Date of Surgery: 2/14/2025    Approximate surgery arrival time given:  No    Location of surgery: [facility]     Pre-Op H&P: PAC on 2/12/2025 at 2:15pm via video    Pre-op Imaging: Not Applicable    Post-Op Appt Date: Protocol     Post-op Imaging:  n/a     Discussed with patient PAC RN will provide arrival time and instructions for surgery at the time of the appointment: [[facility] only]: Yes    Packet sent out: Yes 02/06/25  via [employer] Message      Additional Comments:  n/a    All patients questions were answered and was instructed to review surgical packet and call back with any questions or concerns.       Umm Sullivan on 2/6/2025 at 12:13 PM

## 2025-02-06 NOTE — TELEPHONE ENCOUNTER
Left voicemail for patient regarding scheduling surgery with Dr. Barber.    Provided contact number to discuss.    P: [phone]    __    Umm Sullivan, on 2/6/2025 at 10:18 AM

## 2025-02-08 DIAGNOSIS — E03.9 HYPOTHYROIDISM, UNSPECIFIED TYPE: ICD-10-CM

## 2025-02-10 ENCOUNTER — PRE VISIT (OUTPATIENT)
Dept: SURGERY | Facility: CLINIC | Age: 80
End: 2025-02-10

## 2025-02-10 ENCOUNTER — HOME INFUSION (OUTPATIENT)
Dept: HOME HEALTH SERVICES | Facility: HOME HEALTH | Age: 80
End: 2025-02-10
Payer: MEDICARE

## 2025-02-10 RX ORDER — LEVOTHYROXINE SODIUM 175 UG/1
175 TABLET ORAL DAILY
Qty: 30 TABLET | Refills: 9 | Status: SHIPPED | OUTPATIENT
Start: 2025-02-10

## 2025-02-10 RX ORDER — LEVOTHYROXINE SODIUM 150 UG/1
TABLET ORAL
Qty: 90 TABLET | Refills: 0 | OUTPATIENT
Start: 2025-02-10

## 2025-02-10 NOTE — TELEPHONE ENCOUNTER
Patient Contact    Attempt # 1    Was call answered?  No.  Left message on voicemail with information to call me back.    Thank you,  Araseli Izquierdo RN

## 2025-02-10 NOTE — TELEPHONE ENCOUNTER
Patient back the clinic. He is currently taking levothyroxine (SYNTHROID/LEVOTHROID) 175 MCG tablet. He believes that was changed in the hospital.     He only has a few pills left.

## 2025-02-10 NOTE — TELEPHONE ENCOUNTER
Clinic RN: Please investigate patient's chart or contact patient if the information cannot be found because  Levothyroxine dose doesn't match chart, and last prescriber was at an acute rehab so possibly dose was changed?

## 2025-02-11 LAB
EJ AB SER QL: NEGATIVE
ENA JO1 AB SER IA-ACNC: <20 UNITS
ENA PM/SCL AB SER-ACNC: <20 UNITS
ENA SS-A 52KD IGG SER IA-ACNC: <20 UNITS
FIBRILLARIN AB SER QL: NEGATIVE
KU AB SER QL: NEGATIVE
MDA5 AB SER LINE BLOT-ACNC: <20 UNITS
MI2 AB SER QL: NEGATIVE
MJ AB SER LINE BLOT-ACNC: <20 UNITS
OJ AB SER QL: NEGATIVE
PL12 AB SER QL: NEGATIVE
PL7 AB SER QL: NEGATIVE
SAE1 IGG SER QL LINE BLOT: <20 UNITS
SRP AB SERPL QL: NEGATIVE
TIF1-GAMMA AB SER LINE BLOT-ACNC: <20 UNITS
U1 SNRNP AB SER IA-ACNC: 36 UNITS
U2 SNRNP AB SER QL: NEGATIVE

## 2025-02-12 ENCOUNTER — VIRTUAL VISIT (OUTPATIENT)
Dept: SURGERY | Facility: CLINIC | Age: 80
End: 2025-02-12
Payer: MEDICARE

## 2025-02-12 ENCOUNTER — PRE VISIT (OUTPATIENT)
Dept: SURGERY | Facility: CLINIC | Age: 80
End: 2025-02-12

## 2025-02-12 ENCOUNTER — TELEPHONE (OUTPATIENT)
Dept: INTERNAL MEDICINE | Facility: CLINIC | Age: 80
End: 2025-02-12

## 2025-02-12 VITALS — HEIGHT: 70 IN | BODY MASS INDEX: 24.37 KG/M2 | WEIGHT: 170.2 LBS

## 2025-02-12 DIAGNOSIS — Z01.818 PRE-OP EVALUATION: Primary | ICD-10-CM

## 2025-02-12 DIAGNOSIS — G12.20 MOTOR NEURON DISORDER (H): ICD-10-CM

## 2025-02-12 ASSESSMENT — LIFESTYLE VARIABLES: TOBACCO_USE: 1

## 2025-02-12 ASSESSMENT — PAIN SCALES - GENERAL: PAINLEVEL_OUTOF10: NO PAIN (0)

## 2025-02-12 ASSESSMENT — ENCOUNTER SYMPTOMS
SEIZURES: 0
DYSRHYTHMIAS: 1

## 2025-02-12 NOTE — TELEPHONE ENCOUNTER
Home Care is calling regarding an established patient with [facility].  Requesting orders from: Gladys Vazquez  OTHER ACTION: Needs provider review  Is this a request for a temporary pause in the home care episode?  No    Orders Requested  Please review and route back to team.     HR was 58 yesterday when provider was there. Parameters to call PCP is below 60. Ok to lower parameters for this patient as he is consistently below 60.  No confusion, no SOB, no dizziness to accompany lower HR.     Charlette Auguste RN

## 2025-02-12 NOTE — TELEPHONE ENCOUNTER
"Called and spoke with Hope to relay provider note below.   She verbalized understanding.     Per 1/22 TE:  \"Per 1/15/25 TE: Okay to notify PCP if HR <50. (No need to contact me if HR is above 50)\"     Thank you,  Araseli Izquierdo RN    "

## 2025-02-12 NOTE — PROGRESS NOTES
Steven is a 79 year old who is being evaluated via a billable video visit.    How would you like to obtain your AVS? MyChart  If the video visit is dropped, the invitation should be resent by: Text to cell phone: [phone]    Subjective   Steven is a 79 year old, presenting for the following health issues:  Pre-Op Exam      HPI         Physical Exam

## 2025-02-12 NOTE — PATIENT INSTRUCTIONS
Preparing for Your Surgery      Name:  Mendez Greene   MRN:  [id number]   :  1945   Today's Date:  2025       Arriving for surgery:  Surgery date:  25  Arrival time:  5.30AM    Please come to:     Please come to:       [facility]    [address] [place]  [address]     The [facility] [place] Patient/Visitor Ramp is at [address]. Patients and visitors who self-park will receive the reduced hospital parking rate. If the Patient /Visitor Ramp is full, please follow the signs to the [employer] car park located at the main hospital entrance.       parking is available (24 hours/ 7 days a week)      Discounted parking pass options are available for patients and visitors. They can be purchased at the [employer] desk at the main hospital entrance.     -    Stop at the security desk and they will direct surgery patients to the Surgery Check in and Family Lounge. [phone]        - If you need directions, a wheelchair or an escort please stop at the Information/security desk in the lobby.     What can I eat or drink?  -  You may eat and drink normally up to 8 hours prior to arrival time. (Until 9.30PM) (This applies to tube feeding as well)  -  You may have clear liquids until 2 hours prior to arrival time. (Until 3.30AM)    Examples of clear liquids:  Water  Clear broth  Juices (apple, white grape, white cranberry  and cider) without pulp  Noncarbonated, powder based beverages  (lemonade and Rei-Aid)  Sodas (Sprite, 7-Up, ginger ale and seltzer)  Coffee or tea (without milk or cream)  Gatorade    -  No Alcohol or cannabis products for at least 24 hours before surgery.     Which medicines can I take?    Hold Multivitamins for 7 days before surgery.  Hold Supplements for 7 days before surgery.  Hold Ibuprofen (Advil, Motrin) for 1 day(s) before surgery--unless otherwise  directed by surgeon.  Hold Naproxen (Aleve) for 4 days before surgery.    -  DO NOT take these medications the day of surgery:  Aspirin    -  PLEASE TAKE these medications the day of surgery:  Tylenol as needed  Flonase nasal spray as needed  Levothyroxine   Eye drops as needed  Prednisone  Pyridostigmine (Mestinon)      How do I prepare myself?  - Please take 2 showers (one the night prior to surgery and one the morning of surgery) using Scrubcare or Hibiclens soap.    Use this soap only from the neck to your toes. Avoid genital area      Leave the soap on your skin for one minute--then rinse thoroughly.      You may use your own shampoo and conditioner. No other hair products.   - Please remove all jewelry and body piercings.  - No lotions, deodorants or fragrance.  - No makeup or fingernail polish.   - Bring your ID and insurance card.    -For patients being admitted to the [facility]  Family members are to take the patient belongings with them and place them in the lockers provided in the [facility] Lounge.  Please limit the items you bring to 1 bag as the lockers are small.      -If you use a CPAP machine, please bring the CPAP machine, tubing, and mask to hospital.    -If you have a Deep Brain Stimulator, Spinal Cord Stimulator, or any Neuro Stimulator device---you must bring the remote control to the hospital.      ALL PATIENTS GOING HOME THE SAME DAY OF SURGERY ARE REQUIRED TO HAVE A RESPONSIBLE ADULT TO DRIVE AND BE IN ATTENDANCE WITH THEM FOR 24 HOURS FOLLOWING SURGERY.    Covid testing policy as of 12/06/2022  Your surgeon will notify and schedule you for a COVID test if one is needed before surgery--please direct any questions or COVID symptoms to your surgeon      Questions or Concerns:    - For any questions regarding the day of surgery or your hospital stay, please contact the Pre Admission Nursing Office at [phone].       - If you have health changes between today and your surgery, please  call your surgeon.       - For questions after surgery, please call your surgeons office.           Current Visitor Guidelines    2 adult visitors for adult patients in the pre op area    If additional visitors come (beyond a patient care attendant or a group home caregiver), the additional visitors will be asked to wait in the main lobby of the hospital    Visiting hours: 8 a.m. to 8:30 p.m.    Patients confirmed or suspected to have symptoms of COVID 19 or flu:     No visitors allowed for adult patients.   Children (under age 18) can have 1 named visitor.     People who are sick or showing symptoms of COVID 19 or flu:    Are not allowed to visit patients--we can only make exceptions in special situations.       Please follow these guidelines for your visit:          Please maintain social distance          Masking is optional--however at times you may be asked to wear a mask for the safety of yourself and others     Clean your hands with alcohol hand . Do this when you arrive at and leave the building and patient room,    And again after you touch your mask or anything in the room.     Go directly to and from the room you are visiting.     Stay in the patient s room during your visit. Limit going to other places in the hospital as much as possible     Leave bags and jackets at home or in the car.     For everyone s health, please don t come and go during your visit. That includes for smoking   during your visit.

## 2025-02-12 NOTE — H&P
Pre-Operative H & P     CC:  Preoperative exam to assess for increased cardiopulmonary risk while undergoing surgery and anesthesia.    Date of Encounter: 2/12/2025  Primary Care Physician:  Gladys Vazquez     Reason for visit:   Encounter Diagnoses   Name Primary?    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    He has been followed by neurology for the past several months due to subacute, progressive, painless, bilateral upper extremity weakness and dysphagia. EMG in early January showed widespread acute and chronic denervation in multiple upper and lower extremity muscles. A repeat EMG a few weeks later was then noted to be normal. A muscle biopsy has been recommended for further evaluation and he is now scheduled as above.     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, stage pT1, carcinoma in situ, stage Tis, N0, M0 s/p cystoprostatectomy with ileal neobladder formation    Pure hypercholesterolemia        Past Surgical History  Past Surgical History:   Procedure Laterality Date    ARTHROPLASTY KNEE Left 08/23/2022    Procedure: LEFT TOTAL KNEE ARTHROPLASTY;  Surgeon: Melba Dial MD;  Location:  OR    ARTHROPLASTY REVISION KNEE Left 09/05/2023    Procedure: REVISION TOTAL KNEE ARTHROPLASTY PATELLA;  Surgeon: Melba Dial MD;  Location:  OR    BUNIONECTOMY Left     BUNIONECTOMY DIANA  11/28/2011    RT-Procedure:BUNIONECTOMY DIANA; Right Foot Dwaine Jewell Bunionectomy with Metatarsal and Phalanx Osteotomy with 1st Metatarsal Phalangeal Joint Repair Right Foot; Surgeon:BAKARI ZAIDI; Location:[facility]    CATARACT EXTRACTION, BILATERAL      CV CORONARY ANGIOGRAM N/A 11/11/2024    Procedure: Coronary Angiogram;  Surgeon: Krystian Machuca MD;  Location:  HEART CARDIAC CATH LAB    CV HEART CATHETERIZATION WITH POSSIBLE INTERVENTION N/A 11/07/2020    Procedure: Heart Catheterization with Possible Intervention;  Surgeon: Rishi Llanes MD;  Location:  HEART CARDIAC CATH LAB    CV LEFT HEART CATH N/A 11/11/2024    Procedure: Left Heart Catheterization;  Surgeon: Krystian Machuca MD;  Location:  HEART CARDIAC CATH LAB    CYSTECTOMY BLADDER, ILEAL DIVERSION NEOBLADDER, COMBINED  2006    HERNIORRHAPHY INCISIONAL (LOCATION) N/A 08/01/2016    Procedure: HERNIORRHAPHY INCISIONAL (LOCATION);  Surgeon: Onofre Lua MD;  Location: [facility]    IR GASTROSTOMY TUBE PERCUTANEOUS PLCMNT  11/13/2024    OPEN REDUCTION INTERNAL FIXATION PATELLA Left 09/05/2023    Procedure: LEFT KNEE OPEN REDUCTION INTERNAL  FIXATION PATELLA FRACTURE WITH;  Surgeon: Melba Dial MD;  Location:  OR    OPEN REDUCTION INTERNAL FIXATION PATELLA Left 10/10/2023    Procedure: LEFT KNEE  REVISION OPEN REDUCTION INTERNAL FIXATION PATELLA WITH POLY REMOVAL, PATELLAR;  Surgeon: Melba Dial MD;  Location: [facility] OR    REMOVE HARDWARE KNEE Left 10/10/2023    Procedure: WITH POLY REMOVAL, PATELLAR;  Surgeon: Melba Dial MD;  Location:  OR       Prior to Admission Medications  Current Outpatient Medications   Medication Sig Dispense Refill    acetaminophen (TYLENOL) 325 MG tablet Take 1-2 tablets (325-650 mg) by mouth every 6 hours as needed for mild pain.      artificial saliva (BIOTENE MT) SOLN solution Swish and spit 2 sprays in mouth every hour as needed for dry mouth. 44.3 mL 1    aspirin 81 MG EC tablet Take 81 mg by mouth every evening.      fluticasone (FLONASE) 50 MCG/ACT nasal spray Spray 1 spray into both nostrils daily. 9.9 mL 0    Jevity 1.5 Mandeep Place 1,422 mLs into G tube daily. Infuse via gravity bag     1.5 cartons 4 feedings per day ( 6 total cartons/day)  Water flush: 120ml before and after each feeding 15379 mL 11    levothyroxine (SYNTHROID/LEVOTHROID) 175 MCG tablet Take 1 tablet (175 mcg) by mouth daily. (Patient taking differently: Take 175 mcg by mouth every morning (before breakfast).) 30 tablet 9    olopatadine (PATANOL) 0.1 % ophthalmic solution Place 1 drop into both eyes 2 times daily as needed for allergies.      omeprazole (PRILOSEC) 2 mg/mL suspension Place 10 mLs (20 mg) into Feeding Tube every morning (before breakfast). 500 mL 2    predniSONE (DELTASONE) 10 MG tablet 10 mg/day via feeding tube for 3 days then increase to 20 mg/day for 3 days then increase to 30 mg/day for 3 days then increase to 40mg/day and stay on this dose (Patient taking differently: Take 4 tablets by mouth every morning. 10 mg/day via feeding tube for 3 days then increase to 20 mg/day for 3 days then increase to 30 mg/day for 3 days then increase to 40mg/day and stay on this dose) 120 tablet 2    pyRIDostigmine (MESTINON) 60 MG tablet Take 1 tablet 3 times a day while awake. (Patient  taking differently: Take 60 mg by mouth 3 times daily. Take 1 tablet 3 times a day while awake.) 90 tablet 1    Cholecalciferol (D3 PO) Take 1 tablet by mouth daily. OTC: Unsure of strength. (Patient not taking: Reported on 2/12/2025)      order for DME Equipment being ordered: CPAP and supplies. LIFETIME need. 1 each 0    vitamin B-12 (CYANOCOBALAMIN) 1000 MCG tablet Take 1,000 mcg by mouth daily (Patient not taking: Reported on 2/12/2025)         Allergies  No Known Allergies    Social History  Social History     Socioeconomic History    Marital status:      Spouse name: Alana    Number of children: 2    Years of education: 19    Highest education level: Not on file   Occupational History    Occupation: Semi-retired    Tobacco Use    Smoking status: Former     Types: Cigarettes     Passive exposure: Never (per pt)    Smokeless tobacco: Never    Tobacco comments:     Quit in 1987; smoked for 20 years; 1.5 ppd at his most.    Vaping Use    Vaping status: Never Used   Substance and Sexual Activity    Alcohol use: Not Currently    Drug use: No    Sexual activity: Not Currently   Other Topics Concern    Parent/sibling w/ CABG, MI or angioplasty before 65F 55M? No   Social History Narrative    .    2 adult children.    2 grandchildren.    Left knee surgeries limit mobility.     Social Drivers of Health     Financial Resource Strain: Low Risk  (12/11/2024)    Financial Resource Strain     Within the past 12 months, have you or your family members you live with been unable to get utilities (heat, electricity) when it was really needed?: No   Food Insecurity: Low Risk  (12/11/2024)    Food Insecurity     Within the past 12 months, did you worry that your food would run out before you got money to buy more?: No     Within the past 12 months, did the food you bought just not last and you didn t have money to get more?: No   Transportation Needs: Low Risk  (12/11/2024)    Transportation Needs      Within the past 12 months, has lack of transportation kept you from medical appointments, getting your medicines, non-medical meetings or appointments, work, or from getting things that you need?: No   Physical Activity: Sufficiently Active (7/8/2024)    Exercise Vital Sign     Days of Exercise per Week: 3 days     Minutes of Exercise per Session: 60 min   Stress: No Stress Concern Present (7/8/2024)    [employer] [place] [employer] - Occupational Stress Questionnaire     Feeling of Stress : Only a little   Social Connections: Unknown (7/8/2024)    Social Connection and Isolation Panel [NHANES]     Frequency of Communication with Friends and Family: Not on file     Frequency of Social Gatherings with Friends and Family: Twice a week     Attends Sikh Services: Not on file     Active Member of Clubs or Organizations: Not on file     Attends Club or Organization Meetings: Not on file     Marital Status: Not on file   Interpersonal Safety: Low Risk  (12/11/2024)    Interpersonal Safety     Do you feel physically and emotionally safe where you currently live?: Yes     Within the past 12 months, have you been hit, slapped, kicked or otherwise physically hurt by someone?: No     Within the past 12 months, have you been humiliated or emotionally abused in other ways by your partner or ex-partner?: No   Housing Stability: Low Risk  (12/11/2024)    Housing Stability     Do you have housing? : Yes     Are you worried about losing your housing?: No       Family History  Family History   Problem Relation Age of Onset    Osteoporosis Mother     Neurologic Disorder Mother         PD    Cerebrovascular Disease Mother     Hypertension Mother     Hyperlipidemia Mother     Cerebrovascular Disease Father     Diabetes Type 1 Sister     Cancer Maternal Grandmother         unknown type    Diabetes Type 2  No family hx of     Myocardial Infarction No family hx of     Prostate Cancer No family hx of     Colon Cancer No  family hx of     Anesthesia Reaction No family hx of     Venous thrombosis No family hx of        Review of Systems  The complete review of systems is negative other than noted in the HPI or here.   Anesthesia Evaluation   Pt has had prior anesthetic.     No history of anesthetic complications       ROS/MED HX  ENT/Pulmonary: Comment: Respiratory failure requiring intubation in 11/2024    (+) sleep apnea, uses CPAP,              tobacco use, Past use,                    (-) asthma and recent URI   Neurologic: Comment: Motor neuron disorder - symptoms improving except in his hands   (-) no seizures, no CVA and no TIA   Cardiovascular: Comment: Aortic aneurysm    (+) Dyslipidemia hypertension- -  CAD -  - -                        dysrhythmias (bradycardia), a-fib and Other,  valvular problems/murmurs  aortic regurgitation.    Previous cardiac testing   Echo: Date: 12/1/24 Results:  Interpretation Summary     Limited echo. Patient intubated and sedated.  Echo is quite technically difficult due to poor acoustic windows and irregular  rhythm.     There is grossly normal left and right ventricular function.  There are no severe valvular regurgitation appreciated. Aortic regurgitation  is probably mild.  There is no pericardial effusion.    Stress Test:  Date: Results:    ECG Reviewed:  Date: 12/4/24 Results:  Sinus bradycardia   Abnormal ECG   When compared with ECG of 04-Dec-2024 13:04, (unconfirmed)   No significant change was found     Cath:  Date: 11/11/2024 Results:  Mild coronary artery disease    METS/Exercise Tolerance:  Comment: METS < 4. Limited due to motor neuron disease, able to walk a couple blocks with a walker or cane.    Hematologic:     (+)      anemia,       (-) history of blood clots and history of blood transfusion   Musculoskeletal: Comment: S/p left and right knee surgeries      GI/Hepatic: Comment: Dysphagia s/p G-tube   (-) GERD and liver disease   Renal/Genitourinary:  - neg Renal ROS     Endo:      (+)          thyroid problem, hypothyroidism, Chronic steroid usage for Other. Date most recently used: daily.     (-) Type II DM   Psychiatric/Substance Use:  - neg psychiatric ROS     Infectious Disease:  - neg infectious disease ROS     Malignancy:   (+) Malignancy, History of Other.Other CA Bladder cancer s/p neobladder surgery Remission status post Surgery.    Other:  - neg other ROS          Virtual visit -  No vitals were obtained    Physical Exam  Constitutional: Pleasant, no apparent distress, and appears stated age.  Eyes: Pupils equal  HENT: Normocephalic and atraumatic  Respiratory: Non labored breathing on room air  Neurologic: Awake, alert, oriented to name, place and time.   Neuropsychiatric: Calm, cooperative. Normal affect.        Prior Labs/Diagnostic Studies   All labs and imaging personally reviewed     EKG/ stress test - if available please see in ROS above   Echo result w/o MOPS: Interpretation Summary Limited echo. Patient intubated and sedated.Echo is quite technically difficult due to poor acoustic windows and irregularrhythm. There is grossly normal left and right ventricular function.There are no severe valvular regurgitation appreciated. Aortic regurgitationis probably mild.There is no pericardial effusion.        Latest Ref Rng & Units 1/18/2023     9:51 AM   PFT   FVC L 3.00    FEV1 L 2.24    FVC% % 79    FEV1% % 79          The patient's records and results personally reviewed by this provider.     Outside records reviewed from: Care Everywhere      Assessment  Mendez Greene is a 79 year old male seen as a PAC referral for risk assessment and optimization for anesthesia.    Plan/Recommendations  Pt will be optimized for the proposed procedure.  See below for details on the assessment, risk, and preoperative recommendations    NEUROLOGY  - No history of TIA, CVA or seizure  - Motor neuron disorder  Hospitalized in November and diagnosed with myasthenia gravis crisis, started on IVIG  and steroids.   Following with neurology and has seen improvement in symptoms. Continues to have limited function of hands however. Uncertain now if symptoms are related to myasthenia gravis vs inflammatory myositis  Above surgery planned for further evaluation  Continue prednisone and pyridostigmine    -Post Op delirium risk factors:  Age    ENT  - No current airway concerns.  Will need to be reassessed day of surgery.  Mallampati: Unable to assess  TM: Unable to assess  Intubated in the ED 11/30/24 for acute respiratory failure. Otherwise has had LMA in the past.     CARDIAC  - HTN, HLD, bradycardia, history of paroxysmal atrial fibrillation, NSTEMI, non-obstructive CAD  Last cardiology evaluation 2/6/25  Angiogram in November showed mild non-obstructive CAD  Echo with normal LV function, EF 60-65%  Traditional medical management has been deferred due to underlying neuromuscular disorder  - Aortic valve disease  Moderate aortic regurgitation  Cardiology recommended repeat echo in 1 year    - METS (Metabolic Equivalents)  Patient CANNOT perform 4 METS exercise without symptoms             Total Score: 1    Functional Capacity: Unable to complete 4 METS      RCRI-Low risk: Class 2 0.9% complication rate             Total Score: 1    RCRI: CAD        PULMONARY  - Obstructive Sleep Apnea  CHA with home CPAP.  Patient will be instructed to bring their home CPAP device to the hospital with them.    - Denies asthma or inhaler use  - Acute respiratory failure  Intubated 11/30-12/3  Felt to be secondary to urosepsis (UTI associated with snowden catheter), treated with IV antibiotics  Denies respiratory concerns today    - Tobacco History    History   Smoking Status    Former    Types: Cigarettes   Smokeless Tobacco    Never       GI  - Dysphagia  S/p G-tube  Used for all nutrition and meds  Has swallow evaluation planned for later this month    PONV Low Risk  Total Score: 1           1 AN PONV: Patient is not a current  "smoker        /RENAL  - History of bladder cancer  S/p neobladder  - Urosepsis  See admission 11/30 for details  Denies urinary concerns today  - Baseline Creatinine  0.53-0.63    ENDOCRINE    - BMI: Estimated body mass index is 24.42 kg/m  as calculated from the following:    Height as of this encounter: 1.778 m (5' 10\").    Weight as of this encounter: 77.2 kg (170 lb 3.2 oz).  Healthy Weight (BMI 18.5-24.9)  - No history of Diabetes Mellitus  - Thyroid disorder  Continue home replacement while hospitalized.    HEME  VTE Low Risk 0.5%             Total Score: 3    VTE: Greater than 59 yrs old    VTE: Male      - Platelet dysfunction secondary to Aspirin (Irvin, many others)  Hold day of surgery for low bleed risk procedure  - Chronic anemia  Baseline Hgb: ~8-10  Recommend perioperative use of blood conservation techniques intraoperatively and close monitoring for postoperative bleeding.      MSK  Patient is NOT Frail             Total Score: 0      - S/p bilateral knee surgery  Recommend consideration for careful positioning to limit patient discomfort.    Different anesthesia methods/types have been discussed with the patient, but they are aware that the final plan will be decided by the assigned anesthesia provider on the date of service.    The patient is optimized for their procedure. AVS with information on surgery time/arrival time, meds and NPO status given by nursing staff. No further diagnostic testing indicated.    Please refer to the physical examination documented by the anesthesiologist in the anesthesia record on the day of surgery.    Video-Visit Details    Type of service:  Video Visit    Provider received verbal consent for a Video Visit from the patient? Yes   Video Start Time: 2:02 PM  Video End Time: 2:16 PM    Originating Location (pt. Location): Home    Distant Location (provider location):  Off-site  Mode of Communication:  Video Conference via [employer]  On the day of service:     Prep time: " 15 minutes  Visit time: 14 minutes  Documentation time: 16 minutes  ------------------------------------------  Total time: 45 minutes      Carol Arias PA-C  Preoperative Assessment Center  [facility]  Phone: [phone]  Fax: [phone]

## 2025-02-14 ENCOUNTER — OFFICE VISIT (OUTPATIENT)
Dept: NEUROLOGY | Facility: CLINIC | Age: 80
End: 2025-02-14

## 2025-02-14 ENCOUNTER — HOSPITAL ENCOUNTER (OUTPATIENT)
Facility: CLINIC | Age: 80
Discharge: HOME | End: 2025-02-14
Attending: SURGERY | Admitting: SURGERY
Payer: MEDICARE

## 2025-02-14 VITALS
SYSTOLIC BLOOD PRESSURE: 133 MMHG | BODY MASS INDEX: 25.22 KG/M2 | WEIGHT: 176.15 LBS | DIASTOLIC BLOOD PRESSURE: 58 MMHG | RESPIRATION RATE: 18 BRPM | OXYGEN SATURATION: 99 % | TEMPERATURE: 97.8 F | HEART RATE: 45 BPM | HEIGHT: 70 IN

## 2025-02-14 DIAGNOSIS — G72.49 INFLAMMATORY MYOPATHY: Primary | ICD-10-CM

## 2025-02-14 LAB
Lab: NORMAL
PERFORMING LABORATORY: NORMAL
SPECIMEN STATUS: NORMAL
TEST NAME: NORMAL

## 2025-02-14 PROCEDURE — 88319 ENZYME HISTOCHEMISTRY: CPT | Performed by: SURGERY

## 2025-02-14 PROCEDURE — 710N000012 HC RECOVERY PHASE 2, PER MINUTE: Performed by: SURGERY

## 2025-02-14 PROCEDURE — 88305 TISSUE EXAM BY PATHOLOGIST: CPT | Performed by: SURGERY

## 2025-02-14 PROCEDURE — 250N000009 HC RX 250: Performed by: SURGERY

## 2025-02-14 PROCEDURE — 272N000001 HC OR GENERAL SUPPLY STERILE: Performed by: SURGERY

## 2025-02-14 PROCEDURE — 999N000141 HC STATISTIC PRE-PROCEDURE NURSING ASSESSMENT: Performed by: SURGERY

## 2025-02-14 PROCEDURE — 20205 DEEP MUSCLE BIOPSY: CPT | Mod: RT | Performed by: SURGERY

## 2025-02-14 PROCEDURE — 272N000004 HC RX 272: Performed by: SURGERY

## 2025-02-14 PROCEDURE — 370N000017 HC ANESTHESIA TECHNICAL FEE, PER MIN: Performed by: SURGERY

## 2025-02-14 PROCEDURE — 88313 SPECIAL STAINS GROUP 2: CPT | Performed by: SURGERY

## 2025-02-14 PROCEDURE — 250N000011 HC RX IP 250 OP 636: Performed by: SURGERY

## 2025-02-14 PROCEDURE — 360N000075 HC SURGERY LEVEL 2, PER MIN: Performed by: SURGERY

## 2025-02-14 RX ORDER — ONDANSETRON 2 MG/ML
4 INJECTION INTRAMUSCULAR; INTRAVENOUS EVERY 30 MIN PRN
Status: DISCONTINUED | OUTPATIENT
Start: 2025-02-14 | End: 2025-02-14 | Stop reason: HOSPADM

## 2025-02-14 RX ORDER — SODIUM CHLORIDE, SODIUM LACTATE, POTASSIUM CHLORIDE, CALCIUM CHLORIDE 600; 310; 30; 20 MG/100ML; MG/100ML; MG/100ML; MG/100ML
INJECTION, SOLUTION INTRAVENOUS CONTINUOUS
Status: CANCELLED | OUTPATIENT
Start: 2025-02-14

## 2025-02-14 RX ORDER — LIDOCAINE 40 MG/G
CREAM TOPICAL
Status: DISCONTINUED | OUTPATIENT
Start: 2025-02-14 | End: 2025-02-14 | Stop reason: HOSPADM

## 2025-02-14 RX ORDER — ONDANSETRON 4 MG/1
4 TABLET, ORALLY DISINTEGRATING ORAL EVERY 30 MIN PRN
Status: DISCONTINUED | OUTPATIENT
Start: 2025-02-14 | End: 2025-02-14 | Stop reason: HOSPADM

## 2025-02-14 RX ORDER — CEFAZOLIN SODIUM/WATER 2 G/20 ML
2 SYRINGE (ML) INTRAVENOUS
Status: DISCONTINUED | OUTPATIENT
Start: 2025-02-14 | End: 2025-02-14 | Stop reason: HOSPADM

## 2025-02-14 RX ORDER — ONDANSETRON 2 MG/ML
4 INJECTION INTRAMUSCULAR; INTRAVENOUS EVERY 30 MIN PRN
Status: CANCELLED | OUTPATIENT
Start: 2025-02-14

## 2025-02-14 RX ORDER — FENTANYL CITRATE 50 UG/ML
50 INJECTION, SOLUTION INTRAMUSCULAR; INTRAVENOUS EVERY 5 MIN PRN
Status: CANCELLED | OUTPATIENT
Start: 2025-02-14

## 2025-02-14 RX ORDER — HYDROMORPHONE HCL IN WATER/PF 6 MG/30 ML
0.2 PATIENT CONTROLLED ANALGESIA SYRINGE INTRAVENOUS EVERY 5 MIN PRN
Status: CANCELLED | OUTPATIENT
Start: 2025-02-14

## 2025-02-14 RX ORDER — ONDANSETRON 4 MG/1
4 TABLET, ORALLY DISINTEGRATING ORAL EVERY 30 MIN PRN
Status: CANCELLED | OUTPATIENT
Start: 2025-02-14

## 2025-02-14 RX ORDER — DEXAMETHASONE SODIUM PHOSPHATE 4 MG/ML
4 INJECTION, SOLUTION INTRA-ARTICULAR; INTRALESIONAL; INTRAMUSCULAR; INTRAVENOUS; SOFT TISSUE
Status: CANCELLED | OUTPATIENT
Start: 2025-02-14

## 2025-02-14 RX ORDER — HYDROMORPHONE HCL IN WATER/PF 6 MG/30 ML
0.4 PATIENT CONTROLLED ANALGESIA SYRINGE INTRAVENOUS EVERY 5 MIN PRN
Status: CANCELLED | OUTPATIENT
Start: 2025-02-14

## 2025-02-14 RX ORDER — KETOROLAC TROMETHAMINE 30 MG/ML
15 INJECTION, SOLUTION INTRAMUSCULAR; INTRAVENOUS
Status: CANCELLED | OUTPATIENT
Start: 2025-02-14

## 2025-02-14 RX ORDER — OXYCODONE HYDROCHLORIDE 5 MG/1
5 TABLET ORAL
Status: DISCONTINUED | OUTPATIENT
Start: 2025-02-14 | End: 2025-02-14 | Stop reason: HOSPADM

## 2025-02-14 RX ORDER — NALOXONE HYDROCHLORIDE 0.4 MG/ML
0.1 INJECTION, SOLUTION INTRAMUSCULAR; INTRAVENOUS; SUBCUTANEOUS
Status: CANCELLED | OUTPATIENT
Start: 2025-02-14

## 2025-02-14 RX ORDER — CEFAZOLIN SODIUM/WATER 2 G/20 ML
2 SYRINGE (ML) INTRAVENOUS SEE ADMIN INSTRUCTIONS
Status: DISCONTINUED | OUTPATIENT
Start: 2025-02-14 | End: 2025-02-14 | Stop reason: HOSPADM

## 2025-02-14 RX ORDER — FENTANYL CITRATE 50 UG/ML
25 INJECTION, SOLUTION INTRAMUSCULAR; INTRAVENOUS EVERY 5 MIN PRN
Status: CANCELLED | OUTPATIENT
Start: 2025-02-14

## 2025-02-14 RX ORDER — NALOXONE HYDROCHLORIDE 0.4 MG/ML
0.1 INJECTION, SOLUTION INTRAMUSCULAR; INTRAVENOUS; SUBCUTANEOUS
Status: DISCONTINUED | OUTPATIENT
Start: 2025-02-14 | End: 2025-02-14 | Stop reason: HOSPADM

## 2025-02-14 RX ORDER — DEXAMETHASONE SODIUM PHOSPHATE 4 MG/ML
4 INJECTION, SOLUTION INTRA-ARTICULAR; INTRALESIONAL; INTRAMUSCULAR; INTRAVENOUS; SOFT TISSUE
Status: DISCONTINUED | OUTPATIENT
Start: 2025-02-14 | End: 2025-02-14 | Stop reason: HOSPADM

## 2025-02-14 RX ORDER — OXYCODONE HYDROCHLORIDE 10 MG/1
10 TABLET ORAL
Status: DISCONTINUED | OUTPATIENT
Start: 2025-02-14 | End: 2025-02-14 | Stop reason: HOSPADM

## 2025-02-14 ASSESSMENT — ACTIVITIES OF DAILY LIVING (ADL)
ADLS_ACUITY_SCORE: 55
ADLS_ACUITY_SCORE: 62
ADLS_ACUITY_SCORE: 55

## 2025-02-14 NOTE — BRIEF OP NOTE
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  Complications: None.  Implants: * No implants in log *    Agustin Farooq MD  PGY 1

## 2025-02-14 NOTE — OP NOTE
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  The right arm was prepped and draped in sterile fashion and a time out was performed.  Local anesthetic was injected and a 3.5cm incision over the midpoint of the triceps was sharply made.        Soft tissue was dissected out bluntly down to the muscle fascia.  The fascia over the triceps was was opened sharply.      The muscle was bluntly dissected out with a single strip of muscle 4.5cm in length dissected free, with a bulk of 1cm in diameter.   The proximal and distal portions of the muscle were sharply cut.  The muscle bed was packed.    The specimen was sent in three pieces in saline moistened gauze on wet ice, and sent to the muscle lab.    Local anesthetic was injected. Hemostasis was obtained in the muscle with cautery, the site was packed with surgicel gauze.  The fascia was closed with interrupted 2.0 vicryl.     Hemostasis was obtained and additional local anesthetic injected.  The incision was closed in layers with 3.0 vicryl and 4.0 monocryl.  Steri-strips were applied.  Kerlix gauze and an ACE wrap were placed around the site.  Mendez Greene was then brought to the recovery area in stable condition.    I performed the procedure.

## 2025-02-14 NOTE — LETTER
2025       RE: Mendez Greene  [address] [facility] [address]     Dear Colleague,    Thank you for referring your patient, Mendez Greene, to the [facility] NEUROLOGY [facility]. Please see a copy of my visit note below.    [facility] PHYSICIANS MR#: [id number]   NEUROMUSCULAR PATHOLOGY REPORT NAME: Mendez Greene   NEUROMUSCULAR LABORATORY [phone] / [phone]  [address] [place] [address],  [place] [address] : 1945     MUSCLE BIOPSY LIGHT MICROSCOPY REPORT      DATE OF BIOPSY: 2025   DATE OF REPORT: 2025   SPECIMEN NO:    SURGEON: Sunil FU #:    REFERRING PHYSICIAN: Kaylen   CLINICAL INFORMATION:   79 year old with progressive dysphagia and dysarthria in the last year, and painless proximal>distal UE weakness. He had positive acetylcholine receptor binding antibodies and was initially diagnosed with myasthenia gravis. Dysphagia improved after IVIG treatment, but interestingly repetitive nerve stimulation studies were negative, and recent EMG study showed irritable myopathic changes in upper extremity muscles. Query inflammatory myopathy.   RIGHT TRICEPS MUSCLE BIOPSY:   Two pieces of muscle were quick frozen for light microscopy and histochemistry. Muscle was also fixed in 4:1 EM fixative for plastic-embedding. Additional pieces were quick frozen for biochemical testing.   LIGHT MICROSCOPY:   Frozen sections stained with H&E, trichrome, PAS, Oil red O, and Congo red were available for microscopic examination. There was increased fiber-size variation due to the presence of many atrophic that were variably distributed and may have had the propensity to be present on the periphery of individual fascicles. There is significant discrepancy between the fascicles with some having more atrophic fibers with presence of fibrosis. There is increased number of fibers with  internal nuclei. There were scattered inflammatory cells with no overt endomysial inflammation. There was a single inflammatory infiltrate in the perifascicular space and there was no perivascular inflammation. There were no necrotic fibers or significant number of degenerating fibers. Congo red staining revealed no abnormalities. There were no ragged red fibers on the trichrome stain. There was increased perimysial and perifascicular fibrosis which was with heterogeneous distribution with marked presence in some fascicles and essentially absent in the others Glycogen and lipid content appeared normal.   HISTOCHEMISTRY:   Frozen sections stained with ATPase (pH 4.35, 4.5 and 9.4), metachromatic ATPase, NADH, SDH, modified SDH, ALVAREZ, a-GP, acid phosphatase, phosphorylase, and nonspecific esterase were available for review. ATPase staining identified fibers of types 1, 2a, and 2b. There was a normal fiber-type distribution. There was no evidence of fiber-type grouping. Atrophic fibers of all fiber types were seen. Oxidative enzyme stain deposition was irregular in fibers, most notably in most involved fascicles. There were no ragged blue fibers on the modified SDH stains, and no ALVAREZ-deficient fibers. Acid phosphatase activity highlighted the presence of inflammatory cells which were mainly seen in the affected fascicles. Esterase staining identified endplates and atrophic fibers.   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   COMMENT:   The presence of perimysial and perivascicularr inflammatory cells, and heterogeneous fascicular atrophy and fibrosis may provide the histological appearance of inflammatory myopathy with propensity to involve connective tissue as there was no significant endomysial or perivascular inflammation. Non-specific nature of this biopsy may be due to heterogeneous involvement and anti-inflammatory treatment effect. Other systemic connective tissue diseases, such as lupus erythematosus and  mixed connective tissue disorder, can cause a similar histological appearance. Clinical correlation is required. Tissue remains for ultrastructural, biochemical or genetic testing if clinically indicated.   Chuck Mesa MD            Again, thank you for allowing me to participate in the care of your patient.      Sincerely,    Chuck Mesa MD

## 2025-02-14 NOTE — OR NURSING
Assumed care of patient after he arrived from OR.  Patient is awake and alert; denies any pain or nausea at this time.  Ace wrap had been applied to right upper arm; wrap is dry and intact; unable to visualize biopsy site. Report taken from DEON Ramos RN and Lina Ray CRNA.

## 2025-02-14 NOTE — DISCHARGE INSTRUCTIONS
What to Expect at Home  Your Recovery  After muscle biopsy, many people have no side effects. Some people have pain or bruising at the cut (incision) and feel tired. You may be slightly swollen. This may last a few days. You should feel close to normal in a few days. The incision the doctor made usually heals in about 2 weeks. The scar usually fades with time.    This care sheet gives you a general idea about how long it will take for you to recover. But each person recovers at a different pace. Follow the steps below to get better as quickly as possible.  How can you care for yourself at home?  Activity    Rest when you feel tired. Getting enough sleep will help you recover.     Try to walk each day. Start by walking a little more than you did the day before. Bit by bit, increase the amount you walk. Walking boosts blood flow and helps prevent pneumonia and constipation.     You may drive when you are no longer taking pain medicine and you feel up to it.     You can lift things when you feel comfortable doing so.          You may shower 24 hours after surgery.. Pat the incision dry. Do not take a bath for the first 2 weeks     Avoid activity or exercise that may put stress on the cut. This includes washing windows, vacuuming, or gardening with the affected arm.   Diet    You can eat your normal diet. If your stomach is upset, try bland, low-fat foods like plain rice, broiled chicken, toast, and yogurt.     You may notice that your bowels are not regular right after your surgery. This is common. Try to avoid constipation and straining with bowel movements. Take a fiber supplement such as Citrucel or Metamucil every day. If you have not had a bowel movement after a couple of days, take a mild laxative.   Medicines    You can restart your medicines. He or she will also give you instructions about taking any new medicines.     If you stopped taking aspirin or some other blood thinner, your doctor will tell you when to  start taking it again.     Take pain medicines exactly as directed.  If the doctor gave you a prescription medicine for pain, take it as prescribed.  If you are not taking a prescription pain medicine, take an over-the-counter medicine such as acetaminophen (Tylenol), ibuprofen (Advil, Motrin), or naproxen (Aleve). Read and follow all instructions on the label.  Do not take two or more pain medicines at the same time unless the doctor told you to. Many pain medicines have acetaminophen, which is Tylenol. Too much acetaminophen (Tylenol) can be harmful.     If your doctor prescribed antibiotics, take them as directed. Do not stop taking them just because you feel better. You need to take the full course of antibiotics.     If you think your pain medicine is making you sick to your stomach:  Take your medicine after meals (unless your doctor has told you not to).  Ask your doctor for a different pain medicine.   Incision care    If you have strips of tape on the cut (incision) the doctor made, leave the tape on for about 1 week or until it falls off.     After you can shower, wash the area daily with warm, soapy water and pat it dry.   Follow-up care is a key part of your treatment and safety. Be sure to make and go to all appointments, and call your doctor if you are having problems. It's also a good idea to know your test results and keep a list of the medicines you take.  When should you call for help?   Call 911 anytime you think you may need emergency care. For example, call if:    You passed out (lost consciousness).     You have chest pain, are short of breath, or cough up blood.   Call your doctor now or seek immediate medical care if:    You have pain that does not get better after you take pain medicine.     You cannot pass stools or gas.     You are sick to your stomach or cannot drink fluids.     You have signs of a blood clot in your leg (called a deep vein thrombosis), such as:  Pain in your calf, back  "of the knee, thigh, or groin.  Redness or swelling in your leg.     You have signs of infection, such as:  Increased pain, swelling, warmth, or redness.  Red streaks leading from the incision.  Pus draining from the incision.  A fever.     You have loose stitches, or your incision comes open.     Bright red blood has soaked through the bandage over your incision.   Watch closely for changes in your health, and be sure to contact your doctor if:    You have any problems.     You have new or worse swelling or pain in your arm.     To contact a physician:  Call the Hospital Page  at [phone] and ask for the Surgery Resident \"on call\".  This phone number is answered 24 hours a day.    "

## 2025-02-17 ENCOUNTER — HOME INFUSION BILLING (OUTPATIENT)
Dept: HOME HEALTH SERVICES | Facility: HOME HEALTH | Age: 80
End: 2025-02-17
Payer: MEDICARE

## 2025-02-17 ENCOUNTER — PATIENT OUTREACH (OUTPATIENT)
Dept: SURGERY | Facility: CLINIC | Age: 80
End: 2025-02-17
Payer: MEDICARE

## 2025-02-17 PROCEDURE — B4152 EF CALORIE DENSE>/=1.5KCAL: HCPCS

## 2025-02-17 PROCEDURE — B4035 ENTERAL FEED SUPP PUMP PER D: HCPCS

## 2025-02-17 NOTE — PROGRESS NOTES
02/17/25    9:13 AM     Mendez Greene is a patient of Dr. Sam Barber that underwent  RIGHT TRICEPS muscle biopsy  approximately 3 days ago (2/14).  Attempted to contact patient via telephone for a status update and review post-op  teaching.  LM on VM to call office.  Await return call.     Of note:  Pathology:  Pending. Results will be given to the patient by Dr. Abdullahi  Wound:  Steri-strips  Follow-up:  PRN  Restrictions:  - No activity restrictions  New medications:  None (?)  Equipment/Supplies:  None  Diet:  Regular       02/17/25    9:23 AM     Patient returned call and reached the nurse line and LM that he is doing well, incision is C/D/I, no signs of infection, and tolerated the procedure well. Patient stated in his message that he does not desire a return call; however, attempted to reach the patient and LM on VM.     RTC PRN

## 2025-02-18 ENCOUNTER — HOSPITAL ENCOUNTER (OUTPATIENT)
Dept: SPEECH THERAPY | Facility: CLINIC | Age: 80
Discharge: HOME | End: 2025-02-18
Attending: INTERNAL MEDICINE
Payer: MEDICARE

## 2025-02-18 ENCOUNTER — HOSPITAL ENCOUNTER (OUTPATIENT)
Dept: GENERAL RADIOLOGY | Facility: CLINIC | Age: 80
Discharge: HOME | End: 2025-02-18
Attending: INTERNAL MEDICINE
Payer: MEDICARE

## 2025-02-18 DIAGNOSIS — R13.10 DYSPHAGIA, UNSPECIFIED TYPE: ICD-10-CM

## 2025-02-18 PROCEDURE — 74230 X-RAY XM SWLNG FUNCJ C+: CPT

## 2025-02-18 PROCEDURE — B4035 ENTERAL FEED SUPP PUMP PER D: HCPCS

## 2025-02-18 PROCEDURE — 92611 MOTION FLUOROSCOPY/SWALLOW: CPT | Mod: GN

## 2025-02-18 PROCEDURE — A6402 STERILE GAUZE <= 16 SQ IN: HCPCS

## 2025-02-18 RX ORDER — BARIUM SULFATE 400 MG/ML
SUSPENSION ORAL ONCE
Status: COMPLETED | OUTPATIENT
Start: 2025-02-18 | End: 2025-02-18

## 2025-02-18 RX ORDER — BARIUM SULFATE 400 MG/ML
SUSPENSION ORAL ONCE
Status: DISCONTINUED | OUTPATIENT
Start: 2025-02-18 | End: 2025-02-18

## 2025-02-18 RX ADMIN — BARIUM SULFATE 30 ML: 400 SUSPENSION ORAL at 10:59

## 2025-02-18 NOTE — PROGRESS NOTES
SPEECH LANGUAGE PATHOLOGY EVALUATION : OP VIDEO FLUOROSCOPIC SWALLOW STUDY (VFSS)    Subjective        Presenting condition or subjective complaint: swallowing cleanly  Date of onset:      Relevant medical history: Anemia; Bladder or bowel problems; Cancer; Heart problems; High blood pressure; Implanted device; Incontinence; Overweight; Significant weakness; Sleep disorder like apnea; Smoking; Thyroid problems   Dates & types of surgery: 2006 neobladder surgery.  pre 2000 bunion surgery.  hernia surgery.  2/14/2025 muscle biopsy; 10/2023 total knee replacement; 10/2024 two kneecap surgeries;    Prior diagnostic imaging/testing results: Video fluoroscopic swallow study     Prior therapy history for the same diagnosis, illness or injury: Yes continuing--swallow exercises, neck strengthening exercises, coaching    Living Environment  Social support: With a significant other or spouse   Help at home: Self Cares (home health aide/personal care attendant, family, etc); Home management tasks (cooking, cleaning); Medication and/or finances; Home and Yard maintenance tasks; Assist for driving and community activities  Equipment owned: Straight Cane; Walker; Walker with wheels; Crutches; Bedrail; Grab bars; Bath bench     Employment: No    Hobbies/Interests: birding, gardening, photography, hiking, cooking    Patient goals for therapy: eat real food     Objective     SWALLOW EVALUTION  Dysphagia history: Pt with recent hx of dysphagia onset with complicated, ongoing neurology work up. Initially thought to be a diffuse motor neuron disorder such as ALS or MG, however ruled out and now awaiting biopsy results for potential inflammatory myositis. VFSS's occurring while he was IP on 11/12/2024 and 12/9/2024, additionally when at ARU on 12/18/2024. All revealing significant dysphagia/aspiration, NPO recs with PEG TF placed on 11/13/2024. Pt has since been discharged from ARU to home, and has been receiving  SLP: working on  oropharyngeal exercises and initiated free water protocol use. Pt reports good tolerance of this/ no concerns. Stable respiratory status.   Current Diet/Method of Nutritional Intake: NPO, gastrostomy tube (PEG)      VIDEOFLUOROSCOPIC SWALLOW STUDY  Radiologist: Dr. Colon  Views Taken: left lateral   Physical location of procedure: [facility], Radiology Dept, Fluoroscopy Suite    VFSS textures trialed:   VFSS Eval: Thin Liquids  Mode of Presentation: tsp, cup, self-fed   Order of Presentation: 1, 4, 5, 10  Preparatory Phase: poor bolus control  Oral Phase: premature pharyngeal entry  Bolus Location When Swallow Initiated: pyriforms  Pharyngeal Phase: impaired hyolaryngel excursion, impaired epiglottic movement, impaired pharyngoesophageal segment opening, impaired tongue base retraction, residue in valleculae  Rosenbeck's Penetration Aspiration Scale: 8 - contrast passes glottis, visible subglottic residue remains, absent patient response (aspiration)  Response to Aspiration: absent response  Strategies and Compensations: supraglottic swallow strategy  Diagnostic Statement: Deep laryngeal penetration to the vocal cords + SILENT aspiration observed with thin liquids on 2/4 trials 2/2 reduced oral control, spillage and incomplete laryngeal closure. Cued cough is strong and clears glottic/supraglottic residue.     VFSS Eval: Slightly Thick Liquids   Mode of Presentation: cup, self-fed   Order of Presentation: 2, 3, 11, 13  Preparatory Phase: poor bolus control  Oral Phase: premature pharyngeal entry  Bolus Location When Swallow Initiated: valleculae  Pharyngeal Phase: impaired hyolaryngel excursion, impaired epiglottic movement, impaired pharyngoesophageal segment opening, impaired tongue base retraction, residue in valleculae  Rosenbeck's Penetration Aspiration Scale: 1 - no aspiration, contrast does not enter airway  Diagnostic Statement: trace vallecular residue    VFSS Eval:  "Jarrod  Mode of Presentation: spoon, self-fed   Order of Presentation: 6  Preparatory Phase: WFL  Oral Phase: impaired AP movement, lingual pumping  Bolus Location When Swallow Initiated: valleculae  Pharyngeal Phase: impaired hyolaryngel excursion, impaired epiglottic movement, impaired pharyngoesophageal segment opening, impaired tongue base retraction, residue in valleculae  Rosenbeck's Penetration Aspiration Scale: 1 - no aspiration, contrast does not enter airway  Diagnostic Statement: trace vallecular residue    VFSS Eval: Soft & Bite Sized  Mode of Presentation: spoon, self-fed   Order of Presentation: 7  Preparatory Phase: WFL  Oral Phase: impaired AP movement, lingual pumping  Bolus Location When Swallow Initiated: valleculae  Pharyngeal Phase: impaired hyolaryngel excursion, impaired epiglottic movement, impaired pharyngoesophageal segment opening, impaired tongue base retraction, residue in valleculae  Rosenbeck's Penetration Aspiration Scale: 1 - no aspiration, contrast does not enter airway  Diagnostic Statement: trace vallecular residue    VFSS Eval: Solids  Mode of Presentation: self-fed   Order of Presentation: 8, 9, 12  Preparatory Phase: WFL  Oral Phase: impaired AP movement, lingual pumping  Bolus Location When Swallow Initiated: valleculae  Pharyngeal Phase: impaired hyolaryngel excursion, impaired epiglottic movement, impaired pharyngoesophageal segment opening, impaired tongue base retraction, residue in valleculae  Rosenbeck's Penetration Aspiration Scale: 1 - no aspiration, contrast does not enter airway  Diagnostic Statement: Mild-moderate vallecular residue with regular solids, with pt \"hacking\" up residue back to mouth to swallow a second time. Liquid washes reduced remaining residue.     Assessment & Plan   CLINICAL IMPRESSIONS   Medical Diagnosis: Dysphagia, unspecified type (R13.10)    Treatment Diagnosis: Mild-moderate oropharyngeal dysphagia   Impression/Assessment: Video fluoroscopic " "swallow study completed with thin liquids, slightly thick liquids, puree solids, soft/bite sized solids and regular solids. Pt currently presents with mild-moderate oropharyngeal dysphagia  -- significant improvements compared to last study. Labial seal, oral containment, mastication and oral clearance all WFL. Some lingual pumping noted with solids. Reduced oral control with premature bolus spillage to the pyriform sinuses observed with thin liquids; more timely swallow initiation at the valleculae with slightly thick and all solids. The following are ~moderately reduced in ROM: base of tongue retraction, hyolaryngeal elevation/excursion, epiglottic inversion (heavier weight of bolus e.g., thickened + solids improved retroflexion) and pharyngoesophageal segment opening during the swallow.     Trace vallecular residue with all liquids, puree and semi-solids. Mild-moderate vallecular residue with regular solids, with pt \"hacking\" up residue back to mouth to swallow a second time. Liquid washes reduced remaining residue.     Deep laryngeal penetration to the vocal cords + SILENT aspiration observed with thin liquids on 2/4 trials 2/2 reduced oral control, spillage and incomplete laryngeal closure. Cued cough is strong and clears glottic/supraglottic residue. No penetration or aspiration observed with slightly thick liquid or solids.     SWALLOW ASSESSMENT CLINICAL IMPRESSIONS AND RATIONALE  Diet Consistency Recommendations: slightly thick liquids (level 1), soft & bite-sized (level 6)    Recommended Feeding/Eating Techniques: small bolus size, alternate food and liquid intake, supraglottic swallow, monitor for cough or change in vocal quality with intake, maintain upright sitting position for eating, maintain upright posture during/after eating for 30 minutes, minimize distractions during oral intake   Medication Administration Recommendations: try small medications whole with puree + strategies  Instrumental " Assessment Recommendations: VFSS (videofluoroscopic swallowing study) -- consider repeat VFSS in ~4 weeks following ongoing oropharyngeal exercise program, given silent aspiration with thin liquids     PLAN OF CARE  Treatment Interventions: Swallowing dysfunction and/or oral function for feeding -- Continue  SLP for oropharyngeal exercise program, PO tolerance, strategy training and ADAT.    Prognosis to achieve stated therapy goals is excellent   Rehab potential is impacted by: current level of function, family/caregiver support, patient awareness of deficits, patient motivation, prior level of function    Education Assessment:   Learner/Method: Patient;Significant Other;Listening;Reading;Pictures/Video;No Barriers to Learning  Education Comments: Educated pt/wife on results of VFSS directly following the study. Reviewed cine loop recordings, education on oropharyngeal anatomy/physiology, educated on pt specific deficits and overall improvements noted. Educated on ongoing aspiration of thin liquids, potential risks/complications of aspiration. Educated on PO initiation recommendations for consistencies, strategies and oral hygiene -- handouts re: all provided. They verbalized understanding of all education.    Risks and benefits of evaluation/treatment have been explained.   Patient/Family/caregiver agrees with Plan of Care.     Evaluation Time:    SLP Eval: VideoFluoroscopic Swallow function Minutes ([id number]): 27    Signing Clinician: MELINDA Allison      [facility]                                                                                   OUTPATIENT SPEECH LANGUAGE PATHOLOGY                        I CERTIFY THE NEED FOR THESE SERVICES FURNISHED UNDER        THIS PLAN OF TREATMENT AND WHILE UNDER MY CARE     (Physician attestation of this document indicates review and certification of the therapy plan).              Referring Provider:  Gladys Webster  Assessment  See Epic Evaluation-

## 2025-02-19 PROCEDURE — B4035 ENTERAL FEED SUPP PUMP PER D: HCPCS

## 2025-02-20 ENCOUNTER — TELEPHONE (OUTPATIENT)
Dept: INTERNAL MEDICINE | Facility: CLINIC | Age: 80
End: 2025-02-20
Payer: MEDICARE

## 2025-02-20 PROCEDURE — B4035 ENTERAL FEED SUPP PUMP PER D: HCPCS

## 2025-02-20 NOTE — PROGRESS NOTES
[facility] PHYSICIANS MR#: [id number]   NEUROMUSCULAR PATHOLOGY REPORT NAME: Mendez Greene   NEUROMUSCULAR LABORATORY [phone] / [phone]  [address] [facility],  [place] [address] : 1945     MUSCLE BIOPSY LIGHT MICROSCOPY REPORT      DATE OF BIOPSY: 2025   DATE OF REPORT: 2025   SPECIMEN NO:    SURGEON: Sunil   [facility] #:    REFERRING PHYSICIAN: Kaylen   CLINICAL INFORMATION:   79 year old with progressive dysphagia and dysarthria in the last year, and painless proximal>distal UE weakness. He had positive acetylcholine receptor binding antibodies and was initially diagnosed with myasthenia gravis. Dysphagia improved after IVIG treatment, but interestingly repetitive nerve stimulation studies were negative, and recent EMG study showed irritable myopathic changes in upper extremity muscles. Query inflammatory myopathy.   RIGHT TRICEPS MUSCLE BIOPSY:   Two pieces of muscle were quick frozen for light microscopy and histochemistry. Muscle was also fixed in 4:1 EM fixative for plastic-embedding. Additional pieces were quick frozen for biochemical testing.   LIGHT MICROSCOPY:   Frozen sections stained with H&E, trichrome, PAS, Oil red O, and Congo red were available for microscopic examination. There was increased fiber-size variation due to the presence of many atrophic that were variably distributed and may have had the propensity to be present on the periphery of individual fascicles. There is significant discrepancy between the fascicles with some having more atrophic fibers with presence of fibrosis. There is increased number of fibers with internal nuclei. There were scattered inflammatory cells with no overt endomysial inflammation. There was a single inflammatory infiltrate in the perifascicular space and there was no perivascular inflammation. There were no necrotic fibers or significant number of degenerating fibers. Congo red staining revealed no  abnormalities. There were no ragged red fibers on the trichrome stain. There was increased perimysial and perifascicular fibrosis which was with heterogeneous distribution with marked presence in some fascicles and essentially absent in the others Glycogen and lipid content appeared normal.   HISTOCHEMISTRY:   Frozen sections stained with ATPase (pH 4.35, 4.5 and 9.4), metachromatic ATPase, NADH, SDH, modified SDH, ALVAREZ, ?-GP, acid phosphatase, phosphorylase, and nonspecific esterase were available for review. ATPase staining identified fibers of types 1, 2a, and 2b. There was a normal fiber-type distribution. There was no evidence of fiber-type grouping. Atrophic fibers of all fiber types were seen. Oxidative enzyme stain deposition was irregular in fibers, most notably in most involved fascicles. There were no ragged blue fibers on the modified SDH stains, and no ALVAREZ-deficient fibers. Acid phosphatase activity highlighted the presence of inflammatory cells which were mainly seen in the affected fascicles. Esterase staining identified endplates and atrophic fibers.   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   COMMENT:   The presence of perimysial and perivascicularr inflammatory cells, and heterogeneous fascicular atrophy and fibrosis may provide the histological appearance of inflammatory myopathy with propensity to involve connective tissue as there was no significant endomysial or perivascular inflammation. Non-specific nature of this biopsy may be due to heterogeneous involvement and anti-inflammatory treatment effect. Other systemic connective tissue diseases, such as lupus erythematosus and mixed connective tissue disorder, can cause a similar histological appearance. Clinical correlation is required. Tissue remains for ultrastructural, biochemical or genetic testing if clinically indicated.   Chuck Mesa MD

## 2025-02-20 NOTE — TELEPHONE ENCOUNTER
"  Home Care is calling regarding an established patient with [facility].  Requesting orders from: Gladys Vazquez  RN APPROVED: RN able to provide verbal orders.  Home Care will send orders for signature.  RN will close encounter.  Is this a request for a temporary pause in the home care episode?  No    Orders Requested  Wound Monitoring  PT  was there for visit today. Nursing will be out next week. Pt has wound, no dressing changes needed just steri strips on biopsy site. Home care requesting to \"continuing to monitor wound\"    Caller: Hetal  Home Care Agency: [facility]  Phone number Home Care can be reached at: [phone]  Okay to leave a detailed message?: Yes    Tran Kirk RN   "

## 2025-02-21 PROCEDURE — B4035 ENTERAL FEED SUPP PUMP PER D: HCPCS

## 2025-02-22 PROCEDURE — B4035 ENTERAL FEED SUPP PUMP PER D: HCPCS

## 2025-02-23 PROCEDURE — B4035 ENTERAL FEED SUPP PUMP PER D: HCPCS

## 2025-02-24 PROCEDURE — B4035 ENTERAL FEED SUPP PUMP PER D: HCPCS

## 2025-02-25 PROCEDURE — B4035 ENTERAL FEED SUPP PUMP PER D: HCPCS

## 2025-02-26 PROCEDURE — B4035 ENTERAL FEED SUPP PUMP PER D: HCPCS

## 2025-02-27 PROCEDURE — B4035 ENTERAL FEED SUPP PUMP PER D: HCPCS

## 2025-02-28 PROCEDURE — B4035 ENTERAL FEED SUPP PUMP PER D: HCPCS

## 2025-03-01 PROCEDURE — B4035 ENTERAL FEED SUPP PUMP PER D: HCPCS

## 2025-03-02 PROCEDURE — B4035 ENTERAL FEED SUPP PUMP PER D: HCPCS

## 2025-03-03 ENCOUNTER — TELEPHONE (OUTPATIENT)
Dept: INTERNAL MEDICINE | Facility: CLINIC | Age: 80
End: 2025-03-03
Payer: MEDICARE

## 2025-03-03 DIAGNOSIS — G70.01 MYASTHENIC CRISIS (H): ICD-10-CM

## 2025-03-03 PROCEDURE — B4035 ENTERAL FEED SUPP PUMP PER D: HCPCS

## 2025-03-03 RX ORDER — PYRIDOSTIGMINE BROMIDE 60 MG/1
TABLET ORAL
Qty: 90 TABLET | Refills: 2 | Status: SHIPPED | OUTPATIENT
Start: 2025-03-03

## 2025-03-03 NOTE — TELEPHONE ENCOUNTER
Home Care is calling regarding an established patient with [facility].  Requesting orders from: Gladys Vazquez RN APPROVED: RN able to provide verbal orders.  Home Care will send orders for signature.  RN will close encounter.  Is this a request for a temporary pause in the home care episode?  No    Orders Requested    Speech Therapy  Request for continuation of care with no increase or decrease in frequency  Frequency: every other week for 4 weeks  RN gave verbal order: Yes          Caller: MELINDA Olsen  Home Care Agency: [facility]  Phone number Home Care can be reached at: [phone]    Okay to leave a detailed message?: Yes    Charlette Auguste RN

## 2025-03-03 NOTE — TELEPHONE ENCOUNTER
Home Care is calling regarding an established patient with [facility].  Requesting orders from: Gladys Vazquez  RN APPROVED: RN able to provide verbal orders.  Home Care will send orders for signature.  RN will close encounter.  Is this a request for a temporary pause in the home care episode?  No    Orders Requested    Occupational Therapy  Request for continuation of care with no increase or decrease in frequency  Frequency: 1x/2wk for 8wk  RN gave verbal order: Yes        Caller: Sharlene ANGEL  Home Care Agency: [facility]  Phone number Home Care can be reached at: [phone]  Okay to leave a detailed message?: Yes    Vannessa Gallagher RN

## 2025-03-03 NOTE — TELEPHONE ENCOUNTER
Home Care is calling regarding an established patient with [facility].  Requesting orders from: Gladys Vazquez RN APPROVED: RN able to provide verbal orders.  Home Care will send orders for signature.  RN will close encounter.  Is this a request for a temporary pause in the home care episode?  No    Orders Requested    Skilled Nursing - RE CERTIFICATION  Request for continuation of care with no increase or decrease in frequency  Frequency: 1x/wk for 1wx + 1x/2wk for 6wk - for nutrition and cardiac/medications  RN gave verbal order: Yes        Caller: Rebekah Kincaid RN   Home Care Agency: [facility]   Phone number Home Care can be reached at: [phone]   Okay to leave a detailed message?: Yes    Vannessa Gallagher RN

## 2025-03-04 ENCOUNTER — OFFICE VISIT (OUTPATIENT)
Dept: PHYSICAL MEDICINE AND REHAB | Facility: CLINIC | Age: 80
End: 2025-03-04
Payer: MEDICARE

## 2025-03-04 VITALS
SYSTOLIC BLOOD PRESSURE: 127 MMHG | OXYGEN SATURATION: 99 % | HEART RATE: 65 BPM | RESPIRATION RATE: 16 BRPM | DIASTOLIC BLOOD PRESSURE: 76 MMHG

## 2025-03-04 DIAGNOSIS — G70.00 MYASTHENIA GRAVIS (H): Primary | ICD-10-CM

## 2025-03-04 PROCEDURE — 3078F DIAST BP <80 MM HG: CPT | Performed by: PHYSICAL MEDICINE & REHABILITATION

## 2025-03-04 PROCEDURE — 99203 OFFICE O/P NEW LOW 30 MIN: CPT | Mod: GC | Performed by: PHYSICAL MEDICINE & REHABILITATION

## 2025-03-04 PROCEDURE — 3074F SYST BP LT 130 MM HG: CPT | Performed by: PHYSICAL MEDICINE & REHABILITATION

## 2025-03-04 PROCEDURE — B4035 ENTERAL FEED SUPP PUMP PER D: HCPCS

## 2025-03-04 NOTE — PROGRESS NOTES
PM&R Clinic Note     Patient Name: Mendez Greene : 1945 Medical Record: [id number]            History of Present Illness:     Mendez Greene is a 79 year old male with past medical history of paroxysmal atrial fibrillation, CAD, ascending aortic aneurysm, HLD, HTN, hypothyroidism, and CHA who was initially hospitalized at [facility] from 24-11/15/24 for progressive weakness and fatigue, difficulty swallowing. He was seen by neurology who were concerned for motor neuron disease with high index of suspicion for ALS. EMG consistent with MND. He was started on Riluzole and Edaravone and referred to [facility] neuromuscular division for second opinion. Discharged home with palliative care referral. After discharge, his ACh receptor binding Ab came back positive, strongly suggestive of myasthenia gravis.      He presented back to [facility] ED  with hypotension and acute respiratory failure. Family rescinded patient's DNR/DNI for patient to be full code and patient reportedly nodded in  agreement. Patient was intubated in the ED and admitted to ICU on pressors. Started on broad spectrum antibiotics for possible septic shock due to UTI. Neurology consulted, started patient on IVIGx 5 doses for myasthenic crisis and patient was initiated on Mestinon. Extubated with pressors stopped 12/3. Transferred to hospitalist service on . Hospital course has been notable for urosepsis with chronic Cortez catheter (completed abx course), MOHAMUD, electrolyte derangements, bradycardia, hypertension.      He was transferred to ARU on 24 and was discharged  home on 25.      Therapy notes at discharge   SLP:  Recommend continue NPO with ice chips for comfort with supervision/assist. Oral cares x3/day at minimum. Okay for pt to consume three (3) sips of thin liquid by spoon a day for quality of life with family, no bigger than half-teaspoon in size, must cough multiple times after each teaspoon sip, wait two to three  "minutes before taking next half-teaspoon sip. Pt can complete effortful swallows for maintenance of swallow mechanism to prevent disuse atrophy, but no formal pharyngeal exercise plan indicated given diagnosis. Pt is aware of overall aspiration (including silent) risk and can verbalize rationale for NPO diet, as well as indicators of aspiration and/or respiratory distress. Recommend consider repeat VFSS after neurology/medical workup if/when pt is noting improvement in overall symptoms, or roughly in 2-3 months if no change in symptoms. Recommend swallow evaluation/treatment be completed in an outpatient setting.      OT:  Continued therapy is recommended.  Rationale/Recommendations:  Recommend HC OT services to complete home safety assessment, and increase IND and safety with ADLs/IADLs.  Precautions: Falls, continuous tube feeding and NPO, BUE weakness with R greater than L  *elevate BUE/BLE's when supine in bed with pillows*  R resting hand orthosis - on overnight/off during day  B T-bar splints only with functional tasks-monitor skin breakdown and edema  ADLs:  Mobility:  CGA-SBA in rm w/ FWW and GB  Grooming: sba with adapted r angle toothbrush and spoon for ice chips  Dressing: UB mod a pull over shirt LB Max A FWW.  Bathing: CGA with shower transfer and shower chair with grab bars. Assistance to wash/rinse/dry 6/10 body parts seated on ETB.   Toileting: CGA from 19\" commode overlay, CGA-mod A standing clothing management, Max A hugo cares   IADLs: IND prior, supportive spouse who will provide total A     PT:  Continued therapy is recommended.  Rationale/Recommendations:  Pt discharging to home w/ spouse assist. Recommending SBA for all functional mobility, may need Shai from low surfaces. Family training completed w/ spouse, all DME obtained. Able to amb up to 250' w/ FWW, SBA. Pt will benefit from  physical therapy for optimizing pt safety and IND w/ mobility and decrease caregiver burden.  Outcome " "Measures:   Garcia              12/12: 12/56 12/28: 23/56  5TSTS              12/12: 0x (requires physical assist even with UE assist)              12.26: 0x. (Unable to stand from standard chair, able to stand from 22'' with UE push)  6MWT              12/12: 40 ft, FWW and CGA (stopped early due to fatigue)      Saw Dr. Abdullahi 1/6/25  Recommendations:  - Start prednisone 10 mg/day for 3 days then increase to 20 mg/day for 3 days then increase to 30 mg/day for 3 days then increase to 40mg/day and stay on this dose for 1-2 months.   - Continue pyridostigmine 60 mg every 4 hours (3 times/day) while awake. He has been waking up in the middle of the night to take this medication (this is not needed).   - lansoprazole 30 mg daily  - EMG/NCS with RNS (details as above) done 1/16 and again on 1/23  - Continue PT and OT  - F/U in 2 months     Final recs 1/23  - Right triceps muscle biopsy. done and report was reviweed on 2/14  - continue prednisone 40mg/day until next visit. He has noticed significant improvement with this  - He may continue pyridostigmine as needed.  - PPI (omeprazole) daily  - Continue PT and OT  - F/U in April after muscle biopsy result.       Symptoms,  - Weakness: hands and lower arms the weakest. Just got some new exercises from therapies ~2 weeks ago. Lower arms are sore from doing those exercises. Hands are more open than they were previously but they have no strength. Until a week ago he wasn't able to brush his teeth with R hand, now he can. L is strong enough to do that. Shoulders and hips are \"shrinking,\"    - Weight loss: Still has PEG, not using it right now. Weight has been the same since he left the ARU, but with the hospitalization he noticed atrophy of large muscle groups.     - No pain. Thumbs hurt a little bit.     - 1 fall after getting back from the ARU, none since then. Attributed it to a mat in his kitchen which was removed after the fall. Discussed removing fall " "hazards within the home.     - Sleeping well. Took awhile after getting back from ARU to sleep well but has been doing better recently.     - Still having urology issues, things have been manageable. Controls with pads, timed toileting. Had constipation with the tube feeds. Stool softeners helped. Stabilized. Has had diarrhea the last couple days, explosive. Gotten better. No abdominal pain.     Meds/interventions, no changes warranted right now. Is continuing prednisone until neurology follow up.     Therapies/HEP/equipments,  SLP 2/18 FVSS  Diet Consistency Recommendations: slightly thick liquids (level 1), soft & bite-sized (level 6)    Recommended Feeding/Eating Techniques: small bolus size, alternate food and liquid intake, supraglottic swallow, monitor for cough or change in vocal quality with intake, maintain upright sitting position for eating, maintain upright posture during/after eating for 30 minutes, minimize distractions during oral intake   Medication Administration Recommendations: try small medications whole with puree + strategies  Instrumental Assessment Recommendations: VFSS (videofluoroscopic swallowing study) -- consider repeat VFSS in ~4 weeks following ongoing oropharyngeal exercise program, given silent aspiration with thin liquids     - Will continue to see therapies for next 2 months (Home care). Does not drive right now.     Functionally/social situation, wife, Yara Berry, is very supportive. Has been helpful with appointments, medications, iADLs. 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, stage pT1, carcinoma in situ, stage Tis, N0, M0 s/p cystoprostatectomy with ileal neobladder formation    Pure hypercholesterolemia      Past Surgical History:   Procedure Laterality Date    ARTHROPLASTY KNEE Left 08/23/2022    Procedure: LEFT TOTAL KNEE ARTHROPLASTY;  Surgeon: Melba Dial MD;  Location:  OR    ARTHROPLASTY REVISION KNEE Left 09/05/2023    Procedure: REVISION TOTAL KNEE ARTHROPLASTY PATELLA;  Surgeon: Melba Dial MD;  Location:  OR    BIOPSY MUSCLE DIAGNOSTIC (LOCATION) Right 2/14/2025    Procedure: BIOPSY, MUSCLE, RIGHT TRICEPS;  Surgeon: Bakari Barber MD;  Location: [facility] OR    BUNIONECTOMY Left     BUNIONECTOMY DIANA  11/28/2011    RT-Procedure:BUNIONECTOMY DIANA; Right Foot Dwaine [place] Bunionectomy with Metatarsal and Phalanx Osteotomy with 1st Metatarsal Phalangeal Joint Repair Right Foot; Surgeon:BAKARI ZAIDI; Location:[facility]    CATARACT EXTRACTION, BILATERAL      CV CORONARY ANGIOGRAM N/A 11/11/2024    Procedure: Coronary Angiogram;  Surgeon: Krystian Machuca MD;  Location:  HEART CARDIAC CATH LAB    CV HEART CATHETERIZATION WITH POSSIBLE INTERVENTION N/A 11/07/2020    Procedure: Heart Catheterization with Possible Intervention;  Surgeon: Rishi Llanes MD;  Location:  HEART CARDIAC CATH LAB    CV LEFT HEART CATH N/A 11/11/2024    Procedure: Left Heart Catheterization;  Surgeon: Krystian Machuca MD;  Location:  HEART CARDIAC CATH LAB    CYSTECTOMY BLADDER, ILEAL DIVERSION NEOBLADDER, COMBINED  2006    HERNIORRHAPHY INCISIONAL (LOCATION) N/A 08/01/2016    Procedure: HERNIORRHAPHY INCISIONAL (LOCATION);  Surgeon: Onofre Lua MD;  Location:  [facility]    IR GASTROSTOMY TUBE PERCUTANEOUS PLCMNT  11/13/2024    OPEN REDUCTION INTERNAL FIXATION PATELLA Left 09/05/2023    Procedure: LEFT KNEE OPEN REDUCTION  INTERNAL  FIXATION PATELLA FRACTURE WITH;  Surgeon: Melba Dial MD;  Location: [facility] OR    OPEN REDUCTION INTERNAL FIXATION PATELLA Left 10/10/2023    Procedure: LEFT KNEE REVISION OPEN REDUCTION INTERNAL FIXATION PATELLA WITH POLY REMOVAL, PATELLAR;  Surgeon: Melba Dial MD;  Location: [facility] OR    REMOVE HARDWARE KNEE Left 10/10/2023    Procedure: WITH POLY REMOVAL, PATELLAR;  Surgeon: Melba Dial MD;  Location:  OR            Social History:     Social History     Tobacco Use    Smoking status: Former     Types: Cigarettes     Passive exposure: Never (per pt)    Smokeless tobacco: Never    Tobacco comments:     Quit in 1987; smoked for 20 years; 1.5 ppd at his most.    Substance Use Topics    Alcohol use: Not Currently       Marital Status: , wife is Yara Berry  Living situation: House, 3 [address], railings. All needs met on main floor.   Family support: Wife is supportive, kids nearby  Vocational History: Retired  Tobacco use: former smoker  Alcohol use: None  Recreational drug use: None         Functional history:     Mendez Greene was independent with all aspects of life prior to Dec admission. Now     ADLs: Modified independent  Assistive devices: Walker at home, cane  iADLs (medication management and finances): Wife helps, pt taking over more things like medications  Hand dominance: Right handed  Driving: Not driving           Family History:     Family History   Problem Relation Age of Onset    Osteoporosis Mother     Neurologic Disorder Mother         PD    Cerebrovascular Disease Mother     Hypertension Mother     Hyperlipidemia Mother     Cerebrovascular Disease Father     Diabetes Type 1 Sister     Cancer Maternal Grandmother         unknown type    Diabetes Type 2  No family hx of     Myocardial Infarction No family hx of     Prostate Cancer No family hx of     Colon Cancer No family hx of     Anesthesia Reaction No family hx of     Venous thrombosis No family hx of              Medications:     Current Outpatient Medications   Medication Sig Dispense Refill    acetaminophen (TYLENOL) 325 MG tablet Take 1-2 tablets (325-650 mg) by mouth every 6 hours as needed for mild pain.      artificial saliva (BIOTENE MT) SOLN solution Swish and spit 2 sprays in mouth every hour as needed for dry mouth. 44.3 mL 1    aspirin 81 MG EC tablet Take 81 mg by mouth every evening.      Cholecalciferol (D3 PO) Take 1 tablet by mouth daily. OTC: Unsure of strength.      fluticasone (FLONASE) 50 MCG/ACT nasal spray Spray 1 spray into both nostrils daily. 9.9 mL 0    Jevity 1.5 Mandeep Place 1,422 mLs into G tube daily. Infuse via gravity bag     1.5 cartons 4 feedings per day ( 6 total cartons/day)  Water flush: 120ml before and after each feeding 81294 mL 11    levothyroxine (SYNTHROID/LEVOTHROID) 175 MCG tablet Take 1 tablet (175 mcg) by mouth daily. (Patient taking differently: Take 175 mcg by mouth every morning (before breakfast).) 30 tablet 9    lisinopril (ZESTRIL) 5 MG tablet Take 1 tablet (5 mg) by mouth daily. 90 tablet 3    olopatadine (PATANOL) 0.1 % ophthalmic solution Place 1 drop into both eyes 2 times daily as needed for allergies.      omeprazole (PRILOSEC) 2 mg/mL suspension Place 10 mLs (20 mg) into Feeding Tube every morning (before breakfast). 500 mL 2    order for DME Equipment being ordered: CPAP and supplies. LIFETIME need. 1 each 0    predniSONE (DELTASONE) 10 MG tablet 10 mg/day via feeding tube for 3 days then increase to 20 mg/day for 3 days then increase to 30 mg/day for 3 days then increase to 40mg/day and stay on this dose (Patient taking differently: Take 4 tablets by mouth every morning. 10 mg/day via feeding tube for 3 days then increase to 20 mg/day for 3 days then increase to 30 mg/day for 3 days then increase to 40mg/day and stay on this dose) 120 tablet 2    pyRIDostigmine (MESTINON) 60 MG tablet Take 1 tablet 3 times a day while awake. 90 tablet 2    vitamin B-12  "(CYANOCOBALAMIN) 1000 MCG tablet Take 1,000 mcg by mouth daily.              Allergies:     No Known Allergies           ROS:     A focused ROS is negative other than the symptoms noted above in the HPI.    Patient denied headaches, dizziness, lightheadedness, changes in vision/hearing, sore throat, runny nose, cough, SOB, chest pain, abdominal pain, nausea, vomiting, constipation, dysuria, hematuria, melena, bloody stools, and/or joint aches/pains.            Physical Examiniation:     VITAL SIGNS: /76   Pulse 65   Resp 16   SpO2 99%   BMI: Estimated body mass index is 24.42 kg/m  as calculated from the following:    Height as of 2/14/25: 1.778 m (5' 10\").    Weight as of 3/3/25: 77.2 kg (170 lb 3.2 oz).    Gen: NAD, pleasant and cooperative   Cardio: regular pulse  Pulm: non-labored breathing in room air  Abd: benign - PEG site had a small granulation tissue at 9 o'clock with small amount of yellowish drainage at the site    Ext: WWP, no edema in BLE, no tenderness in calves  MSK/neuro:   Mental Status:  alert and oriented x3    Cranial Nerves: grossly normal    Strength:    SF  EF  EE  WE  G  I  HF  KE  DF  EHL  PF   R  4 4- 5 4- 4 4 4+ 4 5 5 5  L  4 4- 5 4- 4 4 4+ 4 5 5 5    Reflexes: Present and symmetrical    Tone per modified Gabriella Scale: 0   Abnormal movements: None    Speech: Clear, no aphasia   Cognition: Intact, good historian           Laboratory/Imaging:     CBC RESULTS:   Recent Labs   Lab Test 01/16/25  1416 12/30/24  0745 12/26/24  0629   WBC 6.2 3.7* 3.7*   RBC 2.84* 2.28* 2.09*   HGB 10.2* 8.2* 7.4*   HCT 31.2* 25.9* 23.4*   * 114* 112*   MCH 35.9* 36.0* 35.4*   MCHC 32.7 31.7 31.6   RDW 14.6 17.3* 17.7*    270 230     Last Basic Metabolic Panel:  Recent Labs   Lab Test 01/16/25  1416 12/30/24  0745 12/26/24  0629    139 139   POTASSIUM 5.0 4.6 5.0   CHLORIDE 102 105 106   CO2 28 25 28   ANIONGAP 7 9 5*   * 90 108*   BUN 29.1* 31.4* 30.5*   CR 0.63* 0.56* " 0.56*   GFRESTIMATED >90 >90 >90   SIMONA 9.2 8.5* 8.5*       Muscle biopsy results 2/14/25  DIAGNOSIS:   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   COMMENT:   The presence of perimysial and perivascicularr inflammatory cells, and heterogeneous fascicular atrophy and fibrosis may provide the histological appearance of inflammatory myopathy with propensity to involve connective tissue as there was no significant endomysial or perivascular inflammation. Non-specific nature of this biopsy may be due to heterogeneous involvement and anti-inflammatory treatment effect. Other systemic connective tissue diseases, such as lupus erythematosus and mixed connective tissue disorder, can cause a similar histological appearance. Clinical correlation is required. Tissue remains for ultrastructural, biochemical or genetic testing if clinically indicated.            Assessment/Plan:     Mr. 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 Seen in PM&R clinic as follow up after ARU admission in December, 2024.     # Subacute progressive painless bilateral upper limb weakness: R/O inflammatory myositis (?BCIM)  # Dysphagia s/p PEG tube  # Dysarthria, improved  # Positive AchR antibody without clinical signs of neuromuscular junction defect  # History of bladder cancer s/p neobladder surgery     PMH is also significant for paroxysmal atrial fibrillation, CAD, ascending aortic aneurysm, HLD, HTN, hypothyroidism, CHA, chronic macrocytic anemia      Patient education: In depth discussion and education was provided about the assessment and implications of each of the below recommendations for management. Patient indicated readiness to learn, all questions were answered and understanding of material presented was confirmed.  Work-up: None needed at this  time  Therapy/equipment/braces: No new equipment needs at this time. Continue home care with PT, OT and SLP. Will transition to outpatient when ready   Medications: No medication changes made today. Encouraged increased protein intake at length.   Interventions: No interventions today. PEG was paced by IR on 11/13/2024. Will have silver nitrate in the room for next visit in case pt's PEG is being removed at that time. If he maintains adequate nutrition by mouth, his PEG can potentially be removed but will check with Dr. Abdullahi as well.   Referral / follow up with other providers:  Continue to see neuromuscular, urology, neurology, and PCP teams as scheduled.  Follow up: with PM&R in 3 months.    Patient reviewed with attending physician, Dr. Antonio, who agrees with the assessment and plan.    Azul Villagran MD  [facility] PM&R PGY-2  03/06/2025  Pager #: [phone]        Physician Attestation   I, Kelly Antonio MD, saw this patient and agree with the findings and plan of care as documented in the note.      Items personally reviewed/procedural attestation: notes in the chart and agree with the interpretation documented in the note.    The above note was edited by me. Sent a message to IR team (Kylee Ching PA-C) to clarify the PEG tube type and also another message to Dr. Abdullahi about his prognosis and the possibility of removing the tube.     Kelly Antonio MD

## 2025-03-04 NOTE — LETTER
3/4/2025       RE: Mendez Greene  [id number]  [facility] [id number]     Dear Colleague,    Thank you for referring your patient, Mendez Greene, to the [facility] PHYSICAL MEDICINE AND REHABILITATION CLINIC [place] at [facility]. Please see a copy of my visit note below.           PM&R Clinic Note     Patient Name: Mendez Greene : 1945 Medical Record: [id number]            History of Present Illness:     Mendez Greene is a 79 year old male with past medical history of paroxysmal atrial fibrillation, CAD, ascending aortic aneurysm, HLD, HTN, hypothyroidism, and CHA who was initially hospitalized at [facility] from 24-11/15/24 for progressive weakness and fatigue, difficulty swallowing. He was seen by neurology who were concerned for motor neuron disease with high index of suspicion for ALS. EMG consistent with MND. He was started on Riluzole and Edaravone and referred to [facility] neuromuscular division for second opinion. Discharged home with palliative care referral. After discharge, his ACh receptor binding Ab came back positive, strongly suggestive of myasthenia gravis.      He presented back to [facility] ED  with hypotension and acute respiratory failure. Family rescinded patient's DNR/DNI for patient to be full code and patient reportedly nodded in  agreement. Patient was intubated in the ED and admitted to ICU on pressors. Started on broad spectrum antibiotics for possible septic shock due to UTI. Neurology consulted, started patient on IVIGx 5 doses for myasthenic crisis and patient was initiated on Mestinon. Extubated with pressors stopped 12/3. Transferred to hospitalist service on . Hospital course has been notable for urosepsis with chronic Cortez catheter (completed abx course), MOHAMUD, electrolyte derangements, bradycardia, hypertension.      He was transferred to ARU on 24 and was discharged  home on  "1/1/25.      Therapy notes at discharge   SLP:  Recommend continue NPO with ice chips for comfort with supervision/assist. Oral cares x3/day at minimum. Okay for pt to consume three (3) sips of thin liquid by spoon a day for quality of life with family, no bigger than half-teaspoon in size, must cough multiple times after each teaspoon sip, wait two to three minutes before taking next half-teaspoon sip. Pt can complete effortful swallows for maintenance of swallow mechanism to prevent disuse atrophy, but no formal pharyngeal exercise plan indicated given diagnosis. Pt is aware of overall aspiration (including silent) risk and can verbalize rationale for NPO diet, as well as indicators of aspiration and/or respiratory distress. Recommend consider repeat VFSS after neurology/medical workup if/when pt is noting improvement in overall symptoms, or roughly in 2-3 months if no change in symptoms. Recommend swallow evaluation/treatment be completed in an outpatient setting.      OT:  Continued therapy is recommended.  Rationale/Recommendations:  Recommend HC OT services to complete home safety assessment, and increase IND and safety with ADLs/IADLs.  Precautions: Falls, continuous tube feeding and NPO, BUE weakness with R greater than L  *elevate BUE/BLE's when supine in bed with pillows*  R resting hand orthosis - on overnight/off during day  B T-bar splints only with functional tasks-monitor skin breakdown and edema  ADLs:  Mobility:  CGA-SBA in rm w/ FWW and GB  Grooming: sba with adapted r angle toothbrush and spoon for ice chips  Dressing: UB mod a pull over shirt LB Max A FWW.  Bathing: CGA with shower transfer and shower chair with grab bars. Assistance to wash/rinse/dry 6/10 body parts seated on ETB.   Toileting: CGA from 19\" commode overlay, CGA-mod A standing clothing management, Max A hugo cares   IADLs: IND prior, supportive spouse who will provide total A     PT:  Continued therapy is recommended.  " "Rationale/Recommendations:  Pt discharging to home w/ spouse assist. Recommending SBA for all functional mobility, may need Shai from low surfaces. Family training completed w/ spouse, all DME obtained. Able to amb up to 250' w/ FWW, SBA. Pt will benefit from  physical therapy for optimizing pt safety and IND w/ mobility and decrease caregiver burden.  Outcome Measures:   Garcia              12/12: 12/56 12/28: 23/56  5TSTS              12/12: 0x (requires physical assist even with UE assist)              12.26: 0x. (Unable to stand from standard chair, able to stand from 22'' with UE push)  6MWT              12/12: 40 ft, FWW and CGA (stopped early due to fatigue)      Saw Dr. Abdullahi 1/6/25  Recommendations:  - Start prednisone 10 mg/day for 3 days then increase to 20 mg/day for 3 days then increase to 30 mg/day for 3 days then increase to 40mg/day and stay on this dose for 1-2 months.   - Continue pyridostigmine 60 mg every 4 hours (3 times/day) while awake. He has been waking up in the middle of the night to take this medication (this is not needed).   - lansoprazole 30 mg daily  - EMG/NCS with RNS (details as above) done 1/16 and again on 1/23  - Continue PT and OT  - F/U in 2 months     Final recs 1/23  - Right triceps muscle biopsy. done and report was reviweed on 2/14  - continue prednisone 40mg/day until next visit. He has noticed significant improvement with this  - He may continue pyridostigmine as needed.  - PPI (omeprazole) daily  - Continue PT and OT  - F/U in April after muscle biopsy result.       Symptoms,  - Weakness: hands and lower arms the weakest. Just got some new exercises from therapies ~2 weeks ago. Lower arms are sore from doing those exercises. Hands are more open than they were previously but they have no strength. Until a week ago he wasn't able to brush his teeth with R hand, now he can. L is strong enough to do that. Shoulders and hips are \"shrinking,\"    - Weight " "loss: Still has PEG, not using it right now. Weight has been the same since he left the ARU, but with the hospitalization he noticed atrophy of large muscle groups.     - No pain. Thumbs hurt a little bit.     - 1 fall after getting back from the ARU, none since then. Attributed it to a mat in his kitchen which was removed after the fall. Discussed removing fall hazards within the home.     - Sleeping well. Took awhile after getting back from ARU to sleep well but has been doing better recently.     - Still having urology issues, things have been manageable. Controls with pads, timed toileting. Had constipation with the tube feeds. Stool softeners helped. Stabilized. Has had diarrhea the last couple days, explosive. Gotten better. No abdominal pain.     Meds/interventions, no changes warranted right now. Is continuing prednisone until neurology follow up.     Therapies/HEP/equipments,  SLP 2/18 FVSS  Diet Consistency Recommendations: slightly thick liquids (level 1), soft & bite-sized (level 6)    Recommended Feeding/Eating Techniques: small bolus size, alternate food and liquid intake, supraglottic swallow, monitor for cough or change in vocal quality with intake, maintain upright sitting position for eating, maintain upright posture during/after eating for 30 minutes, minimize distractions during oral intake   Medication Administration Recommendations: try small medications whole with puree + strategies  Instrumental Assessment Recommendations: VFSS (videofluoroscopic swallowing study) -- consider repeat VFSS in ~4 weeks following ongoing oropharyngeal exercise program, given silent aspiration with thin liquids     - Will continue to see therapies for next 2 months (Home care). Does not drive right now.     Functionally/social situation, wife, Yara Berry, is very supportive. Has been helpful with appointments, medications, iADLs. 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, stage pT1, carcinoma in situ, stage Tis, N0, M0 s/p cystoprostatectomy with ileal neobladder formation     Pure hypercholesterolemia      Past Surgical History:   Procedure Laterality Date     ARTHROPLASTY KNEE Left 08/23/2022    Procedure: LEFT TOTAL KNEE ARTHROPLASTY;  Surgeon: Melba Dial MD;  Location:  OR     ARTHROPLASTY REVISION KNEE Left 09/05/2023    Procedure: REVISION TOTAL KNEE ARTHROPLASTY PATELLA;  Surgeon: Melba Dial MD;  Location:  OR     BIOPSY MUSCLE DIAGNOSTIC (LOCATION) Right 2/14/2025    Procedure: BIOPSY, MUSCLE, RIGHT TRICEPS;  Surgeon: Bakari Barber MD;  Location: [facility] OR     BUNIONECTOMY Left      BUNIONECTOMY DIANA  11/28/2011    RT-Procedure:BUNIONECTOMY DIANA; Right Foot Dwaine Rochelle Bunionectomy with Metatarsal and Phalanx Osteotomy with 1st Metatarsal Phalangeal Joint Repair Right Foot; Surgeon:BAKARI ZAIDI; Location: [facility]     CATARACT EXTRACTION, BILATERAL       CV CORONARY ANGIOGRAM N/A 11/11/2024    Procedure: Coronary Angiogram;  Surgeon: Krystian Machuca MD;  Location:  HEART CARDIAC CATH LAB     CV HEART CATHETERIZATION WITH POSSIBLE INTERVENTION N/A 11/07/2020    Procedure: Heart Catheterization with Possible Intervention;  Surgeon: Rishi Llanes MD;  Location:  HEART CARDIAC CATH LAB     CV LEFT HEART CATH N/A 11/11/2024    Procedure: Left Heart Catheterization;  Surgeon: Krystian Machuca MD;  Location:  HEART CARDIAC CATH  LAB     CYSTECTOMY BLADDER, ILEAL DIVERSION NEOBLADDER, COMBINED  2006     HERNIORRHAPHY INCISIONAL (LOCATION) N/A 08/01/2016    Procedure: HERNIORRHAPHY INCISIONAL (LOCATION);  Surgeon: Onofre Lua MD;  Location:  [facility]     IR GASTROSTOMY TUBE PERCUTANEOUS PLCMNT  11/13/2024     OPEN REDUCTION INTERNAL FIXATION PATELLA Left 09/05/2023    Procedure: LEFT KNEE OPEN REDUCTION INTERNAL  FIXATION PATELLA FRACTURE WITH;  Surgeon: Melba Dial MD;  Location:  OR     OPEN REDUCTION INTERNAL FIXATION PATELLA Left 10/10/2023    Procedure: LEFT KNEE REVISION OPEN REDUCTION INTERNAL FIXATION PATELLA WITH POLY REMOVAL, PATELLAR;  Surgeon: Melba Dial MD;  Location:  OR     REMOVE HARDWARE KNEE Left 10/10/2023    Procedure: WITH POLY REMOVAL, PATELLAR;  Surgeon: Melba Dial MD;  Location:  OR            Social History:     Social History     Tobacco Use     Smoking status: Former     Types: Cigarettes     Passive exposure: Never (per pt)     Smokeless tobacco: Never     Tobacco comments:     Quit in 1987; smoked for 20 years; 1.5 ppd at his most.    Substance Use Topics     Alcohol use: Not Currently       Marital Status: , wife is Yara Berry  Living situation: House, 3 [address], railings. All needs met on main floor.   Family support: Wife is supportive, kids nearby  Vocational History: Retired  Tobacco use: former smoker  Alcohol use: None  Recreational drug use: None         Functional history:     Mendez Greene was independent with all aspects of life prior to Dec admission. Now     ADLs: Modified independent  Assistive devices: Walker at home, cane  iADLs (medication management and finances): Wife helps, pt taking over more things like medications  Hand dominance: Right handed  Driving: Not driving           Family History:     Family History   Problem Relation Age of Onset     Osteoporosis Mother      Neurologic Disorder Mother         PD     Cerebrovascular Disease Mother       Hypertension Mother      Hyperlipidemia Mother      Cerebrovascular Disease Father      Diabetes Type 1 Sister      Cancer Maternal Grandmother         unknown type     Diabetes Type 2  No family hx of      Myocardial Infarction No family hx of      Prostate Cancer No family hx of      Colon Cancer No family hx of      Anesthesia Reaction No family hx of      Venous thrombosis No family hx of             Medications:     Current Outpatient Medications   Medication Sig Dispense Refill     acetaminophen (TYLENOL) 325 MG tablet Take 1-2 tablets (325-650 mg) by mouth every 6 hours as needed for mild pain.       artificial saliva (BIOTENE MT) SOLN solution Swish and spit 2 sprays in mouth every hour as needed for dry mouth. 44.3 mL 1     aspirin 81 MG EC tablet Take 81 mg by mouth every evening.       Cholecalciferol (D3 PO) Take 1 tablet by mouth daily. OTC: Unsure of strength.       fluticasone (FLONASE) 50 MCG/ACT nasal spray Spray 1 spray into both nostrils daily. 9.9 mL 0     Jevity 1.5 Mandeep Place 1,422 mLs into G tube daily. Infuse via gravity bag     1.5 cartons 4 feedings per day ( 6 total cartons/day)  Water flush: 120ml before and after each feeding 73931 mL 11     levothyroxine (SYNTHROID/LEVOTHROID) 175 MCG tablet Take 1 tablet (175 mcg) by mouth daily. (Patient taking differently: Take 175 mcg by mouth every morning (before breakfast).) 30 tablet 9     lisinopril (ZESTRIL) 5 MG tablet Take 1 tablet (5 mg) by mouth daily. 90 tablet 3     olopatadine (PATANOL) 0.1 % ophthalmic solution Place 1 drop into both eyes 2 times daily as needed for allergies.       omeprazole (PRILOSEC) 2 mg/mL suspension Place 10 mLs (20 mg) into Feeding Tube every morning (before breakfast). 500 mL 2     order for DME Equipment being ordered: CPAP and supplies. LIFETIME need. 1 each 0     predniSONE (DELTASONE) 10 MG tablet 10 mg/day via feeding tube for 3 days then increase to 20 mg/day for 3 days then increase to 30 mg/day for 3  "days then increase to 40mg/day and stay on this dose (Patient taking differently: Take 4 tablets by mouth every morning. 10 mg/day via feeding tube for 3 days then increase to 20 mg/day for 3 days then increase to 30 mg/day for 3 days then increase to 40mg/day and stay on this dose) 120 tablet 2     pyRIDostigmine (MESTINON) 60 MG tablet Take 1 tablet 3 times a day while awake. 90 tablet 2     vitamin B-12 (CYANOCOBALAMIN) 1000 MCG tablet Take 1,000 mcg by mouth daily.              Allergies:     No Known Allergies           ROS:     A focused ROS is negative other than the symptoms noted above in the HPI.    Patient denied headaches, dizziness, lightheadedness, changes in vision/hearing, sore throat, runny nose, cough, SOB, chest pain, abdominal pain, nausea, vomiting, constipation, dysuria, hematuria, melena, bloody stools, and/or joint aches/pains.            Physical Examiniation:     VITAL SIGNS: /76   Pulse 65   Resp 16   SpO2 99%   BMI: Estimated body mass index is 24.42 kg/m  as calculated from the following:    Height as of 2/14/25: 1.778 m (5' 10\").    Weight as of 3/3/25: 77.2 kg (170 lb 3.2 oz).    Gen: NAD, pleasant and cooperative   Cardio: regular pulse  Pulm: non-labored breathing in room air  Abd: benign - PEG site had a small granulation tissue at 9 o'clock with small amount of yellowish drainage at the site    Ext: WWP, no edema in BLE, no tenderness in calves  MSK/neuro:   Mental Status:  alert and oriented x3    Cranial Nerves: grossly normal    Strength:    SF  EF  EE  WE  G  I  HF  KE  DF  EHL  PF   R  4 4- 5 4- 4 4 4+ 4 5 5 5  L  4 4- 5 4- 4 4 4+ 4 5 5 5    Reflexes: Present and symmetrical    Tone per modified Gabriella Scale: 0   Abnormal movements: None    Speech: Clear, no aphasia   Cognition: Intact, good historian           Laboratory/Imaging:     CBC RESULTS:   Recent Labs   Lab Test 01/16/25  1416 12/30/24  0745 12/26/24  0629   WBC 6.2 3.7* 3.7*   RBC 2.84* 2.28* 2.09*   HGB " 10.2* 8.2* 7.4*   HCT 31.2* 25.9* 23.4*   * 114* 112*   MCH 35.9* 36.0* 35.4*   MCHC 32.7 31.7 31.6   RDW 14.6 17.3* 17.7*    270 230     Last Basic Metabolic Panel:  Recent Labs   Lab Test 01/16/25  1416 12/30/24  0745 12/26/24  0629    139 139   POTASSIUM 5.0 4.6 5.0   CHLORIDE 102 105 106   CO2 28 25 28   ANIONGAP 7 9 5*   * 90 108*   BUN 29.1* 31.4* 30.5*   CR 0.63* 0.56* 0.56*   GFRESTIMATED >90 >90 >90   SIMONA 9.2 8.5* 8.5*       Muscle biopsy results 2/14/25  DIAGNOSIS:   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   COMMENT:   The presence of perimysial and perivascicularr inflammatory cells, and heterogeneous fascicular atrophy and fibrosis may provide the histological appearance of inflammatory myopathy with propensity to involve connective tissue as there was no significant endomysial or perivascular inflammation. Non-specific nature of this biopsy may be due to heterogeneous involvement and anti-inflammatory treatment effect. Other systemic connective tissue diseases, such as lupus erythematosus and mixed connective tissue disorder, can cause a similar histological appearance. Clinical correlation is required. Tissue remains for ultrastructural, biochemical or genetic testing if clinically indicated.            Assessment/Plan:     Mr. 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 Seen in PM&R clinic as follow up after ARU admission in December, 2024.     # Subacute progressive painless bilateral upper limb weakness: R/O inflammatory myositis (?BCIM)  # Dysphagia s/p PEG tube  # Dysarthria, improved  # Positive AchR antibody without clinical signs of neuromuscular junction defect  # History of bladder cancer s/p neobladder surgery     PMH is also significant for paroxysmal atrial fibrillation,  CAD, ascending aortic aneurysm, HLD, HTN, hypothyroidism, CHA, chronic macrocytic anemia      Patient education: In depth discussion and education was provided about the assessment and implications of each of the below recommendations for management. Patient indicated readiness to learn, all questions were answered and understanding of material presented was confirmed.  Work-up: None needed at this time  Therapy/equipment/braces: No new equipment needs at this time. Continue home care with PT, OT and SLP. Will transition to outpatient when ready   Medications: No medication changes made today. Encouraged increased protein intake at length.   Interventions: No interventions today. PEG was paced by IR on 11/13/2024. Will have silver nitrate in the room for next visit in case pt's PEG is being removed at that time. If he maintains adequate nutrition by mouth, his PEG can potentially be removed but will check with Dr. Abdullahi as well.   Referral / follow up with other providers:  Continue to see neuromuscular, urology, neurology, and PCP teams as scheduled.  Follow up: with PM&R in 3 months.    Patient reviewed with attending physician, Dr. Antonio, who agrees with the assessment and plan.    Azul Villagran MD  [facility] PM&R PGY-2  03/06/2025  Pager #: [phone]        Physician Attestation   I, Kelly Antonio MD, saw this patient and agree with the findings and plan of care as documented in the note.      Items personally reviewed/procedural attestation: notes in the chart and agree with the interpretation documented in the note.    The above note was edited by me. Sent a message to IR team (Kylee Ching PA-C) to clarify the PEG tube type and also another message to Dr. Abdullahi about his prognosis and the possibility of removing the tube.     Kelly Antonio MD          Again, thank you for allowing me to participate in the care of your patient.      Sincerely,    Kelly Antonoi MD

## 2025-03-05 ENCOUNTER — TELEPHONE (OUTPATIENT)
Dept: INTERNAL MEDICINE | Facility: CLINIC | Age: 80
End: 2025-03-05
Payer: MEDICARE

## 2025-03-05 PROCEDURE — B4035 ENTERAL FEED SUPP PUMP PER D: HCPCS

## 2025-03-05 NOTE — TELEPHONE ENCOUNTER
Home Care is calling regarding an established patient with [facility].  Requesting orders from: Gladys Vazquez  RN APPROVED: RN able to provide verbal orders.  Home Care will send orders for signature.  RN will close encounter.  Is this a request for a temporary pause in the home care episode?  No    Orders Requested    Physical Therapy  Request for initial certification (first set of orders)   Frequency: 1x a week for 1 week, then 1x every other week for 6 weeks, then 1x a week for 1 week  RN gave verbal order: Yes        Caller: ZHEN Fong  Home Care Agency: [facility]  Phone number Home Care can be reached at: [phone]  Okay to leave a detailed message?: Yes    Charlette Auguste RN

## 2025-03-06 PROCEDURE — B4035 ENTERAL FEED SUPP PUMP PER D: HCPCS

## 2025-03-07 PROCEDURE — B4035 ENTERAL FEED SUPP PUMP PER D: HCPCS

## 2025-03-08 PROCEDURE — B4035 ENTERAL FEED SUPP PUMP PER D: HCPCS

## 2025-03-09 PROCEDURE — B4035 ENTERAL FEED SUPP PUMP PER D: HCPCS

## 2025-03-10 ENCOUNTER — NURSE TRIAGE (OUTPATIENT)
Dept: INTERNAL MEDICINE | Facility: CLINIC | Age: 80
End: 2025-03-10
Payer: MEDICARE

## 2025-03-10 LAB
SCANNED LAB RESULT: NORMAL
TEST NAME: NORMAL

## 2025-03-10 PROCEDURE — B4035 ENTERAL FEED SUPP PUMP PER D: HCPCS

## 2025-03-10 NOTE — TELEPHONE ENCOUNTER
S-(situation): 3/9/2025 - pt has 15 mins of loose/watery stools with blood in the bowl     B-(background): never had bloody stools before     A-(assessment): possibly some light lightheadedness. He did notice when he was having the loose stool - he was having pain across the top part of shoulders and into neck - but that has resolved.   denies: abd pain, nausea, vomiting, headaches,CP, SOB     R-(recommendations): should be seen - made an OV for tomorrow as pt said he can't be seen today     RN reviewed worsening symptoms and if they were to happen to go to the ER     Patient stated an understanding and agreed with plan.      Joanna Calixto RN, BSN  [facility] Triage

## 2025-03-11 ENCOUNTER — OFFICE VISIT (OUTPATIENT)
Dept: INTERNAL MEDICINE | Facility: CLINIC | Age: 80
End: 2025-03-11
Payer: MEDICARE

## 2025-03-11 VITALS
HEART RATE: 56 BPM | DIASTOLIC BLOOD PRESSURE: 50 MMHG | BODY MASS INDEX: 24.78 KG/M2 | WEIGHT: 173.1 LBS | RESPIRATION RATE: 16 BRPM | SYSTOLIC BLOOD PRESSURE: 120 MMHG | TEMPERATURE: 97.6 F | HEIGHT: 70 IN | OXYGEN SATURATION: 100 %

## 2025-03-11 DIAGNOSIS — K64.8 INTERNAL BLEEDING HEMORRHOIDS: Primary | ICD-10-CM

## 2025-03-11 PROCEDURE — 3074F SYST BP LT 130 MM HG: CPT | Performed by: PHYSICIAN ASSISTANT

## 2025-03-11 PROCEDURE — B4035 ENTERAL FEED SUPP PUMP PER D: HCPCS

## 2025-03-11 PROCEDURE — G2211 COMPLEX E/M VISIT ADD ON: HCPCS | Performed by: PHYSICIAN ASSISTANT

## 2025-03-11 PROCEDURE — 3078F DIAST BP <80 MM HG: CPT | Performed by: PHYSICIAN ASSISTANT

## 2025-03-11 PROCEDURE — 99212 OFFICE O/P EST SF 10 MIN: CPT | Performed by: PHYSICIAN ASSISTANT

## 2025-03-11 ASSESSMENT — ENCOUNTER SYMPTOMS: DIARRHEA: 1

## 2025-03-11 NOTE — PROGRESS NOTES
"  Assessment & Plan     Internal bleeding hemorrhoids  Improving with bleeding   Reviewed diet and slow advancing, I think the burmickey and fries trigger the diarrhea and then the bleed from the internal hemorrhoid    Recheck as needed                 Subjective   Steven is a 79 year old, presenting for the following health issues:  Diarrhea (/)    History of Present Illness       Reason for visit:  Bloody diarrhea  Symptom onset:  1-3 days ago  Symptoms include:  Blood  Symptom intensity:  Moderate  Symptom progression:  Improving  Had these symptoms before:  No  What makes it worse:  Not that I can identify  What makes it better:  No   He is taking medications regularly.        Diarrhea    Duration: 2 days   Episode of liquid stool x 3 - 5 minutes apart   Blood in stool  Very loose stool yesterday no blood seen   Some small formed stools last night - with pushing noted blood noted.   Description:       Consistency of stool: watery, runny, and loose       Blood in stool: YES       Number of loose stools past 24 hours:      Intensity:  mild  Accompanying signs and symptoms:       Fever: no        Nausea/vomitting: no        Abdominal pain: no        Weight loss: no   History (recent antibiotics or travel/ill contacts/med changes/testing done): none  Had first big meal out with grandson on Saturday night   Burger and fries  Has been on tube feeding due to dysphagia and neuro issues   He has been transitioning to real food.    Precipitating or alleviating factors: None  Therapies tried and outcome:                     Objective    /50 (BP Location: Left arm, Patient Position: Sitting, Cuff Size: Adult Regular)   Pulse 56   Temp 97.6  F (36.4  C) (Temporal)   Resp 16   Ht 1.778 m (5' 10\")   Wt 78.5 kg (173 lb 1.6 oz)   SpO2 100%   BMI 24.84 kg/m    Body mass index is 24.84 kg/m .  Physical Exam   GENERAL: alert and no distress  RECTAL (male): internal hemorrhoid noted with slight bleeding on digital  SKIN: no " suspicious lesions or rashes            Signed Electronically by: Arleth Gonzalez PA-C

## 2025-03-12 PROCEDURE — B4035 ENTERAL FEED SUPP PUMP PER D: HCPCS

## 2025-03-13 ENCOUNTER — TELEPHONE (OUTPATIENT)
Dept: INTERNAL MEDICINE | Facility: CLINIC | Age: 80
End: 2025-03-13
Payer: MEDICARE

## 2025-03-13 DIAGNOSIS — Z93.1 GASTROINTESTINAL TUBE PRESENT (H): ICD-10-CM

## 2025-03-13 DIAGNOSIS — G72.49 INFLAMMATORY MYOPATHY: Primary | ICD-10-CM

## 2025-03-13 PROCEDURE — B4035 ENTERAL FEED SUPP PUMP PER D: HCPCS

## 2025-03-13 NOTE — TELEPHONE ENCOUNTER
Home care nurse asked who can remove patients G-tube. Pt is taking medications orally. Home care speech therapist will be meeting with pt to find out if G- tube can be removed. Would PCP be able to place IR referral to remove G-tube?    Nurse is request dietician referral. Referral pended. Please review and advice  on request.     Home care nurse needs a call back with PCP's recommendations at [phone]. VM is confidential.

## 2025-03-13 NOTE — TELEPHONE ENCOUNTER
Called and left message for Nahomi with the provider's response.     RN did advise Nahomi to call the clinic back to clarify the need for a Nutrition Referral.     RN will postpone phone encounter and ensure we have received a call back from Nahomi.    Aubree Hall RN

## 2025-03-13 NOTE — TELEPHONE ENCOUNTER
IR referral for G-tube removal placed.    Can call [phone] to schedule.    What is the dietician referral for? Generally Medicare does not cover this referral except for diabetes or kidney disease.

## 2025-03-14 PROCEDURE — B4035 ENTERAL FEED SUPP PUMP PER D: HCPCS

## 2025-03-15 PROCEDURE — B4035 ENTERAL FEED SUPP PUMP PER D: HCPCS

## 2025-03-16 PROCEDURE — B4035 ENTERAL FEED SUPP PUMP PER D: HCPCS

## 2025-03-17 PROCEDURE — B4035 ENTERAL FEED SUPP PUMP PER D: HCPCS

## 2025-03-17 NOTE — TELEPHONE ENCOUNTER
Relayed providers message to Nahomi.   Nahomi verbalized understanding and will discuss the referral request with patient at his next visit.     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

## 2025-03-18 PROCEDURE — B4035 ENTERAL FEED SUPP PUMP PER D: HCPCS

## 2025-03-21 ENCOUNTER — HOSPITAL ENCOUNTER (OUTPATIENT)
Facility: CLINIC | Age: 80
Discharge: HOME | End: 2025-03-21
Attending: RADIOLOGY | Admitting: RADIOLOGY
Payer: MEDICARE

## 2025-03-21 ENCOUNTER — APPOINTMENT (OUTPATIENT)
Dept: GENERAL RADIOLOGY | Facility: CLINIC | Age: 80
End: 2025-03-21
Attending: INTERNAL MEDICINE
Payer: MEDICARE

## 2025-03-21 DIAGNOSIS — Z93.1 GASTROINTESTINAL TUBE PRESENT (H): ICD-10-CM

## 2025-03-21 ASSESSMENT — ACTIVITIES OF DAILY LIVING (ADL): ADLS_ACUITY_SCORE: 58

## 2025-03-21 NOTE — PROGRESS NOTES
Removed Pt's gastrostomy tube at this time. Pt tolerated without discomfort. PT given standard discharge instructions and dressing supplies for home. Given imaging nurse line for questions and issues. 4x4 gauze and Tegaderm applied to site. Dressing clean, dry and intact at time of discharge. Answered all questions.Total nurse time less than 15 minutes.

## 2025-03-26 DIAGNOSIS — G12.20 MOTOR NEURON DISORDER (H): ICD-10-CM

## 2025-03-26 DIAGNOSIS — R29.898 BILATERAL ARM WEAKNESS: ICD-10-CM

## 2025-03-26 DIAGNOSIS — G70.00 MYASTHENIA GRAVIS (H): ICD-10-CM

## 2025-03-26 RX ORDER — PREDNISONE 20 MG/1
TABLET ORAL
Qty: 60 TABLET | Refills: 2 | Status: SHIPPED | OUTPATIENT
Start: 2025-03-26

## 2025-04-02 ENCOUNTER — OFFICE VISIT (OUTPATIENT)
Dept: NEUROLOGY | Facility: CLINIC | Age: 80
End: 2025-04-02
Payer: MEDICARE

## 2025-04-02 VITALS
BODY MASS INDEX: 26 KG/M2 | DIASTOLIC BLOOD PRESSURE: 65 MMHG | HEART RATE: 55 BPM | SYSTOLIC BLOOD PRESSURE: 133 MMHG | WEIGHT: 181.2 LBS | RESPIRATION RATE: 16 BRPM | OXYGEN SATURATION: 98 %

## 2025-04-02 DIAGNOSIS — G70.00 MYASTHENIA GRAVIS (H): ICD-10-CM

## 2025-04-02 DIAGNOSIS — G12.20 MOTOR NEURON DISORDER (H): ICD-10-CM

## 2025-04-02 DIAGNOSIS — R29.898 BILATERAL ARM WEAKNESS: ICD-10-CM

## 2025-04-02 DIAGNOSIS — M33.20: Primary | ICD-10-CM

## 2025-04-02 ASSESSMENT — PAIN SCALES - GENERAL: PAINLEVEL_OUTOF10: NO PAIN (0)

## 2025-04-02 NOTE — NURSING NOTE
Chief Complaint   Patient presents with    RECHECK     /65 (BP Location: Left arm, Patient Position: Sitting, Cuff Size: Adult Regular)   Pulse 55   Resp 16   Wt 82.2 kg (181 lb 3.2 oz)   SpO2 98%   BMI 26.00 kg/m      ANIYAH DUQUE

## 2025-04-02 NOTE — LETTER
4/2/2025       RE: Mendez Greene  [address] [facility] [address]     Dear Colleague,    Thank you for referring your patient, Mendez Greene, to the [facility] [place] at [facility]. Please see a copy of my visit note below.    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  Bilateral arm strength has improved.  He can now lift 15 pound weights.  He also noticed more range of motion in finger extension, but this has not returned to baseline.  Getting up from chair and walking are better.  Swallowing has significantly improved to the point that he has his PEG tube removed.  He can now tolerate all food consistency.  He has gained a few pounds back.  He has not noticed any side effect from prednisone.  No new symptoms.  He continues to take pyridostigmine around-the-clock but uncertain if this is helping with any symptoms.    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   COMMENT:   The presence of perimysial and perivascicularr inflammatory cells, and heterogeneous fascicular atrophy and fibrosis may provide the histological appearance of inflammatory myopathy with propensity to involve connective tissue as there was no significant endomysial or perivascular inflammation. Non-specific nature of this biopsy may be due to heterogeneous involvement and anti-inflammatory treatment effect. Other systemic connective tissue diseases, such as lupus erythematosus and mixed connective tissue disorder, can cause a similar histological appearance. Clinical correlation is required. Tissue remains for ultrastructural, biochemical or genetic testing if clinically  indicated.       The following portions of the patient's history were reviewed and updated as appropriate: allergies, current medications, family history, medical history, surgical history, social history, and problem list.     NEUROLOGICAL EXAMINATION  Mental status: normal.  Gait: cautious gait, slightly wide based. Able to stand on toes but not on heels.   Getting up from seated position without pushing on chair: yes  Posture: normal.  Coordination: normal.  Romberg: negative.  Speech: no dysarthria on today's exam  Extrapyramidal: normal.              Cranial nerves   R Muscle strength L   R   L   5 Frontalis 5           5 Orbicularis oculi 5   3 mm Pupils 3 mm   5 Lower facial muscles 5   nl Light reflex nl   5 Temporal-Masseter 5   no Ptosis no   5 Palate-Pharynx 5   nl Extraocular muscles nl   5 Sternocleidomastoid 5           5 Trapezius 5   nl Hearing nl   5 Genioglossus 5           Muscle atrophy/ enlargement: no  Fasciculations: no tongue fasciculations or atrophy.   Normal jaw jerk      No fatigable ptosis after sustained upward gaze. Normal EOM in all directions.       R Muscle, strength L   R Muscle, strength L     Neck        Trunk     5 Neck flexors 5     Abdominal muscles     5 Neck extensors 5     Paraspinals       Upper Limbs       Lower Limbs       Supraspinatus     5 Iliopsoas 5     Infraspinatus     5 Adductors, thigh 5     Pectoralis     5 Gluteus medius 5   5* Deltoid 5   5 Gluteus max 5   4 Biceps brachii 4+   5 Quadriceps 5     Brachioradialis     5 Hamstrings 5     Supinator/pronator     5 Tibialis anterior 5   5 Triceps 5   5 Peronei 5   4+ Wrist extensor 4+   5 EHL 5   4 Wrist flexors 3   5 Toe extensors 5   3 Digit extensors 1   5 Tibialis post. 5   5 Digit flexors 5   5 Toe flexors 5   2 Thenar 2   5 Calf muscles 5   1 Hypothenar 1           1 Interossei 1           *no fatigable weakness  Muscle atrophy / enlargement: atrophy of intrinsic hand muscles both thenar and  hypothenar  Fasciculations: no fasciculations observed on exam today      R Reflexes L   0 Biceps brachii 1   1 Brachioradialis 1   2 Triceps 2   no Best no   2 Quadriceps 1   0 Gastroc-soleus 0   no Clonus (ankle) no   flexion Plantar response flexion          Sensation  Face: normal.  Upper limbs: normal for all modalities.  Lower limbs: normal for all modalities.       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  There continues to be no fatigability to suggest neuromuscular junction defect.  We discussed that the muscle biopsy findings supports the diagnosis of inflammatory myositis.  Myositis specific antibody was overall unremarkable except mild elevation of U1 RNP antibody.  CT abdomen pelvis did not show occult malignancy.  Treatment of inflammatory myositis requires long-term immunosuppressive therapy.  We agree on starting mycophenolate mofetil as steroid sparing agent.  Will start tapering prednisone down slowly.    Recommendations:  - Start mycophenolate mofetil 500 mg twice daily for 1 week then increase to 1000 mg twice daily  - Continue prednisone 40 mg for 2 weeks then decrease to 30 mg for 2 weeks then 25 mg for 2 weeks then 20 mg for 2 weeks then 15 mg until next appointment  - Continue PT, OT  -Labs for CBC and CMP every 2 weeks after initiation of CellCept  - Follow-up in June 2025    I spent a total of 40 minutes on the day of the visit for chart review, face-to-face visit, counseling/coordination of  care, and documentation. Please see the note for further information on patient assessment and treatment.         PATIENT EDUCATION  Ready to learn, no apparent learning barriers were identified; learning preferences include listening.  Explained diagnosis and treatment plan; patient expressed understanding of the content.      Again, thank you for allowing me to participate in the care of your patient.      Sincerely,    Zbigniew Abdullahi MD

## 2025-04-02 NOTE — PROGRESS NOTES
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  Bilateral arm strength has improved.  He can now lift 15 pound weights.  He also noticed more range of motion in finger extension, but this has not returned to baseline.  Getting up from chair and walking are better.  Swallowing has significantly improved to the point that he has his PEG tube removed.  He can now tolerate all food consistency.  He has gained a few pounds back.  He has not noticed any side effect from prednisone.  No new symptoms.  He continues to take pyridostigmine around-the-clock but uncertain if this is helping with any symptoms.    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   COMMENT:   The presence of perimysial and perivascicularr inflammatory cells, and heterogeneous fascicular atrophy and fibrosis may provide the histological appearance of inflammatory myopathy with propensity to involve connective tissue as there was no significant endomysial or perivascular inflammation. Non-specific nature of this biopsy may be due to heterogeneous involvement and anti-inflammatory treatment effect. Other systemic connective tissue diseases, such as lupus erythematosus and mixed connective tissue disorder, can cause a similar histological appearance. Clinical correlation is required. Tissue remains for ultrastructural, biochemical or genetic testing if clinically indicated.       The following portions of the patient's history were reviewed and updated as appropriate: allergies, current medications, family history, medical history, surgical history, social history, and problem list.     NEUROLOGICAL EXAMINATION  Mental status: normal.  Gait: cautious gait, slightly wide based. Able to  stand on toes but not on heels.   Getting up from seated position without pushing on chair: yes  Posture: normal.  Coordination: normal.  Romberg: negative.  Speech: no dysarthria on today's exam  Extrapyramidal: normal.              Cranial nerves   R Muscle strength L   R   L   5 Frontalis 5           5 Orbicularis oculi 5   3 mm Pupils 3 mm   5 Lower facial muscles 5   nl Light reflex nl   5 Temporal-Masseter 5   no Ptosis no   5 Palate-Pharynx 5   nl Extraocular muscles nl   5 Sternocleidomastoid 5           5 Trapezius 5   nl Hearing nl   5 Genioglossus 5           Muscle atrophy/ enlargement: no  Fasciculations: no tongue fasciculations or atrophy.   Normal jaw jerk      No fatigable ptosis after sustained upward gaze. Normal EOM in all directions.       R Muscle, strength L   R Muscle, strength L     Neck        Trunk     5 Neck flexors 5     Abdominal muscles     5 Neck extensors 5     Paraspinals       Upper Limbs       Lower Limbs       Supraspinatus     5 Iliopsoas 5     Infraspinatus     5 Adductors, thigh 5     Pectoralis     5 Gluteus medius 5   5* Deltoid 5   5 Gluteus max 5   4 Biceps brachii 4+   5 Quadriceps 5     Brachioradialis     5 Hamstrings 5     Supinator/pronator     5 Tibialis anterior 5   5 Triceps 5   5 Peronei 5   4+ Wrist extensor 4+   5 EHL 5   4 Wrist flexors 3   5 Toe extensors 5   3 Digit extensors 1   5 Tibialis post. 5   5 Digit flexors 5   5 Toe flexors 5   2 Thenar 2   5 Calf muscles 5   1 Hypothenar 1           1 Interossei 1           *no fatigable weakness  Muscle atrophy / enlargement: atrophy of intrinsic hand muscles both thenar and hypothenar  Fasciculations: no fasciculations observed on exam today      R Reflexes L   0 Biceps brachii 1   1 Brachioradialis 1   2 Triceps 2   no Best no   2 Quadriceps 1   0 Gastroc-soleus 0   no Clonus (ankle) no   flexion Plantar response flexion          Sensation  Face: normal.  Upper limbs: normal for all modalities.  Lower limbs:  normal for all modalities.       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  There continues to be no fatigability to suggest neuromuscular junction defect.  We discussed that the muscle biopsy findings supports the diagnosis of inflammatory myositis.  Myositis specific antibody was overall unremarkable except mild elevation of U1 RNP antibody.  CT abdomen pelvis did not show occult malignancy.  Treatment of inflammatory myositis requires long-term immunosuppressive therapy.  We agree on starting mycophenolate mofetil as steroid sparing agent.  Will start tapering prednisone down slowly.    Recommendations:  - Start mycophenolate mofetil 500 mg twice daily for 1 week then increase to 1000 mg twice daily  - Continue prednisone 40 mg for 2 weeks then decrease to 30 mg for 2 weeks then 25 mg for 2 weeks then 20 mg for 2 weeks then 15 mg until next appointment  - Continue PT, OT  -Labs for CBC and CMP every 2 weeks after initiation of CellCept  - Follow-up in June 2025    I spent a total of 40 minutes on the day of the visit for chart review, face-to-face visit, counseling/coordination of care, and documentation. Please see the note for further information on patient assessment and treatment.         PATIENT EDUCATION  Ready to learn, no apparent learning barriers were identified; learning preferences include listening.  Explained diagnosis and treatment plan; patient expressed understanding of the content.

## 2025-04-03 ENCOUNTER — TELEPHONE (OUTPATIENT)
Dept: NEUROLOGY | Facility: CLINIC | Age: 80
End: 2025-04-03
Payer: MEDICARE

## 2025-04-03 RX ORDER — PREDNISONE 10 MG/1
TABLET ORAL
Qty: 240 TABLET | Refills: 1 | Status: SHIPPED | OUTPATIENT
Start: 2025-04-03

## 2025-04-03 RX ORDER — MYCOPHENOLATE MOFETIL 500 MG/1
TABLET ORAL
Qty: 374 TABLET | Refills: 0 | Status: SHIPPED | OUTPATIENT
Start: 2025-04-03 | End: 2025-07-09

## 2025-04-03 NOTE — TELEPHONE ENCOUNTER
Prior Authorization Retail Medication Request    Medication/Dose: mycophenolate (GENERIC EQUIVALENT) 500 MG tablet  Diagnosis and ICD code (if different than what is on RX):    New/renewal/insurance change PA/secondary ins. PA:  Previously Tried and Failed:    Rationale:      Insurance   Primary:   Insurance ID:      Secondary (if applicable):  Insurance ID:      Pharmacy Information (if different than what is on RX)  Name:    Phone:    Fax:    Clinic Information  Preferred routing pool for dept communication:

## 2025-04-04 NOTE — TELEPHONE ENCOUNTER
Retail Pharmacy Prior Authorization Team   Phone: [phone]    PA Initiation    Medication: MYCOPHENOLATE MOFETIL (GENERIC EQUIV) 500 MG PO TABS  Insurance Company: [employer] - Phone [phone] Fax [phone]  Pharmacy Filling the Rx: [id number] [place], [place] - [address] [facility]  Filling Pharmacy Phone: [phone]  Filling Pharmacy Fax:    Start Date: 4/4/2025  *Took a call from JOEY Ramires @[facility] wanting to verify DX for BvsD determination. Determined this goes to Medicare Part D and will require a PA.   Initiated PA over the phone.     CASE # [id number]

## 2025-04-07 DIAGNOSIS — E03.9 HYPOTHYROIDISM, UNSPECIFIED TYPE: ICD-10-CM

## 2025-04-07 RX ORDER — LEVOTHYROXINE SODIUM 175 UG/1
175 TABLET ORAL DAILY
Qty: 90 TABLET | Refills: 1 | Status: SHIPPED | OUTPATIENT
Start: 2025-04-07

## 2025-04-07 NOTE — TELEPHONE ENCOUNTER
Outpatient Medication Detail     Disp Refills Start End PAIGE   levothyroxine (SYNTHROID/LEVOTHROID) 175 MCG tablet 30 tablet 9 2/10/2025 -- No   Sig - Route: Take 1 tablet (175 mcg) by mouth daily. - Oral       Patient seeking 90 day fill; pended #90 R1    RT Daya (R)

## 2025-04-08 DIAGNOSIS — Z53.9 DIAGNOSIS NOT YET DEFINED: Primary | ICD-10-CM

## 2025-04-08 PROCEDURE — G0179 MD RECERTIFICATION HHA PT: HCPCS | Performed by: INTERNAL MEDICINE

## 2025-04-09 NOTE — TELEPHONE ENCOUNTER
Retail Pharmacy Prior Authorization Team   Phone: [phone]    Prior Authorization Approval    Medication: MYCOPHENOLATE MOFETIL (GENERIC EQUIV) 500 MG PO TABS  Authorization Effective Date: 1/5/2025  Authorization Expiration Date: 4/5/2026  Reference #:   [id number]   Insurance Company: [employer] - Phone [phone] Fax [phone]  Which Pharmacy is filling the prescription: [id number] - [facility] [address] [facility]  Pharmacy Notified: yes  Patient Notified: **Instructed pharmacy to notify patient when script is ready to /ship.**    Spoke to JOEY Russell @[phone], PA approved

## 2025-04-11 ENCOUNTER — MEDICAL CORRESPONDENCE (OUTPATIENT)
Dept: HEALTH INFORMATION MANAGEMENT | Facility: CLINIC | Age: 80
End: 2025-04-11
Payer: MEDICARE

## 2025-04-25 ENCOUNTER — APPOINTMENT (OUTPATIENT)
Dept: URBAN - METROPOLITAN AREA CLINIC 256 | Age: 80
Setting detail: DERMATOLOGY
End: 2025-04-26

## 2025-04-25 VITALS — WEIGHT: 173.8 LBS | HEIGHT: 70 IN

## 2025-04-25 DIAGNOSIS — L57.0 ACTINIC KERATOSIS: ICD-10-CM

## 2025-04-25 DIAGNOSIS — Z71.89 OTHER SPECIFIED COUNSELING: ICD-10-CM

## 2025-04-25 DIAGNOSIS — L57.8 OTHER SKIN CHANGES DUE TO CHRONIC EXPOSURE TO NONIONIZING RADIATION: ICD-10-CM

## 2025-04-25 DIAGNOSIS — D18.0 HEMANGIOMA: ICD-10-CM

## 2025-04-25 DIAGNOSIS — D22 MELANOCYTIC NEVI: ICD-10-CM

## 2025-04-25 DIAGNOSIS — L21.8 OTHER SEBORRHEIC DERMATITIS: ICD-10-CM

## 2025-04-25 DIAGNOSIS — L82.1 OTHER SEBORRHEIC KERATOSIS: ICD-10-CM

## 2025-04-25 PROBLEM — D22.5 MELANOCYTIC NEVI OF TRUNK: Status: ACTIVE | Noted: 2025-04-25

## 2025-04-25 PROBLEM — D22.62 MELANOCYTIC NEVI OF LEFT UPPER LIMB, INCLUDING SHOULDER: Status: ACTIVE | Noted: 2025-04-25

## 2025-04-25 PROBLEM — D22.61 MELANOCYTIC NEVI OF RIGHT UPPER LIMB, INCLUDING SHOULDER: Status: ACTIVE | Noted: 2025-04-25

## 2025-04-25 PROBLEM — D22.39 MELANOCYTIC NEVI OF OTHER PARTS OF FACE: Status: ACTIVE | Noted: 2025-04-25

## 2025-04-25 PROBLEM — D18.01 HEMANGIOMA OF SKIN AND SUBCUTANEOUS TISSUE: Status: ACTIVE | Noted: 2025-04-25

## 2025-04-25 PROCEDURE — OTHER MIPS QUALITY: OTHER

## 2025-04-25 PROCEDURE — OTHER LIQUID NITROGEN: OTHER

## 2025-04-25 PROCEDURE — 99213 OFFICE O/P EST LOW 20 MIN: CPT | Mod: 25

## 2025-04-25 PROCEDURE — OTHER PRESCRIPTION MEDICATION MANAGEMENT: OTHER

## 2025-04-25 PROCEDURE — OTHER COUNSELING: OTHER

## 2025-04-25 PROCEDURE — 17003 DESTRUCT PREMALG LES 2-14: CPT

## 2025-04-25 PROCEDURE — 17000 DESTRUCT PREMALG LESION: CPT

## 2025-04-25 PROCEDURE — OTHER PRESCRIPTION: OTHER

## 2025-04-25 RX ORDER — KETOCONAZOLE 20 MG/G
CREAM TOPICAL BID
Qty: 30 | Refills: 5 | Status: ERX

## 2025-04-25 RX ORDER — HYDROCORTISONE 25 MG/G
CREAM TOPICAL BID
Qty: 30 | Refills: 5 | Status: ERX

## 2025-04-25 ASSESSMENT — LOCATION SIMPLE DESCRIPTION DERM
LOCATION SIMPLE: LEFT CHEEK
LOCATION SIMPLE: LEFT UPPER ARM
LOCATION SIMPLE: POSTERIOR SCALP
LOCATION SIMPLE: LEFT FOREARM
LOCATION SIMPLE: SCALP
LOCATION SIMPLE: LEFT OCCIPITAL SCALP
LOCATION SIMPLE: RIGHT ZYGOMA
LOCATION SIMPLE: RIGHT FOREARM
LOCATION SIMPLE: LEFT UPPER BACK
LOCATION SIMPLE: RIGHT TEMPLE
LOCATION SIMPLE: UPPER BACK

## 2025-04-25 ASSESSMENT — LOCATION DETAILED DESCRIPTION DERM
LOCATION DETAILED: SUPERIOR THORACIC SPINE
LOCATION DETAILED: LEFT CENTRAL PARIETAL SCALP
LOCATION DETAILED: RIGHT MEDIAL TEMPLE
LOCATION DETAILED: LEFT INFERIOR LATERAL MALAR CHEEK
LOCATION DETAILED: RIGHT CENTRAL ZYGOMA
LOCATION DETAILED: LEFT SUPERIOR PARIETAL SCALP
LOCATION DETAILED: LEFT SUPERIOR MEDIAL UPPER BACK
LOCATION DETAILED: LEFT SUPERIOR OCCIPITAL SCALP
LOCATION DETAILED: RIGHT CENTRAL PARIETAL SCALP
LOCATION DETAILED: LEFT CENTRAL MALAR CHEEK
LOCATION DETAILED: LEFT VENTRAL PROXIMAL FOREARM
LOCATION DETAILED: RIGHT VENTRAL PROXIMAL FOREARM
LOCATION DETAILED: LEFT ANTECUBITAL SKIN
LOCATION DETAILED: LEFT INFERIOR CENTRAL MALAR CHEEK

## 2025-04-25 ASSESSMENT — LOCATION ZONE DERM
LOCATION ZONE: TRUNK
LOCATION ZONE: SCALP
LOCATION ZONE: ARM
LOCATION ZONE: FACE

## 2025-04-25 NOTE — PROCEDURE: LIQUID NITROGEN
Consent: The patient's verbal consent was obtained including but not limited to risks of crusting, scabbing, blistering, scarring, darker or lighter pigmentary change, recurrence, incomplete removal and infection.
Detail Level: Simple
Show Applicator Variable?: Yes
Duration Of Freeze Thaw-Cycle (Seconds): 3
Post-Care Instructions: I reviewed with the patient in detail post-care instructions. Patient is to wear sunprotection, and avoid picking at any of the treated lesions. Pt may apply Vaseline to crusted or scabbing areas.
Render Note In Bullet Format When Appropriate: No
Number Of Freeze-Thaw Cycles: 1 freeze-thaw cycle

## 2025-04-25 NOTE — PROCEDURE: PRESCRIPTION MEDICATION MANAGEMENT
Detail Level: Zone
Render In Strict Bullet Format?: No
Continue Regimen: Mix 1:1 Hydrocortisone 2.5% topical cream & Ketoconozole 2% cream BID to affected areas on the face BID until clear then stop.

## 2025-04-30 ENCOUNTER — TELEPHONE (OUTPATIENT)
Dept: INTERNAL MEDICINE | Facility: CLINIC | Age: 80
End: 2025-04-30
Payer: MEDICARE

## 2025-04-30 NOTE — TELEPHONE ENCOUNTER
Home Care is calling regarding an established patient with [facility].  Requesting orders from: Gladys Vazquez: RN able to provide verbal orders.   OTHER ACTION: Pt needs to be triage for symptoms of lightheadedness and fatigue since Monday.   Is this a request for a temporary pause in the home care episode?  No    Orders Requested    Occupational Therapy  Request for continuation of care with no increase or decrease in frequency  Frequency: 5 visits in 9 wks  RN gave verbal order: Yes    Pt is tapering Prednisone. BP today 101/57 pulse 86  and SpO2 100 %.    Per OT, neurology is aware of symptoms.       Phone number Home Care can be reached at: [phone]  Okay to leave a detailed message?: Yes      Darrell Thompson RN

## 2025-04-30 NOTE — TELEPHONE ENCOUNTER
Triage RN attempted to call the patient to triage patient's symptoms. LVM for patient to call the clinic back.     Patient Contact    Attempt # 1    Was call answered?  No.  Left message on voicemail with information to call me back.    Upon call back: please triage symptoms.     Aubree Hall RN

## 2025-04-30 NOTE — TELEPHONE ENCOUNTER
This is FYI for PCP - See neuro encounters from yesterday. This lightheadedness is being managed by Neuro.

## 2025-05-01 ENCOUNTER — TELEPHONE (OUTPATIENT)
Dept: INTERNAL MEDICINE | Facility: CLINIC | Age: 80
End: 2025-05-01
Payer: MEDICARE

## 2025-05-01 NOTE — TELEPHONE ENCOUNTER
Home Care is calling regarding an established patient with [facility].  Requesting orders from: Gladys Vazquez: RN able to provide verbal orders.  Sending as FYI only.  Is this a request for a temporary pause in the home care episode?  No    Orders Requested    Physical Therapy  Request for continuation of care with no increase or decrease in frequency  Frequency: 1x/1k and then eow for 6 wks  RN gave verbal order: Yes          Phone number Home Care can be reached at: [phone]  Okay to leave a detailed message?: Yes    Contacts       Contact Date/Time Type Contact Phone/Fax    05/01/2025 04:36 PM CDT Phone (Incoming) ZHEN reynolds, [facility] (Home Care) [phone]     secure line          Sam Barrientos RN

## 2025-05-04 ENCOUNTER — NURSE TRIAGE (OUTPATIENT)
Dept: INTERNAL MEDICINE | Facility: CLINIC | Age: 80
End: 2025-05-04
Payer: MEDICARE

## 2025-05-04 DIAGNOSIS — G70.01 MYASTHENIC CRISIS (H): ICD-10-CM

## 2025-05-04 DIAGNOSIS — G70.00 MYASTHENIA GRAVIS (H): ICD-10-CM

## 2025-05-04 DIAGNOSIS — G12.20 MND (MOTOR NEURONE DISEASE) (H): ICD-10-CM

## 2025-05-05 ENCOUNTER — LAB (OUTPATIENT)
Dept: LAB | Facility: CLINIC | Age: 80
End: 2025-05-05
Payer: MEDICARE

## 2025-05-05 DIAGNOSIS — G72.49 INFLAMMATORY MYOPATHY: ICD-10-CM

## 2025-05-05 DIAGNOSIS — M33.20: Primary | ICD-10-CM

## 2025-05-05 DIAGNOSIS — M33.20: ICD-10-CM

## 2025-05-05 DIAGNOSIS — R30.0 DYSURIA: ICD-10-CM

## 2025-05-05 DIAGNOSIS — R29.898 BILATERAL ARM WEAKNESS: ICD-10-CM

## 2025-05-05 LAB
ALBUMIN SERPL BCG-MCNC: 4 G/DL (ref 3.5–5.2)
ALBUMIN UR-MCNC: NEGATIVE MG/DL
ALP SERPL-CCNC: 38 U/L (ref 40–150)
ALT SERPL W P-5'-P-CCNC: 18 U/L (ref 0–70)
ANION GAP SERPL CALCULATED.3IONS-SCNC: 12 MMOL/L (ref 7–15)
APPEARANCE UR: CLEAR
AST SERPL W P-5'-P-CCNC: 19 U/L (ref 0–45)
BACTERIA #/AREA URNS HPF: ABNORMAL /HPF
BASOPHILS # BLD AUTO: 0 10E3/UL (ref 0–0.2)
BASOPHILS NFR BLD AUTO: 0 %
BILIRUB SERPL-MCNC: 0.4 MG/DL
BILIRUB UR QL STRIP: NEGATIVE
BUN SERPL-MCNC: 41.6 MG/DL (ref 8–23)
CALCIUM SERPL-MCNC: 9.3 MG/DL (ref 8.8–10.4)
CHLORIDE SERPL-SCNC: 105 MMOL/L (ref 98–107)
COLOR UR AUTO: YELLOW
CREAT SERPL-MCNC: 0.9 MG/DL (ref 0.67–1.17)
EGFRCR SERPLBLD CKD-EPI 2021: 86 ML/MIN/1.73M2
EOSINOPHIL # BLD AUTO: 0 10E3/UL (ref 0–0.7)
EOSINOPHIL NFR BLD AUTO: 0 %
ERYTHROCYTE [DISTWIDTH] IN BLOOD BY AUTOMATED COUNT: 16.9 % (ref 10–15)
GLUCOSE SERPL-MCNC: 177 MG/DL (ref 70–99)
GLUCOSE UR STRIP-MCNC: NEGATIVE MG/DL
HCO3 SERPL-SCNC: 22 MMOL/L (ref 22–29)
HCT VFR BLD AUTO: 29.6 % (ref 40–53)
HGB BLD-MCNC: 9.6 G/DL (ref 13.3–17.7)
HGB UR QL STRIP: ABNORMAL
IMM GRANULOCYTES # BLD: 0.2 10E3/UL
IMM GRANULOCYTES NFR BLD: 3 %
KETONES UR STRIP-MCNC: NEGATIVE MG/DL
LEUKOCYTE ESTERASE UR QL STRIP: ABNORMAL
LYMPHOCYTES # BLD AUTO: 0.8 10E3/UL (ref 0.8–5.3)
LYMPHOCYTES NFR BLD AUTO: 10 %
MCH RBC QN AUTO: 30.6 PG (ref 26.5–33)
MCHC RBC AUTO-ENTMCNC: 32.4 G/DL (ref 31.5–36.5)
MCV RBC AUTO: 94 FL (ref 78–100)
MONOCYTES # BLD AUTO: 0.3 10E3/UL (ref 0–1.3)
MONOCYTES NFR BLD AUTO: 4 %
NEUTROPHILS # BLD AUTO: 6.6 10E3/UL (ref 1.6–8.3)
NEUTROPHILS NFR BLD AUTO: 84 %
NITRATE UR QL: NEGATIVE
PH UR STRIP: 6 [PH] (ref 5–7)
PLATELET # BLD AUTO: 260 10E3/UL (ref 150–450)
POTASSIUM SERPL-SCNC: 4.4 MMOL/L (ref 3.4–5.3)
PROT SERPL-MCNC: 6 G/DL (ref 6.4–8.3)
RBC # BLD AUTO: 3.14 10E6/UL (ref 4.4–5.9)
RBC #/AREA URNS AUTO: ABNORMAL /HPF
SODIUM SERPL-SCNC: 139 MMOL/L (ref 135–145)
SP GR UR STRIP: 1.01 (ref 1–1.03)
SQUAMOUS #/AREA URNS AUTO: ABNORMAL /LPF
UROBILINOGEN UR STRIP-ACNC: 0.2 E.U./DL
WBC # BLD AUTO: 7.9 10E3/UL (ref 4–11)
WBC #/AREA URNS AUTO: ABNORMAL /HPF
WBC CLUMPS #/AREA URNS HPF: PRESENT /HPF

## 2025-05-05 PROCEDURE — 81001 URINALYSIS AUTO W/SCOPE: CPT

## 2025-05-05 PROCEDURE — 85025 COMPLETE CBC W/AUTO DIFF WBC: CPT

## 2025-05-05 PROCEDURE — 36415 COLL VENOUS BLD VENIPUNCTURE: CPT

## 2025-05-05 PROCEDURE — 87086 URINE CULTURE/COLONY COUNT: CPT

## 2025-05-05 PROCEDURE — 80053 COMPREHEN METABOLIC PANEL: CPT

## 2025-05-06 LAB — BACTERIA UR CULT: NORMAL

## 2025-05-07 RX ORDER — PYRIDOSTIGMINE BROMIDE 60 MG/1
60 TABLET ORAL 2 TIMES DAILY
COMMUNITY
Start: 2025-05-07

## 2025-05-07 NOTE — TELEPHONE ENCOUNTER
"Nurse Triage SBAR    Is this a 2nd Level Triage? YES, LICENSED PRACTITIONER REVIEW IS REQUIRED    Situation: Pt sent in a [facility] message with concerns. PT was called but the call dropped. Pt called the clinic back.     Background: Pt has a hx of afib. 3/25/25 was the first time he got a notification of afib on his fitbit. Then one on 3/25/25. Ever since 4/28/25 he has been getting the notifications daily. Pt stated it looks like this happens around the time he is getting up to urinate at night.      Assessment: Pt reports per his fitbit this morning at 1250 his HR ranged from . @ 0110 HR ranged from . @ 0140 this morning HR ranged .      Pt stated he does feel anything (palpitations, chest pain, difficulty breathing, dizziness). Pt stated he had lightheadedness about a week ago that has since pretty much cleared.      Pt stated he always feels weak and its nothing new because his immune system is eating away at his muscles.     Protocol Recommended Disposition:   Go To ED/UCC Now (Or To Office With PCP Approval)    Recommendation: Does pt need to be seen urgently for this? Pt is also asking PCP to look over recent UA and advise.     Routing to PCP to review and advise.     Please call pt back with PCPs recommendations. Pt is asking to be called after 1430 because he will be at the dentist.     Routed to provider    Does the patient meet one of the following criteria for ADS visit consideration? 16+ years old, with an [facility] PCP     TIP  Providers, please consider if this condition is appropriate for management at one of our Acute and Diagnostic Services sites.     If patient is a good candidate, please use dotphrase <dot>triageresponse and select Refer to ADS to document.       Call back after 1430- pt has a dentist appt      Pt is asking to look at UA.   1. DESCRIPTION: \"Please describe your heart rate or heartbeat that you are having\" (e.g., fast/slow, regular/irregular, skipped or extra beats, " "\"palpitations\")      Doesn't feel it   2. ONSET: \"When did it start?\" (e.g., minutes, hours, days)       During a medication changed- weaned off predisone (2nd week of reduced dosage)   3. DURATION: \"How long does it last\" (e.g., seconds, minutes, hours)      About once a day   4. PATTERN \"Does it come and go, or has it been constant since it started?\"  \"Does it get worse with exertion?\"   \"Are you feeling it now?\"      Comes and goes   5. TAP: \"Using your hand, can you tap out what you are feeling on a chair or table in front of you, so that I can hear?\" Note: Not all patients can do this.        Not able   6. HEART RATE: \"Can you tell me your heart rate?\" \"How many beats in 15 seconds?\"  Note: Not all patients can do this.        59  7. RECURRENT SYMPTOM: \"Have you ever had this before?\" If Yes, ask: \"When was the last time?\" and \"What happened that time?\"       Pt has a hx of afib   8. CAUSE: \"What do you think is causing the palpitations?\"      Unknown   9. CARDIAC HISTORY: \"Do you have any history of heart disease?\" (e.g., heart attack, angina, bypass surgery, angioplasty, arrhythmia)       Hx of afib   10. OTHER SYMPTOMS: \"Do you have any other symptoms?\" (e.g., dizziness, chest pain, sweating, difficulty breathing)        Lightheadedness about a week ago (for about a week) that isnt there much   11. PREGNANCY: \"Is there any chance you are pregnant?\" \"When was your last menstrual period?\"        NA  Reason for Disposition   Patient sounds very sick or weak to the triager    Additional Information   Negative: Passed out (e.g., fainted, lost consciousness, blacked out and was not responding)   Negative: Shock suspected (e.g., cold/pale/clammy skin, too weak to stand, low BP, rapid pulse)   Negative: Difficult to awaken or acting confused (e.g., disoriented, slurred speech)   Negative: Visible sweat on face or sweat dripping down face   Negative: Unable to walk, or can only walk with assistance (e.g., requires " support)   Negative: Received SHOCK from implantable cardiac defibrillator and has persisting symptoms (i.e., palpitations, lightheadedness)   Negative: Feeling weak or lightheaded (e.g., woozy, feeling like they might faint) AND heart beating very rapidly (e.g., > 140 / minute)   Negative: Feeling weak or lightheaded (e.g., woozy, feeling like they might faint) AND heart beating very slowly (e.g., < 50 / minute)   Negative: Sounds like a life-threatening emergency to the triager   Negative: Chest pain   Negative: Difficulty breathing   Negative: Feeling weak or lightheaded (e.g., woozy, feeling like they might faint)   Negative: Heart beating very rapidly (e.g., > 140 / minute) and present now  (Exception: During exercise.)   Negative: Heart beating very slowly (e.g., < 50 / minute)  (Exception: Athlete and heart rate normal for caller.)   Negative: New or worsened shortness of breath with activity (dyspnea on exertion)    Protocols used: Heart Rate and Heartbeat [id number]

## 2025-05-07 NOTE — TELEPHONE ENCOUNTER
Noted.     Do not recommend a change in management at this time.    He has know paroxysmal atrial fibrillation. He has previously declined anticoagulation and is on a daily aspirin.    His heart rate is generally within normal limits/low, so do not recommend adding a rate control agent at this time.    Urine results reviewed. Negative culture. No treatment indicated at this time.

## 2025-05-07 NOTE — TELEPHONE ENCOUNTER
Called and spoke with pt to relay provider note below.   He verbalized understanding and declined further questions at this time.     Thank you,  Araseli Izquierdo RN

## 2025-05-07 NOTE — TELEPHONE ENCOUNTER
Please see  regarding afib on [employer]. Triage called patient but he ended the call.   Looks like Neurology ordered urine labs that patient requested.

## 2025-05-12 DIAGNOSIS — Z53.9 DIAGNOSIS NOT YET DEFINED: Primary | ICD-10-CM

## 2025-05-12 PROCEDURE — G0179 MD RECERTIFICATION HHA PT: HCPCS | Performed by: INTERNAL MEDICINE

## 2025-05-19 ENCOUNTER — LAB (OUTPATIENT)
Dept: LAB | Facility: CLINIC | Age: 80
End: 2025-05-19
Payer: MEDICARE

## 2025-05-19 DIAGNOSIS — R29.898 BILATERAL ARM WEAKNESS: ICD-10-CM

## 2025-05-19 DIAGNOSIS — M33.20: ICD-10-CM

## 2025-05-19 LAB
ALBUMIN SERPL BCG-MCNC: 3.9 G/DL (ref 3.5–5.2)
ALP SERPL-CCNC: 43 U/L (ref 40–150)
ALT SERPL W P-5'-P-CCNC: 18 U/L (ref 0–70)
ANION GAP SERPL CALCULATED.3IONS-SCNC: 12 MMOL/L (ref 7–15)
AST SERPL W P-5'-P-CCNC: 20 U/L (ref 0–45)
BASOPHILS # BLD AUTO: 0 10E3/UL (ref 0–0.2)
BASOPHILS NFR BLD AUTO: 0 %
BILIRUB SERPL-MCNC: 0.4 MG/DL
BUN SERPL-MCNC: 33.6 MG/DL (ref 8–23)
CALCIUM SERPL-MCNC: 9.2 MG/DL (ref 8.8–10.4)
CHLORIDE SERPL-SCNC: 105 MMOL/L (ref 98–107)
CREAT SERPL-MCNC: 0.79 MG/DL (ref 0.67–1.17)
EGFRCR SERPLBLD CKD-EPI 2021: 90 ML/MIN/1.73M2
EOSINOPHIL # BLD AUTO: 0 10E3/UL (ref 0–0.7)
EOSINOPHIL NFR BLD AUTO: 0 %
ERYTHROCYTE [DISTWIDTH] IN BLOOD BY AUTOMATED COUNT: 17.2 % (ref 10–15)
GLUCOSE SERPL-MCNC: 121 MG/DL (ref 70–99)
HCO3 SERPL-SCNC: 22 MMOL/L (ref 22–29)
HCT VFR BLD AUTO: 30.5 % (ref 40–53)
HGB BLD-MCNC: 9.7 G/DL (ref 13.3–17.7)
IMM GRANULOCYTES # BLD: 0.1 10E3/UL
IMM GRANULOCYTES NFR BLD: 1 %
LYMPHOCYTES # BLD AUTO: 0.5 10E3/UL (ref 0.8–5.3)
LYMPHOCYTES NFR BLD AUTO: 6 %
MCH RBC QN AUTO: 30 PG (ref 26.5–33)
MCHC RBC AUTO-ENTMCNC: 31.8 G/DL (ref 31.5–36.5)
MCV RBC AUTO: 94 FL (ref 78–100)
MONOCYTES # BLD AUTO: 0.4 10E3/UL (ref 0–1.3)
MONOCYTES NFR BLD AUTO: 5 %
NEUTROPHILS # BLD AUTO: 8.1 10E3/UL (ref 1.6–8.3)
NEUTROPHILS NFR BLD AUTO: 88 %
PLATELET # BLD AUTO: 286 10E3/UL (ref 150–450)
POTASSIUM SERPL-SCNC: 4.4 MMOL/L (ref 3.4–5.3)
PROT SERPL-MCNC: 6.2 G/DL (ref 6.4–8.3)
RBC # BLD AUTO: 3.23 10E6/UL (ref 4.4–5.9)
SODIUM SERPL-SCNC: 139 MMOL/L (ref 135–145)
WBC # BLD AUTO: 9.2 10E3/UL (ref 4–11)

## 2025-05-19 PROCEDURE — 85025 COMPLETE CBC W/AUTO DIFF WBC: CPT

## 2025-05-19 PROCEDURE — 80053 COMPREHEN METABOLIC PANEL: CPT

## 2025-05-19 PROCEDURE — 36415 COLL VENOUS BLD VENIPUNCTURE: CPT

## 2025-05-21 ENCOUNTER — TELEPHONE (OUTPATIENT)
Dept: INTERNAL MEDICINE | Facility: CLINIC | Age: 80
End: 2025-05-21
Payer: MEDICARE

## 2025-05-21 NOTE — TELEPHONE ENCOUNTER
Sharlene, OT with [facility], calling to report an unwitnessed fall on 5/14/25 that patient self-reported to OT today.  Patient tripped over object while in a rush, landed on R side.   No complaints of pain.   Denies head strike  No injuries

## 2025-06-02 ENCOUNTER — LAB (OUTPATIENT)
Dept: LAB | Facility: CLINIC | Age: 80
End: 2025-06-02
Payer: MEDICARE

## 2025-06-02 DIAGNOSIS — R29.898 BILATERAL ARM WEAKNESS: ICD-10-CM

## 2025-06-02 DIAGNOSIS — M33.20: ICD-10-CM

## 2025-06-02 LAB
ALBUMIN SERPL BCG-MCNC: 3.6 G/DL (ref 3.5–5.2)
ALP SERPL-CCNC: 53 U/L (ref 40–150)
ALT SERPL W P-5'-P-CCNC: 9 U/L (ref 0–70)
ANION GAP SERPL CALCULATED.3IONS-SCNC: 11 MMOL/L (ref 7–15)
AST SERPL W P-5'-P-CCNC: 14 U/L (ref 0–45)
BASOPHILS # BLD AUTO: 0 10E3/UL (ref 0–0.2)
BASOPHILS NFR BLD AUTO: 0 %
BILIRUB SERPL-MCNC: 0.2 MG/DL
BUN SERPL-MCNC: 27.5 MG/DL (ref 8–23)
CALCIUM SERPL-MCNC: 9.6 MG/DL (ref 8.8–10.4)
CHLORIDE SERPL-SCNC: 105 MMOL/L (ref 98–107)
CREAT SERPL-MCNC: 0.8 MG/DL (ref 0.67–1.17)
EGFRCR SERPLBLD CKD-EPI 2021: 89 ML/MIN/1.73M2
EOSINOPHIL # BLD AUTO: 0 10E3/UL (ref 0–0.7)
EOSINOPHIL NFR BLD AUTO: 0 %
ERYTHROCYTE [DISTWIDTH] IN BLOOD BY AUTOMATED COUNT: 16.8 % (ref 10–15)
GLUCOSE SERPL-MCNC: 115 MG/DL (ref 70–99)
HCO3 SERPL-SCNC: 23 MMOL/L (ref 22–29)
HCT VFR BLD AUTO: 30.1 % (ref 40–53)
HGB BLD-MCNC: 9.5 G/DL (ref 13.3–17.7)
IMM GRANULOCYTES # BLD: 0.4 10E3/UL
IMM GRANULOCYTES NFR BLD: 5 %
LYMPHOCYTES # BLD AUTO: 0.6 10E3/UL (ref 0.8–5.3)
LYMPHOCYTES NFR BLD AUTO: 6 %
MCH RBC QN AUTO: 29.4 PG (ref 26.5–33)
MCHC RBC AUTO-ENTMCNC: 31.6 G/DL (ref 31.5–36.5)
MCV RBC AUTO: 93 FL (ref 78–100)
MONOCYTES # BLD AUTO: 0.2 10E3/UL (ref 0–1.3)
MONOCYTES NFR BLD AUTO: 3 %
NEUTROPHILS # BLD AUTO: 7.7 10E3/UL (ref 1.6–8.3)
NEUTROPHILS NFR BLD AUTO: 86 %
PLATELET # BLD AUTO: 364 10E3/UL (ref 150–450)
POTASSIUM SERPL-SCNC: 4.7 MMOL/L (ref 3.4–5.3)
PROT SERPL-MCNC: 6 G/DL (ref 6.4–8.3)
RBC # BLD AUTO: 3.23 10E6/UL (ref 4.4–5.9)
SODIUM SERPL-SCNC: 139 MMOL/L (ref 135–145)
WBC # BLD AUTO: 8.9 10E3/UL (ref 4–11)

## 2025-06-02 PROCEDURE — 80053 COMPREHEN METABOLIC PANEL: CPT

## 2025-06-02 PROCEDURE — 85025 COMPLETE CBC W/AUTO DIFF WBC: CPT

## 2025-06-02 PROCEDURE — 36415 COLL VENOUS BLD VENIPUNCTURE: CPT

## 2025-06-09 ENCOUNTER — TRANSFERRED RECORDS (OUTPATIENT)
Dept: HEALTH INFORMATION MANAGEMENT | Facility: CLINIC | Age: 80
End: 2025-06-09
Payer: MEDICARE

## 2025-06-16 ENCOUNTER — RESULTS FOLLOW-UP (OUTPATIENT)
Dept: INTERNAL MEDICINE | Facility: CLINIC | Age: 80
End: 2025-06-16

## 2025-06-16 ENCOUNTER — LAB (OUTPATIENT)
Dept: LAB | Facility: CLINIC | Age: 80
End: 2025-06-16
Payer: MEDICARE

## 2025-06-16 DIAGNOSIS — R29.898 BILATERAL ARM WEAKNESS: ICD-10-CM

## 2025-06-16 DIAGNOSIS — M33.20: ICD-10-CM

## 2025-06-16 DIAGNOSIS — I25.10 CAD (CORONARY ARTERY DISEASE): ICD-10-CM

## 2025-06-16 DIAGNOSIS — Z13.6 SCREENING FOR CARDIOVASCULAR CONDITION: Primary | ICD-10-CM

## 2025-06-16 LAB
ALBUMIN SERPL BCG-MCNC: 3.9 G/DL (ref 3.5–5.2)
ALP SERPL-CCNC: 37 U/L (ref 40–150)
ALT SERPL W P-5'-P-CCNC: 13 U/L (ref 0–70)
ANION GAP SERPL CALCULATED.3IONS-SCNC: 12 MMOL/L (ref 7–15)
AST SERPL W P-5'-P-CCNC: 14 U/L (ref 0–45)
BASOPHILS # BLD AUTO: 0 10E3/UL (ref 0–0.2)
BASOPHILS NFR BLD AUTO: 0 %
BILIRUB SERPL-MCNC: 0.3 MG/DL
BUN SERPL-MCNC: 32.9 MG/DL (ref 8–23)
CALCIUM SERPL-MCNC: 9.1 MG/DL (ref 8.8–10.4)
CHLORIDE SERPL-SCNC: 108 MMOL/L (ref 98–107)
CHOLEST SERPL-MCNC: 196 MG/DL
CREAT SERPL-MCNC: 0.96 MG/DL (ref 0.67–1.17)
EGFRCR SERPLBLD CKD-EPI 2021: 80 ML/MIN/1.73M2
EOSINOPHIL # BLD AUTO: 0 10E3/UL (ref 0–0.7)
EOSINOPHIL NFR BLD AUTO: 0 %
ERYTHROCYTE [DISTWIDTH] IN BLOOD BY AUTOMATED COUNT: 16.9 % (ref 10–15)
FASTING STATUS PATIENT QL REPORTED: NO
FASTING STATUS PATIENT QL REPORTED: NO
GLUCOSE SERPL-MCNC: 110 MG/DL (ref 70–99)
HCO3 SERPL-SCNC: 22 MMOL/L (ref 22–29)
HCT VFR BLD AUTO: 29.8 % (ref 40–53)
HDLC SERPL-MCNC: 89 MG/DL
HGB BLD-MCNC: 9.4 G/DL (ref 13.3–17.7)
IMM GRANULOCYTES # BLD: 0.2 10E3/UL
IMM GRANULOCYTES NFR BLD: 2 %
LDLC SERPL CALC-MCNC: 95 MG/DL
LYMPHOCYTES # BLD AUTO: 0.9 10E3/UL (ref 0.8–5.3)
LYMPHOCYTES NFR BLD AUTO: 14 %
MCH RBC QN AUTO: 28.6 PG (ref 26.5–33)
MCHC RBC AUTO-ENTMCNC: 31.5 G/DL (ref 31.5–36.5)
MCV RBC AUTO: 91 FL (ref 78–100)
MONOCYTES # BLD AUTO: 0.4 10E3/UL (ref 0–1.3)
MONOCYTES NFR BLD AUTO: 6 %
NEUTROPHILS # BLD AUTO: 5.3 10E3/UL (ref 1.6–8.3)
NEUTROPHILS NFR BLD AUTO: 78 %
NONHDLC SERPL-MCNC: 107 MG/DL
PLATELET # BLD AUTO: 296 10E3/UL (ref 150–450)
POTASSIUM SERPL-SCNC: 4.6 MMOL/L (ref 3.4–5.3)
PROT SERPL-MCNC: 5.7 G/DL (ref 6.4–8.3)
RBC # BLD AUTO: 3.29 10E6/UL (ref 4.4–5.9)
SODIUM SERPL-SCNC: 142 MMOL/L (ref 135–145)
TRIGL SERPL-MCNC: 59 MG/DL
WBC # BLD AUTO: 6.8 10E3/UL (ref 4–11)

## 2025-06-16 PROCEDURE — 36415 COLL VENOUS BLD VENIPUNCTURE: CPT

## 2025-06-16 PROCEDURE — 85025 COMPLETE CBC W/AUTO DIFF WBC: CPT

## 2025-06-16 PROCEDURE — 80061 LIPID PANEL: CPT

## 2025-06-16 PROCEDURE — 80053 COMPREHEN METABOLIC PANEL: CPT

## 2025-06-19 ENCOUNTER — TELEPHONE (OUTPATIENT)
Dept: INTERNAL MEDICINE | Facility: CLINIC | Age: 80
End: 2025-06-19
Payer: MEDICARE

## 2025-06-23 ENCOUNTER — OFFICE VISIT (OUTPATIENT)
Dept: NEUROLOGY | Facility: CLINIC | Age: 80
End: 2025-06-23
Payer: MEDICARE

## 2025-06-23 VITALS
HEART RATE: 54 BPM | DIASTOLIC BLOOD PRESSURE: 80 MMHG | OXYGEN SATURATION: 97 % | SYSTOLIC BLOOD PRESSURE: 124 MMHG | RESPIRATION RATE: 16 BRPM

## 2025-06-23 DIAGNOSIS — G70.00 MYASTHENIA GRAVIS (H): ICD-10-CM

## 2025-06-23 DIAGNOSIS — R29.898 BILATERAL ARM WEAKNESS: ICD-10-CM

## 2025-06-23 DIAGNOSIS — M33.20: ICD-10-CM

## 2025-06-23 DIAGNOSIS — G12.20 MOTOR NEURON DISORDER (H): ICD-10-CM

## 2025-06-23 RX ORDER — PREDNISONE 5 MG/1
TABLET ORAL
Qty: 180 TABLET | Refills: 3 | Status: SHIPPED | OUTPATIENT
Start: 2025-06-23

## 2025-06-23 RX ORDER — OMEPRAZOLE 20 MG/1
20 CAPSULE, DELAYED RELEASE ORAL DAILY
Qty: 90 CAPSULE | Refills: 2 | Status: SHIPPED | OUTPATIENT
Start: 2025-06-23

## 2025-06-23 RX ORDER — MYCOPHENOLATE MOFETIL 500 MG/1
1000 TABLET ORAL 2 TIMES DAILY
Qty: 240 TABLET | Refills: 3 | Status: SHIPPED | OUTPATIENT
Start: 2025-06-23

## 2025-06-23 RX ORDER — CALCIUM CARBONATE 500(1250)
1 TABLET ORAL 2 TIMES DAILY
Qty: 120 TABLET | Refills: 2 | Status: SHIPPED | OUTPATIENT
Start: 2025-06-23

## 2025-06-23 NOTE — PROGRESS NOTES
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 He was started on Cellcept. He has been on prednisone 20 mg for about 2 weeks now. There has not been any relapse of symptoms while tapering down steroid. He continues to work with OT and noticed improvement in hand strength. He is now able to open a Ziploc bags and buckle his pants. He is able to walk without gait aid. He uses cane for long distance. He discontinued pyridostigmine. He has not noticed any side effect from prednisone.  No new symptoms.      The following portions of the patient's history were reviewed and updated as appropriate: allergies, current medications, family history, medical history, surgical history, social history, and problem list.     NEUROLOGICAL EXAMINATION  Mental status: normal.  Gait: walk faster than last visit.   Posture: normal.  Coordination: normal.  Romberg: negative.  Speech: no dysarthria on today's exam  Extrapyramidal: normal.              Cranial nerves   R Muscle strength L   R   L   5 Frontalis 5           5 Orbicularis oculi 5   3 mm Pupils 3 mm   5 Lower facial muscles 5   nl Light reflex nl   5 Temporal-Masseter 5   no Ptosis no   5 Palate-Pharynx 5   nl Extraocular muscles nl   5 Sternocleidomastoid 5           5 Trapezius 5   nl Hearing nl   5 Genioglossus 5           Muscle atrophy/ enlargement: no  Fasciculations: no tongue fasciculations or atrophy.   Normal jaw jerk      No fatigable ptosis after sustained upward gaze. Normal EOM in all directions.       R Muscle, strength L   R Muscle, strength L     Neck        Trunk     5 Neck flexors 5     Abdominal muscles     5 Neck extensors 5     Paraspinals       Upper Limbs       Lower Limbs       Supraspinatus     5 Iliopsoas 5     Infraspinatus     5 Adductors, thigh 5     Pectoralis     5 Gluteus medius 5   5* Deltoid 5*   5 Gluteus max 5   4+ Biceps brachii 5   5  Quadriceps 5     Brachioradialis     5 Hamstrings 5     Supinator/pronator     5 Tibialis anterior 5   5 Triceps 5   5 Peronei 5   5 Wrist extensor 4+   5 EHL 5   4+ Wrist flexors 4   5 Toe extensors 5   3 Digit extensors 2   5 Tibialis post. 5   5 Digit flexors 5   5 Toe flexors 5   3 Thenar 2   5 Calf muscles 5   1 Hypothenar 1           1 Interossei 1           *no fatigable weakness  Muscle atrophy / enlargement: atrophy of intrinsic hand muscles both thenar and hypothenar  Fasciculations: no fasciculations observed on exam today      R Reflexes L   0 Biceps brachii 1   1 Brachioradialis 1   2 Triceps 2   no Best no   2 Quadriceps 1   0 Gastroc-soleus 0   no Clonus (ankle) no   flexion Plantar response flexion          Sensation  Face: normal.  Upper limbs: normal for all modalities.  Lower limbs: normal for all modalities.       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 No signs of relapse while tapering steroid. He is tolerating MMF well.  There continues to be no fatigability to suggest neuromuscular junction defect. Response to immunosuppressive therapy and muscle biopsy findings still support the diagnosis of inflammatory myositis.  Myositis specific antibody was overall unremarkable except mild elevation of U1 RNP antibody.  CT abdomen pelvis did not show occult malignancy.     Recommendations:  - Continue mycophenolate mofetil 1000 mg twice daily  - Continue slow taper of prednisone 15 mg for 4 weeks then decrease to 12.5 mg for 4 weeks then 10 mg for 4 weeks then 7.5 mg for 4 weeks then 5  mg until next appointment  - Continue PT, OT  - Labs for CBC and CMP every 4 weeks while on CellCept  - Follow-up in 6 months    I spent a total of 40 minutes on the day of the visit for chart review, face-to-face visit, counseling/coordination of care, and documentation. Please see the note for further information on patient assessment and treatment.     PATIENT EDUCATION  Ready to learn, no apparent learning barriers were identified; learning preferences include listening.  Explained diagnosis and treatment plan; patient expressed understanding of the content.

## 2025-06-23 NOTE — LETTER
6/23/2025       RE: Mendez Greene  [id number] [address] [facility] [id number]     Dear Colleague,    Thank you for referring your patient, Mendez Greene, to the [facility] [place] at [facility]. Please see a copy of my visit note below.    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 He was started on Cellcept. He has been on prednisone 20 mg for about 2 weeks now. There has not been any relapse of symptoms while tapering down steroid. He continues to work with OT and noticed improvement in hand strength. He is now able to open a Ziploc bags and buckle his pants. He is able to walk without gait aid. He uses cane for long distance. He discontinued pyridostigmine. He has not noticed any side effect from prednisone.  No new symptoms.      The following portions of the patient's history were reviewed and updated as appropriate: allergies, current medications, family history, medical history, surgical history, social history, and problem list.     NEUROLOGICAL EXAMINATION  Mental status: normal.  Gait: walk faster than last visit.   Posture: normal.  Coordination: normal.  Romberg: negative.  Speech: no dysarthria on today's exam  Extrapyramidal: normal.              Cranial nerves   R Muscle strength L   R   L   5 Frontalis 5           5 Orbicularis oculi 5   3 mm Pupils 3 mm   5 Lower facial muscles 5   nl Light reflex nl   5 Temporal-Masseter 5   no Ptosis no   5 Palate-Pharynx 5   nl Extraocular muscles nl   5 Sternocleidomastoid 5           5 Trapezius 5   nl Hearing nl   5 Genioglossus 5           Muscle atrophy/ enlargement: no  Fasciculations: no tongue fasciculations or atrophy.   Normal jaw jerk      No fatigable ptosis after sustained upward gaze. Normal EOM in all directions.       R Muscle, strength L   R Muscle, strength L     Neck         Trunk     5 Neck flexors 5     Abdominal muscles     5 Neck extensors 5     Paraspinals       Upper Limbs       Lower Limbs       Supraspinatus     5 Iliopsoas 5     Infraspinatus     5 Adductors, thigh 5     Pectoralis     5 Gluteus medius 5   5* Deltoid 5*   5 Gluteus max 5   4+ Biceps brachii 5   5 Quadriceps 5     Brachioradialis     5 Hamstrings 5     Supinator/pronator     5 Tibialis anterior 5   5 Triceps 5   5 Peronei 5   5 Wrist extensor 4+   5 EHL 5   4+ Wrist flexors 4   5 Toe extensors 5   3 Digit extensors 2   5 Tibialis post. 5   5 Digit flexors 5   5 Toe flexors 5   3 Thenar 2   5 Calf muscles 5   1 Hypothenar 1           1 Interossei 1           *no fatigable weakness  Muscle atrophy / enlargement: atrophy of intrinsic hand muscles both thenar and hypothenar  Fasciculations: no fasciculations observed on exam today      R Reflexes L   0 Biceps brachii 1   1 Brachioradialis 1   2 Triceps 2   no Best no   2 Quadriceps 1   0 Gastroc-soleus 0   no Clonus (ankle) no   flexion Plantar response flexion          Sensation  Face: normal.  Upper limbs: normal for all modalities.  Lower limbs: normal for all modalities.       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 No signs of relapse while tapering steroid. He is tolerating MMF well.  There continues to be no fatigability to suggest neuromuscular junction defect. Response to immunosuppressive therapy and muscle biopsy findings still support the diagnosis of inflammatory myositis.  Myositis specific antibody was overall unremarkable  except mild elevation of U1 RNP antibody.  CT abdomen pelvis did not show occult malignancy.     Recommendations:  - Continue mycophenolate mofetil 1000 mg twice daily  - Continue slow taper of prednisone 15 mg for 4 weeks then decrease to 12.5 mg for 4 weeks then 10 mg for 4 weeks then 7.5 mg for 4 weeks then 5 mg until next appointment  - Continue PT, OT  - Labs for CBC and CMP every 4 weeks while on CellCept  - Follow-up in 6 months    I spent a total of 40 minutes on the day of the visit for chart review, face-to-face visit, counseling/coordination of care, and documentation. Please see the note for further information on patient assessment and treatment.     PATIENT EDUCATION  Ready to learn, no apparent learning barriers were identified; learning preferences include listening.  Explained diagnosis and treatment plan; patient expressed understanding of the content.      Again, thank you for allowing me to participate in the care of your patient.      Sincerely,    Zbigniew Abdullahi MD

## 2025-06-23 NOTE — NURSING NOTE
Chief Complaint   Patient presents with    RECHECK     Here for consult, confirmed with patient     Tracey Rascon

## 2025-06-30 ENCOUNTER — OFFICE VISIT (OUTPATIENT)
Dept: PHYSICAL MEDICINE AND REHAB | Facility: CLINIC | Age: 80
End: 2025-06-30
Payer: MEDICARE

## 2025-06-30 ENCOUNTER — LAB (OUTPATIENT)
Dept: LAB | Facility: CLINIC | Age: 80
End: 2025-06-30
Payer: MEDICARE

## 2025-06-30 VITALS
DIASTOLIC BLOOD PRESSURE: 72 MMHG | SYSTOLIC BLOOD PRESSURE: 139 MMHG | RESPIRATION RATE: 16 BRPM | OXYGEN SATURATION: 99 % | HEART RATE: 77 BPM

## 2025-06-30 DIAGNOSIS — M33.20: ICD-10-CM

## 2025-06-30 DIAGNOSIS — G70.00 MYASTHENIA GRAVIS (H): Primary | ICD-10-CM

## 2025-06-30 DIAGNOSIS — R29.898 BILATERAL ARM WEAKNESS: ICD-10-CM

## 2025-06-30 LAB
ALBUMIN SERPL BCG-MCNC: 3.8 G/DL (ref 3.5–5.2)
ALP SERPL-CCNC: 40 U/L (ref 40–150)
ALT SERPL W P-5'-P-CCNC: 9 U/L (ref 0–70)
ANION GAP SERPL CALCULATED.3IONS-SCNC: 9 MMOL/L (ref 7–15)
AST SERPL W P-5'-P-CCNC: 14 U/L (ref 0–45)
BASOPHILS # BLD AUTO: 0 10E3/UL (ref 0–0.2)
BASOPHILS NFR BLD AUTO: 0 %
BILIRUB SERPL-MCNC: 0.2 MG/DL
BUN SERPL-MCNC: 27.7 MG/DL (ref 8–23)
CALCIUM SERPL-MCNC: 9.4 MG/DL (ref 8.8–10.4)
CHLORIDE SERPL-SCNC: 106 MMOL/L (ref 98–107)
CREAT SERPL-MCNC: 0.92 MG/DL (ref 0.67–1.17)
EGFRCR SERPLBLD CKD-EPI 2021: 84 ML/MIN/1.73M2
EOSINOPHIL # BLD AUTO: 0 10E3/UL (ref 0–0.7)
EOSINOPHIL NFR BLD AUTO: 0 %
ERYTHROCYTE [DISTWIDTH] IN BLOOD BY AUTOMATED COUNT: 17.1 % (ref 10–15)
GLUCOSE SERPL-MCNC: 103 MG/DL (ref 70–99)
HCO3 SERPL-SCNC: 24 MMOL/L (ref 22–29)
HCT VFR BLD AUTO: 29.9 % (ref 40–53)
HGB BLD-MCNC: 9.5 G/DL (ref 13.3–17.7)
IMM GRANULOCYTES # BLD: 0.1 10E3/UL
IMM GRANULOCYTES NFR BLD: 1 %
LYMPHOCYTES # BLD AUTO: 0.8 10E3/UL (ref 0.8–5.3)
LYMPHOCYTES NFR BLD AUTO: 11 %
MCH RBC QN AUTO: 28.3 PG (ref 26.5–33)
MCHC RBC AUTO-ENTMCNC: 31.8 G/DL (ref 31.5–36.5)
MCV RBC AUTO: 89 FL (ref 78–100)
MONOCYTES # BLD AUTO: 0.5 10E3/UL (ref 0–1.3)
MONOCYTES NFR BLD AUTO: 6 %
NEUTROPHILS # BLD AUTO: 6.4 10E3/UL (ref 1.6–8.3)
NEUTROPHILS NFR BLD AUTO: 82 %
PLATELET # BLD AUTO: 269 10E3/UL (ref 150–450)
POTASSIUM SERPL-SCNC: 5 MMOL/L (ref 3.4–5.3)
PROT SERPL-MCNC: 5.8 G/DL (ref 6.4–8.3)
RBC # BLD AUTO: 3.36 10E6/UL (ref 4.4–5.9)
SODIUM SERPL-SCNC: 139 MMOL/L (ref 135–145)
WBC # BLD AUTO: 7.8 10E3/UL (ref 4–11)

## 2025-06-30 PROCEDURE — 80053 COMPREHEN METABOLIC PANEL: CPT

## 2025-06-30 PROCEDURE — 85025 COMPLETE CBC W/AUTO DIFF WBC: CPT

## 2025-06-30 PROCEDURE — 36415 COLL VENOUS BLD VENIPUNCTURE: CPT

## 2025-06-30 NOTE — LETTER
2025       RE: Mendez Greene  [id number] [facility] [place] [id number]     Dear Colleague,    Thank you for referring your patient, Mendez Greene, to the [facility] PHYSICAL MEDICINE AND REHABILITATION CLINIC [place] at [facility]. Please see a copy of my visit note below.             PM&R Clinic Note     Patient Name: Mendez Greene : 1945 Medical Record: [id number]            History of Present Illness:     Mendez Greene is a 79 year old male who presented to clinic today with his wife Yara Berry for follow up regarding his rehab needs.     Background from the initial visit on 3/4/25  He has past medical history of paroxysmal atrial fibrillation, CAD, ascending aortic aneurysm, HLD, HTN, hypothyroidism, and CHA and was initially hospitalized at [facility] from 24-11/15/24 for progressive weakness and fatigue, difficulty swallowing. He was seen by neurology who were concerned for motor neuron disease with high index of suspicion for ALS. EMG consistent with MND. He was started on Riluzole and Edaravone and referred to [facility] neuromuscular division for second opinion. Discharged home with palliative care referral. After discharge, his ACh receptor binding Ab came back positive, strongly suggestive of myasthenia gravis.      He presented back to [facility] ED  with hypotension and acute respiratory failure. Family rescinded patient's DNR/DNI for patient to be full code and patient reportedly nodded in  agreement. Patient was intubated in the ED and admitted to ICU on pressors. Started on broad spectrum antibiotics for possible septic shock due to UTI. Neurology consulted, started patient on IVIGx 5 doses for myasthenic crisis and patient was initiated on Mestinon. Extubated with pressors stopped 12/3. Transferred to hospitalist service on . Hospital course has been notable for urosepsis with chronic Cortez catheter (completed abx  "course), MOHAMUD, electrolyte derangements, bradycardia, hypertension.      He was transferred to ARU on 12/11/24 and was discharged  home on 1/1/25.      Therapy notes at discharge   SLP:  Recommend continue NPO with ice chips for comfort with supervision/assist. Oral cares x3/day at minimum. Okay for pt to consume three (3) sips of thin liquid by spoon a day for quality of life with family, no bigger than half-teaspoon in size, must cough multiple times after each teaspoon sip, wait two to three minutes before taking next half-teaspoon sip. Pt can complete effortful swallows for maintenance of swallow mechanism to prevent disuse atrophy, but no formal pharyngeal exercise plan indicated given diagnosis. Pt is aware of overall aspiration (including silent) risk and can verbalize rationale for NPO diet, as well as indicators of aspiration and/or respiratory distress. Recommend consider repeat VFSS after neurology/medical workup if/when pt is noting improvement in overall symptoms, or roughly in 2-3 months if no change in symptoms. Recommend swallow evaluation/treatment be completed in an outpatient setting.      OT:  Continued therapy is recommended.  Rationale/Recommendations:  Recommend HC OT services to complete home safety assessment, and increase IND and safety with ADLs/IADLs.  Precautions: Falls, continuous tube feeding and NPO, BUE weakness with R greater than L  *elevate BUE/BLE's when supine in bed with pillows*  R resting hand orthosis - on overnight/off during day  B T-bar splints only with functional tasks-monitor skin breakdown and edema  ADLs:  Mobility:  CGA-SBA in rm w/ FWW and GB  Grooming: sba with adapted r angle toothbrush and spoon for ice chips  Dressing: UB mod a pull over shirt LB Max A FWW.  Bathing: CGA with shower transfer and shower chair with grab bars. Assistance to wash/rinse/dry 6/10 body parts seated on ETB.   Toileting: CGA from 19\" commode overlay, CGA-mod A standing clothing " management, Max A hugo cares   IADLs: IND prior, supportive spouse who will provide total A     PT:  Continued therapy is recommended.  Rationale/Recommendations:  Pt discharging to home w/ spouse assist. Recommending SBA for all functional mobility, may need Shai from low surfaces. Family training completed w/ spouse, all DME obtained. Able to amb up to 250' w/ FWW, SBA. Pt will benefit from  physical therapy for optimizing pt safety and IND w/ mobility and decrease caregiver burden.  Outcome Measures:   Garcia              12/12: 12/56 12/28: 23/56  5TSTS              12/12: 0x (requires physical assist even with UE assist)              12.26: 0x. (Unable to stand from standard chair, able to stand from 22'' with UE push)  6MWT              12/12: 40 ft, FWW and CGA (stopped early due to fatigue)      Saw Dr. Abdullahi 1/6/25  Recommendations:  - Start prednisone 10 mg/day for 3 days then increase to 20 mg/day for 3 days then increase to 30 mg/day for 3 days then increase to 40mg/day and stay on this dose for 1-2 months.   - Continue pyridostigmine 60 mg every 4 hours (3 times/day) while awake. He has been waking up in the middle of the night to take this medication (this is not needed).   - lansoprazole 30 mg daily  - EMG/NCS with RNS (details as above) done 1/16 and again on 1/23  - Continue PT and OT  - F/U in 2 months     Final recs 1/23  - Right triceps muscle biopsy. done and report was reviweed on 2/14  - continue prednisone 40mg/day until next visit. He has noticed significant improvement with this  - He may continue pyridostigmine as needed.  - PPI (omeprazole) daily  - Continue PT and OT  - F/U in April after muscle biopsy result.       Symptoms,  - Weakness: hands and lower arms the weakest. Just got some new exercises from therapies ~2 weeks ago. Lower arms are sore from doing those exercises. Hands are more open than they were previously but they have no strength. Until a week ago he  "wasn't able to brush his teeth with R hand, now he can. L is strong enough to do that. Shoulders and hips are \"shrinking,\"    - Weight loss: Still has PEG, not using it right now. Weight has been the same since he left the ARU, but with the hospitalization he noticed atrophy of large muscle groups.     - No pain. Thumbs hurt a little bit.     - 1 fall after getting back from the ARU, none since then. Attributed it to a mat in his kitchen which was removed after the fall. Discussed removing fall hazards within the home.     - Sleeping well. Took awhile after getting back from ARU to sleep well but has been doing better recently.     - Still having urology issues, things have been manageable. Controls with pads, timed toileting. Had constipation with the tube feeds. Stool softeners helped. Stabilized. Has had diarrhea the last couple days, explosive. Gotten better. No abdominal pain.     Meds/interventions, no changes warranted right now. Is continuing prednisone until neurology follow up.     Therapies/HEP/equipments,  SLP 2/18 FVSS  Diet Consistency Recommendations: slightly thick liquids (level 1), soft & bite-sized (level 6)    Recommended Feeding/Eating Techniques: small bolus size, alternate food and liquid intake, supraglottic swallow, monitor for cough or change in vocal quality with intake, maintain upright sitting position for eating, maintain upright posture during/after eating for 30 minutes, minimize distractions during oral intake   Medication Administration Recommendations: try small medications whole with puree + strategies  Instrumental Assessment Recommendations: VFSS (videofluoroscopic swallowing study) -- consider repeat VFSS in ~4 weeks following ongoing oropharyngeal exercise program, given silent aspiration with thin liquids     - Will continue to see therapies for next 2 months (Home care). Does not drive right now.     Functionally/social situation, wife, Yara Berry, is very supportive. Has " "been helpful with appointments, medications, iADLs. Mood has been good, says he's doing, \"great!\"      Interval history  Has been medically stable since our last visit on 3/4.    --PEG was removed 3/21 and the site has healed.   --Saw [facility] ortho team on 6/9; per their note -       --Saw Dr. 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 No signs of relapse while tapering steroid. He is tolerating MMF well.  There continues to be no fatigability to suggest neuromuscular junction defect. Response to immunosuppressive therapy and muscle biopsy findings still support the diagnosis of inflammatory myositis.  Myositis specific antibody was overall unremarkable except mild elevation of U1 RNP antibody.  CT abdomen pelvis did not show occult malignancy.      Recommendations:  - Continue mycophenolate mofetil 1000 mg twice daily  - Continue slow taper of prednisone 15 mg for 4 weeks then decrease to 12.5 mg for 4 weeks then 10 mg for 4 weeks then 7.5 mg for 4 weeks then 5 mg until next appointment  - Continue PT, OT  - Labs for CBC and CMP every 4 weeks while on CellCept  - Follow-up in 6 months      His wife was presented and they were both doing well. He noted that the PEG site has healed, leaving a scar resembling a second belly button, with no pain reported. Earlier this month, Steven was [facility] for [facility]. The left knee, which had a revision, is stable, but arthritis in the right knee persists. Physical therapy led to a displacement of the kneecap, which remains off to the side due to " stronger muscles or tendons on one side. X-rays were performed at [facility], but results are not accessible. Steven has been tapering prednisone under Dr. Abdullahi's guidance and is required to have monthly blood draws while on CellCept. He has been undergoing occupational therapy at home, with the last session scheduled for next week. Physical therapy and speech therapy have been completed. Steven uses a cane primarily outside and reports no falls. He manages his daily routine independently but occasionally requires assistance with tasks like opening tight objects. He experiences some memory lapses, such as leaving the stove burner on once. 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 He reports no issues with bowel movements.             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, stage pT1, carcinoma in situ, stage Tis, N0, M0 s/p cystoprostatectomy with ileal neobladder formation     Pure hypercholesterolemia      Past Surgical History:   Procedure Laterality Date     ARTHROPLASTY KNEE Left 08/23/2022    Procedure: LEFT TOTAL KNEE ARTHROPLASTY;  Surgeon: Melba Dial MD;  Location: [facility] OR     ARTHROPLASTY REVISION KNEE Left 09/05/2023    Procedure: REVISION TOTAL KNEE ARTHROPLASTY PATELLA;  Surgeon: Melba Dial MD;  Location: [facility] OR     BIOPSY MUSCLE DIAGNOSTIC (LOCATION) Right 2/14/2025    Procedure: BIOPSY, MUSCLE, RIGHT TRICEPS;  Surgeon: Sam Barber  MD Alex;  Location: [facility] OR     BUNIONECTOMY Left      BUNIONECTOMY DIANA  11/28/2011    RT-Procedure:BUNIONECTOMY DIANA; Right Foot [place] Bunionectomy with Metatarsal and Phalanx Osteotomy with 1st Metatarsal Phalangeal Joint Repair Right Foot; Surgeon:BAKARI ZAIDI; Location:[facility]     CATARACT EXTRACTION, BILATERAL       CV CORONARY ANGIOGRAM N/A 11/11/2024    Procedure: Coronary Angiogram;  Surgeon: Krystian Machuca MD;  Location:  HEART CARDIAC CATH LAB     CV HEART CATHETERIZATION WITH POSSIBLE INTERVENTION N/A 11/07/2020    Procedure: Heart Catheterization with Possible Intervention;  Surgeon: Rishi Llanes MD;  Location:  HEART CARDIAC CATH LAB     CV LEFT HEART CATH N/A 11/11/2024    Procedure: Left Heart Catheterization;  Surgeon: Krystian Machuca MD;  Location:  HEART CARDIAC CATH LAB     CYSTECTOMY BLADDER, ILEAL DIVERSION NEOBLADDER, COMBINED  2006     HERNIORRHAPHY INCISIONAL (LOCATION) N/A 08/01/2016    Procedure: HERNIORRHAPHY INCISIONAL (LOCATION);  Surgeon: Onofre Lua MD;  Location: [facility]     IR GASTROSTOMY TUBE PERCUTANEOUS PLCMNT  11/13/2024     OPEN REDUCTION INTERNAL FIXATION PATELLA Left 09/05/2023    Procedure: LEFT KNEE OPEN REDUCTION INTERNAL  FIXATION PATELLA FRACTURE WITH;  Surgeon: Melba Dial MD;  Location:  OR     OPEN REDUCTION INTERNAL FIXATION PATELLA Left 10/10/2023    Procedure: LEFT KNEE REVISION OPEN REDUCTION INTERNAL FIXATION PATELLA WITH POLY REMOVAL, PATELLAR;  Surgeon: Melba Dial MD;  Location:  OR     REMOVE HARDWARE KNEE Left 10/10/2023    Procedure: WITH POLY REMOVAL, PATELLAR;  Surgeon: Melba Dial MD;  Location: [facility]            Social History:     Social History     Tobacco Use     Smoking status: Former     Types: Cigarettes     Passive exposure: Never (per pt)     Smokeless tobacco: Never     Tobacco comments:     Quit in 1987; smoked for 20 years; 1.5 ppd at his most.    Substance Use Topics      Alcohol use: Not Currently       Marital Status: , wife is Yara Berry  Living situation: House, 3 [address], railings. All needs met on main floor.   Family support: Wife is supportive, kids nearby  Vocational History: Retired  Tobacco use: former smoker  Alcohol use: None  Recreational drug use: None         Functional history:     Mendez Greene was independent with all aspects of life prior to Dec admission. Now     ADLs: Modified independent  Assistive devices: Walker at home, cane  iADLs (medication management and finances): Wife helps, pt taking over more things like medications  Hand dominance: Right handed  Driving: Not driving           Family History:     Family History   Problem Relation Age of Onset     Osteoporosis Mother      Neurologic Disorder Mother         PD     Cerebrovascular Disease Mother      Hypertension Mother      Hyperlipidemia Mother      Cerebrovascular Disease Father      Diabetes Type 1 Sister      Cancer Maternal Grandmother         unknown type     Diabetes Type 2  No family hx of      Myocardial Infarction No family hx of      Prostate Cancer No family hx of      Colon Cancer No family hx of      Anesthesia Reaction No family hx of      Venous thrombosis No family hx of             Medications:     Current Outpatient Medications   Medication Sig Dispense Refill     acetaminophen (TYLENOL) 325 MG tablet Take 1-2 tablets (325-650 mg) by mouth every 6 hours as needed for mild pain.       aspirin 81 MG EC tablet Take 81 mg by mouth every evening.       calcium carbonate (OS-SIMONA) 500 MG tablet Take 1 tablet (500 mg) by mouth 2 times daily. 120 tablet 2     Cholecalciferol (D3 PO) Take 1 tablet by mouth daily. OTC: Unsure of strength.       levothyroxine (SYNTHROID/LEVOTHROID) 175 MCG tablet Take 1 tablet (175 mcg) by mouth daily. 90 tablet 1     lisinopril (ZESTRIL) 5 MG tablet Take 1 tablet (5 mg) by mouth daily. 90 tablet 3     mycophenolate (GENERIC EQUIVALENT) 500 MG tablet Take 2  "tablets (1,000 mg) by mouth 2 times daily. 240 tablet 3     olopatadine (PATANOL) 0.1 % ophthalmic solution Place 1 drop into both eyes 2 times daily as needed for allergies.       omeprazole (PRILOSEC) 20 MG DR capsule Take 1 capsule (20 mg) by mouth daily. 90 capsule 2     order for DME Equipment being ordered: CPAP and supplies. LIFETIME need. 1 each 0     predniSONE (DELTASONE) 5 MG tablet Take 15 mg for 4 weeks then decrease to 12.5 mg for 4 weeks then 10 mg for 4 weeks then 7.5 mg for 4 weeks then 5 mg until next appointment 180 tablet 3     pyRIDostigmine (MESTINON) 60 MG tablet Take 1 tablet (60 mg) by mouth 2 times daily. Take 1 tablet 3 times a day while awake.       vitamin B-12 (CYANOCOBALAMIN) 1000 MCG tablet Take 1,000 mcg by mouth daily.              Allergies:     No Known Allergies           ROS:     A focused ROS is negative other than the symptoms noted above in the HPI.    Patient denied headaches, dizziness, lightheadedness, changes in vision/hearing, sore throat, runny nose, cough, SOB, chest pain, abdominal pain, nausea, vomiting, constipation, dysuria, hematuria, melena, bloody stools, and/or joint aches/pains.            Physical Examiniation:     VITAL SIGNS: /72   Pulse 77   Resp 16   SpO2 99%   BMI: Estimated body mass index is 26 kg/m  as calculated from the following:    Height as of 3/11/25: 1.778 m (5' 10\").    Weight as of 4/2/25: 82.2 kg (181 lb 3.2 oz).    Gen: NAD, pleasant and cooperative   Cardio: regular pulse  Pulm: non-labored breathing in room air  Abd: benign - PEG site had a small granulation tissue at 9 o'clock with small amount of yellowish drainage at the site    Ext: WWP, no edema in BLE, no tenderness in calves  MSK/neuro:   Mental Status:  alert and oriented x3    Cranial Nerves: grossly normal    Strength:    SF  EF  EE  WE  G  I  HF  KE  DF  EHL  PF   R  4 4- 5 4- 4 4 4+ 4 5 5 5  L  4 4- 5 4- 4 4 4+ 4 5 5 5    Reflexes: Present and symmetrical    Tone per " modified Gabriella Scale: 0   Abnormal movements: None    Speech: Clear, no aphasia   Cognition: Intact, good historian           Laboratory/Imaging:     CBC RESULTS:   Recent Labs   Lab Test 06/30/25  1344 06/16/25  1355 06/02/25  1111   WBC 7.8 6.8 8.9   RBC 3.36* 3.29* 3.23*   HGB 9.5* 9.4* 9.5*   HCT 29.9* 29.8* 30.1*   MCV 89 91 93   MCH 28.3 28.6 29.4   MCHC 31.8 31.5 31.6   RDW 17.1* 16.9* 16.8*    296 364     Last Basic Metabolic Panel:  Recent Labs   Lab Test 06/30/25  1344 06/16/25  1355 06/02/25  1111    142 139   POTASSIUM 5.0 4.6 4.7   CHLORIDE 106 108* 105   CO2 24 22 23   ANIONGAP 9 12 11   * 110* 115*   BUN 27.7* 32.9* 27.5*   CR 0.92 0.96 0.80   GFRESTIMATED 84 80 89   SIMONA 9.4 9.1 9.6       Muscle biopsy results 2/14/25  DIAGNOSIS:   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   COMMENT:   The presence of perimysial and perivascicularr inflammatory cells, and heterogeneous fascicular atrophy and fibrosis may provide the histological appearance of inflammatory myopathy with propensity to involve connective tissue as there was no significant endomysial or perivascular inflammation. Non-specific nature of this biopsy may be due to heterogeneous involvement and anti-inflammatory treatment effect. Other systemic connective tissue diseases, such as lupus erythematosus and mixed connective tissue disorder, can cause a similar histological appearance. Clinical correlation is required. Tissue remains for ultrastructural, biochemical or genetic testing if clinically indicated.            Assessment/Plan:     Subacute progressive painless bilateral upper limb weakness: R/O inflammatory myositis (?BCIM)  Dysphagia s/p PEG tube; was removed in March 2025  Dysarthria, improved  Positive AchR antibody without clinical signs of neuromuscular junction defect  History of bladder cancer s/p neobladder surgery     PMH is also significant for paroxysmal atrial fibrillation, CAD, ascending aortic aneurysm, HLD,  HTN, hypothyroidism, CHA, chronic macrocytic anemia      Steven's knee pain and arthritis are being managed with current treatment as advised by the orthopedic surgeon, with no further surgical intervention planned as long as functionality is maintained. His inflammatory myositis, causing muscle weakness and atrophy, is being managed with mycophenolate and tapering prednisone, with monthly blood draws required while on CellCept. Transition to outpatient occupational and physical therapy is planned after completing home therapy, with regular exercise and staying active recommended to maintain muscle strength and slow decline. For his eric-bladder with sphincter dysfunction leading to incontinence, Steven will continue using pads for management, with potential future surgical intervention not currently planned due to the potential for a difficult recovery.      Patient education: In depth discussion and education was provided about the assessment and implications of each of the below recommendations for management. Patient indicated readiness to learn, all questions were answered and understanding of material presented was confirmed.  Work-up: None needed at this time  Therapy/equipment/braces: No new equipment needs at this time. Continue home care with OT; completed PT and SLP. Will transition to outpatient when ready   Medications: No medication changes made today. Encouraged increased protein intake at length.   Interventions: No interventions today.   Referral / follow up with other providers:  Continue to see neuromuscular, urology, neurology, and PCP teams as scheduled.  Follow up: in one year or sooner if needed    Kelly Antonio MD  Physical Medicine & Rehabilitation    Consent was obtained from the patient to use an AI documentation tool in the creation of this note.          Again, thank you for allowing me to participate in the care of your patient.      Sincerely,    Kelly Antonio MD

## 2025-06-30 NOTE — PROGRESS NOTES
PM&R Clinic Note     Patient Name: Mendez Greene : 1945 Medical Record: [id number]            History of Present Illness:     Mendez Greene is a 79 year old male who presented to clinic today with his wife Yara Berry for follow up regarding his rehab needs.     Background from the initial visit on 3/4/25  He has past medical history of paroxysmal atrial fibrillation, CAD, ascending aortic aneurysm, HLD, HTN, hypothyroidism, and CHA and was initially hospitalized at [facility] from 24-11/15/24 for progressive weakness and fatigue, difficulty swallowing. He was seen by neurology who were concerned for motor neuron disease with high index of suspicion for ALS. EMG consistent with MND. He was started on Riluzole and Edaravone and referred to [facility] neuromuscular division for second opinion. Discharged home with palliative care referral. After discharge, his ACh receptor binding Ab came back positive, strongly suggestive of myasthenia gravis.      He presented back to [facility] ED  with hypotension and acute respiratory failure. Family rescinded patient's DNR/DNI for patient to be full code and patient reportedly nodded in  agreement. Patient was intubated in the ED and admitted to ICU on pressors. Started on broad spectrum antibiotics for possible septic shock due to UTI. Neurology consulted, started patient on IVIGx 5 doses for myasthenic crisis and patient was initiated on Mestinon. Extubated with pressors stopped 12/3. Transferred to hospitalist service on . Hospital course has been notable for urosepsis with chronic Cortez catheter (completed abx course), MOHAMUD, electrolyte derangements, bradycardia, hypertension.      He was transferred to ARU on 24 and was discharged  home on 25.      Therapy notes at discharge   SLP:  Recommend continue NPO with ice chips for comfort with supervision/assist. Oral cares x3/day at minimum. Okay for pt to consume three (3) sips of thin liquid by  "spoon a day for quality of life with family, no bigger than half-teaspoon in size, must cough multiple times after each teaspoon sip, wait two to three minutes before taking next half-teaspoon sip. Pt can complete effortful swallows for maintenance of swallow mechanism to prevent disuse atrophy, but no formal pharyngeal exercise plan indicated given diagnosis. Pt is aware of overall aspiration (including silent) risk and can verbalize rationale for NPO diet, as well as indicators of aspiration and/or respiratory distress. Recommend consider repeat VFSS after neurology/medical workup if/when pt is noting improvement in overall symptoms, or roughly in 2-3 months if no change in symptoms. Recommend swallow evaluation/treatment be completed in an outpatient setting.      OT:  Continued therapy is recommended.  Rationale/Recommendations:  Recommend HC OT services to complete home safety assessment, and increase IND and safety with ADLs/IADLs.  Precautions: Falls, continuous tube feeding and NPO, BUE weakness with R greater than L  *elevate BUE/BLE's when supine in bed with pillows*  R resting hand orthosis - on overnight/off during day  B T-bar splints only with functional tasks-monitor skin breakdown and edema  ADLs:  Mobility:  CGA-SBA in rm w/ FWW and GB  Grooming: sba with adapted r angle toothbrush and spoon for ice chips  Dressing: UB mod a pull over shirt LB Max A FWW.  Bathing: CGA with shower transfer and shower chair with grab bars. Assistance to wash/rinse/dry 6/10 body parts seated on ETB.   Toileting: CGA from 19\" commode overlay, CGA-mod A standing clothing management, Max A hugo cares   IADLs: IND prior, supportive spouse who will provide total A     PT:  Continued therapy is recommended.  Rationale/Recommendations:  Pt discharging to home w/ spouse assist. Recommending SBA for all functional mobility, may need Shai from low surfaces. Family training completed w/ spouse, all DME obtained. Able to amb up to " "250' w/ FWW, SBA. Pt will benefit from  physical therapy for optimizing pt safety and IND w/ mobility and decrease caregiver burden.  Outcome Measures:   Garcia              12/12: 12/56 12/28: 23/56  5TSTS              12/12: 0x (requires physical assist even with UE assist)              12.26: 0x. (Unable to stand from standard chair, able to stand from 22'' with UE push)  6MWT              12/12: 40 ft, FWW and CGA (stopped early due to fatigue)      Saw Dr. Abdullahi 1/6/25  Recommendations:  - Start prednisone 10 mg/day for 3 days then increase to 20 mg/day for 3 days then increase to 30 mg/day for 3 days then increase to 40mg/day and stay on this dose for 1-2 months.   - Continue pyridostigmine 60 mg every 4 hours (3 times/day) while awake. He has been waking up in the middle of the night to take this medication (this is not needed).   - lansoprazole 30 mg daily  - EMG/NCS with RNS (details as above) done 1/16 and again on 1/23  - Continue PT and OT  - F/U in 2 months     Final recs 1/23  - Right triceps muscle biopsy. done and report was reviweed on 2/14  - continue prednisone 40mg/day until next visit. He has noticed significant improvement with this  - He may continue pyridostigmine as needed.  - PPI (omeprazole) daily  - Continue PT and OT  - F/U in April after muscle biopsy result.       Symptoms,  - Weakness: hands and lower arms the weakest. Just got some new exercises from therapies ~2 weeks ago. Lower arms are sore from doing those exercises. Hands are more open than they were previously but they have no strength. Until a week ago he wasn't able to brush his teeth with R hand, now he can. L is strong enough to do that. Shoulders and hips are \"shrinking,\"    - Weight loss: Still has PEG, not using it right now. Weight has been the same since he left the ARU, but with the hospitalization he noticed atrophy of large muscle groups.     - No pain. Thumbs hurt a little bit.     - 1 fall after " "getting back from the ARU, none since then. Attributed it to a mat in his kitchen which was removed after the fall. Discussed removing fall hazards within the home.     - Sleeping well. Took awhile after getting back from ARU to sleep well but has been doing better recently.     - Still having urology issues, things have been manageable. Controls with pads, timed toileting. Had constipation with the tube feeds. Stool softeners helped. Stabilized. Has had diarrhea the last couple days, explosive. Gotten better. No abdominal pain.     Meds/interventions, no changes warranted right now. Is continuing prednisone until neurology follow up.     Therapies/HEP/equipments,  SLP 2/18 FVSS  Diet Consistency Recommendations: slightly thick liquids (level 1), soft & bite-sized (level 6)    Recommended Feeding/Eating Techniques: small bolus size, alternate food and liquid intake, supraglottic swallow, monitor for cough or change in vocal quality with intake, maintain upright sitting position for eating, maintain upright posture during/after eating for 30 minutes, minimize distractions during oral intake   Medication Administration Recommendations: try small medications whole with puree + strategies  Instrumental Assessment Recommendations: VFSS (videofluoroscopic swallowing study) -- consider repeat VFSS in ~4 weeks following ongoing oropharyngeal exercise program, given silent aspiration with thin liquids     - Will continue to see therapies for next 2 months (Home care). Does not drive right now.     Functionally/social situation, wife, Yara Berry, is very supportive. Has been helpful with appointments, medications, iADLs. Mood has been good, says he's doing, \"great!\"      Interval history  Has been medically stable since our last visit on 3/4.    --PEG was removed 3/21 and the site has healed.   --Saw [facility] ortho team on 6/9; per their note -       --Saw Dr. Abdullahi 6/23  #1 Inflammatory myositis presented with subacute " progressive painless bilateral upper limb weakness  #2 Dysphagia s/p PEG tube, now improved after steroid therapy, PEG tube removed  #3 Dysarthria, improved  #4 Positive AchR antibody without clinical signs of neuromuscular junction defect  #5 history of bladder cancer s/p neobladder sx     Exam today continues to show slight improvement in muscle strength of proximal arms and wrist muscles. No signs of relapse while tapering steroid. He is tolerating MMF well.  There continues to be no fatigability to suggest neuromuscular junction defect. Response to immunosuppressive therapy and muscle biopsy findings still support the diagnosis of inflammatory myositis.  Myositis specific antibody was overall unremarkable except mild elevation of U1 RNP antibody.  CT abdomen pelvis did not show occult malignancy.      Recommendations:  - Continue mycophenolate mofetil 1000 mg twice daily  - Continue slow taper of prednisone 15 mg for 4 weeks then decrease to 12.5 mg for 4 weeks then 10 mg for 4 weeks then 7.5 mg for 4 weeks then 5 mg until next appointment  - Continue PT, OT  - Labs for CBC and CMP every 4 weeks while on CellCept  - Follow-up in 6 months      History of Present Illness-Steven Greene, an 79-year-old male, was seen in March for a pectube, which was removed later that month. The site has healed, leaving a scar resembling a second belly button, with no pain reported. Earlier this month, Steven was evaluated at [facility] for knee pain. The left knee, which had a revision, is stable, but arthritis in the right knee persists. Physical therapy led to a displacement of the kneecap, which remains off to the side due to stronger muscles or tendons on one side. X-rays were performed at [facility], but results are not accessible. Steven has been tapering prednisone under Dr. Granda's guidance and is required to have monthly blood draws while on CellCept. He has been undergoing occupational therapy at home, with  the last session scheduled for next week. Physical therapy and speech therapy have been completed. Steven uses a cane primarily outside and reports no falls. He manages his daily routine independently but occasionally requires assistance with tasks like opening tight objects. He experiences some memory lapses, such as leaving the stove burner on once. Steven has a neurogenic bladder, wears pads for incontinence, and drinks plenty of water as advised. He reports no issues with bowel movements.             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, stage pT1, carcinoma in situ, stage Tis, N0, M0 s/p cystoprostatectomy with ileal neobladder formation    Pure hypercholesterolemia      Past Surgical History:   Procedure Laterality Date    ARTHROPLASTY KNEE Left 08/23/2022    Procedure: LEFT TOTAL KNEE ARTHROPLASTY;  Surgeon: Melba Dial MD;  Location:  OR    ARTHROPLASTY REVISION KNEE Left 09/05/2023    Procedure: REVISION TOTAL KNEE ARTHROPLASTY PATELLA;  Surgeon: Melba Dial MD;  Location:  OR    BIOPSY MUSCLE DIAGNOSTIC (LOCATION) Right 2/14/2025    Procedure: BIOPSY, MUSCLE, RIGHT TRICEPS;  Surgeon: Bakari Barber MD;  Location: [facility] OR    BUNIONECTOMY Left     BUNIONECTOMY DIANA  11/28/2011    RT-Procedure:BUNIONECTOMY DIANA; Right Foot Dwaine Diana Bunionectomy with Metatarsal and Phalanx Osteotomy with 1st Metatarsal Phalangeal Joint Repair Right Foot; Surgeon:BAKARI ZAIDI; Location:[facility]    CATARACT EXTRACTION,  BILATERAL      CV CORONARY ANGIOGRAM N/A 11/11/2024    Procedure: Coronary Angiogram;  Surgeon: Krystian Machuca MD;  Location:  HEART CARDIAC CATH LAB    CV HEART CATHETERIZATION WITH POSSIBLE INTERVENTION N/A 11/07/2020    Procedure: Heart Catheterization with Possible Intervention;  Surgeon: Rishi Llanes MD;  Location:  HEART CARDIAC CATH LAB    CV LEFT HEART CATH N/A 11/11/2024    Procedure: Left Heart Catheterization;  Surgeon: Krystian Machuca MD;  Location:  HEART CARDIAC CATH LAB    CYSTECTOMY BLADDER, ILEAL DIVERSION NEOBLADDER, COMBINED  2006    HERNIORRHAPHY INCISIONAL (LOCATION) N/A 08/01/2016    Procedure: HERNIORRHAPHY INCISIONAL (LOCATION);  Surgeon: Onofre Lua MD;  Location:  [facility]    IR GASTROSTOMY TUBE PERCUTANEOUS PLCMNT  11/13/2024    OPEN REDUCTION INTERNAL FIXATION PATELLA Left 09/05/2023    Procedure: LEFT KNEE OPEN REDUCTION INTERNAL  FIXATION PATELLA FRACTURE WITH;  Surgeon: Melba Dial MD;  Location:  OR    OPEN REDUCTION INTERNAL FIXATION PATELLA Left 10/10/2023    Procedure: LEFT KNEE REVISION OPEN REDUCTION INTERNAL FIXATION PATELLA WITH POLY REMOVAL, PATELLAR;  Surgeon: Melba Dial MD;  Location:  OR    REMOVE HARDWARE KNEE Left 10/10/2023    Procedure: WITH POLY REMOVAL, PATELLAR;  Surgeon: Melba Dial MD;  Location:  OR            Social History:     Social History     Tobacco Use    Smoking status: Former     Types: Cigarettes     Passive exposure: Never (per pt)    Smokeless tobacco: Never    Tobacco comments:     Quit in 1987; smoked for 20 years; 1.5 ppd at his most.    Substance Use Topics    Alcohol use: Not Currently       Marital Status: , wife is Yara Berry  Living situation: House, 3 [address], railings. All needs met on main floor.   Family support: Wife is supportive, kids nearby  Vocational History: Retired  Tobacco use: former smoker  Alcohol use: None  Recreational drug use: None         Functional history:      Mendez Greene was independent with all aspects of life prior to Dec admission. Now     ADLs: Modified independent  Assistive devices: Walker at home, cane  iADLs (medication management and finances): Wife helps, pt taking over more things like medications  Hand dominance: Right handed  Driving: Not driving           Family History:     Family History   Problem Relation Age of Onset    Osteoporosis Mother     Neurologic Disorder Mother         PD    Cerebrovascular Disease Mother     Hypertension Mother     Hyperlipidemia Mother     Cerebrovascular Disease Father     Diabetes Type 1 Sister     Cancer Maternal Grandmother         unknown type    Diabetes Type 2  No family hx of     Myocardial Infarction No family hx of     Prostate Cancer No family hx of     Colon Cancer No family hx of     Anesthesia Reaction No family hx of     Venous thrombosis No family hx of             Medications:     Current Outpatient Medications   Medication Sig Dispense Refill    acetaminophen (TYLENOL) 325 MG tablet Take 1-2 tablets (325-650 mg) by mouth every 6 hours as needed for mild pain.      aspirin 81 MG EC tablet Take 81 mg by mouth every evening.      calcium carbonate (OS-SIMONA) 500 MG tablet Take 1 tablet (500 mg) by mouth 2 times daily. 120 tablet 2    Cholecalciferol (D3 PO) Take 1 tablet by mouth daily. OTC: Unsure of strength.      levothyroxine (SYNTHROID/LEVOTHROID) 175 MCG tablet Take 1 tablet (175 mcg) by mouth daily. 90 tablet 1    lisinopril (ZESTRIL) 5 MG tablet Take 1 tablet (5 mg) by mouth daily. 90 tablet 3    mycophenolate (GENERIC EQUIVALENT) 500 MG tablet Take 2 tablets (1,000 mg) by mouth 2 times daily. 240 tablet 3    olopatadine (PATANOL) 0.1 % ophthalmic solution Place 1 drop into both eyes 2 times daily as needed for allergies.      omeprazole (PRILOSEC) 20 MG DR capsule Take 1 capsule (20 mg) by mouth daily. 90 capsule 2    order for DME Equipment being ordered: CPAP and supplies. LIFETIME need. 1 each  "0    predniSONE (DELTASONE) 5 MG tablet Take 15 mg for 4 weeks then decrease to 12.5 mg for 4 weeks then 10 mg for 4 weeks then 7.5 mg for 4 weeks then 5 mg until next appointment 180 tablet 3    pyRIDostigmine (MESTINON) 60 MG tablet Take 1 tablet (60 mg) by mouth 2 times daily. Take 1 tablet 3 times a day while awake.      vitamin B-12 (CYANOCOBALAMIN) 1000 MCG tablet Take 1,000 mcg by mouth daily.              Allergies:     No Known Allergies           ROS:     A focused ROS is negative other than the symptoms noted above in the HPI.    Patient denied headaches, dizziness, lightheadedness, changes in vision/hearing, sore throat, runny nose, cough, SOB, chest pain, abdominal pain, nausea, vomiting, constipation, dysuria, hematuria, melena, bloody stools, and/or joint aches/pains.            Physical Examiniation:     VITAL SIGNS: There were no vitals taken for this visit.  BMI: Estimated body mass index is 26 kg/m  as calculated from the following:    Height as of 3/11/25: 1.778 m (5' 10\").    Weight as of 4/2/25: 82.2 kg (181 lb 3.2 oz).    Gen: NAD, pleasant and cooperative   Cardio: regular pulse  Pulm: non-labored breathing in room air  Abd: benign - PEG site had a small granulation tissue at 9 o'clock with small amount of yellowish drainage at the site    Ext: WWP, no edema in BLE, no tenderness in calves  MSK/neuro:   Mental Status:  alert and oriented x3    Cranial Nerves: grossly normal    Strength:    SF  EF  EE  WE  G  I  HF  KE  DF  EHL  PF   R  4 4- 5 4- 4 4 4+ 4 5 5 5  L  4 4- 5 4- 4 4 4+ 4 5 5 5    Reflexes: Present and symmetrical    Tone per modified Gabriella Scale: 0   Abnormal movements: None    Speech: Clear, no aphasia   Cognition: Intact, good historian           Laboratory/Imaging:     CBC RESULTS:   Recent Labs   Lab Test 06/16/25  1355 06/02/25  1111 05/19/25  1257   WBC 6.8 8.9 9.2   RBC 3.29* 3.23* 3.23*   HGB 9.4* 9.5* 9.7*   HCT 29.8* 30.1* 30.5*   MCV 91 93 94   MCH 28.6 29.4 30.0 "   MCHC 31.5 31.6 31.8   RDW 16.9* 16.8* 17.2*    364 286     Last Basic Metabolic Panel:  Recent Labs   Lab Test 06/16/25  1355 06/02/25  1111 05/19/25  1257    139 139   POTASSIUM 4.6 4.7 4.4   CHLORIDE 108* 105 105   CO2 22 23 22   ANIONGAP 12 11 12   * 115* 121*   BUN 32.9* 27.5* 33.6*   CR 0.96 0.80 0.79   GFRESTIMATED 80 89 90   SIMONA 9.1 9.6 9.2       Muscle biopsy results 2/14/25  DIAGNOSIS:   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   COMMENT:   The presence of perimysial and perivascicularr inflammatory cells, and heterogeneous fascicular atrophy and fibrosis may provide the histological appearance of inflammatory myopathy with propensity to involve connective tissue as there was no significant endomysial or perivascular inflammation. Non-specific nature of this biopsy may be due to heterogeneous involvement and anti-inflammatory treatment effect. Other systemic connective tissue diseases, such as lupus erythematosus and mixed connective tissue disorder, can cause a similar histological appearance. Clinical correlation is required. Tissue remains for ultrastructural, biochemical or genetic testing if clinically indicated.            Assessment/Plan:     Mr. Steven Greene is an 80 yo M PMHx of progressive dysphagia and dysarthria in the last year, and painless proximal>distal UE weakness, initially thought to be ALS, then thought to be myasthenia gravis, now with evidence of an inflammatory myopathy. Seen in PM&R clinic as follow up after ARU admission in December, 2024.     # Subacute progressive painless bilateral upper limb weakness: R/O inflammatory myositis (?BCIM)  # Dysphagia s/p PEG tube  # Dysarthria, improved  # Positive AchR antibody without clinical signs of neuromuscular junction defect  # History of bladder cancer s/p neobladder surgery     PMH is also significant for paroxysmal atrial fibrillation, CAD, ascending aortic aneurysm, HLD, HTN, hypothyroidism, CHA, chronic macrocytic anemia       Assessment & PlanBob's knee pain and arthritis are being managed with current treatment as advised by the orthopedic surgeon, with no further surgical intervention planned as long as functionality is maintained. His inflammatory myositis, causing muscle weakness and atrophy, is being managed with mycophenolate and tapering prednisone, with monthly blood draws required while on CellCept. Transition to outpatient occupational and physical therapy is planned after completing home therapy, with regular exercise and staying active recommended to maintain muscle strength and slow decline. For his neurogenic bladder with sphincter dysfunction leading to incontinence, Steven will continue using pads for management, with potential future surgical intervention not currently planned due to the potential for a difficult recovery.             Patient education: In depth discussion and education was provided about the assessment and implications of each of the below recommendations for management. Patient indicated readiness to learn, all questions were answered and understanding of material presented was confirmed.  Work-up: None needed at this time  Therapy/equipment/braces: No new equipment needs at this time. Continue home care with PT, OT and SLP. Will transition to outpatient when ready   Medications: No medication changes made today. Encouraged increased protein intake at length.   Interventions: No interventions today. PEG was paced by IR on 11/13/2024. Will have silver nitrate in the room for next visit in case pt's PEG is being removed at that time. If he maintains adequate nutrition by mouth, his PEG can potentially be removed but will check with Dr. Abdullahi as well.   Referral / follow up with other providers:  Continue to see neuromuscular, urology, neurology, and PCP teams as scheduled.  Follow up: with PM&R in 3 months.    Kelly Antonio MD  Physical Medicine & Rehabilitation      ***  Consent was obtained  from the patient to use an AI documentation tool in the creation of this note.        Physician Attestation   I, Kelly Antonio MD, saw this patient and agree with the findings and plan of care as documented in the note.      Items personally reviewed/procedural attestation: notes in the chart and agree with the interpretation documented in the note.    The above note was edited by me. Sent a message to IR team (Kylee Ching PA-C) to clarify the PEG tube type and also another message to Dr. Abdullahi about his prognosis and the possibility of removing the tube.     Kelly Antonio MD    **  03/10/25   [place] back from IR team -   Hello, FYI, I only cover [facility] intermittently, so may not respond quickly if I'm not working in the system. IR technically does NOT place PEG tubes (they are placed inside to outside), we place perc G tubes (outside to inside). Therefore, IR will never place a mushroom tube, only balloon     We can deflate the balloon and remove the tube at or after 6 week claudia.

## 2025-07-01 ENCOUNTER — TELEPHONE (OUTPATIENT)
Dept: INTERNAL MEDICINE | Facility: CLINIC | Age: 80
End: 2025-07-01
Payer: MEDICARE

## 2025-07-01 NOTE — TELEPHONE ENCOUNTER
Home Care is calling regarding an established patient with [facility].  Requesting orders from: Gladys Vazquez  RN APPROVED: RN able to provide verbal orders.  Home Care will send orders for signature.  RN will close encounter.  Is this a request for a temporary pause in the home care episode?  No    Orders Requested    Occupational Therapy  Request for continuation of care with no increase or decrease in frequency  Frequency: 4 visits over the next 8 weeks  RN gave verbal order: Yes          Contacts       Contact Date/Time Type Contact Phone/Fax    07/01/2025 11:18 AM CDT Phone (Incoming) STANLEY Su [facility] [phone]     detailed voicemail ok          Rosie Hilton, RN

## 2025-07-24 ENCOUNTER — DOCUMENTATION ONLY (OUTPATIENT)
Dept: INTERNAL MEDICINE | Facility: CLINIC | Age: 80
End: 2025-07-24
Payer: MEDICARE

## 2025-07-24 DIAGNOSIS — Z13.1 SCREENING FOR DIABETES MELLITUS: ICD-10-CM

## 2025-07-24 DIAGNOSIS — E03.4 HYPOTHYROIDISM DUE TO ACQUIRED ATROPHY OF THYROID: ICD-10-CM

## 2025-07-24 DIAGNOSIS — E78.00 PURE HYPERCHOLESTEROLEMIA: Primary | ICD-10-CM

## 2025-07-24 NOTE — PROGRESS NOTES
Mendez Greene has an upcoming lab appointment:    Future Appointments   Date Time Provider Department [place]   7/28/2025 10:30 AM [place] [facility]    8/1/2025 10:30 AM Gladys Vazquez MD [facility]   1/5/2026 11:30 AM Zbigniew Abdullahi MD [facility]   5/19/2026 11:40 AM Kelly Antonio MD [facility]     Patient is scheduled for the following lab(s):   Scheduling Notes: 4 week follow up   Made On: 7/1/2025 11:36 AM By: BRENDON GLOVER         Health Maintenance Due   Topic    ANNUAL REVIEW OF  ORDERS      Jenn Isidro

## 2025-07-28 ENCOUNTER — LAB (OUTPATIENT)
Dept: LAB | Facility: CLINIC | Age: 80
End: 2025-07-28
Payer: MEDICARE

## 2025-07-28 DIAGNOSIS — E78.00 PURE HYPERCHOLESTEROLEMIA: ICD-10-CM

## 2025-07-28 DIAGNOSIS — D64.9 ANEMIA, UNSPECIFIED TYPE: ICD-10-CM

## 2025-07-28 DIAGNOSIS — E03.4 HYPOTHYROIDISM DUE TO ACQUIRED ATROPHY OF THYROID: Primary | ICD-10-CM

## 2025-07-28 DIAGNOSIS — D64.9 ANEMIA, UNSPECIFIED TYPE: Primary | ICD-10-CM

## 2025-07-28 DIAGNOSIS — E03.4 HYPOTHYROIDISM DUE TO ACQUIRED ATROPHY OF THYROID: ICD-10-CM

## 2025-07-28 DIAGNOSIS — M33.20: ICD-10-CM

## 2025-07-28 DIAGNOSIS — Z13.1 SCREENING FOR DIABETES MELLITUS: ICD-10-CM

## 2025-07-28 DIAGNOSIS — R29.898 BILATERAL ARM WEAKNESS: ICD-10-CM

## 2025-07-28 LAB
ALBUMIN SERPL BCG-MCNC: 3.9 G/DL (ref 3.5–5.2)
ALP SERPL-CCNC: 40 U/L (ref 40–150)
ALT SERPL W P-5'-P-CCNC: 7 U/L (ref 0–70)
ANION GAP SERPL CALCULATED.3IONS-SCNC: 9 MMOL/L (ref 7–15)
AST SERPL W P-5'-P-CCNC: 17 U/L (ref 0–45)
BASOPHILS # BLD AUTO: 0 10E3/UL (ref 0–0.2)
BASOPHILS NFR BLD AUTO: 0 %
BILIRUB SERPL-MCNC: 0.3 MG/DL
BUN SERPL-MCNC: 33.4 MG/DL (ref 8–23)
CALCIUM SERPL-MCNC: 9.6 MG/DL (ref 8.8–10.4)
CHLORIDE SERPL-SCNC: 107 MMOL/L (ref 98–107)
CHOLEST SERPL-MCNC: 192 MG/DL
CREAT SERPL-MCNC: 1.16 MG/DL (ref 0.67–1.17)
EGFRCR SERPLBLD CKD-EPI 2021: 64 ML/MIN/1.73M2
EOSINOPHIL # BLD AUTO: 0.1 10E3/UL (ref 0–0.7)
EOSINOPHIL NFR BLD AUTO: 1 %
ERYTHROCYTE [DISTWIDTH] IN BLOOD BY AUTOMATED COUNT: 17.5 % (ref 10–15)
FASTING STATUS PATIENT QL REPORTED: YES
FASTING STATUS PATIENT QL REPORTED: YES
FERRITIN SERPL-MCNC: 21 NG/ML (ref 31–409)
GLUCOSE SERPL-MCNC: 96 MG/DL (ref 70–99)
HCO3 SERPL-SCNC: 23 MMOL/L (ref 22–29)
HCT VFR BLD AUTO: 30 % (ref 40–53)
HDLC SERPL-MCNC: 80 MG/DL
HGB BLD-MCNC: 9.5 G/DL (ref 13.3–17.7)
IMM GRANULOCYTES # BLD: 0.1 10E3/UL
IMM GRANULOCYTES NFR BLD: 1 %
IRON BINDING CAPACITY (ROCHE): 384 UG/DL (ref 240–430)
IRON SATN MFR SERPL: 8 % (ref 15–46)
IRON SERPL-MCNC: 31 UG/DL (ref 61–157)
LDLC SERPL CALC-MCNC: 86 MG/DL
LYMPHOCYTES # BLD AUTO: 0.8 10E3/UL (ref 0.8–5.3)
LYMPHOCYTES NFR BLD AUTO: 11 %
MCH RBC QN AUTO: 27.7 PG (ref 26.5–33)
MCHC RBC AUTO-ENTMCNC: 31.7 G/DL (ref 31.5–36.5)
MCV RBC AUTO: 88 FL (ref 78–100)
MONOCYTES # BLD AUTO: 0.7 10E3/UL (ref 0–1.3)
MONOCYTES NFR BLD AUTO: 9 %
NEUTROPHILS # BLD AUTO: 6.3 10E3/UL (ref 1.6–8.3)
NEUTROPHILS NFR BLD AUTO: 79 %
NONHDLC SERPL-MCNC: 112 MG/DL
PLATELET # BLD AUTO: 276 10E3/UL (ref 150–450)
POTASSIUM SERPL-SCNC: 5.3 MMOL/L (ref 3.4–5.3)
PROT SERPL-MCNC: 6 G/DL (ref 6.4–8.3)
RBC # BLD AUTO: 3.43 10E6/UL (ref 4.4–5.9)
SODIUM SERPL-SCNC: 139 MMOL/L (ref 135–145)
T4 FREE SERPL-MCNC: 2.1 NG/DL (ref 0.9–1.7)
TRIGL SERPL-MCNC: 132 MG/DL
TSH SERPL DL<=0.005 MIU/L-ACNC: 0.15 UIU/ML (ref 0.3–4.2)
VIT B12 SERPL-MCNC: 376 PG/ML (ref 232–1245)
WBC # BLD AUTO: 8 10E3/UL (ref 4–11)

## 2025-07-28 PROCEDURE — 84439 ASSAY OF FREE THYROXINE: CPT

## 2025-07-28 PROCEDURE — 83540 ASSAY OF IRON: CPT

## 2025-07-28 PROCEDURE — 82607 VITAMIN B-12: CPT

## 2025-07-28 PROCEDURE — 82728 ASSAY OF FERRITIN: CPT

## 2025-07-28 PROCEDURE — 80061 LIPID PANEL: CPT

## 2025-07-28 PROCEDURE — 83550 IRON BINDING TEST: CPT

## 2025-07-28 PROCEDURE — 80053 COMPREHEN METABOLIC PANEL: CPT

## 2025-07-28 PROCEDURE — 84443 ASSAY THYROID STIM HORMONE: CPT

## 2025-07-28 PROCEDURE — 85025 COMPLETE CBC W/AUTO DIFF WBC: CPT

## 2025-07-28 RX ORDER — LEVOTHYROXINE SODIUM 125 UG/1
125 TABLET ORAL
Qty: 90 TABLET | Refills: 3 | Status: SHIPPED | OUTPATIENT
Start: 2025-07-28

## 2025-07-31 ENCOUNTER — TELEPHONE (OUTPATIENT)
Dept: INTERNAL MEDICINE | Facility: CLINIC | Age: 80
End: 2025-07-31
Payer: MEDICARE

## 2025-07-31 NOTE — TELEPHONE ENCOUNTER
Home Care is calling regarding an established patient with [facility].  Requesting orders from: Gladys Vazquez: RN able to provide verbal orders.  Sending as FYI only.  Patient had two falls on 7/24/25. Both were exiting his home via a stoop that does not have a railing. The first time he fell he was not using his cane correctly and fell on his buttock. The second fall occurred because he forgot his cane. Again he fell back on his buttock. Patient sustained no skin tears, injuries and did not hit his head.  Shaina ANGEL requested order for PT evaluation due to patient complaint of gluteal pain. Patient informed OT that he had this pain before his fall and the pain may have contributed to his fall.  Patient is scheduled for physical exam tomorrow with Dr. Vazquez.     Is this a request for a temporary pause in the home care episode?  No    Orders Requested    Physical Therapy  Request for initial evaluation and treatment (one time)   RN gave verbal order: Yes        Phone number Home Care can be reached at:   Okay to leave a detailed message?:     Contacts       Contact Date/Time Type Contact Phone/Fax    07/31/2025 10:56 AM CDT Phone (Incoming) [phone]     confidential VM          Ree Valdez, KEVIN

## 2025-07-31 NOTE — TELEPHONE ENCOUNTER
Called and spoke with Sharlene to relay provider note below.  She verbalized understanding.     Thank you,  Araseli Izquierdo RN

## 2025-08-01 DIAGNOSIS — Z53.9 DIAGNOSIS NOT YET DEFINED: Primary | ICD-10-CM

## 2025-08-05 ENCOUNTER — VIRTUAL VISIT (OUTPATIENT)
Dept: ONCOLOGY | Facility: CLINIC | Age: 80
End: 2025-08-05
Attending: INTERNAL MEDICINE
Payer: MEDICARE

## 2025-08-05 ENCOUNTER — TELEPHONE (OUTPATIENT)
Dept: INTERNAL MEDICINE | Facility: CLINIC | Age: 80
End: 2025-08-05

## 2025-08-05 VITALS — WEIGHT: 171 LBS | HEIGHT: 69 IN | BODY MASS INDEX: 25.33 KG/M2

## 2025-08-05 DIAGNOSIS — D50.0 IRON DEFICIENCY ANEMIA DUE TO CHRONIC BLOOD LOSS: Primary | ICD-10-CM

## 2025-08-05 PROCEDURE — 98005 SYNCH AUDIO-VIDEO EST LOW 20: CPT | Performed by: INTERNAL MEDICINE

## 2025-08-05 PROCEDURE — 1126F AMNT PAIN NOTED NONE PRSNT: CPT | Performed by: INTERNAL MEDICINE

## 2025-08-05 RX ORDER — HEPARIN SODIUM (PORCINE) LOCK FLUSH IV SOLN 100 UNIT/ML 100 UNIT/ML
5 SOLUTION INTRAVENOUS
OUTPATIENT
Start: 2025-08-12

## 2025-08-05 RX ORDER — HEPARIN SODIUM,PORCINE 10 UNIT/ML
5-20 VIAL (ML) INTRAVENOUS DAILY PRN
OUTPATIENT
Start: 2025-08-12

## 2025-08-05 RX ORDER — MEPERIDINE HYDROCHLORIDE 25 MG/ML
25 INJECTION INTRAMUSCULAR; INTRAVENOUS; SUBCUTANEOUS
OUTPATIENT
Start: 2025-08-12

## 2025-08-05 RX ORDER — DIPHENHYDRAMINE HYDROCHLORIDE 50 MG/ML
50 INJECTION, SOLUTION INTRAMUSCULAR; INTRAVENOUS
Start: 2025-08-12

## 2025-08-05 RX ORDER — EPINEPHRINE 1 MG/ML
0.3 INJECTION, SOLUTION INTRAMUSCULAR; SUBCUTANEOUS EVERY 5 MIN PRN
OUTPATIENT
Start: 2025-08-12

## 2025-08-05 RX ORDER — METHYLPREDNISOLONE SODIUM SUCCINATE 40 MG/ML
40 INJECTION INTRAMUSCULAR; INTRAVENOUS
Start: 2025-08-12

## 2025-08-05 RX ORDER — ALBUTEROL SULFATE 90 UG/1
1-2 INHALANT RESPIRATORY (INHALATION)
Start: 2025-08-12

## 2025-08-05 RX ORDER — ALBUTEROL SULFATE 0.83 MG/ML
2.5 SOLUTION RESPIRATORY (INHALATION)
OUTPATIENT
Start: 2025-08-12

## 2025-08-05 RX ORDER — DIPHENHYDRAMINE HYDROCHLORIDE 50 MG/ML
25 INJECTION, SOLUTION INTRAMUSCULAR; INTRAVENOUS
Start: 2025-08-12

## 2025-08-05 ASSESSMENT — PAIN SCALES - GENERAL: PAINLEVEL_OUTOF10: NO PAIN (0)

## 2025-08-11 ENCOUNTER — TELEPHONE (OUTPATIENT)
Dept: INTERNAL MEDICINE | Facility: CLINIC | Age: 80
End: 2025-08-11
Payer: MEDICARE

## 2025-08-11 DIAGNOSIS — R53.81 PHYSICAL DECONDITIONING: ICD-10-CM

## 2025-08-11 DIAGNOSIS — G72.49 INFLAMMATORY MYOPATHY: Primary | ICD-10-CM

## 2025-08-19 ENCOUNTER — INFUSION THERAPY VISIT (OUTPATIENT)
Dept: INFUSION THERAPY | Facility: CLINIC | Age: 80
End: 2025-08-19
Attending: INTERNAL MEDICINE
Payer: MEDICARE

## 2025-08-19 VITALS
RESPIRATION RATE: 16 BRPM | HEART RATE: 78 BPM | SYSTOLIC BLOOD PRESSURE: 115 MMHG | DIASTOLIC BLOOD PRESSURE: 78 MMHG | OXYGEN SATURATION: 98 % | TEMPERATURE: 97.9 F

## 2025-08-19 DIAGNOSIS — D50.0 IRON DEFICIENCY ANEMIA DUE TO CHRONIC BLOOD LOSS: Primary | ICD-10-CM

## 2025-08-19 PROCEDURE — 96376 TX/PRO/DX INJ SAME DRUG ADON: CPT

## 2025-08-19 PROCEDURE — 258N000003 HC RX IP 258 OP 636: Performed by: INTERNAL MEDICINE

## 2025-08-19 PROCEDURE — 96365 THER/PROPH/DIAG IV INF INIT: CPT

## 2025-08-19 PROCEDURE — 250N000011 HC RX IP 250 OP 636: Performed by: INTERNAL MEDICINE

## 2025-08-19 RX ORDER — EPINEPHRINE 1 MG/ML
0.3 INJECTION, SOLUTION INTRAMUSCULAR; SUBCUTANEOUS EVERY 5 MIN PRN
Status: CANCELLED | OUTPATIENT
Start: 2025-08-19

## 2025-08-19 RX ORDER — MEPERIDINE HYDROCHLORIDE 25 MG/ML
25 INJECTION INTRAMUSCULAR; INTRAVENOUS; SUBCUTANEOUS
Status: CANCELLED | OUTPATIENT
Start: 2025-08-19

## 2025-08-19 RX ORDER — HEPARIN SODIUM (PORCINE) LOCK FLUSH IV SOLN 100 UNIT/ML 100 UNIT/ML
5 SOLUTION INTRAVENOUS
Status: CANCELLED | OUTPATIENT
Start: 2025-08-19

## 2025-08-19 RX ORDER — ALBUTEROL SULFATE 0.83 MG/ML
2.5 SOLUTION RESPIRATORY (INHALATION)
Status: CANCELLED | OUTPATIENT
Start: 2025-08-19

## 2025-08-19 RX ORDER — METHYLPREDNISOLONE SODIUM SUCCINATE 40 MG/ML
40 INJECTION INTRAMUSCULAR; INTRAVENOUS
Status: CANCELLED
Start: 2025-08-19

## 2025-08-19 RX ORDER — ALBUTEROL SULFATE 90 UG/1
1-2 INHALANT RESPIRATORY (INHALATION)
Status: CANCELLED
Start: 2025-08-19

## 2025-08-19 RX ORDER — DIPHENHYDRAMINE HYDROCHLORIDE 50 MG/ML
25 INJECTION, SOLUTION INTRAMUSCULAR; INTRAVENOUS
Status: CANCELLED
Start: 2025-08-19

## 2025-08-19 RX ORDER — HEPARIN SODIUM,PORCINE 10 UNIT/ML
5-20 VIAL (ML) INTRAVENOUS DAILY PRN
Status: CANCELLED | OUTPATIENT
Start: 2025-08-19

## 2025-08-19 RX ORDER — DIPHENHYDRAMINE HYDROCHLORIDE 50 MG/ML
50 INJECTION, SOLUTION INTRAMUSCULAR; INTRAVENOUS
Status: CANCELLED
Start: 2025-08-19

## 2025-08-19 RX ADMIN — SODIUM CHLORIDE 25 MG: 9 INJECTION, SOLUTION INTRAVENOUS at 12:50

## 2025-08-19 RX ADMIN — SODIUM CHLORIDE 250 ML: 0.9 INJECTION, SOLUTION INTRAVENOUS at 12:49

## 2025-08-19 RX ADMIN — SODIUM CHLORIDE 975 MG: 0.9 INJECTION, SOLUTION INTRAVENOUS at 14:00

## 2025-08-25 ENCOUNTER — LAB (OUTPATIENT)
Dept: LAB | Facility: CLINIC | Age: 80
End: 2025-08-25
Payer: MEDICARE

## 2025-08-25 ENCOUNTER — OFFICE VISIT (OUTPATIENT)
Dept: URGENT CARE | Facility: URGENT CARE | Age: 80
End: 2025-08-25
Payer: MEDICARE

## 2025-08-25 ENCOUNTER — TELEPHONE (OUTPATIENT)
Dept: INTERNAL MEDICINE | Facility: CLINIC | Age: 80
End: 2025-08-25

## 2025-08-25 VITALS
HEART RATE: 63 BPM | RESPIRATION RATE: 20 BRPM | TEMPERATURE: 96.9 F | SYSTOLIC BLOOD PRESSURE: 108 MMHG | WEIGHT: 182.8 LBS | OXYGEN SATURATION: 98 % | DIASTOLIC BLOOD PRESSURE: 68 MMHG | BODY MASS INDEX: 26.99 KG/M2

## 2025-08-25 DIAGNOSIS — Z53.9 DIAGNOSIS NOT YET DEFINED: Primary | ICD-10-CM

## 2025-08-25 DIAGNOSIS — S51.812A SKIN TEAR OF LEFT FOREARM WITHOUT COMPLICATION, INITIAL ENCOUNTER: Primary | ICD-10-CM

## 2025-08-25 DIAGNOSIS — R29.898 BILATERAL ARM WEAKNESS: ICD-10-CM

## 2025-08-25 DIAGNOSIS — M33.20: ICD-10-CM

## 2025-08-25 LAB
ALBUMIN SERPL BCG-MCNC: 3.8 G/DL (ref 3.5–5.2)
ALP SERPL-CCNC: 37 U/L (ref 40–150)
ALT SERPL W P-5'-P-CCNC: 10 U/L (ref 0–70)
ANION GAP SERPL CALCULATED.3IONS-SCNC: 10 MMOL/L (ref 7–15)
AST SERPL W P-5'-P-CCNC: 18 U/L (ref 0–45)
BASOPHILS # BLD AUTO: <0.04 10E3/UL (ref 0–0.2)
BASOPHILS NFR BLD AUTO: 0.3 %
BILIRUB SERPL-MCNC: 0.4 MG/DL
BUN SERPL-MCNC: 30.1 MG/DL (ref 8–23)
CALCIUM SERPL-MCNC: 9.5 MG/DL (ref 8.8–10.4)
CHLORIDE SERPL-SCNC: 104 MMOL/L (ref 98–107)
CREAT SERPL-MCNC: 0.94 MG/DL (ref 0.67–1.17)
EGFRCR SERPLBLD CKD-EPI 2021: 82 ML/MIN/1.73M2
EOSINOPHIL # BLD AUTO: 0.09 10E3/UL (ref 0–0.7)
EOSINOPHIL NFR BLD AUTO: 1.4 %
ERYTHROCYTE [DISTWIDTH] IN BLOOD BY AUTOMATED COUNT: 17.4 % (ref 10–15)
GLUCOSE SERPL-MCNC: 92 MG/DL (ref 70–99)
HCO3 SERPL-SCNC: 25 MMOL/L (ref 22–29)
HCT VFR BLD AUTO: 31.9 % (ref 40–53)
HGB BLD-MCNC: 10.1 G/DL (ref 13.3–17.7)
IMM GRANULOCYTES # BLD: 0.09 10E3/UL
IMM GRANULOCYTES NFR BLD: 1.4 %
LYMPHOCYTES # BLD AUTO: 2.47 10E3/UL (ref 0.8–5.3)
LYMPHOCYTES NFR BLD AUTO: 37.7 %
MCH RBC QN AUTO: 28.2 PG (ref 26.5–33)
MCHC RBC AUTO-ENTMCNC: 31.7 G/DL (ref 31.5–36.5)
MCV RBC AUTO: 89.1 FL (ref 78–100)
MONOCYTES # BLD AUTO: 0.62 10E3/UL (ref 0–1.3)
MONOCYTES NFR BLD AUTO: 9.5 %
NEUTROPHILS # BLD AUTO: 3.26 10E3/UL (ref 1.6–8.3)
NEUTROPHILS NFR BLD AUTO: 49.7 %
PLATELET # BLD AUTO: 295 10E3/UL (ref 150–450)
POTASSIUM SERPL-SCNC: 4.4 MMOL/L (ref 3.4–5.3)
PROT SERPL-MCNC: 5.8 G/DL (ref 6.4–8.3)
RBC # BLD AUTO: 3.58 10E6/UL (ref 4.4–5.9)
SODIUM SERPL-SCNC: 139 MMOL/L (ref 135–145)
WBC # BLD AUTO: 6.55 10E3/UL (ref 4–11)

## 2025-08-25 PROCEDURE — 85025 COMPLETE CBC W/AUTO DIFF WBC: CPT

## 2025-08-25 PROCEDURE — 3074F SYST BP LT 130 MM HG: CPT | Performed by: FAMILY MEDICINE

## 2025-08-25 PROCEDURE — 99213 OFFICE O/P EST LOW 20 MIN: CPT | Performed by: FAMILY MEDICINE

## 2025-08-25 PROCEDURE — G0179 MD RECERTIFICATION HHA PT: HCPCS | Performed by: INTERNAL MEDICINE

## 2025-08-25 PROCEDURE — 80053 COMPREHEN METABOLIC PANEL: CPT

## 2025-08-25 PROCEDURE — 36415 COLL VENOUS BLD VENIPUNCTURE: CPT

## 2025-08-25 PROCEDURE — 3078F DIAST BP <80 MM HG: CPT | Performed by: FAMILY MEDICINE

## 2025-09-03 ENCOUNTER — E-VISIT (OUTPATIENT)
Dept: INTERNAL MEDICINE | Facility: CLINIC | Age: 80
End: 2025-09-03
Payer: MEDICARE

## 2025-09-03 DIAGNOSIS — R39.9 LOWER URINARY TRACT SYMPTOMS (LUTS): Primary | ICD-10-CM

## 2025-09-03 RX ORDER — SULFAMETHOXAZOLE AND TRIMETHOPRIM 800; 160 MG/1; MG/1
1 TABLET ORAL 2 TIMES DAILY
Qty: 28 TABLET | Refills: 0 | Status: SHIPPED | OUTPATIENT
Start: 2025-09-03 | End: 2025-09-17

## 2025-09-04 ENCOUNTER — LAB (OUTPATIENT)
Dept: LAB | Facility: CLINIC | Age: 80
End: 2025-09-04
Payer: MEDICARE

## 2025-09-04 DIAGNOSIS — R39.9 LOWER URINARY TRACT SYMPTOMS (LUTS): ICD-10-CM

## 2025-09-04 LAB
ALBUMIN UR-MCNC: >=300 MG/DL
APPEARANCE UR: ABNORMAL
BACTERIA #/AREA URNS HPF: ABNORMAL /HPF
BILIRUB UR QL STRIP: NEGATIVE
COLOR UR AUTO: ABNORMAL
GLUCOSE UR STRIP-MCNC: NEGATIVE MG/DL
HGB UR QL STRIP: ABNORMAL
KETONES UR STRIP-MCNC: NEGATIVE MG/DL
LEUKOCYTE ESTERASE UR QL STRIP: ABNORMAL
NITRATE UR QL: POSITIVE
PH UR STRIP: 8.5 [PH] (ref 5–7)
RBC #/AREA URNS AUTO: ABNORMAL /HPF
SP GR UR STRIP: 1.01 (ref 1–1.03)
UROBILINOGEN UR STRIP-ACNC: 0.2 E.U./DL
WBC #/AREA URNS AUTO: >100 /HPF
WBC CLUMPS #/AREA URNS HPF: PRESENT /HPF

## (undated) DEVICE — DRILL BIT ARTHREX BB TAK MTP AR-13226

## (undated) DEVICE — BLADE SAW RECIP STRK 70X12.5X1.2MM 0277-096-281

## (undated) DEVICE — DRAPE LAP W/ARMBOARD 29410

## (undated) DEVICE — WIRE GUIDE 0.035"X260CM SAFE-T-J EXCHANGE G00517

## (undated) DEVICE — BLADE SAW RECIP STRK LONG 70X12.5X0.9MM 0277-096-278

## (undated) DEVICE — BONE CEMENT MIXEVAC III HI VAC KIT  0206-015-000

## (undated) DEVICE — LINEN TOWEL PACK X5 5464

## (undated) DEVICE — CATH ANGIO JUDKINS R4 6FRX100CM INFINITI 534621T

## (undated) DEVICE — IMM KNEE 20" 0814-2660

## (undated) DEVICE — IMPLANTABLE DEVICE: Type: IMPLANTABLE DEVICE | Site: KNEE | Status: NON-FUNCTIONAL

## (undated) DEVICE — WIRE GUIDE 0.035"X260CM AMPTLAZ XSTIFF CVD THSCF-35-260-3-A

## (undated) DEVICE — BNDG ELASTIC 4"X5YDS UNSTERILE 6611-40

## (undated) DEVICE — SYR 50ML LL W/O NDL 309653

## (undated) DEVICE — DRAPE CONVERTORS U-DRAPE 60X72" 8476

## (undated) DEVICE — BNDG ELASTIC 6"X5YDS UNSTERILE 6611-60

## (undated) DEVICE — BONE CLEANING TIP INTERPULSE  0210-010-000

## (undated) DEVICE — SPECIMEN CULTURETTE DBL SWAB 220109

## (undated) DEVICE — MANIFOLD KIT ANGIO AUTOMATED 014613

## (undated) DEVICE — ESU GROUND PAD ADULT W/CORD E7507

## (undated) DEVICE — PACK TOTAL KNEE SOP15TKFSD

## (undated) DEVICE — CATH ANGIO JUDKINS JL4 6FRX100CM INFINITI 534620T

## (undated) DEVICE — DRSG ADAPTIC 3X8" 6113

## (undated) DEVICE — CATH ANGIO INFINITI PIGTAIL 145 6 SH 6FRX110CM  534-652S

## (undated) DEVICE — ESU GROUND PAD UNIVERSAL W/O CORD

## (undated) DEVICE — TOTE ANGIO CORP PC15AT SAN32CC83O

## (undated) DEVICE — DRSG ABDOMINAL 07 1/2X8" 7197D

## (undated) DEVICE — CAST PADDING 6" STERILE 9046S

## (undated) DEVICE — SU VICRYL 3-0 SH 27" J316H

## (undated) DEVICE — SU VICRYL 2-0 CT-1 27" UND J259H

## (undated) DEVICE — GLOVE PROTEXIS POWDER FREE 7.0 ORTHOPEDIC 2D73ET70

## (undated) DEVICE — WRAP EZY KNEE 1213PP

## (undated) DEVICE — SPECIMEN CONTAINER 5OZ STERILE 2600SA

## (undated) DEVICE — NDL SPINAL 18GA 3.5" 405184

## (undated) DEVICE — PREP CHLORAPREP 26ML TINTED HI-LITE ORANGE 930815

## (undated) DEVICE — Device

## (undated) DEVICE — STPL SKIN 35W 6.9MM  PXW35

## (undated) DEVICE — DRAPE C-ARM 60X42" 1013

## (undated) DEVICE — SUCTION MANIFOLD NEPTUNE 2 SYS 4 PORT 0702-020-000

## (undated) DEVICE — SU VICRYL 4-0 PS-2 18" UND J496H

## (undated) DEVICE — CATH GUIDING BLUE YELLOW PTFE XB3.5 6FRX100CM 67005400

## (undated) DEVICE — DECANTER BAG 2002S

## (undated) DEVICE — SOL NACL 0.9% IRRIG 1000ML BOTTLE 2F7124

## (undated) DEVICE — BLADE SAW SAGITTAL THK1.13 MM L90 MM X W18 MM 4118-127-090

## (undated) DEVICE — HOOD SURG T7PLUS PEEL AWAY FACE SHIELD STRL LF 0416-801-100

## (undated) DEVICE — DRILL BIT ARTHREX MTP 2.0MM AR-8944-22

## (undated) DEVICE — TOURNIQUET CUFF 30" STERILE

## (undated) DEVICE — GLOVE BIOGEL PI MICRO INDICATOR UNDERGLOVE SZ 8.0 48980

## (undated) DEVICE — SOLUTION WOUND CLEANSING 3/4OZ 10% PVP EA-L3011FB-50

## (undated) DEVICE — SU VICRYL 1 CTX CR 8X18" J765D

## (undated) DEVICE — SU FIBERWIRE 0 38" BLUE 22.2MM W/TAPER NDL  AR-7250

## (undated) DEVICE — GLOVE PROTEXIS MICRO 6.5  2D73PM65

## (undated) DEVICE — KIT HAND CONTROL ANGIOTOUCH ACIST 65CM AT-P65

## (undated) DEVICE — WRAP EZY KNEE

## (undated) DEVICE — INTRO GLIDESHEATH SLENDER 6FR 10X45CM 60-1060

## (undated) DEVICE — DRSG GAUZE 4X4" 8044

## (undated) DEVICE — GLOVE PROTEXIS POWDER FREE 7.5 ORTHOPEDIC 2D73ET75

## (undated) DEVICE — GLIDEWIRE TERUMO .035X180CM 1.5,, J-TIP GR3525

## (undated) DEVICE — CATH ANGIO JUDKINS JL3.5 6FRX100CM INFINITI 534618T

## (undated) DEVICE — MANIFOLD NEPTUNE 4 PORT 700-20

## (undated) DEVICE — CAST PADDING 6" UNSTERILE 9046

## (undated) DEVICE — SU SILK 2-0 TIE 12X30" A305H

## (undated) DEVICE — SLEEVE TR BAND RADIAL COMPRESSION DEVICE 24CM TRB24-REG

## (undated) DEVICE — SU ETHIBOND 0 CTX CR  8X18" CX31D

## (undated) DEVICE — MEDITRACE MULTIFUNTION ADULT RADIOTRANSPARENT ELECTRODE FOR ZOLL

## (undated) DEVICE — SOL WATER IRRIG 1000ML BOTTLE 2F7114

## (undated) DEVICE — SU VICRYL 2-0 SH 27" UND J417H

## (undated) DEVICE — SURGICEL HEMOSTAT 2X3" 1953

## (undated) DEVICE — PREP DURAPREP 26ML APL 8630

## (undated) DEVICE — DRSG STERI STRIP 1/2X4" R1547

## (undated) DEVICE — CATH DIAGNOSTIC RADIAL 5FR TIG 4.0

## (undated) DEVICE — TOURNIQUET CUFF 34" STERILE

## (undated) DEVICE — SUCTION IRR SYSTEM W/O TIP INTERPULSE HANDPIECE 0210-100-000

## (undated) DEVICE — CAST PADDING 4" UNSTERILE 9044

## (undated) DEVICE — SYR BULB IRRIG DOVER 60 ML LATEX FREE 67000

## (undated) DEVICE — SYR 30ML SLIP TIP W/O NDL 302833

## (undated) DEVICE — DEFIB PRO-PADZ LVP LQD GEL ADULT 8900-2105-01

## (undated) DEVICE — TAPE DURAPORE 3" SILK 1538-3

## (undated) DEVICE — BLADE CLIPPER DISP 4406

## (undated) DEVICE — BLADE SAW SAGITTAL STRK 18X90X1.19MM HD SYS 6 6118-119-090

## (undated) DEVICE — DRAPE SHEET REV FOLD 3/4 9349

## (undated) DEVICE — CLOSURE ANGIOSEAL 6FR 610130

## (undated) DEVICE — SUCTION TIP YANKAUER STR K87

## (undated) DEVICE — PACK EXTREMITY SOP15EXFSD

## (undated) DEVICE — DRAPE COVER C-ARM SEAMLESS SNAP-KAP 03-KP26 LATEX FREE

## (undated) DEVICE — LINEN TOWEL PACK X6 WHITE 5487

## (undated) DEVICE — BONE WAX 2.5GM W31G

## (undated) RX ORDER — PROPOFOL 10 MG/ML
INJECTION, EMULSION INTRAVENOUS
Status: DISPENSED
Start: 2022-08-23

## (undated) RX ORDER — EPHEDRINE SULFATE 50 MG/ML
INJECTION, SOLUTION INTRAMUSCULAR; INTRAVENOUS; SUBCUTANEOUS
Status: DISPENSED
Start: 2023-10-10

## (undated) RX ORDER — HEPARIN SODIUM 1000 [USP'U]/ML
INJECTION, SOLUTION INTRAVENOUS; SUBCUTANEOUS
Status: DISPENSED
Start: 2020-11-07

## (undated) RX ORDER — FENTANYL CITRATE 0.05 MG/ML
INJECTION, SOLUTION INTRAMUSCULAR; INTRAVENOUS
Status: DISPENSED
Start: 2023-10-10

## (undated) RX ORDER — FENTANYL CITRATE 50 UG/ML
INJECTION, SOLUTION INTRAMUSCULAR; INTRAVENOUS
Status: DISPENSED
Start: 2024-11-11

## (undated) RX ORDER — HYDROMORPHONE HYDROCHLORIDE 1 MG/ML
INJECTION, SOLUTION INTRAMUSCULAR; INTRAVENOUS; SUBCUTANEOUS
Status: DISPENSED
Start: 2023-09-05

## (undated) RX ORDER — DIAZEPAM 5 MG
TABLET ORAL
Status: DISPENSED
Start: 2021-11-10

## (undated) RX ORDER — HEPARIN SODIUM 200 [USP'U]/100ML
INJECTION, SOLUTION INTRAVENOUS
Status: DISPENSED
Start: 2020-11-07

## (undated) RX ORDER — LIDOCAINE HYDROCHLORIDE 10 MG/ML
INJECTION, SOLUTION EPIDURAL; INFILTRATION; INTRACAUDAL; PERINEURAL
Status: DISPENSED
Start: 2025-02-14

## (undated) RX ORDER — TRANEXAMIC ACID 650 MG/1
TABLET ORAL
Status: DISPENSED
Start: 2023-10-10

## (undated) RX ORDER — FENTANYL CITRATE 50 UG/ML
INJECTION, SOLUTION INTRAMUSCULAR; INTRAVENOUS
Status: DISPENSED
Start: 2025-02-14

## (undated) RX ORDER — FENTANYL CITRATE 50 UG/ML
INJECTION, SOLUTION INTRAMUSCULAR; INTRAVENOUS
Status: DISPENSED
Start: 2023-10-10

## (undated) RX ORDER — DEXAMETHASONE SODIUM PHOSPHATE 4 MG/ML
INJECTION, SOLUTION INTRA-ARTICULAR; INTRALESIONAL; INTRAMUSCULAR; INTRAVENOUS; SOFT TISSUE
Status: DISPENSED
Start: 2023-10-10

## (undated) RX ORDER — FENTANYL CITRATE 50 UG/ML
INJECTION, SOLUTION INTRAMUSCULAR; INTRAVENOUS
Status: DISPENSED
Start: 2022-08-23

## (undated) RX ORDER — LIDOCAINE HYDROCHLORIDE 10 MG/ML
INJECTION, SOLUTION EPIDURAL; INFILTRATION; INTRACAUDAL; PERINEURAL
Status: DISPENSED
Start: 2024-11-11

## (undated) RX ORDER — FENTANYL CITRATE 50 UG/ML
INJECTION, SOLUTION INTRAMUSCULAR; INTRAVENOUS
Status: DISPENSED
Start: 2023-09-05

## (undated) RX ORDER — FENTANYL CITRATE 50 UG/ML
INJECTION, SOLUTION INTRAMUSCULAR; INTRAVENOUS
Status: DISPENSED
Start: 2020-11-07

## (undated) RX ORDER — TRANEXAMIC ACID 650 MG/1
TABLET ORAL
Status: DISPENSED
Start: 2023-09-05

## (undated) RX ORDER — HEPARIN SODIUM 200 [USP'U]/100ML
INJECTION, SOLUTION INTRAVENOUS
Status: DISPENSED
Start: 2024-11-11

## (undated) RX ORDER — BUPIVACAINE HYDROCHLORIDE AND EPINEPHRINE 2.5; 5 MG/ML; UG/ML
INJECTION, SOLUTION EPIDURAL; INFILTRATION; INTRACAUDAL; PERINEURAL
Status: DISPENSED
Start: 2023-10-10

## (undated) RX ORDER — BUPIVACAINE HYDROCHLORIDE 5 MG/ML
INJECTION, SOLUTION EPIDURAL; INTRACAUDAL
Status: DISPENSED
Start: 2023-10-10

## (undated) RX ORDER — EPINEPHRINE 1 MG/ML
INJECTION, SOLUTION INTRAMUSCULAR; SUBCUTANEOUS
Status: DISPENSED
Start: 2023-10-10

## (undated) RX ORDER — TRANEXAMIC ACID 650 MG/1
TABLET ORAL
Status: DISPENSED
Start: 2022-08-23

## (undated) RX ORDER — BUPIVACAINE HYDROCHLORIDE 2.5 MG/ML
INJECTION, SOLUTION EPIDURAL; INFILTRATION; INTRACAUDAL; PERINEURAL
Status: DISPENSED
Start: 2025-02-14

## (undated) RX ORDER — LIDOCAINE HYDROCHLORIDE 10 MG/ML
INJECTION, SOLUTION INFILTRATION; PERINEURAL
Status: DISPENSED
Start: 2024-11-13

## (undated) RX ORDER — CEFAZOLIN SODIUM 2 G/100ML
INJECTION, SOLUTION INTRAVENOUS
Status: DISPENSED
Start: 2024-11-13

## (undated) RX ORDER — ONDANSETRON 2 MG/ML
INJECTION INTRAMUSCULAR; INTRAVENOUS
Status: DISPENSED
Start: 2023-10-10

## (undated) RX ORDER — VERAPAMIL HYDROCHLORIDE 2.5 MG/ML
INJECTION, SOLUTION INTRAVENOUS
Status: DISPENSED
Start: 2024-11-11

## (undated) RX ORDER — HEPARIN SODIUM 1000 [USP'U]/ML
INJECTION, SOLUTION INTRAVENOUS; SUBCUTANEOUS
Status: DISPENSED
Start: 2024-11-11

## (undated) RX ORDER — CEFAZOLIN SODIUM/WATER 2 G/20 ML
SYRINGE (ML) INTRAVENOUS
Status: DISPENSED
Start: 2023-10-10

## (undated) RX ORDER — LIDOCAINE HYDROCHLORIDE 10 MG/ML
INJECTION, SOLUTION EPIDURAL; INFILTRATION; INTRACAUDAL; PERINEURAL
Status: DISPENSED
Start: 2020-11-07